# Patient Record
Sex: FEMALE | Race: BLACK OR AFRICAN AMERICAN | NOT HISPANIC OR LATINO | Employment: OTHER | URBAN - METROPOLITAN AREA
[De-identification: names, ages, dates, MRNs, and addresses within clinical notes are randomized per-mention and may not be internally consistent; named-entity substitution may affect disease eponyms.]

---

## 2017-11-13 ENCOUNTER — OFFICE VISIT (OUTPATIENT)
Dept: OPHTHALMOLOGY | Facility: CLINIC | Age: 67
End: 2017-11-13
Payer: COMMERCIAL

## 2017-11-13 DIAGNOSIS — H43.812 POSTERIOR VITREOUS DETACHMENT OF LEFT EYE: ICD-10-CM

## 2017-11-13 DIAGNOSIS — H43.12 VITREOUS HEMORRHAGE OF LEFT EYE: ICD-10-CM

## 2017-11-13 PROCEDURE — 99999 PR PBB SHADOW E&M-EST. PATIENT-LVL III: CPT | Mod: PBBFAC,,, | Performed by: OPHTHALMOLOGY

## 2017-11-13 PROCEDURE — 92014 COMPRE OPH EXAM EST PT 1/>: CPT | Mod: 25,S$GLB,, | Performed by: OPHTHALMOLOGY

## 2017-11-13 PROCEDURE — 67028 INJECTION EYE DRUG: CPT | Mod: LT,S$GLB,, | Performed by: OPHTHALMOLOGY

## 2017-11-13 RX ADMIN — Medication 1.25 MG: at 02:11

## 2017-11-13 NOTE — PROGRESS NOTES
HPI     Eye Problem    Additional comments: yearly check           Comments   DLS 11/8/16- She has been seeing floaters OS for about 2 weeks now. No   changes in vision noticed. BS ok but runs up and down    QYL=986 yesterday am      OCT - stable central cystoid space OD  No ME OS    Prior FA - NV OU OD > OS  With macular ischemia OD > OS  And late leakage OD c/w CME/DME      A/P    1. DM   - PDR s/p PRP ou  Increased VH OS    Avastin OS today    Will need additional PRP OS in future    2. DME OD  - S/p Avastin OD x 4  - S/p focal OD  - Stable CME OD today  - Monitor for now - consider Ozurdex OD if worsens    3. Pseudophakia OU    4. VH OS  Increased 3 weeks ago,  But improving  Monitor      5. Glaucoma screen      1 month    Risks, benefits, and alternatives to treatment discussed in detail with the patient.  The patient voiced understanding and wished to proceed with the procedure    Injection Procedure Note:  Diagnosis: PDR/VH OS    Topical Proparacaine and Betadine.  Inject Avastin OS at 6:00 @ 3.5-4mm posterior to limbus  Post Operative Dx: Same  Complications: None  Follow up as above.

## 2017-11-13 NOTE — PATIENT INSTRUCTIONS

## 2017-12-19 ENCOUNTER — HOSPITAL ENCOUNTER (EMERGENCY)
Facility: HOSPITAL | Age: 67
Discharge: HOME | End: 2017-12-19
Attending: EMERGENCY MEDICINE
Payer: COMMERCIAL

## 2017-12-19 VITALS
DIASTOLIC BLOOD PRESSURE: 67 MMHG | BODY MASS INDEX: 30.24 KG/M2 | HEIGHT: 59 IN | SYSTOLIC BLOOD PRESSURE: 155 MMHG | WEIGHT: 150 LBS | OXYGEN SATURATION: 99 % | RESPIRATION RATE: 18 BRPM | TEMPERATURE: 98 F | HEART RATE: 92 BPM
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
## 2017-12-19 DIAGNOSIS — R74.01 TRANSAMINITIS: ICD-10-CM
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
## 2017-12-19 DIAGNOSIS — R00.0 TACHYCARDIA: ICD-10-CM

## 2017-12-19 DIAGNOSIS — S09.90XA TRAUMATIC INJURY OF HEAD, INITIAL ENCOUNTER: ICD-10-CM

## 2017-12-19 DIAGNOSIS — R42 LIGHTHEADEDNESS: ICD-10-CM

## 2017-12-19 DIAGNOSIS — S89.91XA RIGHT KNEE INJURY: ICD-10-CM

## 2017-12-19 DIAGNOSIS — S89.92XA LEFT KNEE INJURY: ICD-10-CM

## 2017-12-19 LAB
ALBUMIN SERPL BCP-MCNC: 3.2 G/DL
ALP SERPL-CCNC: 173 U/L
ALT SERPL W/O P-5'-P-CCNC: 58 U/L
ANION GAP SERPL CALC-SCNC: 9 MMOL/L
AST SERPL-CCNC: 55 U/L
BASOPHILS # BLD AUTO: 0.04 K/UL
BASOPHILS NFR BLD: 0.4 %
BILIRUB SERPL-MCNC: 0.4 MG/DL
BUN SERPL-MCNC: 26 MG/DL
CALCIUM SERPL-MCNC: 10.1 MG/DL
CHLORIDE SERPL-SCNC: 103 MMOL/L
CO2 SERPL-SCNC: 29 MMOL/L
CREAT SERPL-MCNC: 0.8 MG/DL
DIFFERENTIAL METHOD: ABNORMAL
EOSINOPHIL # BLD AUTO: 0 K/UL
EOSINOPHIL NFR BLD: 0.4 %
ERYTHROCYTE [DISTWIDTH] IN BLOOD BY AUTOMATED COUNT: 16.4 %
EST. GFR  (AFRICAN AMERICAN): >60 ML/MIN/1.73 M^2
EST. GFR  (NON AFRICAN AMERICAN): >60 ML/MIN/1.73 M^2
GLUCOSE SERPL-MCNC: 63 MG/DL
HCT VFR BLD AUTO: 39.7 %
HGB BLD-MCNC: 12.3 G/DL
IMM GRANULOCYTES # BLD AUTO: 0.03 K/UL
IMM GRANULOCYTES NFR BLD AUTO: 0.3 %
LYMPHOCYTES # BLD AUTO: 3.4 K/UL
LYMPHOCYTES NFR BLD: 31.7 %
MCH RBC QN AUTO: 24.6 PG
MCHC RBC AUTO-ENTMCNC: 31 G/DL
MCV RBC AUTO: 79 FL
MONOCYTES # BLD AUTO: 0.6 K/UL
MONOCYTES NFR BLD: 5.2 %
NEUTROPHILS # BLD AUTO: 6.7 K/UL
NEUTROPHILS NFR BLD: 62 %
NRBC BLD-RTO: 0 /100 WBC
PLATELET # BLD AUTO: 445 K/UL
PMV BLD AUTO: 9.1 FL
POTASSIUM SERPL-SCNC: 4.3 MMOL/L
PROT SERPL-MCNC: 7.8 G/DL
RBC # BLD AUTO: 5 M/UL
SODIUM SERPL-SCNC: 141 MMOL/L
WBC # BLD AUTO: 10.78 K/UL

## 2017-12-19 PROCEDURE — 93010 ELECTROCARDIOGRAM REPORT: CPT | Mod: ,,, | Performed by: INTERNAL MEDICINE

## 2017-12-19 PROCEDURE — 25000003 PHARM REV CODE 250: Performed by: PHYSICIAN ASSISTANT

## 2017-12-19 PROCEDURE — 96360 HYDRATION IV INFUSION INIT: CPT

## 2017-12-19 PROCEDURE — 96361 HYDRATE IV INFUSION ADD-ON: CPT

## 2017-12-19 PROCEDURE — 93005 ELECTROCARDIOGRAM TRACING: CPT

## 2017-12-19 PROCEDURE — 90715 TDAP VACCINE 7 YRS/> IM: CPT | Performed by: PHYSICIAN ASSISTANT

## 2017-12-19 PROCEDURE — 99284 EMERGENCY DEPT VISIT MOD MDM: CPT | Mod: 25

## 2017-12-19 PROCEDURE — 85025 COMPLETE CBC W/AUTO DIFF WBC: CPT

## 2017-12-19 PROCEDURE — 80053 COMPREHEN METABOLIC PANEL: CPT

## 2017-12-19 PROCEDURE — 63600175 PHARM REV CODE 636 W HCPCS: Performed by: PHYSICIAN ASSISTANT

## 2017-12-19 PROCEDURE — 25000003 PHARM REV CODE 250: Performed by: EMERGENCY MEDICINE

## 2017-12-19 PROCEDURE — 99284 EMERGENCY DEPT VISIT MOD MDM: CPT | Mod: ,,, | Performed by: EMERGENCY MEDICINE

## 2017-12-19 PROCEDURE — 90471 IMMUNIZATION ADMIN: CPT | Performed by: PHYSICIAN ASSISTANT

## 2017-12-19 RX ORDER — ACETAMINOPHEN 325 MG/1
650 TABLET ORAL
Status: COMPLETED | OUTPATIENT
Start: 2017-12-19 | End: 2017-12-19

## 2017-12-19 RX ADMIN — SODIUM CHLORIDE 500 ML: 0.9 INJECTION, SOLUTION INTRAVENOUS at 07:12

## 2017-12-19 RX ADMIN — ACETAMINOPHEN 650 MG: 325 TABLET ORAL at 06:12

## 2017-12-19 RX ADMIN — CLOSTRIDIUM TETANI TOXOID ANTIGEN (FORMALDEHYDE INACTIVATED), CORYNEBACTERIUM DIPHTHERIAE TOXOID ANTIGEN (FORMALDEHYDE INACTIVATED), BORDETELLA PERTUSSIS TOXOID ANTIGEN (GLUTARALDEHYDE INACTIVATED), BORDETELLA PERTUSSIS FILAMENTOUS HEMAGGLUTININ ANTIGEN (FORMALDEHYDE INACTIVATED), BORDETELLA PERTUSSIS PERTACTIN ANTIGEN, AND BORDETELLA PERTUSSIS FIMBRIAE 2/3 ANTIGEN 0.5 ML: 5; 2; 2.5; 5; 3; 5 INJECTION, SUSPENSION INTRAMUSCULAR at 06:12

## 2017-12-19 NOTE — ED TRIAGE NOTES
[facility]  States that she fell on her face sustaining an abrasion to the inside of her upper lip, neck, both forearms and the medial aspect of her left thing.    GENERAL: The patient is well-developed and well-nourished in no apparent distress. Alert and oriented x4.                                                HEENT: Head is normocephalic and atraumatic. Extraocular muscles are intact. Pupils are equal, round, and reactive to light and accommodation. Nares appeared normal. Mouth is well hydrated and without lesions. Mucous membranes are moist. Posterior pharynx clear of any exudate or lesions.    NECK: Supple. No carotid bruits. No lymphadenopathy or thyromegaly.    LUNGS: Clear to auscultation.    HEART: Regular rate and rhythm without murmur.     ABDOMEN: Soft, nontender, and nondistended. Positive bowel sounds. No hepatosplenomegaly was noted.     EXTREMITIES: Without any cyanosis, clubbing, rash, lesions or edema.     NEUROLOGIC: Cranial nerves II through XII are grossly intact.     PSYCHIATRIC: Flat affect, but denies suicidal or homicidal ideations.    SKIN: No ulceration or induration present.

## 2017-12-20 NOTE — ED NOTES
Discharge home with family, states understanding to follow up as directed. Ambulates out of ED without difficulty. Reviewed dc info with PA and family prior to discharge.

## 2017-12-20 NOTE — ED NOTES
Patient sitting in recliner eating a whopper from [facility] [employer] that was brought in by family member

## 2017-12-20 NOTE — ED PROVIDER NOTES
"Encounter Date: 12/19/2017    SCRIBE #1 NOTE: I, Dory Romo, am scribing for, and in the presence of,  Dr. Lara . I have scribed the following portions of the note - the APC attestation.       History     Chief Complaint   Patient presents with    Fall     tripped walking to clinic, fell and struck  mouth on floor. Lost the partial plate on the bottom during the fall. On plavix. Denies LOC.Pain in both arms    and left knee     66 year old female with past medical history of DMII with proliferative retinopathy, nuclear sclerosis, Hx of CVA on Plavix, Tobacco use presenting to the ED with the chief complaint of a fall. Patient reports falling at 2pm today on her way to the eye clinic at [facility]. She reports she felt like her left leg "gave out" which caused her to fall. She reports falling forward on her bilateral knees and subsequently hitting her face and forehead on the ground. She reports her partial dentures on the bottom came out. She denies LOC and remembers the fall. She was able to ambulate after the fall. She reports feeling at her baseline health and denies having medical complaints prior to the fall. Patient denies headache, fever, chills, sore throat, trouble swallowing, chest pain, cough, shortness of breath, abdominal pain, nausea, vomiting, dysuria, hematuria, diarrhea, constipation, and extremity weakness.            Review of patient's allergies indicates:   Allergen Reactions    Iodinated contrast- oral and iv dye      Past Medical History:   Diagnosis Date    Arthritis     Cataract     Diabetes mellitus     Diabetic retinopathy     Hypertension      Past Surgical History:   Procedure Laterality Date    CAPSULOTOMY  6/26/13    yag left eye    CATARACT EXTRACTION  6/3/2013    right eye    CHOLECYSTECTOMY      TOTAL ABDOMINAL HYSTERECTOMY W/ BILATERAL SALPINGOOPHORECTOMY       Family History   Problem Relation Age of Onset    Glaucoma Mother     Diabetes Mother     Colon cancer " Mother     Diabetes Father     Diabetes Sister     Breast cancer Sister     Diabetes Maternal Uncle      Social History   Substance Use Topics    Smoking status: Current Every Day Smoker     Packs/day: 1.50    Smokeless tobacco: Never Used    Alcohol use No     Review of Systems   Constitutional: Negative for chills and fever.   HENT: Positive for dental problem. Negative for congestion, sore throat and trouble swallowing.    Eyes: Negative for pain.   Respiratory: Negative for cough and shortness of breath.    Cardiovascular: Negative for chest pain.   Gastrointestinal: Negative for abdominal pain, diarrhea, nausea and vomiting.   Genitourinary: Negative for dysuria and hematuria.   Musculoskeletal: Positive for arthralgias and gait problem. Negative for neck pain and neck stiffness.   Skin: Negative for wound.   Neurological: Positive for light-headedness. Negative for numbness and headaches.       Physical Exam     Initial Vitals [12/19/17 1521]   BP Pulse Resp Temp SpO2   125/87 100 16 97.7 °F (36.5 °C) 99 %      MAP       99.67         Physical Exam    Constitutional: She appears well-developed and well-nourished. No distress.   HENT:   Head: Normocephalic.   Mouth/Throat: Oropharynx is clear and moist. No oropharyngeal exudate.   There is a superficial scrape on the inside of the upper lip   Eyes: EOM are normal. Pupils are equal, round, and reactive to light.   Neck: Normal range of motion.   Cardiovascular: Normal rate, regular rhythm and intact distal pulses.   Pulmonary/Chest: Breath sounds normal. No respiratory distress. She has no wheezes.   Abdominal: Soft. Bowel sounds are normal. There is no tenderness.   Musculoskeletal:   There is tenderness to the left knee most prominent distal to the patella. There is full passive ROM. She able to bear weight and walk on her left leg.    Lymphadenopathy:     She has no cervical adenopathy.   Neurological: She is alert and oriented to person, place, and  time. She has normal strength and normal reflexes. No cranial nerve deficit.   Skin: Skin is warm and dry. No erythema.       Imaging Results          CT Head Without Contrast (Final result)  Result time 12/19/17 21:03:42    Final result by Yusuf Chairez MD (12/19/17 21:03:42)                 Impression:        No acute findings.    Remote right MCA territory infarct.    Remote left PCA distribution infarct with compensatory enlargement of the left occipital horn.    Chronic microvascular ischemic change.  ---------------------------------  Findings first visualized at 20:50.  Impression discussed with Skye Lara MD at 20:58 by Dr. Harvey via telephone for staff radiologist Yusuf Chairez MD .  ______________________________________     Electronically signed by resident: JAEYSH HARVEY MD  Date:     12/19/17  Time:    20:59            As the supervising and teaching physician, I personally reviewed the images and resident's interpretation and I agree with the findings.          Electronically signed by: YUSUF CHAIREZ MD  Date:     12/19/17  Time:    21:03              Narrative:    CT of the head without contrast:    Technique: 5 mm axial images were obtained from the skull base to the vertex without administration of intravenous contrast.    History: The head trauma, MRI brain 2/11/13    Comparison: MRI brain 2/11/13    Findings:     The ventricular system is normal in size and configuration.    There is a hypodense region involving the right frontal lobe corresponds this patient's remote history of a right MCA distribution infarct.  There is a hypodense region in the left occipital lobe which is also favored to represent a remote infarct in the left PCA distribution with compensatory enlargement of the left occipital horn. There are superimposed patchy and confluent regions of hypoattenuation within the supratentorial white matter in a distribution consistent with chronic microvascular ischemic change.   No evidence of intracranial hemorrhage or intra-axial mass.    No extra-axial masses or abnormal fluid collections.    The calvarium appears normal.  Paranasal sinuses and mastoid air cells appear clear.  No significant abnormality of the extracranial soft tissues.                             CT Lower Extremity Without Contrast Left (Final result)  Result time 12/19/17 21:45:57    Final result by Yusuf Burleson MD (12/19/17 21:45:57)                 Impression:        No acute fracture.    Small joint effusion.    Prepatellar soft tissue stranding which can be seen in the setting of prepatellar bursitis vs. edema.    Chondrocalcinosis of the medial and lateral lateral meniscus and calcifications of the articular cartilage of the knee.  These findings are not specific and can be seen with arthritis or possibly CPPD.    Atherosclerosis.  ______________________________________     Electronically signed by resident: JAYESH HARVEY MD  Date:     12/19/17  Time:    21:08            As the supervising and teaching physician, I personally reviewed the images and resident's interpretation and I agree with the findings.          Electronically signed by: YUSUF BURLESON MD  Date:     12/19/17  Time:    21:45              Narrative:    CT left knee    Technique: Helical axial sections were obtained through the left knee without IV contrast.  Multiplanar reformats were obtained.    Comparison: Radiograph bilateral knees 12/19/17    Indication: Possible left tibial plateau fracture.    Findings:    No evidence of acute fracture.  The previously questioned left tibial plateau fracture was likely artifactual.    There are mild degenerative changes of the knee with associated mild joint space narrowing and marginal osteophytes.  A fabella is incidentally noted.    There is chondrocalcinosis of the medial lateral meniscus an additional calcifications of the articular cartilage of the lateral tibial plateau and patellar  cartilage.  These findings are nonspecific and can be seen in the setting of arthritis or possibly CPPD.    There is a small joint effusion.  The extensor mechanism is maintained.    There is soft tissue stranding anterior to the patella and patellar tendon which can be seen with prepatellar bursitis.    The adjacent soft tissues otherwise demonstrate no significant abnormality.  There is significant atherosclerosis of the distal femoral artery, popliteal artery, and arteries of the calf.                             X-Ray Knee 3 View Bilateral (Final result)     Abnormal  Result time 12/19/17 20:13:37   Procedure changed from X-Ray Knee 3 View Left     Final result by Brooks Hinds MD (12/19/17 20:13:37)                 Impression:     Questionable nondisplaced fracture lateral side of left tibial plateau. Suggest clinical correlation and/or CT for confirmation.    Chondrocalcinosis and mild degenerative change of both knees.    This report has been flagged in EPIC .          Electronically signed by: BROOKS HINDS MD  Date:     12/19/17  Time:    20:13              Narrative:    Bilateral knee exam was performed with these views -- right and left laterals, AP each knee, and Merchant both knees.  Comparison -- none.    The AP view shows questionable nondisplaced fracture line along the lateral aspect of the left tibial plateau. This is not definite. If there is strong clinical suspicion of fracture, CT is recommended for further assessment. Otherwise the skeletal structures are intact without fracture, dislocation, or joint effusion. Mild degenerative changes are present with slight spurring at a few positions. The joint spaces are not significantly narrowed. Cartilage calcifications are  present bilaterally indicating chondrocalcinosis. Prominent vascular calcifications are present on both side.                                ED Course   Procedures  Labs Reviewed   CBC W/ AUTO DIFFERENTIAL - Abnormal;  Notable for the following:        Result Value    MCV 79 (*)     MCH 24.6 (*)     MCHC 31.0 (*)     RDW 16.4 (*)     Platelets 445 (*)     MPV 9.1 (*)     All other components within normal limits   COMPREHENSIVE METABOLIC PANEL - Abnormal; Notable for the following:     Glucose 63 (*)     BUN, Bld 26 (*)     Albumin 3.2 (*)     Alkaline Phosphatase 173 (*)     AST 55 (*)     ALT 58 (*)     All other components within normal limits                   APC / Resident Notes:   66 year old female with past medical history of DMII with proliferative retinopathy, nuclear sclerosis, Hx of CVA on Plavix, Tobacco use presenting to the ED c/o a fall occurring at 2pm today. Initial vitals stable. Physical exam reveals patient is AAOx3 and follows commands appropriately. There is tenderness to her forehead. There is a superficial scrape inside her upper lip. Heart and lung sounds unremarkable. There is no abdominal or CVA tenderness. There is no midline spinal tenderness. Patient is neurovascularly intact. There is tenderness to the left knee most prominent distal to the patella. There is full passive ROM. She able to bear weight and walk on her left leg. DDx includes but not limited to fracture, intracranial hemorrhage, musculoskeletal injury, osteoarthritis, compartment syndrome, septic arthritis, DVT. Will proceed with orthostatics, bilateral knee x-rays, head CT, and basic labs. Will update tetanus and give fluids.     Orthostatics positive  ECG - NSR at 96 bpm, left fascicular block with no changes from previous    WBC 10.78, H/H 12.3/39.7  Electrolytes show elevated BUN 26, elevated alk phos 173, elevated AST/ALT 55/58    CT head without contrast:  -No acute findings.  -Remote right MCA territory infarct.  -Remote left PCA distribution infarct with compensatory enlargement of the left occipital horn.  -Chronic microvascular ischemic change.    CT of knee after questionable nondisplaced fracture lateral side of left tibial  plateau on x-ray:  -No evidence of acute fracture.  The previously questioned left tibial plateau fracture was likely artifactual.  -There are mild degenerative changes of the knee with associated mild joint space narrowing and marginal osteophytes. There is a small joint effusion. Soft tissue stranding anterior to the patella and patellar tendon.     Labs and imaging reassuring for emergent processes at this time. Patient advised to follow-up with neurology regarding Head CT imaging. Patient advised to follow-up with orthopedics regarding CT of the knee if she continues to experience symptoms. Transaminitis most likey 2/2 to statin use as patient does not have abdominal tenderness. I have advised the patient to follow-up with their PCP. Return to ED precautions given. All of the patient's questions were answered. I have discussed the care of this patient with my supervising physician.        Scribe Attestation:   Scribe #1: I performed the above scribed service and the documentation accurately describes the services I performed. I attest to the accuracy of the note.    Attending Attestation:     Physician Attestation Statement for NP/PA:   I have conducted a face to face encounter with this patient in addition to the NP/PA, due to Medical Complexity    Other NP/PA Attestation Additions:    History of Present Illness: 66 y.o. female on Plavix and aspirin for a CVA presenting with a mechanical fall onto her knees, face, and forehead c/o bilateral knee pain left more than right and mouth pain. There is a small abrasion to her inner and upper lip. Pt has a loose left front upper incisor. No c- spine tenderness. Pt has some bilateral mild knee pain, but normal gait. Rest of trama exam is unremarkable. Will do CXR, head CT, bilateral knee xray, and reassess.                    ED Course      Clinical Impression:   The primary encounter diagnosis was Fall, initial encounter. Diagnoses of Lightheadedness, Left knee injury,  Right knee injury, Tachycardia, Transaminitis, and Traumatic injury of head, initial encounter were also pertinent to this visit.    Disposition:   Disposition: Discharged  Condition: Stable                        Quan Saldivar PA-C  12/20/17 9985

## 2018-01-16 ENCOUNTER — PROCEDURE VISIT (OUTPATIENT)
Dept: OPHTHALMOLOGY | Facility: CLINIC | Age: 68
End: 2018-01-16
Payer: COMMERCIAL

## 2018-01-16 VITALS — HEART RATE: 106 BPM | DIASTOLIC BLOOD PRESSURE: 79 MMHG | SYSTOLIC BLOOD PRESSURE: 143 MMHG

## 2018-01-16 DIAGNOSIS — H43.812 POSTERIOR VITREOUS DETACHMENT OF LEFT EYE: ICD-10-CM

## 2018-01-16 DIAGNOSIS — H43.12 VITREOUS HEMORRHAGE OF LEFT EYE: ICD-10-CM

## 2018-01-16 PROCEDURE — 92014 COMPRE OPH EXAM EST PT 1/>: CPT | Mod: S$GLB,,, | Performed by: OPHTHALMOLOGY

## 2018-01-16 PROCEDURE — 92226 PR SPECIAL EYE EXAM, SUBSEQUENT: CPT | Mod: RT,S$GLB,, | Performed by: OPHTHALMOLOGY

## 2018-01-16 NOTE — PROGRESS NOTES
HPI     DLS 11/13/17          Pt noticed that VA is stable---denies eye or flashes but is seeing   floaters in the left eye     LBS ---118 this am       OCT - stable central cystoid space OD  No ME OS    Prior FA - NV OU OD > OS  With macular ischemia OD > OS  And late leakage OD c/w CME/DME      A/P    1. DM   - PDR s/p PRP ou  Increased VH OS  S/p Avastin OS    Still central heme - consider PPV next visit if NI    Will need additional PRP OS in future    2. DME OD  - S/p Avastin OD x 4  - S/p focal OD  - Stable CME OD today  - Monitor for now - consider Ozurdex OD if worsens    3. Pseudophakia OU    4. VH OS  Increased 3 weeks ago,  But improving  Monitor      5. Glaucoma screen      2 month

## 2018-03-13 ENCOUNTER — PROCEDURE VISIT (OUTPATIENT)
Dept: OPHTHALMOLOGY | Facility: CLINIC | Age: 68
End: 2018-03-13
Payer: COMMERCIAL

## 2018-03-13 VITALS — HEART RATE: 102 BPM | DIASTOLIC BLOOD PRESSURE: 68 MMHG | SYSTOLIC BLOOD PRESSURE: 116 MMHG

## 2018-03-13 DIAGNOSIS — H43.812 POSTERIOR VITREOUS DETACHMENT OF LEFT EYE: ICD-10-CM

## 2018-03-13 DIAGNOSIS — H43.12 VITREOUS HEMORRHAGE OF LEFT EYE: ICD-10-CM

## 2018-03-13 PROCEDURE — 92226 PR SPECIAL EYE EXAM, SUBSEQUENT: CPT | Mod: 59,RT,S$GLB, | Performed by: OPHTHALMOLOGY

## 2018-03-13 PROCEDURE — 92014 COMPRE OPH EXAM EST PT 1/>: CPT | Mod: S$GLB,,, | Performed by: OPHTHALMOLOGY

## 2018-03-13 NOTE — PROGRESS NOTES
HPI     2 mo f/u   DLS- 01/16/2018 Dr. Dawson    Pt sts still seeing blood in OS and denies pain   Vision okay worse at night.   (-)Flashes (-)Floaters  (+)Photophobia  (+)Glare    No gtts     LBS - 118 today   Hemoglobin A1C       Date                     Value               Ref Range             Status                02/12/2013               7.7 (H)             4.0 - 6.2 %           Final            ----------      OCT - stable central cystoid space OD  No ME OS    Prior FA - NV OU OD > OS  With macular ischemia OD > OS  And late leakage OD c/w CME/DME      A/P    1. DM   - PDR s/p PRP ou  Increased VH OS  S/p Avastin OS    Will need additional PRP OS in future    2. DME OD  - S/p Avastin OD x 4  - S/p focal OD  - Stable CME OD today  - Monitor for now - consider Ozurdex OD if worsens    3. Pseudophakia OU    4. VH OS  Increased 3 weeks ago,  But improving  Monitor      5. Glaucoma screen      2-3 weeks PRP OS fill in

## 2018-03-27 ENCOUNTER — OFFICE VISIT (OUTPATIENT)
Dept: OPHTHALMOLOGY | Facility: CLINIC | Age: 68
End: 2018-03-27
Payer: COMMERCIAL

## 2018-03-27 VITALS — DIASTOLIC BLOOD PRESSURE: 84 MMHG | SYSTOLIC BLOOD PRESSURE: 151 MMHG | HEART RATE: 102 BPM

## 2018-03-27 DIAGNOSIS — H43.12 VITREOUS HEMORRHAGE OF LEFT EYE: ICD-10-CM

## 2018-03-27 PROCEDURE — 99999 PR PBB SHADOW E&M-EST. PATIENT-LVL III: CPT | Mod: PBBFAC,,, | Performed by: OPHTHALMOLOGY

## 2018-03-27 PROCEDURE — 67228 TREATMENT X10SV RETINOPATHY: CPT | Mod: LT,S$GLB,, | Performed by: OPHTHALMOLOGY

## 2018-03-27 PROCEDURE — 99499 UNLISTED E&M SERVICE: CPT | Mod: S$GLB,,, | Performed by: OPHTHALMOLOGY

## 2018-03-27 NOTE — PROGRESS NOTES
HPI     Eye Problem    Additional comments: PRP           Comments   DLS 3/13/18- Patient here for PRP fill in OD    OCT - stable central cystoid space OD  No ME OS    Prior FA - NV OU OD > OS  With macular ischemia OD > OS  And late leakage OD c/w CME/DME      A/P    1. DM   - PDR s/p PRP ou   Recent VH OS  S/p Avastin OS  (last 11/13/17)    Here for PRP fill in today.     2. DME OD  - S/p Avastin OD x 4  - S/p focal OD  - Stable CME OD today  - Monitor for now - consider Ozurdex OD if worsens    3. Pseudophakia OU    4. VH OS   - improving  Monitor      5. Glaucoma screen      1 month OCT    Risks, benefits, and alternatives to treatment discussed in detail with the patient.  The patient voiced understanding and wished to proceed with the procedure  Laser Procedure Note  Dx: PDR with VH OS  Laser: PRP OS  Argon  Spot: 200  Power: 130-170  Dur: 0.050  #:  [id number]  Complications: None  F/U as above

## 2018-05-01 ENCOUNTER — OFFICE VISIT (OUTPATIENT)
Dept: OPHTHALMOLOGY | Facility: CLINIC | Age: 68
End: 2018-05-01
Payer: COMMERCIAL

## 2018-05-01 VITALS — HEART RATE: 96 BPM | SYSTOLIC BLOOD PRESSURE: 113 MMHG | DIASTOLIC BLOOD PRESSURE: 72 MMHG

## 2018-05-01 DIAGNOSIS — H43.12 VITREOUS HEMORRHAGE OF LEFT EYE: ICD-10-CM

## 2018-05-01 DIAGNOSIS — H43.812 POSTERIOR VITREOUS DETACHMENT OF LEFT EYE: ICD-10-CM

## 2018-05-01 PROCEDURE — 99999 PR PBB SHADOW E&M-EST. PATIENT-LVL III: CPT | Mod: PBBFAC,,, | Performed by: OPHTHALMOLOGY

## 2018-05-01 PROCEDURE — 92226 PR SPECIAL EYE EXAM, SUBSEQUENT: CPT | Mod: RT,S$GLB,, | Performed by: OPHTHALMOLOGY

## 2018-05-01 PROCEDURE — 92014 COMPRE OPH EXAM EST PT 1/>: CPT | Mod: S$GLB,,, | Performed by: OPHTHALMOLOGY

## 2018-05-01 NOTE — PROGRESS NOTES
HPI     1mth check/ OCT  DLS-03/27/2018 Dr. Dawson     No change in vision maybe a little better still having floaters.     (-)Flashes (+)Floaters  (-)Photophobia  (+)Glare    No gtts         OCT - stable central cystoid space OD  No ME OS    Prior FA - NV OU OD > OS  With macular ischemia OD > OS  And late leakage OD c/w CME/DME      A/P    1. DM   - PDR s/p PRP ou   Recent VH OS  S/p Avastin OS  (last 11/13/17)    Here for PRP fill in today.     2. DME OD  - S/p Avastin OD x 4  - S/p focal OD  - Stable CME OD today  - Monitor for now - consider Ozurdex OD if worsens    3. Pseudophakia OU    4. VH OS   - improving  Monitor      5. Glaucoma screen      3 month OCT

## 2018-07-31 ENCOUNTER — OFFICE VISIT (OUTPATIENT)
Dept: OPHTHALMOLOGY | Facility: CLINIC | Age: 68
End: 2018-07-31
Payer: COMMERCIAL

## 2018-07-31 VITALS — DIASTOLIC BLOOD PRESSURE: 70 MMHG | HEART RATE: 100 BPM | SYSTOLIC BLOOD PRESSURE: 127 MMHG

## 2018-07-31 DIAGNOSIS — H43.12 VITREOUS HEMORRHAGE OF LEFT EYE: ICD-10-CM

## 2018-07-31 DIAGNOSIS — H43.812 POSTERIOR VITREOUS DETACHMENT OF LEFT EYE: ICD-10-CM

## 2018-07-31 DIAGNOSIS — Z96.1 PSEUDOPHAKIA OF BOTH EYES: ICD-10-CM

## 2018-07-31 PROCEDURE — 99999 PR PBB SHADOW E&M-EST. PATIENT-LVL III: CPT | Mod: PBBFAC,,, | Performed by: OPHTHALMOLOGY

## 2018-07-31 PROCEDURE — 92226 PR SPECIAL EYE EXAM, SUBSEQUENT: CPT | Mod: LT,S$GLB,, | Performed by: OPHTHALMOLOGY

## 2018-07-31 PROCEDURE — 92014 COMPRE OPH EXAM EST PT 1/>: CPT | Mod: S$GLB,,, | Performed by: OPHTHALMOLOGY

## 2018-07-31 PROCEDURE — 92134 CPTRZ OPH DX IMG PST SGM RTA: CPT | Mod: S$GLB,,, | Performed by: OPHTHALMOLOGY

## 2018-07-31 NOTE — PROGRESS NOTES
HPI     3 mth check/ OCT  DLS-05/01/2018 Dr. Dawson     No change in vision  And improvement in floaters has not seen them in a   while     (-)Flashes (-)Floaters  (-)Photophobia  (+)Glare    No gtts     HPI     1mth check/ OCT  DLS-03/27/2018 Dr. Dawson     No change in vision maybe a little better still having floaters.     (-)Flashes (+)Floaters  (-)Photophobia  (+)Glare    No gtts         OCT - stable central cystoid space OD  No ME OS    Prior FA - NV OU OD > OS  With macular ischemia OD > OS  And late leakage OD c/w CME/DME      A/P    1. DM   - PDR s/p PRP ou   Recent VH OS  S/p Avastin OS  (last 11/13/17)    Here for PRP fill in today.     2. DME OD  - S/p Avastin OD x 4  - S/p focal OD  - Stable CME OD today  - Monitor for now - consider Ozurdex OD if worsens    3. Pseudophakia OU    4. VH OS   - improving  Monitor      5. Glaucoma screen      6 month OCT

## 2018-10-30 ENCOUNTER — OFFICE VISIT (OUTPATIENT)
Dept: OPTOMETRY | Facility: CLINIC | Age: 68
End: 2018-10-30
Payer: COMMERCIAL

## 2018-10-30 DIAGNOSIS — H52.203 ASTIGMATISM WITH PRESBYOPIA, BILATERAL: Primary | ICD-10-CM

## 2018-10-30 DIAGNOSIS — H52.4 ASTIGMATISM WITH PRESBYOPIA, BILATERAL: Primary | ICD-10-CM

## 2018-10-30 PROCEDURE — 92015 DETERMINE REFRACTIVE STATE: CPT | Mod: S$GLB,,, | Performed by: OPTOMETRIST

## 2018-10-30 PROCEDURE — 99999 PR PBB SHADOW E&M-EST. PATIENT-LVL II: CPT | Mod: PBBFAC,,, | Performed by: OPTOMETRIST

## 2018-10-30 RX ORDER — AMITRIPTYLINE HYDROCHLORIDE 100 MG/1
1 TABLET ORAL NIGHTLY
Status: ON HOLD | COMMUNITY
Start: 2018-08-14 | End: 2021-10-15 | Stop reason: HOSPADM

## 2018-10-30 RX ORDER — ZOLPIDEM TARTRATE 10 MG/1
1 TABLET ORAL
Status: ON HOLD | COMMUNITY
Start: 2018-08-14 | End: 2020-07-23 | Stop reason: CLARIF

## 2018-10-30 RX ORDER — ROPINIROLE 0.25 MG/1
2 TABLET, FILM COATED ORAL
Status: ON HOLD | COMMUNITY
Start: 2018-08-14 | End: 2020-07-23 | Stop reason: CLARIF

## 2018-10-31 NOTE — PROGRESS NOTES
HPI     67 y.o. Female is here for MRx. Denies eye pain and flashes. H/o   floaters. Blurred vision at distance and near. Have trouble driving at   night. See better w/o correction.     Eye Meds: No gtt     Last edited by SALONI Green on 10/30/2018  3:09 PM. (History)            Assessment /Plan     For exam results, see Encounter Report.    Astigmatism with presbyopia, bilateral          1.  Bifocal rx given.  Recommend AR coating to help with night driving.  RTC 6 weeks for glasses check.

## 2018-12-11 ENCOUNTER — OFFICE VISIT (OUTPATIENT)
Dept: OPTOMETRY | Facility: CLINIC | Age: 68
End: 2018-12-11
Payer: COMMERCIAL

## 2018-12-11 DIAGNOSIS — H52.203 ASTIGMATISM WITH PRESBYOPIA, BILATERAL: Primary | ICD-10-CM

## 2018-12-11 DIAGNOSIS — H52.4 ASTIGMATISM WITH PRESBYOPIA, BILATERAL: Primary | ICD-10-CM

## 2018-12-11 PROCEDURE — 99499 UNLISTED E&M SERVICE: CPT | Mod: S$GLB,,, | Performed by: OPTOMETRIST

## 2018-12-11 PROCEDURE — 99999 PR PBB SHADOW E&M-EST. PATIENT-LVL II: CPT | Mod: PBBFAC,,, | Performed by: OPTOMETRIST

## 2018-12-11 NOTE — PROGRESS NOTES
HPI     Concerns About Ocular Health      Additional comments: 6 wk MR ck              Comments     Last eye exam was 10/30/18 with Dr. Hutton.  Patient states new glasses hasn't helped at all with night vision. Vision   is slightly better during the day but not much compared to old glasses.           Last edited by Melisa Cortez on 12/11/2018  3:04 PM. (History)            Assessment /Plan     For exam results, see Encounter Report.    Astigmatism with presbyopia, bilateral            1.  Vision better with new glasses.  Educated pt common for night vision to decrease as we age.  Also laser treatment to the retina affecting night vision. Continue scheduled follow-up with Dr. Dawson.

## 2019-01-28 ENCOUNTER — OFFICE VISIT (OUTPATIENT)
Dept: OPHTHALMOLOGY | Facility: CLINIC | Age: 69
End: 2019-01-28
Payer: COMMERCIAL

## 2019-01-28 DIAGNOSIS — H43.12 VITREOUS HEMORRHAGE OF LEFT EYE: ICD-10-CM

## 2019-01-28 DIAGNOSIS — H43.812 POSTERIOR VITREOUS DETACHMENT OF LEFT EYE: ICD-10-CM

## 2019-01-28 PROCEDURE — 92134 CPTRZ OPH DX IMG PST SGM RTA: CPT | Mod: S$GLB,,, | Performed by: OPHTHALMOLOGY

## 2019-01-28 PROCEDURE — 92014 COMPRE OPH EXAM EST PT 1/>: CPT | Mod: S$GLB,,, | Performed by: OPHTHALMOLOGY

## 2019-01-28 PROCEDURE — 99999 PR PBB SHADOW E&M-EST. PATIENT-LVL III: CPT | Mod: PBBFAC,,, | Performed by: OPHTHALMOLOGY

## 2019-01-28 PROCEDURE — 92226 PR SPECIAL EYE EXAM, SUBSEQUENT: CPT | Mod: RT,S$GLB,, | Performed by: OPHTHALMOLOGY

## 2019-01-28 PROCEDURE — 92014 PR EYE EXAM, EST PATIENT,COMPREHESV: ICD-10-PCS | Mod: S$GLB,,, | Performed by: OPHTHALMOLOGY

## 2019-01-28 PROCEDURE — 92226 PR SPECIAL EYE EXAM, SUBSEQUENT: ICD-10-PCS | Mod: RT,S$GLB,, | Performed by: OPHTHALMOLOGY

## 2019-01-28 PROCEDURE — 99999 PR PBB SHADOW E&M-EST. PATIENT-LVL III: ICD-10-PCS | Mod: PBBFAC,,, | Performed by: OPHTHALMOLOGY

## 2019-01-28 PROCEDURE — 92134 POSTERIOR SEGMENT OCT RETINA (OCULAR COHERENCE TOMOGRAPHY)-BOTH EYES: ICD-10-PCS | Mod: S$GLB,,, | Performed by: OPHTHALMOLOGY

## 2019-01-28 NOTE — PROGRESS NOTES
HPI     Eye Problem      Additional comments: 6 mos check              Comments     DLS 7/31/18- No changes x last visit. She went to her Dr last week and BS was a little high    XDB=100 yesterday am  A1C=??      OCT - stable central cystoid space OD  No ME OS    Prior FA - NV OU OD > OS  With macular ischemia OD > OS  And late leakage OD c/w CME/DME      A/P    1. DM   - PDR s/p PRP ou   Recent VH OS  S/p Avastin OS  (last 11/13/17)      2. DME OD  ERM/VMT component  - S/p Avastin OD x 4  - S/p focal OD  - Stable CME OD today  - Monitor for now - consider Ozurdex OD if worsens    3. Pseudophakia OU    4. VH OS   - improving  Monitor      5. Glaucoma screen      6 month OCT

## 2019-07-30 ENCOUNTER — OFFICE VISIT (OUTPATIENT)
Dept: OPHTHALMOLOGY | Facility: CLINIC | Age: 69
End: 2019-07-30
Payer: COMMERCIAL

## 2019-07-30 VITALS — HEART RATE: 99 BPM | DIASTOLIC BLOOD PRESSURE: 76 MMHG | SYSTOLIC BLOOD PRESSURE: 132 MMHG

## 2019-07-30 DIAGNOSIS — H43.12 VITREOUS HEMORRHAGE OF LEFT EYE: ICD-10-CM

## 2019-07-30 DIAGNOSIS — H43.812 POSTERIOR VITREOUS DETACHMENT OF LEFT EYE: ICD-10-CM

## 2019-07-30 PROCEDURE — 99999 PR PBB SHADOW E&M-EST. PATIENT-LVL III: CPT | Mod: PBBFAC,,, | Performed by: OPHTHALMOLOGY

## 2019-07-30 PROCEDURE — 92134 CPTRZ OPH DX IMG PST SGM RTA: CPT | Mod: S$GLB,,, | Performed by: OPHTHALMOLOGY

## 2019-07-30 PROCEDURE — 92014 PR EYE EXAM, EST PATIENT,COMPREHESV: ICD-10-PCS | Mod: S$GLB,,, | Performed by: OPHTHALMOLOGY

## 2019-07-30 PROCEDURE — 92134 POSTERIOR SEGMENT OCT RETINA (OCULAR COHERENCE TOMOGRAPHY)-BOTH EYES: ICD-10-PCS | Mod: S$GLB,,, | Performed by: OPHTHALMOLOGY

## 2019-07-30 PROCEDURE — 99999 PR PBB SHADOW E&M-EST. PATIENT-LVL III: ICD-10-PCS | Mod: PBBFAC,,, | Performed by: OPHTHALMOLOGY

## 2019-07-30 PROCEDURE — 92226 PR SPECIAL EYE EXAM, SUBSEQUENT: CPT | Mod: LT,S$GLB,, | Performed by: OPHTHALMOLOGY

## 2019-07-30 PROCEDURE — 92014 COMPRE OPH EXAM EST PT 1/>: CPT | Mod: S$GLB,,, | Performed by: OPHTHALMOLOGY

## 2019-07-30 PROCEDURE — 92226 PR SPECIAL EYE EXAM, SUBSEQUENT: ICD-10-PCS | Mod: RT,S$GLB,, | Performed by: OPHTHALMOLOGY

## 2019-07-30 NOTE — PROGRESS NOTES
HPI     6 mo OCT   DLS- 01/28/2019 Dr. Dawson     Pt sts no change in va since last visit denies pain   (-)Flashes (-)Floaters  (+)Photophobia  (+)Glare    No gtts       OCT - stable central cystoid space OD  No ME OS    Prior FA - NV OU OD > OS  With macular ischemia OD > OS  And late leakage OD c/w CME/DME      A/P    1. DM   - PDR s/p PRP ou   Recent VH OS  S/p Avastin OS  (last 11/13/17)      2. DME OD  ERM/VMT component  - S/p Avastin OD x 4  - S/p focal OD  - Stable CME OD today  - Monitor for now - consider Ozurdex OD if worsens    3. Pseudophakia OU    4. VH OS   - improving  Monitor      5. Glaucoma screen      12 month OCT

## 2020-03-25 ENCOUNTER — DOCUMENTATION ONLY (OUTPATIENT)
Dept: SURGERY | Facility: CLINIC | Age: 70
End: 2020-03-25

## 2020-03-25 ENCOUNTER — HOSPITAL ENCOUNTER (OUTPATIENT)
Dept: RADIOLOGY | Facility: HOSPITAL | Age: 70
Discharge: HOME | End: 2020-03-25
Attending: SURGERY
Payer: COMMERCIAL

## 2020-03-25 NOTE — PROGRESS NOTES
Received call from [facility], pt is requesting to see  for a consult for newly diagnosed breast cancer. CD & records brought to [facility] yesterday evening & uploaded/scanned into media. Tentatively scheduled for tomorrow, attempted to call pt, no answer, left voicemail.

## 2020-03-26 ENCOUNTER — TELEPHONE (OUTPATIENT)
Dept: SURGERY | Facility: CLINIC | Age: 70
End: 2020-03-26

## 2020-03-26 NOTE — TELEPHONE ENCOUNTER
Called pt & rescheduled appt to next week because receptors are not back, she voiced understanding.

## 2020-03-26 NOTE — TELEPHONE ENCOUNTER
Attempted to call & reschedule consult today because receptors are not back, no answer, left voicemail.

## 2020-03-30 ENCOUNTER — TELEPHONE (OUTPATIENT)
Dept: SURGERY | Facility: CLINIC | Age: 70
End: 2020-03-30

## 2020-03-30 NOTE — TELEPHONE ENCOUNTER
Called the patient about changing her appointment time with Dr. Clark on 4/2 from 1400 to 0930, she did not answer. Left her a detailed message with my direct call back number.

## 2020-04-01 ENCOUNTER — TELEPHONE (OUTPATIENT)
Dept: SURGERY | Facility: CLINIC | Age: 70
End: 2020-04-01

## 2020-04-02 ENCOUNTER — OFFICE VISIT (OUTPATIENT)
Dept: SURGERY | Facility: CLINIC | Age: 70
End: 2020-04-02
Payer: COMMERCIAL

## 2020-04-02 ENCOUNTER — DOCUMENTATION ONLY (OUTPATIENT)
Dept: SURGERY | Facility: CLINIC | Age: 70
End: 2020-04-02

## 2020-04-02 VITALS
BODY MASS INDEX: 30.23 KG/M2 | HEART RATE: 99 BPM | DIASTOLIC BLOOD PRESSURE: 69 MMHG | WEIGHT: 149.94 LBS | HEIGHT: 59 IN | SYSTOLIC BLOOD PRESSURE: 130 MMHG

## 2020-04-02 DIAGNOSIS — Z17.0 MALIGNANT NEOPLASM OF UPPER-OUTER QUADRANT OF RIGHT BREAST IN FEMALE, ESTROGEN RECEPTOR POSITIVE: Primary | ICD-10-CM

## 2020-04-02 DIAGNOSIS — C50.411 MALIGNANT NEOPLASM OF UPPER-OUTER QUADRANT OF RIGHT BREAST IN FEMALE, ESTROGEN RECEPTOR POSITIVE: Primary | ICD-10-CM

## 2020-04-02 PROCEDURE — 99205 PR OFFICE/OUTPT VISIT, NEW, LEVL V, 60-74 MIN: ICD-10-PCS | Mod: S$GLB,,, | Performed by: SURGERY

## 2020-04-02 PROCEDURE — 99999 PR PBB SHADOW E&M-EST. PATIENT-LVL III: ICD-10-PCS | Mod: PBBFAC,,, | Performed by: SURGERY

## 2020-04-02 PROCEDURE — 3288F PR FALLS RISK ASSESSMENT DOCUMENTED: ICD-10-PCS | Mod: CPTII,S$GLB,, | Performed by: SURGERY

## 2020-04-02 PROCEDURE — 1159F PR MEDICATION LIST DOCUMENTED IN MEDICAL RECORD: ICD-10-PCS | Mod: S$GLB,,, | Performed by: SURGERY

## 2020-04-02 PROCEDURE — 1126F PR PAIN SEVERITY QUANTIFIED, NO PAIN PRESENT: ICD-10-PCS | Mod: S$GLB,,, | Performed by: SURGERY

## 2020-04-02 PROCEDURE — 3288F FALL RISK ASSESSMENT DOCD: CPT | Mod: CPTII,S$GLB,, | Performed by: SURGERY

## 2020-04-02 PROCEDURE — 1126F AMNT PAIN NOTED NONE PRSNT: CPT | Mod: S$GLB,,, | Performed by: SURGERY

## 2020-04-02 PROCEDURE — 1100F PR PT FALLS ASSESS DOC 2+ FALLS/FALL W/INJURY/YR: ICD-10-PCS | Mod: CPTII,S$GLB,, | Performed by: SURGERY

## 2020-04-02 PROCEDURE — 99999 PR PBB SHADOW E&M-EST. PATIENT-LVL III: CPT | Mod: PBBFAC,,, | Performed by: SURGERY

## 2020-04-02 PROCEDURE — 1159F MED LIST DOCD IN RCRD: CPT | Mod: S$GLB,,, | Performed by: SURGERY

## 2020-04-02 PROCEDURE — 99205 OFFICE O/P NEW HI 60 MIN: CPT | Mod: S$GLB,,, | Performed by: SURGERY

## 2020-04-02 PROCEDURE — 1100F PTFALLS ASSESS-DOCD GE2>/YR: CPT | Mod: CPTII,S$GLB,, | Performed by: SURGERY

## 2020-04-02 NOTE — PROGRESS NOTES
Nurse Navigator Note:     Met with patient during her consult with Dr. Clark.  Patient and I reviewed the information she discussed with Dr. Clark, including treatment options, diagnosis, and future plans for workup. Patient and I went through the new patient binder, explained some of the information and why it is provided.     Also offered patient consults with our other specialty clinics: Dr. Berger for gynecological health during treatment, our breast physical therapy department for pre-op and post-operative assessments, Dr. Mcdaniels for psychological support, and Zulma King for nutritional counseling. Explained to patient that all of these support services are completely optional. Discussed that physical therapy may call patient to offer pre-op appt, and what that appt would entail.     Patient was given a copy of her appointments, Dr. Clark's card, and my card. Encouraged her to call me if she has any questions or concerns or would like to schedule any additional appointments. Verbalized understanding of all information.

## 2020-04-02 NOTE — PROGRESS NOTES
[facility]  [facility]  2020      SUBJECTIVE:   Ms. Darlene Avila is a 69 y.o. year old female with Type II DM who presents today with a newly diagnosed Invasive Ductal Carcinoma of the RIGHT breast.      History of Present Illness: Patient states she never felt this breast mass.  She went to her her OBGYN at [facility] for an annual checkup and he felt a right breast mass and sent her for an MRI on 3/17.  This showed a 2.9 cm area of non-mass enhancement at the lower outer right breast.  A mammogram and ultrasound was ordered for comparison and showed a subtle area of parenchymal enhancement at the lower outer right breast (BIRADS 4).  An MRI guided biopsy was recommended and performed on 3/18 revealing:    Invasive Ductal Carcinoma  Grade 2  ER+ (98.6%)  DE negative  Her2 negative   Ki-67: <10%     GYN History: Patient is  and had a hysterectomy at age 32. Denies any HRT or birth control.     She denies any previous history of cancer.  She denies any nipple discharge or skin changes at the breast.      Past Medical History:   Past Medical History:   Diagnosis Date    Arthritis     Cataract     Diabetes mellitus     Diabetic retinopathy     Hypertension      Past Surgical history:   Past Surgical History:   Procedure Laterality Date    CAPSULOTOMY  13    yag left eye    CATARACT EXTRACTION  6/3/2013    right eye    CHOLECYSTECTOMY      TOTAL ABDOMINAL HYSTERECTOMY W/ BILATERAL SALPINGOOPHORECTOMY       Social history:   Social History     Socioeconomic History    Marital status: Single     Spouse name: Not on file    Number of children: Not on file    Years of education: Not on file    Highest education level: Not on file   Occupational History    Not on file   Social Needs    Financial resource strain: Not on file    Food insecurity:     Worry: Not on file     Inability: Not on file    Transportation needs:     Medical: Not on file     Non-medical: Not on file    Tobacco Use    Smoking status: Current Every Day Smoker     Packs/day: 1.50    Smokeless tobacco: Never Used   Substance and Sexual Activity    Alcohol use: No    Drug use: No    Sexual activity: Not Currently   Lifestyle    Physical activity:     Days per week: Not on file     Minutes per session: Not on file    Stress: Not on file   Relationships    Social connections:     Talks on phone: Not on file     Gets together: Not on file     Attends Gnosticism service: Not on file     Active member of club or organization: Not on file     Attends meetings of clubs or organizations: Not on file     Relationship status: Not on file   Other Topics Concern    Not on file   Social History Narrative    Not on file     Allergies:   Review of patient's allergies indicates:   Allergen Reactions    Iodinated contrast media       Family History: Mother had colon cancer at age 82 and sister had breast cancer at 65.  Patient is unsure if they had genetic testing.      Review of Systems: Denies any unexplained weight loss, bone pain, chest pain, or SOB.  Admits to new headaches over the past week (pateint thinks attributed to stress).     OBJECTIVE:  Vitals:    04/02/20 1303   BP: 130/69   Pulse: 99     Physical Exam:  HEENT: Normocephalic, atraumatic.    Gen: alert and oriented; no acute distress.  Breast: ~3 x 3.5 cm firm mass at the 10 o'clock position of the right breast, N+4 cm; non-tender.  Very fibrocystic and dense breasts bilaterally with thickened tissue at superior and lateral aspects bilaterally.    Axilla:  No adjacent axillary lymphadenopathy bilaterally.    ASSESSMENT:  Ms. Darlene Avila is a 69 y.o. year old female with a newly diagnosed IDC of the RIGHT breast.    PLAN:   We discussed the etiology of this tumor along with the recommendations for treatment based on the National Comprehensive Cancer Network guidelines.    The options of Breast Conservation therapy with a lumpectomy versus a mastectomy with  or without reconstruction was presented. Both procedures would require a sentinel lymph node biopsy and a possible complete axillary lymph node dissection. We discussed that there was no difference in survival versus spread for breast conservation therapy versus a mastectomy.   In the setting of a lumpectomy, adjuvant radiation therapy is required to reduce the chance of a locoregional recurrence.  The duration and side effects of radiation were presented.    We also discussed the role of adjuvant endocrine therapy since she is ER+, and that it can reduce the risk of a recurrence and improve survival. The possibility of Chemotherapy and its duration, systemic benefit, and side effects was presented.     She will likely not need chemotherapy; however, we will send off an Oncotype to determine whether or not she would benefit from it in addition to endocrine therapy.  If the Oncotype score comes back low we will have medical oncology start treatment with endocrine therapy.  However, if the oncotype score comes back low at a time when COVID precautions are no longer needed, we would recommend surgery upfront.  If the oncotype score is high we will proceed with chemotherapy.      The patient is interested in a lumpectomy at this time.  We discussed that this is reasonable considering the large size of her breasts.  She will probably need radiation therapy if she chooses to have a lumpectomy; however, she would not need this if she chose to proceed with a mastectomy.     Patient was educated on breast cancer, receptors, wire localization lumpectomy, mastectomy, sentinel lymph node mapping and biopsy, axillary lymph node dissection, reconstruction, breast prosthesis with post-mastectomy bra and radiation therapy. Patient was given patient information binder including [facility] breast cancer treatment brochure.  All her questions were answered.    Patient declined genetic testing today.    We will call her once the Oncotype  results are finalized and determine the recommended treatment plan at that time.  Patient is interested in seeing a female medical oncologist at [facility].      Total time spent with the patient: 60 minutes.  45 minutes of face to face consultation and 15 minutes of chart review and coordination of care.

## 2020-04-03 DIAGNOSIS — Z17.0 MALIGNANT NEOPLASM OF UPPER-OUTER QUADRANT OF RIGHT BREAST IN FEMALE, ESTROGEN RECEPTOR POSITIVE: Primary | ICD-10-CM

## 2020-04-03 DIAGNOSIS — C50.411 MALIGNANT NEOPLASM OF UPPER-OUTER QUADRANT OF RIGHT BREAST IN FEMALE, ESTROGEN RECEPTOR POSITIVE: Primary | ICD-10-CM

## 2020-04-06 ENCOUNTER — TELEPHONE (OUTPATIENT)
Dept: HEMATOLOGY/ONCOLOGY | Facility: CLINIC | Age: 70
End: 2020-04-06

## 2020-04-06 ENCOUNTER — OFFICE VISIT (OUTPATIENT)
Dept: HEMATOLOGY/ONCOLOGY | Facility: CLINIC | Age: 70
End: 2020-04-06
Payer: COMMERCIAL

## 2020-04-06 DIAGNOSIS — F17.210 CIGARETTE SMOKER: ICD-10-CM

## 2020-04-06 DIAGNOSIS — C50.411 MALIGNANT NEOPLASM OF UPPER-OUTER QUADRANT OF RIGHT BREAST IN FEMALE, ESTROGEN RECEPTOR POSITIVE: Primary | ICD-10-CM

## 2020-04-06 DIAGNOSIS — Z17.0 MALIGNANT NEOPLASM OF UPPER-OUTER QUADRANT OF RIGHT BREAST IN FEMALE, ESTROGEN RECEPTOR POSITIVE: Primary | ICD-10-CM

## 2020-04-06 PROCEDURE — 99244 PR OFFICE CONSULTATION,LEVEL IV: ICD-10-PCS | Mod: 95,,, | Performed by: INTERNAL MEDICINE

## 2020-04-06 PROCEDURE — 99406 BEHAV CHNG SMOKING 3-10 MIN: CPT | Mod: 95,,, | Performed by: INTERNAL MEDICINE

## 2020-04-06 PROCEDURE — 99406 PR TOBACCO USE CESSATION INTERMEDIATE 3-10 MINUTES: ICD-10-PCS | Mod: 95,,, | Performed by: INTERNAL MEDICINE

## 2020-04-06 PROCEDURE — 99244 OFF/OP CNSLTJ NEW/EST MOD 40: CPT | Mod: 95,,, | Performed by: INTERNAL MEDICINE

## 2020-04-06 RX ORDER — ANASTROZOLE 1 MG/1
1 TABLET ORAL DAILY
Qty: 90 TABLET | Refills: 3 | Status: SHIPPED | OUTPATIENT
Start: 2020-04-06 | End: 2021-04-06

## 2020-04-06 NOTE — PROGRESS NOTES
ADDENDUM 04/13/2020     Oncotype low risk. Continue with neoadjuvant endocrine therapy. Message left with patient.   History:     Reason For Consultation:   1. Stage I (T2N0M0) invasive ductal carcinoma of right breast, upper outer quadrant, ER 99%, [place] neg, Her2 neg, Grade 2, Ki67 < 10%    Referring Provider:   Krysta Clark MD  [address] [place] [address]    Records Obtained: Records of the patients history including those obtained from the referring provider were reviewed and summarized in detail.    HPI:   Darlene Avila presents for consultation breast cancer. She is postmenopausal. Her oncologic history is detailed below. Today, she is feeling well. Her sister Sharmila was on the phone as well. She didn't have any menopausal symptoms when she went through menopause. She is a heavy smoker and her sister reports that she's unable to walk far distances.     Oncologic History:  Presentation  - 3/2020 - presented for annual GYN check up and right breast mass palpated   - 3/11/2020 - MRI breasts- Regional non mass enhancement in approximately the 9 upper outer quadrant of the right breast measuring approximately 3 cm in length.  No adenopathy. Left breast benign  - 3/17/2020 - Right breast US showed numerous hypoechoic areas were measured in the right breast.  The largest in the right breast at 8-9 o'clock 7 cm from the nipple area of palpable measures approximately 4.8 cm.  Characterization is markedly limited due to the  dependent nature of ultrasound and these could reflect dense breast tissue. noting that no masses were detected on previous MRI.  No definite correlate is seen for the MRI non mass enhancement.  - 3/18/2020 - Biopsy - Grade 2 IDC, estrogen receptor 99%, progesterone receptor neg, Her2 tello neg, Ki67 < 10%.   Surgery consultation with Dr Clark on 4/2; Oncotype sent  Medical oncology consultation on 4/6/2020   - 4/6/2020 - start Arimidex.        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, with long-term current use of insulin    Vitreous hemorrhage of left eye    Posterior vitreous detachment of left eye    Screening for glaucoma    Chronic diarrhea    Pseudophakia of both eyes    Malignant neoplasm of upper-outer quadrant of right breast in female, estrogen receptor positive     Social History   Social History     Socioeconomic History    Marital status: Single     Spouse name: Not on file    Number of children: Not on file    Years of education: Not on file    Highest education level: Not on file   Occupational History    Not on file   Social Needs    Financial resource strain: Not on file    Food insecurity:     Worry: Not on file     Inability: Not on file    Transportation needs:     Medical: Not on file     Non-medical: Not on file   Tobacco Use    Smoking status: Current Every Day Smoker     Packs/day: 1.50    Smokeless tobacco: Never Used   Substance and Sexual Activity    Alcohol use: No    Drug use: No    Sexual activity: Not Currently   Lifestyle    Physical activity:     Days per week: Not on file     Minutes per session: Not on file    Stress: Not on file   Relationships    Social connections:     Talks on phone: Not on file     Gets together: Not on file     Attends Cheondoism service: Not on file     Active member of club or organization: Not on file     Attends meetings of clubs or organizations: Not on file     Relationship status: Not on file   Other Topics Concern    Not on file   Social History Narrative    Not on file     Family History  Family History   Problem Relation Age of Onset    Glaucoma Mother     Diabetes Mother     Colon cancer Mother  82    Cataracts Mother     Diabetes Father     Diabetes Sister     Breast cancer Sister 65    Diabetes Maternal Uncle     Amblyopia Neg Hx     Blindness Neg Hx     Macular degeneration Neg Hx     Retinal detachment Neg Hx     Strabismus Neg Hx      Medications    Current Outpatient Medications:     amitriptyline (ELAVIL) 100 MG tablet, Take 1 tablet by mouth., Disp: , Rfl:     anastrozole (ARIMIDEX) 1 mg Tab, Take 1 tablet (1 mg total) by mouth once daily., Disp: 90 tablet, Rfl: 3    aspirin (ECOTRIN) 81 MG EC tablet, Take 81 mg by mouth once daily., Disp: , Rfl:     citalopram (CELEXA) 10 MG tablet, Take 1 tablet by mouth., Disp: , Rfl:     clopidogrel (PLAVIX) 75 mg tablet, Take by mouth. 1 tablet Oral Every day, Disp: , Rfl:     COLESEVELAM HCL (WELCHOL ORAL), Take by mouth., Disp: , Rfl:     diphenoxylate-atropine 2.5-0.025 mg (LOMOTIL) 2.5-0.025 mg per tablet, Take by mouth. 1 tablet Oral Three times a day, Disp: , Rfl:     insulin lispro protam & lispro (HUMALOG MIX 75-25 KWIKPEN) 100 unit/mL (75-25) InPn, , Disp: , Rfl:     insulin lispro protamine-insulin lispro (HUMALOG 75/25) 100 unit/mL (75-25) Susp, Inject into the skin 2 (two) times daily before meals.  , Disp: , Rfl:     pravastatin (PRAVACHOL) 10 MG tablet, Take by mouth. 1 tablet Oral Every day, Disp: , Rfl:     ramipril (ALTACE) 1.25 MG capsule, Take by mouth. 1 capsule Oral Every day, Disp: , Rfl:     rOPINIRole (REQUIP) 0.25 MG tablet, Take 2 tablets by mouth., Disp: , Rfl:     zolpidem (AMBIEN) 10 mg Tab, Take 1 tablet by mouth., Disp: , Rfl:   Allergies  Review of patient's allergies indicates:   Allergen Reactions    Iodinated contrast media      Review of Systems  Review of Systems   Constitutional: Negative for activity change, appetite change, chills, fatigue, fever and unexpected weight change.   HENT: Negative for congestion, hearing loss, nosebleeds, sinus pressure and trouble swallowing.    Eyes: Negative for  pain, discharge and itching.   Respiratory: Negative for cough, chest tightness and shortness of breath.    Cardiovascular: Negative for chest pain, palpitations and leg swelling.   Gastrointestinal: Negative for abdominal distention, abdominal pain, blood in stool, diarrhea, nausea, rectal pain and vomiting.   Endocrine: Negative for heat intolerance and polydipsia.   Genitourinary: Negative for difficulty urinating, dysuria, flank pain, frequency, hematuria and urgency.   Musculoskeletal: Negative for arthralgias, back pain and neck pain.   Skin: Negative for color change, pallor and rash.   Neurological: Negative for dizziness, numbness and headaches.   Hematological: Negative for adenopathy. Does not bruise/bleed easily.   Psychiatric/Behavioral: Negative for confusion and decreased concentration. The patient is not nervous/anxious.         Objective     Objective:     Televisit    Dr Clark's exam Breast: ~3 x 3.5 cm firm mass at the 10 o'clock position of the right breast, N+4 cm; non-tender.  Very fibrocystic and dense breasts bilaterally with thickened tissue at superior and lateral aspects bilaterally.      Labs and Imaging  Results for orders placed or performed during the hospital encounter of 12/19/17   CBC auto differential   Result Value Ref Range    WBC 10.78 3.90 - 12.70 K/uL    RBC 5.00 4.00 - 5.40 M/uL    Hemoglobin 12.3 12.0 - 16.0 g/dL    Hematocrit 39.7 37.0 - 48.5 %    Mean Corpuscular Volume 79 (L) 82 - 98 fL    Mean Corpuscular Hemoglobin 24.6 (L) 27.0 - 31.0 pg    Mean Corpuscular Hemoglobin Conc 31.0 (L) 32.0 - 36.0 g/dL    RDW 16.4 (H) 11.5 - 14.5 %    Platelets 445 (H) 150 - 350 K/uL    MPV 9.1 (L) 9.2 - 12.9 fL    Immature Granulocytes 0.3 0.0 - 0.5 %    Gran # (ANC) 6.7 1.8 - 7.7 K/uL    Immature Grans (Abs) 0.03 0.00 - 0.04 K/uL    Lymph # 3.4 1.0 - 4.8 K/uL    Mono # 0.6 0.3 - 1.0 K/uL    Eos # 0.0 0.0 - 0.5 K/uL    Baso # 0.04 0.00 - 0.20 K/uL    nRBC 0 0 /100 WBC    Gran% 62.0 38.0  - 73.0 %    Lymph% 31.7 18.0 - 48.0 %    Mono% 5.2 4.0 - 15.0 %    Eosinophil% 0.4 0.0 - 8.0 %    Basophil% 0.4 0.0 - 1.9 %    Differential Method Automated    Comprehensive metabolic panel   Result Value Ref Range    Sodium 141 136 - 145 mmol/L    Potassium 4.3 3.5 - 5.1 mmol/L    Chloride 103 95 - 110 mmol/L    CO2 29 23 - 29 mmol/L    Glucose 63 (L) 70 - 110 mg/dL    BUN, Bld 26 (H) 8 - 23 mg/dL    Creatinine 0.8 0.5 - 1.4 mg/dL    Calcium 10.1 8.7 - 10.5 mg/dL    Total Protein 7.8 6.0 - 8.4 g/dL    Albumin 3.2 (L) 3.5 - 5.2 g/dL    Total Bilirubin 0.4 0.1 - 1.0 mg/dL    Alkaline Phosphatase 173 (H) 55 - 135 U/L    AST 55 (H) 10 - 40 U/L    ALT 58 (H) 10 - 44 U/L    Anion Gap 9 8 - 16 mmol/L    eGFR if African American >60.0 >60 mL/min/1.73 m^2    eGFR if non African American >60.0 >60 mL/min/1.73 m^2       Breast pathology and imaging as above.     Assessment     Assessment:     1. Stage I (T2N0M0) invasive ductal carcinoma of right breast, upper outer quadrant, ER 99%, [place] neg, Her2 neg, Grade 2, Ki67 < 10%   * We discussed the prognosis for her breast cancer, particularly in terms of risks of local and distant recurrences. We reviewed treatment recommendations as detailed below.    * Discussed that in setting of coronavirus that we are not proceeding with surgery upfront given lower risk tumor. We should start treatment with endocrine therapy with Arimdiex while we await on the Oncotype result. If Oncotype greater than 26, we will consider neoadjuvant chemotherapy as it would be indicated in adjuvant setting anyway. If oncotype < 26, will perform neoadjuvant endocrine therapy.     - We discussed the risks, benefits and side effects of aromatase inhibitors, including but not limited to osteoporosis, musculoskeletal symptoms, cardiovascular risks, sexual dysfunction, and mood instability. Patient asked multiple appropriate questions and was given a handout detailing the medication.     - We will obtain a baseline  bone density scan when safe and start calcium and vitamin D supplementation.      2. Comorbidities including diabetes,   3. Tobacco cessation. I counseled patient on tobacco cessation for 4 minutes. We discussed the negative health impact of tobacco, the importance stopping cigarette use and methods for which to stop. Patient is interested in cessation.          Plan:     1. Start Arimidex.   2. Follow up Oncotype  3. Start calcium/vitamin D  4. Surgery when safe from coronavirus standpoint.   5. RTC in 6 wks  6. Tobacco cessation      I asked the patient to call for any questions, concerns, or new symptoms.    The patient location is: home  The chief complaint leading to consultation is: breast cancer  Visit type: Virtual visit with synchronous audio and video  Total time spent with patient: 25 min with additional 30 minutes reviewing records and coordinating care.   Each patient to whom he or she provides medical services by telemedicine is:  (1) informed of the relationship between the physician and patient and the respective role of any other health care provider with respect to management of the patient; and (2) notified that he or she may decline to receive medical services by telemedicine and may withdraw from such care at any time.

## 2020-04-06 NOTE — LETTER
April 6, 2020      Krysta Clark MD  [address]  [facility] [place] [address]           [place] [facility]  [address]  [facility] [address]  Phone: [phone]          Patient: Darlene Avila   MR Number: [id number]   YOB: 1950   Date of Visit: 4/6/2020       Dear Dr. Krysta Clark:    Thank you for referring Darlene Avila to me for evaluation. Attached you will find relevant portions of my assessment and plan of care.    If you have questions, please do not hesitate to call me. I look forward to following Darlene Avila along with you.    Sincerely,    Benita Barcenas MD    Enclosure  CC:  No Recipients    If you would like to receive this communication electronically, please contact [email or web address] or [phone] to request more information on [employer] Link access.    For providers and/or their staff who would like to refer a patient to [facility], please contact us through our one-stop-shop provider referral line, [facility], at [phone].    If you feel you have received this communication in error or would no longer like to receive these types of communications, please e-mail [email or web address]

## 2020-04-07 ENCOUNTER — TELEPHONE (OUTPATIENT)
Dept: HEMATOLOGY/ONCOLOGY | Facility: CLINIC | Age: 70
End: 2020-04-07

## 2020-04-07 NOTE — TELEPHONE ENCOUNTER
Call to pt.  Pt unavailable.   Left message for pt to return call to discuss Ca/Vit D recs from Dr. Barcenas--- recommend 1200 mg of calcium and vitamin D 1000 IU daily.   Callback number provided.

## 2020-04-24 ENCOUNTER — TELEPHONE (OUTPATIENT)
Dept: SURGERY | Facility: CLINIC | Age: 70
End: 2020-04-24

## 2020-04-24 NOTE — TELEPHONE ENCOUNTER
Message was left for  regarding her apt to see  on 05/21/20@ 1:30 pm .  has had a change in her schedule please call Genna abreu [phone] .So we can reschedule your apt to 6/04/20 @ 3:00pm.

## 2020-05-14 ENCOUNTER — TELEPHONE (OUTPATIENT)
Dept: HEMATOLOGY/ONCOLOGY | Facility: CLINIC | Age: 70
End: 2020-05-14

## 2020-05-14 ENCOUNTER — TELEPHONE (OUTPATIENT)
Dept: SURGERY | Facility: CLINIC | Age: 70
End: 2020-05-14

## 2020-05-14 NOTE — TELEPHONE ENCOUNTER
Called and spoke with the patient. She is scheduled for 6/18 at 3:30pm with Dr. Clark. Explained there was a scheduling issue and with the COVID restrictions things have been a little off. Confirmed appt date, time, and location and she voiced thanks and understanding.    ----- Message from Brooks Guerrero sent at 5/14/2020  1:44 PM CDT -----  Contact: Pt      The Pt is confused about when her appt should be because she states that it was for June and the computer says 5/21/20.  Please contact the Pt to confirm the date.    Phone # [phone]

## 2020-05-14 NOTE — TELEPHONE ENCOUNTER
Noted.    ----- Message from Brooks Guerrero sent at 5/14/2020  1:48 PM CDT -----  Contact: Pt    The Pt would like a call back to confirm her appt with you on 5/18/20.    Phone #  [phone]

## 2020-05-18 ENCOUNTER — OFFICE VISIT (OUTPATIENT)
Dept: HEMATOLOGY/ONCOLOGY | Facility: CLINIC | Age: 70
End: 2020-05-18
Payer: COMMERCIAL

## 2020-05-18 DIAGNOSIS — Z79.811 USE OF AROMATASE INHIBITORS: ICD-10-CM

## 2020-05-18 DIAGNOSIS — C50.411 MALIGNANT NEOPLASM OF UPPER-OUTER QUADRANT OF RIGHT BREAST IN FEMALE, ESTROGEN RECEPTOR POSITIVE: Primary | ICD-10-CM

## 2020-05-18 DIAGNOSIS — Z17.0 MALIGNANT NEOPLASM OF UPPER-OUTER QUADRANT OF RIGHT BREAST IN FEMALE, ESTROGEN RECEPTOR POSITIVE: Primary | ICD-10-CM

## 2020-05-18 PROCEDURE — 99214 OFFICE O/P EST MOD 30 MIN: CPT | Mod: 95,,, | Performed by: INTERNAL MEDICINE

## 2020-05-18 PROCEDURE — 99214 PR OFFICE/OUTPT VISIT, EST, LEVL IV, 30-39 MIN: ICD-10-PCS | Mod: 95,,, | Performed by: INTERNAL MEDICINE

## 2020-05-18 NOTE — PROGRESS NOTES
"Established Patient - Audio Only Telehealth Visit     The patient location is: home  The chief complaint leading to consultation is: breast cancer   Visit type: Virtual visit with audio only (telephone)  Total time spent with patient: 12 min       The reason for the audio only service rather than synchronous audio and video virtual visit was related to technical difficulties or patient preference/necessity.     Each patient to whom I provide medical services by telemedicine is:  (1) informed of the relationship between the physician and patient and the respective role of any other health care provider with respect to management of the patient; and (2) notified that they may decline to receive medical services by telemedicine and may withdraw from such care at any time. Patient verbally consented to receive this service via voice-only telephone call.       HPI: patient with T2 breast cancer on Arimidex in "neoadjuvant" setting due to coronavirus. Plan for surgical resection with Dr Clark. Has mild nausea with all of her medications but improves if she eats something before she takes the pills. She can feel the breast mass - doesn't feel like it is getting bigger. Occasional breast pains. Tolerating Arimidex well. She stopped smoking.        Assessment and plan:      1. Stage I (T2N0M0) invasive ductal carcinoma of right breast, upper outer quadrant, ER 99%, [place] neg, Her2 neg, Grade 2, Ki67 < 10%, low risk Oncotype   - Continue Arimidex until surgery with Dr Clark. Tolerating well. Has appt with Dr Clark on 6/18.      RTC 8 wks      This service was not originating from a related E/M service provided within the previous 7 days nor will  to an E/M service or procedure within the next 24 hours or my soonest available appointment.  Prevailing standard of care was able to be met in this audio-only visit.      "

## 2020-05-27 ENCOUNTER — TELEPHONE (OUTPATIENT)
Dept: SURGERY | Facility: CLINIC | Age: 70
End: 2020-05-27

## 2020-05-27 NOTE — TELEPHONE ENCOUNTER
Left VM with my direct contact information, patient advised to give a return call to schedule surgery date.

## 2020-06-18 ENCOUNTER — OFFICE VISIT (OUTPATIENT)
Dept: SURGERY | Facility: CLINIC | Age: 70
End: 2020-06-18
Payer: COMMERCIAL

## 2020-06-18 VITALS
DIASTOLIC BLOOD PRESSURE: 60 MMHG | HEART RATE: 108 BPM | BODY MASS INDEX: 30.04 KG/M2 | HEIGHT: 59 IN | WEIGHT: 149 LBS | SYSTOLIC BLOOD PRESSURE: 127 MMHG

## 2020-06-18 DIAGNOSIS — C50.411 MALIGNANT NEOPLASM OF UPPER-OUTER QUADRANT OF RIGHT BREAST IN FEMALE, ESTROGEN RECEPTOR POSITIVE: ICD-10-CM

## 2020-06-18 DIAGNOSIS — Z01.818 PREOP TESTING: Primary | ICD-10-CM

## 2020-06-18 DIAGNOSIS — Z17.0 MALIGNANT NEOPLASM OF UPPER-OUTER QUADRANT OF RIGHT BREAST IN FEMALE, ESTROGEN RECEPTOR POSITIVE: ICD-10-CM

## 2020-06-18 PROCEDURE — 1126F PR PAIN SEVERITY QUANTIFIED, NO PAIN PRESENT: ICD-10-PCS | Mod: S$GLB,,, | Performed by: SURGERY

## 2020-06-18 PROCEDURE — 1126F AMNT PAIN NOTED NONE PRSNT: CPT | Mod: S$GLB,,, | Performed by: SURGERY

## 2020-06-18 PROCEDURE — 1101F PT FALLS ASSESS-DOCD LE1/YR: CPT | Mod: CPTII,S$GLB,, | Performed by: SURGERY

## 2020-06-18 PROCEDURE — 1101F PR PT FALLS ASSESS DOC 0-1 FALLS W/OUT INJ PAST YR: ICD-10-PCS | Mod: CPTII,S$GLB,, | Performed by: SURGERY

## 2020-06-18 PROCEDURE — 99215 PR OFFICE/OUTPT VISIT, EST, LEVL V, 40-54 MIN: ICD-10-PCS | Mod: S$GLB,,, | Performed by: SURGERY

## 2020-06-18 PROCEDURE — 99999 PR PBB SHADOW E&M-EST. PATIENT-LVL V: ICD-10-PCS | Mod: PBBFAC,,, | Performed by: SURGERY

## 2020-06-18 PROCEDURE — 99215 OFFICE O/P EST HI 40 MIN: CPT | Mod: S$GLB,,, | Performed by: SURGERY

## 2020-06-18 PROCEDURE — 1159F PR MEDICATION LIST DOCUMENTED IN MEDICAL RECORD: ICD-10-PCS | Mod: S$GLB,,, | Performed by: SURGERY

## 2020-06-18 PROCEDURE — 99999 PR PBB SHADOW E&M-EST. PATIENT-LVL V: CPT | Mod: PBBFAC,,, | Performed by: SURGERY

## 2020-06-18 PROCEDURE — 1159F MED LIST DOCD IN RCRD: CPT | Mod: S$GLB,,, | Performed by: SURGERY

## 2020-06-18 NOTE — PROGRESS NOTES
[facility]  [facility] [place]  2020      SUBJECTIVE:   Ms. Darlene Avila is a 69 y.o. year old female with Type II DM who presents today with a newly diagnosed Invasive Ductal Carcinoma of the RIGHT breast.      History of Present Illness: Patient states she never felt this breast mass.  She went to her her OBGYN at [facility] for an annual checkup and he felt a right breast mass and sent her for an MRI on 3/17.  This showed a 2.9 cm area of non-mass enhancement at the lower outer right breast.  A mammogram and ultrasound was ordered for comparison and showed a subtle area of parenchymal enhancement at the lower outer right breast (BIRADS 4).  An MRI guided biopsy was recommended and performed on 3/18 revealing:    Invasive Ductal Carcinoma  Grade 2  ER+ (98.6%)  AZ negative  Her2 negative   Ki-67: <10%     Interval History 20  She was started on Arimidex due to COVID-related delay. She presents now for pre op. She is very nervous and anxious about surgery.      GYN History: Patient is  and had a hysterectomy at age 32. Denies any HRT or birth control.     She denies any previous history of cancer.  She denies any nipple discharge or skin changes at the breast.      Past Medical History:   Past Medical History:   Diagnosis Date    Arthritis     Cataract     Diabetes mellitus     Diabetic retinopathy     Hypertension      Past Surgical history:   Past Surgical History:   Procedure Laterality Date    CAPSULOTOMY  12    yag left eye    CATARACT EXTRACTION  6/3/2013    right eye    CHOLECYSTECTOMY      TOTAL ABDOMINAL HYSTERECTOMY W/ BILATERAL SALPINGOOPHORECTOMY       Social history:   Social History     Socioeconomic History    Marital status: Single     Spouse name: Not on file    Number of children: Not on file    Years of education: Not on file    Highest education level: Not on file   Occupational History    Not on file   Social Needs    Financial resource strain:  Not on file    Food insecurity     Worry: Not on file     Inability: Not on file    Transportation needs     Medical: Not on file     Non-medical: Not on file   Tobacco Use    Smoking status: Current Every Day Smoker     Packs/day: 1.50    Smokeless tobacco: Never Used   Substance and Sexual Activity    Alcohol use: No    Drug use: No    Sexual activity: Not Currently   Lifestyle    Physical activity     Days per week: Not on file     Minutes per session: Not on file    Stress: Not on file   Relationships    Social connections     Talks on phone: Not on file     Gets together: Not on file     Attends Cheondo[employer] service: Not on file     Active member of club or organization: Not on file     Attends meetings of clubs or organizations: Not on file     Relationship status: Not on file   Other Topics Concern    Not on file   Social History Narrative    Not on file     Allergies:   Review of patient's allergies indicates:   Allergen Reactions    Iodinated contrast media       Family History: Mother had colon cancer at age 82 and sister had breast cancer at 65.  Patient is unsure if they had genetic testing.      Review of Systems: Denies any unexplained weight loss, bone pain, chest pain, or SOB.  Admits to new headaches over the past week (pateint thinks attributed to stress).     OBJECTIVE:  There were no vitals filed for this visit.  Physical Exam:  HEENT: Normocephalic, atraumatic.    Gen: alert and oriented; no acute distress.  Breast: ~3 x 3.5 cm firm mass at the 10 o'clock position of the right breast, N+4 cm; non-tender.  Very fibrocystic and dense breasts bilaterally with thickened tissue at superior and lateral aspects bilaterally.    Axilla:  No adjacent axillary lymphadenopathy bilaterally.    ASSESSMENT:  Ms. Darlene Avila is a 69 y.o. year old female with a newly diagnosed IDC of the RIGHT breast.    PLAN:     - Plan for R mastectomy with SLNB. Discussed options at length again.  Her breast are  very dense and difficult to examine.   - Continue Arimidex, per med onc  - F/u information will be provided postop  - Please call with questions or concerns

## 2020-07-17 ENCOUNTER — TELEPHONE (OUTPATIENT)
Dept: HEMATOLOGY/ONCOLOGY | Facility: CLINIC | Age: 70
End: 2020-07-17

## 2020-07-20 ENCOUNTER — OFFICE VISIT (OUTPATIENT)
Dept: HEMATOLOGY/ONCOLOGY | Facility: CLINIC | Age: 70
End: 2020-07-20
Payer: COMMERCIAL

## 2020-07-20 ENCOUNTER — TELEPHONE (OUTPATIENT)
Dept: HEMATOLOGY/ONCOLOGY | Facility: CLINIC | Age: 70
End: 2020-07-20

## 2020-07-20 VITALS
HEART RATE: 101 BPM | RESPIRATION RATE: 16 BRPM | SYSTOLIC BLOOD PRESSURE: 127 MMHG | TEMPERATURE: 98 F | HEIGHT: 59 IN | WEIGHT: 128.5 LBS | DIASTOLIC BLOOD PRESSURE: 64 MMHG | OXYGEN SATURATION: 100 % | BODY MASS INDEX: 25.91 KG/M2

## 2020-07-20 DIAGNOSIS — Z17.0 MALIGNANT NEOPLASM OF UPPER-OUTER QUADRANT OF RIGHT BREAST IN FEMALE, ESTROGEN RECEPTOR POSITIVE: Primary | ICD-10-CM

## 2020-07-20 DIAGNOSIS — C50.411 MALIGNANT NEOPLASM OF UPPER-OUTER QUADRANT OF RIGHT BREAST IN FEMALE, ESTROGEN RECEPTOR POSITIVE: Primary | ICD-10-CM

## 2020-07-20 DIAGNOSIS — Z79.811 USE OF AROMATASE INHIBITORS: ICD-10-CM

## 2020-07-20 PROCEDURE — 1126F PR PAIN SEVERITY QUANTIFIED, NO PAIN PRESENT: ICD-10-PCS | Mod: S$GLB,,, | Performed by: NURSE PRACTITIONER

## 2020-07-20 PROCEDURE — 99999 PR PBB SHADOW E&M-EST. PATIENT-LVL IV: CPT | Mod: PBBFAC,,, | Performed by: NURSE PRACTITIONER

## 2020-07-20 PROCEDURE — 3008F BODY MASS INDEX DOCD: CPT | Mod: CPTII,S$GLB,, | Performed by: NURSE PRACTITIONER

## 2020-07-20 PROCEDURE — 1159F MED LIST DOCD IN RCRD: CPT | Mod: S$GLB,,, | Performed by: NURSE PRACTITIONER

## 2020-07-20 PROCEDURE — 1159F PR MEDICATION LIST DOCUMENTED IN MEDICAL RECORD: ICD-10-PCS | Mod: S$GLB,,, | Performed by: NURSE PRACTITIONER

## 2020-07-20 PROCEDURE — 1101F PR PT FALLS ASSESS DOC 0-1 FALLS W/OUT INJ PAST YR: ICD-10-PCS | Mod: CPTII,S$GLB,, | Performed by: NURSE PRACTITIONER

## 2020-07-20 PROCEDURE — 3008F PR BODY MASS INDEX (BMI) DOCUMENTED: ICD-10-PCS | Mod: CPTII,S$GLB,, | Performed by: NURSE PRACTITIONER

## 2020-07-20 PROCEDURE — 1126F AMNT PAIN NOTED NONE PRSNT: CPT | Mod: S$GLB,,, | Performed by: NURSE PRACTITIONER

## 2020-07-20 PROCEDURE — 99214 PR OFFICE/OUTPT VISIT, EST, LEVL IV, 30-39 MIN: ICD-10-PCS | Mod: S$GLB,,, | Performed by: NURSE PRACTITIONER

## 2020-07-20 PROCEDURE — 99999 PR PBB SHADOW E&M-EST. PATIENT-LVL IV: ICD-10-PCS | Mod: PBBFAC,,, | Performed by: NURSE PRACTITIONER

## 2020-07-20 PROCEDURE — 99214 OFFICE O/P EST MOD 30 MIN: CPT | Mod: S$GLB,,, | Performed by: NURSE PRACTITIONER

## 2020-07-20 PROCEDURE — 1101F PT FALLS ASSESS-DOCD LE1/YR: CPT | Mod: CPTII,S$GLB,, | Performed by: NURSE PRACTITIONER

## 2020-07-20 NOTE — Clinical Note
BMD- see if she can do tomorrow at [facility] around her other appts,. Thanks.   RTC in 8 weeks to see Dr. Barcenas.

## 2020-07-20 NOTE — PROGRESS NOTES
"Cc: breast cancer      70 yo female with  Stage I (T2N0M0) invasive ductal carcinoma of right breast, upper outer quadrant, ER 99%, MI neg, Her2 neg, Grade 2, Ki67 < 10%, low risk Oncotype. She is currently taking Arimidex in the  "neoadjuvant" setting due to coronavirus.   She is scheduled for R mastectomy 7/24/2020.     Today, she feels well and has no complaints except being nervous about upcoming surgery.   Tolerating AI well and without side effects.   No pain issues.     Oncologic History:  Presentation  - 3/2020 - presented for annual GYN check up and right breast mass palpated   - 3/11/2020 - MRI breasts- Regional non mass enhancement in approximately the 9 upper outer quadrant of the right breast measuring approximately 3 cm in length.  No adenopathy. Left breast benign  - 3/17/2020 - Right breast US showed numerous hypoechoic areas were measured in the right breast.  The largest in the right breast at 8-9 o'clock 7 cm from the nipple area of palpable measures approximately 4.8 cm.  Characterization is markedly limited due to the  dependent nature of ultrasound and these could reflect dense breast tissue. noting that no masses were detected on previous MRI.  No definite correlate is seen for the MRI non mass enhancement.  - 3/18/2020 - Biopsy - Grade 2 IDC, estrogen receptor 99%, progesterone receptor neg, Her2 tello neg, Ki67 < 10%.   Surgery consultation with Dr Clark on 4/2; Oncotype sent-     Medical oncology consultation on 4/6/2020   - 4/6/2020 - start Arimidex.    - Oncotype low risk.        Review of Systems  Review of Systems   Constitutional: Negative for activity change, appetite change, chills, fatigue, fever and unexpected weight change.   HENT: Negative for congestion, hearing loss, nosebleeds, sinus pressure and trouble swallowing.    Eyes: Negative for pain, discharge and itching.   Respiratory: Negative for cough, chest tightness and shortness of breath.    Cardiovascular: Negative " for chest pain, palpitations and leg swelling.   Gastrointestinal: Negative for abdominal distention, abdominal pain, blood in stool, diarrhea, nausea, rectal pain and vomiting.   Endocrine: Negative for heat intolerance and polydipsia.   Genitourinary: Negative for difficulty urinating, dysuria, flank pain, frequency, hematuria and urgency.   Musculoskeletal: Negative for arthralgias, back pain and neck pain.   Skin: Negative for color change, pallor and rash.   Neurological: Negative for dizziness, numbness and headaches.   Hematological: Negative for adenopathy. Does not bruise/bleed easily.   Psychiatric/Behavioral: Negative for confusion and decreased concentration. The patient is not nervous/anxious.             Objective:     Physical Exam   Constitutional: She is oriented to person, place, and time and well-developed, well-nourished, and in no distress.   HENT:   Head: Normocephalic.   Eyes: Pupils are equal, round, and reactive to light. EOM are normal.   Neck: Normal range of motion.   Cardiovascular: Normal rate and regular rhythm.   Pulmonary/Chest: Effort normal and breath sounds normal.   Abdominal: Soft. Bowel sounds are normal.   Musculoskeletal: Normal range of motion.         General: No edema.   Neurological: She is alert and oriented to person, place, and time.   Skin: Skin is warm and dry.     Breast: @ 3 x 3.5 cm firm mass at the 10 o'clock position of the right breast; non-tender.  Very fibrocystic and dense breasts bilaterally with thickened tissue at superior and lateral aspects bilaterally.    Axilla:  No adjacent axillary lymphadenopathy bilaterally.    Assessment:       1. Malignant neoplasm of upper-outer quadrant of right breast in female, estrogen receptor positive  Bone Density Spine And Hip   2. Use of aromatase inhibitors           Plan:     1. Continue Arimidex.   2. Continue calcium/vitamin D. Will need Vit D level this year.   3. R mastectomy this week as scheduled.   4. BMD this  week - need baseline  5. RTC in 8 weeks.   6. Tobacco cessation  7. Continue to follow up with PCP for other health maintenance. Reminded the need for Vit D and Lipid panel yearly.     Patient is in agreement with the proposed treatment plan. All questions were answered to the patient's satisfaction. Pt knows to call clinic for any new or worsening symptoms and if anything is needed before the next clinic visit.      [facility]  Hematology & Oncology  [address] [place] [id number]  ph. [phone]  Fax. [phone]     I spent 30 minutes (face to face) with the patient, more than 50% was in counseling and coordination of care as detailed above.

## 2020-07-20 NOTE — TELEPHONE ENCOUNTER
----- Message from Reji Franco NP sent at 7/20/2020  1:41 PM CDT -----  BMD- see if she can do tomorrow at [facility] around her other appts,. Thanks. RTC in 8 weeks to see Dr. Barcenas.

## 2020-07-21 ENCOUNTER — HOSPITAL ENCOUNTER (OUTPATIENT)
Dept: PREADMISSION TESTING | Facility: OTHER | Age: 70
Discharge: HOME | End: 2020-07-21
Attending: SURGERY
Payer: COMMERCIAL

## 2020-07-21 ENCOUNTER — HOSPITAL ENCOUNTER (OUTPATIENT)
Dept: RADIOLOGY | Facility: OTHER | Age: 70
Discharge: HOME | End: 2020-07-21
Attending: NURSE PRACTITIONER
Payer: COMMERCIAL

## 2020-07-21 ENCOUNTER — HOSPITAL ENCOUNTER (OUTPATIENT)
Dept: CARDIOLOGY | Facility: OTHER | Age: 70
Discharge: HOME | End: 2020-07-21
Attending: SURGERY
Payer: COMMERCIAL

## 2020-07-21 ENCOUNTER — ANESTHESIA EVENT (OUTPATIENT)
Dept: SURGERY | Facility: OTHER | Age: 70
End: 2020-07-21
Payer: COMMERCIAL

## 2020-07-21 ENCOUNTER — TELEPHONE (OUTPATIENT)
Dept: SURGERY | Facility: CLINIC | Age: 70
End: 2020-07-21

## 2020-07-21 VITALS
HEART RATE: 99 BPM | BODY MASS INDEX: 25.8 KG/M2 | SYSTOLIC BLOOD PRESSURE: 123 MMHG | HEIGHT: 59 IN | DIASTOLIC BLOOD PRESSURE: 61 MMHG | WEIGHT: 128 LBS | TEMPERATURE: 98 F | OXYGEN SATURATION: 99 %

## 2020-07-21 DIAGNOSIS — Z01.818 PREOP TESTING: ICD-10-CM

## 2020-07-21 DIAGNOSIS — Z01.818 PREOP TESTING: Primary | ICD-10-CM

## 2020-07-21 DIAGNOSIS — Z17.0 MALIGNANT NEOPLASM OF UPPER-OUTER QUADRANT OF RIGHT BREAST IN FEMALE, ESTROGEN RECEPTOR POSITIVE: ICD-10-CM

## 2020-07-21 DIAGNOSIS — C50.411 MALIGNANT NEOPLASM OF UPPER-OUTER QUADRANT OF RIGHT BREAST IN FEMALE, ESTROGEN RECEPTOR POSITIVE: ICD-10-CM

## 2020-07-21 PROCEDURE — 77080 DXA BONE DENSITY AXIAL: CPT | Mod: TC

## 2020-07-21 PROCEDURE — U0003 INFECTIOUS AGENT DETECTION BY NUCLEIC ACID (DNA OR RNA); SEVERE ACUTE RESPIRATORY SYNDROME CORONAVIRUS 2 (SARS-COV-2) (CORONAVIRUS DISEASE [COVID-19]), AMPLIFIED PROBE TECHNIQUE, MAKING USE OF HIGH THROUGHPUT TECHNOLOGIES AS DESCRIBED BY CMS-2020-01-R: HCPCS

## 2020-07-21 PROCEDURE — 93005 ELECTROCARDIOGRAM TRACING: CPT

## 2020-07-21 PROCEDURE — 93010 EKG 12-LEAD: ICD-10-PCS | Mod: ,,, | Performed by: INTERNAL MEDICINE

## 2020-07-21 PROCEDURE — 93010 ELECTROCARDIOGRAM REPORT: CPT | Mod: ,,, | Performed by: INTERNAL MEDICINE

## 2020-07-21 PROCEDURE — 77080 DXA BONE DENSITY AXIAL: CPT | Mod: 26,,, | Performed by: RADIOLOGY

## 2020-07-21 PROCEDURE — 77080 DEXA BONE DENSITY SPINE HIP: ICD-10-PCS | Mod: 26,,, | Performed by: RADIOLOGY

## 2020-07-21 RX ORDER — CELECOXIB 200 MG/1
400 CAPSULE ORAL
Status: CANCELLED | OUTPATIENT
Start: 2020-07-21 | End: 2020-07-21

## 2020-07-21 RX ORDER — CALCIUM CARBONATE 600 MG
600 TABLET ORAL ONCE
Status: ON HOLD | COMMUNITY
End: 2022-06-20

## 2020-07-21 RX ORDER — LIDOCAINE HYDROCHLORIDE 10 MG/ML
0.5 INJECTION, SOLUTION EPIDURAL; INFILTRATION; INTRACAUDAL; PERINEURAL ONCE
Status: CANCELLED | OUTPATIENT
Start: 2020-07-21 | End: 2020-07-21

## 2020-07-21 RX ORDER — FAMOTIDINE 20 MG/1
20 TABLET, FILM COATED ORAL
Status: CANCELLED | OUTPATIENT
Start: 2020-07-21 | End: 2020-07-21

## 2020-07-21 RX ORDER — ACETAMINOPHEN 500 MG
1000 TABLET ORAL
Status: CANCELLED | OUTPATIENT
Start: 2020-07-21 | End: 2020-07-21

## 2020-07-21 RX ORDER — SODIUM CHLORIDE, SODIUM LACTATE, POTASSIUM CHLORIDE, CALCIUM CHLORIDE 600; 310; 30; 20 MG/100ML; MG/100ML; MG/100ML; MG/100ML
INJECTION, SOLUTION INTRAVENOUS CONTINUOUS
Status: CANCELLED | OUTPATIENT
Start: 2020-07-21

## 2020-07-21 NOTE — DISCHARGE INSTRUCTIONS
Information to Prepare you for your Surgery    PRE-ADMIT TESTING -  [phone]    [address]  [place] [facility]          Your surgery has been scheduled at [facility]. We are pleased to have the opportunity to serve you. For Further Information please call [phone].    On the day of surgery please report to the Information Desk on the 1st floor.    · CONTACT YOUR PHYSICIAN'S OFFICE THE DAY PRIOR TO YOUR SURGERY TO OBTAIN YOUR ARRIVAL TIME.     · The evening before surgery do not eat anything after 9 p.m. ( this includes hard candy, chewing gum and mints).  You may only have GATORADE, POWERADE AND WATER  from 9 p.m. until you leave your home.   DO NOT DRINK ANY LIQUIDS ON THE WAY TO THE HOSPITAL.      SPECIAL MEDICATION INSTRUCTIONS: TAKE medications checked off by the Anesthesiologist on your Medication List.    Angiogram Patients: Take medications as instructed by your physician, including aspirin.     Surgery Patients:    If you take ASPIRIN - Your PHYSICIAN/SURGEON will need to inform you IF/OR when you need to stop taking aspirin prior to your surgery.     Do Not take any medications containing IBUPROFEN.  Do Not Wear any make-up or dark nail polish   (especially eye make-up) to surgery. If you come to surgery with makeup on you will be required to remove the makeup or nail polish.    Do not shave your surgical area at least 5 days prior to your surgery. The surgical prep will be performed at the hospital according to Infection Control regulations.    Leave all valuables at home.   Do Not wear any jewelry or watches, including any metal in body piercings. Jewelry must be removed prior to coming to the hospital.  There is a possibility that rings that are unable to be removed may be cut off if they are on the surgical extremity.    Contact Lens must be removed before surgery. Either do not wear the contact lens or bring a case and solution for  storage.  Please bring a container for eyeglasses or dentures as required.  Bring any paperwork your physician has provided, such as consent forms,  history and physicals, doctor's orders, etc.   Bring comfortable clothes that are loose fitting to wear upon discharge. Take into consideration the type of surgery being performed.  Maintain your diet as advised per your physician the day prior to surgery.      Adequate rest the night before surgery is advised.   Park in the Parking lot behind the hospital or in the [facility] across the street from the parking lot. Parking is complimentary.  If you will be discharged the same day as your procedure, please arrange for a responsible adult to drive you home or to accompany you if traveling by taxi.   YOU WILL NOT BE PERMITTED TO DRIVE OR TO LEAVE THE HOSPITAL ALONE AFTER SURGERY.   If you are being discharged the same day, it is strongly recommended that you arrange for someone to remain with you for the first 24 hrs following your surgery.    The Surgeon will speak to your family/visitor after your surgery regarding the outcome of your surgery and post op care.  The Surgeon may speak to you after your surgery, but there is a possibility you may not remember the details.  Please check with your family members regarding the conversation with the Surgeon.    We strongly recommend whoever is bringing you home be present for discharge instructions.  This will ensure a thorough understanding for your post op home care.    ALL CHILDREN MUST ALWAYS BE ACCOMPANIED BY AN ADULT.    Visitors-Refer to current Visitor policy handouts.    Thank you for your cooperation.  The Staff of [facility].                Bathing Instructions with Hibiclens     Shower the evening before and morning of your procedure with Hibiclens:   Wash your face with water and your regular face wash/soap   Apply Hibiclens directly on your skin or on a wet washcloth and wash  gently. When showering: Move away from the shower stream when applying Hibiclens to avoid rinsing off too soon.   Rinse thoroughly with warm water   Do not dilute Hibiclens         Dry off as usual, do not use any deodorant, powder, body lotions, perfume, after shave or cologne.

## 2020-07-21 NOTE — TELEPHONE ENCOUNTER
Spoke to patient to move surgery date to 8/7/20 due to her Glucose level on preop labs.  Patient is scheduled to see her primary care doctor tomorrow for medical clearance for surgery.

## 2020-07-21 NOTE — ANESTHESIA PREPROCEDURE EVALUATION
07/21/2020  Darlene Avila is a 69 y.o., female.    Anesthesia Evaluation    I have reviewed the Patient Summary Reports.    I have reviewed the Nursing Notes. I have reviewed the NPO Status.   I have reviewed the Medications.     Review of Systems  Anesthesia Hx:  No problems with previous Anesthesia    Social:  Smoker, No Alcohol Use    Hematology/Oncology:  Hematology Normal      Current/Recent Cancer. Breast right   EENT/Dental:EENT/Dental Normal   Cardiovascular:   Exercise tolerance: good Hypertension, well controlled    Pulmonary:  Pulmonary Normal    Renal/:  Renal/ Normal     Hepatic/GI:  Hepatic/GI Normal    Musculoskeletal:   Arthritis     Neurological:   TIA,    Endocrine:   Diabetes, well controlled, type 2        Physical Exam  General:  Well nourished    Airway/Jaw/Neck:  Airway Findings: Mouth Opening: Normal Tongue: Normal  General Airway Assessment: Adult, Good  Mallampati: I  TM Distance: Normal, at least 6 cm  Jaw/Neck Findings:  Neck ROM: Normal ROM      Dental:  Dental Findings: Upper partial dentures, Lower partial dentures        Mental Status:  Mental Status Findings:  Cooperative, Alert and Oriented         Anesthesia Plan  Type of Anesthesia, risks & benefits discussed:  Anesthesia Type:  general  Patient's Preference:   Intra-op Monitoring Plan: standard ASA monitors  Intra-op Monitoring Plan Comments:   Post Op Pain Control Plan: per primary service following discharge from PACU and multimodal analgesia  Post Op Pain Control Plan Comments:   Induction:    Beta Blocker:         Informed Consent: Patient understands risks and agrees with Anesthesia plan.  Questions answered. Anesthesia consent signed with patient.  ASA Score: 3     Day of Surgery Review of History & Physical:    H&P update referred to the surgeon.     Anesthesia Plan Notes: L arm devices. Labs and EKG are  pending. Discontinuation of Plavix was not discussed with the pt, and she is still taking it. Will ask for clearance from prescribing doctor.  Cleared by Dr Serra office,stopped Plavix Sept 20 GAC MD    Day of surgery update: pt surgery cx 9/25 due to elevated glucose, admit, ultimately required insulin qqt for control. Glucose 209 at 10:15, insulin at 0.375 unit/hr  Glucose in , will up insulin ((BS-60) * 0.02) or 3.5 units/hr        Ready For Surgery From Anesthesia Perspective.

## 2020-07-22 ENCOUNTER — HOSPITAL ENCOUNTER (OUTPATIENT)
Facility: OTHER | Age: 70
Discharge: HOME HEALTH | End: 2020-07-24
Attending: EMERGENCY MEDICINE | Admitting: INTERNAL MEDICINE
Payer: COMMERCIAL

## 2020-07-22 DIAGNOSIS — R55 SYNCOPE, UNSPECIFIED SYNCOPE TYPE: ICD-10-CM
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
## 2020-07-22 DIAGNOSIS — R55 SYNCOPE: ICD-10-CM

## 2020-07-22 DIAGNOSIS — R79.89 ELEVATED TROPONIN: ICD-10-CM
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
## 2020-07-22 DIAGNOSIS — I95.1 ORTHOSTASIS: Primary | ICD-10-CM

## 2020-07-22 DIAGNOSIS — R53.1 WEAKNESS: ICD-10-CM

## 2020-07-22 DIAGNOSIS — R42 POSTURAL DIZZINESS WITH PRESYNCOPE: ICD-10-CM

## 2020-07-22 DIAGNOSIS — R55 POSTURAL DIZZINESS WITH PRESYNCOPE: ICD-10-CM

## 2020-07-22 PROBLEM — E16.2 HYPOGLYCEMIA: Status: ACTIVE | Noted: 2020-07-22

## 2020-07-22 PROBLEM — I10 ESSENTIAL HYPERTENSION: Status: ACTIVE | Noted: 2020-07-22

## 2020-07-22 LAB
ALBUMIN SERPL BCP-MCNC: 3.4 G/DL (ref 3.5–5.2)
ALP SERPL-CCNC: 125 U/L (ref 55–135)
ALT SERPL W/O P-5'-P-CCNC: 16 U/L (ref 10–44)
ANION GAP SERPL CALC-SCNC: 13 MMOL/L (ref 8–16)
APTT BLDCRRT: 26 SEC (ref 21–32)
AST SERPL-CCNC: 18 U/L (ref 10–40)
BASOPHILS # BLD AUTO: 0.05 K/UL (ref 0–0.2)
BASOPHILS NFR BLD: 0.4 % (ref 0–1.9)
BILIRUB SERPL-MCNC: 0.3 MG/DL (ref 0.1–1)
BUN SERPL-MCNC: 24 MG/DL (ref 8–23)
CALCIUM SERPL-MCNC: 9.7 MG/DL (ref 8.7–10.5)
CHLORIDE SERPL-SCNC: 101 MMOL/L (ref 95–110)
CO2 SERPL-SCNC: 27 MMOL/L (ref 23–29)
CREAT SERPL-MCNC: 1.2 MG/DL (ref 0.5–1.4)
DIFFERENTIAL METHOD: ABNORMAL
EOSINOPHIL # BLD AUTO: 0 K/UL (ref 0–0.5)
EOSINOPHIL NFR BLD: 0.2 % (ref 0–8)
ERYTHROCYTE [DISTWIDTH] IN BLOOD BY AUTOMATED COUNT: 14 % (ref 11.5–14.5)
EST. GFR  (AFRICAN AMERICAN): 53 ML/MIN/1.73 M^2
EST. GFR  (NON AFRICAN AMERICAN): 46 ML/MIN/1.73 M^2
GLUCOSE SERPL-MCNC: 62 MG/DL (ref 70–110)
HCT VFR BLD AUTO: 40.2 % (ref 37–48.5)
HGB BLD-MCNC: 12.4 G/DL (ref 12–16)
IMM GRANULOCYTES # BLD AUTO: 0.03 K/UL (ref 0–0.04)
IMM GRANULOCYTES NFR BLD AUTO: 0.2 % (ref 0–0.5)
INR PPP: 0.9 (ref 0.8–1.2)
LYMPHOCYTES # BLD AUTO: 3.7 K/UL (ref 1–4.8)
LYMPHOCYTES NFR BLD: 27.4 % (ref 18–48)
MCH RBC QN AUTO: 25.8 PG (ref 27–31)
MCHC RBC AUTO-ENTMCNC: 30.8 G/DL (ref 32–36)
MCV RBC AUTO: 84 FL (ref 82–98)
MONOCYTES # BLD AUTO: 0.7 K/UL (ref 0.3–1)
MONOCYTES NFR BLD: 5.5 % (ref 4–15)
NEUTROPHILS # BLD AUTO: 8.9 K/UL (ref 1.8–7.7)
NEUTROPHILS NFR BLD: 66.3 % (ref 38–73)
NRBC BLD-RTO: 0 /100 WBC
PLATELET # BLD AUTO: 391 K/UL (ref 150–350)
PMV BLD AUTO: 8.9 FL (ref 9.2–12.9)
POCT GLUCOSE: 308 MG/DL (ref 70–110)
POCT GLUCOSE: 67 MG/DL (ref 70–110)
POCT GLUCOSE: 80 MG/DL (ref 70–110)
POTASSIUM SERPL-SCNC: 3.7 MMOL/L (ref 3.5–5.1)
PROT SERPL-MCNC: 6.8 G/DL (ref 6–8.4)
PROTHROMBIN TIME: 10 SEC (ref 9–12.5)
RBC # BLD AUTO: 4.8 M/UL (ref 4–5.4)
SARS-COV-2 RDRP RESP QL NAA+PROBE: NEGATIVE
SARS-COV-2 RNA RESP QL NAA+PROBE: NOT DETECTED
SODIUM SERPL-SCNC: 141 MMOL/L (ref 136–145)
TROPONIN I SERPL DL<=0.01 NG/ML-MCNC: 0.03 NG/ML (ref 0–0.03)
TROPONIN I SERPL DL<=0.01 NG/ML-MCNC: 0.03 NG/ML (ref 0–0.03)
TSH SERPL DL<=0.005 MIU/L-ACNC: 1.04 UIU/ML (ref 0.4–4)
WBC # BLD AUTO: 13.38 K/UL (ref 3.9–12.7)

## 2020-07-22 PROCEDURE — 80053 COMPREHEN METABOLIC PANEL: CPT

## 2020-07-22 PROCEDURE — 84443 ASSAY THYROID STIM HORMONE: CPT

## 2020-07-22 PROCEDURE — G0378 HOSPITAL OBSERVATION PER HR: HCPCS

## 2020-07-22 PROCEDURE — 36415 COLL VENOUS BLD VENIPUNCTURE: CPT

## 2020-07-22 PROCEDURE — 99220 PR INITIAL OBSERVATION CARE,LEVL III: CPT | Mod: ,,, | Performed by: PHYSICIAN ASSISTANT

## 2020-07-22 PROCEDURE — 63600175 PHARM REV CODE 636 W HCPCS: Performed by: EMERGENCY MEDICINE

## 2020-07-22 PROCEDURE — 93005 ELECTROCARDIOGRAM TRACING: CPT

## 2020-07-22 PROCEDURE — 25000003 PHARM REV CODE 250: Performed by: PHYSICIAN ASSISTANT

## 2020-07-22 PROCEDURE — 85610 PROTHROMBIN TIME: CPT

## 2020-07-22 PROCEDURE — 99291 CRITICAL CARE FIRST HOUR: CPT | Mod: 25

## 2020-07-22 PROCEDURE — 85730 THROMBOPLASTIN TIME PARTIAL: CPT

## 2020-07-22 PROCEDURE — U0002 COVID-19 LAB TEST NON-CDC: HCPCS

## 2020-07-22 PROCEDURE — 93010 ELECTROCARDIOGRAM REPORT: CPT | Mod: ,,, | Performed by: INTERNAL MEDICINE

## 2020-07-22 PROCEDURE — 99220 PR INITIAL OBSERVATION CARE,LEVL III: ICD-10-PCS | Mod: ,,, | Performed by: PHYSICIAN ASSISTANT

## 2020-07-22 PROCEDURE — 84484 ASSAY OF TROPONIN QUANT: CPT | Mod: 91

## 2020-07-22 PROCEDURE — 93010 EKG 12-LEAD: ICD-10-PCS | Mod: ,,, | Performed by: INTERNAL MEDICINE

## 2020-07-22 PROCEDURE — 85025 COMPLETE CBC W/AUTO DIFF WBC: CPT

## 2020-07-22 PROCEDURE — 96372 THER/PROPH/DIAG INJ SC/IM: CPT | Mod: 59

## 2020-07-22 PROCEDURE — 96374 THER/PROPH/DIAG INJ IV PUSH: CPT

## 2020-07-22 PROCEDURE — 25000003 PHARM REV CODE 250: Performed by: EMERGENCY MEDICINE

## 2020-07-22 RX ORDER — INSULIN ASPART 100 [IU]/ML
3 INJECTION, SOLUTION INTRAVENOUS; SUBCUTANEOUS
Status: DISCONTINUED | OUTPATIENT
Start: 2020-07-23 | End: 2020-07-23

## 2020-07-22 RX ORDER — INSULIN ASPART 100 [IU]/ML
1-10 INJECTION, SOLUTION INTRAVENOUS; SUBCUTANEOUS
Status: DISCONTINUED | OUTPATIENT
Start: 2020-07-22 | End: 2020-07-24 | Stop reason: HOSPADM

## 2020-07-22 RX ORDER — TALC
6 POWDER (GRAM) TOPICAL NIGHTLY PRN
Status: DISCONTINUED | OUTPATIENT
Start: 2020-07-22 | End: 2020-07-24 | Stop reason: HOSPADM

## 2020-07-22 RX ORDER — SODIUM CHLORIDE 0.9 % (FLUSH) 0.9 %
10 SYRINGE (ML) INJECTION
Status: DISCONTINUED | OUTPATIENT
Start: 2020-07-22 | End: 2020-07-24 | Stop reason: HOSPADM

## 2020-07-22 RX ORDER — IBUPROFEN 200 MG
16 TABLET ORAL
Status: DISCONTINUED | OUTPATIENT
Start: 2020-07-22 | End: 2020-07-24 | Stop reason: HOSPADM

## 2020-07-22 RX ORDER — ONDANSETRON 2 MG/ML
4 INJECTION INTRAMUSCULAR; INTRAVENOUS EVERY 8 HOURS PRN
Status: DISCONTINUED | OUTPATIENT
Start: 2020-07-22 | End: 2020-07-24 | Stop reason: HOSPADM

## 2020-07-22 RX ORDER — HEPARIN SODIUM 5000 [USP'U]/ML
5000 INJECTION, SOLUTION INTRAVENOUS; SUBCUTANEOUS EVERY 8 HOURS
Status: DISCONTINUED | OUTPATIENT
Start: 2020-07-23 | End: 2020-07-24 | Stop reason: HOSPADM

## 2020-07-22 RX ORDER — GLUCAGON 1 MG
1 KIT INJECTION
Status: DISCONTINUED | OUTPATIENT
Start: 2020-07-22 | End: 2020-07-24 | Stop reason: HOSPADM

## 2020-07-22 RX ORDER — IBUPROFEN 200 MG
24 TABLET ORAL
Status: DISCONTINUED | OUTPATIENT
Start: 2020-07-22 | End: 2020-07-24 | Stop reason: HOSPADM

## 2020-07-22 RX ADMIN — Medication 6 MG: at 09:07

## 2020-07-22 RX ADMIN — SODIUM CHLORIDE 1000 ML: 0.9 INJECTION, SOLUTION INTRAVENOUS at 04:07

## 2020-07-22 RX ADMIN — DEXTROSE AND SODIUM CHLORIDE 1000 ML: 5; .9 INJECTION, SOLUTION INTRAVENOUS at 05:07

## 2020-07-22 RX ADMIN — INSULIN ASPART 4 UNITS: 100 INJECTION, SOLUTION INTRAVENOUS; SUBCUTANEOUS at 09:07

## 2020-07-22 NOTE — ED PROVIDER NOTES
Encounter Date: 7/22/2020    SCRIBE #1 NOTE: I, Ez Andujar, am scribing for, and in the presence of, Dr. Bailey.       History     Chief Complaint   Patient presents with    Fall     Pt was sent from Dr. Kirill Serra for hypotension. Pt 113/54 here. Pt reported she fell last night and has been dizzy.     Time seen by provider: 4:23 PM    This is a 69 y.o. female with a history of HTN and DM s/p fall who presents with complaint of dizziness for the last few days. She says that she was going to the bathroom this morning when she began to feel dizzy and fell. When she starts to feel dizzy she notices that her vision gets blurry. She is a chronic smoker with chronic dyspnea she thinks is perhaps mildly worse than her baseline today. Pt says her blood sugars are stable but admits that her blood pressure has been lower than normal recently, around 90 systolic and correlates with her dizziness. She denies associated chest pain, cough, paresthesias, or any medication changes.    The history is provided by the patient and medical records.     Review of patient's allergies indicates:   Allergen Reactions    Iodinated contrast media Hives     Past Medical History:   Diagnosis Date    Arthritis     Cancer     Cataract     Diabetes mellitus     Diabetic retinopathy     Hypertension     TIA (transient ischemic attack) 4817-3577     Past Surgical History:   Procedure Laterality Date    CAPSULOTOMY  6/26/13    yag left eye    CATARACT EXTRACTION  6/3/2013    right eye    CHOLECYSTECTOMY      TOTAL ABDOMINAL HYSTERECTOMY W/ BILATERAL SALPINGOOPHORECTOMY       Family History   Problem Relation Age of Onset    Glaucoma Mother     Diabetes Mother     Colon cancer Mother 82    Cataracts Mother     Diabetes Father     Diabetes Sister     Breast cancer Sister 65    Diabetes Maternal Uncle     Amblyopia Neg Hx     Blindness Neg Hx     Macular degeneration Neg Hx     Retinal detachment Neg Hx     Strabismus Neg Hx  "     Social History     Tobacco Use    Smoking status: Current Every Day Smoker     Packs/day: 1.50    Smokeless tobacco: Never Used   Substance Use Topics    Alcohol use: No    Drug use: No     Review of Systems   ROS: As per HPI and below:   General: No fever.   HENT: No facial pain.   Eyes: Notes presyncopal/orthostatic vision changes. Negative for eye pain.   Cardiovascular: No chest pain.   Respiratory:  Notes dyspnea.   GI: No abdominal pain. Notes nausea. Notes vomiting. No diarrhea.   Skin: No rashes.   Neuro:  Notes syncope and dizziness.  No focal deficits.   Musculoskeletal: No extremity pain.  All other systems reviewed and are negative.    Physical Exam     Initial Vitals [07/22/20 1524]   BP Pulse Resp Temp SpO2   (!) 113/54 98 16 97.3 °F (36.3 °C) 100 %      MAP       --         Physical Exam  Nursing note and vitals reviewed.  BP (!) 148/71 (BP Location: Left arm, Patient Position: Sitting)   Pulse 98   Temp 98 °F (36.7 °C) (Oral)   Resp 19   Ht 4' 11" (1.499 m)   Wt 58.1 kg (128 lb)   SpO2 100%   BMI 25.85 kg/m²   Constitutional: AAOx3. No distress. As I entered the room, the pt was throwing away some trash and I noticed her seeming unsteady, presyncopal and required assistance to the stretcher. Pt improved once laying in the bed.  Eyes: EOMI. No discharge. Anicteric.  HENT:   Mouth/Throat: Oropharynx is clear. Uvula midline. Mucus membranes are moist.  Neck: Normal range of motion. Neck supple.  Cardiovascular: Normal rate. No murmur, no gallop and no friction rub heard.   Pulmonary/Chest: No respiratory distress. Effort normal. No wheezes, no rales, no rhonchi.   Abdominal: Bowel sounds normal. Soft. No distension and no mass. There is no tenderness. There is no rebound, no guarding, no tenderness at McBurney's point.  Musculoskeletal: Normal range of motion.   Neurological: GCS 15. Alert and oriented to person, place, and time. No gross cranial nerve, light touch or strength deficit. " Coordination normal.   Skin: Skin is warm and dry.   EXT: 2+ radial pulses.   Psychiatric: Behavior is normal. Judgment normal.  ED Course   Procedures  Labs Reviewed   CBC W/ AUTO DIFFERENTIAL - Abnormal; Notable for the following components:       Result Value    WBC 13.38 (*)     Mean Corpuscular Hemoglobin 25.8 (*)     Mean Corpuscular Hemoglobin Conc 30.8 (*)     Platelets 391 (*)     MPV 8.9 (*)     Gran # (ANC) 8.9 (*)     All other components within normal limits   COMPREHENSIVE METABOLIC PANEL - Abnormal; Notable for the following components:    Glucose 62 (*)     BUN, Bld 24 (*)     Albumin 3.4 (*)     eGFR if  53 (*)     eGFR if non  46 (*)     All other components within normal limits   TROPONIN I - Abnormal; Notable for the following components:    Troponin I 0.031 (*)     All other components within normal limits   POCT GLUCOSE - Abnormal; Notable for the following components:    POCT Glucose 67 (*)     All other components within normal limits   PROTIME-INR   TSH   APTT   SARS-COV-2 RNA AMPLIFICATION, QUAL    Narrative:     Pre-admit screen   D DIMER, QUANTITATIVE   OSMOLALITY, URINE RANDOM   PROTEIN / CREATININE RATIO, URINE   SODIUM, URINE, RANDOM   TROPONIN I   URINALYSIS, REFLEX TO URINE CULTURE   POCT GLUCOSE, HAND-HELD DEVICE   POCT GLUCOSE, HAND-HELD DEVICE   POCT GLUCOSE, HAND-HELD DEVICE   POCT GLUCOSE, HAND-HELD DEVICE   POCT GLUCOSE, HAND-HELD DEVICE   POCT GLUCOSE, HAND-HELD DEVICE   POCT GLUCOSE          Imaging Results          X-Ray Chest AP Portable (Final result)  Result time 07/22/20 16:29:21    Final result by Sam Knight MD (07/22/20 16:29:21)                 Impression:      No acute abnormality.      Electronically signed by: Sam Knight MD  Date:    07/22/2020  Time:    16:29             Narrative:    EXAMINATION:  XR CHEST AP PORTABLE    CLINICAL HISTORY:  Stroke; Unspecified fall, initial encounter    TECHNIQUE:  Single frontal view  of the chest was performed.    COMPARISON:  11/05/2013    FINDINGS:  The lungs are clear, with normal appearance of pulmonary vasculature and no pleural effusion or pneumothorax.    The cardiac silhouette is normal in size. The hilar and mediastinal contours are unremarkable.    Bones are intact.                               CT Head Without Contrast (Final result)  Result time 07/22/20 16:33:25    Final result by Sam Knight MD (07/22/20 16:33:25)                 Impression:      No intracranial blood products or acute large territory infarction.    Remote infarcts of the right frontal lobe, left occipital lobe, and left basal ganglia/thalamus as well as volume loss and microvascular changes are grossly stable from 2017 exam.      Electronically signed by: Sam Knight MD  Date:    07/22/2020  Time:    16:33             Narrative:    EXAMINATION:  CT HEAD WITHOUT CONTRAST    CLINICAL HISTORY:  Neuro deficit, acute, stroke suspected; Unspecified fall, initial encounter    TECHNIQUE:  Low dose axial CT images obtained throughout the head without intravenous contrast. Sagittal and coronal reconstructions were performed.    COMPARISON:  12/19/2017    FINDINGS:  No intracranial blood products.  No extra-axial masses or fluid collections.  No evidence of an acute large territory vascular infarction.  There are evidence of remote infarcts in the right frontal and left occipital lobes.  Probable prior left basal ganglia/thalamic infarct, also unchanged from prior.  There is volume loss with compensatory ventricular dilatation, similar to prior.  Sequelae of microvascular disease appears relatively similar from prior.    Calvarium is intact.  Visualized paranasal sinuses and mastoid air cells are clear.                                 Medical Decision Making:   History:   Old Medical Records: I decided to obtain old medical records.  Independently Interpreted Test(s):   I have ordered and independently  interpreted X-rays - see prior notes.  I have ordered and independently interpreted EKG Reading(s) - see prior notes  Clinical Tests:   Lab Tests: Ordered and Reviewed  Radiological Study: Ordered and Reviewed  Medical Tests: Ordered and Reviewed            Scribe Attestation:   Scribe #1: I performed the above scribed service and the documentation accurately describes the services I performed. I attest to the accuracy of the note.    Attending Attestation:           Physician Attestation for Scribe:  Physician Attestation Statement for Scribe #1: I, Dr. Bailey, reviewed documentation, as scribed by Ez Andujar in my presence, and it is both accurate and complete.                 ED Course as of Jul 22 1835   Wed Jul 22, 2020   1617 Patient is a 69-year-old female with hypertension, diabetes, history of TIA who presents with orthostasis, a fall last night after acute dizziness, and multiple near syncopal episodes over the past day.  Under went to interview the patient, she was trying to throw out an orange peel while standing, became acutely appeared unsteady, and became acutely presyncopal, requiring assistance back into the stretcher.   The initial differential included dysrhythmia, dehydration, gross metabolic abnormality, metabolic crisis, TIA/stroke. She denies chest pain or dyspnea, however acute PE is on the differential given her breast cancer history.     [RC]   1642 I independently reviewed and interpreted EKG which shows normal sinus rhythm at 99 beats per minute, no STEMI, no ischemic changes, normal intervals.  No acute change compared to prior tracing.        [RC]   1722 I independently reviewed and interpreted CXR which shows no pneumothorax, no focal consolidation, no cardiomegaly, no acute process.    I independently reviewed and interpreted CT head which shows no acute intracranial bleeding, mass, skull fracture, or acute intracranial process.    I independently reviewed and interpreted labs which  are notable for elevated troponin, pre-renal azotemia with normal Cr (BUN/Cr 24/1.2), mild hypoglycemia.       [RC]   1654 I have discussed the patient history, exam, findings with hospitalist Dr. Hanks, who accepts the patient for Dr. Webber.       =====================================  Critical Care:  35 minutes total critical care time was personally spent by me, exclusive of procedures and separately billable time.   Critical care was necessary to treat or prevent imminent or life-threatening deterioration of the following conditions: elevated troponin, syncope, hypotension.    =====================================           [RC]   1807 Findings, plan for admission discussed with pt. Though reluctant because she feels better, she with plan to admit.     [RC]      ED Course User Index  [RC] Mike Bailey MD                Clinical Impression:     1. Elevated troponin    2. Weakness    3. Fall at home, initial encounter    4. Postural dizziness with presyncope    5. 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 Syncope    7. Syncope, unspecified syncope type              ED Disposition Condition    Observation                           Mike Bailey MD  07/22/20 [id number]

## 2020-07-22 NOTE — HPI
Ms. Darlene Avila is a 69 y.o. female, with PMH of recently diagnosed with right-sided breast cancer (biopsy taken 03/18/2020, currently on Arimidex), hypertension, IDDM-2, prior TIA, current smoker, who presented to [facility] ED on 07/22/2020 for further evaluation of dizziness and presyncope which caused her to fall today.  She states that she has felt dizzy for the past few days, and nearly fell while going to the bathroom this morning as a result of dizziness.  Today she presented to her PCP for a preop clearance for her mastectomy,  and was found to be hypotensive in the office today.  She endorses more shortness of breath than baseline at present, and recent low blood pressures in the 90 systolic. She states she has not been eating well since starting her Arimidex, and feels nauseated each time she tries to eat.  In the ED she was evaluated with labs that showed hypoglycemia of 62, troponin was elevated at 0.031, an EKG showed normal sinus rhythm with a rate of 99, she did have positive orthostatics in the ED, and was unable to stand without dizziness. She was placed on OBS.

## 2020-07-22 NOTE — ED NOTES
Appearance: Pt awake, alert & oriented to person, place & time. Pt in no acute distress at present time. Pt is clean and well groomed with clothes appropriately fastened.   Skin: Skin warm, dry & intact. Color consistent with ethnicity. Mucous membranes moist. No breakdown or brusing noted.   Musculoskeletal: Patient moving all extremities well, no obvious swelling or deformities noted.   Respiratory: Respirations spontaneous, even, and non-labored. Visible chest rise noted. Airway is open and patent. No accessory muscle use noted.   Neurologic: Sensation is intact. Speech is clear and appropriate. Eyes open spontaneously, behavior appropriate to situation, follows commands, facial expression symmetrical, bilateral hand grasp equal and even, purposeful motor response noted.  Cardiac:  No Bilateral lower extremity edema. Cap refill is <3 seconds. Dizziness reported by pt along with generalized weakness.   Abdomen: Abdomen soft, non-tender to palpation. Pt denies active abd pains, cramping or discomfort, No N/V/D at this time.   : Pt reports no dysuria or hematuria.

## 2020-07-22 NOTE — ED NOTES
Pt c/o dizziness, generalized weakness x several days causing her to fall yesterday. Was sent down to ED for further eval. Supposed to have breast sx tomorrow. Hx breast ca. Denies hitting her head or having LOC. Pt AAOx4 and appropriate at this time. Respirations even and unlabored. No acute distress noted.

## 2020-07-23 PROBLEM — N39.0 UTI (URINARY TRACT INFECTION): Status: ACTIVE | Noted: 2020-07-23

## 2020-07-23 LAB
ANION GAP SERPL CALC-SCNC: 10 MMOL/L (ref 8–16)
ASCENDING AORTA: 2.59 CM
AV INDEX (PROSTH): 0.83
AV MEAN GRADIENT: 3 MMHG
AV PEAK GRADIENT: 5 MMHG
AV VALVE AREA: 2.32 CM2
AV VELOCITY RATIO: 0.85
BACTERIA #/AREA URNS HPF: ABNORMAL /HPF
BASOPHILS # BLD AUTO: 0.05 K/UL (ref 0–0.2)
BASOPHILS NFR BLD: 0.5 % (ref 0–1.9)
BILIRUB UR QL STRIP: NEGATIVE
BSA FOR ECHO PROCEDURE: 1.53 M2
BUN SERPL-MCNC: 17 MG/DL (ref 8–23)
CALCIUM SERPL-MCNC: 9 MG/DL (ref 8.7–10.5)
CHLORIDE SERPL-SCNC: 105 MMOL/L (ref 95–110)
CHOLEST SERPL-MCNC: 106 MG/DL (ref 120–199)
CHOLEST/HDLC SERPL: 2 {RATIO} (ref 2–5)
CLARITY UR: CLEAR
CO2 SERPL-SCNC: 24 MMOL/L (ref 23–29)
COLOR UR: YELLOW
CREAT SERPL-MCNC: 0.8 MG/DL (ref 0.5–1.4)
CREAT UR-MCNC: 48.9 MG/DL (ref 15–325)
CV ECHO LV RWT: 0.82 CM
DIFFERENTIAL METHOD: ABNORMAL
DOP CALC AO PEAK VEL: 1.12 M/S
DOP CALC AO VTI: 23.5 CM
DOP CALC LVOT AREA: 2.8 CM2
DOP CALC LVOT DIAMETER: 1.89 CM
DOP CALC LVOT PEAK VEL: 0.95 M/S
DOP CALC LVOT STROKE VOLUME: 54.57 CM3
DOP CALCLVOT PEAK VEL VTI: 19.46 CM
ECHO LV POSTERIOR WALL: 1.25 CM (ref 0.6–1.1)
EOSINOPHIL # BLD AUTO: 0.1 K/UL (ref 0–0.5)
EOSINOPHIL NFR BLD: 1.1 % (ref 0–8)
ERYTHROCYTE [DISTWIDTH] IN BLOOD BY AUTOMATED COUNT: 14.1 % (ref 11.5–14.5)
EST. GFR  (AFRICAN AMERICAN): >60 ML/MIN/1.73 M^2
EST. GFR  (NON AFRICAN AMERICAN): >60 ML/MIN/1.73 M^2
ESTIMATED AVG GLUCOSE: 329 MG/DL (ref 68–131)
FRACTIONAL SHORTENING: 51 % (ref 28–44)
GLUCOSE SERPL-MCNC: 71 MG/DL (ref 70–110)
GLUCOSE UR QL STRIP: ABNORMAL
HBA1C MFR BLD HPLC: 13.1 % (ref 4–5.6)
HCT VFR BLD AUTO: 38.9 % (ref 37–48.5)
HDLC SERPL-MCNC: 54 MG/DL (ref 40–75)
HDLC SERPL: 50.9 % (ref 20–50)
HGB BLD-MCNC: 11.9 G/DL (ref 12–16)
HGB UR QL STRIP: NEGATIVE
IMM GRANULOCYTES # BLD AUTO: 0.03 K/UL (ref 0–0.04)
IMM GRANULOCYTES NFR BLD AUTO: 0.3 % (ref 0–0.5)
INTERVENTRICULAR SEPTUM: 1.34 CM (ref 0.6–1.1)
IVRT: 127.5 MSEC
KETONES UR QL STRIP: NEGATIVE
LA MAJOR: 3.49 CM
LA MINOR: 4.78 CM
LA WIDTH: 3.4 CM
LDLC SERPL CALC-MCNC: 41.8 MG/DL (ref 63–159)
LEFT ATRIUM SIZE: 2.8 CM
LEFT ATRIUM VOLUME INDEX MOD: 16.6 ML/M2
LEFT ATRIUM VOLUME INDEX: 21.7 ML/M2
LEFT ATRIUM VOLUME MOD: 25 CM3
LEFT ATRIUM VOLUME: 32.65 CM3
LEFT INTERNAL DIMENSION IN SYSTOLE: 1.5 CM (ref 2.1–4)
LEFT VENTRICLE DIASTOLIC VOLUME INDEX: 24.22 ML/M2
LEFT VENTRICLE DIASTOLIC VOLUME: 36.47 ML
LEFT VENTRICLE MASS INDEX: 84 G/M2
LEFT VENTRICLE SYSTOLIC VOLUME INDEX: 4.1 ML/M2
LEFT VENTRICLE SYSTOLIC VOLUME: 6.1 ML
LEFT VENTRICULAR INTERNAL DIMENSION IN DIASTOLE: 3.05 CM (ref 3.5–6)
LEFT VENTRICULAR MASS: 126.26 G
LEUKOCYTE ESTERASE UR QL STRIP: ABNORMAL
LYMPHOCYTES # BLD AUTO: 4.6 K/UL (ref 1–4.8)
LYMPHOCYTES NFR BLD: 43.7 % (ref 18–48)
MCH RBC QN AUTO: 25.7 PG (ref 27–31)
MCHC RBC AUTO-ENTMCNC: 30.6 G/DL (ref 32–36)
MCV RBC AUTO: 84 FL (ref 82–98)
MICROSCOPIC COMMENT: ABNORMAL
MONOCYTES # BLD AUTO: 0.7 K/UL (ref 0.3–1)
MONOCYTES NFR BLD: 6.6 % (ref 4–15)
NEUTROPHILS # BLD AUTO: 5 K/UL (ref 1.8–7.7)
NEUTROPHILS NFR BLD: 47.8 % (ref 38–73)
NITRITE UR QL STRIP: NEGATIVE
NONHDLC SERPL-MCNC: 52 MG/DL
NRBC BLD-RTO: 0 /100 WBC
OSMOLALITY UR: 499 MOSM/KG (ref 50–1200)
PH UR STRIP: 6 [PH] (ref 5–8)
PISA TR MAX VEL: 2.81 M/S
PLATELET # BLD AUTO: 367 K/UL (ref 150–350)
PMV BLD AUTO: 9 FL (ref 9.2–12.9)
POCT GLUCOSE: 109 MG/DL (ref 70–110)
POCT GLUCOSE: 250 MG/DL (ref 70–110)
POCT GLUCOSE: 310 MG/DL (ref 70–110)
POTASSIUM SERPL-SCNC: 3.6 MMOL/L (ref 3.5–5.1)
PROT UR QL STRIP: NEGATIVE
PROT UR-MCNC: 11 MG/DL (ref 0–15)
PROT/CREAT UR: 0.22 MG/G{CREAT} (ref 0–0.2)
PULM VEIN S/D RATIO: 0.88
PV PEAK D VEL: 0.51 M/S
PV PEAK S VEL: 0.45 M/S
PV PEAK VELOCITY: 1.07 CM/S
RA MAJOR: 4.04 CM
RA PRESSURE: 3 MMHG
RA WIDTH: 3.39 CM
RBC # BLD AUTO: 4.63 M/UL (ref 4–5.4)
RETIRED EF AND QEF - SEE NOTES: 83 %
RIGHT VENTRICULAR END-DIASTOLIC DIMENSION: 2.17 CM
RV TISSUE DOPPLER FREE WALL SYSTOLIC VELOCITY 1 (APICAL 4 CHAMBER VIEW): 14.26 CM/S
SINUS: 2.97 CM
SODIUM SERPL-SCNC: 139 MMOL/L (ref 136–145)
SODIUM UR-SCNC: 87 MMOL/L (ref 20–250)
SP GR UR STRIP: 1.01 (ref 1–1.03)
SQUAMOUS #/AREA URNS HPF: 1 /HPF
STJ: 2.65 CM
TR MAX PG: 32 MMHG
TRICUSPID ANNULAR PLANE SYSTOLIC EXCURSION: 2.09 CM
TRIGL SERPL-MCNC: 51 MG/DL (ref 30–150)
TROPONIN I SERPL DL<=0.01 NG/ML-MCNC: 0.02 NG/ML (ref 0–0.03)
TROPONIN I SERPL DL<=0.01 NG/ML-MCNC: 0.03 NG/ML (ref 0–0.03)
TROPONIN I SERPL DL<=0.01 NG/ML-MCNC: 0.03 NG/ML (ref 0–0.03)
TV REST PULMONARY ARTERY PRESSURE: 35 MMHG
URN SPEC COLLECT METH UR: ABNORMAL
UROBILINOGEN UR STRIP-ACNC: NEGATIVE EU/DL
WBC # BLD AUTO: 10.42 K/UL (ref 3.9–12.7)
WBC #/AREA URNS HPF: >100 /HPF (ref 0–5)
WBC CLUMPS URNS QL MICRO: ABNORMAL
YEAST URNS QL MICRO: ABNORMAL

## 2020-07-23 PROCEDURE — 80048 BASIC METABOLIC PNL TOTAL CA: CPT

## 2020-07-23 PROCEDURE — 84484 ASSAY OF TROPONIN QUANT: CPT

## 2020-07-23 PROCEDURE — 96372 THER/PROPH/DIAG INJ SC/IM: CPT

## 2020-07-23 PROCEDURE — 99226 PR SUBSEQUENT OBSERVATION CARE,LEVEL III: CPT | Mod: ,,, | Performed by: PHYSICIAN ASSISTANT

## 2020-07-23 PROCEDURE — 63600175 PHARM REV CODE 636 W HCPCS: Performed by: PHYSICIAN ASSISTANT

## 2020-07-23 PROCEDURE — 80061 LIPID PANEL: CPT

## 2020-07-23 PROCEDURE — 99226 PR SUBSEQUENT OBSERVATION CARE,LEVEL III: ICD-10-PCS | Mod: ,,, | Performed by: PHYSICIAN ASSISTANT

## 2020-07-23 PROCEDURE — 81000 URINALYSIS NONAUTO W/SCOPE: CPT

## 2020-07-23 PROCEDURE — 97530 THERAPEUTIC ACTIVITIES: CPT

## 2020-07-23 PROCEDURE — 97162 PT EVAL MOD COMPLEX 30 MIN: CPT

## 2020-07-23 PROCEDURE — 85025 COMPLETE CBC W/AUTO DIFF WBC: CPT

## 2020-07-23 PROCEDURE — 84300 ASSAY OF URINE SODIUM: CPT

## 2020-07-23 PROCEDURE — 84156 ASSAY OF PROTEIN URINE: CPT

## 2020-07-23 PROCEDURE — 87086 URINE CULTURE/COLONY COUNT: CPT

## 2020-07-23 PROCEDURE — 84484 ASSAY OF TROPONIN QUANT: CPT | Mod: 91

## 2020-07-23 PROCEDURE — 94761 N-INVAS EAR/PLS OXIMETRY MLT: CPT

## 2020-07-23 PROCEDURE — C9399 UNCLASSIFIED DRUGS OR BIOLOG: HCPCS | Performed by: PHYSICIAN ASSISTANT

## 2020-07-23 PROCEDURE — 25000003 PHARM REV CODE 250: Performed by: PHYSICIAN ASSISTANT

## 2020-07-23 PROCEDURE — 83036 HEMOGLOBIN GLYCOSYLATED A1C: CPT

## 2020-07-23 PROCEDURE — 83935 ASSAY OF URINE OSMOLALITY: CPT

## 2020-07-23 PROCEDURE — 96375 TX/PRO/DX INJ NEW DRUG ADDON: CPT

## 2020-07-23 PROCEDURE — G0378 HOSPITAL OBSERVATION PER HR: HCPCS

## 2020-07-23 PROCEDURE — 36415 COLL VENOUS BLD VENIPUNCTURE: CPT

## 2020-07-23 RX ORDER — SODIUM CHLORIDE 9 MG/ML
INJECTION, SOLUTION INTRAVENOUS CONTINUOUS
Status: DISCONTINUED | OUTPATIENT
Start: 2020-07-23 | End: 2020-07-24

## 2020-07-23 RX ADMIN — HEPARIN SODIUM 5000 UNITS: 5000 INJECTION INTRAVENOUS; SUBCUTANEOUS at 05:07

## 2020-07-23 RX ADMIN — INSULIN DETEMIR 9 UNITS: 100 INJECTION, SOLUTION SUBCUTANEOUS at 09:07

## 2020-07-23 RX ADMIN — HEPARIN SODIUM 5000 UNITS: 5000 INJECTION INTRAVENOUS; SUBCUTANEOUS at 09:07

## 2020-07-23 RX ADMIN — SODIUM CHLORIDE: 0.9 INJECTION, SOLUTION INTRAVENOUS at 04:07

## 2020-07-23 RX ADMIN — INSULIN ASPART 2 UNITS: 100 INJECTION, SOLUTION INTRAVENOUS; SUBCUTANEOUS at 10:07

## 2020-07-23 RX ADMIN — CEFTRIAXONE 1 G: 1 INJECTION, SOLUTION INTRAVENOUS at 06:07

## 2020-07-23 RX ADMIN — INSULIN ASPART 8 UNITS: 100 INJECTION, SOLUTION INTRAVENOUS; SUBCUTANEOUS at 05:07

## 2020-07-23 RX ADMIN — HEPARIN SODIUM 5000 UNITS: 5000 INJECTION INTRAVENOUS; SUBCUTANEOUS at 01:07

## 2020-07-23 RX ADMIN — SODIUM CHLORIDE: 0.9 INJECTION, SOLUTION INTRAVENOUS at 02:07

## 2020-07-23 RX ADMIN — SODIUM CHLORIDE 500 ML: 0.9 INJECTION, SOLUTION INTRAVENOUS at 05:07

## 2020-07-23 NOTE — PLAN OF CARE
Plan of care reviewed with patient. Pt remains free from fall, injury, and skin breakdown. Positions self independently. Blood glucose and Telemetry monitoring maintained. Patient continue to complaints of dizziness when she stands up. Patient B/P decreases when she stands, see v/s flowsheet. Patient educated to call staff when she needs to get up out of bed. Purposeful hourly rounding done. Safety maintained. Patient lying in bed in no distress. Will continue to monitor.

## 2020-07-23 NOTE — ASSESSMENT & PLAN NOTE
- no associated chest pain, flat  - no events on monitor  - doubt ACS  - EKG without ischemic changes

## 2020-07-23 NOTE — PLAN OF CARE
Pt AAO x 4. Resp. Even and unlabored. Denies any c/o discomfort. Purposeful rounding done. Continues with IV fluids as ordered. Tolerating well. Voiding yellow urine per [facility] with assist without difficulty noted. Pt free from falls or injury. Safety maintained. Bed in lowest position and locked. Call light in reach.

## 2020-07-23 NOTE — ASSESSMENT & PLAN NOTE
- last A1C: 13.1  - patient reports compliance with home insulin, uncertain if this is the case  - low on admission. Given D5 bolus in ED  - Diabetic diet   - SSI with accuchekcs AC/HS  Will start LA insulin 9 units qhs, monitor

## 2020-07-23 NOTE — SUBJECTIVE & OBJECTIVE
Past Medical History:   Diagnosis Date    Arthritis     Cancer     Cataract     Diabetes mellitus     Diabetic retinopathy     Hypertension     TIA (transient ischemic attack) [id number]       Past Surgical History:   Procedure Laterality Date    CAPSULOTOMY  6/26/13    yag left eye    CATARACT EXTRACTION  6/3/2013    right eye    CHOLECYSTECTOMY      TOTAL ABDOMINAL HYSTERECTOMY W/ BILATERAL SALPINGOOPHORECTOMY         Review of patient's allergies indicates:   Allergen Reactions    Iodinated contrast media Hives       No current facility-administered medications on file prior to encounter.      Current Outpatient Medications on File Prior to Encounter   Medication Sig    amitriptyline (ELAVIL) 100 MG tablet Take 1 tablet by mouth.    aspirin (ECOTRIN) 81 MG EC tablet Take 81 mg by mouth once daily.    calcium carbonate (OS-JABIER) 600 mg calcium (1,500 mg) Tab Take 600 mg by mouth once.    citalopram (CELEXA) 10 MG tablet Take 1 tablet by mouth.    clopidogrel (PLAVIX) 75 mg tablet Take by mouth. 1 tablet Oral Every day    COLESEVELAM HCL (WELCHOL ORAL) Take by mouth.    diphenoxylate-atropine 2.5-0.025 mg (LOMOTIL) 2.5-0.025 mg per tablet Take by mouth. 1 tablet Oral Three times a day    insulin lispro protam & lispro (HUMALOG MIX 75-25 KWIKPEN) 100 unit/mL (75-25) InPn     insulin lispro protamine-insulin lispro (HUMALOG 75/25) 100 unit/mL (75-25) Susp Inject into the skin 2 (two) times daily before meals.      multivitamin capsule Take 1 capsule by mouth once daily.    pravastatin (PRAVACHOL) 10 MG tablet Take by mouth. 1 tablet Oral Every day    ramipril (ALTACE) 1.25 MG capsule Take by mouth. 1 capsule Oral Every day    zolpidem (AMBIEN) 10 mg Tab Take 1 tablet by mouth.    anastrozole (ARIMIDEX) 1 mg Tab Take 1 tablet (1 mg total) by mouth once daily.    rOPINIRole (REQUIP) 0.25 MG tablet Take 2 tablets by mouth.     Family History     Problem Relation (Age of Onset)    Breast cancer  "Sister (65)    Cataracts Mother    Colon cancer Mother (82)    Diabetes Mother, Father, Sister, Maternal Uncle    Glaucoma Mother        Tobacco Use    Smoking status: Current Every Day Smoker     Packs/day: 1.50    Smokeless tobacco: Never Used   Substance and Sexual Activity    Alcohol use: No    Drug use: No    Sexual activity: Not Currently     Review of Systems   Constitutional: Positive for appetite change (poor 2/2 nausea with eating ). Negative for activity change, chills, fatigue and fever.   HENT: Negative for congestion, ear pain, postnasal drip, rhinorrhea, sinus pressure, sore throat and trouble swallowing.    Eyes: Positive for visual disturbance ("tunnel vision" during episodes of dizziness ).   Respiratory: Negative for cough, shortness of breath and wheezing.    Cardiovascular: Negative for chest pain, palpitations and leg swelling.   Gastrointestinal: Negative for abdominal pain, diarrhea, nausea and vomiting.   Genitourinary: Negative for dysuria, flank pain, frequency, hematuria and urgency.   Musculoskeletal: Negative for back pain, gait problem, joint swelling and neck pain.   Skin: Negative for color change and wound.   Neurological: Positive for dizziness and syncope. Negative for weakness, light-headedness, numbness and headaches.   Psychiatric/Behavioral: Negative for confusion and decreased concentration.     Objective:     Vital Signs (Most Recent):  Temp: 98.2 °F (36.8 °C) (07/22/20 1919)  Pulse: 103 (07/22/20 1919)  Resp: 20 (07/22/20 1919)  BP: (!) 147/85 (07/22/20 1919)  SpO2: 98 % (07/22/20 1919) Vital Signs (24h Range):  Temp:  [97.3 °F (36.3 °C)-98.2 °F (36.8 °C)] 98.2 °F (36.8 °C)  Pulse:  [] 103  Resp:  [16-20] 20  SpO2:  [98 %-100 %] 98 %  BP: (113-148)/(54-85) 147/85     Weight: 58.1 kg (128 lb)  Body mass index is 25.85 kg/m².    Physical Exam  Vitals signs and nursing note reviewed.   Constitutional:       General: She is not in acute distress.     Appearance: " Normal appearance. She is well-developed. She is not diaphoretic.   HENT:      Head: Normocephalic and atraumatic.   Eyes:      General: No scleral icterus.     Conjunctiva/sclera: Conjunctivae normal.      Pupils: Pupils are equal, round, and reactive to light.   Neck:      Musculoskeletal: Normal range of motion and neck supple.      Trachea: No tracheal deviation.   Cardiovascular:      Rate and Rhythm: Normal rate and regular rhythm.      Heart sounds: Normal heart sounds. No murmur. No gallop.    Pulmonary:      Effort: Pulmonary effort is normal. No respiratory distress.      Breath sounds: Normal breath sounds. No stridor. No wheezing or rales.   Abdominal:      General: Bowel sounds are normal. There is no distension.      Palpations: Abdomen is soft. There is no mass.      Tenderness: There is no abdominal tenderness. There is no guarding or rebound.      Hernia: No hernia is present.   Musculoskeletal: Normal range of motion.   Skin:     General: Skin is warm and dry.      Coloration: Skin is not pale.      Findings: No erythema or rash.   Neurological:      General: No focal deficit present.      Mental Status: She is alert and oriented to person, place, and time.      Motor: No abnormal muscle tone.   Psychiatric:         Behavior: Behavior normal.         Thought Content: Thought content normal.         Judgment: Judgment normal.           CRANIAL NERVES     CN III, IV, VI   Pupils are equal, round, and reactive to light.       Significant Labs:   BMP:   Recent Labs   Lab 07/22/20  1609   GLU 62*      K 3.7      CO2 27   BUN 24*   CREATININE 1.2   CALCIUM 9.7     CBC:   Recent Labs   Lab 07/21/20  1131 07/22/20  1609   WBC 10.58 13.38*   HGB 12.6 12.4   HCT 42.0 40.2   * 391*     CMP:   Recent Labs   Lab 07/21/20  1131 07/22/20  1609    141   K 4.5 3.7   CL 99 101   CO2 25 27   * 62*   BUN 19 24*   CREATININE 1.3 1.2   CALCIUM 9.7 9.7   PROT 6.7 6.8   ALBUMIN 3.3* 3.4*    BILITOT 0.2 0.3   ALKPHOS 141* 125   AST 15 18   ALT 14 16   ANIONGAP 13 13   EGFRNONAA 42* 46*     Urine Culture: No results for input(s): LABURIN in the last 48 hours.  Urine Studies: No results for input(s): COLORU, APPEARANCEUA, PHUR, SPECGRAV, PROTEINUA, GLUCUA, KETONESU, BILIRUBINUA, OCCULTUA, NITRITE, UROBILINOGEN, LEUKOCYTESUR, RBCUA, WBCUA, BACTERIA, SQUAMEPITHEL, HYALINECASTS in the last 48 hours.    Invalid input(s): WRIGHTSUR  All pertinent labs within the past 24 hours have been reviewed.    Significant Imaging: I have reviewed all pertinent imaging results/findings within the past 24 hours.   Imaging Results          X-Ray Chest AP Portable (Final result)  Result time 07/22/20 16:29:21    Final result by Sam Knight MD (07/22/20 16:29:21)                 Impression:      No acute abnormality.      Electronically signed by: Sam Knight MD  Date:    07/22/2020  Time:    16:29             Narrative:    EXAMINATION:  XR CHEST AP PORTABLE    CLINICAL HISTORY:  Stroke; Unspecified fall, initial encounter    TECHNIQUE:  Single frontal view of the chest was performed.    COMPARISON:  11/05/2013    FINDINGS:  The lungs are clear, with normal appearance of pulmonary vasculature and no pleural effusion or pneumothorax.    The cardiac silhouette is normal in size. The hilar and mediastinal contours are unremarkable.    Bones are intact.                               CT Head Without Contrast (Final result)  Result time 07/22/20 16:33:25    Final result by Sam Knight MD (07/22/20 16:33:25)                 Impression:      No intracranial blood products or acute large territory infarction.    Remote infarcts of the right frontal lobe, left occipital lobe, and left basal ganglia/thalamus as well as volume loss and microvascular changes are grossly stable from 2017 exam.      Electronically signed by: Sam Knight MD  Date:    07/22/2020  Time:    16:33             Narrative:     EXAMINATION:  CT HEAD WITHOUT CONTRAST    CLINICAL HISTORY:  Neuro deficit, acute, stroke suspected; Unspecified fall, initial encounter    TECHNIQUE:  Low dose axial CT images obtained throughout the head without intravenous contrast. Sagittal and coronal reconstructions were performed.    COMPARISON:  12/19/2017    FINDINGS:  No intracranial blood products.  No extra-axial masses or fluid collections.  No evidence of an acute large territory vascular infarction.  There are evidence of remote infarcts in the right frontal and left occipital lobes.  Probable prior left basal ganglia/thalamic infarct, also unchanged from prior.  There is volume loss with compensatory ventricular dilatation, similar to prior.  Sequelae of microvascular disease appears relatively similar from prior.    Calvarium is intact.  Visualized paranasal sinuses and mastoid air cells are clear.

## 2020-07-23 NOTE — ASSESSMENT & PLAN NOTE
- Ms. Darlene Avila presents after multiple pre-syncopal episodes   - she reports dizziness, and tunnel vision prior to pre-syncopal episodes   - CT Head was negative in ED   - reported positive orthostatics in ED MD note, not documented in flowsheets   - fall precautions   - monitor on tele

## 2020-07-23 NOTE — ASSESSMENT & PLAN NOTE
- labs yesterday show hyperglycemia in 400s  - todays labs show hypoglycemia in 60's   - on D5 NS drip in ED  - monitor

## 2020-07-23 NOTE — PLAN OF CARE
SW met with pt at bedside to complete discharge assessment.  Dr. Kirill Cabrera is PCP and uses [facility] and would like bedside delivery.  No POA or LW.  Sister, Sabi will provide transportation.  Pt doesn't have DME or HH.  No needs identified  at this time.     07/23/20 1554   Discharge Assessment   Assessment Type Discharge Planning Assessment   Confirmed/corrected address and phone number on facesheet? Yes   Assessment information obtained from? Patient   Communicated expected length of stay with patient/caregiver no   Prior to hospitilization cognitive status: Alert/Oriented   Prior to hospitalization functional status: Independent   Current cognitive status: Alert/Oriented   Current Functional Status: Independent   Lives With alone   Able to Return to Prior Arrangements yes   Is patient able to care for self after discharge? Yes   Readmission Within the Last 30 Days no previous admission in last 30 days   Patient currently being followed by outpatient case management? No   Patient currently receives any other outside agency services? No   Equipment Currently Used at Home none   Do you have any problems affording any of your prescribed medications? No   Is the patient taking medications as prescribed? yes   Does the patient have transportation home? Yes   Transportation Anticipated family or friend will provide   Does the patient receive services at the [facility]? No   Discharge Plan A Home   DME Needed Upon Discharge  none   Patient/Family in Agreement with Plan yes

## 2020-07-23 NOTE — PROGRESS NOTES
[facility] Hospital Medicine  Progress Note    Patient Name: Darlene Avila  MRN: [id number]  Patient Class: OP- Observation   Admission Date: 7/22/2020  Length of Stay: 0 days  Attending Physician: Na Webber MD  Primary Care Provider: Kirill Cabrera Iii, MD        Subjective:     Principal Problem:Syncope        HPI:  Ms. Darlene Avila is a 69 y.o. female, with PMH of recently diagnosed with right-sided breast cancer (biopsy taken 03/18/2020, currently on Arimidex), hypertension, IDDM-2, prior TIA, current smoker, who presented to [facility] ED on 07/22/2020 for further evaluation of dizziness and presyncope which caused her to fall today.  She states that she has felt dizzy for the past few days, and nearly fell while going to the bathroom this morning as a result of dizziness.  Today she presented to her PCP for a preop clearance for her mastectomy,  and was found to be hypotensive in the office today.  She endorses more shortness of breath than baseline at present, and recent low blood pressures in the 90 systolic. She states she has not been eating well since starting her Arimidex, and feels nauseated each time she tries to eat.  In the ED she was evaluated with labs that showed hypoglycemia of 62, troponin was elevated at 0.031, an EKG showed normal sinus rhythm with a rate of 99, she did have positive orthostatics in the ED, and was unable to stand without dizziness. She was placed on OBS.     Overview/Hospital Course:  No notes on file    Interval History: remains orthostatic, weak    Review of Systems   Constitutional: Positive for appetite change (poor 2/2 nausea with eating ) and fatigue. Negative for activity change, chills and fever.   HENT: Negative for congestion.    Eyes: Negative for visual disturbance.   Respiratory: Negative for cough, shortness of breath and wheezing.    Cardiovascular: Negative for chest pain and palpitations.   Gastrointestinal: Negative for abdominal pain,  diarrhea, nausea and vomiting.   Genitourinary: Negative for hematuria and urgency.   Musculoskeletal: Negative for back pain and neck pain.   Skin: Negative for color change and wound.   Neurological: Positive for dizziness. Negative for weakness, light-headedness, numbness and headaches.     Objective:     Vital Signs (Most Recent):  Temp: 97.6 °F (36.4 °C) (07/23/20 1317)  Pulse: 104 (07/23/20 1400)  Resp: 20 (07/23/20 1317)  BP: 123/73 (07/23/20 1317)  SpO2: 100 % (07/23/20 1317) Vital Signs (24h Range):  Temp:  [97.6 °F (36.4 °C)-98.5 °F (36.9 °C)] 97.6 °F (36.4 °C)  Pulse:  [] 104  Resp:  [18-20] 20  SpO2:  [97 %-100 %] 100 %  BP: ()/(46-85) 123/73     Weight: 56.3 kg (124 lb 1.9 oz)  Body mass index is 25.07 kg/m².    Intake/Output Summary (Last 24 hours) at 7/23/2020 1618  Last data filed at 7/23/2020 1100  Gross per 24 hour   Intake 840.42 ml   Output 1500 ml   Net -659.58 ml      Physical Exam  Vitals signs and nursing note reviewed.   Constitutional:       General: She is not in acute distress.     Appearance: She is well-developed. She is not diaphoretic.   HENT:      Head: Normocephalic and atraumatic.   Eyes:      General: No scleral icterus.     Conjunctiva/sclera: Conjunctivae normal.   Neck:      Musculoskeletal: Normal range of motion and neck supple.   Cardiovascular:      Rate and Rhythm: Normal rate and regular rhythm.      Heart sounds: Normal heart sounds.   Pulmonary:      Effort: Pulmonary effort is normal. No respiratory distress.      Breath sounds: Normal breath sounds. No wheezing.   Abdominal:      General: Bowel sounds are normal. There is no distension.      Palpations: Abdomen is soft.      Tenderness: There is no abdominal tenderness.   Musculoskeletal: Normal range of motion.   Skin:     General: Skin is warm and dry.      Capillary Refill: Capillary refill takes less than 2 seconds.   Neurological:      General: No focal deficit present.      Mental Status: She is  alert.         Significant Labs:   CBC:   Recent Labs   Lab 07/22/20  1609 07/23/20  0341   WBC 13.38* 10.42   HGB 12.4 11.9*   HCT 40.2 38.9   * 367*     CMP:   Recent Labs   Lab 07/22/20  1609 07/23/20  0341    139   K 3.7 3.6    105   CO2 27 24   GLU 62* 71   BUN 24* 17   CREATININE 1.2 0.8   CALCIUM 9.7 9.0   PROT 6.8  --    ALBUMIN 3.4*  --    BILITOT 0.3  --    ALKPHOS 125  --    AST 18  --    ALT 16  --    ANIONGAP 13 10   EGFRNONAA 46* >60     Urine Culture: No results for input(s): LABURIN in the last 48 hours.  Urine Studies:   Recent Labs   Lab 07/23/20  0020   COLORU Yellow   APPEARANCEUA Clear   PHUR 6.0   SPECGRAV 1.015   PROTEINUA Negative   GLUCUA 3+*   KETONESU Negative   BILIRUBINUA Negative   OCCULTUA Negative   NITRITE Negative   UROBILINOGEN Negative   LEUKOCYTESUR 1+*   WBCUA >100*   BACTERIA Occasional   SQUAMEPITHEL 1     All pertinent labs within the past 24 hours have been reviewed.    Significant Imaging: I have reviewed all pertinent imaging results/findings within the past 24 hours.   Imaging Results          X-Ray Chest AP Portable (Final result)  Result time 07/22/20 16:29:21    Final result by Sam Knight MD (07/22/20 16:29:21)                 Impression:      No acute abnormality.      Electronically signed by: Sam Knight MD  Date:    07/22/2020  Time:    16:29             Narrative:    EXAMINATION:  XR CHEST AP PORTABLE    CLINICAL HISTORY:  Stroke; Unspecified fall, initial encounter    TECHNIQUE:  Single frontal view of the chest was performed.    COMPARISON:  11/05/2013    FINDINGS:  The lungs are clear, with normal appearance of pulmonary vasculature and no pleural effusion or pneumothorax.    The cardiac silhouette is normal in size. The hilar and mediastinal contours are unremarkable.    Bones are intact.                               CT Head Without Contrast (Final result)  Result time 07/22/20 16:33:25    Final result by Sam Knight,  MD (07/22/20 16:33:25)                 Impression:      No intracranial blood products or acute large territory infarction.    Remote infarcts of the right frontal lobe, left occipital lobe, and left basal ganglia/thalamus as well as volume loss and microvascular changes are grossly stable from 2017 exam.      Electronically signed by: Sam Knight MD  Date:    07/22/2020  Time:    16:33             Narrative:    EXAMINATION:  CT HEAD WITHOUT CONTRAST    CLINICAL HISTORY:  Neuro deficit, acute, stroke suspected; Unspecified fall, initial encounter    TECHNIQUE:  Low dose axial CT images obtained throughout the head without intravenous contrast. Sagittal and coronal reconstructions were performed.    COMPARISON:  12/19/2017    FINDINGS:  No intracranial blood products.  No extra-axial masses or fluid collections.  No evidence of an acute large territory vascular infarction.  There are evidence of remote infarcts in the right frontal and left occipital lobes.  Probable prior left basal ganglia/thalamic infarct, also unchanged from prior.  There is volume loss with compensatory ventricular dilatation, similar to prior.  Sequelae of microvascular disease appears relatively similar from prior.    Calvarium is intact.  Visualized paranasal sinuses and mastoid air cells are clear.                                    Assessment/Plan:      * Syncope  - Ms. Darlene Avila presents after multiple pre-syncopal episodes   - she reports dizziness, and tunnel vision prior to pre-syncopal episodes   - CT Head was negative in ED   - remains orthostatic, cont IVF's., KATHRIN's hold ACEi, hold Arimidex (can cause vasodilation)  - check cortisol in am  - fall precautions   - monitor on tele       UTI (urinary tract infection)  - cont CTx, follow UC      Essential hypertension  - reasonably stable  - hold ACEi d/t severe hypotension  - monitor    Hypoglycemia  - see above    Elevated troponin  - no associated chest pain, flat  - no  events on monitor  - doubt ACS  - EKG without ischemic changes          Malignant neoplasm of upper-outer quadrant of right breast in female, estrogen receptor positive  - biopsy taken 3/2020  - started Arimidex 4/2020  - initially scheduled for mastectomy 7/2020, delayed to 8/2020 2/2 hypoglycemia/hypotension   - Follows w/ Dr. Clark (Breast Surgery), Dr. 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 Given D5 bolus in ED  - Diabetic diet   - SSI with accuchekcs AC/HS  Will start LA insulin 9 units qhs, monitor      VTE Risk Mitigation (From admission, onward)         Ordered     Place KATHRNI hose  Until discontinued      07/23/20 1628     heparin (porcine) injection 5,000 Units  Every 8 hours      07/22/20 2138     IP VTE HIGH RISK PATIENT  Once      07/22/20 2138     Place sequential compression device  Until discontinued      07/22/20 2031                      Starla Posey PA-C  Department of Hospital Medicine   [facility]

## 2020-07-23 NOTE — SUBJECTIVE & OBJECTIVE
Interval History: remains orthostatic, weak    Review of Systems   Constitutional: Positive for appetite change (poor 2/2 nausea with eating ) and fatigue. Negative for activity change, chills and fever.   HENT: Negative for congestion.    Eyes: Negative for visual disturbance.   Respiratory: Negative for cough, shortness of breath and wheezing.    Cardiovascular: Negative for chest pain and palpitations.   Gastrointestinal: Negative for abdominal pain, diarrhea, nausea and vomiting.   Genitourinary: Negative for hematuria and urgency.   Musculoskeletal: Negative for back pain and neck pain.   Skin: Negative for color change and wound.   Neurological: Positive for dizziness. Negative for weakness, light-headedness, numbness and headaches.     Objective:     Vital Signs (Most Recent):  Temp: 97.6 °F (36.4 °C) (07/23/20 1317)  Pulse: 104 (07/23/20 1400)  Resp: 20 (07/23/20 1317)  BP: 123/73 (07/23/20 1317)  SpO2: 100 % (07/23/20 1317) Vital Signs (24h Range):  Temp:  [97.6 °F (36.4 °C)-98.5 °F (36.9 °C)] 97.6 °F (36.4 °C)  Pulse:  [] 104  Resp:  [18-20] 20  SpO2:  [97 %-100 %] 100 %  BP: ()/(46-85) 123/73     Weight: 56.3 kg (124 lb 1.9 oz)  Body mass index is 25.07 kg/m².    Intake/Output Summary (Last 24 hours) at 7/23/2020 1618  Last data filed at 7/23/2020 1100  Gross per 24 hour   Intake 840.42 ml   Output 1500 ml   Net -659.58 ml      Physical Exam  Vitals signs and nursing note reviewed.   Constitutional:       General: She is not in acute distress.     Appearance: She is well-developed. She is not diaphoretic.   HENT:      Head: Normocephalic and atraumatic.   Eyes:      General: No scleral icterus.     Conjunctiva/sclera: Conjunctivae normal.   Neck:      Musculoskeletal: Normal range of motion and neck supple.   Cardiovascular:      Rate and Rhythm: Normal rate and regular rhythm.      Heart sounds: Normal heart sounds.   Pulmonary:      Effort: Pulmonary effort is normal. No respiratory  distress.      Breath sounds: Normal breath sounds. No wheezing.   Abdominal:      General: Bowel sounds are normal. There is no distension.      Palpations: Abdomen is soft.      Tenderness: There is no abdominal tenderness.   Musculoskeletal: Normal range of motion.   Skin:     General: Skin is warm and dry.      Capillary Refill: Capillary refill takes less than 2 seconds.   Neurological:      General: No focal deficit present.      Mental Status: She is alert.         Significant Labs:   CBC:   Recent Labs   Lab 07/22/20  1609 07/23/20  0341   WBC 13.38* 10.42   HGB 12.4 11.9*   HCT 40.2 38.9   * 367*     CMP:   Recent Labs   Lab 07/22/20  1609 07/23/20  0341    139   K 3.7 3.6    105   CO2 27 24   GLU 62* 71   BUN 24* 17   CREATININE 1.2 0.8   CALCIUM 9.7 9.0   PROT 6.8  --    ALBUMIN 3.4*  --    BILITOT 0.3  --    ALKPHOS 125  --    AST 18  --    ALT 16  --    ANIONGAP 13 10   EGFRNONAA 46* >60     Urine Culture: No results for input(s): LABURIN in the last 48 hours.  Urine Studies:   Recent Labs   Lab 07/23/20  0020   COLORU Yellow   APPEARANCEUA Clear   PHUR 6.0   SPECGRAV 1.015   PROTEINUA Negative   GLUCUA 3+*   KETONESU Negative   BILIRUBINUA Negative   OCCULTUA Negative   NITRITE Negative   UROBILINOGEN Negative   LEUKOCYTESUR 1+*   WBCUA >100*   BACTERIA Occasional   SQUAMEPITHEL 1     All pertinent labs within the past 24 hours have been reviewed.    Significant Imaging: I have reviewed all pertinent imaging results/findings within the past 24 hours.   Imaging Results          X-Ray Chest AP Portable (Final result)  Result time 07/22/20 16:29:21    Final result by Sam Knight MD (07/22/20 16:29:21)                 Impression:      No acute abnormality.      Electronically signed by: Sam Knight MD  Date:    07/22/2020  Time:    16:29             Narrative:    EXAMINATION:  XR CHEST AP PORTABLE    CLINICAL HISTORY:  Stroke; Unspecified fall, initial  encounter    TECHNIQUE:  Single frontal view of the chest was performed.    COMPARISON:  11/05/2013    FINDINGS:  The lungs are clear, with normal appearance of pulmonary vasculature and no pleural effusion or pneumothorax.    The cardiac silhouette is normal in size. The hilar and mediastinal contours are unremarkable.    Bones are intact.                               CT Head Without Contrast (Final result)  Result time 07/22/20 16:33:25    Final result by Sam Knight MD (07/22/20 16:33:25)                 Impression:      No intracranial blood products or acute large territory infarction.    Remote infarcts of the right frontal lobe, left occipital lobe, and left basal ganglia/thalamus as well as volume loss and microvascular changes are grossly stable from 2017 exam.      Electronically signed by: Sam Knight MD  Date:    07/22/2020  Time:    16:33             Narrative:    EXAMINATION:  CT HEAD WITHOUT CONTRAST    CLINICAL HISTORY:  Neuro deficit, acute, stroke suspected; Unspecified fall, initial encounter    TECHNIQUE:  Low dose axial CT images obtained throughout the head without intravenous contrast. Sagittal and coronal reconstructions were performed.    COMPARISON:  12/19/2017    FINDINGS:  No intracranial blood products.  No extra-axial masses or fluid collections.  No evidence of an acute large territory vascular infarction.  There are evidence of remote infarcts in the right frontal and left occipital lobes.  Probable prior left basal ganglia/thalamic infarct, also unchanged from prior.  There is volume loss with compensatory ventricular dilatation, similar to prior.  Sequelae of microvascular disease appears relatively similar from prior.    Calvarium is intact.  Visualized paranasal sinuses and mastoid air cells are clear.

## 2020-07-23 NOTE — PT/OT/SLP EVAL
"Occupational Therapy   Evaluation and Discharge Note    Name: Darlene Avila  MRN: [id number]  Admitting Diagnosis:  Syncope      Recommendations:     Discharge Recommendations: home health PT, home health OT  Discharge Equipment Recommendations:  other (see comments)(TTB recommended though Pt. declined)  Barriers to discharge:  None    Assessment:   Initial OT eval/treat complete.  9517-8246:  Initially found seated EOB.  Seated vitals taken and found to be 96%, 104 BPM, and 107/54.  Sit>stand from EOB with supervision and no LOB noted during static stance while receiving standing BP check.  Standing vitals found to be 107 BPM and 87/54 - asymptomatic at this time.  Ambulated short household distance without AD bed>bathroom>sink and return to bed with supervision; no c/o dizziness or LOB noted.  Step transfer to toilet and using BUE to provide support and push up into stance from toilet.  Hand hygiene in stance with supervision.  Session terminated prematurely due to nursing staff needing to start PIV and PA meeting with Pt. At this time.  1561-1307:  Able to don/doff socks seated EOB via cross leg tech with no LOB noted during sitting.  Donned hospital gown as would front opening shirt and able to thread LUE and drape across back though requiring assist for snaps at RUE due to PIV placement at this time.  Discussed fall prevention including zelaya lighting and bathroom use as Pt. Reports a fall a week ago stating, "I'll be honest.  I really couldn't see.  I was holding onto the bed and it was dark."  Discussed zelaya lighting including a lamp or plug-ins with light.  Recommend HH OT/PT services with participation in ADL/functional transfers as independently as possible with nursing staff assist during acute care stay.  Recommending TTB for safety with bathing ADL due to syncopal episodes and low BP though Pt. Declining reporting that she prefers sit down baths.  No acute care OT needs identified at this time.  To d/c OT. "     Darlene Avila is a 69 y.o. female with a medical diagnosis of Syncope. At this time, patient is functioning at their prior level of function and does not require further acute OT services.     Plan:     During this hospitalization, patient does not require further acute OT services.  Please re-consult if situation changes.    · Plan of Care Reviewed with: patient    Subjective     Chief Complaint: No c/o pain.   Patient/Family Comments/goals: Wants to return home.  Expressed displeasure at having to stay over night.     Occupational Profile:  Lives alone in [place] with 4 ASHLEY and B-handrails; tub/shower combo with standard toilet.  Pt. Prefers sit down baths and performs all ADL/IADL/and ambulation independently prior to admit.    Equipment Used at home:  none  Assistance upon Discharge: Pt. Lives next door to her sister that is able to assist if needed.     Pain/Comfort:  Pain Rating 1: 0/10  Pain Rating Post-Intervention 1: 0/10    Patients cultural, spiritual, [employer]ian conflicts given the current situation: other (see comments)(None stated.)    Objective:     Communicated with: Luana GILMORE RN prior to session.  Patient found seated EOB with peripheral IV upon OT entry to room.    General Precautions: Standard, fall, other (see comments)(orthostatic)   Orthopedic Precautions:N/A   Braces: N/A     Occupational Performance:    Bed Mobility:    1st visit:  Initially found seated EOB; sit>supine independently to receive KATHRIN hose placement and complete meeting with PA.  2nd visit:  Independent with supine<>sit with HOB slightly elevated.     Functional Mobility/Transfers:  1st visit:  Sit>stand from EOB with supervision.  Ambulated short household distance without AD bed>bathroom>sink and return to bed with supervision.  Step transfer to toilet and using BUE to provide support and push up into stance from toilet.  Hand hygiene in stance with supervision.    Activities of Daily Livinnd visit:  Able to don/doff  socks seated EOB via cross leg tech with no LOB noted during sitting.  Donned hospital gown as would front opening shirt and able to thread LUE and drape across back though requiring assist for snaps at RUE due to PIV placement at this time.       Cognitive/Visual Perceptual:  Cognitive/Psychosocial Skills:  -       Oriented to: Person, Place, Time and Situation   -       Follows Commands/attention:Follows one-step commands and Follows two-step commands  -       Communication: clear/fluent  -       Memory: No Deficits noted  -       Safety awareness/insight to disability: intact   -       Mood/Affect/Coping skills/emotional control: Appropriate to situation  Visual/Perceptual:  -grossly intact; wears glasses     Physical Exam:  Skin integrity: Visible skin intact  Edema:  None noted  Sensation: -       Intact  Dominant hand: -       R  Upper Extremity Range of Motion:  -       Right Upper Extremity: WFL  -       Left Upper Extremity: WFL  Upper Extremity Strength: -       Right Upper Extremity: WFL  -       Left Upper Extremity: WFL   Strength: -       Right Upper Extremity: WFL  -       Left Upper Extremity: WFL  Fine Motor Coordination: -       Intact  Left hand thumb/finger opposition skills and Right hand thumb/finger opposition skills    AMPA 6 Click ADL:  AMPAC Total Score: 24    Treatment & Education:  Educated on role of OT, POC, DME needs, fall prevention/safety, and call light use.  Education:    Patient left HOB elevated with all lines intact, call button in reach and nursing notified    GOALS:   Multidisciplinary Problems     Occupational Therapy Goals        Problem: Occupational Therapy Goal    Goal Priority Disciplines Outcome Interventions   Occupational Therapy Goal     OT, PT/OT     Description: Initial OT eval/treat complete.  Recommend  OT/PT services and participation with nursing staff as independently as possible with ADL/functional transfers during acute care stay.  No acute care OT  services needed at this time.  To d/c OT services.                     History:     Past Medical History:   Diagnosis Date    Arthritis     Cancer     Cataract     Diabetes mellitus     Diabetic retinopathy     Hypertension     TIA (transient ischemic attack) [id number]         Past Surgical History:   Procedure Laterality Date    CAPSULOTOMY  6/26/13    yag left eye    CATARACT EXTRACTION  6/3/2013    right eye    CHOLECYSTECTOMY      HYSTERECTOMY      TOTAL ABDOMINAL HYSTERECTOMY W/ BILATERAL SALPINGOOPHORECTOMY         Time Tracking:     OT Date of Treatment: 07/23/20  OT Start Time: 1454(1615)  OT Stop Time: 1516(1634)  OT Total Time (min): 22 min (19)    Billable Minutes:Evaluation 22  Therapeutic Activity 19    Mela Burnett, OT  7/23/2020

## 2020-07-23 NOTE — ASSESSMENT & PLAN NOTE
- biopsy taken 3/2020  - started Arimidex 4/2020  - initially scheduled for mastectomy 7/2020, delayed to 8/2020 2/2 hypoglycemia/hypotension   - Follows w/ Dr. Clark (Breast Surgery), Dr. Barcenas (Hem/Onc)

## 2020-07-23 NOTE — NURSING
[id number] Kearns notified of drop in B/P while standing 90/51. Telephone order given to start Normal saline @ 125 ml/hr continuously in 1 hr. Telephone order read back.

## 2020-07-23 NOTE — ASSESSMENT & PLAN NOTE
- no associated chest pain  - EKG without ischemic changes   - monitor [id number]   - continue tele monitoring

## 2020-07-23 NOTE — ASSESSMENT & PLAN NOTE
- Ms. Darlene Avila presents after multiple pre-syncopal episodes   - she reports dizziness, and tunnel vision prior to pre-syncopal episodes   - CT Head was negative in ED   - remains orthostatic, cont IVF's., KATHRIN's hold ACEi, hold Arimidex (can cause vasodilation)  - check cortisol in am  - fall precautions   - monitor on tele

## 2020-07-23 NOTE — PLAN OF CARE
Problem: Physical Therapy Goal  Goal: Physical Therapy Goal  Outcome: Met     PT orders received. Evaluation completed. Pt is independent for ambulation and transfers. Pt demonstrates normal gait pattern and normal balance. Pt was orthostatic during session (BP in sitting 167/69, BP in standing 92/50), but had no complaints of dizziness. Pt expressed confidence in all aspects of her mobility. No acute PT needs identified at this time. Will d/c PT. Recommend discharge to home with HHPT.

## 2020-07-23 NOTE — PLAN OF CARE
Problem: Adult Inpatient Plan of Care  Goal: Absence of Hospital-Acquired Illness or Injury  Outcome: Met      Initial OT eval/treat complete.  Recommend HH OT/PT services with participation in ADL/functional transfers as independently as possible with nursing staff assist during acute care stay.  Recommending TTB for safety with bathing ADL due to syncopal episodes and low BP though Pt. Declining reporting that she prefers sit down baths.  No acute care OT needs identified at this time.  To d/c OT.

## 2020-07-23 NOTE — PT/OT/SLP EVAL
Physical Therapy Evaluation, Treatment, and Discharge Note    Patient Name:  Darlene Avila   MRN:  [id number]    Recommendations:     Discharge Recommendations:  home, home health PT   Discharge Equipment Recommendations: none   Barriers to discharge: Decreased caregiver support    Assessment:     Darlene Avila is a 69 y.o. female admitted with a medical diagnosis of Syncope. PT orders received. Evaluation completed. Pt is independent for ambulation and transfers. Pt demonstrates normal gait pattern and normal balance. Pt was orthostatic during session (BP in sitting 167/69, BP in standing 92/50), but had no complaints of dizziness. Pt expressed confidence in all aspects of her mobility. No acute PT needs identified at this time. Will d/c PT. Recommend discharge to home with HHPT.    Recent Surgery: * No surgery found *      Plan:     During this hospitalization, patient does not require further acute PT services.  Please re-consult if situation changes.      Subjective     Chief Complaint: None stated.  Patient/Family Comments/goals: No goal stated.  Pain/Comfort:  · Pain Rating 1: 0/10  · Pain Rating Post-Intervention 1: 0/10    Patients cultural, spiritual, Episcopal conflicts given the current situation: no(None stated)    Living Environment:  · Pt lives alone in a single story house with 3 steps to enter and bilateral handrail(s).   · DME owned: None  · Upon discharge, patient will not have assistance at home.  Prior level of function:  · Ambulation: Independent  · ADL's: Independent  · IADLs: Independent  · Falls: Admitted after a fall at home.    Objective:     Communicated with RN (Rosalee) prior to session.  Patient found supine with peripheral IV, bed alarm upon PT entry to room.    General Precautions: Standard, fall   Orthopedic Precautions:N/A   Braces: N/A     Exams:  · Cognition:   · Patient is oriented to person, place, time, and situation.  · Pt follows approximately 100% of multiple-step commands.     · Mood: Pleasant and cooperative.  · Musculoskeletal:  · Posture:    · In sitting: WNL  · In standing: WNL  · LE ROM/Strength:   · R ROM: No deficits  · L ROM: No deficits  · R Strength:   · Hip flexion: 5/5  · Knee extension: 5/5  · Dorsiflexion: 5/5   · L Strength:   · Hip flexion: 5/5  · Knee extension: 5/5  · Dorsiflexion: 5/5   · Neuromuscular:  · Sensation: Intact to light touch bilateral LEs.  · Tone/Reflexes: No impairments identified with functional mobility. No formal testing performed.  · Coordination:  · Heel to shin: WNL  · Toe tapping: WNL  · Balance:   · Static sitting: Independent  · Dynamic sitting: Independent  · Static standing: Independent  · Dynamic standing: Independent  · Visual-vestibular: No impairments identified with functional mobility. No formal testing performed.  · Integument: Visible skin intact.   · Cardiopulmonary:  · Orthostatic vital signs     07/23/20 0837 07/23/20 0839   Vital Signs   BP (!) 167/69 (!) 92/50   MAP (mmHg) 99 65   BP Location Left arm Left arm   Patient Position Sitting Standing     RN Amina) notified of low BP with standing.    Functional Mobility:  · Bed Mobility:     · Supine to Sit: independence  · Sit to Supine: independence  · Transfers:     · Sit to Stand:  independence with no AD  · Gait: 50 ft with independence and no AD. Limited gait distance due to low BP.    Therapeutic Activities and Exercises:   Bed mobility, transfer, and gait training as described above.    AM-PAC 6 CLICK MOBILITY  Total Score:24     Patient left supine with all lines intact, call button in reach and bed alarm on.    GOALS:   Multidisciplinary Problems     Physical Therapy Goals     Not on file          Multidisciplinary Problems (Resolved)        Problem: Physical Therapy Goal    Goal Priority Disciplines Outcome Goal Variances Interventions   Physical Therapy Goal   (Resolved)     PT, PT/OT Met                     History:     Past Medical History:   Diagnosis Date    Arthritis      Cancer     Cataract     Diabetes mellitus     Diabetic retinopathy     Hypertension     TIA (transient ischemic attack) 6630-5596       Past Surgical History:   Procedure Laterality Date    CAPSULOTOMY  6/26/13    yag left eye    CATARACT EXTRACTION  6/3/2013    right eye    CHOLECYSTECTOMY      HYSTERECTOMY      TOTAL ABDOMINAL HYSTERECTOMY W/ BILATERAL SALPINGOOPHORECTOMY         Time Tracking:     PT Received On: 07/23/20  PT Start Time: 0830     PT Stop Time: 0853  PT Total Time (min): 23 min     Billable Minutes: Evaluation 14 and Therapeutic Activity 10      Yocasta Mccabe, PT  07/23/2020

## 2020-07-23 NOTE — ASSESSMENT & PLAN NOTE
- last A1C:   Lab Results   Component Value Date    HGBA1C 7.7 (H) 02/12/2013    - A1C pending for AM   - hold oral antidiabetic meds   - Diabetic diet   - SSI with accuchekcs AC/HS

## 2020-07-23 NOTE — H&P
[facility] Hospital Medicine  History & Physical    Patient Name: Darlene Avila  MRN: [id number]  Admission Date: 7/22/2020  Attending Physician: Na Webber MD   Primary Care Provider: Kirill Cabrera Iii, MD         Patient information was obtained from patient, past medical records and ER records.     Subjective:     Principal Problem:Syncope    Chief Complaint:   Chief Complaint   Patient presents with    Fall     Pt was sent from Dr. Kirill Serra for hypotension. Pt 113/54 here. Pt reported she fell last night and has been dizzy.        HPI: Ms. Darlene Avila is a 69 y.o. female, with PMH of recently diagnosed with right-sided breast cancer (biopsy taken 03/18/2020, currently on Arimidex), hypertension, IDDM-2, prior TIA, current smoker, who [place] [facility] [place]2020 for further evaluation of dizziness and presyncope which caused her to fall today.  She states that she has felt dizzy for the past few days, and nearly fell while going to the bathroom this morning as a result of dizziness.  Today she presented to her PCP for a preop clearance for her mastectomy,  and was found to be hypotensive in the office today.  She endorses more shortness of breath than baseline at present, and recent low blood pressures in the 90 systolic. She states she has not been eating well since starting her Arimidex, and feels nauseated each time she tries to eat.  In the ED she was evaluated with labs that showed hypoglycemia of 62, troponin was elevated at 0.031, an EKG showed normal sinus rhythm with a rate of 99, she did have positive orthostatics in the ED, and was unable to stand without dizziness. She was placed on OBS.      Past Medical History:   Diagnosis Date    Arthritis     Cancer     Cataract     Diabetes mellitus     Diabetic retinopathy     Hypertension     TIA (transient ischemic attack) [id number]       Past Surgical History:   Procedure Laterality Date    CAPSULOTOMY  6/26/13     yag left eye    CATARACT EXTRACTION  6/3/2013    right eye    CHOLECYSTECTOMY      TOTAL ABDOMINAL HYSTERECTOMY W/ BILATERAL SALPINGOOPHORECTOMY         Review of patient's allergies indicates:   Allergen Reactions    Iodinated contrast media Hives       No current facility-administered medications on file prior to encounter.      Current Outpatient Medications on File Prior to Encounter   Medication Sig    amitriptyline (ELAVIL) 100 MG tablet Take 1 tablet by mouth.    aspirin (ECOTRIN) 81 MG EC tablet Take 81 mg by mouth once daily.    calcium carbonate (OS-JABIER) 600 mg calcium (1,500 mg) Tab Take 600 mg by mouth once.    citalopram (CELEXA) 10 MG tablet Take 1 tablet by mouth.    clopidogrel (PLAVIX) 75 mg tablet Take by mouth. 1 tablet Oral Every day    COLESEVELAM HCL (WELCHOL ORAL) Take by mouth.    diphenoxylate-atropine 2.5-0.025 mg (LOMOTIL) 2.5-0.025 mg per tablet Take by mouth. 1 tablet Oral Three times a day    insulin lispro protam & lispro (HUMALOG MIX 75-25 KWIKPEN) 100 unit/mL (75-25) InPn     insulin lispro protamine-insulin lispro (HUMALOG 75/25) 100 unit/mL (75-25) Susp Inject into the skin 2 (two) times daily before meals.      multivitamin capsule Take 1 capsule by mouth once daily.    pravastatin (PRAVACHOL) 10 MG tablet Take by mouth. 1 tablet Oral Every day    ramipril (ALTACE) 1.25 MG capsule Take by mouth. 1 capsule Oral Every day    zolpidem (AMBIEN) 10 mg Tab Take 1 tablet by mouth.    anastrozole (ARIMIDEX) 1 mg Tab Take 1 tablet (1 mg total) by mouth once daily.    rOPINIRole (REQUIP) 0.25 MG tablet Take 2 tablets by mouth.     Family History     Problem Relation (Age of Onset)    Breast cancer Sister (65)    Cataracts Mother    Colon cancer Mother (82)    Diabetes Mother, Father, Sister, Maternal Uncle    Glaucoma Mother        Tobacco Use    Smoking status: Current Every Day Smoker     Packs/day: 1.50    Smokeless tobacco: Never Used   Substance and Sexual  "Activity    Alcohol use: No    Drug use: No    Sexual activity: Not Currently     Review of Systems   Constitutional: Positive for appetite change (poor 2/2 nausea with eating ). Negative for activity change, chills, fatigue and fever.   HENT: Negative for congestion, ear pain, postnasal drip, rhinorrhea, sinus pressure, sore throat and trouble swallowing.    Eyes: Positive for visual disturbance ("tunnel vision" during episodes of dizziness ).   Respiratory: Negative for cough, shortness of breath and wheezing.    Cardiovascular: Negative for chest pain, palpitations and leg swelling.   Gastrointestinal: Negative for abdominal pain, diarrhea, nausea and vomiting.   Genitourinary: Negative for dysuria, flank pain, frequency, hematuria and urgency.   Musculoskeletal: Negative for back pain, gait problem, joint swelling and neck pain.   Skin: Negative for color change and wound.   Neurological: Positive for dizziness and syncope. Negative for weakness, light-headedness, numbness and headaches.   Psychiatric/Behavioral: Negative for confusion and decreased concentration.     Objective:     Vital Signs (Most Recent):  Temp: 98.2 °F (36.8 °C) (07/22/20 1919)  Pulse: 103 (07/22/20 1919)  Resp: 20 (07/22/20 1919)  BP: (!) 147/85 (07/22/20 1919)  SpO2: 98 % (07/22/20 1919) Vital Signs (24h Range):  Temp:  [97.3 °F (36.3 °C)-98.2 °F (36.8 °C)] 98.2 °F (36.8 °C)  Pulse:  [] 103  Resp:  [16-20] 20  SpO2:  [98 %-100 %] 98 %  BP: (113-148)/(54-85) 147/85     Weight: 58.1 kg (128 lb)  Body mass index is 25.85 kg/m².    Physical Exam  Vitals signs and nursing note reviewed.   Constitutional:       General: She is not in acute distress.     Appearance: Normal appearance. She is well-developed. She is not diaphoretic.   HENT:      Head: Normocephalic and atraumatic.   Eyes:      General: No scleral icterus.     Conjunctiva/sclera: Conjunctivae normal.      Pupils: Pupils are equal, round, and reactive to light.   Neck:      " Musculoskeletal: Normal range of motion and neck supple.      Trachea: No tracheal deviation.   Cardiovascular:      Rate and Rhythm: Normal rate and regular rhythm.      Heart sounds: Normal heart sounds. No murmur. No gallop.    Pulmonary:      Effort: Pulmonary effort is normal. No respiratory distress.      Breath sounds: Normal breath sounds. No stridor. No wheezing or rales.   Abdominal:      General: Bowel sounds are normal. There is no distension.      Palpations: Abdomen is soft. There is no mass.      Tenderness: There is no abdominal tenderness. There is no guarding or rebound.      Hernia: No hernia is present.   Musculoskeletal: Normal range of motion.   Skin:     General: Skin is warm and dry.      Coloration: Skin is not pale.      Findings: No erythema or rash.   Neurological:      General: No focal deficit present.      Mental Status: She is alert and oriented to person, place, and time.      Motor: No abnormal muscle tone.   Psychiatric:         Behavior: Behavior normal.         Thought Content: Thought content normal.         Judgment: Judgment normal.           CRANIAL NERVES     CN III, IV, VI   Pupils are equal, round, and reactive to light.       Significant Labs:   BMP:   Recent Labs   Lab 07/22/20  1609   GLU 62*      K 3.7      CO2 27   BUN 24*   CREATININE 1.2   CALCIUM 9.7     CBC:   Recent Labs   Lab 07/21/20  1131 07/22/20  1609   WBC 10.58 13.38*   HGB 12.6 12.4   HCT 42.0 40.2   * 391*     CMP:   Recent Labs   Lab 07/21/20  1131 07/22/20  1609    141   K 4.5 3.7   CL 99 101   CO2 25 27   * 62*   BUN 19 24*   CREATININE 1.3 1.2   CALCIUM 9.7 9.7   PROT 6.7 6.8   ALBUMIN 3.3* 3.4*   BILITOT 0.2 0.3   ALKPHOS 141* 125   AST 15 18   ALT 14 16   ANIONGAP 13 13   EGFRNONAA 42* 46*     Urine Culture: No results for input(s): LABURIN in the last 48 hours.  Urine Studies: No results for input(s): COLORU, APPEARANCEUA, PHUR, SPECGRAV, PROTEINUA, GLUCUA,  KETONESU, BILIRUBINUA, OCCULTUA, NITRITE, UROBILINOGEN, LEUKOCYTESUR, RBCUA, WBCUA, BACTERIA, SQUAMEPITHEL, HYALINECASTS in the last 48 hours.    Invalid input(s): STEFANIE  All pertinent labs within the past 24 hours have been reviewed.    Significant Imaging: I have reviewed all pertinent imaging results/findings within the past 24 hours.   Imaging Results          X-Ray Chest AP Portable (Final result)  Result time 07/22/20 16:29:21    Final result by Sam Knight MD (07/22/20 16:29:21)                 Impression:      No acute abnormality.      Electronically signed by: Sam Knight MD  Date:    07/22/2020  Time:    16:29             Narrative:    EXAMINATION:  XR CHEST AP PORTABLE    CLINICAL HISTORY:  Stroke; Unspecified fall, initial encounter    TECHNIQUE:  Single frontal view of the chest was performed.    COMPARISON:  11/05/2013    FINDINGS:  The lungs are clear, with normal appearance of pulmonary vasculature and no pleural effusion or pneumothorax.    The cardiac silhouette is normal in size. The hilar and mediastinal contours are unremarkable.    Bones are intact.                               CT Head Without Contrast (Final result)  Result time 07/22/20 16:33:25    Final result by Sam Knight MD (07/22/20 16:33:25)                 Impression:      No intracranial blood products or acute large territory infarction.    Remote infarcts of the right frontal lobe, left occipital lobe, and left basal ganglia/thalamus as well as volume loss and microvascular changes are grossly stable from 2017 exam.      Electronically signed by: Sam Knight MD  Date:    07/22/2020  Time:    16:33             Narrative:    EXAMINATION:  CT HEAD WITHOUT CONTRAST    CLINICAL HISTORY:  Neuro deficit, acute, stroke suspected; Unspecified fall, initial encounter    TECHNIQUE:  Low dose axial CT images obtained throughout the head without intravenous contrast. Sagittal and coronal reconstructions were  performed.    COMPARISON:  12/19/2017    FINDINGS:  No intracranial blood products.  No extra-axial masses or fluid collections.  No evidence of an acute large territory vascular infarction.  There are evidence of remote infarcts in the right frontal and left occipital lobes.  Probable prior left basal ganglia/thalamic infarct, also unchanged from prior.  There is volume loss with compensatory ventricular dilatation, similar to prior.  Sequelae of microvascular disease appears relatively similar from prior.    Calvarium is intact.  Visualized paranasal sinuses and mastoid air cells are clear.                                 Assessment/Plan:     * Syncope  - Ms. Darlene Avila presents after multiple pre-syncopal episodes   - she reports dizziness, and tunnel vision prior to pre-syncopal episodes   - CT Head was negative in ED   - reported positive orthostatics in ED MD note, not documented in flowsheets   - fall precautions   - monitor on tele       Hypoglycemia  - labs yesterday show hyperglycemia in 400s  - todays labs show hypoglycemia in 60's   - on D5 NS drip in ED  - monitor       Elevated troponin  - no associated chest pain  - EKG without ischemic changes   - monitor [id number]   - continue tele monitoring       Essential hypertension  - hypertensive at last vital signs   - home meds include ramipril 1.25 mg QD   - monitor     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 Clark (Breast Surgery), Dr. Barcenas (Hem/Onc)      VTE Risk Mitigation (From admission, onward)         Ordered     heparin (porcine) injection 5,000 Units  Every 8 hours      07/22/20 2138     IP VTE HIGH RISK PATIENT  Once      07/22/20 2138     Place sequential compression device  Until discontinued      07/22/20 2031                   Nataly Franklin PA-C  Department of Hospital Medicine   [facility]

## 2020-07-24 VITALS
HEIGHT: 59 IN | RESPIRATION RATE: 16 BRPM | HEART RATE: 101 BPM | WEIGHT: 124.13 LBS | DIASTOLIC BLOOD PRESSURE: 79 MMHG | OXYGEN SATURATION: 99 % | BODY MASS INDEX: 25.02 KG/M2 | SYSTOLIC BLOOD PRESSURE: 173 MMHG | TEMPERATURE: 98 F

## 2020-07-24 PROBLEM — I95.1 ORTHOSTASIS: Status: ACTIVE | Noted: 2020-07-24

## 2020-07-24 PROBLEM — Z86.73 HISTORY OF CVA (CEREBROVASCULAR ACCIDENT): Status: ACTIVE | Noted: 2020-07-24

## 2020-07-24 LAB
ANION GAP SERPL CALC-SCNC: 10 MMOL/L (ref 8–16)
BACTERIA UR CULT: NO GROWTH
BUN SERPL-MCNC: 15 MG/DL (ref 8–23)
CALCIUM SERPL-MCNC: 9 MG/DL (ref 8.7–10.5)
CHLORIDE SERPL-SCNC: 104 MMOL/L (ref 95–110)
CO2 SERPL-SCNC: 23 MMOL/L (ref 23–29)
CORTIS SERPL-MCNC: 19.7 UG/DL
CORTIS SERPL-MCNC: 25.5 UG/DL
CORTIS SERPL-MCNC: 4.1 UG/DL
CREAT SERPL-MCNC: 0.8 MG/DL (ref 0.5–1.4)
EST. GFR  (AFRICAN AMERICAN): >60 ML/MIN/1.73 M^2
EST. GFR  (NON AFRICAN AMERICAN): >60 ML/MIN/1.73 M^2
GLUCOSE SERPL-MCNC: 263 MG/DL (ref 70–110)
POCT GLUCOSE: 227 MG/DL (ref 70–110)
POCT GLUCOSE: 256 MG/DL (ref 70–110)
POCT GLUCOSE: 325 MG/DL (ref 70–110)
POTASSIUM SERPL-SCNC: 4.4 MMOL/L (ref 3.5–5.1)
SODIUM SERPL-SCNC: 137 MMOL/L (ref 136–145)
TSH SERPL DL<=0.005 MIU/L-ACNC: 0.69 UIU/ML (ref 0.4–4)
VIT B12 SERPL-MCNC: 728 PG/ML (ref 210–950)

## 2020-07-24 PROCEDURE — 96372 THER/PROPH/DIAG INJ SC/IM: CPT

## 2020-07-24 PROCEDURE — 96375 TX/PRO/DX INJ NEW DRUG ADDON: CPT

## 2020-07-24 PROCEDURE — 63700000 PHARM REV CODE 250 ALT 637 W/O HCPCS: Performed by: PHYSICIAN ASSISTANT

## 2020-07-24 PROCEDURE — 84443 ASSAY THYROID STIM HORMONE: CPT

## 2020-07-24 PROCEDURE — 96376 TX/PRO/DX INJ SAME DRUG ADON: CPT

## 2020-07-24 PROCEDURE — 36415 COLL VENOUS BLD VENIPUNCTURE: CPT

## 2020-07-24 PROCEDURE — 25000003 PHARM REV CODE 250: Performed by: PHYSICIAN ASSISTANT

## 2020-07-24 PROCEDURE — 82533 TOTAL CORTISOL: CPT | Mod: 91

## 2020-07-24 PROCEDURE — 97802 MEDICAL NUTRITION INDIV IN: CPT

## 2020-07-24 PROCEDURE — 82607 VITAMIN B-12: CPT

## 2020-07-24 PROCEDURE — 84244 ASSAY OF RENIN: CPT

## 2020-07-24 PROCEDURE — G0378 HOSPITAL OBSERVATION PER HR: HCPCS

## 2020-07-24 PROCEDURE — 99226 PR SUBSEQUENT OBSERVATION CARE,LEVEL III: CPT | Mod: ,,, | Performed by: PHYSICIAN ASSISTANT

## 2020-07-24 PROCEDURE — 63600175 PHARM REV CODE 636 W HCPCS: Performed by: PHYSICIAN ASSISTANT

## 2020-07-24 PROCEDURE — 82024 ASSAY OF ACTH: CPT

## 2020-07-24 PROCEDURE — C9399 UNCLASSIFIED DRUGS OR BIOLOG: HCPCS | Performed by: PHYSICIAN ASSISTANT

## 2020-07-24 PROCEDURE — 80048 BASIC METABOLIC PNL TOTAL CA: CPT

## 2020-07-24 PROCEDURE — 99226 PR SUBSEQUENT OBSERVATION CARE,LEVEL III: ICD-10-PCS | Mod: ,,, | Performed by: PHYSICIAN ASSISTANT

## 2020-07-24 RX ORDER — FLUCONAZOLE 100 MG/1
100 TABLET ORAL ONCE
Status: COMPLETED | OUTPATIENT
Start: 2020-07-24 | End: 2020-07-24

## 2020-07-24 RX ORDER — PRAVASTATIN SODIUM 10 MG/1
10 TABLET ORAL NIGHTLY
Status: DISCONTINUED | OUTPATIENT
Start: 2020-07-24 | End: 2020-07-24 | Stop reason: HOSPADM

## 2020-07-24 RX ORDER — COSYNTROPIN 0.25 MG/ML
0.25 INJECTION, POWDER, FOR SOLUTION INTRAMUSCULAR; INTRAVENOUS ONCE
Status: COMPLETED | OUTPATIENT
Start: 2020-07-24 | End: 2020-07-24

## 2020-07-24 RX ORDER — ASPIRIN 81 MG/1
81 TABLET ORAL DAILY
Status: DISCONTINUED | OUTPATIENT
Start: 2020-07-24 | End: 2020-07-24 | Stop reason: HOSPADM

## 2020-07-24 RX ORDER — MIDODRINE HYDROCHLORIDE 2.5 MG/1
2.5 TABLET ORAL EVERY 12 HOURS
Qty: 60 TABLET | Refills: 0 | Status: ON HOLD | OUTPATIENT
Start: 2020-07-24 | End: 2020-12-28 | Stop reason: HOSPADM

## 2020-07-24 RX ORDER — DIPHENOXYLATE HYDROCHLORIDE AND ATROPINE SULFATE 2.5; .025 MG/1; MG/1
TABLET ORAL
Status: ON HOLD
Start: 2020-07-24 | End: 2020-10-02 | Stop reason: HOSPADM

## 2020-07-24 RX ORDER — CLOPIDOGREL BISULFATE 75 MG/1
75 TABLET ORAL DAILY
Status: DISCONTINUED | OUTPATIENT
Start: 2020-07-24 | End: 2020-07-24 | Stop reason: HOSPADM

## 2020-07-24 RX ORDER — INSULIN LISPRO 100 [IU]/ML
10 INJECTION, SUSPENSION SUBCUTANEOUS 2 TIMES DAILY
Status: ON HOLD
Start: 2020-07-24 | End: 2020-12-28 | Stop reason: SDUPTHER

## 2020-07-24 RX ORDER — CEFUROXIME AXETIL 250 MG/1
250 TABLET ORAL EVERY 12 HOURS
Status: DISCONTINUED | OUTPATIENT
Start: 2020-07-25 | End: 2020-07-24

## 2020-07-24 RX ADMIN — INSULIN ASPART 8 UNITS: 100 INJECTION, SOLUTION INTRAVENOUS; SUBCUTANEOUS at 11:07

## 2020-07-24 RX ADMIN — ASPIRIN 81 MG: 81 TABLET, COATED ORAL at 03:07

## 2020-07-24 RX ADMIN — CEFTRIAXONE 1 G: 1 INJECTION, SOLUTION INTRAVENOUS at 05:07

## 2020-07-24 RX ADMIN — CLOPIDOGREL 75 MG: 75 TABLET, FILM COATED ORAL at 03:07

## 2020-07-24 RX ADMIN — INSULIN DETEMIR 10 UNITS: 100 INJECTION, SOLUTION SUBCUTANEOUS at 09:07

## 2020-07-24 RX ADMIN — HEPARIN SODIUM 5000 UNITS: 5000 INJECTION INTRAVENOUS; SUBCUTANEOUS at 05:07

## 2020-07-24 RX ADMIN — INSULIN ASPART 4 UNITS: 100 INJECTION, SOLUTION INTRAVENOUS; SUBCUTANEOUS at 09:07

## 2020-07-24 RX ADMIN — COSYNTROPIN 0.25 MG: 0.25 INJECTION, POWDER, LYOPHILIZED, FOR SOLUTION INTRAVENOUS at 11:07

## 2020-07-24 RX ADMIN — HEPARIN SODIUM 5000 UNITS: 5000 INJECTION INTRAVENOUS; SUBCUTANEOUS at 01:07

## 2020-07-24 RX ADMIN — FLUCONAZOLE 100 MG: 100 TABLET ORAL at 03:07

## 2020-07-24 NOTE — CONSULTS
"  [facility]  Adult Nutrition  Consult Note    SUMMARY     Recommendations    1. Continue Current diet.     2. If PO intake <50% of meals recommend Boost glucose BID.     3. Weekly weights.    Goals: 1. >75% of EEN, EPN by RD follow up.  Nutrition Goal Status: new  Communication of RD Recs: (POC)    Reason for Assessment    Reason For Assessment: consult  Diagnosis: (Syncope)  Relevant Medical History: DM, HTN, Cancer  Interdisciplinary Rounds: did not attend  General Information Comments:   20- Pt is a 69 year old female admitted after a fall. Pt reports her appetite is improved. PO intake of breakfast meal 75%. Pt states she has N/V PTA but feels great now. UBW 128lbs, no signifigant change in weight. Spoke with pt about countiung CHO's to stabilize blood sugar. Pt states she does not follow the DM diet at home but will try upon discharge.   NFPE 20- Pt nourished.   Nutrition Discharge Planning: Adequate nutrition to meet needs via diabetic diet.    Nutrition Risk Screen    Nutrition Risk Screen: no indicators present    Nutrition/Diet History    Spiritual, Cultural Beliefs, Adventist Practices, Values that Affect Care: no    Anthropometrics    Temp: 97.8 °F (36.6 °C)  Height Method: Stated  Height: 4' 11" (149.9 cm)  Height (inches): 59 in  Weight Method: Bed Scale  Weight: 56.3 kg (124 lb 1.9 oz)  Weight (lb): 124.12 lb  Ideal Body Weight (IBW), Female: 95 lb  % Ideal Body Weight, Female (lb): 130.65 %  BMI (Calculated): 25.1  BMI Grade: 25 - 29.9 - overweight  Usual Body Weight (UBW), k.1 kg  % Usual Body Weight: 97.1  % Weight Change From Usual Weight: -3.1 %     Lab/Procedures/Meds    Pertinent Labs Reviewed: reviewed  Pertinent Labs Comments: Glucose 263, A1C 13.1  Pertinent Medications Reviewed: reviewed  Pertinent Medications Comments: Heparin, Insulin    Estimated/Assessed Needs    Weight Used For Calorie Calculations: 56.3 kg (124 lb 1.9 oz)  Energy Calorie Requirements " (kcal): 1235 kcals/day(x1.25)  Energy Need Method: [place]  Protein Requirements: 45.13-56.42 g/day(0.8-1.0 g/kg)  Weight Used For Protein Calculations: 56.3 kg (124 lb 1.9 oz)  Fluid Requirements (mL): 1mL/kcal or per MD  Estimated Fluid Requirement Method: RDA Method  RDA Method (mL): 1235  CHO Requirement: 154 g CHO    Nutrition Prescription Ordered    Current Diet Order: Diet diabetic [facility]; 1500 Calorie (Diet/Nutrition)    Evaluation of Received Nutrient/Fluid Intake    Energy Calories Required: meeting needs  Protein Required: meeting needs  Fluid Required: meeting needs  Comments: LBM 7.23.20  % Intake of Estimated Energy Needs: 75 - 100 %  % Meal Intake: 75 - 100 %    Nutrition Risk    Level of Risk/Frequency of Follow-up: low     Assessment and Plan    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, with long-term current use of insulin  Contributing Nutrition Diagnosis  Excessive CHO intake    Related to (etiology):   Non compliant to diet order    Signs and Symptoms (as evidenced by):   A1C 13.1, Glucose 263    Interventions (treatment strategy):  Collaboration with other providers  Diabetic diet    Nutrition Diagnosis Status:   New    Monitor and Evaluation    Food and Nutrient Intake: food and beverage intake  Food and Nutrient Adminstration: diet order  Knowledge/Beliefs/Attitudes: food and nutrition knowledge/skill  Physical Activity and Function: nutrition-related ADLs and IADLs  Anthropometric Measurements: weight  Biochemical Data, Medical Tests and Procedures: glucose/endocrine profile  Nutrition-Focused Physical Findings: overall appearance     Malnutrition Assessment     Shine Score: 19  Orbital Region (Subcutaneous Fat Loss): well nourished  Upper Arm Region (Subcutaneous Fat Loss): well nourished  Thoracic and Lumbar Region: well nourished   [place] Region (Muscle Loss): well nourished  Clavicle Bone Region  (Muscle Loss): well nourished  Clavicle and Acromion Bone Region (Muscle Loss): well nourished  Scapular Bone Region (Muscle Loss): well nourished  Dorsal Hand (Muscle Loss): well nourished  Patellar Region (Muscle Loss): well nourished  Anterior Thigh Region (Muscle Loss): well nourished  Posterior Calf Region (Muscle Loss): well nourished   Edema (Fluid Accumulation): 0-->no edema present        Nutrition Follow-Up    RD Follow-up?: Yes

## 2020-07-24 NOTE — PLAN OF CARE
Recommendations    1. Continue Current diet.     2. If PO intake <50% of meals recommend Boost glucose BID.     3. Weekly weights.    Goals: 1. >75% of EEN, EPN by RD follow up.  Nutrition Goal Status: new  Communication of RD Recs: (POC)

## 2020-07-24 NOTE — NURSING
07/24/2020        Pt verbalized understanding D/C instructions and education provided pertinent to D/C needs. IV cath removed fully intact. No distress noted. Pt awaiting transport for discharge.          Luana Box RN

## 2020-07-24 NOTE — PLAN OF CARE
Plan of care reviewed with patient. Pt remains free from fall, injury, and skin breakdown. Positions self independently. Blood glucose and Telemetry monitoring maintained. IVFs infusing as order. Voiding spontaneously. Purposeful hourly rounding done. Safety maintained. Patient lying in bed in no distress. Will continue to monitor.

## 2020-07-24 NOTE — ASSESSMENT & PLAN NOTE
- Ms. Darlene Avila presents after multiple pre-syncopal episodes   - she reports dizziness, and tunnel vision prior to pre-syncopal episodes   - CT Head was negative in ED   - remains orthostatic, cont IVF's., KATHRIN's hold ACEi, hold Arimidex (can cause vasodilation); discussed with heme/onco  will need to restart since patient has not had resection; no know orthostatic cause  - fall precautions   - monitor on tele

## 2020-07-24 NOTE — PLAN OF CARE
Met with patient at the bedside; discussed patient choices of home health care providers. Patient signed Patient's Choice Disclosure Form choosing [facility] as preferred provider of choice or next first available agency in network.     [facility] health [facility] to [facility];; pending acceptance    Patient is okay to discharge home; home health agency to follow up with patient    2012- Melisa with [facility] accepted home health referral- will admit on Monday

## 2020-07-24 NOTE — PROGRESS NOTES
[facility] Hospital Medicine  Progress Note    Patient Name: Darlene Avila  MRN: [id number]  Patient Class: OP- Observation   Admission Date: 7/22/2020  Length of Stay: 0 days  Attending Physician: Na Webber MD  Primary Care Provider: Kirill Cabrera Iii, MD        Subjective:     Principal Problem:Syncope        HPI:  Ms. Darlene Avila is a 69 y.o. female, with PMH of recently diagnosed with right-sided breast cancer (biopsy taken 03/18/2020, currently on Arimidex), hypertension, IDDM-2, prior TIA, current smoker, who presented to [facility] ED on 07/22/2020 for further evaluation of dizziness and presyncope which caused her to fall today.  She states that she has felt dizzy for the past few days, and nearly fell while going to the bathroom this morning as a result of dizziness.  Today she presented to her PCP for a preop clearance for her mastectomy,  and was found to be hypotensive in the office today.  She endorses more shortness of breath than baseline at present, and recent low blood pressures in the 90 systolic. She states she has not been eating well since starting her Arimidex, and feels nauseated each time she tries to eat.  In the ED she was evaluated with labs that showed hypoglycemia of 62, troponin was elevated at 0.031, an EKG showed normal sinus rhythm with a rate of 99, she did have positive orthostatics in the ED, and was unable to stand without dizziness. She was placed on OBS.     Overview/Hospital Course:  No notes on file    Interval History: no overnight events, denies dizziness, still very orthostatic; sister reports frequent falls    Review of Systems   Constitutional: Positive for appetite change (poor 2/2 nausea with eating ) and fatigue. Negative for activity change, chills and fever.   HENT: Negative for congestion.    Eyes: Negative for visual disturbance.   Respiratory: Negative for cough, shortness of breath and wheezing.    Cardiovascular: Negative for chest pain  and palpitations.   Gastrointestinal: Negative for abdominal pain, diarrhea, nausea and vomiting.   Genitourinary: Negative for hematuria and urgency.   Musculoskeletal: Negative for back pain and neck pain.   Skin: Negative for color change and wound.   Neurological: Negative for dizziness, weakness, light-headedness, numbness and headaches.     Objective:     Vital Signs (Most Recent):  Temp: 98.1 °F (36.7 °C) (07/24/20 1257)  Pulse: 105 (07/24/20 1257)  Resp: 20 (07/24/20 1257)  BP: 108/63 (07/24/20 1257)  SpO2: 98 % (07/24/20 1257) Vital Signs (24h Range):  Temp:  [97.7 °F (36.5 °C)-98.7 °F (37.1 °C)] 98.1 °F (36.7 °C)  Pulse:  [] 105  Resp:  [18-20] 20  SpO2:  [97 %-100 %] 98 %  BP: ()/(53-81) 108/63     Weight: 56.3 kg (124 lb 1.9 oz)  Body mass index is 25.07 kg/m².    Intake/Output Summary (Last 24 hours) at 7/24/2020 1353  Last data filed at 7/24/2020 0800  Gross per 24 hour   Intake 2747.82 ml   Output 3250 ml   Net -502.18 ml      Physical Exam  Vitals signs and nursing note reviewed.   Constitutional:       General: She is not in acute distress.     Appearance: She is well-developed. She is not diaphoretic.   HENT:      Head: Normocephalic and atraumatic.   Eyes:      General: No scleral icterus.     Conjunctiva/sclera: Conjunctivae normal.   Neck:      Musculoskeletal: Normal range of motion and neck supple.   Cardiovascular:      Rate and Rhythm: Normal rate and regular rhythm.      Heart sounds: Normal heart sounds.   Pulmonary:      Effort: Pulmonary effort is normal. No respiratory distress.      Breath sounds: Normal breath sounds. No wheezing.   Abdominal:      General: Bowel sounds are normal. There is no distension.      Palpations: Abdomen is soft.      Tenderness: There is no abdominal tenderness.   Musculoskeletal: Normal range of motion.   Skin:     General: Skin is warm and dry.      Capillary Refill: Capillary refill takes less than 2 seconds.   Neurological:      General: No  focal deficit present.      Mental Status: She is alert.      Comments: Poor memory         Significant Labs:   CBC:   Recent Labs   Lab 07/22/20  1609 07/23/20  0341   WBC 13.38* 10.42   HGB 12.4 11.9*   HCT 40.2 38.9   * 367*     CMP:   Recent Labs   Lab 07/22/20  1609 07/23/20  0341 07/24/20  0320    139 137   K 3.7 3.6 4.4    105 104   CO2 27 24 23   GLU 62* 71 263*   BUN 24* 17 15   CREATININE 1.2 0.8 0.8   CALCIUM 9.7 9.0 9.0   PROT 6.8  --   --    ALBUMIN 3.4*  --   --    BILITOT 0.3  --   --    ALKPHOS 125  --   --    AST 18  --   --    ALT 16  --   --    ANIONGAP 13 10 10   EGFRNONAA 46* >60 >60     All pertinent labs within the past 24 hours have been reviewed.    Significant Imaging: I have reviewed all pertinent imaging results/findings within the past 24 hours.   Imaging Results          X-Ray Chest AP Portable (Final result)  Result time 07/22/20 16:29:21    Final result by Sam Knight MD (07/22/20 16:29:21)                 Impression:      No acute abnormality.      Electronically signed by: Sam Knight MD  Date:    07/22/2020  Time:    16:29             Narrative:    EXAMINATION:  XR CHEST AP PORTABLE    CLINICAL HISTORY:  Stroke; Unspecified fall, initial encounter    TECHNIQUE:  Single frontal view of the chest was performed.    COMPARISON:  11/05/2013    FINDINGS:  The lungs are clear, with normal appearance of pulmonary vasculature and no pleural effusion or pneumothorax.    The cardiac silhouette is normal in size. The hilar and mediastinal contours are unremarkable.    Bones are intact.                               CT Head Without Contrast (Final result)  Result time 07/22/20 16:33:25    Final result by Sam Knight MD (07/22/20 16:33:25)                 Impression:      No intracranial blood products or acute large territory infarction.    Remote infarcts of the right frontal lobe, left occipital lobe, and left basal ganglia/thalamus as well as volume  loss and microvascular changes are grossly stable from 2017 exam.      Electronically signed by: Sam Knight MD  Date:    07/22/2020  Time:    16:33             Narrative:    EXAMINATION:  CT HEAD WITHOUT CONTRAST    CLINICAL HISTORY:  Neuro deficit, acute, stroke suspected; Unspecified fall, initial encounter    TECHNIQUE:  Low dose axial CT images obtained throughout the head without intravenous contrast. Sagittal and coronal reconstructions were performed.    COMPARISON:  12/19/2017    FINDINGS:  No intracranial blood products.  No extra-axial masses or fluid collections.  No evidence of an acute large territory vascular infarction.  There are evidence of remote infarcts in the right frontal and left occipital lobes.  Probable prior left basal ganglia/thalamic infarct, also unchanged from prior.  There is volume loss with compensatory ventricular dilatation, similar to prior.  Sequelae of microvascular disease appears relatively similar from prior.    Calvarium is intact.  Visualized paranasal sinuses and mastoid air cells are clear.                                    Assessment/Plan:      * Syncope  - Ms. Darlene Avila presents after multiple pre-syncopal episodes   - she reports dizziness, and tunnel vision prior to pre-syncopal episodes   - CT Head was negative in ED   - remains orthostatic, cont IVF's., KATHRIN's hold ACEi, hold Arimidex (can cause vasodilation); discussed with heme/onco  will need to restart since patient has not had resection; no know orthostatic cause  - fall precautions   - monitor on tele       Orthostasis  - patient remains orthostatic despite IVF's; unclear etiology; per sister, patient with frequent falls  - TEDs  - am cortisol 4.1 sl lower than normal  - TSH normal  - check ACTH, renin, cortrosyn stimulation, B12      History of CVA (cerebrovascular accident)  - on ASA, plavix, statin at home      UTI (urinary tract infection)  - possible UTI with frequent urination  - UC  negative  - will dc abx      Essential hypertension  - reasonably stable  - hold ACEi d/t severe hypotension  - monitor    Hypoglycemia  - see above    Elevated troponin  - no associated chest pain, flat  - no events on monitor  - doubt ACS  - EKG without ischemic changes          Malignant neoplasm of upper-outer quadrant of right breast in female, estrogen receptor positive  - biopsy taken 3/2020  - started Arimidex 4/2020  - initially scheduled for mastectomy 7/2020, delayed to 8/2020 2/2 hypoglycemia/hypotension   - Follows w/ Dr. Clark (Breast Surgery), Dr. 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 Given D5 bolus in ED  - Diabetic diet   - SSI with accuchekcs AC/HS  LA 10 units  - monitor      VTE Risk Mitigation (From admission, onward)         Ordered     Place KATHRIN hose  Until discontinued      07/23/20 1628     heparin (porcine) injection 5,000 Units  Every 8 hours      07/22/20 2138     IP VTE HIGH RISK PATIENT  Once      07/22/20 2138     Place sequential compression device  Until discontinued      07/22/20 2031                      Starla Posey PA-C  Department of Hospital Medicine   [facility]

## 2020-07-24 NOTE — SUBJECTIVE & OBJECTIVE
Interval History: no overnight events, denies dizziness, still very orthostatic; sister reports frequent falls    Review of Systems   Constitutional: Positive for appetite change (poor 2/2 nausea with eating ) and fatigue. Negative for activity change, chills and fever.   HENT: Negative for congestion.    Eyes: Negative for visual disturbance.   Respiratory: Negative for cough, shortness of breath and wheezing.    Cardiovascular: Negative for chest pain and palpitations.   Gastrointestinal: Negative for abdominal pain, diarrhea, nausea and vomiting.   Genitourinary: Negative for hematuria and urgency.   Musculoskeletal: Negative for back pain and neck pain.   Skin: Negative for color change and wound.   Neurological: Negative for dizziness, weakness, light-headedness, numbness and headaches.     Objective:     Vital Signs (Most Recent):  Temp: 98.1 °F (36.7 °C) (07/24/20 1257)  Pulse: 105 (07/24/20 1257)  Resp: 20 (07/24/20 1257)  BP: 108/63 (07/24/20 1257)  SpO2: 98 % (07/24/20 1257) Vital Signs (24h Range):  Temp:  [97.7 °F (36.5 °C)-98.7 °F (37.1 °C)] 98.1 °F (36.7 °C)  Pulse:  [] 105  Resp:  [18-20] 20  SpO2:  [97 %-100 %] 98 %  BP: ()/(53-81) 108/63     Weight: 56.3 kg (124 lb 1.9 oz)  Body mass index is 25.07 kg/m².    Intake/Output Summary (Last 24 hours) at 7/24/2020 1353  Last data filed at 7/24/2020 0800  Gross per 24 hour   Intake 2747.82 ml   Output 3250 ml   Net -502.18 ml      Physical Exam  Vitals signs and nursing note reviewed.   Constitutional:       General: She is not in acute distress.     Appearance: She is well-developed. She is not diaphoretic.   HENT:      Head: Normocephalic and atraumatic.   Eyes:      General: No scleral icterus.     Conjunctiva/sclera: Conjunctivae normal.   Neck:      Musculoskeletal: Normal range of motion and neck supple.   Cardiovascular:      Rate and Rhythm: Normal rate and regular rhythm.      Heart sounds: Normal heart sounds.   Pulmonary:       Effort: Pulmonary effort is normal. No respiratory distress.      Breath sounds: Normal breath sounds. No wheezing.   Abdominal:      General: Bowel sounds are normal. There is no distension.      Palpations: Abdomen is soft.      Tenderness: There is no abdominal tenderness.   Musculoskeletal: Normal range of motion.   Skin:     General: Skin is warm and dry.      Capillary Refill: Capillary refill takes less than 2 seconds.   Neurological:      General: No focal deficit present.      Mental Status: She is alert.      Comments: Poor memory         Significant Labs:   CBC:   Recent Labs   Lab 07/22/20  1609 07/23/20  0341   WBC 13.38* 10.42   HGB 12.4 11.9*   HCT 40.2 38.9   * 367*     CMP:   Recent Labs   Lab 07/22/20  1609 07/23/20  0341 07/24/20  0320    139 137   K 3.7 3.6 4.4    105 104   CO2 27 24 23   GLU 62* 71 263*   BUN 24* 17 15   CREATININE 1.2 0.8 0.8   CALCIUM 9.7 9.0 9.0   PROT 6.8  --   --    ALBUMIN 3.4*  --   --    BILITOT 0.3  --   --    ALKPHOS 125  --   --    AST 18  --   --    ALT 16  --   --    ANIONGAP 13 10 10   EGFRNONAA 46* >60 >60     All pertinent labs within the past 24 hours have been reviewed.    Significant Imaging: I have reviewed all pertinent imaging results/findings within the past 24 hours.   Imaging Results          X-Ray Chest AP Portable (Final result)  Result time 07/22/20 16:29:21    Final result by Sam Knight MD (07/22/20 16:29:21)                 Impression:      No acute abnormality.      Electronically signed by: Sam Knight MD  Date:    07/22/2020  Time:    16:29             Narrative:    EXAMINATION:  XR CHEST AP PORTABLE    CLINICAL HISTORY:  Stroke; Unspecified fall, initial encounter    TECHNIQUE:  Single frontal view of the chest was performed.    COMPARISON:  11/05/2013    FINDINGS:  The lungs are clear, with normal appearance of pulmonary vasculature and no pleural effusion or pneumothorax.    The cardiac silhouette is normal in  size. The hilar and mediastinal contours are unremarkable.    Bones are intact.                               CT Head Without Contrast (Final result)  Result time 07/22/20 16:33:25    Final result by Sam Knight MD (07/22/20 16:33:25)                 Impression:      No intracranial blood products or acute large territory infarction.    Remote infarcts of the right frontal lobe, left occipital lobe, and left basal ganglia/thalamus as well as volume loss and microvascular changes are grossly stable from 2017 exam.      Electronically signed by: Sam Knight MD  Date:    07/22/2020  Time:    16:33             Narrative:    EXAMINATION:  CT HEAD WITHOUT CONTRAST    CLINICAL HISTORY:  Neuro deficit, acute, stroke suspected; Unspecified fall, initial encounter    TECHNIQUE:  Low dose axial CT images obtained throughout the head without intravenous contrast. Sagittal and coronal reconstructions were performed.    COMPARISON:  12/19/2017    FINDINGS:  No intracranial blood products.  No extra-axial masses or fluid collections.  No evidence of an acute large territory vascular infarction.  There are evidence of remote infarcts in the right frontal and left occipital lobes.  Probable prior left basal ganglia/thalamic infarct, also unchanged from prior.  There is volume loss with compensatory ventricular dilatation, similar to prior.  Sequelae of microvascular disease appears relatively similar from prior.    Calvarium is intact.  Visualized paranasal sinuses and mastoid air cells are clear.

## 2020-07-24 NOTE — PLAN OF CARE
[facility]    HOME HEALTH ORDERS  FACE TO FACE ENCOUNTER    Patient Name: Darlene Avila  YOB: 1950    PCP: Kirill Cabrera Iii, MD   [facility] [address] [facility] [address]  PCP Phone Number: [phone]  PCP Fax: [phone]       Encounter Date: 07/24/2020    Admit to Home Health    Diagnoses:  Active Hospital Problems    Diagnosis  POA    *Syncope [R55]  Yes    History of CVA (cerebrovascular accident) [Z86.73]  Not Applicable    Orthostasis [I95.1]  Yes    UTI (urinary tract infection) [N39.0]  Yes    Elevated troponin [R79.89]  Yes    Hypoglycemia [E16.2]  Yes    Essential hypertension [I10]  Yes    Malignant neoplasm of upper-outer quadrant of right breast in female, estrogen receptor positive [C50.411, Z17.0]  Not Applicable    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       Future Appointments   Date Time Provider Department Center   8/4/2020  2:00 PM COVID TESTING, [facility]   8/4/2020  3:30 PM DEIRDRE Dawson MD [facility]   8/6/2020  3:15 PM Krysta Clark MD [facility]   8/7/2020  7:30 AM [facility] NM1 [facility]LEAR [facility]   9/15/2020  1:30 PM Benita Barcenas MD [facility] HEMONC3 [facility]     Follow-up Information     Schedule an appointment as soon as possible for a visit with Kirill Cabrera Iii, MD.    Specialty: Internal Medicine  Why: post hospital follow-up  Contact information:  [address]  [facility] [address]  [facility] [address] [phone]                     I have seen and examined this patient face to face today. My clinical findings that support the need for the home health skilled services and home bound status are the following:  Weakness/numbness  causing balance and gait disturbance due to Weakness/Debility making it taxing to leave home.    Allergies:  Review of patient's allergies indicates:   Allergen Reactions    Iodinated contrast media Hives       Diet: regular diet    Activities: activity as tolerated    Nursing:   SN to complete comprehensive assessment including routine vital signs. Instruct on disease process and s/s of complications to report to MD. Review/verify medication list sent home with the patient at time of discharge  and instruct patient/caregiver as needed. Frequency may be adjusted depending on start of care date.    Notify MD if SBP > 160 or < 90; DBP > 90 or < 50; HR > 120 or < 50; Temp > 101       CONSULTS:    Physical Therapy to evaluate and treat. Evaluate for home safety and equipment needs; Establish/upgrade home exercise program. Perform / instruct on therapeutic exercises, gait training, transfer training, and Range of Motion.  Occupational Therapy to evaluate and treat. Evaluate home environment for safety and equipment needs. Perform/Instruct on transfers, ADL training, ROM, and therapeutic exercises.    Medications: Review discharge medications with patient and family and provide education.      Current Discharge Medication List      START taking these medications    Details   midodrine (PROAMATINE) 2.5 MG Tab Take 1 tablet (2.5 mg total) by mouth every 12 (twelve) hours.  Qty: 60 tablet, Refills: 0    Associated Diagnoses: Orthostasis         CONTINUE these medications which have CHANGED    Details   diphenoxylate-atropine 2.5-0.025 mg (LOMOTIL) 2.5-0.025 mg per tablet 1 tablet Oral Three times a day  As needed    Associated Diagnoses: Orthostasis      insulin lispro protamin-lispro (HUMALOG MIX 75-25 KWIKPEN) 100 unit/mL (75-25) InPn Inject 10 Units into the skin 2 (two) times a day. Inject 16 units every morning and 18 units every evening    Associated Diagnoses: Orthostasis         CONTINUE these medications which have  NOT CHANGED    Details   amitriptyline (ELAVIL) 100 MG tablet Take 1 tablet by mouth every evening.       aspirin (ECOTRIN) 81 MG EC tablet Take 81 mg by mouth once daily.      calcium carbonate (OS-JABIER) 600 mg calcium (1,500 mg) Tab Take 600 mg by mouth once.      citalopram (CELEXA) 10 MG tablet Take 1 tablet by mouth once daily.       clopidogrel (PLAVIX) 75 mg tablet Take 75 mg by mouth once daily.       multivitamin capsule Take 1 capsule by mouth once daily.      pravastatin (PRAVACHOL) 10 MG tablet Take 10 mg by mouth once daily.       anastrozole (ARIMIDEX) 1 mg Tab Take 1 tablet (1 mg total) by mouth once daily.  Qty: 90 tablet, Refills: 3    Associated Diagnoses: Malignant neoplasm of upper-outer quadrant of right breast in female, estrogen receptor positive             I certify that this patient is confined to her home and needs intermittent skilled nursing care, physical therapy and occupational therapy.

## 2020-07-24 NOTE — ASSESSMENT & PLAN NOTE
- patient remains orthostatic despite IVF's; unclear etiology; per sister, patient with frequent falls  - TEDs  - am cortisol 4.1 sl lower than normal  - TSH normal  - check ACTH, renin, cortrosyn stimulation, B12

## 2020-07-24 NOTE — DISCHARGE SUMMARY
[facility] Hospital Medicine  Discharge Summary      Patient Name: Darlene Avila  MRN: [id number]  Admission Date: 7/22/2020  Hospital Length of Stay: 0 days  Discharge Date and Time: 7/24/2020  6:33 PM  Attending Physician: No att. providers found   Discharging Provider: Starla Posey PA-C  Primary Care Provider: Kirill Cabrera Iii, MD      HPI:   Ms. Darlene Avila is a 69 y.o. female, with PMH of recently diagnosed with right-sided breast cancer (biopsy taken 03/18/2020, currently on Arimidex), hypertension, IDDM-2, prior TIA, current smoker, who presented to [facility] [place] on 07/22/2020 for further evaluation of dizziness and presyncope which caused her to fall today.  She states that she has felt dizzy for the past few days, and nearly fell while going to the bathroom this morning as a result of dizziness.  Today she presented to her PCP for a preop clearance for her mastectomy,  and was found to be hypotensive in the office today.  She endorses more shortness of breath than baseline at present, and recent low blood pressures in the 90 systolic. She states she has not been eating well since starting her Arimidex, and feels nauseated each time she tries to eat.  In the ED she was evaluated with labs that showed hypoglycemia of 62, troponin was elevated at 0.031, an EKG showed normal sinus rhythm with a rate of 99, she did have positive orthostatics in the ED, and was unable to stand without dizziness. She was placed on OBS.     * No surgery found *      Hospital Course:   69 y.o. female, with PMH of recently diagnosed with right-sided breast cancer (biopsy taken 03/18/2020, currently on Arimidex), hypertension, IDDM-2, prior TIA, current smoker, frequent falls admitted to observation with hypotension/presyncope, possible UTI, culture negative, abx discontinued. CT Head was negative in ED. Gluc 67 in ED s/p D5. A1c 13.1; patient reports compliance with home insulin, uncertain if this is the case as  not medication has been picked up from her pharmacy; sister states patient does not always take her medications; reports patient drinks soda all day long does not eat regular meals.  She was noted to be be severely orthostatic. S/p IVF's, TEDs. Morning cortisol, low normal but normal respone on cortrosyn stimulation. TSH and B12 within normal limits. Renin and ACTH pending. Low dose midrodrine started. Home health arranged, discussed extensively with patient and sister regarding safety when standing and holding on to something. Home health arranged, stable for discharge home.      Consults:   Consults (From admission, onward)        Status Ordering Provider     Inpatient consult to Registered Dietitian/Nutritionist  Once     Provider:  (Not yet assigned)    Completed ERIC BABCOCK          No new Assessment & Plan notes have been filed under this hospital service since the last note was generated.  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     Specialty: Internal Medicine  Why: post hospital follow-up  Contact information:  [address]  [facility]  [facility] [address] [phone]                 Patient Instructions:      Diet Adult Regular     Notify your health care provider if you experience any of the following:  temperature >100.4     Notify your health care provider if you experience any of the following:  persistent nausea and vomiting or diarrhea     Notify your health care provider if you experience any of the following:  severe uncontrolled pain     Notify your health care provider if you experience any of the following:  difficulty breathing or increased cough     Notify your health care provider if you experience any of the following:  severe persistent headache     Notify your health care provider if you experience any of the following:  persistent dizziness, light-headedness, or visual disturbances     Notify your health care provider if you experience any of the following:  increased confusion or weakness     Activity as tolerated       Significant Diagnostic Studies:  Imaging Results          X-Ray Chest AP Portable (Final result)  Result time 07/22/20 16:29:21    Final result by Sam Knight MD (07/22/20 16:29:21)                 Impression:      No acute abnormality.      Electronically signed by: Sam Knight MD  Date:    07/22/2020  Time:    16:29             Narrative:    EXAMINATION:  XR CHEST AP PORTABLE    CLINICAL HISTORY:  Stroke; Unspecified fall, initial encounter    TECHNIQUE:  Single frontal view of the chest was performed.    COMPARISON:  11/05/2013    FINDINGS:  The lungs are clear, with normal appearance of pulmonary vasculature and no pleural effusion or pneumothorax.    The cardiac silhouette is normal in size. The hilar and mediastinal contours are unremarkable.    Bones are intact.                               CT Head Without Contrast (Final result)  Result time 07/22/20 16:33:25    Final  result by Sam Knight MD (07/22/20 16:33:25)                 Impression:      No intracranial blood products or acute large territory infarction.    Remote infarcts of the right frontal lobe, left occipital lobe, and left basal ganglia/thalamus as well as volume loss and microvascular changes are grossly stable from 2017 exam.      Electronically signed by: Sam Knight MD  Date:    07/22/2020  Time:    16:33             Narrative:    EXAMINATION:  CT HEAD WITHOUT CONTRAST    CLINICAL HISTORY:  Neuro deficit, acute, stroke suspected; Unspecified fall, initial encounter    TECHNIQUE:  Low dose axial CT images obtained throughout the head without intravenous contrast. Sagittal and coronal reconstructions were performed.    COMPARISON:  12/19/2017    FINDINGS:  No intracranial blood products.  No extra-axial masses or fluid collections.  No evidence of an acute large territory vascular infarction.  There are evidence of remote infarcts in the right frontal and left occipital lobes.  Probable prior left basal ganglia/thalamic infarct, also unchanged from prior.  There is volume loss with compensatory ventricular dilatation, similar to prior.  Sequelae of microvascular disease appears relatively similar from prior.    Calvarium is intact.  Visualized paranasal sinuses and mastoid air cells are clear.                            '    Pending Diagnostic Studies:     Procedure Component Value Units Date/Time    ACTH [[id number]] Collected: 07/24/20 1057    Order Status: Sent Lab Status: In process Updated: 07/24/20 1250    Specimen: Blood     Renin [[id number]] Collected: 07/24/20 1057    Order Status: Sent Lab Status: In process Updated: 07/24/20 1250    Specimen: Blood          Medications:  Reconciled Home Medications:      Medication List      START taking these medications    midodrine 2.5 MG Tab  Commonly known as: PROAMATINE  Take 1 tablet (2.5 mg total) by mouth every 12 (twelve) hours.        CHANGE how  you take these medications    diphenoxylate-atropine 2.5-0.025 mg 2.5-0.025 mg per tablet  Commonly known as: LOMOTIL  1 tablet Oral Three times a day  As needed  What changed:   · how to take this  · additional instructions     HumaLOG Mix 75-25 KwikPen 100 unit/mL (75-25) Inpn  Generic drug: insulin lispro protamin-lispro  Inject 10 Units into the skin 2 (two) times a day. Inject 16 units every morning and 18 units every evening  What changed: how much to take        CONTINUE taking these medications    amitriptyline 100 MG tablet  Commonly known as: ELAVIL  Take 1 tablet by mouth every evening.     anastrozole 1 mg Tab  Commonly known as: ARIMIDEX  Take 1 tablet (1 mg total) by mouth once daily.     aspirin 81 MG EC tablet  Commonly known as: ECOTRIN  Take 81 mg by mouth once daily.     calcium carbonate 600 mg calcium (1,500 mg) Tab  Commonly known as: OS-JABIER  Take 600 mg by mouth once.     citalopram 10 MG tablet  Commonly known as: CELEXA  Take 1 tablet by mouth once daily.     multivitamin capsule  Take 1 capsule by mouth once daily.     PLAVIX 75 mg tablet  Generic drug: clopidogreL  Take 75 mg by mouth once daily.     pravastatin 10 MG tablet  Commonly known as: PRAVACHOL  Take 10 mg by mouth once daily.            Indwelling Lines/Drains at time of discharge:   Lines/Drains/Airways     None                 Time spent on the discharge of patient: >30 minutes  Patient was seen and examined on the date of discharge and determined to be suitable for discharge.         Starla Posey PA-C  Department of Hospital Medicine  [facility]

## 2020-07-24 NOTE — ASSESSMENT & PLAN NOTE
Contributing Nutrition Diagnosis  Excessive CHO intake    Related to (etiology):   Non compliant to diet order    Signs and Symptoms (as evidenced by):   A1C 13.1, Glucose 263    Interventions (treatment strategy):  Collaboration with other providers  Diabetic diet    Nutrition Diagnosis Status:   New

## 2020-07-24 NOTE — ASSESSMENT & PLAN NOTE
- last A1C: 13.1  - patient reports compliance with home insulin, uncertain if this is the case as not medication has been picked up from her pharmacy; sister states patient does not always take her medications; reports she drinks soda all day long does not eat regular meals  - low on admission. Given D5 bolus in ED  - Diabetic diet   - SSI with accuchekcs AC/HS  LA 10 units  - monitor

## 2020-07-25 LAB — CORTIS SERPL-MCNC: 28.4 UG/DL

## 2020-07-25 NOTE — HOSPITAL COURSE
69 y.o. female, with PMH of recently diagnosed with right-sided breast cancer (biopsy taken 03/18/2020, currently on Arimidex), hypertension, IDDM-2, prior TIA, current smoker, frequent falls admitted to observation with hypotension/presyncope, possible UTI, culture negative, abx discontinued. CT Head was negative in ED. Gluc 67 in ED s/p D5. A1c 13.1; patient reports compliance with home insulin, uncertain if this is the case as not medication has been picked up from her pharmacy; sister states patient does not always take her medications; reports patient drinks soda all day long does not eat regular meals.  She was noted to be be severely orthostatic. S/p IVF's, TEDs. Morning cortisol, low normal but normal respone on cortrosyn stimulation. TSH and B12 within normal limits. Renin and ACTH pending. Low dose midrodrine started. Home health arranged, discussed extensively with patient and sister regarding safety when standing and holding on to something. Home health arranged, stable for discharge home.

## 2020-07-28 LAB — ACTH PLAS-MCNC: 12 PG/ML (ref 0–46)

## 2020-07-29 ENCOUNTER — TELEPHONE (OUTPATIENT)
Dept: SURGERY | Facility: CLINIC | Age: 70
End: 2020-07-29

## 2020-07-29 NOTE — TELEPHONE ENCOUNTER
Spoke with Odilia VALENCIA from [facility], pt presented to clinic for surgical clearance, pt BP 78/50, pt lethargic, dizzy, and feeling overall weak and tired, pt states that she had not yet taken her midodrine 2.5 MG Tab, pt A1C 13.1, pt refused to report to ED, pt is not cleared for surgery at this time, pt scheduled for F/U appt on Tuesday 8/4, midodrine 2.5 MG Tab medication changed to BID per NP, will notify MD at this time

## 2020-07-30 LAB — RENIN PLAS-CCNC: 2.1 NG/ML/H

## 2020-08-04 ENCOUNTER — OFFICE VISIT (OUTPATIENT)
Dept: OPHTHALMOLOGY | Facility: CLINIC | Age: 70
End: 2020-08-04
Payer: COMMERCIAL

## 2020-08-04 VITALS — SYSTOLIC BLOOD PRESSURE: 77 MMHG | HEART RATE: 98 BPM | DIASTOLIC BLOOD PRESSURE: 53 MMHG

## 2020-08-04 DIAGNOSIS — H43.812 POSTERIOR VITREOUS DETACHMENT OF LEFT EYE: ICD-10-CM

## 2020-08-04 DIAGNOSIS — H43.12 VITREOUS HEMORRHAGE OF LEFT EYE: ICD-10-CM

## 2020-08-04 PROCEDURE — 99999 PR PBB SHADOW E&M-EST. PATIENT-LVL IV: CPT | Mod: PBBFAC,,, | Performed by: OPHTHALMOLOGY

## 2020-08-04 PROCEDURE — 92134 POSTERIOR SEGMENT OCT RETINA (OCULAR COHERENCE TOMOGRAPHY)-BOTH EYES: ICD-10-PCS | Mod: S$GLB,,, | Performed by: OPHTHALMOLOGY

## 2020-08-04 PROCEDURE — 92134 CPTRZ OPH DX IMG PST SGM RTA: CPT | Mod: S$GLB,,, | Performed by: OPHTHALMOLOGY

## 2020-08-04 PROCEDURE — 92202 PR OPHTHALMOSCOPY, EXT, W/DRAW OPTIC NERVE/MACULA, I&R, UNI/BI: ICD-10-PCS | Mod: S$GLB,,, | Performed by: OPHTHALMOLOGY

## 2020-08-04 PROCEDURE — 92014 COMPRE OPH EXAM EST PT 1/>: CPT | Mod: S$GLB,,, | Performed by: OPHTHALMOLOGY

## 2020-08-04 PROCEDURE — 92202 OPSCPY EXTND ON/MAC DRAW: CPT | Mod: S$GLB,,, | Performed by: OPHTHALMOLOGY

## 2020-08-04 PROCEDURE — 99999 PR PBB SHADOW E&M-EST. PATIENT-LVL IV: ICD-10-PCS | Mod: PBBFAC,,, | Performed by: OPHTHALMOLOGY

## 2020-08-04 PROCEDURE — 92014 PR EYE EXAM, EST PATIENT,COMPREHESV: ICD-10-PCS | Mod: S$GLB,,, | Performed by: OPHTHALMOLOGY

## 2020-08-04 NOTE — PROGRESS NOTES
HPI     Eye Problem      Additional comments: PDR OU              Comments     Yearly check OCT   DLS: 07/30/2020 Dr. Dawson     Hemoglobin A1C       Date                     Value               Ref Range             Status                07/23/2020               13.1 (H)            4.0 - 5.6 %           Final            ----------  Patient states her vision has been stable since her last visit.   (-)Flashes (-)Floaters  (+)Photophobia  (+)Glare    No gtts         OCT - stable central cystoid space OD  No ME OS    Prior FA - NV OU OD > OS  With macular ischemia OD > OS  And late leakage OD c/w CME/DME      A/P    1. DM   - PDR s/p PRP ou   Recent VH OS  S/p Avastin OS  (last 11/13/17)      2. DME OD  ERM/VMT component  - S/p Avastin OD x 4  - S/p focal OD  - Stable CME OD today  - Monitor for now - consider Ozurdex OD if worsens    3. Pseudophakia OU    4. VH OS   - improving  Monitor      5. Glaucoma screen      12 month OCT

## 2020-09-10 ENCOUNTER — OFFICE VISIT (OUTPATIENT)
Dept: SURGERY | Facility: CLINIC | Age: 70
End: 2020-09-10
Payer: COMMERCIAL

## 2020-09-10 VITALS
DIASTOLIC BLOOD PRESSURE: 52 MMHG | WEIGHT: 129.88 LBS | HEART RATE: 103 BPM | BODY MASS INDEX: 26.18 KG/M2 | SYSTOLIC BLOOD PRESSURE: 108 MMHG | HEIGHT: 59 IN

## 2020-09-10 DIAGNOSIS — Z17.0 MALIGNANT NEOPLASM OF UPPER-OUTER QUADRANT OF RIGHT BREAST IN FEMALE, ESTROGEN RECEPTOR POSITIVE: Primary | ICD-10-CM

## 2020-09-10 DIAGNOSIS — C50.411 MALIGNANT NEOPLASM OF UPPER-OUTER QUADRANT OF RIGHT BREAST IN FEMALE, ESTROGEN RECEPTOR POSITIVE: Primary | ICD-10-CM

## 2020-09-10 PROCEDURE — 1126F PR PAIN SEVERITY QUANTIFIED, NO PAIN PRESENT: ICD-10-PCS | Mod: S$GLB,,, | Performed by: SURGERY

## 2020-09-10 PROCEDURE — 1101F PT FALLS ASSESS-DOCD LE1/YR: CPT | Mod: CPTII,S$GLB,, | Performed by: SURGERY

## 2020-09-10 PROCEDURE — 1159F MED LIST DOCD IN RCRD: CPT | Mod: S$GLB,,, | Performed by: SURGERY

## 2020-09-10 PROCEDURE — 99999 PR PBB SHADOW E&M-EST. PATIENT-LVL IV: CPT | Mod: PBBFAC,,, | Performed by: SURGERY

## 2020-09-10 PROCEDURE — 3008F PR BODY MASS INDEX (BMI) DOCUMENTED: ICD-10-PCS | Mod: CPTII,S$GLB,, | Performed by: SURGERY

## 2020-09-10 PROCEDURE — 99214 OFFICE O/P EST MOD 30 MIN: CPT | Mod: S$GLB,,, | Performed by: SURGERY

## 2020-09-10 PROCEDURE — 3008F BODY MASS INDEX DOCD: CPT | Mod: CPTII,S$GLB,, | Performed by: SURGERY

## 2020-09-10 PROCEDURE — 1101F PR PT FALLS ASSESS DOC 0-1 FALLS W/OUT INJ PAST YR: ICD-10-PCS | Mod: CPTII,S$GLB,, | Performed by: SURGERY

## 2020-09-10 PROCEDURE — 1159F PR MEDICATION LIST DOCUMENTED IN MEDICAL RECORD: ICD-10-PCS | Mod: S$GLB,,, | Performed by: SURGERY

## 2020-09-10 PROCEDURE — 99999 PR PBB SHADOW E&M-EST. PATIENT-LVL IV: ICD-10-PCS | Mod: PBBFAC,,, | Performed by: SURGERY

## 2020-09-10 PROCEDURE — 1126F AMNT PAIN NOTED NONE PRSNT: CPT | Mod: S$GLB,,, | Performed by: SURGERY

## 2020-09-10 PROCEDURE — 99214 PR OFFICE/OUTPT VISIT, EST, LEVL IV, 30-39 MIN: ICD-10-PCS | Mod: S$GLB,,, | Performed by: SURGERY

## 2020-09-10 NOTE — H&P (VIEW-ONLY)
[facility]  [facility] [place]  9/10/2020      SUBJECTIVE:   Ms. Darlene Avila is a 69 y.o. year old female with Type II DM who presents today with a newly diagnosed Invasive Ductal Carcinoma of the RIGHT breast.      History of Present Illness: Patient states she never felt this breast mass.  She went to her her OBGYN at [facility] for an annual checkup and he felt a right breast mass and sent her for an MRI on 3/17.  This showed a 2.9 cm area of non-mass enhancement at the lower outer right breast.  A mammogram and ultrasound was ordered for comparison and showed a subtle area of parenchymal enhancement at the lower outer right breast (BIRADS 4).  An MRI guided biopsy was recommended and performed on 3/18 revealing:    Invasive Ductal Carcinoma  Grade 2  ER+ (98.6%)  MT negative  Her2 negative   Ki-67: <10%     Interval History 20  She was started on Arimidex due to COVID-related delay. She presents now for pre op. She is very nervous and anxious about surgery.      GYN History: Patient is  and had a hysterectomy at age 32. Denies any HRT or birth control.     She denies any previous history of cancer.  She denies any nipple discharge or skin changes at the breast.      Past Medical History:   Past Medical History:   Diagnosis Date    Arthritis     Cancer     Cataract     Diabetes mellitus     Diabetic retinopathy     Hypertension     TIA (transient ischemic attack) [id number]     Past Surgical history:   Past Surgical History:   Procedure Laterality Date    CAPSULOTOMY  13    yag left eye    CATARACT EXTRACTION  6/3/2013    right eye    CHOLECYSTECTOMY      HYSTERECTOMY      TOTAL ABDOMINAL HYSTERECTOMY W/ BILATERAL SALPINGOOPHORECTOMY       Social history:   Social History     Socioeconomic History    Marital status: Single     Spouse name: Not on file    Number of children: Not on file    Years of education: Not on file    Highest education level: Not on file    Occupational History    Not on file   Social Needs    Financial resource strain: Not on file    Food insecurity     Worry: Not on file     Inability: Not on file    Transportation needs     Medical: Not on file     Non-medical: Not on file   Tobacco Use    Smoking status: Current Every Day Smoker     Packs/day: 1.50    Smokeless tobacco: Never Used   Substance and Sexual Activity    Alcohol use: No    Drug use: No    Sexual activity: Not Currently   Lifestyle    Physical activity     Days per week: Not on file     Minutes per session: Not on file    Stress: Not on file   Relationships    Social connections     Talks on phone: Not on file     Gets together: Not on file     Attends Advent service: Not on file     Active member of club or organization: Not on file     Attends meetings of clubs or organizations: Not on file     Relationship status: Not on file   Other Topics Concern    Not on file   Social History Narrative    Not on file     Allergies:   Review of patient's allergies indicates:   Allergen Reactions    Iodinated contrast media Hives      Family History: Mother had colon cancer at age 82 and sister had breast cancer at 65.  Patient is unsure if they had genetic testing.      Review of Systems: Denies any unexplained weight loss, bone pain, chest pain, or SOB.  Admits to new headaches over the past week (pateint thinks attributed to stress).     OBJECTIVE:  Vitals:    09/10/20 1441   BP: (!) 108/52   Pulse: 103     Physical Exam:  HEENT: Normocephalic, atraumatic.    Gen: alert and oriented; no acute distress.  Breast: ~3 x 3.5 cm firm mass at the 10 o'clock position of the right breast, N+4 cm; non-tender.  Very fibrocystic and dense breasts bilaterally with thickened tissue at superior and lateral aspects bilaterally.    Axilla:  No adjacent axillary lymphadenopathy bilaterally.    ASSESSMENT:  Ms. Darlene Avila is a 69 y.o. year old female with a newly diagnosed IDC of the RIGHT  breast.    PLAN:     - Plan for R mastectomy with SLNB. Discussed options at length again.  Her breast are very dense and difficult to examine.

## 2020-09-10 NOTE — PROGRESS NOTES
[facility]  [facility] [place]  9/10/2020      SUBJECTIVE:   Ms. Darlene Avila is a 69 y.o. year old female with Type II DM who presents today with a newly diagnosed Invasive Ductal Carcinoma of the RIGHT breast.      History of Present Illness: Patient states she never felt this breast mass.  She went to her her OBGYN at [facility] for an annual checkup and he felt a right breast mass and sent her for an MRI on 3/17.  This showed a 2.9 cm area of non-mass enhancement at the lower outer right breast.  A mammogram and ultrasound was ordered for comparison and showed a subtle area of parenchymal enhancement at the lower outer right breast (BIRADS 4).  An MRI guided biopsy was recommended and performed on 3/18 revealing:    Invasive Ductal Carcinoma  Grade 2  ER+ (98.6%)  [place] negative  Her2 negative   Ki-67: <10%     Interval History 20  She was started on Arimidex due to COVID-related delay. She presents now for pre op. She is very nervous and anxious about surgery.      GYN History: Patient is  and had a hysterectomy at age 32. Denies any HRT or birth control.     She denies any previous history of cancer.  She denies any nipple discharge or skin changes at the breast.      Past Medical History:   Past Medical History:   Diagnosis Date    Arthritis     Cancer     Cataract     Diabetes mellitus     Diabetic retinopathy     Hypertension     TIA (transient ischemic attack) [id number]     Past Surgical history:   Past Surgical History:   Procedure Laterality Date    CAPSULOTOMY  13    yag left eye    CATARACT EXTRACTION  6/3/2013    right eye    CHOLECYSTECTOMY      HYSTERECTOMY      TOTAL ABDOMINAL HYSTERECTOMY W/ BILATERAL SALPINGOOPHORECTOMY       Social history:   Social History     Socioeconomic History    Marital status: Single     Spouse name: Not on file    Number of children: Not on file    Years of education: Not on file    Highest education level: Not on file    Occupational History    Not on file   Social Needs    Financial resource strain: Not on file    Food insecurity     Worry: Not on file     Inability: Not on file    Transportation needs     Medical: Not on file     Non-medical: Not on file   Tobacco Use    Smoking status: Current Every Day Smoker     Packs/day: 1.50    Smokeless tobacco: Never Used   Substance and Sexual Activity    Alcohol use: No    Drug use: No    Sexual activity: Not Currently   Lifestyle    Physical activity     Days per week: Not on file     Minutes per session: Not on file    Stress: Not on file   Relationships    Social connections     Talks on phone: Not on file     Gets together: Not on file     Attends [employer] service: Not on file     Active member of club or organization: Not on file     Attends meetings of clubs or organizations: Not on file     Relationship status: Not on file   Other Topics Concern    Not on file   Social History Narrative    Not on file     Allergies:   Review of patient's allergies indicates:   Allergen Reactions    Iodinated contrast media Hives      Family History: Mother had colon cancer at age 82 and sister had breast cancer at 65.  Patient is unsure if they had genetic testing.      Review of Systems: Denies any unexplained weight loss, bone pain, chest pain, or SOB.  Admits to new headaches over the past week (pateint thinks attributed to stress).     OBJECTIVE:  Vitals:    09/10/20 1441   BP: (!) 108/52   Pulse: 103     Physical Exam:  HEENT: Normocephalic, atraumatic.    Gen: alert and oriented; no acute distress.  Breast: ~3 x 3.5 cm firm mass at the 10 o'clock position of the right breast, N+4 cm; non-tender.  Very fibrocystic and dense breasts bilaterally with thickened tissue at superior and lateral aspects bilaterally.    Axilla:  No adjacent axillary lymphadenopathy bilaterally.    ASSESSMENT:  Ms. Darlene Avila is a 69 y.o. year old female with a newly diagnosed IDC of the RIGHT  breast.    PLAN:     - Plan for R mastectomy with SLNB. Discussed options at length again.  Her breast are very dense and difficult to examine.

## 2020-09-15 RX ORDER — SODIUM CHLORIDE 9 MG/ML
INJECTION, SOLUTION INTRAVENOUS CONTINUOUS
Status: CANCELLED | OUTPATIENT
Start: 2020-09-15

## 2020-09-18 ENCOUNTER — TELEPHONE (OUTPATIENT)
Dept: SURGERY | Facility: CLINIC | Age: 70
End: 2020-09-18

## 2020-09-18 NOTE — TELEPHONE ENCOUNTER
Spoke to patient and gave her instructions to stop her plavix on Sunday 9/19/20, 5 days before her surgery and to restart it 24 hours after her surgery per Odilia Villar NP, [facility].  Patient verbalized understanding.

## 2020-09-21 ENCOUNTER — TELEPHONE (OUTPATIENT)
Dept: HEMATOLOGY/ONCOLOGY | Facility: CLINIC | Age: 70
End: 2020-09-21

## 2020-09-21 ENCOUNTER — OFFICE VISIT (OUTPATIENT)
Dept: HEMATOLOGY/ONCOLOGY | Facility: CLINIC | Age: 70
End: 2020-09-21
Payer: COMMERCIAL

## 2020-09-21 VITALS
RESPIRATION RATE: 18 BRPM | DIASTOLIC BLOOD PRESSURE: 71 MMHG | TEMPERATURE: 98 F | HEIGHT: 59 IN | BODY MASS INDEX: 26.27 KG/M2 | HEART RATE: 103 BPM | SYSTOLIC BLOOD PRESSURE: 142 MMHG | WEIGHT: 130.31 LBS | OXYGEN SATURATION: 100 %

## 2020-09-21 DIAGNOSIS — C50.411 MALIGNANT NEOPLASM OF UPPER-OUTER QUADRANT OF RIGHT BREAST IN FEMALE, ESTROGEN RECEPTOR POSITIVE: Primary | ICD-10-CM

## 2020-09-21 DIAGNOSIS — I10 ESSENTIAL HYPERTENSION: ICD-10-CM

## 2020-09-21 DIAGNOSIS — Z17.0 MALIGNANT NEOPLASM OF UPPER-OUTER QUADRANT OF RIGHT BREAST IN FEMALE, ESTROGEN RECEPTOR POSITIVE: Primary | ICD-10-CM

## 2020-09-21 DIAGNOSIS — Z79.811 USE OF AROMATASE INHIBITORS: ICD-10-CM

## 2020-09-21 PROCEDURE — 1101F PR PT FALLS ASSESS DOC 0-1 FALLS W/OUT INJ PAST YR: ICD-10-PCS | Mod: CPTII,S$GLB,, | Performed by: INTERNAL MEDICINE

## 2020-09-21 PROCEDURE — 99213 OFFICE O/P EST LOW 20 MIN: CPT | Mod: S$GLB,,, | Performed by: INTERNAL MEDICINE

## 2020-09-21 PROCEDURE — 3008F BODY MASS INDEX DOCD: CPT | Mod: CPTII,S$GLB,, | Performed by: INTERNAL MEDICINE

## 2020-09-21 PROCEDURE — 1126F AMNT PAIN NOTED NONE PRSNT: CPT | Mod: S$GLB,,, | Performed by: INTERNAL MEDICINE

## 2020-09-21 PROCEDURE — 3008F PR BODY MASS INDEX (BMI) DOCUMENTED: ICD-10-PCS | Mod: CPTII,S$GLB,, | Performed by: INTERNAL MEDICINE

## 2020-09-21 PROCEDURE — 99999 PR PBB SHADOW E&M-EST. PATIENT-LVL IV: ICD-10-PCS | Mod: PBBFAC,,, | Performed by: INTERNAL MEDICINE

## 2020-09-21 PROCEDURE — 99213 PR OFFICE/OUTPT VISIT, EST, LEVL III, 20-29 MIN: ICD-10-PCS | Mod: S$GLB,,, | Performed by: INTERNAL MEDICINE

## 2020-09-21 PROCEDURE — 1159F MED LIST DOCD IN RCRD: CPT | Mod: S$GLB,,, | Performed by: INTERNAL MEDICINE

## 2020-09-21 PROCEDURE — 99999 PR PBB SHADOW E&M-EST. PATIENT-LVL IV: CPT | Mod: PBBFAC,,, | Performed by: INTERNAL MEDICINE

## 2020-09-21 PROCEDURE — 1126F PR PAIN SEVERITY QUANTIFIED, NO PAIN PRESENT: ICD-10-PCS | Mod: S$GLB,,, | Performed by: INTERNAL MEDICINE

## 2020-09-21 PROCEDURE — 1159F PR MEDICATION LIST DOCUMENTED IN MEDICAL RECORD: ICD-10-PCS | Mod: S$GLB,,, | Performed by: INTERNAL MEDICINE

## 2020-09-21 PROCEDURE — 1101F PT FALLS ASSESS-DOCD LE1/YR: CPT | Mod: CPTII,S$GLB,, | Performed by: INTERNAL MEDICINE

## 2020-09-21 RX ORDER — CHOLECALCIFEROL (VITAMIN D3) 25 MCG
1000 TABLET ORAL DAILY
Status: ON HOLD | COMMUNITY
End: 2022-06-20

## 2020-09-21 NOTE — TELEPHONE ENCOUNTER
----- Message from Benita Barcenas MD sent at 9/21/2020  4:33 PM CDT -----  See DR Richard to review pathology in about 3.5 weeks.

## 2020-09-21 NOTE — PROGRESS NOTES
History:     Reason For Follow up:   1. Stage I (T2N0M0) invasive ductal carcinoma of right breast, upper outer quadrant, ER 99%, NV neg, Her2 neg, Grade 2, Ki67 < 10%    HPI:   Darlene Avila presents for follow up of breast cancer. She is on neoadjuvant Arimidex with plans for surgery on Friday. She is nervious about surgery. Tolerates Arimidex well without complaints. Is owrking on quitting smoking.     Oncologic History:  Presentation  - 3/2020 - presented for annual GYN check up and right breast mass palpated   - 3/11/2020 - MRI breasts- Regional non mass enhancement in approximately the 9 upper outer quadrant of the right breast measuring approximately 3 cm in length.  No adenopathy. Left breast benign  - 3/17/2020 - Right breast US showed numerous hypoechoic areas were measured in the right breast.  The largest in the right breast at 8-9 o'clock 7 cm from the nipple area of palpable measures approximately 4.8 cm.  Characterization is markedly limited due to the  dependent nature of ultrasound and these could reflect dense breast tissue. noting that no masses were detected on previous MRI.  No definite correlate is seen for the MRI non mass enhancement.  - 3/18/2020 - Biopsy - Grade 2 IDC, estrogen receptor 99%, progesterone receptor neg, Her2 tello neg, Ki67 < 10%.   Surgery consultation with Dr Clark on 4/2; Oncotype sent  Medical oncology consultation on 4/6/2020   - 4/6/2020 - started Arimidex.     History: I reviewed, confirmed and updated history (past medical, social, family) as necessary.    Medications    Current Outpatient Medications:     amitriptyline (ELAVIL) 100 MG tablet, Take 1 tablet by mouth every evening. , Disp: , Rfl:     anastrozole (ARIMIDEX) 1 mg Tab, Take 1 tablet (1 mg total) by mouth once daily., Disp: 90 tablet, Rfl: 3    aspirin (ECOTRIN) 81 MG EC tablet, Take 81 mg by mouth once daily., Disp: , Rfl:     calcium carbonate (OS-JABIER) 600 mg calcium (1,500 mg) Tab, Take  600 mg by mouth once., Disp: , Rfl:     citalopram (CELEXA) 10 MG tablet, Take 1 tablet by mouth once daily. , Disp: , Rfl:     clopidogrel (PLAVIX) 75 mg tablet, Take 75 mg by mouth once daily. , Disp: , Rfl:     insulin lispro protamin-lispro (HUMALOG MIX 75-25 KWIKPEN) 100 unit/mL (75-25) InPn, Inject 10 Units into the skin 2 (two) times a day. Inject 16 units every morning and 18 units every evening, Disp: , Rfl:     midodrine (PROAMATINE) 2.5 MG Tab, Take 1 tablet (2.5 mg total) by mouth every 12 (twelve) hours., Disp: 60 tablet, Rfl: 0    multivitamin capsule, Take 1 capsule by mouth once daily., Disp: , Rfl:     pravastatin (PRAVACHOL) 10 MG tablet, Take 10 mg by mouth once daily. , Disp: , Rfl:     vitamin D (VITAMIN D3) 1000 units Tab, Take 1,000 Units by mouth once daily., Disp: , Rfl:     diphenoxylate-atropine 2.5-0.025 mg (LOMOTIL) 2.5-0.025 mg per tablet, 1 tablet Oral Three times a day  As needed (Patient not taking: Reported on 9/21/2020), Disp: , Rfl:   Allergies  Review of patient's allergies indicates:   Allergen Reactions    Iodinated contrast media Hives     Review of Systems  Review of Systems   Constitutional: Negative for chills, diaphoresis, fever, malaise/fatigue and weight loss.   Respiratory: Negative for cough, hemoptysis, sputum production, shortness of breath and wheezing.    Gastrointestinal: Negative for abdominal pain, nausea and vomiting.   Musculoskeletal: Negative for joint pain.   Skin: Negative for itching and rash.   Neurological: Negative for dizziness, tingling, weakness and headaches.          Objective     Objective:     Vitals:    09/21/20 1604   BP: (!) 142/71   Pulse: 103   Resp: 18   Temp: 98 °F (36.7 °C)     Physical Exam  Vitals signs and nursing note reviewed.   Constitutional:       General: She is not in acute distress.     Appearance: Normal appearance. She is well-developed.   HENT:      Head: Normocephalic and atraumatic.      Mouth/Throat:      Mouth:  No oral lesions.   Eyes:      General: No scleral icterus.        Right eye: No discharge.         Left eye: No discharge.      Conjunctiva/sclera: Conjunctivae normal.   Neck:      Musculoskeletal: Neck supple.   Cardiovascular:      Rate and Rhythm: Normal rate and regular rhythm.      Heart sounds: Normal heart sounds. No murmur.   Pulmonary:      Effort: Pulmonary effort is normal. No respiratory distress.      Breath sounds: Normal breath sounds. No wheezing, rhonchi or rales.      Comments: Right breast with 3 x 3.5 cm firm mass at the 10 o'clock position of the right breast, N+4 cm; dense breast  Chest:      Chest wall: There is no dullness to percussion.   Abdominal:      General: Bowel sounds are normal.      Palpations: Abdomen is soft.      Tenderness: There is no abdominal tenderness.   Skin:     General: Skin is warm and dry.      Coloration: Skin is not pale.   Neurological:      Mental Status: She is alert and oriented to person, place, and time.   Psychiatric:         Behavior: Behavior normal.         Thought Content: Thought content normal.             Labs and Imaging  Results for orders placed or performed during the hospital encounter of 07/22/20   Urine culture    Specimen: Urine   Result Value Ref Range    Urine Culture, Routine No growth    CBC W/ AUTO DIFFERENTIAL   Result Value Ref Range    WBC 13.38 (H) 3.90 - 12.70 K/uL    RBC 4.80 4.00 - 5.40 M/uL    Hemoglobin 12.4 12.0 - 16.0 g/dL    Hematocrit 40.2 37.0 - 48.5 %    Mean Corpuscular Volume 84 82 - 98 fL    Mean Corpuscular Hemoglobin 25.8 (L) 27.0 - 31.0 pg    Mean Corpuscular Hemoglobin Conc 30.8 (L) 32.0 - 36.0 g/dL    RDW 14.0 11.5 - 14.5 %    Platelets 391 (H) 150 - 350 K/uL    MPV 8.9 (L) 9.2 - 12.9 fL    Immature Granulocytes 0.2 0.0 - 0.5 %    Gran # (ANC) 8.9 (H) 1.8 - 7.7 K/uL    Immature Grans (Abs) 0.03 0.00 - 0.04 K/uL    Lymph # 3.7 1.0 - 4.8 K/uL    Mono # 0.7 0.3 - 1.0 K/uL    Eos # 0.0 0.0 - 0.5 K/uL    Analisa # 0.05 0.00  - 0.20 K/uL    nRBC 0 0 /100 WBC    Gran% 66.3 38.0 - 73.0 %    Lymph% 27.4 18.0 - 48.0 %    Mono% 5.5 4.0 - 15.0 %    Eosinophil% 0.2 0.0 - 8.0 %    Basophil% 0.4 0.0 - 1.9 %    Differential Method Automated    Comprehensive metabolic panel   Result Value Ref Range    Sodium 141 136 - 145 mmol/L    Potassium 3.7 3.5 - 5.1 mmol/L    Chloride 101 95 - 110 mmol/L    CO2 27 23 - 29 mmol/L    Glucose 62 (L) 70 - 110 mg/dL    BUN, Bld 24 (H) 8 - 23 mg/dL    Creatinine 1.2 0.5 - 1.4 mg/dL    Calcium 9.7 8.7 - 10.5 mg/dL    Total Protein 6.8 6.0 - 8.4 g/dL    Albumin 3.4 (L) 3.5 - 5.2 g/dL    Total Bilirubin 0.3 0.1 - 1.0 mg/dL    Alkaline Phosphatase 125 55 - 135 U/L    AST 18 10 - 40 U/L    ALT 16 10 - 44 U/L    Anion Gap 13 8 - 16 mmol/L    eGFR if African American 53 (A) >60 mL/min/1.73 m^2    eGFR if non African American 46 (A) >60 mL/min/1.73 m^2   Protime-INR   Result Value Ref Range    Prothrombin Time 10.0 9.0 - 12.5 sec    INR 0.9 0.8 - 1.2   TSH   Result Value Ref Range    TSH 1.036 0.400 - 4.000 uIU/mL   APTT   Result Value Ref Range    aPTT 26.0 21.0 - 32.0 sec   Troponin I   Result Value Ref Range    Troponin I 0.031 (H) 0.000 - 0.026 ng/mL   COVID-19 Rapid Screening   Result Value Ref Range    SARS-CoV-2 RNA, Amplification, Qual Negative Negative   Osmolality, urine   Result Value Ref Range    Osmolality, Ur 499 50 - 1200 mOsm/kg   Protein / creatinine ratio, urine   Result Value Ref Range    Protein, Urine Random 11 0 - 15 mg/dL    Creatinine, Random Ur 48.9 15.0 - 325.0 mg/dL    Prot/Creat Ratio, Ur 0.22 (H) 0.00 - 0.20   Sodium, urine, random   Result Value Ref Range    Sodium Urine Random 87 20 - 250 mmol/L   Troponin I   Result Value Ref Range    Troponin I 0.029 (H) 0.000 - 0.026 ng/mL   Urinalysis, Reflex to Urine Culture Urine, Clean Catch    Specimen: Urine   Result Value Ref Range    Specimen UA Urine, Clean Catch     Color, UA Yellow Yellow, Straw, Kirsten    Appearance, UA Clear Clear    pH, UA 6.0  5.0 - 8.0    Specific Gravity, UA 1.015 1.005 - 1.030    Protein, UA Negative Negative    Glucose, UA 3+ (A) Negative    Ketones, UA Negative Negative    Bilirubin (UA) Negative Negative    Occult Blood UA Negative Negative    Nitrite, UA Negative Negative    Urobilinogen, UA Negative <2.0 EU/dL    Leukocytes, UA 1+ (A) Negative   Troponin I   Result Value Ref Range    Troponin I 0.033 (H) 0.000 - 0.026 ng/mL   Urinalysis Microscopic   Result Value Ref Range    WBC, UA >100 (H) 0 - 5 /hpf    WBC Clumps, UA Occasional (A) None-Rare    Bacteria Occasional None-Occ /hpf    Yeast, UA Moderate (A) None    Squam Epithel, UA 1 /hpf    Microscopic Comment SEE COMMENT    CBC auto differential   Result Value Ref Range    WBC 10.42 3.90 - 12.70 K/uL    RBC 4.63 4.00 - 5.40 M/uL    Hemoglobin 11.9 (L) 12.0 - 16.0 g/dL    Hematocrit 38.9 37.0 - 48.5 %    Mean Corpuscular Volume 84 82 - 98 fL    Mean Corpuscular Hemoglobin 25.7 (L) 27.0 - 31.0 pg    Mean Corpuscular Hemoglobin Conc 30.6 (L) 32.0 - 36.0 g/dL    RDW 14.1 11.5 - 14.5 %    Platelets 367 (H) 150 - 350 K/uL    MPV 9.0 (L) 9.2 - 12.9 fL    Immature Granulocytes 0.3 0.0 - 0.5 %    Gran # (ANC) 5.0 1.8 - 7.7 K/uL    Immature Grans (Abs) 0.03 0.00 - 0.04 K/uL    Lymph # 4.6 1.0 - 4.8 K/uL    Mono # 0.7 0.3 - 1.0 K/uL    Eos # 0.1 0.0 - 0.5 K/uL    Baso # 0.05 0.00 - 0.20 K/uL    nRBC 0 0 /100 WBC    Gran% 47.8 38.0 - 73.0 %    Lymph% 43.7 18.0 - 48.0 %    Mono% 6.6 4.0 - 15.0 %    Eosinophil% 1.1 0.0 - 8.0 %    Basophil% 0.5 0.0 - 1.9 %    Differential Method Automated    Troponin I   Result Value Ref Range    Troponin I 0.024 0.000 - 0.026 ng/mL   Basic metabolic panel   Result Value Ref Range    Sodium 139 136 - 145 mmol/L    Potassium 3.6 3.5 - 5.1 mmol/L    Chloride 105 95 - 110 mmol/L    CO2 24 23 - 29 mmol/L    Glucose 71 70 - 110 mg/dL    BUN, Bld 17 8 - 23 mg/dL    Creatinine 0.8 0.5 - 1.4 mg/dL    Calcium 9.0 8.7 - 10.5 mg/dL    Anion Gap 10 8 - 16 mmol/L    eGFR  if African American >60 >60 mL/min/1.73 m^2    eGFR if non African American >60 >60 mL/min/1.73 m^2   Troponin I   Result Value Ref Range    Troponin I 0.026 0.000 - 0.026 ng/mL   Lipid Panel   Result Value Ref Range    Cholesterol 106 (L) 120 - 199 mg/dL    Triglycerides 51 30 - 150 mg/dL    HDL 54 40 - 75 mg/dL    LDL Cholesterol 41.8 (L) 63.0 - 159.0 mg/dL    Hdl/Cholesterol Ratio 50.9 (H) 20.0 - 50.0 %    Total Cholesterol/HDL Ratio 2.0 2.0 - 5.0    Non-HDL Cholesterol 52 mg/dL   Hemoglobin A1C   Result Value Ref Range    Hemoglobin A1C 13.1 (H) 4.0 - 5.6 %    Estimated Avg Glucose 329 (H) 68 - 131 mg/dL   Basic metabolic panel   Result Value Ref Range    Sodium 137 136 - 145 mmol/L    Potassium 4.4 3.5 - 5.1 mmol/L    Chloride 104 95 - 110 mmol/L    CO2 23 23 - 29 mmol/L    Glucose 263 (H) 70 - 110 mg/dL    BUN, Bld 15 8 - 23 mg/dL    Creatinine 0.8 0.5 - 1.4 mg/dL    Calcium 9.0 8.7 - 10.5 mg/dL    Anion Gap 10 8 - 16 mmol/L    eGFR if African American >60 >60 mL/min/1.73 m^2    eGFR if non African American >60 >60 mL/min/1.73 m^2   Cortisol   Result Value Ref Range    Cortisol 4.10 ug/dL   ACTH   Result Value Ref Range    ACTH 12 0 - 46 pg/mL   Renin   Result Value Ref Range    Renin Activity 2.1 ng/mL/h   Cortisol   Result Value Ref Range    Cortisol 19.70 ug/dL   Vitamin B12   Result Value Ref Range    Vitamin B-12 728 210 - 950 pg/mL   TSH   Result Value Ref Range    TSH 0.689 0.400 - 4.000 uIU/mL   Cortisol   Result Value Ref Range    Cortisol 25.50 ug/dL   Cortisol   Result Value Ref Range    Cortisol 28.40 ug/dL   Echo Color Flow Doppler? Yes   Result Value Ref Range    Ascending aorta 2.59 cm    STJ 2.65 cm    AV mean gradient 3 mmHg    Ao peak anne 1.12 m/s    Ao VTI 23.50 cm    IVRT 127.50 msec    IVS 1.34 (A) 0.6 - 1.1 cm    LA size 2.80 cm    Left Atrium Major Axis 3.49 cm    Left Atrium Minor Axis 4.78 cm    LVIDd 3.05 (A) 3.5 - 6.0 cm    LVIDs 1.50 (A) 2.1 - 4.0 cm    LVOT diameter 1.89 cm    LVOT  peak VTI 19.46 cm    Posterior Wall 1.25 (A) 0.6 - 1.1 cm    PV Peak D Bull 0.51 m/s    PV Peak S Bull 0.45 m/s    RA Major Axis 4.04 cm    RA Width 3.39 cm    RVDD 2.17 cm    Sinus 2.97 cm    TAPSE 2.09 cm    TR Max Bull 2.81 m/s    LA WIDTH 3.40 cm    PV PEAK VELOCITY 1.07 cm/s    LV Diastolic Volume 36.47 mL    LV Systolic Volume 6.10 mL    RV S' 14.26 cm/s    LVOT peak bull 0.95 m/s    FS 51 %    LA volume 32.65 cm3    LV mass 126.26 g    Left Ventricle Relative Wall Thickness 0.82 cm    AV valve area 2.32 cm2    AV Velocity Ratio 0.85     AV index (prosthetic) 0.83     Pulm vein S/D ratio 0.88     LVOT area 2.8 cm2    LVOT stroke volume 54.57 cm3    AV peak gradient 5 mmHg    LV Systolic Volume Index 4.1 mL/m2    LV Diastolic Volume Index 24.22 mL/m2    LA Volume Index 21.7 mL/m2    LV Mass Index 84 g/m2    Triscuspid Valve Regurgitation Peak Gradient 32 mmHg    BSA 1.53 m2    Right Atrial Pressure (from IVC) 3 mmHg    TV rest pulmonary artery pressure 35 mmHg    LA Volume Index (Mod) 16.6 mL/m2    LA volume (mod) 25.00 cm3    QEF 83 %   POCT glucose   Result Value Ref Range    POCT Glucose 67 (L) 70 - 110 mg/dL   POCT glucose   Result Value Ref Range    POCT Glucose 80 70 - 110 mg/dL   POCT glucose   Result Value Ref Range    POCT Glucose 308 (H) 70 - 110 mg/dL   POCT glucose   Result Value Ref Range    POCT Glucose 109 70 - 110 mg/dL   POCT glucose   Result Value Ref Range    POCT Glucose 310 (H) 70 - 110 mg/dL   POCT glucose   Result Value Ref Range    POCT Glucose 250 (H) 70 - 110 mg/dL   POCT glucose   Result Value Ref Range    POCT Glucose 227 (H) 70 - 110 mg/dL   POCT glucose   Result Value Ref Range    POCT Glucose 325 (H) 70 - 110 mg/dL   POCT glucose   Result Value Ref Range    POCT Glucose 256 (H) 70 - 110 mg/dL       Breast pathology and imaging as above.     Assessment     Assessment:     1. Stage I (T2N0M0) invasive ductal carcinoma of right breast, upper outer quadrant, ER 99%, IL neg, Her2 neg,  Grade 2, Ki67 < 10%, low risk Oncotype   * 3/2020 - started neoadjuvant Arimidex due to coronavirus   * Planning mastectomy with Dr Clark this week. Will follow up 3 wks post-op to review pathology.     2. Comorbidities including diabetes,   3. Tobacco cessation. I counseled patient on tobacco cessation for 4 minutes. We discussed the negative health impact of tobacco, the importance stopping cigarette use and methods for which to stop. Patient is interested in cessation.      Plan:     1. Surgery this week   2. Continue Arimidex.   3. Continue calcium/vitamin D  4. RTC to review pathology and finalize adjuvant therapy plan.    Wants female MD upon my departure.       I asked the patient to call for any questions, concerns, or new symptoms.

## 2020-09-22 ENCOUNTER — HOSPITAL ENCOUNTER (OUTPATIENT)
Dept: PREADMISSION TESTING | Facility: OTHER | Age: 70
Discharge: HOME | End: 2020-09-22
Attending: SURGERY
Payer: COMMERCIAL

## 2020-09-22 DIAGNOSIS — Z01.818 PREOP TESTING: ICD-10-CM

## 2020-09-22 PROCEDURE — U0003 INFECTIOUS AGENT DETECTION BY NUCLEIC ACID (DNA OR RNA); SEVERE ACUTE RESPIRATORY SYNDROME CORONAVIRUS 2 (SARS-COV-2) (CORONAVIRUS DISEASE [COVID-19]), AMPLIFIED PROBE TECHNIQUE, MAKING USE OF HIGH THROUGHPUT TECHNOLOGIES AS DESCRIBED BY CMS-2020-01-R: HCPCS

## 2020-09-23 LAB — SARS-COV-2 RNA RESP QL NAA+PROBE: NOT DETECTED

## 2020-09-23 NOTE — PRE ADMISSION SCREENING
Clearance from Dr. Serra in media reviewed by Dr. Ingram. Patient last dose of plavix was 9/19. No new orders.

## 2020-09-23 NOTE — PRE ADMISSION SCREENING
Pre admit phone call completed.    Instructions given to patient about NPO status as follows:     The evening before surgery do not eat anything after 9 p.m. ( this includes hard candy, chewing gum and mints).  You may only have GATORADE, POWERADE AND WATER from 9 p.m. until you leave your home. DO NOT  DRINK ANY LIQUIDS ON THE WAY TO THE HOSPITAL.      Patient was also instructed on the below information:    Park in the Parking lot behind the hospital or in the [facility] across the street from the parking lot.  Parking is complimentary.  If you will be discharged the same day as your procedure, please arrange for a responsible adult to drive you home or  to accompany you if traveling by taxi.  YOU WILL NOT BE PERMITTED TO DRIVE OR TO LEAVE THE HOSPITAL ALONE AFTER SURGERY.  It is strongly recommended that you arrange for someone to remain with you for the first 24 hrs following your surgery.    Patient verbalized understanding of above instructions.      Last dose of PLAVIX was 9/19/2020.

## 2020-09-24 ENCOUNTER — TELEPHONE (OUTPATIENT)
Dept: SURGERY | Facility: CLINIC | Age: 70
End: 2020-09-24

## 2020-09-24 NOTE — TELEPHONE ENCOUNTER
Spoke to patient to give surgery arrival time of 1130 tomorrow.  Covid test results negative.  Patient had no other concerns at this time

## 2020-09-25 ENCOUNTER — HOSPITAL ENCOUNTER (OUTPATIENT)
Facility: OTHER | Age: 70
Discharge: HOME | End: 2020-10-02
Attending: SURGERY | Admitting: HOSPITALIST
Payer: COMMERCIAL

## 2020-09-25 ENCOUNTER — HOSPITAL ENCOUNTER (OUTPATIENT)
Dept: RADIOLOGY | Facility: OTHER | Age: 70
Discharge: HOME | End: 2020-09-25
Attending: SURGERY | Admitting: SURGERY
Payer: COMMERCIAL

## 2020-09-25 ENCOUNTER — ANESTHESIA (OUTPATIENT)
Dept: SURGERY | Facility: OTHER | Age: 70
End: 2020-09-25
Payer: COMMERCIAL

## 2020-09-25 DIAGNOSIS — C50.411 MALIGNANT NEOPLASM OF UPPER-OUTER QUADRANT OF RIGHT BREAST IN FEMALE, ESTROGEN RECEPTOR POSITIVE: ICD-10-CM

## 2020-09-25 DIAGNOSIS — Z51.5 ENCOUNTER FOR PALLIATIVE CARE: ICD-10-CM

## 2020-09-25 DIAGNOSIS — Z17.0 MALIGNANT NEOPLASM OF UPPER-OUTER QUADRANT OF RIGHT BREAST IN FEMALE, ESTROGEN RECEPTOR POSITIVE: ICD-10-CM

## 2020-09-25 DIAGNOSIS — R00.0 TACHYCARDIA: ICD-10-CM

## 2020-09-25 DIAGNOSIS — R92.8 ABNORMAL MAMMOGRAPHY: ICD-10-CM

## 2020-09-25 DIAGNOSIS — E11.65 POORLY CONTROLLED TYPE 2 DIABETES MELLITUS WITH COMPLICATION: Primary | ICD-10-CM

## 2020-09-25 DIAGNOSIS — E11.8 POORLY CONTROLLED TYPE 2 DIABETES MELLITUS WITH COMPLICATION: Primary | ICD-10-CM

## 2020-09-25 PROBLEM — R55 SYNCOPE: Status: RESOLVED | Noted: 2020-07-22 | Resolved: 2020-09-25

## 2020-09-25 LAB
ANION GAP SERPL CALC-SCNC: 9 MMOL/L (ref 8–16)
BASOPHILS # BLD AUTO: 0.07 K/UL (ref 0–0.2)
BASOPHILS NFR BLD: 0.7 % (ref 0–1.9)
BUN SERPL-MCNC: 21 MG/DL (ref 8–23)
CALCIUM SERPL-MCNC: 9.9 MG/DL (ref 8.7–10.5)
CHLORIDE SERPL-SCNC: 101 MMOL/L (ref 95–110)
CO2 SERPL-SCNC: 26 MMOL/L (ref 23–29)
CREAT SERPL-MCNC: 1.1 MG/DL (ref 0.5–1.4)
DIFFERENTIAL METHOD: ABNORMAL
EOSINOPHIL # BLD AUTO: 0.1 K/UL (ref 0–0.5)
EOSINOPHIL NFR BLD: 0.5 % (ref 0–8)
ERYTHROCYTE [DISTWIDTH] IN BLOOD BY AUTOMATED COUNT: 13.7 % (ref 11.5–14.5)
EST. GFR  (AFRICAN AMERICAN): 59 ML/MIN/1.73 M^2
EST. GFR  (NON AFRICAN AMERICAN): 51 ML/MIN/1.73 M^2
GLUCOSE SERPL-MCNC: 347 MG/DL (ref 70–110)
HCT VFR BLD AUTO: 42.8 % (ref 37–48.5)
HGB BLD-MCNC: 13.2 G/DL (ref 12–16)
IMM GRANULOCYTES # BLD AUTO: 0.02 K/UL (ref 0–0.04)
IMM GRANULOCYTES NFR BLD AUTO: 0.2 % (ref 0–0.5)
LYMPHOCYTES # BLD AUTO: 2.8 K/UL (ref 1–4.8)
LYMPHOCYTES NFR BLD: 28.5 % (ref 18–48)
MCH RBC QN AUTO: 26.2 PG (ref 27–31)
MCHC RBC AUTO-ENTMCNC: 30.8 G/DL (ref 32–36)
MCV RBC AUTO: 85 FL (ref 82–98)
MONOCYTES # BLD AUTO: 0.5 K/UL (ref 0.3–1)
MONOCYTES NFR BLD: 5.3 % (ref 4–15)
NEUTROPHILS # BLD AUTO: 6.4 K/UL (ref 1.8–7.7)
NEUTROPHILS NFR BLD: 64.8 % (ref 38–73)
NRBC BLD-RTO: 0 /100 WBC
PHOSPHATE SERPL-MCNC: 2.9 MG/DL (ref 2.7–4.5)
PLATELET # BLD AUTO: 363 K/UL (ref 150–350)
PMV BLD AUTO: 9 FL (ref 9.2–12.9)
POCT GLUCOSE: 294 MG/DL (ref 70–110)
POCT GLUCOSE: 359 MG/DL (ref 70–110)
POCT GLUCOSE: 366 MG/DL (ref 70–110)
POTASSIUM SERPL-SCNC: 4.9 MMOL/L (ref 3.5–5.1)
RBC # BLD AUTO: 5.04 M/UL (ref 4–5.4)
SODIUM SERPL-SCNC: 136 MMOL/L (ref 136–145)
WBC # BLD AUTO: 9.9 K/UL (ref 3.9–12.7)

## 2020-09-25 PROCEDURE — C9399 UNCLASSIFIED DRUGS OR BIOLOG: HCPCS | Performed by: HOSPITALIST

## 2020-09-25 PROCEDURE — 93010 EKG 12-LEAD: ICD-10-PCS | Mod: ,,, | Performed by: INTERNAL MEDICINE

## 2020-09-25 PROCEDURE — 84100 ASSAY OF PHOSPHORUS: CPT

## 2020-09-25 PROCEDURE — 94761 N-INVAS EAR/PLS OXIMETRY MLT: CPT

## 2020-09-25 PROCEDURE — G0378 HOSPITAL OBSERVATION PER HR: HCPCS

## 2020-09-25 PROCEDURE — 36415 COLL VENOUS BLD VENIPUNCTURE: CPT

## 2020-09-25 PROCEDURE — 80048 BASIC METABOLIC PNL TOTAL CA: CPT

## 2020-09-25 PROCEDURE — 99220 PR INITIAL OBSERVATION CARE,LEVL III: ICD-10-PCS | Mod: ,,, | Performed by: HOSPITALIST

## 2020-09-25 PROCEDURE — 83036 HEMOGLOBIN GLYCOSYLATED A1C: CPT

## 2020-09-25 PROCEDURE — 96372 THER/PROPH/DIAG INJ SC/IM: CPT | Mod: 59 | Performed by: HOSPITALIST

## 2020-09-25 PROCEDURE — 85025 COMPLETE CBC W/AUTO DIFF WBC: CPT

## 2020-09-25 PROCEDURE — 93010 ELECTROCARDIOGRAM REPORT: CPT | Mod: ,,, | Performed by: INTERNAL MEDICINE

## 2020-09-25 PROCEDURE — A9520 TC99 TILMANOCEPT DIAG 0.5MCI: HCPCS

## 2020-09-25 PROCEDURE — 63600175 PHARM REV CODE 636 W HCPCS: Performed by: HOSPITALIST

## 2020-09-25 PROCEDURE — 25000003 PHARM REV CODE 250: Performed by: NURSE PRACTITIONER

## 2020-09-25 PROCEDURE — 93005 ELECTROCARDIOGRAM TRACING: CPT

## 2020-09-25 PROCEDURE — 25000003 PHARM REV CODE 250: Performed by: HOSPITALIST

## 2020-09-25 PROCEDURE — 96361 HYDRATE IV INFUSION ADD-ON: CPT | Performed by: HOSPITALIST

## 2020-09-25 PROCEDURE — 99220 PR INITIAL OBSERVATION CARE,LEVL III: CPT | Mod: ,,, | Performed by: HOSPITALIST

## 2020-09-25 RX ORDER — CITALOPRAM 10 MG/1
10 TABLET ORAL DAILY
Status: DISCONTINUED | OUTPATIENT
Start: 2020-09-26 | End: 2020-10-02 | Stop reason: HOSPADM

## 2020-09-25 RX ORDER — INSULIN ASPART 100 [IU]/ML
0-5 INJECTION, SOLUTION INTRAVENOUS; SUBCUTANEOUS
Status: DISCONTINUED | OUTPATIENT
Start: 2020-09-25 | End: 2020-09-27

## 2020-09-25 RX ORDER — AMITRIPTYLINE HYDROCHLORIDE 25 MG/1
100 TABLET, FILM COATED ORAL NIGHTLY
Status: DISCONTINUED | OUTPATIENT
Start: 2020-09-25 | End: 2020-09-26

## 2020-09-25 RX ORDER — IBUPROFEN 200 MG
1 TABLET ORAL DAILY
Status: DISCONTINUED | OUTPATIENT
Start: 2020-09-26 | End: 2020-10-02

## 2020-09-25 RX ORDER — IBUPROFEN 200 MG
16 TABLET ORAL
Status: DISCONTINUED | OUTPATIENT
Start: 2020-09-25 | End: 2020-09-27

## 2020-09-25 RX ORDER — CEFAZOLIN SODIUM 2 G/50ML
2 SOLUTION INTRAVENOUS
Status: DISCONTINUED | OUTPATIENT
Start: 2020-09-25 | End: 2020-09-25 | Stop reason: HOSPADM

## 2020-09-25 RX ORDER — IBUPROFEN 200 MG
24 TABLET ORAL
Status: DISCONTINUED | OUTPATIENT
Start: 2020-09-25 | End: 2020-09-27

## 2020-09-25 RX ORDER — SODIUM CHLORIDE, SODIUM LACTATE, POTASSIUM CHLORIDE, CALCIUM CHLORIDE 600; 310; 30; 20 MG/100ML; MG/100ML; MG/100ML; MG/100ML
INJECTION, SOLUTION INTRAVENOUS CONTINUOUS
Status: DISCONTINUED | OUTPATIENT
Start: 2020-09-25 | End: 2020-09-26

## 2020-09-25 RX ORDER — INSULIN ASPART 100 [IU]/ML
5 INJECTION, SOLUTION INTRAVENOUS; SUBCUTANEOUS
Status: DISCONTINUED | OUTPATIENT
Start: 2020-09-26 | End: 2020-09-26

## 2020-09-25 RX ORDER — SODIUM CHLORIDE 0.9 % (FLUSH) 0.9 %
10 SYRINGE (ML) INJECTION
Status: DISCONTINUED | OUTPATIENT
Start: 2020-09-25 | End: 2020-10-02 | Stop reason: HOSPADM

## 2020-09-25 RX ORDER — SODIUM CHLORIDE 9 MG/ML
INJECTION, SOLUTION INTRAVENOUS CONTINUOUS
Status: DISCONTINUED | OUTPATIENT
Start: 2020-09-25 | End: 2020-09-25

## 2020-09-25 RX ORDER — ENOXAPARIN SODIUM 100 MG/ML
40 INJECTION SUBCUTANEOUS EVERY 24 HOURS
Status: DISCONTINUED | OUTPATIENT
Start: 2020-09-25 | End: 2020-09-27

## 2020-09-25 RX ORDER — ASPIRIN 81 MG/1
81 TABLET ORAL DAILY
Status: DISCONTINUED | OUTPATIENT
Start: 2020-09-26 | End: 2020-09-27

## 2020-09-25 RX ORDER — AMITRIPTYLINE HYDROCHLORIDE 25 MG/1
50 TABLET, FILM COATED ORAL NIGHTLY
Status: DISCONTINUED | OUTPATIENT
Start: 2020-09-25 | End: 2020-09-25

## 2020-09-25 RX ORDER — PRAVASTATIN SODIUM 10 MG/1
10 TABLET ORAL DAILY
Status: DISCONTINUED | OUTPATIENT
Start: 2020-09-26 | End: 2020-10-02 | Stop reason: HOSPADM

## 2020-09-25 RX ORDER — AMITRIPTYLINE HYDROCHLORIDE 25 MG/1
100 TABLET, FILM COATED ORAL NIGHTLY
Status: DISCONTINUED | OUTPATIENT
Start: 2020-09-25 | End: 2020-09-25

## 2020-09-25 RX ORDER — GLUCAGON 1 MG
1 KIT INJECTION
Status: DISCONTINUED | OUTPATIENT
Start: 2020-09-25 | End: 2020-09-27

## 2020-09-25 RX ORDER — DIPHENOXYLATE HYDROCHLORIDE AND ATROPINE SULFATE 2.5; .025 MG/1; MG/1
1 TABLET ORAL 4 TIMES DAILY PRN
Status: DISCONTINUED | OUTPATIENT
Start: 2020-09-25 | End: 2020-10-02 | Stop reason: HOSPADM

## 2020-09-25 RX ADMIN — AMITRIPTYLINE HYDROCHLORIDE 100 MG: 25 TABLET, FILM COATED ORAL at 09:09

## 2020-09-25 RX ADMIN — INSULIN ASPART 3 UNITS: 100 INJECTION, SOLUTION INTRAVENOUS; SUBCUTANEOUS at 09:09

## 2020-09-25 RX ADMIN — SODIUM CHLORIDE, SODIUM LACTATE, POTASSIUM CHLORIDE, AND CALCIUM CHLORIDE: .6; .31; .03; .02 INJECTION, SOLUTION INTRAVENOUS at 07:09

## 2020-09-25 RX ADMIN — ENOXAPARIN SODIUM 40 MG: 100 INJECTION SUBCUTANEOUS at 07:09

## 2020-09-25 RX ADMIN — INSULIN DETEMIR 15 UNITS: 100 INJECTION, SOLUTION SUBCUTANEOUS at 09:09

## 2020-09-25 NOTE — PLAN OF CARE
During routine rounds, assessed pt for spiritual distress, and assured pt aware  of spiritual care available.  While providing reflective listening and compassionate presence, pt's concerns were explored.  Mild distress was reported and presented, indicated by pt's affect, lament(s), request for prayer, and request for spiritual care.  Care intervention included pt requesting and being led in prayer.   Pt reported gratitude for the spiritual wellness check.   Pt reported relational and crow support.  Follow-up was offered upon request, though patient will be discharged soon.

## 2020-09-25 NOTE — OR NURSING
Spoke to both Dr. Jameson and Dr. Clark about pt's .  Instructed to consult hospitalist. Surgery cancelled per Dr. Clark until blood sugar is under control.  Consult order placed by BAKARI Hernandez RN.

## 2020-09-25 NOTE — OR NURSING
Patient transferred to [facility]; ROOM 371, report given to RADHA Turk. Dr Salguero notified.

## 2020-09-26 LAB
ESTIMATED AVG GLUCOSE: 280 MG/DL (ref 68–131)
HBA1C MFR BLD HPLC: 11.4 % (ref 4–5.6)
MAGNESIUM SERPL-MCNC: 1.8 MG/DL (ref 1.6–2.6)
POCT GLUCOSE: 133 MG/DL (ref 70–110)
POCT GLUCOSE: 246 MG/DL (ref 70–110)
POCT GLUCOSE: 329 MG/DL (ref 70–110)
POCT GLUCOSE: 338 MG/DL (ref 70–110)
POCT GLUCOSE: 85 MG/DL (ref 70–110)

## 2020-09-26 PROCEDURE — G0378 HOSPITAL OBSERVATION PER HR: HCPCS

## 2020-09-26 PROCEDURE — 25000003 PHARM REV CODE 250: Performed by: HOSPITALIST

## 2020-09-26 PROCEDURE — 97161 PT EVAL LOW COMPLEX 20 MIN: CPT

## 2020-09-26 PROCEDURE — 99226 PR SUBSEQUENT OBSERVATION CARE,LEVEL III: ICD-10-PCS | Mod: ,,, | Performed by: HOSPITALIST

## 2020-09-26 PROCEDURE — 63600175 PHARM REV CODE 636 W HCPCS: Performed by: HOSPITALIST

## 2020-09-26 PROCEDURE — 96361 HYDRATE IV INFUSION ADD-ON: CPT | Performed by: HOSPITALIST

## 2020-09-26 PROCEDURE — 99226 PR SUBSEQUENT OBSERVATION CARE,LEVEL III: CPT | Mod: ,,, | Performed by: HOSPITALIST

## 2020-09-26 PROCEDURE — 96375 TX/PRO/DX INJ NEW DRUG ADDON: CPT | Performed by: HOSPITALIST

## 2020-09-26 PROCEDURE — S4991 NICOTINE PATCH NONLEGEND: HCPCS | Performed by: HOSPITALIST

## 2020-09-26 PROCEDURE — 83735 ASSAY OF MAGNESIUM: CPT

## 2020-09-26 PROCEDURE — 97165 OT EVAL LOW COMPLEX 30 MIN: CPT

## 2020-09-26 PROCEDURE — 36415 COLL VENOUS BLD VENIPUNCTURE: CPT

## 2020-09-26 PROCEDURE — 96372 THER/PROPH/DIAG INJ SC/IM: CPT | Mod: 59 | Performed by: HOSPITALIST

## 2020-09-26 RX ORDER — AMITRIPTYLINE HYDROCHLORIDE 25 MG/1
75 TABLET, FILM COATED ORAL NIGHTLY
Status: DISCONTINUED | OUTPATIENT
Start: 2020-09-26 | End: 2020-10-02 | Stop reason: HOSPADM

## 2020-09-26 RX ORDER — ONDANSETRON 2 MG/ML
8 INJECTION INTRAMUSCULAR; INTRAVENOUS EVERY 8 HOURS PRN
Status: DISCONTINUED | OUTPATIENT
Start: 2020-09-26 | End: 2020-10-02 | Stop reason: HOSPADM

## 2020-09-26 RX ORDER — INSULIN ASPART 100 [IU]/ML
8 INJECTION, SOLUTION INTRAVENOUS; SUBCUTANEOUS
Status: DISCONTINUED | OUTPATIENT
Start: 2020-09-26 | End: 2020-09-27

## 2020-09-26 RX ADMIN — ASPIRIN 81 MG: 81 TABLET, COATED ORAL at 09:09

## 2020-09-26 RX ADMIN — CITALOPRAM HYDROBROMIDE 10 MG: 10 TABLET, FILM COATED ORAL at 09:09

## 2020-09-26 RX ADMIN — INSULIN ASPART 4 UNITS: 100 INJECTION, SOLUTION INTRAVENOUS; SUBCUTANEOUS at 09:09

## 2020-09-26 RX ADMIN — INSULIN ASPART 2 UNITS: 100 INJECTION, SOLUTION INTRAVENOUS; SUBCUTANEOUS at 12:09

## 2020-09-26 RX ADMIN — ONDANSETRON 8 MG: 2 INJECTION INTRAMUSCULAR; INTRAVENOUS at 12:09

## 2020-09-26 RX ADMIN — Medication 1 PATCH: at 09:09

## 2020-09-26 RX ADMIN — ENOXAPARIN SODIUM 40 MG: 100 INJECTION SUBCUTANEOUS at 06:09

## 2020-09-26 RX ADMIN — INSULIN ASPART 5 UNITS: 100 INJECTION, SOLUTION INTRAVENOUS; SUBCUTANEOUS at 09:09

## 2020-09-26 RX ADMIN — INSULIN ASPART 2 UNITS: 100 INJECTION, SOLUTION INTRAVENOUS; SUBCUTANEOUS at 10:09

## 2020-09-26 RX ADMIN — AMITRIPTYLINE HYDROCHLORIDE 75 MG: 25 TABLET, FILM COATED ORAL at 09:09

## 2020-09-26 RX ADMIN — PRAVASTATIN SODIUM 10 MG: 10 TABLET ORAL at 09:09

## 2020-09-26 RX ADMIN — INSULIN ASPART 8 UNITS: 100 INJECTION, SOLUTION INTRAVENOUS; SUBCUTANEOUS at 12:09

## 2020-09-26 NOTE — HOSPITAL COURSE
Patient is a 69 year-old woman with hypertension, poorly controlled diabetes mellitus type II, prior transient ischemic attack, current tobacco smoker with breast cancer who has had delay in undergoing a mastectomy to COVID pandemic and again now due to hyperglycemia resulting in cancellation of surgery on Friday.  Patient admitted on the hospital under observation to medically optimize her for surgery anticipated on Monday. Despite efforts to adjust her insulin regimen for better glycemic control she continued to have high capillary blood glucose readings intermixed with some low insulin readings.  Patient transferred to the intensive care unit select she was started on continuous insulin infusion to help regulate her diabetes in the perioperative period. See below

## 2020-09-26 NOTE — HPI
Patient is a 69 year-old woman with hypertension, poorly controlled diabetes mellitus type II, prior transient ischemic attack, current tobacco and recently diagnosed with right-sided breast cancer currently on anastrozole with plan for mastectomy today with Dr. Krysta Clark.  Surgery however cancelled due to hyperglycemia. Surgery has already been delay due to COVID pandemic.  Dr. Clark requesting evaluation by hospital medicine.  Patient reports non-compliance with diabetic diet.

## 2020-09-26 NOTE — PROGRESS NOTES
[facility] Hospital Medicine  Progress Note    Patient Name: Darlene Avila  MRN: [id number]  Patient Class: OP- Observation   Admission Date: 9/25/2020  Length of Stay: 0 days  Attending Physician: Hamilton Salguero MD  Primary Care Provider: Kirill Cabrera Iii, MD        Subjective:     Principal Problem:Poorly controlled type 2 diabetes mellitus with complication        HPI:  Patient is a 69 year-old woman with hypertension, poorly controlled diabetes mellitus type II, prior transient ischemic attack, current tobacco and recently diagnosed with right-sided breast cancer currently on anastrozole with plan for mastectomy today with Dr. Krysta Clark.  Surgery however cancelled due to hyperglycemia. Surgery has already been delay due to COVID pandemic.  Dr. Clark requesting evaluation by hospital medicine.  Patient reports non-compliance with diabetic diet.    Overview/Hospital Course:  Patient is a 69 year-old woman with hypertension, poorly controlled diabetes mellitus type II, prior transient ischemic attack, current tobacco smoker with breast cancer who has had delay in undergoing a mastectomy to COVID pandemic and again now due to hyperglycemia resulting in cancellation of surgery on Friday.  Patient admitted on the hospital under observation to medically optimize her for surgery anticipated on Monday.    Interval History:  No acute events overnight.  Capillary blood glucose readings remain high.  Patient reports some nausea this morning.    Review of Systems   Constitutional: Negative for chills and fever.   Respiratory: Negative for shortness of breath and wheezing.    Cardiovascular: Negative for chest pain.   Gastrointestinal: Positive for nausea. Negative for abdominal distention, abdominal pain, constipation, diarrhea and vomiting.   Genitourinary: Negative for dysuria and frequency.   Musculoskeletal: Negative for arthralgias and myalgias.   Neurological: Negative for light-headedness.    Psychiatric/Behavioral: Negative for agitation and confusion.     Objective:     Vital Signs (Most Recent):  Temp: 98.3 °F (36.8 °C) (09/26/20 1223)  Pulse: 96 (09/26/20 1223)  Resp: 18 (09/26/20 1223)  BP: 125/63 (09/26/20 1223)  SpO2: 96 % (09/26/20 1223) Vital Signs (24h Range):  Temp:  [97.9 °F (36.6 °C)-98.7 °F (37.1 °C)] 98.3 °F (36.8 °C)  Pulse:  [] 96  Resp:  [16-20] 18  SpO2:  [96 %-99 %] 96 %  BP: (123-164)/(63-85) 125/63     Weight: 59.6 kg (131 lb 8 oz)  Body mass index is 26.56 kg/m².    Intake/Output Summary (Last 24 hours) at 9/26/2020 1603  Last data filed at 9/26/2020 0600  Gross per 24 hour   Intake 480 ml   Output 700 ml   Net -220 ml      Physical Exam  Constitutional:       General: She is not in acute distress.     Appearance: She is well-developed.   HENT:      Head: Atraumatic.   Eyes:      Conjunctiva/sclera: Conjunctivae normal.   Neck:      Musculoskeletal: Neck supple.   Cardiovascular:      Rate and Rhythm: Normal rate and regular rhythm.      Heart sounds: Normal heart sounds. No murmur.   Pulmonary:      Effort: Pulmonary effort is normal.      Breath sounds: Normal breath sounds. No wheezing.   Abdominal:      General: Bowel sounds are normal. There is no distension.      Palpations: Abdomen is soft.      Tenderness: There is no abdominal tenderness.   Musculoskeletal: Normal range of motion.         General: No deformity.   Neurological:      Mental Status: She is alert and oriented to person, place, and time.         Significant Labs: All pertinent labs within the past 24 hours have been reviewed.    Significant Imaging: I have reviewed all pertinent imaging results/findings within the past 24 hours.      Assessment/Plan:      * Poorly controlled type 2 diabetes mellitus with complication  Poorly controlled diabetes based on hemoglobin A1c of 11.4%.  Increased both long-acting and preprandial insulin today for better glycemic control.  Continue monitor adjust further as  needed to medically optimized prior to surgery anticipated for Monday.    Malignant neoplasm of upper-outer quadrant of right breast in female, estrogen receptor positive  Patient has had delay and surgery related to obtaining medical clearance, COVID-19 pandemic, and now recent cancellation of surgery due to hyperglycemia.  Continue medical optimization prior to anticipated surgery scheduled for Monday.    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    Essential hypertension  Normotensive off blood pressure medications.  Continue to monitor.    Chronic diarrhea  Continue with home regimen.        VTE Risk Mitigation (From admission, onward)         Ordered     enoxaparin injection 40 mg  Every 24 hours      09/25/20 1742     IP VTE HIGH RISK PATIENT  Once      09/25/20 1742     Place sequential compression device  Until discontinued      09/25/20 1207     Place KATHRIN hose  Until discontinued      09/25/20 1207                Hamilton Salguero MD  Department of Hospital Medicine   [facility]

## 2020-09-26 NOTE — PLAN OF CARE
OT evaluation completed with no OT treatment needs identified.  She was ambulatory without gait instability and Independent with basic self-care during assessment.  There are no discharge recommendations or DME needs.  D/C OT.  Violet Barbosa, ScD, LOTR 9/26/2020

## 2020-09-26 NOTE — PLAN OF CARE
SW met with patient at bedside to complete discharge planning assessment.  Patient alert and oriented xs 4.  Patient verified all demographic information on facesheet is correct.  Patient verified PCP is Dr. Kirill Cabrera.  Patient verified primary health insurance is [employer].  Patient with home health and rollator for DME.  Patient signed patient choice disclosure choosing to resume home health with [facility].  Patient with NO POA but has Living Will.  Patient not on dialysis or medication coumadin.  Patient with no 30 day admission.  Patient with no financial issues at this time.  Patient family will provide transportation upon discharge from facility.  Patient independent with ADLs, live alone, drives self.  Patient uses [facility] [address]       09/26/20 0966   Discharge Assessment   Assessment Type Discharge Planning Assessment   Confirmed/corrected address and phone number on facesheet? Yes   Assessment information obtained from? Patient   Communicated expected length of stay with patient/caregiver yes   Prior to hospitilization cognitive status: Alert/Oriented   Prior to hospitalization functional status: Independent   Current cognitive status: Alert/Oriented   Current Functional Status: Independent   Facility Arrived From: home   Lives With alone   Able to Return to Prior Arrangements yes   Is patient able to care for self after discharge? Yes   Patient's perception of discharge disposition home health   Readmission Within the Last 30 Days no previous admission in last 30 days   Patient currently being followed by outpatient case management? No   Patient currently receives any other outside agency services? Yes   Name and contact number of agency or person providing outside services [facility]   Is it the patient/care giver preference to resume care with the current outside agency? Yes   Equipment Currently Used at Home rollator   Do you have any problems  affording any of your prescribed medications? No   Is the patient taking medications as prescribed? yes   Does the patient have transportation home? Yes   Transportation Anticipated family or friend will provide   Does the patient receive services at the [facility]? No   Discharge Plan A Home Health   Discharge Plan B Home Health   DME Needed Upon Discharge  none   Patient/Family in Agreement with Plan yes

## 2020-09-26 NOTE — PT/OT/SLP EVAL
"Occupational Therapy   Evaluation and Discharge Note    Name: Darlene Avila  MRN: [id number]  Admitting Diagnosis:  Poorly controlled type 2 diabetes mellitus with complication 1 Day Post-Op    Recommendations:     Discharge Recommendations: home  Discharge Equipment Recommendations:  none  Barriers to discharge:  None    Assessment:     Darlene Avila is a 69 y.o. female with a medical diagnosis of Poorly controlled type 2 diabetes mellitus with complication. At this time, patient is functioning at their prior level of function and does not require further acute OT services.     Plan:     During this hospitalization, patient does not require further acute OT services.  Please re-consult if situation changes.    · Plan of Care Reviewed with: patient    Subjective     Chief Complaint: none  Patient/Family Comments/goals: stabilize "blood sugar" so she can have hr planned surgery    Occupational Profile:  Living Environment: lives alone in a single story house with 4 ASHLEY and (B)HR at entry  Previous level of function: ambulatory without AD, Independent ADLs and IADLs  Roles and Routines: none reported  Equipment Used at home:  none  Assistance upon Discharge: her sister can help at discharge if needed    Pain/Comfort:  · Pain Rating 1: 0/10  · Pain Rating Post-Intervention 1: 0/10    Patients cultural, spiritual, [employer]aritan conflicts given the current situation: no    Objective:     Communicated with: patient, PT, and patient's nurse prior to session.  Patient found standing EOB looking for her slipper with peripheral IV upon OT entry to room.  She was agreeable to the OT evaluation.    General Precautions: Standard, diabetic   Orthopedic Precautions:N/A   Braces: N/A     Occupational Performance:    Bed Mobility:    · None    Functional Mobility/Transfers:  · Independent sit><stand  · Independent toilet transfer  ·   · Functional Mobility: ambulated EOB><bathroom independently without AD or assist, nn gait instability " or LOB noted.    Activities of Daily Living:  · Independent G/H (mouth wash, brush tongue-gums, clean dentures, wash face and hands) standing at sink  · Independent UBD (don hospital gown as robe) standing EOB   · Independent LBD (don socks) sitting EOB  · Declined toilet use    Cognitive/Visual Perceptual:  Cognitive/Psychosocial Skills:  -       Oriented to: Person, Place, Time and Situation   -       Follows Commands/attention:Follows multistep  commands  -       Communication: clear/fluent  -       Memory: No Deficits noted  -       Safety awareness/insight to disability: intact   -       Mood/Affect/Coping skills/emotional control: Appropriate to situation  Visual/Perceptual:      -Intact dentures    Physical Exam:  Balance:    -       WFL  Postural examination/scapula alignment:    -       No postural abnormalities identified  Skin integrity: Visible skin intact  Edema:  None noted  Sensation:    -       Intact for light touch both hands  Motor Planning:    -       WFL  Dominant hand:    -       Right  UE ROM: WNL BUE  UE Strength: 4/5 BUE  Hand Function: WNL for  strength and dexterity both hands    AMPA 6 Click ADL:  AMPAC Total Score: 24     Treatment/Education:  Instruction on use of remote call button device to run the TV  Education:    Patient left standing EOB with call button in reach and nurs notified    GOALS:   Multidisciplinary Problems     Occupational Therapy Goals     Not on file          Multidisciplinary Problems (Resolved)        Problem: Occupational Therapy Goal    Goal Priority Disciplines Outcome Interventions   Occupational Therapy Goal   (Resolved)     OT, PT/OT Met    Description: No goals                   History:     Past Medical History:   Diagnosis Date    Arthritis     Cancer     Cataract     Diabetes mellitus     Diabetic retinopathy     Hypertension     TIA (transient ischemic attack) [id number]       Past Surgical History:   Procedure Laterality Date    CAPSULOTOMY   6/26/13    yag left eye    CATARACT EXTRACTION  6/3/2013    right eye    CHOLECYSTECTOMY      HYSTERECTOMY      TOTAL ABDOMINAL HYSTERECTOMY W/ BILATERAL SALPINGOOPHORECTOMY         Time Tracking:     OT Date of Treatment: 09/26/20  OT Start Time: 0902  OT Stop Time: 0924  OT Total Time (min): 22 min    Billable Minutes:Evaluation 22    Violet Barbosa, Mya, LOTR  9/26/2020

## 2020-09-26 NOTE — PLAN OF CARE
Plan of care reviewed with patient. Pt remains free from fall, injury, and skin breakdown. Positions self independently. Blood glucose monitoring. Education provided about diet, Pt verbalized understanding. Purposeful hourly rounding done. Safety maintained. Patient lying in bed in no distress.

## 2020-09-26 NOTE — PLAN OF CARE
Patient is awake, alert, and oriented x 4. Patient is ambulatory and up to bathroom. Patient complained of nausea during the shift and medications were administered per orders. VSS through the shift. Patient refused SCDs. Patient remained free from injury and falls throughout the shift. Bed locked in lowest position with side rails up x 2. Call light is within reach of patient. Purposeful rounding maintained throughout shift. Will continue to monitor.

## 2020-09-26 NOTE — SUBJECTIVE & OBJECTIVE
Interval History:  No acute events overnight.  Capillary blood glucose readings remain high.  Patient reports some nausea this morning.    Review of Systems   Constitutional: Negative for chills and fever.   Respiratory: Negative for shortness of breath and wheezing.    Cardiovascular: Negative for chest pain.   Gastrointestinal: Positive for nausea. Negative for abdominal distention, abdominal pain, constipation, diarrhea and vomiting.   Genitourinary: Negative for dysuria and frequency.   Musculoskeletal: Negative for arthralgias and myalgias.   Neurological: Negative for light-headedness.   Psychiatric/Behavioral: Negative for agitation and confusion.     Objective:     Vital Signs (Most Recent):  Temp: 98.3 °F (36.8 °C) (09/26/20 1223)  Pulse: 96 (09/26/20 1223)  Resp: 18 (09/26/20 1223)  BP: 125/63 (09/26/20 1223)  SpO2: 96 % (09/26/20 1223) Vital Signs (24h Range):  Temp:  [97.9 °F (36.6 °C)-98.7 °F (37.1 °C)] 98.3 °F (36.8 °C)  Pulse:  [] 96  Resp:  [16-20] 18  SpO2:  [96 %-99 %] 96 %  BP: (123-164)/(63-85) 125/63     Weight: 59.6 kg (131 lb 8 oz)  Body mass index is 26.56 kg/m².    Intake/Output Summary (Last 24 hours) at 9/26/2020 1603  Last data filed at 9/26/2020 0600  Gross per 24 hour   Intake 480 ml   Output 700 ml   Net -220 ml      Physical Exam  Constitutional:       General: She is not in acute distress.     Appearance: She is well-developed.   HENT:      Head: Atraumatic.   Eyes:      Conjunctiva/sclera: Conjunctivae normal.   Neck:      Musculoskeletal: Neck supple.   Cardiovascular:      Rate and Rhythm: Normal rate and regular rhythm.      Heart sounds: Normal heart sounds. No murmur.   Pulmonary:      Effort: Pulmonary effort is normal.      Breath sounds: Normal breath sounds. No wheezing.   Abdominal:      General: Bowel sounds are normal. There is no distension.      Palpations: Abdomen is soft.      Tenderness: There is no abdominal tenderness.   Musculoskeletal: Normal range of  motion.         General: No deformity.   Neurological:      Mental Status: She is alert and oriented to person, place, and time.         Significant Labs: All pertinent labs within the past 24 hours have been reviewed.    Significant Imaging: I have reviewed all pertinent imaging results/findings within the past 24 hours.

## 2020-09-26 NOTE — H&P
[facility] Hospital Medicine  History & Physical    Patient Name: Darlene Avila  MRN: [id number]  Admission Date: 9/25/2020  Attending Physician: Hamilton Salguero MD   Primary Care Provider: Kirill Cabrera III, MD         Patient information was obtained from patient, past medical records and ER records.     Subjective:     Principal Problem:Poorly controlled type 2 diabetes mellitus with complication    Chief Complaint:   Chief Complaint   Patient presents with    Dizziness        HPI: Patient is a 69 year-old woman with hypertension, poorly controlled diabetes mellitus type II, prior transient ischemic attack, current tobacco and recently diagnosed with right-sided breast cancer currently on anastrozole with plan for mastectomy today with Dr. Krysta Clark.  Surgery however cancelled due to hyperglycemia. Surgery has already been delay due to COVID pandemic.  Dr. Clark requesting evaluation by hospital medicine.  Patient reports non-compliance with diabetic diet.    Past Medical History:   Diagnosis Date    Arthritis     Cancer     Cataract     Diabetes mellitus     Diabetic retinopathy     Hypertension     TIA (transient ischemic attack) [id number]       Past Surgical History:   Procedure Laterality Date    CAPSULOTOMY  6/26/13    yag left eye    CATARACT EXTRACTION  6/3/2013    right eye    CHOLECYSTECTOMY      HYSTERECTOMY      TOTAL ABDOMINAL HYSTERECTOMY W/ BILATERAL SALPINGOOPHORECTOMY         Review of patient's allergies indicates:   Allergen Reactions    Iodinated contrast media Hives       No current facility-administered medications on file prior to encounter.      Current Outpatient Medications on File Prior to Encounter   Medication Sig    amitriptyline (ELAVIL) 100 MG tablet Take 1 tablet by mouth every evening.     anastrozole (ARIMIDEX) 1 mg Tab Take 1 tablet (1 mg total) by mouth once daily.    aspirin (ECOTRIN) 81 MG EC tablet Take 81 mg by mouth once daily.     calcium carbonate (OS-JABIER) 600 mg calcium (1,500 mg) Tab Take 600 mg by mouth once.    citalopram (CELEXA) 10 MG tablet Take 1 tablet by mouth once daily.     clopidogrel (PLAVIX) 75 mg tablet Take 75 mg by mouth once daily.     diphenoxylate-atropine 2.5-0.025 mg (LOMOTIL) 2.5-0.025 mg per tablet 1 tablet Oral Three times a day  As needed    insulin lispro protamin-lispro (HUMALOG MIX 75-25 KWIKPEN) 100 unit/mL (75-25) InPn Inject 10 Units into the skin 2 (two) times a day. Inject 16 units every morning and 18 units every evening    midodrine (PROAMATINE) 2.5 MG Tab Take 1 tablet (2.5 mg total) by mouth every 12 (twelve) hours.    multivitamin capsule Take 1 capsule by mouth once daily.    pravastatin (PRAVACHOL) 10 MG tablet Take 10 mg by mouth once daily.      Family History     Problem Relation (Age of Onset)    Breast cancer Sister (65)    Cancer Mother, Sister    Cataracts Mother    Colon cancer Mother (82)    Diabetes Mother, Father, Sister, Maternal Uncle, Paternal Uncle, Maternal Grandmother, Maternal Grandfather    Glaucoma Mother    Hypertension Sister, Maternal Grandmother        Tobacco Use    Smoking status: Current Every Day Smoker     Packs/day: 1.50    Smokeless tobacco: Never Used   Substance and Sexual Activity    Alcohol use: No    Drug use: No    Sexual activity: Not Currently     Review of Systems   Constitutional: Negative for chills and fever.   HENT: Negative for congestion, hearing loss, sinus pressure and trouble swallowing.    Eyes: Negative for photophobia, pain, redness and visual disturbance.   Respiratory: Negative for cough, chest tightness, shortness of breath and wheezing.    Cardiovascular: Negative for chest pain and palpitations.   Gastrointestinal: Negative for abdominal distention, abdominal pain, blood in stool, constipation, diarrhea, nausea and vomiting.   Endocrine: Negative for cold intolerance, heat intolerance, polydipsia, polyphagia and polyuria.    Genitourinary: Negative for dysuria and frequency.   Musculoskeletal: Positive for back pain and myalgias. Negative for arthralgias, gait problem, joint swelling and neck pain.   Allergic/Immunologic: Negative for environmental allergies, food allergies and immunocompromised state.   Neurological: Negative for dizziness, seizures, syncope, weakness, light-headedness and headaches.   Hematological: Negative for adenopathy.   Psychiatric/Behavioral: Negative for agitation, behavioral problems and confusion.     Objective:     Vital Signs (Most Recent):  Temp: 98.1 °F (36.7 °C) (09/25/20 2008)  Pulse: 103 (09/25/20 2008)  Resp: 20 (09/25/20 2008)  BP: (!) 159/79 (09/25/20 2008)  SpO2: 99 % (09/25/20 2008) Vital Signs (24h Range):  Temp:  [97.8 °F (36.6 °C)-98.7 °F (37.1 °C)] 98.1 °F (36.7 °C)  Pulse:  [] 103  Resp:  [16-20] 20  SpO2:  [98 %-99 %] 99 %  BP: (115-159)/(62-79) 159/79     Weight: 58.1 kg (128 lb)  Body mass index is 25.85 kg/m².    Physical Exam  Constitutional:       General: She is not in acute distress.     Appearance: She is well-developed. She is not diaphoretic.   HENT:      Head: Normocephalic and atraumatic.      Nose: Nose normal.      Mouth/Throat:      Mouth: Mucous membranes are dry.      Pharynx: No oropharyngeal exudate.   Eyes:      General: No scleral icterus.        Right eye: No discharge.         Left eye: No discharge.      Conjunctiva/sclera: Conjunctivae normal.      Pupils: Pupils are equal, round, and reactive to light.   Neck:      Musculoskeletal: Normal range of motion and neck supple.      Thyroid: No thyromegaly.      Vascular: No JVD.      Trachea: No tracheal deviation.   Cardiovascular:      Rate and Rhythm: Normal rate and regular rhythm.      Heart sounds: Normal heart sounds. No murmur. No friction rub. No gallop.    Pulmonary:      Effort: Pulmonary effort is normal. No respiratory distress.      Breath sounds: Normal breath sounds. No stridor. No wheezing or  rales.   Chest:      Chest wall: No tenderness.   Abdominal:      General: Bowel sounds are normal. There is no distension.      Palpations: Abdomen is soft. There is no mass.      Tenderness: There is no abdominal tenderness. There is no guarding or rebound.   Musculoskeletal: Normal range of motion.         General: No tenderness.   Lymphadenopathy:      Cervical: No cervical adenopathy.   Skin:     General: Skin is warm and dry.      Coloration: Skin is not pale.      Findings: No erythema or rash.   Neurological:      Mental Status: She is alert and oriented to person, place, and time.      Cranial Nerves: No cranial nerve deficit.      Motor: No abnormal muscle tone.      Coordination: Coordination normal.      Deep Tendon Reflexes: Reflexes are normal and symmetric. Reflexes normal.   Psychiatric:         Behavior: Behavior normal.         Thought Content: Thought content normal.         Judgment: Judgment normal.           CRANIAL NERVES     CN III, IV, VI   Pupils are equal, round, and reactive to light.       Significant Labs: All pertinent labs within the past 24 hours have been reviewed.    Significant Imaging: I have reviewed all pertinent imaging results/findings within the past 24 hours.    Assessment/Plan:     * Poorly controlled type 2 diabetes mellitus with complication  Checking hemoglobin A1c. Reasonably controlled with current regimen.  Will continue with current regimen and continue to monitor.    Malignant neoplasm of upper-outer quadrant of right breast in female, estrogen receptor positive  Plan on surgery on Monday.  Plan to medically optimized prior to surgery.    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    Essential hypertension  Hold blood pressure medication until hypotension resolves.    Chronic diarrhea  Resume home regimen.      VTE Risk  Mitigation (From admission, onward)         Ordered     enoxaparin injection 40 mg  Every 24 hours      09/25/20 1742     IP VTE HIGH RISK PATIENT  Once      09/25/20 1742     Place sequential compression device  Until discontinued      09/25/20 1207     Place KATHRIN hose  Until discontinued      09/25/20 1207                   Hamilton Salguero MD  Department of Hospital Medicine   [facility]

## 2020-09-26 NOTE — ASSESSMENT & PLAN NOTE
Poorly controlled diabetes based on hemoglobin A1c of 11.4%.  Increased both long-acting and preprandial insulin today for better glycemic control.  Continue monitor adjust further as needed to medically optimized prior to surgery anticipated for Monday.

## 2020-09-26 NOTE — ASSESSMENT & PLAN NOTE
Patient has had delay and surgery related to obtaining medical clearance, COVID-19 pandemic, and now recent cancellation of surgery due to hyperglycemia.  Continue medical optimization prior to anticipated surgery scheduled for Monday.

## 2020-09-26 NOTE — SUBJECTIVE & OBJECTIVE
Past Medical History:   Diagnosis Date    Arthritis     Cancer     Cataract     Diabetes mellitus     Diabetic retinopathy     Hypertension     TIA (transient ischemic attack) 5230-3927       Past Surgical History:   Procedure Laterality Date    CAPSULOTOMY  6/26/13    yag left eye    CATARACT EXTRACTION  6/3/2013    right eye    CHOLECYSTECTOMY      HYSTERECTOMY      TOTAL ABDOMINAL HYSTERECTOMY W/ BILATERAL SALPINGOOPHORECTOMY         Review of patient's allergies indicates:   Allergen Reactions    Iodinated contrast media Hives       No current facility-administered medications on file prior to encounter.      Current Outpatient Medications on File Prior to Encounter   Medication Sig    amitriptyline (ELAVIL) 100 MG tablet Take 1 tablet by mouth every evening.     anastrozole (ARIMIDEX) 1 mg Tab Take 1 tablet (1 mg total) by mouth once daily.    aspirin (ECOTRIN) 81 MG EC tablet Take 81 mg by mouth once daily.    calcium carbonate (OS-JABIER) 600 mg calcium (1,500 mg) Tab Take 600 mg by mouth once.    citalopram (CELEXA) 10 MG tablet Take 1 tablet by mouth once daily.     clopidogrel (PLAVIX) 75 mg tablet Take 75 mg by mouth once daily.     diphenoxylate-atropine 2.5-0.025 mg (LOMOTIL) 2.5-0.025 mg per tablet 1 tablet Oral Three times a day  As needed    insulin lispro protamin-lispro (HUMALOG MIX 75-25 KWIKPEN) 100 unit/mL (75-25) InPn Inject 10 Units into the skin 2 (two) times a day. Inject 16 units every morning and 18 units every evening    midodrine (PROAMATINE) 2.5 MG Tab Take 1 tablet (2.5 mg total) by mouth every 12 (twelve) hours.    multivitamin capsule Take 1 capsule by mouth once daily.    pravastatin (PRAVACHOL) 10 MG tablet Take 10 mg by mouth once daily.      Family History     Problem Relation (Age of Onset)    Breast cancer Sister (65)    Cancer Mother, Sister    Cataracts Mother    Colon cancer Mother (82)    Diabetes Mother, Father, Sister, Maternal Uncle, Paternal Uncle,  Maternal Grandmother, Maternal Grandfather    Glaucoma Mother    Hypertension Sister, Maternal Grandmother        Tobacco Use    Smoking status: Current Every Day Smoker     Packs/day: 1.50    Smokeless tobacco: Never Used   Substance and Sexual Activity    Alcohol use: No    Drug use: No    Sexual activity: Not Currently     Review of Systems   Constitutional: Negative for chills and fever.   HENT: Negative for congestion, hearing loss, sinus pressure and trouble swallowing.    Eyes: Negative for photophobia, pain, redness and visual disturbance.   Respiratory: Negative for cough, chest tightness, shortness of breath and wheezing.    Cardiovascular: Negative for chest pain and palpitations.   Gastrointestinal: Negative for abdominal distention, abdominal pain, blood in stool, constipation, diarrhea, nausea and vomiting.   Endocrine: Negative for cold intolerance, heat intolerance, polydipsia, polyphagia and polyuria.   Genitourinary: Negative for dysuria and frequency.   Musculoskeletal: Positive for back pain and myalgias. Negative for arthralgias, gait problem, joint swelling and neck pain.   Allergic/Immunologic: Negative for environmental allergies, food allergies and immunocompromised state.   Neurological: Negative for dizziness, seizures, syncope, weakness, light-headedness and headaches.   Hematological: Negative for adenopathy.   Psychiatric/Behavioral: Negative for agitation, behavioral problems and confusion.     Objective:     Vital Signs (Most Recent):  Temp: 98.1 °F (36.7 °C) (09/25/20 2008)  Pulse: 103 (09/25/20 2008)  Resp: 20 (09/25/20 2008)  BP: (!) 159/79 (09/25/20 2008)  SpO2: 99 % (09/25/20 2008) Vital Signs (24h Range):  Temp:  [97.8 °F (36.6 °C)-98.7 °F (37.1 °C)] 98.1 °F (36.7 °C)  Pulse:  [] 103  Resp:  [16-20] 20  SpO2:  [98 %-99 %] 99 %  BP: (115-159)/(62-79) 159/79     Weight: 58.1 kg (128 lb)  Body mass index is 25.85 kg/m².    Physical Exam  Constitutional:       General:  She is not in acute distress.     Appearance: She is well-developed. She is not diaphoretic.   HENT:      Head: Normocephalic and atraumatic.      Nose: Nose normal.      Mouth/Throat:      Mouth: Mucous membranes are dry.      Pharynx: No oropharyngeal exudate.   Eyes:      General: No scleral icterus.        Right eye: No discharge.         Left eye: No discharge.      Conjunctiva/sclera: Conjunctivae normal.      Pupils: Pupils are equal, round, and reactive to light.   Neck:      Musculoskeletal: Normal range of motion and neck supple.      Thyroid: No thyromegaly.      Vascular: No JVD.      Trachea: No tracheal deviation.   Cardiovascular:      Rate and Rhythm: Normal rate and regular rhythm.      Heart sounds: Normal heart sounds. No murmur. No friction rub. No gallop.    Pulmonary:      Effort: Pulmonary effort is normal. No respiratory distress.      Breath sounds: Normal breath sounds. No stridor. No wheezing or rales.   Chest:      Chest wall: No tenderness.   Abdominal:      General: Bowel sounds are normal. There is no distension.      Palpations: Abdomen is soft. There is no mass.      Tenderness: There is no abdominal tenderness. There is no guarding or rebound.   Musculoskeletal: Normal range of motion.         General: No tenderness.   Lymphadenopathy:      Cervical: No cervical adenopathy.   Skin:     General: Skin is warm and dry.      Coloration: Skin is not pale.      Findings: No erythema or rash.   Neurological:      Mental Status: She is alert and oriented to person, place, and time.      Cranial Nerves: No cranial nerve deficit.      Motor: No abnormal muscle tone.      Coordination: Coordination normal.      Deep Tendon Reflexes: Reflexes are normal and symmetric. Reflexes normal.   Psychiatric:         Behavior: Behavior normal.         Thought Content: Thought content normal.         Judgment: Judgment normal.           CRANIAL NERVES     CN III, IV, VI   Pupils are equal, round, and  reactive to light.       Significant Labs: All pertinent labs within the past 24 hours have been reviewed.    Significant Imaging: I have reviewed all pertinent imaging results/findings within the past 24 hours.

## 2020-09-26 NOTE — PLAN OF CARE
Problem: Physical Therapy Goal  Goal: Physical Therapy Goal  Description: Eval only, pt I  Outcome: Met  Pt presented sitting in BSChair, sit stand I, gait x 300ft, no Ad, no LOB, WFL.  Pt return to room to sit onEOB I'ly. Pt reports in/out of bed and bathroom all day.

## 2020-09-26 NOTE — PLAN OF CARE
09/26/20 0920   CHENG Message   Medicare Outpatient and Observation Notification regarding financial responsibility Explained to patient/caregiver;Signed/date by patient/caregiver   Date CHENG was signed 09/26/20   Time CHENG was signed 0920

## 2020-09-26 NOTE — PT/OT/SLP EVAL
Physical Therapy Evaluation and Discharge Note    Patient Name:  Darlene Avila   MRN:  [id number]    Recommendations:     Discharge Recommendations:  home   Discharge Equipment Recommendations: none   Barriers to discharge: None    Assessment:     Darlene Avila is a 69 y.o. female admitted with a medical diagnosis of Poorly controlled type 2 diabetes mellitus with complication. .  At this time, patient is functioning at their prior level of function and does not require further acute PT services.     Recent Surgery: Procedure(s) (LRB):  MASTECTOMY-Right (Right)  BIOPSY, LYMPH NODE, SENTINEL-Right (Right) 1 Day Post-Op    Plan:     During this hospitalization, patient does not require further acute PT services.  Please re-consult if situation changes.      Subjective     Chief Complaint: cancellation of sx  Patient/Family Comments/goals: hoping to have sx on Monday  Pain/Comfort:  · Pain Rating 1: 0/10  · Pain Rating Post-Intervention 1: 0/10    Patients cultural, spiritual, Alevism conflicts given the current situation: no    Living Environment:  Pt lives alone in [facility] with 4 stepes to enter with B HR  Prior to admission, patients level of function was I.  Equipment used at home: none.  DME owned (not currently used): rollator.  Upon discharge, patient will have assistance from sister if needed.    Objective:     Communicated with nurseDenise prior to session.  Patient found up in chair with peripheral IV upon PT entry to room.    General Precautions: Standard, diabetic   Orthopedic Precautions:N/A   Braces: N/A     Exams:  · Cognitive Exam:  Patient is oriented to Person, Place, Time and Situation  · Gross Motor Coordination:  WFL  · Postural Exam:  Patient presented with the following abnormalities:    · -       No postural abnormalities identified  · Sensation:    · -       Intact  · Skin Integrity/Edema:      · -       Skin integrity: Visible skin intact  · RLE ROM: WFL  · RLE Strength: WFL  · LLE ROM:  WFL  · LLE Strength: WFL    Functional Mobility:  · Bed Mobility:     · Supine to Sit: independence  · Sit to Supine: independence  · Transfers:     · Sit to Stand:  independence with no AD  · Bed to Chair: independence with  no AD  using  Step Transfer  · Gait: x 300ft, no AD, no LOB, WFL  · Balance: WFL    AM-PAC 6 CLICK MOBILITY  Total Score:24       Therapeutic Activities and Exercises:   Pt presented sitting in BSChair, sit stand I, gait x 300ft, no Ad, no LOB, WFL.  Pt return to room to sit onEOB I'ly. Pt reports in/out of bed and bathroom all day.    AM-PAC 6 CLICK MOBILITY  Total Score:24     Patient left sitting EOB with all lines intact, call button in reach and nurse, Denise notified.    GOALS:   Multidisciplinary Problems     Physical Therapy Goals     Not on file          Multidisciplinary Problems (Resolved)        Problem: Physical Therapy Goal    Goal Priority Disciplines Outcome Goal Variances Interventions   Physical Therapy Goal   (Resolved)     PT, PT/OT Met     Description: Eval only, pt I                   History:     Past Medical History:   Diagnosis Date    Arthritis     Cancer     Cataract     Diabetes mellitus     Diabetic retinopathy     Hypertension     TIA (transient ischemic attack) [id number]       Past Surgical History:   Procedure Laterality Date    CAPSULOTOMY  6/26/13    yag left eye    CATARACT EXTRACTION  6/3/2013    right eye    CHOLECYSTECTOMY      HYSTERECTOMY      TOTAL ABDOMINAL HYSTERECTOMY W/ BILATERAL SALPINGOOPHORECTOMY         Time Tracking:     PT Received On: 09/26/20  PT Start Time: 1031     PT Stop Time: 1040  PT Total Time (min): 9 min     Billable Minutes: Evaluation 6      Rubens Contreras, PT  09/26/2020

## 2020-09-26 NOTE — ASSESSMENT & PLAN NOTE
Checking hemoglobin A1c. Reasonably controlled with current regimen.  Will continue with current regimen and continue to monitor.

## 2020-09-27 LAB
ANION GAP SERPL CALC-SCNC: 6 MMOL/L (ref 8–16)
BASOPHILS # BLD AUTO: 0.03 K/UL (ref 0–0.2)
BASOPHILS NFR BLD: 0.3 % (ref 0–1.9)
BUN SERPL-MCNC: 25 MG/DL (ref 8–23)
CALCIUM SERPL-MCNC: 9.2 MG/DL (ref 8.7–10.5)
CHLORIDE SERPL-SCNC: 104 MMOL/L (ref 95–110)
CO2 SERPL-SCNC: 25 MMOL/L (ref 23–29)
CREAT SERPL-MCNC: 1.2 MG/DL (ref 0.5–1.4)
DIFFERENTIAL METHOD: ABNORMAL
EOSINOPHIL # BLD AUTO: 0 K/UL (ref 0–0.5)
EOSINOPHIL NFR BLD: 0.5 % (ref 0–8)
ERYTHROCYTE [DISTWIDTH] IN BLOOD BY AUTOMATED COUNT: 13.6 % (ref 11.5–14.5)
EST. GFR  (AFRICAN AMERICAN): 53 ML/MIN/1.73 M^2
EST. GFR  (NON AFRICAN AMERICAN): 46 ML/MIN/1.73 M^2
GLUCOSE SERPL-MCNC: 359 MG/DL (ref 70–110)
HCT VFR BLD AUTO: 40.5 % (ref 37–48.5)
HGB BLD-MCNC: 12.3 G/DL (ref 12–16)
IMM GRANULOCYTES # BLD AUTO: 0.02 K/UL (ref 0–0.04)
IMM GRANULOCYTES NFR BLD AUTO: 0.2 % (ref 0–0.5)
LYMPHOCYTES # BLD AUTO: 3 K/UL (ref 1–4.8)
LYMPHOCYTES NFR BLD: 33.8 % (ref 18–48)
MAGNESIUM SERPL-MCNC: 2.1 MG/DL (ref 1.6–2.6)
MCH RBC QN AUTO: 25.6 PG (ref 27–31)
MCHC RBC AUTO-ENTMCNC: 30.4 G/DL (ref 32–36)
MCV RBC AUTO: 84 FL (ref 82–98)
MONOCYTES # BLD AUTO: 0.6 K/UL (ref 0.3–1)
MONOCYTES NFR BLD: 6.2 % (ref 4–15)
NEUTROPHILS # BLD AUTO: 5.2 K/UL (ref 1.8–7.7)
NEUTROPHILS NFR BLD: 59 % (ref 38–73)
NRBC BLD-RTO: 0 /100 WBC
PHOSPHATE SERPL-MCNC: 2.9 MG/DL (ref 2.7–4.5)
PLATELET # BLD AUTO: 337 K/UL (ref 150–350)
PMV BLD AUTO: 9.1 FL (ref 9.2–12.9)
POCT GLUCOSE: 211 MG/DL (ref 70–110)
POCT GLUCOSE: 244 MG/DL (ref 70–110)
POCT GLUCOSE: 272 MG/DL (ref 70–110)
POCT GLUCOSE: 433 MG/DL (ref 70–110)
POTASSIUM SERPL-SCNC: 4.3 MMOL/L (ref 3.5–5.1)
RBC # BLD AUTO: 4.8 M/UL (ref 4–5.4)
SODIUM SERPL-SCNC: 135 MMOL/L (ref 136–145)
WBC # BLD AUTO: 8.85 K/UL (ref 3.9–12.7)

## 2020-09-27 PROCEDURE — 84132 ASSAY OF SERUM POTASSIUM: CPT | Mod: 91

## 2020-09-27 PROCEDURE — 63600175 PHARM REV CODE 636 W HCPCS: Performed by: HOSPITALIST

## 2020-09-27 PROCEDURE — 99226 PR SUBSEQUENT OBSERVATION CARE,LEVEL III: CPT | Mod: ,,, | Performed by: HOSPITALIST

## 2020-09-27 PROCEDURE — 96372 THER/PROPH/DIAG INJ SC/IM: CPT | Mod: 59 | Performed by: HOSPITALIST

## 2020-09-27 PROCEDURE — S4991 NICOTINE PATCH NONLEGEND: HCPCS | Performed by: HOSPITALIST

## 2020-09-27 PROCEDURE — G0378 HOSPITAL OBSERVATION PER HR: HCPCS

## 2020-09-27 PROCEDURE — 25000003 PHARM REV CODE 250: Performed by: HOSPITALIST

## 2020-09-27 PROCEDURE — 83735 ASSAY OF MAGNESIUM: CPT

## 2020-09-27 PROCEDURE — 99226 PR SUBSEQUENT OBSERVATION CARE,LEVEL III: ICD-10-PCS | Mod: ,,, | Performed by: HOSPITALIST

## 2020-09-27 PROCEDURE — 85025 COMPLETE CBC W/AUTO DIFF WBC: CPT

## 2020-09-27 PROCEDURE — 84100 ASSAY OF PHOSPHORUS: CPT

## 2020-09-27 PROCEDURE — 96361 HYDRATE IV INFUSION ADD-ON: CPT | Performed by: HOSPITALIST

## 2020-09-27 PROCEDURE — 80048 BASIC METABOLIC PNL TOTAL CA: CPT

## 2020-09-27 PROCEDURE — 36415 COLL VENOUS BLD VENIPUNCTURE: CPT

## 2020-09-27 RX ORDER — GLUCAGON 1 MG
1 KIT INJECTION
Status: DISCONTINUED | OUTPATIENT
Start: 2020-09-27 | End: 2020-09-27

## 2020-09-27 RX ORDER — INSULIN ASPART 100 [IU]/ML
1-10 INJECTION, SOLUTION INTRAVENOUS; SUBCUTANEOUS
Status: DISCONTINUED | OUTPATIENT
Start: 2020-09-27 | End: 2020-09-27

## 2020-09-27 RX ORDER — IBUPROFEN 200 MG
24 TABLET ORAL
Status: DISCONTINUED | OUTPATIENT
Start: 2020-09-27 | End: 2020-09-27

## 2020-09-27 RX ORDER — IBUPROFEN 200 MG
16 TABLET ORAL
Status: DISCONTINUED | OUTPATIENT
Start: 2020-09-27 | End: 2020-09-27

## 2020-09-27 RX ORDER — SODIUM CHLORIDE 9 MG/ML
INJECTION, SOLUTION INTRAVENOUS CONTINUOUS
Status: DISCONTINUED | OUTPATIENT
Start: 2020-09-27 | End: 2020-09-27

## 2020-09-27 RX ADMIN — DIPHENOXYLATE HYDROCHLORIDE AND ATROPINE SULFATE 1 TABLET: 2.5; .025 TABLET ORAL at 04:09

## 2020-09-27 RX ADMIN — INSULIN ASPART 5 UNITS: 100 INJECTION, SOLUTION INTRAVENOUS; SUBCUTANEOUS at 08:09

## 2020-09-27 RX ADMIN — PRAVASTATIN SODIUM 10 MG: 10 TABLET ORAL at 08:09

## 2020-09-27 RX ADMIN — INSULIN ASPART 4 UNITS: 100 INJECTION, SOLUTION INTRAVENOUS; SUBCUTANEOUS at 04:09

## 2020-09-27 RX ADMIN — ENOXAPARIN SODIUM 40 MG: 100 INJECTION SUBCUTANEOUS at 04:09

## 2020-09-27 RX ADMIN — SODIUM CHLORIDE: 0.9 INJECTION, SOLUTION INTRAVENOUS at 08:09

## 2020-09-27 RX ADMIN — INSULIN ASPART 4 UNITS: 100 INJECTION, SOLUTION INTRAVENOUS; SUBCUTANEOUS at 08:09

## 2020-09-27 RX ADMIN — CITALOPRAM HYDROBROMIDE 10 MG: 10 TABLET, FILM COATED ORAL at 08:09

## 2020-09-27 RX ADMIN — AMITRIPTYLINE HYDROCHLORIDE 75 MG: 25 TABLET, FILM COATED ORAL at 08:09

## 2020-09-27 RX ADMIN — ASPIRIN 81 MG: 81 TABLET, COATED ORAL at 08:09

## 2020-09-27 RX ADMIN — SODIUM CHLORIDE: 0.9 INJECTION, SOLUTION INTRAVENOUS at 06:09

## 2020-09-27 RX ADMIN — INSULIN ASPART 6 UNITS: 100 INJECTION, SOLUTION INTRAVENOUS; SUBCUTANEOUS at 12:09

## 2020-09-27 RX ADMIN — Medication 1 PATCH: at 08:09

## 2020-09-27 NOTE — ASSESSMENT & PLAN NOTE
Capillary blood glucose mostly in the 300s yesterday.  Adjust insulin regimen for better glycemic control.  Patient counseled importance of adhering to a diabetic diet.

## 2020-09-27 NOTE — ASSESSMENT & PLAN NOTE
Poorly controlled diabetes based on hemoglobin A1c of 11.4%.  Adjusted insulin regimen further for better glycemic control.  Patient counseled importance of sticking to a diabetic diet.  Patient states she is agreeable to adhering to a diabetic diet today.  Continue monitor adjust further as needed to medically optimized prior to surgery anticipated for Monday.

## 2020-09-27 NOTE — SUBJECTIVE & OBJECTIVE
Interval History:  No acute events.  Patient refused to take a diabetic diet yesterday.  She had an episode of diarrhea.  Patient with history of chronic diarrhea.    Review of Systems   Constitutional: Negative for chills and fever.   Respiratory: Negative for shortness of breath and wheezing.    Cardiovascular: Negative for chest pain.   Gastrointestinal: Positive for diarrhea. Negative for abdominal distention, abdominal pain, constipation, nausea and vomiting.   Genitourinary: Negative for dysuria and frequency.   Musculoskeletal: Negative for arthralgias and myalgias.   Neurological: Negative for light-headedness.   Psychiatric/Behavioral: Negative for agitation and confusion.     Objective:     Vital Signs (Most Recent):  Temp: 97.8 °F (36.6 °C) (09/27/20 0811)  Pulse: 95 (09/27/20 0811)  Resp: 18 (09/27/20 0811)  BP: 117/65 (09/27/20 0811)  SpO2: 97 % (09/27/20 0811) Vital Signs (24h Range):  Temp:  [97.7 °F (36.5 °C)-98.5 °F (36.9 °C)] 97.8 °F (36.6 °C)  Pulse:  [] 95  Resp:  [16-20] 18  SpO2:  [96 %-99 %] 97 %  BP: (107-130)/(56-70) 117/65     Weight: 59.6 kg (131 lb 8 oz)  Body mass index is 26.56 kg/m².  No intake or output data in the 24 hours ending 09/27/20 1021   Physical Exam  Constitutional:       General: She is not in acute distress.     Appearance: She is well-developed.   HENT:      Head: Atraumatic.   Eyes:      Conjunctiva/sclera: Conjunctivae normal.   Neck:      Musculoskeletal: Neck supple.   Cardiovascular:      Rate and Rhythm: Normal rate and regular rhythm.      Heart sounds: Normal heart sounds. No murmur.   Pulmonary:      Effort: Pulmonary effort is normal.      Breath sounds: Normal breath sounds. No wheezing.   Abdominal:      General: Bowel sounds are normal. There is no distension.      Palpations: Abdomen is soft.      Tenderness: There is no abdominal tenderness.   Musculoskeletal: Normal range of motion.         General: No deformity.   Neurological:      Mental Status:  She is alert and oriented to person, place, and time.         Significant Labs: All pertinent labs within the past 24 hours have been reviewed.    Significant Imaging: I have reviewed all pertinent imaging results/findings within the past 24 hours.

## 2020-09-27 NOTE — PROGRESS NOTES
[facility] Hospital Medicine  Progress Note    Patient Name: Darlene Avila  MRN: [id number]  Patient Class: OP- Observation   Admission Date: 9/25/2020  Length of Stay: 0 days  Attending Physician: Hamilton Salguero MD  Primary Care Provider: Kirill Cabrera Iii, MD        Subjective:     Principal Problem:Poorly controlled type 2 diabetes mellitus with complication        HPI:  Patient is a 69 year-old woman with hypertension, poorly controlled diabetes mellitus type II, prior transient ischemic attack, current tobacco and recently diagnosed with right-sided breast cancer currently on anastrozole with plan for mastectomy today with Dr. Krysta Clark.  Surgery however cancelled due to hyperglycemia. Surgery has already been delay due to COVID pandemic.  Dr. Clark requesting evaluation by hospital medicine.  Patient reports non-compliance with diabetic diet.    Overview/Hospital Course:  Patient is a 69 year-old woman with hypertension, poorly controlled diabetes mellitus type II, prior transient ischemic attack, current tobacco smoker with breast cancer who has had delay in undergoing a mastectomy to COVID pandemic and again now due to hyperglycemia resulting in cancellation of surgery on Friday.  Patient admitted on the hospital under observation to medically optimize her for surgery anticipated on Monday.  Patient continue high elevated capillary blood glucose readings on Saturday.  Insulin regimen has been adjusted bring her under better glycemic control particularly in the perioperative period.    Interval History:  No acute events.  Patient refused to take a diabetic diet yesterday.  She had an episode of diarrhea.  Patient with history of chronic diarrhea.    Review of Systems   Constitutional: Negative for chills and fever.   Respiratory: Negative for shortness of breath and wheezing.    Cardiovascular: Negative for chest pain.   Gastrointestinal: Positive for diarrhea. Negative for  abdominal distention, abdominal pain, constipation, nausea and vomiting.   Genitourinary: Negative for dysuria and frequency.   Musculoskeletal: Negative for arthralgias and myalgias.   Neurological: Negative for light-headedness.   Psychiatric/Behavioral: Negative for agitation and confusion.     Objective:     Vital Signs (Most Recent):  Temp: 97.8 °F (36.6 °C) (09/27/20 0811)  Pulse: 95 (09/27/20 0811)  Resp: 18 (09/27/20 0811)  BP: 117/65 (09/27/20 0811)  SpO2: 97 % (09/27/20 0811) Vital Signs (24h Range):  Temp:  [97.7 °F (36.5 °C)-98.5 °F (36.9 °C)] 97.8 °F (36.6 °C)  Pulse:  [] 95  Resp:  [16-20] 18  SpO2:  [96 %-99 %] 97 %  BP: (107-130)/(56-70) 117/65     Weight: 59.6 kg (131 lb 8 oz)  Body mass index is 26.56 kg/m².  No intake or output data in the 24 hours ending 09/27/20 1021   Physical Exam  Constitutional:       General: She is not in acute distress.     Appearance: She is well-developed.   HENT:      Head: Atraumatic.   Eyes:      Conjunctiva/sclera: Conjunctivae normal.   Neck:      Musculoskeletal: Neck supple.   Cardiovascular:      Rate and Rhythm: Normal rate and regular rhythm.      Heart sounds: Normal heart sounds. No murmur.   Pulmonary:      Effort: Pulmonary effort is normal.      Breath sounds: Normal breath sounds. No wheezing.   Abdominal:      General: Bowel sounds are normal. There is no distension.      Palpations: Abdomen is soft.      Tenderness: There is no abdominal tenderness.   Musculoskeletal: Normal range of motion.         General: No deformity.   Neurological:      Mental Status: She is alert and oriented to person, place, and time.         Significant Labs: All pertinent labs within the past 24 hours have been reviewed.    Significant Imaging: I have reviewed all pertinent imaging results/findings within the past 24 hours.      Assessment/Plan:      * Poorly controlled type 2 diabetes mellitus with complication  Poorly controlled diabetes based on hemoglobin A1c of  11.4%.  Adjusted insulin regimen further for better glycemic control.  Patient counseled importance of sticking to a diabetic diet.  Patient states she is agreeable to adhering to a diabetic diet today.  Continue monitor adjust further as needed to medically optimized prior to surgery anticipated for Monday.    Malignant neoplasm of upper-outer quadrant of right breast in female, estrogen receptor positive  Patient has had delay and surgery related to obtaining medical clearance, COVID-19 pandemic, and now recent cancellation of surgery due to hyperglycemia.  Continue medical optimization prior to anticipated surgery scheduled for tomorrow.  NPO after midnight tonight.    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  Adjust insulin regimen for better glycemic control.  Patient counseled importance of adhering to a diabetic diet.    Essential hypertension  Normotensive off blood pressure medications.  Continue to monitor.    Chronic diarrhea  Stable.  Continue with home regimen.        VTE Risk Mitigation (From admission, onward)         Ordered     enoxaparin injection 40 mg  Every 24 hours      09/25/20 1742     IP VTE HIGH RISK PATIENT  Once      09/25/20 1742     Place sequential compression device  Until discontinued      09/25/20 1207     Place KATHRIN hose  Until discontinued      09/25/20 1207                Hamilton Salguero MD  Department of Hospital Medicine   [facility]

## 2020-09-27 NOTE — ASSESSMENT & PLAN NOTE
Patient has had delay and surgery related to obtaining medical clearance, COVID-19 pandemic, and now recent cancellation of surgery due to hyperglycemia.  Continue medical optimization prior to anticipated surgery scheduled for tomorrow.  NPO after midnight tonight.

## 2020-09-27 NOTE — PLAN OF CARE
VSS and afebrile, AAOx4. Glucose levels being monitored. Ordered diet tolerated well. Pt doing well, no issues tonight. Plan of care reviewed with patient. Purposeful rounding done, call light at bed side, bed at lowest position, brakes on, non-skid socks on, will continue to monitor.

## 2020-09-28 PROBLEM — Z72.0 TOBACCO ABUSE: Status: ACTIVE | Noted: 2020-09-28

## 2020-09-28 LAB
ANION GAP SERPL CALC-SCNC: 8 MMOL/L (ref 8–16)
BASOPHILS # BLD AUTO: 0.05 K/UL (ref 0–0.2)
BASOPHILS NFR BLD: 0.6 % (ref 0–1.9)
BUN SERPL-MCNC: 17 MG/DL (ref 8–23)
CALCIUM SERPL-MCNC: 9.1 MG/DL (ref 8.7–10.5)
CHLORIDE SERPL-SCNC: 107 MMOL/L (ref 95–110)
CO2 SERPL-SCNC: 24 MMOL/L (ref 23–29)
CREAT SERPL-MCNC: 0.7 MG/DL (ref 0.5–1.4)
DIFFERENTIAL METHOD: ABNORMAL
EOSINOPHIL # BLD AUTO: 0.1 K/UL (ref 0–0.5)
EOSINOPHIL NFR BLD: 1.1 % (ref 0–8)
ERYTHROCYTE [DISTWIDTH] IN BLOOD BY AUTOMATED COUNT: 13.5 % (ref 11.5–14.5)
EST. GFR  (AFRICAN AMERICAN): >60 ML/MIN/1.73 M^2
EST. GFR  (NON AFRICAN AMERICAN): >60 ML/MIN/1.73 M^2
GLUCOSE SERPL-MCNC: 81 MG/DL (ref 70–110)
HCT VFR BLD AUTO: 39.6 % (ref 37–48.5)
HGB BLD-MCNC: 12.2 G/DL (ref 12–16)
IMM GRANULOCYTES # BLD AUTO: 0.02 K/UL (ref 0–0.04)
IMM GRANULOCYTES NFR BLD AUTO: 0.2 % (ref 0–0.5)
LYMPHOCYTES # BLD AUTO: 3.8 K/UL (ref 1–4.8)
LYMPHOCYTES NFR BLD: 44.1 % (ref 18–48)
MAGNESIUM SERPL-MCNC: 1.8 MG/DL (ref 1.6–2.6)
MCH RBC QN AUTO: 26 PG (ref 27–31)
MCHC RBC AUTO-ENTMCNC: 30.8 G/DL (ref 32–36)
MCV RBC AUTO: 84 FL (ref 82–98)
MONOCYTES # BLD AUTO: 0.6 K/UL (ref 0.3–1)
MONOCYTES NFR BLD: 6.8 % (ref 4–15)
NEUTROPHILS # BLD AUTO: 4 K/UL (ref 1.8–7.7)
NEUTROPHILS NFR BLD: 47.2 % (ref 38–73)
NRBC BLD-RTO: 0 /100 WBC
PHOSPHATE SERPL-MCNC: 2.3 MG/DL (ref 2.7–4.5)
PLATELET # BLD AUTO: 326 K/UL (ref 150–350)
PMV BLD AUTO: 8.9 FL (ref 9.2–12.9)
POCT GLUCOSE: 105 MG/DL (ref 70–110)
POCT GLUCOSE: 113 MG/DL (ref 70–110)
POCT GLUCOSE: 119 MG/DL (ref 70–110)
POCT GLUCOSE: 138 MG/DL (ref 70–110)
POCT GLUCOSE: 139 MG/DL (ref 70–110)
POCT GLUCOSE: 150 MG/DL (ref 70–110)
POCT GLUCOSE: 153 MG/DL (ref 70–110)
POCT GLUCOSE: 176 MG/DL (ref 70–110)
POCT GLUCOSE: 200 MG/DL (ref 70–110)
POCT GLUCOSE: 209 MG/DL (ref 70–110)
POCT GLUCOSE: 227 MG/DL (ref 70–110)
POCT GLUCOSE: 232 MG/DL (ref 70–110)
POCT GLUCOSE: 256 MG/DL (ref 70–110)
POCT GLUCOSE: 282 MG/DL (ref 70–110)
POCT GLUCOSE: 296 MG/DL (ref 70–110)
POCT GLUCOSE: 72 MG/DL (ref 70–110)
POCT GLUCOSE: 76 MG/DL (ref 70–110)
POCT GLUCOSE: 83 MG/DL (ref 70–110)
POCT GLUCOSE: 84 MG/DL (ref 70–110)
POCT GLUCOSE: 88 MG/DL (ref 70–110)
POCT GLUCOSE: 95 MG/DL (ref 70–110)
POCT GLUCOSE: 98 MG/DL (ref 70–110)
POTASSIUM SERPL-SCNC: 3.7 MMOL/L (ref 3.5–5.1)
POTASSIUM SERPL-SCNC: 4.2 MMOL/L (ref 3.5–5.1)
RBC # BLD AUTO: 4.69 M/UL (ref 4–5.4)
SODIUM SERPL-SCNC: 139 MMOL/L (ref 136–145)
WBC # BLD AUTO: 8.5 K/UL (ref 3.9–12.7)

## 2020-09-28 PROCEDURE — 19303 PR MASTECTOMY, SIMPLE, COMPLETE: ICD-10-PCS | Mod: RT,,, | Performed by: SURGERY

## 2020-09-28 PROCEDURE — G0378 HOSPITAL OBSERVATION PER HR: HCPCS

## 2020-09-28 PROCEDURE — 38525 BIOPSY/REMOVAL LYMPH NODES: CPT | Mod: 51,RT,, | Performed by: SURGERY

## 2020-09-28 PROCEDURE — 63600175 PHARM REV CODE 636 W HCPCS: Mod: JG | Performed by: SURGERY

## 2020-09-28 PROCEDURE — 88342 CHG IMMUNOCYTOCHEMISTRY: ICD-10-PCS | Mod: 26,,, | Performed by: PATHOLOGY

## 2020-09-28 PROCEDURE — 94761 N-INVAS EAR/PLS OXIMETRY MLT: CPT

## 2020-09-28 PROCEDURE — 38525 PR BIOPSY/REM LYMPH NODES, AXILLARY: ICD-10-PCS | Mod: 51,RT,, | Performed by: SURGERY

## 2020-09-28 PROCEDURE — 88307 PR  SURG PATH,LEVEL V: ICD-10-PCS | Mod: 26,,, | Performed by: PATHOLOGY

## 2020-09-28 PROCEDURE — 36000706: Performed by: SURGERY

## 2020-09-28 PROCEDURE — 71000039 HC RECOVERY, EACH ADD'L HOUR: Performed by: SURGERY

## 2020-09-28 PROCEDURE — 38900 PR INTRAOPERATIVE SENTINEL LYMPH NODE ID W DYE INJECTION: ICD-10-PCS | Mod: RT,,, | Performed by: SURGERY

## 2020-09-28 PROCEDURE — 88307 TISSUE EXAM BY PATHOLOGIST: CPT | Mod: 26,,, | Performed by: PATHOLOGY

## 2020-09-28 PROCEDURE — 84100 ASSAY OF PHOSPHORUS: CPT

## 2020-09-28 PROCEDURE — 63600175 PHARM REV CODE 636 W HCPCS: Performed by: REGISTERED NURSE

## 2020-09-28 PROCEDURE — 88331 PATH CONSLTJ SURG 1 BLK 1SPC: CPT | Performed by: PATHOLOGY

## 2020-09-28 PROCEDURE — 63600175 PHARM REV CODE 636 W HCPCS: Performed by: ANESTHESIOLOGY

## 2020-09-28 PROCEDURE — 96361 HYDRATE IV INFUSION ADD-ON: CPT | Performed by: HOSPITALIST

## 2020-09-28 PROCEDURE — 36000707: Performed by: SURGERY

## 2020-09-28 PROCEDURE — 25000003 PHARM REV CODE 250: Performed by: ANESTHESIOLOGY

## 2020-09-28 PROCEDURE — 96376 TX/PRO/DX INJ SAME DRUG ADON: CPT | Performed by: HOSPITALIST

## 2020-09-28 PROCEDURE — 88331 PR  PATH CONSULT IN SURG,W FRZ SEC: ICD-10-PCS | Mod: 26,,, | Performed by: PATHOLOGY

## 2020-09-28 PROCEDURE — 88331 PATH CONSLTJ SURG 1 BLK 1SPC: CPT | Mod: 26,,, | Performed by: PATHOLOGY

## 2020-09-28 PROCEDURE — 80048 BASIC METABOLIC PNL TOTAL CA: CPT

## 2020-09-28 PROCEDURE — 36415 COLL VENOUS BLD VENIPUNCTURE: CPT

## 2020-09-28 PROCEDURE — 88307 TISSUE EXAM BY PATHOLOGIST: CPT | Performed by: PATHOLOGY

## 2020-09-28 PROCEDURE — 88341 IMHCHEM/IMCYTCHM EA ADD ANTB: CPT | Mod: 59 | Performed by: PATHOLOGY

## 2020-09-28 PROCEDURE — 63600175 PHARM REV CODE 636 W HCPCS: Performed by: HOSPITALIST

## 2020-09-28 PROCEDURE — 71000033 HC RECOVERY, INTIAL HOUR: Performed by: SURGERY

## 2020-09-28 PROCEDURE — 99226 PR SUBSEQUENT OBSERVATION CARE,LEVEL III: ICD-10-PCS | Mod: ,,, | Performed by: HOSPITALIST

## 2020-09-28 PROCEDURE — 63600175 PHARM REV CODE 636 W HCPCS: Performed by: NURSE PRACTITIONER

## 2020-09-28 PROCEDURE — 25000003 PHARM REV CODE 250: Performed by: REGISTERED NURSE

## 2020-09-28 PROCEDURE — 96375 TX/PRO/DX INJ NEW DRUG ADDON: CPT | Mod: 59 | Performed by: HOSPITALIST

## 2020-09-28 PROCEDURE — 96365 THER/PROPH/DIAG IV INF INIT: CPT | Performed by: HOSPITALIST

## 2020-09-28 PROCEDURE — 83735 ASSAY OF MAGNESIUM: CPT

## 2020-09-28 PROCEDURE — 38900 IO MAP OF SENT LYMPH NODE: CPT | Mod: RT,,, | Performed by: SURGERY

## 2020-09-28 PROCEDURE — 96372 THER/PROPH/DIAG INJ SC/IM: CPT | Mod: 59 | Performed by: HOSPITALIST

## 2020-09-28 PROCEDURE — 63600175 PHARM REV CODE 636 W HCPCS: Performed by: INTERNAL MEDICINE

## 2020-09-28 PROCEDURE — 99226 PR SUBSEQUENT OBSERVATION CARE,LEVEL III: CPT | Mod: ,,, | Performed by: HOSPITALIST

## 2020-09-28 PROCEDURE — 38792 RA TRACER ID OF SENTINL NODE: CPT | Mod: 59,RT,, | Performed by: SURGERY

## 2020-09-28 PROCEDURE — 25000003 PHARM REV CODE 250: Performed by: HOSPITALIST

## 2020-09-28 PROCEDURE — S4991 NICOTINE PATCH NONLEGEND: HCPCS | Performed by: HOSPITALIST

## 2020-09-28 PROCEDURE — 88342 IMHCHEM/IMCYTCHM 1ST ANTB: CPT | Performed by: PATHOLOGY

## 2020-09-28 PROCEDURE — 85025 COMPLETE CBC W/AUTO DIFF WBC: CPT

## 2020-09-28 PROCEDURE — 88342 IMHCHEM/IMCYTCHM 1ST ANTB: CPT | Mod: 26,,, | Performed by: PATHOLOGY

## 2020-09-28 PROCEDURE — 88341 PR IHC OR ICC EACH ADD'L SINGLE ANTIBODY  STAINPR: ICD-10-PCS | Mod: 26,,, | Performed by: PATHOLOGY

## 2020-09-28 PROCEDURE — 88341 IMHCHEM/IMCYTCHM EA ADD ANTB: CPT | Mod: 26,,, | Performed by: PATHOLOGY

## 2020-09-28 PROCEDURE — 38792 PR IDENTIFY SENTINEL 2DE: ICD-10-PCS | Mod: 59,RT,, | Performed by: SURGERY

## 2020-09-28 PROCEDURE — 37000009 HC ANESTHESIA EA ADD 15 MINS: Performed by: SURGERY

## 2020-09-28 PROCEDURE — 96366 THER/PROPH/DIAG IV INF ADDON: CPT | Mod: 59 | Performed by: HOSPITALIST

## 2020-09-28 PROCEDURE — 82962 GLUCOSE BLOOD TEST: CPT | Performed by: SURGERY

## 2020-09-28 PROCEDURE — 37000008 HC ANESTHESIA 1ST 15 MINUTES: Performed by: SURGERY

## 2020-09-28 PROCEDURE — 27201423 OPTIME MED/SURG SUP & DEVICES STERILE SUPPLY: Performed by: SURGERY

## 2020-09-28 PROCEDURE — 19303 MAST SIMPLE COMPLETE: CPT | Mod: RT,,, | Performed by: SURGERY

## 2020-09-28 PROCEDURE — 25000003 PHARM REV CODE 250: Performed by: INTERNAL MEDICINE

## 2020-09-28 PROCEDURE — 25000003 PHARM REV CODE 250: Performed by: SURGERY

## 2020-09-28 PROCEDURE — C9399 UNCLASSIFIED DRUGS OR BIOLOG: HCPCS | Performed by: HOSPITALIST

## 2020-09-28 RX ORDER — HYDROMORPHONE HYDROCHLORIDE 2 MG/ML
0.4 INJECTION, SOLUTION INTRAMUSCULAR; INTRAVENOUS; SUBCUTANEOUS EVERY 5 MIN PRN
Status: DISCONTINUED | OUTPATIENT
Start: 2020-09-28 | End: 2020-10-01

## 2020-09-28 RX ORDER — DEXTROSE MONOHYDRATE AND SODIUM CHLORIDE 5; .9 G/100ML; G/100ML
INJECTION, SOLUTION INTRAVENOUS CONTINUOUS
Status: DISCONTINUED | OUTPATIENT
Start: 2020-09-28 | End: 2020-09-28

## 2020-09-28 RX ORDER — DEXTROSE MONOHYDRATE, SODIUM CHLORIDE, AND POTASSIUM CHLORIDE 50; 1.49; 9 G/1000ML; G/1000ML; G/1000ML
INJECTION, SOLUTION INTRAVENOUS CONTINUOUS
Status: DISPENSED | OUTPATIENT
Start: 2020-09-28 | End: 2020-09-28

## 2020-09-28 RX ORDER — SODIUM CHLORIDE, SODIUM LACTATE, POTASSIUM CHLORIDE, CALCIUM CHLORIDE 600; 310; 30; 20 MG/100ML; MG/100ML; MG/100ML; MG/100ML
INJECTION, SOLUTION INTRAVENOUS CONTINUOUS PRN
Status: DISCONTINUED | OUTPATIENT
Start: 2020-09-28 | End: 2020-09-28

## 2020-09-28 RX ORDER — FENTANYL CITRATE 50 UG/ML
INJECTION, SOLUTION INTRAMUSCULAR; INTRAVENOUS
Status: DISCONTINUED | OUTPATIENT
Start: 2020-09-28 | End: 2020-09-28

## 2020-09-28 RX ORDER — SODIUM CHLORIDE 0.9 % (FLUSH) 0.9 %
3 SYRINGE (ML) INJECTION
Status: DISCONTINUED | OUTPATIENT
Start: 2020-09-28 | End: 2020-10-02 | Stop reason: HOSPADM

## 2020-09-28 RX ORDER — INSULIN ASPART 100 [IU]/ML
8 INJECTION, SOLUTION INTRAVENOUS; SUBCUTANEOUS
Status: DISCONTINUED | OUTPATIENT
Start: 2020-09-28 | End: 2020-09-29

## 2020-09-28 RX ORDER — HYDROCODONE BITARTRATE AND ACETAMINOPHEN 5; 325 MG/1; MG/1
1 TABLET ORAL EVERY 4 HOURS PRN
Status: DISCONTINUED | OUTPATIENT
Start: 2020-09-28 | End: 2020-10-02 | Stop reason: HOSPADM

## 2020-09-28 RX ORDER — ENALAPRILAT 1.25 MG/ML
1.25 INJECTION INTRAVENOUS EVERY 6 HOURS
Status: DISCONTINUED | OUTPATIENT
Start: 2020-09-28 | End: 2020-09-28

## 2020-09-28 RX ORDER — IBUPROFEN 200 MG
16 TABLET ORAL
Status: DISCONTINUED | OUTPATIENT
Start: 2020-09-28 | End: 2020-10-02 | Stop reason: HOSPADM

## 2020-09-28 RX ORDER — GLUCAGON 1 MG
1 KIT INJECTION
Status: DISCONTINUED | OUTPATIENT
Start: 2020-09-28 | End: 2020-10-02 | Stop reason: HOSPADM

## 2020-09-28 RX ORDER — ONDANSETRON 2 MG/ML
INJECTION INTRAMUSCULAR; INTRAVENOUS
Status: DISCONTINUED | OUTPATIENT
Start: 2020-09-28 | End: 2020-09-28

## 2020-09-28 RX ORDER — ISOSULFAN BLUE 50 MG/5ML
INJECTION, SOLUTION SUBCUTANEOUS
Status: DISCONTINUED | OUTPATIENT
Start: 2020-09-28 | End: 2020-09-28 | Stop reason: HOSPADM

## 2020-09-28 RX ORDER — ONDANSETRON 2 MG/ML
4 INJECTION INTRAMUSCULAR; INTRAVENOUS DAILY PRN
Status: DISCONTINUED | OUTPATIENT
Start: 2020-09-28 | End: 2020-10-01

## 2020-09-28 RX ORDER — PHENYLEPHRINE HYDROCHLORIDE 10 MG/ML
INJECTION INTRAVENOUS
Status: DISCONTINUED | OUTPATIENT
Start: 2020-09-28 | End: 2020-09-28

## 2020-09-28 RX ORDER — INSULIN ASPART 100 [IU]/ML
1-10 INJECTION, SOLUTION INTRAVENOUS; SUBCUTANEOUS
Status: DISCONTINUED | OUTPATIENT
Start: 2020-09-28 | End: 2020-10-02 | Stop reason: HOSPADM

## 2020-09-28 RX ORDER — ROCURONIUM BROMIDE 10 MG/ML
INJECTION, SOLUTION INTRAVENOUS
Status: DISCONTINUED | OUTPATIENT
Start: 2020-09-28 | End: 2020-09-28

## 2020-09-28 RX ORDER — IBUPROFEN 200 MG
24 TABLET ORAL
Status: DISCONTINUED | OUTPATIENT
Start: 2020-09-28 | End: 2020-10-02 | Stop reason: HOSPADM

## 2020-09-28 RX ORDER — SUCCINYLCHOLINE CHLORIDE 20 MG/ML
INJECTION INTRAMUSCULAR; INTRAVENOUS
Status: DISCONTINUED | OUTPATIENT
Start: 2020-09-28 | End: 2020-09-28

## 2020-09-28 RX ORDER — DIPHENHYDRAMINE HYDROCHLORIDE 50 MG/ML
12.5 INJECTION INTRAMUSCULAR; INTRAVENOUS EVERY 30 MIN PRN
Status: DISCONTINUED | OUTPATIENT
Start: 2020-09-28 | End: 2020-10-01

## 2020-09-28 RX ORDER — LIDOCAINE HCL/PF 100 MG/5ML
SYRINGE (ML) INTRAVENOUS
Status: DISCONTINUED | OUTPATIENT
Start: 2020-09-28 | End: 2020-09-28

## 2020-09-28 RX ORDER — PROPOFOL 10 MG/ML
INJECTION, EMULSION INTRAVENOUS
Status: DISCONTINUED | OUTPATIENT
Start: 2020-09-28 | End: 2020-09-28

## 2020-09-28 RX ORDER — METOPROLOL TARTRATE 1 MG/ML
5 INJECTION, SOLUTION INTRAVENOUS EVERY 6 HOURS
Status: DISCONTINUED | OUTPATIENT
Start: 2020-09-28 | End: 2020-09-28

## 2020-09-28 RX ORDER — MUPIROCIN 20 MG/G
OINTMENT TOPICAL 2 TIMES DAILY
Status: DISCONTINUED | OUTPATIENT
Start: 2020-09-28 | End: 2020-10-02 | Stop reason: HOSPADM

## 2020-09-28 RX ORDER — OXYCODONE HYDROCHLORIDE 5 MG/1
5 TABLET ORAL
Status: DISCONTINUED | OUTPATIENT
Start: 2020-09-28 | End: 2020-10-01

## 2020-09-28 RX ORDER — ENALAPRILAT 1.25 MG/ML
1.25 INJECTION INTRAVENOUS EVERY 6 HOURS
Status: DISPENSED | OUTPATIENT
Start: 2020-09-28 | End: 2020-09-29

## 2020-09-28 RX ADMIN — INSULIN ASPART 3 UNITS: 100 INJECTION, SOLUTION INTRAVENOUS; SUBCUTANEOUS at 11:09

## 2020-09-28 RX ADMIN — ONDANSETRON 8 MG: 2 INJECTION INTRAMUSCULAR; INTRAVENOUS at 04:09

## 2020-09-28 RX ADMIN — PROPOFOL 50 MG: 10 INJECTION, EMULSION INTRAVENOUS at 11:09

## 2020-09-28 RX ADMIN — SODIUM CHLORIDE 2 UNITS/HR: 9 INJECTION, SOLUTION INTRAVENOUS at 02:09

## 2020-09-28 RX ADMIN — PRAVASTATIN SODIUM 10 MG: 10 TABLET ORAL at 08:09

## 2020-09-28 RX ADMIN — INSULIN ASPART 8 UNITS: 100 INJECTION, SOLUTION INTRAVENOUS; SUBCUTANEOUS at 05:09

## 2020-09-28 RX ADMIN — ENALAPRILAT 1.25 MG: 1.25 INJECTION INTRAVENOUS at 11:09

## 2020-09-28 RX ADMIN — MUPIROCIN: 20 OINTMENT TOPICAL at 08:09

## 2020-09-28 RX ADMIN — ENALAPRILAT 1.25 MG: 1.25 INJECTION INTRAVENOUS at 05:09

## 2020-09-28 RX ADMIN — CITALOPRAM HYDROBROMIDE 10 MG: 10 TABLET, FILM COATED ORAL at 08:09

## 2020-09-28 RX ADMIN — SUCCINYLCHOLINE CHLORIDE 100 MG: 20 INJECTION, SOLUTION INTRAMUSCULAR; INTRAVENOUS at 11:09

## 2020-09-28 RX ADMIN — FENTANYL CITRATE 50 MCG: 50 INJECTION, SOLUTION INTRAMUSCULAR; INTRAVENOUS at 11:09

## 2020-09-28 RX ADMIN — PHENYLEPHRINE HYDROCHLORIDE 160 MCG: 10 INJECTION INTRAVENOUS at 12:09

## 2020-09-28 RX ADMIN — INSULIN DETEMIR 20 UNITS: 100 INJECTION, SOLUTION SUBCUTANEOUS at 08:09

## 2020-09-28 RX ADMIN — LIDOCAINE HYDROCHLORIDE 50 MG: 20 INJECTION, SOLUTION INTRAVENOUS at 11:09

## 2020-09-28 RX ADMIN — SODIUM CHLORIDE 3 UNITS/HR: 9 INJECTION, SOLUTION INTRAVENOUS at 01:09

## 2020-09-28 RX ADMIN — HYDROCODONE BITARTRATE AND ACETAMINOPHEN 1 TABLET: 5; 325 TABLET ORAL at 08:09

## 2020-09-28 RX ADMIN — Medication 1 PATCH: at 08:09

## 2020-09-28 RX ADMIN — PHENYLEPHRINE HYDROCHLORIDE 100 MCG: 10 INJECTION INTRAVENOUS at 12:09

## 2020-09-28 RX ADMIN — POTASSIUM CHLORIDE, DEXTROSE MONOHYDRATE AND SODIUM CHLORIDE: 150; 5; 900 INJECTION, SOLUTION INTRAVENOUS at 08:09

## 2020-09-28 RX ADMIN — SODIUM CHLORIDE, SODIUM LACTATE, POTASSIUM CHLORIDE, AND CALCIUM CHLORIDE: 600; 310; 30; 20 INJECTION, SOLUTION INTRAVENOUS at 11:09

## 2020-09-28 RX ADMIN — PHENYLEPHRINE HYDROCHLORIDE 100 MCG: 10 INJECTION INTRAVENOUS at 11:09

## 2020-09-28 RX ADMIN — DEXTROSE 2 G: 50 INJECTION, SOLUTION INTRAVENOUS at 11:09

## 2020-09-28 RX ADMIN — DEXTROSE AND SODIUM CHLORIDE: 5; .9 INJECTION, SOLUTION INTRAVENOUS at 03:09

## 2020-09-28 RX ADMIN — AMITRIPTYLINE HYDROCHLORIDE 75 MG: 25 TABLET, FILM COATED ORAL at 08:09

## 2020-09-28 RX ADMIN — PHENYLEPHRINE HYDROCHLORIDE 0.4 MCG/KG/MIN: 10 INJECTION INTRAVENOUS at 12:09

## 2020-09-28 RX ADMIN — PHENYLEPHRINE HYDROCHLORIDE 200 MCG: 10 INJECTION INTRAVENOUS at 11:09

## 2020-09-28 RX ADMIN — ENALAPRILAT 1.25 MG: 1.25 INJECTION INTRAVENOUS at 01:09

## 2020-09-28 RX ADMIN — PROMETHAZINE HYDROCHLORIDE 6.25 MG: 25 INJECTION INTRAMUSCULAR; INTRAVENOUS at 02:09

## 2020-09-28 RX ADMIN — SODIUM CHLORIDE 2.3 UNITS/HR: 9 INJECTION, SOLUTION INTRAVENOUS at 01:09

## 2020-09-28 RX ADMIN — ENALAPRILAT 1.25 MG: 1.25 INJECTION INTRAVENOUS at 06:09

## 2020-09-28 RX ADMIN — ROCURONIUM BROMIDE 5 MG: 10 INJECTION, SOLUTION INTRAVENOUS at 11:09

## 2020-09-28 RX ADMIN — PHENYLEPHRINE HYDROCHLORIDE 200 MCG: 10 INJECTION INTRAVENOUS at 12:09

## 2020-09-28 RX ADMIN — CARBOXYMETHYLCELLULOSE SODIUM 1 DROP: 2.5 SOLUTION/ DROPS OPHTHALMIC at 11:09

## 2020-09-28 RX ADMIN — MINERAL OIL AND WHITE PETROLATUM 1 TUBE: 30; 940 OINTMENT OPHTHALMIC at 11:09

## 2020-09-28 RX ADMIN — ONDANSETRON HYDROCHLORIDE 4 MG: 2 INJECTION INTRAMUSCULAR; INTRAVENOUS at 12:09

## 2020-09-28 NOTE — NURSING
mg/dL. Insulin gtt restarted per MD order and rate adjusted per insulin protocol. Will continue to monitor.

## 2020-09-28 NOTE — ANESTHESIA PROCEDURE NOTES
Intubation  Performed by: Ingris Jackson CRNA  Authorized by: Alison Grant MD     Intubation:     Induction:  Intravenous    Intubated:  Postinduction    Mask Ventilation:  Easy mask    Attempts:  1    Attempted By:  CRNA    Method of Intubation:  Video laryngoscopy    Blade:  Hunter 3    Laryngeal View Grade: Grade IIA - cords partially seen      Difficult Airway Encountered?: No      Complications:  None    Airway Device:  Oral endotracheal tube    Airway Device Size:  7.0    Style/Cuff Inflation:  Cuffed (inflated to minimal occlusive pressure)    Inflation Amount (mL):  4    Tube secured:  21    Secured at:  The lips    Placement Verified By:  Capnometry    Complicating Factors:  Anterior larynx    Findings Post-Intubation:  BS equal bilateral and atraumatic/condition of teeth unchanged

## 2020-09-28 NOTE — PROGRESS NOTES
[facility] Hospital Medicine  Progress Note    Patient Name: Darlene Avila  MRN: [id number]  Patient Class: OP- Observation   Admission Date: 9/25/2020  Length of Stay: 0 days  Attending Physician: Hamilton Salguero MD  Primary Care Provider: Kirill Cabrrea Iii, MD        Subjective:     Principal Problem:Poorly controlled type 2 diabetes mellitus with complication        HPI:  Patient is a 69 year-old woman with hypertension, poorly controlled diabetes mellitus type II, prior transient ischemic attack, current tobacco and recently diagnosed with right-sided breast cancer currently on anastrozole with plan for mastectomy today with Dr. Krysta Clark.  Surgery however cancelled due to hyperglycemia. Surgery has already been delay due to COVID pandemic.  Dr. Clark requesting evaluation by hospital medicine.  Patient reports non-compliance with diabetic diet.    Overview/Hospital Course:  Patient is a 69 year-old woman with hypertension, poorly controlled diabetes mellitus type II, prior transient ischemic attack, current tobacco smoker with breast cancer who has had delay in undergoing a mastectomy to COVID pandemic and again now due to hyperglycemia resulting in cancellation of surgery on Friday.  Patient admitted on the hospital under observation to medically optimize her for surgery anticipated on Monday. Despite efforts to adjust her insulin regimen for better glycemic control she continued to have high capillary blood glucose readings intermixed with some low insulin readings.  Patient transferred to the intensive care unit select she was started on continuous insulin infusion to help regulate her diabetes in the perioperative period.  NPO for surgery later today.    Interval History:  Efforts to achieve reasonable glycemic control with subcutaneous insulin successful.  Patient started on insulin infusion to achieve reasonable glycemic control during the perioperative period.    Review of  Systems   Constitutional: Negative for chills and fever.   Respiratory: Negative for shortness of breath and wheezing.    Cardiovascular: Negative for chest pain.   Gastrointestinal: Positive for diarrhea. Negative for abdominal distention, abdominal pain, constipation, nausea and vomiting.   Genitourinary: Negative for dysuria and frequency.   Musculoskeletal: Negative for arthralgias and myalgias.   Neurological: Negative for light-headedness.   Psychiatric/Behavioral: Negative for agitation and confusion.     Objective:     Vital Signs (Most Recent):  Temp: 97.8 °F (36.6 °C) (09/28/20 0315)  Pulse: 94 (09/28/20 0500)  Resp: (!) 23 (09/28/20 0500)  BP: (!) 179/84 (09/28/20 0500)  SpO2: 100 % (09/28/20 0500) Vital Signs (24h Range):  Temp:  [97.1 °F (36.2 °C)-98.6 °F (37 °C)] 97.8 °F (36.6 °C)  Pulse:  [] 94  Resp:  [10-23] 23  SpO2:  [96 %-100 %] 100 %  BP: (117-193)/(65-92) 179/84     Weight: 58.2 kg (128 lb 4.9 oz)  Body mass index is 25.91 kg/m².    Intake/Output Summary (Last 24 hours) at 9/28/2020 0623  Last data filed at 9/28/2020 0536  Gross per 24 hour   Intake 188.35 ml   Output 650 ml   Net -461.65 ml      Physical Exam  Constitutional:       General: She is not in acute distress.     Appearance: She is well-developed.   HENT:      Head: Atraumatic.   Eyes:      Conjunctiva/sclera: Conjunctivae normal.   Neck:      Musculoskeletal: Neck supple.   Cardiovascular:      Rate and Rhythm: Normal rate and regular rhythm.      Heart sounds: Normal heart sounds. No murmur.   Pulmonary:      Effort: Pulmonary effort is normal.      Breath sounds: Normal breath sounds. No wheezing.   Abdominal:      General: Bowel sounds are normal. There is no distension.      Palpations: Abdomen is soft.      Tenderness: There is no abdominal tenderness.   Musculoskeletal: Normal range of motion.         General: No deformity.   Neurological:      Mental Status: She is alert and oriented to person, place, and time.          Significant Labs: All pertinent labs within the past 24 hours have been reviewed.    Significant Imaging: I have reviewed all pertinent imaging results/findings within the past 24 hours.      Assessment/Plan:      * Poorly controlled type 2 diabetes mellitus with complication  Poorly controlled diabetes based on hemoglobin A1c of 13.1%.  Adjusted subcutaneous insulin regimen further for better glycemic control.  Patient has been noncompliant with diabetic diet and has poor insight on how to manage her diabetes.  She is also challenged by somewhat brittle diabetes as well.  Will ask diabetic educator to speak with patient.  Efforts to reasonably control her diabetes utilizing subcutaneous insulin have been unsuccessful.  Patient transferred to the intensive care unit started on continuous infusion in order to achieve reasonable glycemic control during the perioperative period.  Continue with continuous insulin infusion protocol.    Malignant neoplasm of upper-outer quadrant of right breast in female, estrogen receptor positive  Patient has had delays in surgery related to obtaining medical clearance, COVID-19 pandemic, and now recent cancellation of surgery due to hyperglycemia.  Started on insulin infusion overnight to achieve reasonable glycemic control during the perioperative period.    Essential hypertension  Started on enalaprilat 1.25 mg every 6 hours overnight.  Will continue for now and monitor blood pressure and adjust further if need.  Will avoid tight blood pressure control prior to surgery.    Chronic diarrhea  Stable.  Continue with home regimen.    Tobacco abuse  Patient counseled on the importance of smoking cessation.  Started on nicotine replacement.      DVT prophylaxis.  Thrombo Embolic Deterrent hose (KATHRIN® hose) and sequential compression devices for now pending surgery.      Hamilton Salguero MD  Department of Hospital Medicine   [facility]

## 2020-09-28 NOTE — NURSING
Per Dr. Barron keep BG less than 200 mg/dL. Last  mg/dL.  Insulin gtt turned off.  Will continue to monitor BG Q1h per order.

## 2020-09-28 NOTE — PLAN OF CARE
"Plan of care reviewed with patient. Verbalized understanding. AAOx4, repetitive health related questions, forgetful, vss, and respirations equal and unlabored. Complains of diarrhea.PRN lomotil administered. Glucose and bp monitored. Pt tolerating diet. Safety maintained. No falls or injuries this shift. Wheels locked, bed in the lowest position, personal items and call light with in reach. Pt instructed to call with all needs. Patient states," I have had multiple fall prior to admission. I fell outside the doctors office and scraped my left ankle". Pt educated on fall safety. No needs at this time. No acute distress this shift. Will continue to monitor.  "

## 2020-09-28 NOTE — TRANSFER OF CARE
"Anesthesia Transfer of Care Note    Patient: Darlene Avila    Procedure(s) Performed: Procedure(s) (LRB):  MASTECTOMY-Right (Right)  BIOPSY, LYMPH NODE, SENTINEL-Right (Right)    Patient location: PACU    Anesthesia Type: general    Transport from OR: Transported from OR on room air with adequate spontaneous ventilation    Post pain: adequate analgesia    Post assessment: no apparent anesthetic complications    Post vital signs: stable    Level of consciousness: awake    Nausea/Vomiting: no nausea/vomiting    Complications: none    Transfer of care protocol was followed      Last vitals:   Visit Vitals  BP (!) 144/65   Pulse 92   Temp 36.6 °C (97.8 °F) (Oral)   Resp 14   Ht 4' 11" (1.499 m)   Wt 58.2 kg (128 lb 4.9 oz)   SpO2 98%   Breastfeeding No   BMI 25.91 kg/m²     "

## 2020-09-28 NOTE — SUBJECTIVE & OBJECTIVE
Interval History:  Efforts to achieve reasonable glycemic control with subcutaneous insulin successful.  Patient started on insulin infusion to achieve reasonable glycemic control during the perioperative period.    Review of Systems   Constitutional: Negative for chills and fever.   Respiratory: Negative for shortness of breath and wheezing.    Cardiovascular: Negative for chest pain.   Gastrointestinal: Positive for diarrhea. Negative for abdominal distention, abdominal pain, constipation, nausea and vomiting.   Genitourinary: Negative for dysuria and frequency.   Musculoskeletal: Negative for arthralgias and myalgias.   Neurological: Negative for light-headedness.   Psychiatric/Behavioral: Negative for agitation and confusion.     Objective:     Vital Signs (Most Recent):  Temp: 97.8 °F (36.6 °C) (09/28/20 0315)  Pulse: 94 (09/28/20 0500)  Resp: (!) 23 (09/28/20 0500)  BP: (!) 179/84 (09/28/20 0500)  SpO2: 100 % (09/28/20 0500) Vital Signs (24h Range):  Temp:  [97.1 °F (36.2 °C)-98.6 °F (37 °C)] 97.8 °F (36.6 °C)  Pulse:  [] 94  Resp:  [10-23] 23  SpO2:  [96 %-100 %] 100 %  BP: (117-193)/(65-92) 179/84     Weight: 58.2 kg (128 lb 4.9 oz)  Body mass index is 25.91 kg/m².    Intake/Output Summary (Last 24 hours) at 9/28/2020 0623  Last data filed at 9/28/2020 0536  Gross per 24 hour   Intake 188.35 ml   Output 650 ml   Net -461.65 ml      Physical Exam  Constitutional:       General: She is not in acute distress.     Appearance: She is well-developed.   HENT:      Head: Atraumatic.   Eyes:      Conjunctiva/sclera: Conjunctivae normal.   Neck:      Musculoskeletal: Neck supple.   Cardiovascular:      Rate and Rhythm: Normal rate and regular rhythm.      Heart sounds: Normal heart sounds. No murmur.   Pulmonary:      Effort: Pulmonary effort is normal.      Breath sounds: Normal breath sounds. No wheezing.   Abdominal:      General: Bowel sounds are normal. There is no distension.      Palpations: Abdomen is  soft.      Tenderness: There is no abdominal tenderness.   Musculoskeletal: Normal range of motion.         General: No deformity.   Neurological:      Mental Status: She is alert and oriented to person, place, and time.         Significant Labs: All pertinent labs within the past 24 hours have been reviewed.    Significant Imaging: I have reviewed all pertinent imaging results/findings within the past 24 hours.

## 2020-09-28 NOTE — PLAN OF CARE
Pt received as transfer from floor for insulin gtt. Insulin gtt initiated per orders, Dr. Vargas from [facility] in room. New orders noted. Q1H CBG preformed. 0400 CBG 84 insulin gtt suspended per insulin adjustment nomogram ordered. Q15 CBG's in progress until value >/= to 100, insulin gtt remains suspended at this time. Plan for pt to go to surgery this AM. Safety measures in place, will continue to monitor.

## 2020-09-28 NOTE — ANESTHESIA POSTPROCEDURE EVALUATION
Anesthesia Post Evaluation    Patient: Darlene Avila    Procedure(s) Performed: Procedure(s) (LRB):  MASTECTOMY-Right (Right)  BIOPSY, LYMPH NODE, SENTINEL-Right (Right)    Final Anesthesia Type: general    Patient location during evaluation: PACU  Patient participation: Yes- Able to Participate  Level of consciousness: awake and alert  Post-procedure vital signs: reviewed and stable  Pain management: adequate  Airway patency: patent    PONV status at discharge: No PONV  Anesthetic complications: no      Cardiovascular status: blood pressure returned to baseline and stable  Respiratory status: unassisted, spontaneous ventilation and room air  Hydration status: euvolemic  Follow-up not needed.  Comments: Pt in ICU preop for insulin qqt. Brought to PACU post op in anticipation of d/c drip and sending to the floor; however, hospital medicine preferred her to go back to ICU to transition off insulin qqt. Weaned down to 2 units/hr insulin and glucose from 227 to 153.           Vitals Value Taken Time   /75 09/28/20 1601   Temp 36.7 °C (98.1 °F) 09/28/20 1545   Pulse 94 09/28/20 1643   Resp 21 09/28/20 1643   SpO2 98 % 09/28/20 1643   Vitals shown include unvalidated device data.      Event Time   Out of Recovery 09/28/2020 15:31:00         Pain/Shira Score: Shira Score: 10 (9/28/2020  3:10 PM)

## 2020-09-28 NOTE — PROGRESS NOTES
1330 dr wall at bedside. Notified pt BG is 173. Order received to decrease insulin drip to 2.3 units/hr.  1430 dr wall at [facility]. Notified that pt BG is 153. Order received to decrease insulin to 2 units/hr.

## 2020-09-28 NOTE — ASSESSMENT & PLAN NOTE
Poorly controlled diabetes based on hemoglobin A1c of 13.1%.  Adjusted subcutaneous insulin regimen further for better glycemic control.  Patient has been noncompliant with diabetic diet and has poor insight on how to manage her diabetes.  She is also challenged by somewhat brittle diabetes as well.  Will ask diabetic educator to speak with patient.  Efforts to reasonably control her diabetes utilizing subcutaneous insulin have been unsuccessful.  Patient transferred to the intensive care unit started on continuous infusion in order to achieve reasonable glycemic control during the perioperative period.  Continue with continuous insulin infusion protocol.

## 2020-09-28 NOTE — NURSING
Transferred to ICU via bed to receive insulin drip as ordered by Dr. Barron.  Bedside report given to RN.  Insulin drip being ordered to attempt to get BS under control in order to have surgery as scheduled on today.

## 2020-09-28 NOTE — EICU
69 year old female with poorly controlled HTN/DM admitted preoperative prior to mastectomy for Breast cancer     On camera alert and awake and communicative  LA 94/min,/84,SPO2 100% RA,RR11/min    Cr 1.2  Anion gap 6  Glucose 359    Plan:  -continue insulin infusion  -DNS 75/ml hour to be initiated if BS less than 200mg/dl  -Check glucose hourly  -enalaprilat 1.25 mg IV q 6 hours for BP control  -NPO for procedure in AM

## 2020-09-28 NOTE — ASSESSMENT & PLAN NOTE
Patient has had delays in surgery related to obtaining medical clearance, COVID-19 pandemic, and now recent cancellation of surgery due to hyperglycemia.  Started on insulin infusion overnight to achieve reasonable glycemic control during the perioperative period.

## 2020-09-28 NOTE — ASSESSMENT & PLAN NOTE
Started on enalaprilat 1.25 mg every 6 hours overnight.  Will continue for now and monitor blood pressure and adjust further if need.  Will avoid tight blood pressure control prior to surgery.

## 2020-09-28 NOTE — NURSING
Pt arrived back to room from sx @ 1535. VSS on RA. Insulin gtt and IVF remains infusing. Plan is to transition pt to SubQ insulin @ dinner. R breast incision with abd pads and surgical bra. R breast CORWIN drain noted with bloody drainage. Pt has no complaints at the present time. Only request is to eat. Bed in lowest locked position. NAD noted. Will continue to monitor.

## 2020-09-29 LAB
ANION GAP SERPL CALC-SCNC: 7 MMOL/L (ref 8–16)
BASOPHILS # BLD AUTO: 0.05 K/UL (ref 0–0.2)
BASOPHILS # BLD AUTO: 0.06 K/UL (ref 0–0.2)
BASOPHILS NFR BLD: 0.4 % (ref 0–1.9)
BASOPHILS NFR BLD: 0.4 % (ref 0–1.9)
BUN SERPL-MCNC: 13 MG/DL (ref 8–23)
CALCIUM SERPL-MCNC: 8.8 MG/DL (ref 8.7–10.5)
CHLORIDE SERPL-SCNC: 108 MMOL/L (ref 95–110)
CO2 SERPL-SCNC: 24 MMOL/L (ref 23–29)
CREAT SERPL-MCNC: 0.9 MG/DL (ref 0.5–1.4)
DIFFERENTIAL METHOD: ABNORMAL
DIFFERENTIAL METHOD: ABNORMAL
EOSINOPHIL # BLD AUTO: 0.1 K/UL (ref 0–0.5)
EOSINOPHIL # BLD AUTO: 0.1 K/UL (ref 0–0.5)
EOSINOPHIL NFR BLD: 0.5 % (ref 0–8)
EOSINOPHIL NFR BLD: 0.7 % (ref 0–8)
ERYTHROCYTE [DISTWIDTH] IN BLOOD BY AUTOMATED COUNT: 13.5 % (ref 11.5–14.5)
ERYTHROCYTE [DISTWIDTH] IN BLOOD BY AUTOMATED COUNT: 13.5 % (ref 11.5–14.5)
EST. GFR  (AFRICAN AMERICAN): >60 ML/MIN/1.73 M^2
EST. GFR  (NON AFRICAN AMERICAN): >60 ML/MIN/1.73 M^2
GLUCOSE SERPL-MCNC: 159 MG/DL (ref 70–110)
HCT VFR BLD AUTO: 34.3 % (ref 37–48.5)
HCT VFR BLD AUTO: 37.3 % (ref 37–48.5)
HGB BLD-MCNC: 10.7 G/DL (ref 12–16)
HGB BLD-MCNC: 11.3 G/DL (ref 12–16)
IMM GRANULOCYTES # BLD AUTO: 0.03 K/UL (ref 0–0.04)
IMM GRANULOCYTES # BLD AUTO: 0.04 K/UL (ref 0–0.04)
IMM GRANULOCYTES NFR BLD AUTO: 0.2 % (ref 0–0.5)
IMM GRANULOCYTES NFR BLD AUTO: 0.3 % (ref 0–0.5)
LYMPHOCYTES # BLD AUTO: 3.3 K/UL (ref 1–4.8)
LYMPHOCYTES # BLD AUTO: 4.9 K/UL (ref 1–4.8)
LYMPHOCYTES NFR BLD: 27.2 % (ref 18–48)
LYMPHOCYTES NFR BLD: 36.5 % (ref 18–48)
MAGNESIUM SERPL-MCNC: 1.8 MG/DL (ref 1.6–2.6)
MCH RBC QN AUTO: 26.2 PG (ref 27–31)
MCH RBC QN AUTO: 26.6 PG (ref 27–31)
MCHC RBC AUTO-ENTMCNC: 30.3 G/DL (ref 32–36)
MCHC RBC AUTO-ENTMCNC: 31.2 G/DL (ref 32–36)
MCV RBC AUTO: 85 FL (ref 82–98)
MCV RBC AUTO: 87 FL (ref 82–98)
MONOCYTES # BLD AUTO: 0.7 K/UL (ref 0.3–1)
MONOCYTES # BLD AUTO: 0.8 K/UL (ref 0.3–1)
MONOCYTES NFR BLD: 4.9 % (ref 4–15)
MONOCYTES NFR BLD: 6.4 % (ref 4–15)
NEUTROPHILS # BLD AUTO: 7.7 K/UL (ref 1.8–7.7)
NEUTROPHILS # BLD AUTO: 7.9 K/UL (ref 1.8–7.7)
NEUTROPHILS NFR BLD: 57.4 % (ref 38–73)
NEUTROPHILS NFR BLD: 65.1 % (ref 38–73)
NRBC BLD-RTO: 0 /100 WBC
NRBC BLD-RTO: 0 /100 WBC
PHOSPHATE SERPL-MCNC: 2.6 MG/DL (ref 2.7–4.5)
PLATELET # BLD AUTO: 291 K/UL (ref 150–350)
PLATELET # BLD AUTO: 300 K/UL (ref 150–350)
PMV BLD AUTO: 8.9 FL (ref 9.2–12.9)
PMV BLD AUTO: 9.5 FL (ref 9.2–12.9)
POCT GLUCOSE: 100 MG/DL (ref 70–110)
POCT GLUCOSE: 154 MG/DL (ref 70–110)
POCT GLUCOSE: 175 MG/DL (ref 70–110)
POCT GLUCOSE: 83 MG/DL (ref 70–110)
POTASSIUM SERPL-SCNC: 3.6 MMOL/L (ref 3.5–5.1)
RBC # BLD AUTO: 4.02 M/UL (ref 4–5.4)
RBC # BLD AUTO: 4.31 M/UL (ref 4–5.4)
SODIUM SERPL-SCNC: 139 MMOL/L (ref 136–145)
WBC # BLD AUTO: 12.08 K/UL (ref 3.9–12.7)
WBC # BLD AUTO: 13.37 K/UL (ref 3.9–12.7)

## 2020-09-29 PROCEDURE — 25000003 PHARM REV CODE 250: Performed by: SURGERY

## 2020-09-29 PROCEDURE — 99226 PR SUBSEQUENT OBSERVATION CARE,LEVEL III: CPT | Mod: ,,, | Performed by: INTERNAL MEDICINE

## 2020-09-29 PROCEDURE — 80048 BASIC METABOLIC PNL TOTAL CA: CPT

## 2020-09-29 PROCEDURE — G0378 HOSPITAL OBSERVATION PER HR: HCPCS

## 2020-09-29 PROCEDURE — 96372 THER/PROPH/DIAG INJ SC/IM: CPT | Mod: 59 | Performed by: HOSPITALIST

## 2020-09-29 PROCEDURE — 84100 ASSAY OF PHOSPHORUS: CPT

## 2020-09-29 PROCEDURE — 25000003 PHARM REV CODE 250: Performed by: HOSPITALIST

## 2020-09-29 PROCEDURE — 25000003 PHARM REV CODE 250: Performed by: ANESTHESIOLOGY

## 2020-09-29 PROCEDURE — 99226 PR SUBSEQUENT OBSERVATION CARE,LEVEL III: ICD-10-PCS | Mod: ,,, | Performed by: INTERNAL MEDICINE

## 2020-09-29 PROCEDURE — 94761 N-INVAS EAR/PLS OXIMETRY MLT: CPT

## 2020-09-29 PROCEDURE — 96376 TX/PRO/DX INJ SAME DRUG ADON: CPT | Mod: 59 | Performed by: HOSPITALIST

## 2020-09-29 PROCEDURE — 85025 COMPLETE CBC W/AUTO DIFF WBC: CPT | Mod: 91

## 2020-09-29 PROCEDURE — 36415 COLL VENOUS BLD VENIPUNCTURE: CPT

## 2020-09-29 PROCEDURE — S4991 NICOTINE PATCH NONLEGEND: HCPCS | Performed by: HOSPITALIST

## 2020-09-29 PROCEDURE — 25000003 PHARM REV CODE 250: Performed by: INTERNAL MEDICINE

## 2020-09-29 PROCEDURE — 83735 ASSAY OF MAGNESIUM: CPT

## 2020-09-29 RX ADMIN — INSULIN ASPART 5 UNITS: 100 INJECTION, SOLUTION INTRAVENOUS; SUBCUTANEOUS at 09:09

## 2020-09-29 RX ADMIN — MUPIROCIN: 20 OINTMENT TOPICAL at 10:09

## 2020-09-29 RX ADMIN — ENALAPRILAT 1.25 MG: 1.25 INJECTION INTRAVENOUS at 12:09

## 2020-09-29 RX ADMIN — CITALOPRAM HYDROBROMIDE 10 MG: 10 TABLET, FILM COATED ORAL at 08:09

## 2020-09-29 RX ADMIN — OXYCODONE 5 MG: 5 TABLET ORAL at 11:09

## 2020-09-29 RX ADMIN — Medication 1 PATCH: at 08:09

## 2020-09-29 RX ADMIN — PRAVASTATIN SODIUM 10 MG: 10 TABLET ORAL at 08:09

## 2020-09-29 RX ADMIN — MUPIROCIN: 20 OINTMENT TOPICAL at 09:09

## 2020-09-29 RX ADMIN — AMITRIPTYLINE HYDROCHLORIDE 75 MG: 25 TABLET, FILM COATED ORAL at 09:09

## 2020-09-29 RX ADMIN — HYDROCODONE BITARTRATE AND ACETAMINOPHEN 1 TABLET: 5; 325 TABLET ORAL at 05:09

## 2020-09-29 RX ADMIN — OXYCODONE 5 MG: 5 TABLET ORAL at 10:09

## 2020-09-29 RX ADMIN — INSULIN ASPART 2 UNITS: 100 INJECTION, SOLUTION INTRAVENOUS; SUBCUTANEOUS at 04:09

## 2020-09-29 NOTE — NURSING
"Palliative Care Daily Progress Note:  Chief Complaint:       Chief Complaint   Patient presents with    Dizziness   HPI: Patient is a 69 year-old woman with hypertension, poorly controlled diabetes mellitus type II, prior transient ischemic attack, current tobacco and recently diagnosed with right-sided breast cancer currently on anastrozole with plan for mastectomy today with Dr. Krysta Clark.  Surgery however cancelled due to hyperglycemia. Surgery has already been delay due to COVID   pandemic.  Dr. Clark requesting evaluation by hospital medicine.  Patient reports non-compliance with diabetic diet.    Initial Vital Signs:  Temp: 98.1 °F (36.7 °C) (09/25/20 2008)  Pulse: 103 (09/25/20 2008)  Resp: 20 (09/25/20 2008)  BP: (!) 159/79 (09/25/20 2008)  SpO2: 99 % (09/25/20 2008)    Admission Weight/BMI & Noted Weight/BMI in EMR:    59.6 kg (131 lb 8 oz)     Date: Weight in kg (lbs): BMI: Height:   9/28/2020 58.2 kg (128 lb 4.9 oz)  25.91 kg/m²    4' 11" (149.9 cm)   9/1/2020 56.7 kg (125 lb) 33.79 kg/m²    4' 11" (149.9 cm)   7/22/2020 58.1 kg (128 lb) 25.85 kg/m²    4' 11" (149.9 cm)     Family History      Problem Relation (Age of Onset)     Breast cancer Sister (65)     Cancer Mother, Sister     Cataracts Mother     Colon cancer Mother (82)     Diabetes Mother, Father, Sister, Maternal Uncle, Paternal Uncle, Maternal Grandmother, Maternal Grandfather     Glaucoma Mother     Hypertension Sister, Maternal Grandmother                Tobacco Use    Smoking status: Current Every Day Smoker       Packs/day: 1.50    Smokeless tobacco: Never Used   Substance and Sexual Activity    Alcohol use: No    Drug use: No    Sexual activity: Not Currently     Per Dr. Salguero dated 9/28/20 at 0635:  HPI:  Patient is a 69 year-old woman with hypertension, poorly controlled diabetes mellitus type II, prior transient ischemic attack, current tobacco and recently diagnosed with right-sided breast cancer currently on anastrozole " with plan for mastectomy today with Dr. Krysta Clark.  Surgery however cancelled due to hyperglycemia. Surgery has already been delay due to COVID pandemic.  Dr. Clark requesting evaluation by hospital medicine.  Patient reports non-compliance with diabetic diet.     Overview/Hospital Course:  Patient is a 69 year-old woman with hypertension, poorly controlled diabetes mellitus type II, prior transient ischemic attack, current tobacco smoker with breast cancer who has had delay in undergoing a mastectomy to COVID pandemic and again now due to hyperglycemia resulting in cancellation of surgery on Friday.  Patient admitted on the hospital under observation to medically optimize her for surgery anticipated on Monday. Despite efforts to adjust her insulin regimen for better glycemic control she continued to have high capillary blood glucose readings intermixed with some low insulin readings.  Patient transferred to the intensive care unit select she was started on continuous insulin infusion to help regulate her diabetes in the perioperative period. NPO for surgery later today.     ----------------------------------------------------------------------------------------------------------------------------------------------------------------------  Per ICU RN note dated 9/29/20 at 0503:  Approx 0300 patient called out and RN entered room & pt on floor next to bed. RN x3 transferred patient safely to bed. Patient denies LOC/hitting head. Oriented x4. Only c/o soreness while pointing to R breast/R upper abdominal area. BP 82/48  SpO2 100% on RA. Previous /59 before fall. Seth VALENCIA notified. Repeat /64 and pt brought to CT for stat CT by RN x2. CT results discussed w/ ANNIE Ann. Will hold IV antihypertensive this AM per ANNIE Ann. Incision wnl. CORWIN drain in  place.  -----------------------------------------------------------------------------------------------------------------------------------------------------------------------      S:   Medical record reviewed, followed up with Dr. Dietz regarding patient's condition. Physician's Plan of Care Goal is GOC and AD.         B:   Code Status: Full  Advanced Directives:      Yes/No/Unknown Date Completed: In EMR:   HCPOA Unknown N/A No   Living Will Yes, per CM note dated 9/26/20 at 0920 unknown No   LaPOST Unknown N/A No      Family/Support:   Sister: Sabi Avila ([phone])  Marital Status:  Single  Spiritual Designation:  Islam        Labs:    Ref Range & Units 9/22/20 2mo ago   SARS-CoV2 (COVID-19) Qualitative PCR Not Detected Not Detected  Not Detected CM       Ref Range & Units 9/25/20 2mo ago   POCT Glucose 70 - 110 mg/dL 366High   256High        Ref Range & Units 9/25/20 2mo ago   Hemoglobin A1C 4.0 - 5.6 % 11.4High   13.1High  CM       Ref Range & Units 9/29/20 at 11:23 9/29/20 at 07:40   POCT Glucose 70 - 110 mg/dL 100  83          BMP:   Ref Range & Units 9/25/20 2mo ago   Sodium 136 - 145 mmol/L 136  137    Potassium 3.5 - 5.1 mmol/L 4.9  4.4    Chloride 95 - 110 mmol/L 101  104    CO2 23 - 29 mmol/L 26  23    Glucose 70 - 110 mg/dL 347High   263High     BUN, Bld 8 - 23 mg/dL 21  15    Creatinine 0.5 - 1.4 mg/dL 1.1  0.8    Calcium 8.7 - 10.5 mg/dL 9.9  9.0    Anion Gap 8 - 16 mmol/L 9  10    eGFR if African American >60 mL/min/1.73 m^2 59Abnormal   >60    eGFR if non African American >60 mL/min/1.73 m^2 51Abnormal   >60 CM      CBC:   Ref Range & Units 9/25/20 2mo ago   WBC 3.90 - 12.70 K/uL 9.90  10.42    RBC 4.00 - 5.40 M/uL 5.04  4.63    Hemoglobin 12.0 - 16.0 g/dL 13.2  11.9Low     Hematocrit 37.0 - 48.5 % 42.8  38.9    Mean Corpuscular Volume 82 - 98 fL 85  84    Mean Corpuscular Hemoglobin 27.0 - 31.0 pg 26.2Low   25.7Low     Mean Corpuscular Hemoglobin Conc 32.0 - 36.0 g/dL 30.8Low   30.6Low      RDW 11.5 - 14.5 % 13.7  14.1    Platelets 150 - 350 K/uL 363High   367High     MPV 9.2 - 12.9 fL 9.0Low   9.0Low     Immature Granulocytes 0.0 - 0.5 % 0.2  0.3    Gran # (ANC) 1.8 - 7.7 K/uL 6.4  5.0    Immature Grans (Abs) 0.00 - 0.04 K/uL 0.02  0.03 CM       <<<Specimen to Pathology, Surgery Breast: Specimen Collected: 09/28/20 12:00; Status:  In process >>>         Diagnostics:     9/29/20 CT HEAD WITHOUT CONTRAST:  No CT evidence of acute intracranial abnormality. Clinical correlation and further evaluation as warranted.Chronic senescent and microvascular ischemic changes.  Stable appearing remote infarcts involving the right frontal and left occipital lobes.    9/29/20 CT CHEST ABDOMEN PELVIS WITHOUT CONTRAST(XPD):  1.  Postoperative change of recent right-sided mastectomy with surgical drainage catheter in place.  There are scattered foci of subcutaneous emphysema throughout the right chest wall as well as a heterogeneous predominately hyperattenuating collection within the right anterior chest wall subcutaneous soft tissues presumably representing postoperative hematoma/seroma with dimensions as above.  Clinical correlation advised.  2.  No CT evidence of acute intra-abdominal abnormality.  3.  Incidentally noted 0.4 cm right lower lobe nodule.  For a solid nodule <6 mm, Fleischner Society 2017 guidelines recommend no routine follow up for a low risk patient, or follow-up with non-contrast chest CT at 12 months in a high risk patient.  4.  Mild urinary bladder wall thickening likely relating to nondistention, although clinical correlation for cystitis advised.  5.  Small subcentimeter sclerotic focus within the right superior pubic ramus as well as the left greater trochanter.  These may relate to small bone islands, although in light of patient's history of breast malignancy, correlation with any prior outside imaging to assess for stability is advised.  6.  Coarsely calcified pancreas suggesting sequelae  of chronic pancreatitis.  7.  Aortic atherosclerosis.  Coronary artery calcification.    7/23/20 TTE:  · Concentric left ventricular remodeling.  · Hyperdynamic left ventricular systolic function.  · Normal right ventricular systolic function.         A:   ESAS to be completed by Palliative Care NP during ful consult.  Bowel Management Plan (BMP): No  Last BM noted on 9/27/20.      Discharge Planning:   Per CM note dated 9/26/20 at 0920:  Discharge Plan A Home Health   Discharge Plan B Home Health     Prior to hospitilization cognitive status: Alert/Oriented   Prior to hospitalization functional status: Independent   Current cognitive status: Alert/Oriented   Current Functional Status: Independent   Facility Arrived From: home   Lives With alone     Name and contact number of agency or person providing outside services ???? Home Health     Equipment Currently Used at Home rollator     DME Needed Upon Discharge  none   SW met with patient at bedside to complete discharge planning assessment.  Patient alert and oriented xs 4.  Patient verified all demographic information on facesheet is correct.  Patient verified PCP is Dr. Kirill Cabrera.  Patient verified primary health insurance is [employer].  Patient with home health and rollator for DME.  Patient signed patient choice disclosure choosing to resume home health with [facility].  Patient with NO POA but has Living Will.  Patient not on dialysis or medication coumadin.  Patient with no 30 day admission.  Patient with no financial issues at this time.  Patient family will provide transportation upon discharge from facility.  Patient independent with ADLs, live alone, drives self.  Patient uses [facility] [address]      R:  Support offered at this time.   Palliative Care Team will continue to follow patient.       Thank you for allowing Palliative Care to participate in the care of this patient.      Shamika Dixon RN

## 2020-09-29 NOTE — SUBJECTIVE & OBJECTIVE
Interval History:  Patient overall stable this morning.  She had a reported fall last night - unclear events leading up to this.  No LOC.  No head injury.  Soreness to right chest area.  Patient is awake, alert this morning.    Review of Systems   Constitutional: Negative for chills and fever.   Respiratory: Negative for shortness of breath and wheezing.    Cardiovascular: Negative for chest pain.   Gastrointestinal: Positive for diarrhea. Negative for abdominal distention, abdominal pain, constipation, nausea and vomiting.   Genitourinary: Negative for dysuria and frequency.   Musculoskeletal: Negative for arthralgias and myalgias.        Right post-surgical pain   Neurological: Negative for light-headedness.   Psychiatric/Behavioral: Negative for agitation and confusion.     Objective:     Vital Signs (Most Recent):  Temp: 98.9 °F (37.2 °C) (09/29/20 1101)  Pulse: 104 (09/29/20 1215)  Resp: 16 (09/29/20 1215)  BP: 135/66 (09/29/20 1000)  SpO2: 100 % (09/29/20 1215) Vital Signs (24h Range):  Temp:  [98 °F (36.7 °C)-98.9 °F (37.2 °C)] 98.9 °F (37.2 °C)  Pulse:  [] 104  Resp:  [13-25] 16  SpO2:  [97 %-100 %] 100 %  BP: ()/(48-92) 135/66     Weight: 58.2 kg (128 lb 4.9 oz)  Body mass index is 25.91 kg/m².    Intake/Output Summary (Last 24 hours) at 9/29/2020 1313  Last data filed at 9/29/2020 1200  Gross per 24 hour   Intake 1471.58 ml   Output 395 ml   Net 1076.58 ml      Physical Exam  Constitutional:       General: She is not in acute distress.     Appearance: She is well-developed.   HENT:      Head: Atraumatic.   Eyes:      Conjunctiva/sclera: Conjunctivae normal.   Neck:      Musculoskeletal: Neck supple.   Cardiovascular:      Rate and Rhythm: Normal rate and regular rhythm.      Heart sounds: Normal heart sounds. No murmur.   Pulmonary:      Effort: Pulmonary effort is normal.      Breath sounds: Normal breath sounds. No wheezing.   Abdominal:      General: Bowel sounds are normal. There is no  distension.      Palpations: Abdomen is soft.      Tenderness: There is no abdominal tenderness.   Musculoskeletal: Normal range of motion.         General: Tenderness present. No deformity.      Comments: Right chest drain with blood.   Neurological:      Mental Status: She is alert and oriented to person, place, and time.         Significant Labs: All pertinent labs within the past 24 hours have been reviewed.    Significant Imaging: I have reviewed all pertinent imaging results/findings within the past 24 hours.

## 2020-09-29 NOTE — PLAN OF CARE
CORWIN drain output was 280 mL since start of shift with hernesto red blood. Eduardo MEJÍA and J Luis MEJÍA made aware. Eduardo ordered CBC and pt to be NPO. Vitals stable. Surgical site shows no hematoma or bleeding.

## 2020-09-29 NOTE — SIGNIFICANT EVENT
Patient had a fall.  Complaining of abdominal pain. Patient also hypotensive post fall.  Will scan head, chest, abdomen, and pelvis due to fall and subsequent low pressure in the immediate post op period.

## 2020-09-29 NOTE — PLAN OF CARE
During routine rounds, assessed pt for spiritual distress, and reminded pt  of spiritual care available.  While providing reflective listening and compassionate presence, pt's concerns were explored.  Some distress was reported and presented, indicated by pt's affect, feedback, and lament(s).  Pt reported gratitude for the spiritual wellness check.   Pt reported and presented with crow and emotional support.  Follow-up was offered upon request, and, though not indicated at this time, may be offered later at staff's discretion, should pt's conditions change, and/or if pt's length of stay continues significantly.

## 2020-09-29 NOTE — PROGRESS NOTES
Approx 0300 patient called out and RN entered room & pt on floor next to bed. RN x3 transferred patient safely to bed. Patient denies LOC/hitting head. Oriented x4. Only c/o soreness while pointing to R breast/R upper abdominal area. BP 82/48  SpO2 100% on RA. Previous /59 before fall. Seth VALENCIA notified. Repeat /64 and pt brought to CT for stat CT by RN x2. CT results discussed w/ ANNIE Ann. Will hold IV antihypertensive this AM per ANNIE Ann. Incision wnl. CORWIN drain in place.

## 2020-09-29 NOTE — ASSESSMENT & PLAN NOTE
Started on Enalaprilat 1.25 mg every 6 hours overnight on 9/27/2020.    Will continue for now and monitor blood pressure and adjust further if need.    BP was low overnight post-op, but better today.

## 2020-09-29 NOTE — ASSESSMENT & PLAN NOTE
Patient has had delays in surgery related to obtaining medical clearance, COVID-19 pandemic, and now recent cancellation of surgery due to hyperglycemia.    Started on insulin infusion overnight on 9/27/2020 to achieve reasonable glycemic control during the perioperative period.    Fall overnight on 9/28/2020 - Imaging without acute injury noted.  The patient is status post recent right-sided mastectomy.  There are scattered foci of subcutaneous emphysema throughout the right chest wall presumably postoperative in etiology.  There is a surgical drainage catheter in place.  There is a heterogeneous collection within the right anterior chest wall measuring approximately 4.3 x 19.5 x 12.0 cm, likely representing postoperative seroma/hematoma.    POD #1 - MASTECTOMY-Right, and BIOPSY, LYMPH NODE, SENTINEL-Right.  Drain with blood.    Plan:  CBC  Notified Dr. Clark by Nurse

## 2020-09-29 NOTE — PLAN OF CARE
CORWIN output decreased throughout the day. Incision site ecchymotic but clean and intact. MD Eduardo at bedside at 1700. Pt is NPO after midnight per MD order. No plans for surgery yet, but MD will be back to see pt in AM to monitor drainage and site. No food till cleared by MD that there will be no surgery. MD ordered to notify if inciscion area became more swollen and bruised and if output significantly increases. BPs dropped when pt. Ambulated to toilet. BP stable now. BP med held.

## 2020-09-29 NOTE — PROGRESS NOTES
[facility] Hospital Medicine  Progress Note    Patient Name: Darlene Avila  MRN: [id number]  Patient Class: OP- Observation   Admission Date: 9/25/2020  Length of Stay: 0 days  Attending Physician: Chino Dietz MD  Primary Care Provider: Kirill Cabrera Iii, MD        Subjective:     Principal Problem:Poorly controlled type 2 diabetes mellitus with complication        HPI:  Patient is a 69 year-old woman with hypertension, poorly controlled diabetes mellitus type II, prior transient ischemic attack, current tobacco and recently diagnosed with right-sided breast cancer currently on anastrozole with plan for mastectomy today with Dr. Krysta Clark.  Surgery however cancelled due to hyperglycemia. Surgery has already been delay due to COVID pandemic.  Dr. Clark requesting evaluation by hospital medicine.  Patient reports non-compliance with diabetic diet.    Overview/Hospital Course:  Patient is a 69 year-old woman with hypertension, poorly controlled diabetes mellitus type II, prior transient ischemic attack, current tobacco smoker with breast cancer who has had delay in undergoing a mastectomy to COVID pandemic and again now due to hyperglycemia resulting in cancellation of surgery on Friday.  Patient admitted on the hospital under observation to medically optimize her for surgery anticipated on Monday. Despite efforts to adjust her insulin regimen for better glycemic control she continued to have high capillary blood glucose readings intermixed with some low insulin readings.  Patient transferred to the intensive care unit select she was started on continuous insulin infusion to help regulate her diabetes in the perioperative period.  NPO for surgery later today.    Interval History:  Patient overall stable this morning.  She had a reported fall last night - unclear events leading up to this.  No LOC.  No head injury.  Soreness to right chest area.  Patient is awake, alert this  morning.    Review of Systems   Constitutional: Negative for chills and fever.   Respiratory: Negative for shortness of breath and wheezing.    Cardiovascular: Negative for chest pain.   Gastrointestinal: Positive for diarrhea. Negative for abdominal distention, abdominal pain, constipation, nausea and vomiting.   Genitourinary: Negative for dysuria and frequency.   Musculoskeletal: Negative for arthralgias and myalgias.        Right post-surgical pain   Neurological: Negative for light-headedness.   Psychiatric/Behavioral: Negative for agitation and confusion.     Objective:     Vital Signs (Most Recent):  Temp: 98.9 °F (37.2 °C) (09/29/20 1101)  Pulse: 104 (09/29/20 1215)  Resp: 16 (09/29/20 1215)  BP: 135/66 (09/29/20 1000)  SpO2: 100 % (09/29/20 1215) Vital Signs (24h Range):  Temp:  [98 °F (36.7 °C)-98.9 °F (37.2 °C)] 98.9 °F (37.2 °C)  Pulse:  [] 104  Resp:  [13-25] 16  SpO2:  [97 %-100 %] 100 %  BP: ()/(48-92) 135/66     Weight: 58.2 kg (128 lb 4.9 oz)  Body mass index is 25.91 kg/m².    Intake/Output Summary (Last 24 hours) at 9/29/2020 1313  Last data filed at 9/29/2020 1200  Gross per 24 hour   Intake 1471.58 ml   Output 395 ml   Net 1076.58 ml      Physical Exam  Constitutional:       General: She is not in acute distress.     Appearance: She is well-developed.   HENT:      Head: Atraumatic.   Eyes:      Conjunctiva/sclera: Conjunctivae normal.   Neck:      Musculoskeletal: Neck supple.   Cardiovascular:      Rate and Rhythm: Normal rate and regular rhythm.      Heart sounds: Normal heart sounds. No murmur.   Pulmonary:      Effort: Pulmonary effort is normal.      Breath sounds: Normal breath sounds. No wheezing.   Abdominal:      General: Bowel sounds are normal. There is no distension.      Palpations: Abdomen is soft.      Tenderness: There is no abdominal tenderness.   Musculoskeletal: Normal range of motion.         General: Tenderness present. No deformity.      Comments: Right chest  drain with blood.   Neurological:      Mental Status: She is alert and oriented to person, place, and time.         Significant Labs: All pertinent labs within the past 24 hours have been reviewed.    Significant Imaging: I have reviewed all pertinent imaging results/findings within the past 24 hours.      Assessment/Plan:      * Poorly controlled type 2 diabetes mellitus with complication  Poorly controlled diabetes based on hemoglobin A1c of 13.1%.    Home Meds:  Humalog 75/25 - 16 units and 18 units.      Patient has been noncompliant with diabetic diet and has poor insight on how to manage her diabetes.  She is also challenged by somewhat brittle diabetes as well.    Placed on continuous infusion in order to achieve reasonable glycemic control during the perioperative period.    Now on Detemir 20units qHS with SSI  -Follow closely    Malignant neoplasm of upper-outer quadrant of right breast in female, estrogen receptor positive  Patient has had delays in surgery related to obtaining medical clearance, COVID-19 pandemic, and now recent cancellation of surgery due to hyperglycemia.    Started on insulin infusion overnight on 9/27/2020 to achieve reasonable glycemic control during the perioperative period.    Fall overnight on 9/28/2020 - Imaging without acute injury noted.  The patient is status post recent right-sided mastectomy.  There are scattered foci of subcutaneous emphysema throughout the right chest wall presumably postoperative in etiology.  There is a surgical drainage catheter in place.  There is a heterogeneous collection within the right anterior chest wall measuring approximately 4.3 x 19.5 x 12.0 cm, likely representing postoperative seroma/hematoma.    POD #1 - MASTECTOMY-Right, and BIOPSY, LYMPH NODE, SENTINEL-Right.  Drain with blood.    Plan:  CBC  Notified Dr. Clark by Nurse    Tobacco abuse  Patient counseled on the importance of smoking cessation.    Started on nicotine  replacement.      History of CVA (cerebrovascular accident)  On ASA, Plavix, Statin at home  -ASA/Plavix held marguerite-operatively      Essential hypertension  Started on Enalaprilat 1.25 mg every 6 hours overnight on 9/27/2020.    Will continue for now and monitor blood pressure and adjust further if need.    BP was low overnight post-op, but better today.    Chronic diarrhea  Stable.  Continue with home regimen.      VTE Risk Mitigation (From admission, onward)         Ordered     Place KATHRIN hose  Until discontinued      09/28/20 1703     Place sequential compression device  Until discontinued      09/28/20 1703     IP VTE HIGH RISK PATIENT  Once      09/28/20 1703                Discharge Planning   JUDIT:      Code Status: Full Code   Is the patient medically ready for discharge?:     Reason for patient still in hospital (select all that apply): Patient trending condition, Treatment and Consult recommendations  Discharge Plan A: Home Health                  Chino Dietz MD  Department of Hospital Medicine   [facility]

## 2020-09-29 NOTE — ASSESSMENT & PLAN NOTE
Poorly controlled diabetes based on hemoglobin A1c of 13.1%.    Home Meds:  Humalog 75/25 - 16 units and 18 units.      Patient has been noncompliant with diabetic diet and has poor insight on how to manage her diabetes.  She is also challenged by somewhat brittle diabetes as well.    Placed on continuous infusion in order to achieve reasonable glycemic control during the perioperative period.    Now on Detemir 20units qHS with SSI  -Follow closely

## 2020-09-30 PROBLEM — D62 ACUTE BLOOD LOSS ANEMIA: Status: ACTIVE | Noted: 2020-09-30

## 2020-09-30 LAB
ANION GAP SERPL CALC-SCNC: 6 MMOL/L (ref 8–16)
BASOPHILS # BLD AUTO: 0.04 K/UL (ref 0–0.2)
BASOPHILS NFR BLD: 0.4 % (ref 0–1.9)
BUN SERPL-MCNC: 16 MG/DL (ref 8–23)
CALCIUM SERPL-MCNC: 8.7 MG/DL (ref 8.7–10.5)
CHLORIDE SERPL-SCNC: 106 MMOL/L (ref 95–110)
CO2 SERPL-SCNC: 26 MMOL/L (ref 23–29)
CREAT SERPL-MCNC: 0.8 MG/DL (ref 0.5–1.4)
DIFFERENTIAL METHOD: ABNORMAL
EOSINOPHIL # BLD AUTO: 0.1 K/UL (ref 0–0.5)
EOSINOPHIL NFR BLD: 0.5 % (ref 0–8)
ERYTHROCYTE [DISTWIDTH] IN BLOOD BY AUTOMATED COUNT: 13.5 % (ref 11.5–14.5)
EST. GFR  (AFRICAN AMERICAN): >60 ML/MIN/1.73 M^2
EST. GFR  (NON AFRICAN AMERICAN): >60 ML/MIN/1.73 M^2
GLUCOSE SERPL-MCNC: 146 MG/DL (ref 70–110)
HCT VFR BLD AUTO: 32.5 % (ref 37–48.5)
HGB BLD-MCNC: 10 G/DL (ref 12–16)
IMM GRANULOCYTES # BLD AUTO: 0.02 K/UL (ref 0–0.04)
IMM GRANULOCYTES NFR BLD AUTO: 0.2 % (ref 0–0.5)
LYMPHOCYTES # BLD AUTO: 3 K/UL (ref 1–4.8)
LYMPHOCYTES NFR BLD: 32.3 % (ref 18–48)
MAGNESIUM SERPL-MCNC: 1.9 MG/DL (ref 1.6–2.6)
MCH RBC QN AUTO: 26.2 PG (ref 27–31)
MCHC RBC AUTO-ENTMCNC: 30.8 G/DL (ref 32–36)
MCV RBC AUTO: 85 FL (ref 82–98)
MONOCYTES # BLD AUTO: 0.7 K/UL (ref 0.3–1)
MONOCYTES NFR BLD: 7.3 % (ref 4–15)
NEUTROPHILS # BLD AUTO: 5.6 K/UL (ref 1.8–7.7)
NEUTROPHILS NFR BLD: 59.3 % (ref 38–73)
NRBC BLD-RTO: 0 /100 WBC
PHOSPHATE SERPL-MCNC: 2.8 MG/DL (ref 2.7–4.5)
PLATELET # BLD AUTO: 280 K/UL (ref 150–350)
PMV BLD AUTO: 9 FL (ref 9.2–12.9)
POCT GLUCOSE: 122 MG/DL (ref 70–110)
POCT GLUCOSE: 165 MG/DL (ref 70–110)
POCT GLUCOSE: 282 MG/DL (ref 70–110)
POCT GLUCOSE: 300 MG/DL (ref 70–110)
POCT GLUCOSE: 380 MG/DL (ref 70–110)
POCT GLUCOSE: 61 MG/DL (ref 70–110)
POTASSIUM SERPL-SCNC: 3.9 MMOL/L (ref 3.5–5.1)
RBC # BLD AUTO: 3.81 M/UL (ref 4–5.4)
SODIUM SERPL-SCNC: 138 MMOL/L (ref 136–145)
WBC # BLD AUTO: 9.41 K/UL (ref 3.9–12.7)

## 2020-09-30 PROCEDURE — 99226 PR SUBSEQUENT OBSERVATION CARE,LEVEL III: CPT | Mod: ,,, | Performed by: INTERNAL MEDICINE

## 2020-09-30 PROCEDURE — 25000003 PHARM REV CODE 250: Performed by: HOSPITALIST

## 2020-09-30 PROCEDURE — 94761 N-INVAS EAR/PLS OXIMETRY MLT: CPT

## 2020-09-30 PROCEDURE — 99226 PR SUBSEQUENT OBSERVATION CARE,LEVEL III: ICD-10-PCS | Mod: ,,, | Performed by: INTERNAL MEDICINE

## 2020-09-30 PROCEDURE — 85025 COMPLETE CBC W/AUTO DIFF WBC: CPT

## 2020-09-30 PROCEDURE — 25000003 PHARM REV CODE 250: Performed by: SURGERY

## 2020-09-30 PROCEDURE — 36415 COLL VENOUS BLD VENIPUNCTURE: CPT

## 2020-09-30 PROCEDURE — S4991 NICOTINE PATCH NONLEGEND: HCPCS | Performed by: HOSPITALIST

## 2020-09-30 PROCEDURE — 97162 PT EVAL MOD COMPLEX 30 MIN: CPT

## 2020-09-30 PROCEDURE — 96374 THER/PROPH/DIAG INJ IV PUSH: CPT | Mod: 59 | Performed by: HOSPITALIST

## 2020-09-30 PROCEDURE — 80048 BASIC METABOLIC PNL TOTAL CA: CPT

## 2020-09-30 PROCEDURE — 84100 ASSAY OF PHOSPHORUS: CPT

## 2020-09-30 PROCEDURE — G0378 HOSPITAL OBSERVATION PER HR: HCPCS

## 2020-09-30 PROCEDURE — 83735 ASSAY OF MAGNESIUM: CPT

## 2020-09-30 PROCEDURE — 96372 THER/PROPH/DIAG INJ SC/IM: CPT | Mod: 59 | Performed by: HOSPITALIST

## 2020-09-30 PROCEDURE — 25000003 PHARM REV CODE 250: Performed by: INTERNAL MEDICINE

## 2020-09-30 RX ORDER — MIDODRINE HYDROCHLORIDE 2.5 MG/1
2.5 TABLET ORAL 2 TIMES DAILY WITH MEALS
Status: DISCONTINUED | OUTPATIENT
Start: 2020-09-30 | End: 2020-10-02 | Stop reason: HOSPADM

## 2020-09-30 RX ADMIN — MUPIROCIN: 20 OINTMENT TOPICAL at 09:09

## 2020-09-30 RX ADMIN — INSULIN ASPART 6 UNITS: 100 INJECTION, SOLUTION INTRAVENOUS; SUBCUTANEOUS at 11:09

## 2020-09-30 RX ADMIN — Medication 1 PATCH: at 08:09

## 2020-09-30 RX ADMIN — HYDROCODONE BITARTRATE AND ACETAMINOPHEN 1 TABLET: 5; 325 TABLET ORAL at 11:09

## 2020-09-30 RX ADMIN — HYDROCODONE BITARTRATE AND ACETAMINOPHEN 1 TABLET: 5; 325 TABLET ORAL at 10:09

## 2020-09-30 RX ADMIN — PRAVASTATIN SODIUM 10 MG: 10 TABLET ORAL at 08:09

## 2020-09-30 RX ADMIN — HYDROCODONE BITARTRATE AND ACETAMINOPHEN 1 TABLET: 5; 325 TABLET ORAL at 04:09

## 2020-09-30 RX ADMIN — DEXTROSE MONOHYDRATE 12.5 G: 25 INJECTION, SOLUTION INTRAVENOUS at 07:09

## 2020-09-30 RX ADMIN — CITALOPRAM HYDROBROMIDE 10 MG: 10 TABLET, FILM COATED ORAL at 08:09

## 2020-09-30 RX ADMIN — INSULIN ASPART 3 UNITS: 100 INJECTION, SOLUTION INTRAVENOUS; SUBCUTANEOUS at 09:09

## 2020-09-30 RX ADMIN — MUPIROCIN: 20 OINTMENT TOPICAL at 08:09

## 2020-09-30 RX ADMIN — AMITRIPTYLINE HYDROCHLORIDE 75 MG: 25 TABLET, FILM COATED ORAL at 09:09

## 2020-09-30 RX ADMIN — MIDODRINE HYDROCHLORIDE 2.5 MG: 2.5 TABLET ORAL at 05:09

## 2020-09-30 NOTE — ASSESSMENT & PLAN NOTE
Poorly controlled diabetes based on hemoglobin A1c of 13.1%.    Home Meds:  Humalog 75/25 - 16 units and 18 units.      Patient has been noncompliant with diabetic diet and has poor insight on how to manage her diabetes.  She is also challenged by somewhat brittle diabetes as well.    Placed on continuous infusion in order to achieve reasonable glycemic control during the perioperative period.    Now on Detemir 20units qHS with SSI, but was NPO after midnight.  Glucose a little low this morning.  Consider reducing Detemir to 15units  -Follow closely

## 2020-09-30 NOTE — PLAN OF CARE
Pt is not yet ready for discharge, remains in ICU. Pt with glucose and BP issues.    CM to follow for plans and arrangements when ready for discharge.   09/30/20 7077   Discharge Reassessment   Assessment Type Discharge Planning Reassessment   Provided patient/caregiver education on the expected discharge date and the discharge plan Yes   Do you have any problems affording any of your prescribed medications? No   Discharge Plan A Home   Discharge Plan B Home;Home Health

## 2020-09-30 NOTE — PLAN OF CARE
Problem: Physical Therapy Goal  Goal: Physical Therapy Goal  Description: Goals to be met by: 10/30/2020    Patient will perform the following to increase strength, improve mobility, and return to prior level of function:    1. Supine <> sit with independence.  2. Sit<>stand with independence.  3. Gait x 200 feet with independence.  4. Ascend/descend 3 step(s) with bilateral handrails and SBA.   9/30/2020 1009 by Yocasta Mccabe, PT  Outcome: Ongoing, Progressing     PT orders received. PT evaluation completed and goals/POC established. Pt tolerated evaluation well with no adverse reactions. Pt performed supine <> sit, sit <> stand, and side-stepping with CGA. BP dropped to 90/51 with standing. Deferred ambulation way from bed 2/2 orthostatic hypotension. PT will continue to follow and progress goals as tolerated. Recommend discharge to home with outpatient PT.

## 2020-09-30 NOTE — PT/OT/SLP EVAL
Physical Therapy Evaluation    Patient Name:  Darlene Avila   MRN:  [id number]    Recommendations:     Discharge Recommendations:  home, outpatient PT   Discharge Equipment Recommendations: none   Barriers to discharge: Decreased caregiver support    Assessment:     Darlene Avila is a 69 y.o. female admitted with a medical diagnosis of Poorly controlled type 2 diabetes mellitus with complication. Pt was admitted on 9/25 for mastectomy 2/2 R breast carcinoma. BG was found to be 366, surgery was cancelled, hospital consulted, and patient admitted to hospital. Pt was transferred to ICU on 9/28 and started on a continuous insulin drip to control BG. She underwent mastectomy on 9/28/2020 and has been in ICU since surgery. She presents with the following impairments/functional limitations:  gait instability, impaired self care skills, decreased upper extremity function, pain, impaired cardiopulmonary response to activity.    Rehab Prognosis: Good; patient would benefit from acute skilled PT services to address these deficits and reach maximum level of function.    Recent Surgery: Procedure(s) (LRB):  MASTECTOMY-Right (Right)  BIOPSY, LYMPH NODE, SENTINEL-Right (Right) 2 Days Post-Op    Plan:     During this hospitalization, patient to be seen 3 x/week to address the identified rehab impairments via gait training, therapeutic activities, therapeutic exercises and progress toward the following goals:    · Plan of Care Expires:  10/30/20    Subjective     Chief Complaint: R shoulder pain  Patient/Family Comments/goals: To go home  Pain/Comfort:  · Pain Rating 1: 6/10  · Location - Side 1: Right  · Location - Orientation 1: generalized  · Location 1: shoulder  · Pain Addressed 1: Reposition, Distraction, Cessation of Activity  · Pain Rating Post-Intervention 1: (Pain unchanged)    Patients cultural, spiritual, Pentec[employer]alism conflicts given the current situation: no    Living Environment:  · Pt lives alone in a single story house  with 3 steps to enter and bilateral handrail(s).   · DME owned: rolling walker  · Upon discharge, patient will not have assistance at home.  Prior level of function:  · Ambulation: Independent  · ADL's: Independent  · IADLs: Independent  · Falls: Fall on 9/29 in hospital room.    Objective:     Communicated with RNs (Krista) prior to session.  Patient found supine with blood pressure cuff, bed alarm, peripheral IV, CORWIN drain, telemetry, pulse ox (continuous)  upon PT entry to room.    General Precautions: Standard, diabetic, fall, other (see comments)(Orthostatic)   Orthopedic Precautions:N/A   Braces: N/A     Exams:  · Cognition:   · Patient is oriented to person, place, time, and situation.  · Pt follows approximately 100% of multiple step commands.    · Mood: Pleasant and cooperative. Motivated to participate  · Safety Awareness: Fair. Slightly impulsive with mobility  · Musculoskeletal:  · Posture:  WNL  · LE ROM/Strength:   · R ROM: WNL  · L ROM: WNL  · R Strength: WFL  · L Strength: WFL  · Neuromuscular:  · Sensation: Intact to light touch bilateral LEs.   · Tone/Reflexes/Coordination: No impairments identified with functional mobility. No formal testing performed.  · Balance:   · Static sitting: Normal  · Dynamic sitting: Normal  · Static standing: Good+  · Dynamic standing: Good+  · Visual-vestibular: No impairments identified with functional mobility. No formal testing performed.  · Integument: Bruising of left forearm  · Cardiopulmonary:  · Vital signs:    BP HR (bpm) SpO2   Pre-treatment (supine) 163/77 101  100%   Sitting /60 105 100%   Standing 90/51 105 100%   Post treatment (supine) 162/75 104 97%     · Edema: Mild edema in L hand.    Functional Mobility: Non-slip socks donned prior to mobility.  · Bed Mobility:     · Supine to Sit: contact guard assistance. Performed with head of bed elevated.  · Required verbal cues to limit pushing up with RUE  · Sit to Supine: contact guard  assistance. Performed with head of bed elevated.  · Required verbal cues to limit pushing/use of RUE.  · Transfers:     · Sit to Stand:  contact guard assistance with no AD  · Gait: Pt took 3 side-steps at EOB with CGA and no AD.   · Ambulation away from bed deferred 2/2 orthostatic hypotension.    Therapeutic Activities and Exercises:   PT educated patient re:   · PT plan of care/role of PT  · Fall and safety precautions  · Use of call light (don't get up without assistance)  Pt verbalized understanding     The patient is safe to transfer with RN assist.   Patient encouraged to sit up in chair throughout the day to prevent deconditioning.     AM-PAC 6 CLICK MOBILITY  Total Score:18     Patient left supine with all lines intact, call button in reach and bed alarm on. RN (Rachelle) notified of pt's Bps during session.    GOALS:   Multidisciplinary Problems     Physical Therapy Goals        Problem: Physical Therapy Goal    Goal Priority Disciplines Outcome Goal Variances Interventions   Physical Therapy Goal     PT, PT/OT Ongoing, Progressing     Description: Goals to be met by: 10/30/2020    Patient will perform the following to increase strength, improve mobility, and return to prior level of function:    1. Supine <> sit with independence.  2. Sit<>stand with independence.  3. Gait x 200 feet with independence.  4. Ascend/descend 3 step(s) with bilateral handrails and SBA.                    History:     Past Medical History:   Diagnosis Date    Arthritis     Cancer     Cataract     Diabetes mellitus     Diabetic retinopathy     Hypertension     TIA (transient ischemic attack) 4120-5061       Past Surgical History:   Procedure Laterality Date    CAPSULOTOMY  6/26/13    yag left eye    CATARACT EXTRACTION  6/3/2013    right eye    CHOLECYSTECTOMY      HYSTERECTOMY      MASTECTOMY Right 9/28/2020    Procedure: MASTECTOMY-Right;  Surgeon: Krysta Clark MD;  Location[facility];  Service: General;  Laterality:  Right;    SENTINEL LYMPH NODE BIOPSY Right 9/28/2020    Procedure: BIOPSY, LYMPH NODE, SENTINEL-Right;  Surgeon: Krysta Clark MD;  Location: [facility];  Service: General;  Laterality: Right;    TOTAL ABDOMINAL HYSTERECTOMY W/ BILATERAL SALPINGOOPHORECTOMY         Time Tracking:     PT Received On: 09/30/20  PT Start Time: 0932     PT Stop Time: 0948  PT Total Time (min): 16 min     Billable Minutes: Evaluation 16      Yocasta Mccabe, PT  09/30/2020

## 2020-09-30 NOTE — ASSESSMENT & PLAN NOTE
Patient has had delays in surgery related to obtaining medical clearance, COVID-19 pandemic, and now recent cancellation of surgery due to hyperglycemia.    Started on insulin infusion overnight on 9/27/2020 to achieve reasonable glycemic control during the perioperative period.    Fall overnight on 9/28/2020 - Imaging without acute injury noted.  The patient is status post recent right-sided mastectomy.  There are scattered foci of subcutaneous emphysema throughout the right chest wall presumably postoperative in etiology.  There is a surgical drainage catheter in place.  There is a heterogeneous collection within the right anterior chest wall measuring approximately 4.3 x 19.5 x 12.0 cm, likely representing postoperative seroma/hematoma.    POD #2 - MASTECTOMY-Right, and BIOPSY, LYMPH NODE, SENTINEL-Right.  Drain with blood - less today.  No need for further surgical intervention per Dr. Clark.

## 2020-09-30 NOTE — SUBJECTIVE & OBJECTIVE
Interval History:  Patient overall stable this morning.  Having less blood in drain today.  Patient is awake, alert this morning.      Review of Systems   Constitutional: Negative for chills and fever.   Respiratory: Negative for shortness of breath and wheezing.    Cardiovascular: Negative for chest pain.   Gastrointestinal: Positive for diarrhea (chronic). Negative for abdominal distention, abdominal pain, constipation, nausea and vomiting.   Genitourinary: Negative for dysuria and frequency.   Musculoskeletal: Negative for arthralgias and myalgias.        Right post-surgical pain   Neurological: Negative for light-headedness.   Psychiatric/Behavioral: Negative for agitation and confusion.     Objective:     Vital Signs (Most Recent):  Temp: 98.2 °F (36.8 °C) (09/30/20 0703)  Pulse: 106 (09/30/20 0754)  Resp: (!) 25 (09/30/20 0754)  BP: (!) 115/56 (09/30/20 0703)  SpO2: 100 % (09/30/20 0754) Vital Signs (24h Range):  Temp:  [98.1 °F (36.7 °C)-99.1 °F (37.3 °C)] 98.2 °F (36.8 °C)  Pulse:  [] 106  Resp:  [10-30] 25  SpO2:  [98 %-100 %] 100 %  BP: ()/(47-90) 115/56     Weight: 58.2 kg (128 lb 4.9 oz)  Body mass index is 25.91 kg/m².    Intake/Output Summary (Last 24 hours) at 9/30/2020 1004  Last data filed at 9/30/2020 0600  Gross per 24 hour   Intake 400 ml   Output 1055 ml   Net -655 ml      Physical Exam  Constitutional:       General: She is not in acute distress.     Appearance: She is well-developed.   HENT:      Head: Atraumatic.   Eyes:      Conjunctiva/sclera: Conjunctivae normal.   Neck:      Musculoskeletal: Neck supple.   Cardiovascular:      Rate and Rhythm: Normal rate and regular rhythm.      Heart sounds: Normal heart sounds. No murmur.   Pulmonary:      Effort: Pulmonary effort is normal.      Breath sounds: Normal breath sounds. No wheezing.   Abdominal:      General: Bowel sounds are normal. There is no distension.      Palpations: Abdomen is soft.      Tenderness: There is no  abdominal tenderness.   Musculoskeletal: Normal range of motion.         General: Tenderness present. No deformity.      Comments: Right chest drain with blood.   Neurological:      Mental Status: She is alert and oriented to person, place, and time.         Significant Labs: All pertinent labs within the past 24 hours have been reviewed.    Significant Imaging: I have reviewed all pertinent imaging results/findings within the past 24 hours.

## 2020-09-30 NOTE — PROGRESS NOTES
[facility] Hospital Medicine  Progress Note    Patient Name: Darlene Avila  MRN: [id number]  Patient Class: OP- Observation   Admission Date: 9/25/2020  Length of Stay: 0 days  Attending Physician: Chino Dietz MD  Primary Care Provider: Kirill Cabrera Iii, MD        Subjective:     Principal Problem:Poorly controlled type 2 diabetes mellitus with complication        HPI:  Patient is a 69 year-old woman with hypertension, poorly controlled diabetes mellitus type II, prior transient ischemic attack, current tobacco and recently diagnosed with right-sided breast cancer currently on anastrozole with plan for mastectomy today with Dr. Krysta Clark.  Surgery however cancelled due to hyperglycemia. Surgery has already been delay due to COVID pandemic.  Dr. Clark requesting evaluation by hospital medicine.  Patient reports non-compliance with diabetic diet.    Overview/Hospital Course:  Patient is a 69 year-old woman with hypertension, poorly controlled diabetes mellitus type II, prior transient ischemic attack, current tobacco smoker with breast cancer who has had delay in undergoing a mastectomy to COVID pandemic and again now due to hyperglycemia resulting in cancellation of surgery on Friday.  Patient admitted on the hospital under observation to medically optimize her for surgery anticipated on Monday. Despite efforts to adjust her insulin regimen for better glycemic control she continued to have high capillary blood glucose readings intermixed with some low insulin readings.  Patient transferred to the intensive care unit select she was started on continuous insulin infusion to help regulate her diabetes in the perioperative period.  NPO for surgery later today.    Interval History:  Patient overall stable this morning.  Having less blood in drain today.  Patient is awake, alert this morning.      Review of Systems   Constitutional: Negative for chills and fever.   Respiratory: Negative for  shortness of breath and wheezing.    Cardiovascular: Negative for chest pain.   Gastrointestinal: Positive for diarrhea (chronic). Negative for abdominal distention, abdominal pain, constipation, nausea and vomiting.   Genitourinary: Negative for dysuria and frequency.   Musculoskeletal: Negative for arthralgias and myalgias.        Right post-surgical pain   Neurological: Negative for light-headedness.   Psychiatric/Behavioral: Negative for agitation and confusion.     Objective:     Vital Signs (Most Recent):  Temp: 98.2 °F (36.8 °C) (09/30/20 0703)  Pulse: 106 (09/30/20 0754)  Resp: (!) 25 (09/30/20 0754)  BP: (!) 115/56 (09/30/20 0703)  SpO2: 100 % (09/30/20 0754) Vital Signs (24h Range):  Temp:  [98.1 °F (36.7 °C)-99.1 °F (37.3 °C)] 98.2 °F (36.8 °C)  Pulse:  [] 106  Resp:  [10-30] 25  SpO2:  [98 %-100 %] 100 %  BP: ()/(47-90) 115/56     Weight: 58.2 kg (128 lb 4.9 oz)  Body mass index is 25.91 kg/m².    Intake/Output Summary (Last 24 hours) at 9/30/2020 1004  Last data filed at 9/30/2020 0600  Gross per 24 hour   Intake 400 ml   Output 1055 ml   Net -655 ml      Physical Exam  Constitutional:       General: She is not in acute distress.     Appearance: She is well-developed.   HENT:      Head: Atraumatic.   Eyes:      Conjunctiva/sclera: Conjunctivae normal.   Neck:      Musculoskeletal: Neck supple.   Cardiovascular:      Rate and Rhythm: Normal rate and regular rhythm.      Heart sounds: Normal heart sounds. No murmur.   Pulmonary:      Effort: Pulmonary effort is normal.      Breath sounds: Normal breath sounds. No wheezing.   Abdominal:      General: Bowel sounds are normal. There is no distension.      Palpations: Abdomen is soft.      Tenderness: There is no abdominal tenderness.   Musculoskeletal: Normal range of motion.         General: Tenderness present. No deformity.      Comments: Right chest drain with blood.   Neurological:      Mental Status: She is alert and oriented to person,  place, and time.         Significant Labs: All pertinent labs within the past 24 hours have been reviewed.    Significant Imaging: I have reviewed all pertinent imaging results/findings within the past 24 hours.      Assessment/Plan:      * Poorly controlled type 2 diabetes mellitus with complication  Poorly controlled diabetes based on hemoglobin A1c of 13.1%.    Home Meds:  Humalog 75/25 - 16 units and 18 units.      Patient has been noncompliant with diabetic diet and has poor insight on how to manage her diabetes.  She is also challenged by somewhat brittle diabetes as well.    Placed on continuous infusion in order to achieve reasonable glycemic control during the perioperative period.    Now on Detemir 20units qHS with SSI, but was NPO after midnight.  Glucose a little low this morning.  Consider reducing Detemir to 15units  -Follow closely    Malignant neoplasm of upper-outer quadrant of right breast in female, estrogen receptor positive  Patient has had delays in surgery related to obtaining medical clearance, COVID-19 pandemic, and now recent cancellation of surgery due to hyperglycemia.    Started on insulin infusion overnight on 9/27/2020 to achieve reasonable glycemic control during the perioperative period.    Fall overnight on 9/28/2020 - Imaging without acute injury noted.  The patient is status post recent right-sided mastectomy.  There are scattered foci of subcutaneous emphysema throughout the right chest wall presumably postoperative in etiology.  There is a surgical drainage catheter in place.  There is a heterogeneous collection within the right anterior chest wall measuring approximately 4.3 x 19.5 x 12.0 cm, likely representing postoperative seroma/hematoma.    POD #2 - MASTECTOMY-Right, and BIOPSY, LYMPH NODE, SENTINEL-Right.  Drain with blood - less today.  No need for further surgical intervention per Dr. Clark.        Acute blood loss anemia  Hgb 13.2 on 9/25 and down to 10.0  post-op.  Follow       Tobacco abuse  Patient counseled on the importance of smoking cessation.    Started on nicotine replacement.      History of CVA (cerebrovascular accident)  On ASA, Plavix, Statin at home  -ASA/Plavix held marguerite-operatively      Essential hypertension  Not on BP meds at home.  On Midodrine for possible Orthostatic Hypotension.  Will check Orthostatics.    Chronic diarrhea  Stable.  Continue with home regimen.      VTE Risk Mitigation (From admission, onward)         Ordered     Place KATHRIN hose  Until discontinued      09/28/20 1703     Place sequential compression device  Until discontinued      09/28/20 1703     IP VTE HIGH RISK PATIENT  Once      09/28/20 1703                Discharge Planning   JUDIT:      Code Status: Full Code   Is the patient medically ready for discharge?:     Reason for patient still in hospital (select all that apply): Patient trending condition and Treatment  Discharge Plan A: Home Health                  Chino Dietz MD  Department of Hospital Medicine   [facility]

## 2020-09-30 NOTE — NURSING
NPO since midnight for possible surgery in a.m.  Total output from CORWIN=80cc serosanguinous drainage overnight.  Up to bathroom and urinating without difficulty. Incision is dry and intact with no drainage, ecchymosis noted.  No increased swelling.  Pain controlled. Free from falls and injury. Cautioned to call for help and patient states understanding. Bed alarm on.  VSS, BP labile.

## 2020-09-30 NOTE — ASSESSMENT & PLAN NOTE
Not on BP meds at home.  On Midodrine for possible Orthostatic Hypotension.  Will check Orthostatics.

## 2020-09-30 NOTE — PROGRESS NOTES
MD Eduardo rounded this morning and cleared pt for being NPO. Surgical site dry and in tact. Site is healing up to MD standards. Output from CORWIN drain has decreased significantly.   Blood sugar was 60 in AM. Pt. Was given IV 25 g of dextrose. Blood sugar was brought back up to 160. Blood sugar was 300 at noon. Pt. Received 6 units of Aspart with lunch.  When pt was up with PT, blood pressure dropped to MAP of 65. Orthostatic vitals collected. When pt was standing position, pt dropped to MAP of 55. Pt was asymptomatic with blood pressure drop. When pt placed back in bed, pressure went to systolic of 180s. MD ordered Midodrene to maintain BP. Pt. Complained of rt. Shoulder pain. Pt. Received prn pain med for comfort. Pt. Received first Midodrene dose with dinner. Pt. Ate all of dinner. All vitals stable and pt in no visible distress. Family up to date with plan of care.

## 2020-10-01 LAB
ANION GAP SERPL CALC-SCNC: 9 MMOL/L (ref 8–16)
BASOPHILS # BLD AUTO: 0.04 K/UL (ref 0–0.2)
BASOPHILS NFR BLD: 0.3 % (ref 0–1.9)
BUN SERPL-MCNC: 15 MG/DL (ref 8–23)
CALCIUM SERPL-MCNC: 9 MG/DL (ref 8.7–10.5)
CHLORIDE SERPL-SCNC: 104 MMOL/L (ref 95–110)
CO2 SERPL-SCNC: 26 MMOL/L (ref 23–29)
CREAT SERPL-MCNC: 0.8 MG/DL (ref 0.5–1.4)
DIFFERENTIAL METHOD: ABNORMAL
EOSINOPHIL # BLD AUTO: 0.1 K/UL (ref 0–0.5)
EOSINOPHIL NFR BLD: 0.4 % (ref 0–8)
ERYTHROCYTE [DISTWIDTH] IN BLOOD BY AUTOMATED COUNT: 13.6 % (ref 11.5–14.5)
EST. GFR  (AFRICAN AMERICAN): >60 ML/MIN/1.73 M^2
EST. GFR  (NON AFRICAN AMERICAN): >60 ML/MIN/1.73 M^2
GLUCOSE SERPL-MCNC: 171 MG/DL (ref 70–110)
HCT VFR BLD AUTO: 32.4 % (ref 37–48.5)
HGB BLD-MCNC: 10 G/DL (ref 12–16)
IMM GRANULOCYTES # BLD AUTO: 0.04 K/UL (ref 0–0.04)
IMM GRANULOCYTES NFR BLD AUTO: 0.3 % (ref 0–0.5)
LYMPHOCYTES # BLD AUTO: 3 K/UL (ref 1–4.8)
LYMPHOCYTES NFR BLD: 24.8 % (ref 18–48)
MAGNESIUM SERPL-MCNC: 1.9 MG/DL (ref 1.6–2.6)
MCH RBC QN AUTO: 26.3 PG (ref 27–31)
MCHC RBC AUTO-ENTMCNC: 30.9 G/DL (ref 32–36)
MCV RBC AUTO: 85 FL (ref 82–98)
MONOCYTES # BLD AUTO: 0.6 K/UL (ref 0.3–1)
MONOCYTES NFR BLD: 5.3 % (ref 4–15)
NEUTROPHILS # BLD AUTO: 8.2 K/UL (ref 1.8–7.7)
NEUTROPHILS NFR BLD: 68.9 % (ref 38–73)
NRBC BLD-RTO: 0 /100 WBC
PHOSPHATE SERPL-MCNC: 2.8 MG/DL (ref 2.7–4.5)
PLATELET # BLD AUTO: 309 K/UL (ref 150–350)
PMV BLD AUTO: 9.2 FL (ref 9.2–12.9)
POCT GLUCOSE: 135 MG/DL (ref 70–110)
POCT GLUCOSE: 218 MG/DL (ref 70–110)
POCT GLUCOSE: 247 MG/DL (ref 70–110)
POCT GLUCOSE: 265 MG/DL (ref 70–110)
POCT GLUCOSE: 53 MG/DL (ref 70–110)
POTASSIUM SERPL-SCNC: 3.7 MMOL/L (ref 3.5–5.1)
RBC # BLD AUTO: 3.8 M/UL (ref 4–5.4)
SODIUM SERPL-SCNC: 139 MMOL/L (ref 136–145)
WBC # BLD AUTO: 11.94 K/UL (ref 3.9–12.7)

## 2020-10-01 PROCEDURE — 99233 SBSQ HOSP IP/OBS HIGH 50: CPT | Mod: ,,, | Performed by: INTERNAL MEDICINE

## 2020-10-01 PROCEDURE — 25000003 PHARM REV CODE 250: Performed by: SURGERY

## 2020-10-01 PROCEDURE — S4991 NICOTINE PATCH NONLEGEND: HCPCS | Performed by: HOSPITALIST

## 2020-10-01 PROCEDURE — 25000003 PHARM REV CODE 250: Performed by: HOSPITALIST

## 2020-10-01 PROCEDURE — 25000003 PHARM REV CODE 250: Performed by: INTERNAL MEDICINE

## 2020-10-01 PROCEDURE — 94761 N-INVAS EAR/PLS OXIMETRY MLT: CPT

## 2020-10-01 PROCEDURE — 85025 COMPLETE CBC W/AUTO DIFF WBC: CPT

## 2020-10-01 PROCEDURE — 83735 ASSAY OF MAGNESIUM: CPT

## 2020-10-01 PROCEDURE — 99233 PR SUBSEQUENT HOSPITAL CARE,LEVL III: ICD-10-PCS | Mod: ,,, | Performed by: INTERNAL MEDICINE

## 2020-10-01 PROCEDURE — 20000000 HC ICU ROOM

## 2020-10-01 PROCEDURE — 80048 BASIC METABOLIC PNL TOTAL CA: CPT

## 2020-10-01 PROCEDURE — 96376 TX/PRO/DX INJ SAME DRUG ADON: CPT | Mod: 59 | Performed by: HOSPITALIST

## 2020-10-01 PROCEDURE — 84100 ASSAY OF PHOSPHORUS: CPT

## 2020-10-01 PROCEDURE — 36415 COLL VENOUS BLD VENIPUNCTURE: CPT

## 2020-10-01 PROCEDURE — G0378 HOSPITAL OBSERVATION PER HR: HCPCS

## 2020-10-01 RX ORDER — HYDRALAZINE HYDROCHLORIDE 25 MG/1
25 TABLET, FILM COATED ORAL EVERY 8 HOURS PRN
Status: DISCONTINUED | OUTPATIENT
Start: 2020-10-02 | End: 2020-10-02 | Stop reason: HOSPADM

## 2020-10-01 RX ORDER — ACETAMINOPHEN 325 MG/1
650 TABLET ORAL EVERY 6 HOURS PRN
Status: DISCONTINUED | OUTPATIENT
Start: 2020-10-01 | End: 2020-10-02 | Stop reason: HOSPADM

## 2020-10-01 RX ORDER — ANASTROZOLE 1 MG/1
1 TABLET ORAL DAILY
Status: DISCONTINUED | OUTPATIENT
Start: 2020-10-01 | End: 2020-10-02 | Stop reason: HOSPADM

## 2020-10-01 RX ADMIN — PRAVASTATIN SODIUM 10 MG: 10 TABLET ORAL at 08:10

## 2020-10-01 RX ADMIN — MUPIROCIN: 20 OINTMENT TOPICAL at 09:10

## 2020-10-01 RX ADMIN — ACETAMINOPHEN 650 MG: 325 TABLET, FILM COATED ORAL at 08:10

## 2020-10-01 RX ADMIN — HYDROCODONE BITARTRATE AND ACETAMINOPHEN 1 TABLET: 5; 325 TABLET ORAL at 11:10

## 2020-10-01 RX ADMIN — ANASTROZOLE 1 MG: 1 TABLET, COATED ORAL at 12:10

## 2020-10-01 RX ADMIN — DIPHENOXYLATE HYDROCHLORIDE AND ATROPINE SULFATE 1 TABLET: 2.5; .025 TABLET ORAL at 08:10

## 2020-10-01 RX ADMIN — MIDODRINE HYDROCHLORIDE 2.5 MG: 2.5 TABLET ORAL at 08:10

## 2020-10-01 RX ADMIN — AMITRIPTYLINE HYDROCHLORIDE 75 MG: 25 TABLET, FILM COATED ORAL at 08:10

## 2020-10-01 RX ADMIN — INSULIN ASPART 4 UNITS: 100 INJECTION, SOLUTION INTRAVENOUS; SUBCUTANEOUS at 12:10

## 2020-10-01 RX ADMIN — HYDROCODONE BITARTRATE AND ACETAMINOPHEN 1 TABLET: 5; 325 TABLET ORAL at 03:10

## 2020-10-01 RX ADMIN — Medication 1 PATCH: at 08:10

## 2020-10-01 RX ADMIN — INSULIN ASPART 4 UNITS: 100 INJECTION, SOLUTION INTRAVENOUS; SUBCUTANEOUS at 04:10

## 2020-10-01 RX ADMIN — CITALOPRAM HYDROBROMIDE 10 MG: 10 TABLET, FILM COATED ORAL at 08:10

## 2020-10-01 RX ADMIN — MIDODRINE HYDROCHLORIDE 2.5 MG: 2.5 TABLET ORAL at 05:10

## 2020-10-01 RX ADMIN — DEXTROSE MONOHYDRATE 12.5 G: 25 INJECTION, SOLUTION INTRAVENOUS at 07:10

## 2020-10-01 RX ADMIN — MUPIROCIN: 20 OINTMENT TOPICAL at 08:10

## 2020-10-01 RX ADMIN — INSULIN ASPART 3 UNITS: 100 INJECTION, SOLUTION INTRAVENOUS; SUBCUTANEOUS at 09:10

## 2020-10-01 NOTE — PROGRESS NOTES
0730 BG 53 this am. Pt asymptomatic. Treated with 12.5 g of D50 per prn order. Result was BG of 135. Pt given meal tray. Dr. Dietz notified. Will monitor.

## 2020-10-01 NOTE — ASSESSMENT & PLAN NOTE
Poorly controlled diabetes based on hemoglobin A1c of 13.1%.    Home Meds:  Humalog 75/25 - 16 units and 18 units.      Patient has been noncompliant with diabetic diet and has poor insight on how to manage her diabetes.    Placed on continuous infusion in order to achieve reasonable glycemic control during the perioperative period.    Now on Detemir 20units qHS with SSI.  Glucose a little low again  this morning.    -Decrease Detemir to 15units  -Follow closely

## 2020-10-01 NOTE — PROGRESS NOTES
[facility] Hospital Medicine  Progress Note    Patient Name: Darlene Avila  MRN: [id number]  Patient Class: IP- Inpatient   Admission Date: 9/25/2020  Length of Stay: 0 days  Attending Physician: Chino Dietz MD  Primary Care Provider: Kirill Cabrera Iii, MD        Subjective:     Principal Problem:Poorly controlled type 2 diabetes mellitus with complication        HPI:  Patient is a 69 year-old woman with hypertension, poorly controlled diabetes mellitus type II, prior transient ischemic attack, current tobacco and recently diagnosed with right-sided breast cancer currently on anastrozole with plan for mastectomy today with Dr. Krysta Clark.  Surgery however cancelled due to hyperglycemia. Surgery has already been delay due to COVID pandemic.  Dr. Clark requesting evaluation by hospital medicine.  Patient reports non-compliance with diabetic diet.    Overview/Hospital Course:  Patient is a 69 year-old woman with hypertension, poorly controlled diabetes mellitus type II, prior transient ischemic attack, current tobacco smoker with breast cancer who has had delay in undergoing a mastectomy to COVID pandemic and again now due to hyperglycemia resulting in cancellation of surgery on Friday.  Patient admitted on the hospital under observation to medically optimize her for surgery anticipated on Monday. Despite efforts to adjust her insulin regimen for better glycemic control she continued to have high capillary blood glucose readings intermixed with some low insulin readings.  Patient transferred to the intensive care unit select she was started on continuous insulin infusion to help regulate her diabetes in the perioperative period.  NPO for surgery later today.    Interval History:  Patient overall stable this morning.  Having less blood in drain today.  She wants to go home today.    Review of Systems   Constitutional: Negative for chills and fever.   Respiratory: Negative for shortness of  breath and wheezing.    Cardiovascular: Negative for chest pain.   Gastrointestinal: Positive for diarrhea (chronic and intermittent since Cholecystomy). Negative for abdominal distention, abdominal pain, constipation, nausea and vomiting.   Genitourinary: Negative for dysuria and frequency.   Musculoskeletal: Negative for arthralgias and myalgias.        Right post-surgical pain   Neurological: Negative for light-headedness.   Psychiatric/Behavioral: Negative for agitation and confusion.     Objective:     Vital Signs (Most Recent):  Temp: 98.1 °F (36.7 °C) (10/01/20 1100)  Pulse: 102 (10/01/20 1100)  Resp: 18 (10/01/20 1100)  BP: (!) 152/68 (10/01/20 1100)  SpO2: 99 % (10/01/20 1100) Vital Signs (24h Range):  Temp:  [98.1 °F (36.7 °C)-98.3 °F (36.8 °C)] 98.1 °F (36.7 °C)  Pulse:  [] 102  Resp:  [8-41] 18  SpO2:  [96 %-100 %] 99 %  BP: ()/(46-88) 152/68     Weight: 58.2 kg (128 lb 4.9 oz)  Body mass index is 25.91 kg/m².    Intake/Output Summary (Last 24 hours) at 10/1/2020 1117  Last data filed at 9/30/2020 2305  Gross per 24 hour   Intake 1040 ml   Output 832 ml   Net 208 ml      Physical Exam  Constitutional:       General: She is not in acute distress.     Appearance: She is well-developed.   HENT:      Head: Atraumatic.   Eyes:      Conjunctiva/sclera: Conjunctivae normal.   Neck:      Musculoskeletal: Neck supple.   Cardiovascular:      Rate and Rhythm: Normal rate and regular rhythm.      Heart sounds: Normal heart sounds. No murmur.   Pulmonary:      Effort: Pulmonary effort is normal.      Breath sounds: Normal breath sounds. No wheezing.   Abdominal:      General: Bowel sounds are normal. There is no distension.      Palpations: Abdomen is soft.      Tenderness: There is no abdominal tenderness.   Musculoskeletal: Normal range of motion.         General: Tenderness present. No deformity.      Comments: Right chest drain with blood.   Neurological:      Mental Status: She is alert and oriented  to person, place, and time.         Significant Labs: All pertinent labs within the past 24 hours have been reviewed.    Significant Imaging: I have reviewed all pertinent imaging results/findings within the past 24 hours.      Assessment/Plan:      * Poorly controlled type 2 diabetes mellitus with complication  Poorly controlled diabetes based on hemoglobin A1c of 13.1%.    Home Meds:  Humalog 75/25 - 16 units and 18 units.      Patient has been noncompliant with diabetic diet and has poor insight on how to manage her diabetes.    Placed on continuous infusion in order to achieve reasonable glycemic control during the perioperative period.    Now on Detemir 20units qHS with SSI.  Glucose a little low again  this morning.    -Decrease Detemir to 15units  -Follow closely    Malignant neoplasm of upper-outer quadrant of right breast in female, estrogen receptor positive  Stage I (T2N0M0) invasive ductal carcinoma of right breast, upper outer quadrant, ER 99%, PA neg, Her2 neg, Grade 2, Ki67 < 10%, low risk Oncotype  Started neoadjuvant Arimidex due to coronavirus in 3/2020  Patient has had delays in surgery related to obtaining medical clearance, COVID-19 pandemic, and now recent cancellation of surgery due to hyperglycemia.    Started on insulin infusion overnight on 9/27/2020 to achieve reasonable glycemic control during the perioperative period.    POD #3 - MASTECTOMY-Right, and BIOPSY, LYMPH NODE, SENTINEL-Right.  Drain with blood - less today.  No need for further surgical intervention per Dr. Clark.    Plan:  Patient will continue with drain in place with outpatient Breast Surgery follow up  Continue with Arimidex  Follow up with Oncology on discharge        Acute blood loss anemia  Hgb 13.2 on 9/25 and down to 10.0 post-op.  Stable since yesterday  Follow       Tobacco abuse  Patient counseled on the importance of smoking cessation.    Started on nicotine replacement.      History of CVA (cerebrovascular  accident)  On ASA, Plavix, Statin at home  -ASA/Plavix held marguerite-operatively      Essential hypertension  Not on BP meds at home.  On Midodrine for Orthostatic Hypotension - no records found.  Orthostatic in ICU and Midodrine restarted  BP a little high today  -Repeat Orthostatics on Midodrine  -Consider Cardiology consult    Chronic diarrhea  Since Cholecystectomy  Stable.    Continue with home regimen.      VTE Risk Mitigation (From admission, onward)         Ordered     Place KATHRIN hose  Until discontinued      09/28/20 1703     Place sequential compression device  Until discontinued      09/28/20 1703     IP VTE HIGH RISK PATIENT  Once      09/28/20 1703                Discharge Planning   JUDIT:      Code Status: Full Code   Is the patient medically ready for discharge?:     Reason for patient still in hospital (select all that apply): Patient trending condition and Treatment  Discharge Plan A: Home                  Chino Dietz MD  Department of Hospital Medicine   [facility]

## 2020-10-01 NOTE — ASSESSMENT & PLAN NOTE
Stage I (T2N0M0) invasive ductal carcinoma of right breast, upper outer quadrant, ER 99%, VT neg, Her2 neg, Grade 2, Ki67 < 10%, low risk Oncotype  Started neoadjuvant Arimidex due to coronavirus in 3/2020  Patient has had delays in surgery related to obtaining medical clearance, COVID-19 pandemic, and now recent cancellation of surgery due to hyperglycemia.    Started on insulin infusion overnight on 9/27/2020 to achieve reasonable glycemic control during the perioperative period.    POD #3 - MASTECTOMY-Right, and BIOPSY, LYMPH NODE, SENTINEL-Right.  Drain with blood - less today.  No need for further surgical intervention per Dr. Clark.    Plan:  Patient will continue with drain in place with outpatient Breast Surgery follow up  Continue with Arimidex  Follow up with Oncology on discharge

## 2020-10-01 NOTE — ASSESSMENT & PLAN NOTE
Not on BP meds at home.  On Midodrine for Orthostatic Hypotension - no records found.  Orthostatic in ICU and Midodrine restarted  BP a little high today  -Repeat Orthostatics on Midodrine  -Consider Cardiology consult

## 2020-10-01 NOTE — SUBJECTIVE & OBJECTIVE
Interval History:  Patient overall stable this morning.  Having less blood in drain today.  She wants to go home today.    Review of Systems   Constitutional: Negative for chills and fever.   Respiratory: Negative for shortness of breath and wheezing.    Cardiovascular: Negative for chest pain.   Gastrointestinal: Positive for diarrhea (chronic and intermittent since Cholecystomy). Negative for abdominal distention, abdominal pain, constipation, nausea and vomiting.   Genitourinary: Negative for dysuria and frequency.   Musculoskeletal: Negative for arthralgias and myalgias.        Right post-surgical pain   Neurological: Negative for light-headedness.   Psychiatric/Behavioral: Negative for agitation and confusion.     Objective:     Vital Signs (Most Recent):  Temp: 98.1 °F (36.7 °C) (10/01/20 1100)  Pulse: 102 (10/01/20 1100)  Resp: 18 (10/01/20 1100)  BP: (!) 152/68 (10/01/20 1100)  SpO2: 99 % (10/01/20 1100) Vital Signs (24h Range):  Temp:  [98.1 °F (36.7 °C)-98.3 °F (36.8 °C)] 98.1 °F (36.7 °C)  Pulse:  [] 102  Resp:  [8-41] 18  SpO2:  [96 %-100 %] 99 %  BP: ()/(46-88) 152/68     Weight: 58.2 kg (128 lb 4.9 oz)  Body mass index is 25.91 kg/m².    Intake/Output Summary (Last 24 hours) at 10/1/2020 1117  Last data filed at 9/30/2020 2305  Gross per 24 hour   Intake 1040 ml   Output 832 ml   Net 208 ml      Physical Exam  Constitutional:       General: She is not in acute distress.     Appearance: She is well-developed.   HENT:      Head: Atraumatic.   Eyes:      Conjunctiva/sclera: Conjunctivae normal.   Neck:      Musculoskeletal: Neck supple.   Cardiovascular:      Rate and Rhythm: Normal rate and regular rhythm.      Heart sounds: Normal heart sounds. No murmur.   Pulmonary:      Effort: Pulmonary effort is normal.      Breath sounds: Normal breath sounds. No wheezing.   Abdominal:      General: Bowel sounds are normal. There is no distension.      Palpations: Abdomen is soft.      Tenderness:  There is no abdominal tenderness.   Musculoskeletal: Normal range of motion.         General: Tenderness present. No deformity.      Comments: Right chest drain with blood.   Neurological:      Mental Status: She is alert and oriented to person, place, and time.         Significant Labs: All pertinent labs within the past 24 hours have been reviewed.    Significant Imaging: I have reviewed all pertinent imaging results/findings within the past 24 hours.

## 2020-10-02 ENCOUNTER — TELEPHONE (OUTPATIENT)
Dept: CARDIOLOGY | Facility: CLINIC | Age: 70
End: 2020-10-02

## 2020-10-02 VITALS
OXYGEN SATURATION: 97 % | HEIGHT: 59 IN | WEIGHT: 128.31 LBS | TEMPERATURE: 98 F | HEART RATE: 71 BPM | BODY MASS INDEX: 25.87 KG/M2 | RESPIRATION RATE: 16 BRPM | DIASTOLIC BLOOD PRESSURE: 89 MMHG | SYSTOLIC BLOOD PRESSURE: 145 MMHG

## 2020-10-02 PROBLEM — Z51.5 ENCOUNTER FOR PALLIATIVE CARE: Status: ACTIVE | Noted: 2020-10-02

## 2020-10-02 LAB
ANION GAP SERPL CALC-SCNC: 7 MMOL/L (ref 8–16)
BASOPHILS # BLD AUTO: 0.05 K/UL (ref 0–0.2)
BASOPHILS NFR BLD: 0.5 % (ref 0–1.9)
BUN SERPL-MCNC: 17 MG/DL (ref 8–23)
CALCIUM SERPL-MCNC: 9.1 MG/DL (ref 8.7–10.5)
CHLORIDE SERPL-SCNC: 105 MMOL/L (ref 95–110)
CO2 SERPL-SCNC: 27 MMOL/L (ref 23–29)
CREAT SERPL-MCNC: 0.8 MG/DL (ref 0.5–1.4)
DIFFERENTIAL METHOD: ABNORMAL
EOSINOPHIL # BLD AUTO: 0.3 K/UL (ref 0–0.5)
EOSINOPHIL NFR BLD: 2.4 % (ref 0–8)
ERYTHROCYTE [DISTWIDTH] IN BLOOD BY AUTOMATED COUNT: 13.4 % (ref 11.5–14.5)
EST. GFR  (AFRICAN AMERICAN): >60 ML/MIN/1.73 M^2
EST. GFR  (NON AFRICAN AMERICAN): >60 ML/MIN/1.73 M^2
GLUCOSE SERPL-MCNC: 207 MG/DL (ref 70–110)
HCT VFR BLD AUTO: 31.9 % (ref 37–48.5)
HGB BLD-MCNC: 9.7 G/DL (ref 12–16)
IMM GRANULOCYTES # BLD AUTO: 0.02 K/UL (ref 0–0.04)
IMM GRANULOCYTES NFR BLD AUTO: 0.2 % (ref 0–0.5)
LYMPHOCYTES # BLD AUTO: 3.6 K/UL (ref 1–4.8)
LYMPHOCYTES NFR BLD: 33.5 % (ref 18–48)
MAGNESIUM SERPL-MCNC: 1.9 MG/DL (ref 1.6–2.6)
MCH RBC QN AUTO: 26 PG (ref 27–31)
MCHC RBC AUTO-ENTMCNC: 30.4 G/DL (ref 32–36)
MCV RBC AUTO: 86 FL (ref 82–98)
MONOCYTES # BLD AUTO: 0.8 K/UL (ref 0.3–1)
MONOCYTES NFR BLD: 7.7 % (ref 4–15)
NEUTROPHILS # BLD AUTO: 6 K/UL (ref 1.8–7.7)
NEUTROPHILS NFR BLD: 55.7 % (ref 38–73)
NRBC BLD-RTO: 0 /100 WBC
PHOSPHATE SERPL-MCNC: 3 MG/DL (ref 2.7–4.5)
PLATELET # BLD AUTO: 350 K/UL (ref 150–350)
PMV BLD AUTO: 9.1 FL (ref 9.2–12.9)
POCT GLUCOSE: 172 MG/DL (ref 70–110)
POCT GLUCOSE: 80 MG/DL (ref 70–110)
POTASSIUM SERPL-SCNC: 4.1 MMOL/L (ref 3.5–5.1)
RBC # BLD AUTO: 3.73 M/UL (ref 4–5.4)
SODIUM SERPL-SCNC: 139 MMOL/L (ref 136–145)
WBC # BLD AUTO: 10.81 K/UL (ref 3.9–12.7)

## 2020-10-02 PROCEDURE — 99217 PR OBSERVATION CARE DISCHARGE: ICD-10-PCS | Mod: ,,, | Performed by: INTERNAL MEDICINE

## 2020-10-02 PROCEDURE — 84100 ASSAY OF PHOSPHORUS: CPT

## 2020-10-02 PROCEDURE — 85025 COMPLETE CBC W/AUTO DIFF WBC: CPT

## 2020-10-02 PROCEDURE — 99497 PR ADVNCD CARE PLAN 30 MIN: ICD-10-PCS | Mod: ,,, | Performed by: FAMILY MEDICINE

## 2020-10-02 PROCEDURE — 99497 ADVNCD CARE PLAN 30 MIN: CPT | Mod: ,,, | Performed by: FAMILY MEDICINE

## 2020-10-02 PROCEDURE — G0378 HOSPITAL OBSERVATION PER HR: HCPCS

## 2020-10-02 PROCEDURE — 25000003 PHARM REV CODE 250: Performed by: INTERNAL MEDICINE

## 2020-10-02 PROCEDURE — 99205 PR OFFICE/OUTPT VISIT, NEW, LEVL V, 60-74 MIN: ICD-10-PCS | Mod: ,,, | Performed by: FAMILY MEDICINE

## 2020-10-02 PROCEDURE — 94761 N-INVAS EAR/PLS OXIMETRY MLT: CPT

## 2020-10-02 PROCEDURE — 36415 COLL VENOUS BLD VENIPUNCTURE: CPT

## 2020-10-02 PROCEDURE — 99205 OFFICE O/P NEW HI 60 MIN: CPT | Mod: ,,, | Performed by: FAMILY MEDICINE

## 2020-10-02 PROCEDURE — 97116 GAIT TRAINING THERAPY: CPT

## 2020-10-02 PROCEDURE — 83735 ASSAY OF MAGNESIUM: CPT

## 2020-10-02 PROCEDURE — 99217 PR OBSERVATION CARE DISCHARGE: CPT | Mod: ,,, | Performed by: INTERNAL MEDICINE

## 2020-10-02 PROCEDURE — 80048 BASIC METABOLIC PNL TOTAL CA: CPT

## 2020-10-02 RX ORDER — RAMIPRIL 1.25 MG/1
1.25 CAPSULE ORAL DAILY
Status: DISCONTINUED | OUTPATIENT
Start: 2020-10-02 | End: 2020-10-02 | Stop reason: HOSPADM

## 2020-10-02 RX ORDER — RAMIPRIL 1.25 MG/1
1.25 CAPSULE ORAL DAILY
Qty: 30 CAPSULE | Refills: 0 | Status: ON HOLD
Start: 2020-10-02 | End: 2020-12-28 | Stop reason: HOSPADM

## 2020-10-02 RX ORDER — MUPIROCIN 20 MG/G
OINTMENT TOPICAL 2 TIMES DAILY
Qty: 1 TUBE | Refills: 0 | Status: ON HOLD | OUTPATIENT
Start: 2020-10-02 | End: 2020-12-28 | Stop reason: HOSPADM

## 2020-10-02 RX ORDER — HYDROCODONE BITARTRATE AND ACETAMINOPHEN 5; 325 MG/1; MG/1
1 TABLET ORAL EVERY 8 HOURS PRN
Qty: 21 TABLET | Refills: 0 | Status: SHIPPED | OUTPATIENT
Start: 2020-10-02 | End: 2020-10-09

## 2020-10-02 RX ADMIN — CITALOPRAM HYDROBROMIDE 10 MG: 10 TABLET, FILM COATED ORAL at 09:10

## 2020-10-02 RX ADMIN — MUPIROCIN: 20 OINTMENT TOPICAL at 09:10

## 2020-10-02 RX ADMIN — ANASTROZOLE 1 MG: 1 TABLET, COATED ORAL at 09:10

## 2020-10-02 RX ADMIN — PRAVASTATIN SODIUM 10 MG: 10 TABLET ORAL at 09:10

## 2020-10-02 RX ADMIN — RAMIPRIL 1.25 MG: 1.25 CAPSULE ORAL at 02:10

## 2020-10-02 NOTE — ASSESSMENT & PLAN NOTE
Stage I (T2N0M0) invasive ductal carcinoma of right breast, upper outer quadrant, ER 99%, UT neg, Her2 neg, Grade 2, Ki67 < 10%, low risk Oncotype  Started neoadjuvant Arimidex due to coronavirus in 3/2020  Patient has had delays in surgery related to obtaining medical clearance, COVID-19 pandemic, and now recent cancellation of surgery due to hyperglycemia.    Started on insulin infusion overnight on 9/27/2020 to achieve reasonable glycemic control during the perioperative period.    POD #4 - MASTECTOMY-Right, and BIOPSY, LYMPH NODE, SENTINEL-Right.  Drain with blood - less today.  No need for further surgical intervention per Dr. Clark.    Plan:  Patient will continue with drain in place with outpatient Breast Surgery follow up - 10/8/2020  Continue with Arimidex  Follow up with Oncology on discharge - 10/19/2020

## 2020-10-02 NOTE — PLAN OF CARE
[facility] HOME HEALTH ORDERS  FACE TO FACE ENCOUNTER    Patient Name: Darlene Avila  YOB: 1950    PCP: Kirill Cabrera Iii, MD   PCP Address: [address] [place] [address] [address] / [facility] [address]  PCP Phone Number: [phone]  PCP Fax: [phone]    Encounter Date: 10/02/2020    Admit to Home Health    Diagnoses:  Active Hospital Problems    Diagnosis  POA    *Poorly controlled type 2 diabetes mellitus with complication [E11.8, E11.65]  Yes     Priority: 2     Malignant neoplasm of upper-outer quadrant of right breast in female, estrogen receptor positive [C50.411, Z17.0]  Not Applicable     Priority: 1 - High    Encounter for palliative care [Z51.5]  Not Applicable    Acute blood loss anemia [D62]  No    Tobacco abuse [Z72.0]  Yes    History of CVA (cerebrovascular accident) [Z86.73]  Not Applicable    Essential hypertension [I10]  Yes    Chronic diarrhea [K52.9]  Yes      Resolved Hospital Problems    Diagnosis Date Resolved POA    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    Specialties: Surgery, Breast Surgery  Why: Breast Surgery - 10/8/2020 at 1:45pm  Contact information:  [address]  [facility] [place] [address]  [phone]             Kirill Cabrera Iii, MD In 1 week.    Specialty: Internal Medicine  Why: Blood Pressure and Diabetes follow up  Contact information:  [facility] [address]  [facility] [address] [phone]                     I have seen and examined this patient face to face today. My clinical findings that  support the need for the home health skilled services and home bound status are the following:  Patient with medication mismanagement issues requiring home bound status as evidenced by  Poor adherence to medication regimen/dosage, Uncontrolled hyperglycemic/hypoglycemic events and Orthostatic Hypotension.  Medical restrictions requiring assistance of another human to leave home due to  Recent Surgery and decreased mobility.    Allergies:  Review of patient's allergies indicates:   Allergen Reactions    Iodinated contrast media Hives       Diet: diabetic diet: 1800 calorie    Activities: activity as tolerated    Nursing:   SN to complete comprehensive assessment including routine vital signs. Instruct on disease process and s/s of complications to report to MD. Review/verify medication list sent home with the patient at time of discharge  and instruct patient/caregiver as needed. Frequency may be adjusted depending on start of care date.    Notify MD if SBP > 160 or < 90; DBP > 90 or < 50; HR > 120 or < 50; Temp > 101; Other:         MISCELLANEOUS CARE:  Wound Care Orders:  n/a   General Post-Mastectomy care  Patient with Drain in Place      Medications: Review discharge medications with patient and family and provide education.      Current Discharge Medication List      START taking these medications    Details   HYDROcodone-acetaminophen (NORCO) 5-325 mg per tablet Take 1 tablet by mouth every 8 (eight) hours as needed.  Qty: 21 tablet, Refills: 0    Comments: Quantity prescribed more than 7 day supply? No      mupirocin (BACTROBAN) 2 % ointment by Nasal route 2 (two) times daily.  Qty: 1 Tube, Refills: 0      ramipriL (ALTACE) 1.25 MG capsule Take 1 capsule (1.25 mg total) by mouth once daily.  Qty: 30 capsule, Refills: 0         CONTINUE these medications which have NOT CHANGED    Details   amitriptyline (ELAVIL) 100 MG tablet Take 1 tablet by mouth every evening.       anastrozole (ARIMIDEX) 1 mg Tab Take 1  tablet (1 mg total) by mouth once daily.  Qty: 90 tablet, Refills: 3    Associated Diagnoses: Malignant neoplasm of upper-outer quadrant of right breast in female, estrogen receptor positive      aspirin (ECOTRIN) 81 MG EC tablet Take 81 mg by mouth once daily.      calcium carbonate (OS-JABIER) 600 mg calcium (1,500 mg) Tab Take 600 mg by mouth once.      citalopram (CELEXA) 10 MG tablet Take 1 tablet by mouth once daily.       clopidogrel (PLAVIX) 75 mg tablet Take 75 mg by mouth once daily.       insulin lispro protamin-lispro (HUMALOG MIX 75-25 KWIKPEN) 100 unit/mL (75-25) InPn Inject 10 Units into the skin 2 (two) times a day. Inject 16 units every morning and 18 units every evening    Associated Diagnoses: Orthostasis      midodrine (PROAMATINE) 2.5 MG Tab Take 1 tablet (2.5 mg total) by mouth every 12 (twelve) hours.  Qty: 60 tablet, Refills: 0    Associated Diagnoses: Orthostasis      multivitamin capsule Take 1 capsule by mouth once daily.      pravastatin (PRAVACHOL) 10 MG tablet Take 10 mg by mouth once daily.       vitamin D (VITAMIN D3) 1000 units Tab Take 1,000 Units by mouth once daily.         STOP taking these medications       diphenoxylate-atropine 2.5-0.025 mg (LOMOTIL) 2.5-0.025 mg per tablet Comments:   Reason for Stopping:               I certify that this patient is confined to her home and needs Nursing Care

## 2020-10-02 NOTE — PLAN OF CARE
Ms. Avila rested well overnight, VSS, afebrile. Requested PRN lomotil @ beginning of shift d/t eating gumbo for dinner. Pt reports she has had chronic diarrhea since having her gallbladder removed and states she takes antidiarrheal medication twice daily at home. Right breast incision CDI. CORWIN drain with 30cc serosanguineous output overnight. Ambulates to toilet and voids without difficulty; standby assist provided. NP notified overnight regarding  and then 180; PRN hydralazine ordered. Upon 3rd recheck prior to med admin, , no PRN BP medications administered. Repositions in bed independently. Very anxious for discharge. Up to date with POC.

## 2020-10-02 NOTE — ASSESSMENT & PLAN NOTE
Not on BP meds at home.  On Midodrine for Orthostatic Hypotension - no records found.  Probable Autonomic Dysfunction given wide fluctuations in BP.  Orthostatic in ICU and Midodrine restarted  BP a high today, but improvement in orthostatic component.  -Will consult Cardiology for recommendations

## 2020-10-02 NOTE — DISCHARGE INSTRUCTIONS
POSTOPERATIVE INSTRUCTIONS FOLLOWING   MASTECTOMY AND/OR AXILLARY LYMPH NODE DISSECTION    The following are post-operative instructions that will help you to recover from your surgery.  Please read over these instructions carefully and contact us if we can answer any of your questions or concerns.    Dressing/breast binder (surgi-bra)  A surgical bra may be placed around your chest after your surgery.  If you are given the bra, please wear it as close to 24 hours a day as possible until your post-operative clinic appointment.  If the elastic around the bra irritates your skin, you may wear a soft t-shirt underneath the bra.    You may go without wearing the bra long enough to bath, to launder and dry the bra. If you have fluffy filler placed inside the bra, the filler should be removed whenever the bra is taken off. Please reinsert the fluffy filler, or insert the new soft filler, under the bra when you put the bra back on.  If the bra is extremely uncomfortable, you may wear a supportive sports bra instead after 2 days.    You may shower AFTER the drains are removed.  Please sponge bathe until then. Do not take a tub bath and do not soak the surgical site. Please do not remove the white strips of tape (steri-strips) that cover your incision.  They will be removed at your clinic visit.    Activity    You will be able to do much of your own personal care, such as bathing, dressing, preparing simple meals, etc.   A short walk each day will help with your recovery   You may find that you need to take rest breaks between activities, but you should not need to stay in bed for prolonged periods of time during the day   You may resume light household activities such as simple meal preparation, folding laundry, using your computer, and completing paperwork as you feel ready   Please avoid activities that require moderate to heavy lifting (grocery shopping) or pushing/pulling (vacuuming) and repetitive motions (such as  washing windows or long hours on the computer). Do not lift anything heavier than a gallon of milk.   A good rule during this time is to listen to your body, do what is comfortable, and stop and rest when your feel tired.  If it hurts, dont do it.   Following a lymph node dissection, dont avoid using your arm, but dont exercise your arm until after your first post-operative visit.  At your first post-op visit, you will be given arm exercises to regain movement and flexibility.  You may be referred to physical therapy.   You may restart driving when you are no longer on narcotics, your drain has been removed, and you feel safe turning the wheel and stopping quickly.   You will need to be out of work approximately 2-6 weeks depending on your particular surgery and how well you are recovering.  We will evaluate how you are doing at the first post-op appointment.  This is a good time to ask when you may return to work and what activities you may do.    Medication for pain   You will be given a prescription for pain medication. You should not drive or operate machinery while taking these.  Please take narcotics with food.  Narcotics can cause, or worse, constipation.  You will need to increase your fluid intake, eat high fiber foods (such as fruits and bran) and make sure that you are up and walking. You may need to take an over the counter stool softener for constipation.   An icepack may be helpful to decrease discomfort and swelling, particularly to the armpit after a lymph node dissection.   A small pillow positioned in the armpit may also decrease discomfort after a lymph node dissection.   If you are given a prescription for antibiotics, please continue to take them until the drains have been removed.    How to care for your Drain(s)  1. Wash hands--STRIP or milk the drainage tube as it comes out of your body toward the bulb.   a. Beginning where the drain comes out of your body, hold drainage tubing  with one hand and with the other, stretch and release tubing an inch at time while moving downward with both hands toward the bulb.  b. Do this 2-3 times before emptying the bulb.  2. Remove the stopper from the bulbs port  (drainage port)  3. Pour the drainage in the measuring cup provided by the nurse  4. Flatten/squeeze the bulb to create a vacuum and replace the stopper before letting go the of the bulb.  5. Record the date, time and amount of drainage in ccs (not ounces) each time bulb is emptied. If you have more than one drain, record each separately.  6. Discard the drainage into the toilet after measuring and then wash hands.  7. Empty bulbs 3 times/day or as needed if it fills up before 8 hours.  8. Remember to bring the output record with you to your doctors appointment.    Please report the following:   Temperature greater than 101 degrees   Discharge or bad odor from the wound   Excessive bleeding, such as bloody dressing or extreme bruising   Redness at incision and/or drain sites   Swelling or buildup of fluid around incision  If blue dye was used to locate your sentinel lymph nodes, your urine and stool may be blue-green in color for 1 or 2 days.    Additional information  I will see you approximately 2 weeks following your surgery.  If this follow-up appointment has not been made, please call the office.    If you have any questions or problems, please call my office or my nurse.    Dr. Krysta James, RN  [phone]    After hours and on weekends, you may call the main [facility] line at [phone] and ask to have the general surgery resident paged or have me paged      Lymphedema Risk Reduction    Lymphedema is a swelling of a part of the body, caused by an insufficient lymphatic system and an accumulation of fluid in the bodys tissues.  Lymphedema may occur when normal drainage of fluid is disrupted, such as an infection, injury, cancer, scar tissue, or removal of lymph  nodes.    If you had a full axillary node dissection procedure, you may be at great risk for lymphedema.     For those patients having a sentinel lymph node biopsy, these risks may be smaller and the recommendations are provided for your review and consideration.    The following list contains recommendations for reducing your risk of developing lymphedema.    I. Skin Care--avoid trauma/injury to reduce infection risk   Keep the hand and arm on the side of surgery clean and dry   Pay attention to nail care and do not cut cuticles   Avoid punctures, such as injections and blood draws from you on the side of your surgery   Wear gloves while doing activities that may cause skin injury (washing dishes, gardening, etc.)   If scratches or punctures occur, wash area with soap and water, and observe for signs of infections (redness, drainage, swelling)   If a rash, itching, redness, pain, increased skin temperatures, fever, or flu-like symptoms occur, contact your physician immediately for early treatment of a possible infection  II. Activity/Lifestyle   Gradually build up the duration and intensity of any activity or exercise   Take frequent rest periods during activity to allow for arm recovery   Monitor your arm and upper body during and after activity for any change in size, shape, tissue, texture, soreness, heaviness, or firmness  III. Avoid constriction of your arm on the side of your surgery   Avoid having blood pressure take on the arm on the side of your surgery   Wear loose fitting jewelry and clothing   When you return to wearing a bra, make sure that it is well fitted and not tight                    PLEASE RECORD ALL AMOUNTS IN CCS AND BRIND THIS RECORD   WITH YOU TO YOUR RETURN APPOINTMENT  Date Time Drain #1 Drain #2 comment                                                                                                                                                                                                                          Date Time Drain #1 Drain #2 comment

## 2020-10-02 NOTE — ASSESSMENT & PLAN NOTE
Poorly controlled diabetes based on hemoglobin A1c of 13.1%.    Home Meds:  Humalog 75/25 - 16 units and 18 units.      Patient has been noncompliant with diabetic diet and has poor insight on how to manage her diabetes.    Placed on continuous infusion in order to achieve reasonable glycemic control during the perioperative period.    Now on Detemir 20units qHS with SSI.  Glucose a little low again  this morning.      Patient admits to poor adherence with insulin at home.  Will discharge on previous 75/25 and advised to follow up closely with her PCP.

## 2020-10-02 NOTE — HPI
"Ms. Avila is a 69 y.o. female with PMH of hypertension, poorly controlled diabetes mellitus type II, prior transient ischemic attack, current tobacco and recently diagnosed with right-sided breast cancer who presented for mastectomy which was delayed due to uncontrolled diabetes.  Patient reports non compliance with medication.  On 9/28, mastectomy was able to be performed.  Patient was placed in ICU prior to procedure due to needs insulin gtt.  Since surgery, patient with orthostatic hypotension and fall. Cardiology has been consulted today.    Patient reports she lives alone, she has no children. Her sister lives next door.  Patient reports she is independent at home but reports worsening fatigue and weakness.  Patient reports frequent falls at home, reports she does not know how long she is on the floor.  Patient reports episodes of "blacking out" while in the bathroom.  Patient reports this has been occurring for the past year.  Patient reports associated dizziness. Patient reports she drives herself to healthcare appointments.   "

## 2020-10-02 NOTE — ASSESSMENT & PLAN NOTE
- Disease education provided.  Patient reports her favorite foods are fried foods, discussed importance of dietary adherence and medication adherence.

## 2020-10-02 NOTE — TELEPHONE ENCOUNTER
Dr. Rivera notified of consult at 9:15 a.m.    ----- Message from Agata Carranza sent at 10/2/2020  7:13 AM CDT -----  Name of Who is Calling: [facility]     Room/Bed# ICU 3     Diagnosis: Ortho hypotension    Referring Physician: Chino Dietz     Call Back Number: [phone]

## 2020-10-02 NOTE — ASSESSMENT & PLAN NOTE
- Patient with episodes of orthostatic hypotension on midodrine, now hypertensive.  - Cardiology consulted

## 2020-10-02 NOTE — ASSESSMENT & PLAN NOTE
- Patient reports weakness/ fatigue/ frequent falls prior to admission.  Patient reports difficulty in things she previously could do such as mopping.   - PT/OT following  - ACP introduced.  HCPOA designated  - Patient discussed her experience with her mothers death after her mother was in a coma after a fall.  Emotional support provided  - LaPOST completed.  Resuscitation discussed.  Patient does not desire resuscitation when her heart/ breathing are to stop.  PATIENT TO BE DNR  - Living will completed  - GOC introduced.  Patient placed an emphasis on being able to cook and regaining her independence

## 2020-10-02 NOTE — PT/OT/SLP PROGRESS
"Physical Therapy Treatment    Patient Name:  Darlene Avila   MRN:  [id number]    Recommendations:     Discharge Recommendations:  home, outpatient PT   Discharge Equipment Recommendations: none   Barriers to discharge: Decreased caregiver support    Assessment:     Darlene Avila is a 69 y.o. female admitted with a medical diagnosis of Poorly controlled type 2 diabetes mellitus with complication.  She presents with the following impairments/functional limitations:  gait instability, impaired balance, decreased safety awareness, impaired cardiopulmonary response to activity.    Pt tolerated treatment well. Pt is progressing toward meeting goals. Pt performed sit <> stand with SBA. BP in sitting was 140/62. BP dropped to 103/53 with standing, but patient had no complaints of dizziness initially. Ambulated 200 ft, then began complaining of dizziness and had loss of balance. Pt required min A from therapist to correct. Returned to room, but unable to get immediate blood pressure reading due to machine error. After sitting for ~2 min BP was 149/63. PT will continue to follow and progress goals as tolerated. Recommend discharge to home.     Rehab Prognosis: Good; patient would benefit from acute skilled PT services to address these deficits and reach maximum level of function.    Recent Surgery: Procedure(s) (LRB):  MASTECTOMY-Right (Right)  BIOPSY, LYMPH NODE, SENTINEL-Right (Right) 4 Days Post-Op    Plan:     During this hospitalization, patient to be seen 3 x/week to address the identified rehab impairments via gait training, therapeutic activities, therapeutic exercises and progress toward the following goals:    · Plan of Care Expires:  10/30/20    Subjective     Chief Complaint: "I need to get out of here."  Patient/Family Comments/goals: to go home  Pain/Comfort:  · Pain Rating 1: 0/10  · Pain Rating Post-Intervention 1: 0/10      Objective:     Communicated with RN (Luana) prior to session.  Patient found sitting " EOB with peripheral IV, telemetry, pulse ox (continuous), blood pressure cuff upon PT entry to room.     General Precautions: Standard, fall, diabetic   Orthopedic Precautions:N/A   Braces: N/A     Functional Mobility: Gait belt and non-slip socks donned prior to mobility. Surgical mask donned for ambulation in hallway per current infection control policy.  · Transfers: sit <> stand with SBA  · Ambulation: 200 ft with SBA. Pt began complaining of dizziness after ambulating 200 ft and had loss of balance requiring min A from therapist to correct.    AM-PAC 6 CLICK MOBILITY  Turning over in bed (including adjusting bedclothes, sheets and blankets)?: 4  Sitting down on and standing up from a chair with arms (e.g., wheelchair, bedside commode, etc.): 4  Moving from lying on back to sitting on the side of the bed?: 4  Moving to and from a bed to a chair (including a wheelchair)?: 4  Need to walk in hospital room?: 4  Climbing 3-5 steps with a railing?: 4  Basic Mobility Total Score: 24       Therapeutic Activities and Exercises:   PT educated patient re:   · PT plan of care/role of PT  · Fall and safety precautions  · Gait deviations  · Use of call light (don't get up without assistance)  Pt verbalized understanding     The patient is safe to transfer with RN assist, whiteboard updated.   Patient encouraged to sit up in chair throughout the day to prevent deconditioning.     Patient left sitting EOB with all lines intact and call button in reach..    GOALS:   Multidisciplinary Problems     Physical Therapy Goals        Problem: Physical Therapy Goal    Goal Priority Disciplines Outcome Goal Variances Interventions   Physical Therapy Goal     PT, PT/OT Ongoing, Progressing     Description: Goals to be met by: 10/30/2020    Patient will perform the following to increase strength, improve mobility, and return to prior level of function:    1. Supine <> sit with independence.  2. Sit<>stand with independence.  3. Gait x 200  feet with independence.  4. Ascend/descend 3 step(s) with bilateral handrails and SBA.                    Time Tracking:     PT Received On: 10/02/20  PT Start Time: 1331     PT Stop Time: 1348  PT Total Time (min): 17 min     Billable Minutes: Gait Training 17    Treatment Type: Treatment  PT/PTA: PT     PTA Visit Number: 0     Yocasta Mccabe, PT  10/02/2020

## 2020-10-02 NOTE — ASSESSMENT & PLAN NOTE
On ASA, Plavix, Statin at home  -ASA/Plavix held marguerite-operatively  -Can resume Aspirin and Plavix tomorrow (10/3/2020) per Dr. Clark.

## 2020-10-02 NOTE — NURSING
Pt w/c out of the ICU. Pt stable. Pt's d/c instructions and wound care reviewed. Pt instructed about CORWIN drain. Pt instructed on the s/s of infection. Pt alert. Family at the bedside. Pt instructed to follow up with cards. Pt instructed how to change postions slowly to prevent dizziness. Spoke with Dr. Dietz ok to d.c home without cardiology seeing the patient. Dr. Dietz's nurse will make cardiology follow up appointment and call patient with date and time.

## 2020-10-02 NOTE — PLAN OF CARE
Problem: Physical Therapy Goal  Goal: Physical Therapy Goal  Description: Goals to be met by: 10/30/2020    Patient will perform the following to increase strength, improve mobility, and return to prior level of function:    1. Supine <> sit with independence.  2. Sit<>stand with independence.  3. Gait x 200 feet with independence.  4. Ascend/descend 3 step(s) with bilateral handrails and SBA.   Outcome: Ongoing, Progressing     Pt tolerated treatment well. Pt is progressing toward meeting goals. Pt performed sit <> stand with SBA. BP in sitting was 140/62. BP dropped to 103/53 with standing, but patient had no complaints of dizziness initially. Ambulated 200 ft, then began complaining of dizziness and had loss of balance. Pt required min A from therapist to correct. Returned to room, but unable to get immediate blood pressure reading due to machine error. After sitting for ~2 min BP was 149/63. PT will continue to follow and progress goals as tolerated. Recommend discharge to home.

## 2020-10-02 NOTE — CONSULTS
[facility]  Palliative Medicine  Consult Note    Patient Name: Darlene Avila  MRN: [id number]  Admission Date: 9/25/2020  Hospital Length of Stay: 1 days  Code Status: Full Code   Attending Provider: Chino Dietz MD  Consulting Provider: Malia Gamboa DNP  Primary Care Physician: Kirill Cabrera Iii, MD  Principal Problem:Poorly controlled type 2 diabetes mellitus with complication    Patient information was obtained from patient, past medical records and ER records.      Consults   Reason for consult: Advance care planning/ goals of care    Assessment/Plan:     * Poorly controlled type 2 diabetes mellitus with complication  - Disease education provided.  Patient reports her favorite foods are fried foods, discussed importance of dietary adherence and medication adherence.     Encounter for palliative care  - Patient reports weakness/ fatigue/ frequent falls prior to admission.  Patient reports difficulty in things she previously could do such as mopping.   - PT/OT following  - ACP introduced.  HCPOA designated  - Patient talked about her recent diagnosis of breast cancer.  Patients sister has had breast cancer in the past.  Emotional support provided.   - Patient discussed her experience with her mothers death after her mother was in a coma after a fall.  Emotional support provided  - LaPOST completed.  Resuscitation discussed.  Patient does not desire resuscitation when her heart/ breathing are to stop.  PATIENT TO BE DNR  - Living will completed  - GOC introduced.  Patient placed an emphasis on being able to cook and regaining her independence    Essential hypertension  - Patient with episodes of orthostatic hypotension on midodrine, now hypertensive.  - Cardiology consulted     Malignant neoplasm of upper-outer quadrant of right breast in female, estrogen receptor positive  - Patient has mastectomy on 9/28/2020        Thank you for your consult. I will sign off. Please contact us if  "you have any additional questions.    Subjective:     HPI:   Ms. Avila is a 69 y.o. female with PMH of hypertension, poorly controlled diabetes mellitus type II, prior transient ischemic attack, current tobacco and recently diagnosed with right-sided breast cancer who presented for mastectomy which was delayed due to uncontrolled diabetes.  Patient reports non compliance with medication.  On 9/28, mastectomy was able to be performed.  Patient was placed in ICU prior to procedure due to needing insulin gtt.  Since surgery, patient with orthostatic hypotension and fall. Cardiology has been consulted today.    Patient reports she lives alone, she has no children. Her sister lives next door.  Patient reports she is independent at home but reports worsening fatigue and weakness.  Patient reports frequent falls at home, reports she does not know how long she is on the floor.  Patient reports episodes of "blacking out" while in the bathroom.  Patient reports this has been occurring for the past year.  Patient reports associated dizziness. Patient reports she drives herself to healthcare appointments.     Hospital Course:  No notes on file    Interval History: Patient lying in bed, reports incisional pain.    Family member at bedside     Past Medical History:   Diagnosis Date    Arthritis     Cancer     Cataract     Diabetes mellitus     Diabetic retinopathy     Hypertension     TIA (transient ischemic attack) [id number]       Past Surgical History:   Procedure Laterality Date    CAPSULOTOMY  6/26/13    yag left eye    CATARACT EXTRACTION  6/3/2013    right eye    CHOLECYSTECTOMY      HYSTERECTOMY      MASTECTOMY Right 9/28/2020    Procedure: MASTECTOMY-Right;  Surgeon: Krysta Clark MD;  Location: [facility];  Service: General;  Laterality: Right;    SENTINEL LYMPH NODE BIOPSY Right 9/28/2020    Procedure: BIOPSY, LYMPH NODE, SENTINEL-Right;  Surgeon: Krysta Clark MD;  Location: [facility];  Service: General;  " Laterality: Right;    TOTAL ABDOMINAL HYSTERECTOMY W/ BILATERAL SALPINGOOPHORECTOMY         Review of patient's allergies indicates:   Allergen Reactions    Iodinated contrast media Hives       Medications:  Continuous Infusions:  Scheduled Meds:   amitriptyline  75 mg Oral QHS    anastrozole  1 mg Oral Daily    citalopram  10 mg Oral Daily    insulin detemir U-100  18 Units Subcutaneous QHS    midodrine  2.5 mg Oral BID WM    mupirocin   Nasal BID    pravastatin  10 mg Oral Daily     PRN Meds:acetaminophen, dextrose 50%, dextrose 50%, diphenoxylate-atropine 2.5-0.025 mg, glucagon (human recombinant), glucose, glucose, hydrALAZINE, HYDROcodone-acetaminophen, influenza, insulin aspart U-100, ondansetron, promethazine (PHENERGAN) IVPB, sodium chloride 0.9%, sodium chloride 0.9%    Family History     Problem Relation (Age of Onset)    Breast cancer Sister (65)    Cancer Mother, Sister    Cataracts Mother    Colon cancer Mother (82)    Diabetes Mother, Father, Sister, Maternal Uncle, Paternal Uncle, Maternal Grandmother, Maternal Grandfather    Glaucoma Mother    Hypertension Sister, Maternal Grandmother        Tobacco Use    Smoking status: Current Every Day Smoker     Packs/day: 1.50    Smokeless tobacco: Never Used   Substance and Sexual Activity    Alcohol use: No    Drug use: No    Sexual activity: Not Currently       Review of Systems   Constitutional: Positive for fatigue. Negative for activity change, appetite change and fever.   HENT: Negative for sinus pressure and sore throat.    Eyes: Negative for pain and visual disturbance.   Respiratory: Negative for cough, chest tightness and shortness of breath.    Cardiovascular: Negative for chest pain and palpitations.   Gastrointestinal: Positive for diarrhea. Negative for abdominal pain, constipation and nausea.   Musculoskeletal: Negative for gait problem and myalgias.        Reports incisional pain to R breast   Neurological: Positive for dizziness  and weakness. Negative for seizures, syncope, light-headedness and headaches.   Psychiatric/Behavioral: Negative for agitation, behavioral problems and hallucinations. The patient is not nervous/anxious.      Objective:     Vital Signs (Most Recent):  Temp: 98.1 °F (36.7 °C) (10/02/20 1110)  Pulse: (!) 116 (10/02/20 1245)  Resp: (!) 35 (10/02/20 1245)  BP: (!) 164/132 (10/02/20 1200)  SpO2: 99 % (10/02/20 1245) Vital Signs (24h Range):  Temp:  [98 °F (36.7 °C)-98.6 °F (37 °C)] 98.1 °F (36.7 °C)  Pulse:  [] 116  Resp:  [12-41] 35  SpO2:  [97 %-100 %] 99 %  BP: (107-180)/() 164/132     Weight: 58.2 kg (128 lb 4.9 oz)  Body mass index is 25.91 kg/m².    Physical Exam  Constitutional:       General: She is not in acute distress.     Appearance: She is well-developed.   Neck:      Musculoskeletal: Normal range of motion.   Cardiovascular:      Rate and Rhythm: Normal rate and regular rhythm.      Heart sounds: No murmur.   Pulmonary:      Breath sounds: Normal breath sounds.   Chest:      Chest wall: No tenderness.   Abdominal:      General: Bowel sounds are normal.   Musculoskeletal: Normal range of motion.   Skin:     General: Skin is warm.      Comments: R breast incision with R drain  R arm/ breast tender   Neurological:      Mental Status: She is alert and oriented to person, place, and time.   Psychiatric:         Thought Content: Thought content normal.         Judgment: Judgment normal.         Review of Symptoms    Symptom Assessment (ESAS 0-10 Scale)  Pain:  3  Dyspnea:  0  Fatigue:  2               Performance Status:  70    ECOG Performance Status Grade:  1 - Ambulates, capable of light work    Living Arrangements:  Lives alone    Psychosocial/Cultural: Patient has no children, has a sister who lives next door and is able to help the patient if needed.       Advance Care Planning   Advance Directives:   Living Will: Yes (completed 10/2/2020)        Copy on chart: Yes    LaPOST: Yes (DNR, selective  treatment, trial period of artificial nutrition by tube)    Do Not Resuscitate Status: Yes (established 10/2/2020)    Medical Power of : Yes    Agent's Name:  Sabi Avila   Agent's Contact Number:  [phone]    Decision Making:  Patient answered questions         Significant Labs: All pertinent labs within the past 24 hours have been reviewed.  CBC:   Recent Labs   Lab 10/02/20  0350   WBC 10.81   HGB 9.7*   HCT 31.9*   MCV 86        BMP:  Recent Labs   Lab 10/02/20  0350   *      K 4.1      CO2 27   BUN 17   CREATININE 0.8   CALCIUM 9.1   MG 1.9     LFT:  Lab Results   Component Value Date    AST 18 07/22/2020    ALKPHOS 125 07/22/2020    BILITOT 0.3 07/22/2020     Albumin:   Albumin   Date Value Ref Range Status   07/22/2020 3.4 (L) 3.5 - 5.2 g/dL Final     Protein:   Total Protein   Date Value Ref Range Status   07/22/2020 6.8 6.0 - 8.4 g/dL Final     Lactic acid:   No results found for: LACTATE    Significant Imaging: I have reviewed all pertinent imaging results/findings within the past 24 hours.     I have reviewed the CT head from 9/29/2020    I have reviewed the CT chest abdomen from 9/29/2020    I have reviewed the echo TTE from 7/23/2020    I have reviewed the EKG from 9/25/2020    Advance Care Planning     Power of   I initiated the process of advance care planning today and explained the importance of this process to the patient.  I introduced the concept of advance directives to the patient, as well. Then the patient received detailed information about the importance of designating a Health Care Power of  (HCPOA). She was also instructed to communicate with this person about their wishes for future healthcare, should she become sick and lose decision-making capacity. The patient has not previously appointed a HCPOA. After our discussion, the patient has decided to complete a HCPOA and has appointed her sister and NAME:Sabi Avila #[phone].          Living Will  During this visit, I engaged the patient  in the advance care planning process.  The patient and I reviewed the role for advance directives and their purpose in directing future healthcare if the patient's unable to speak for him/herself.  At this point in time, the patient does have full decision-making capacity.  We discussed different extreme health states that she could experience, and reviewed what kind of medical care she would want in those situations.  The patient communicated that if she were comatose and had little chance of a meaningful recovery, she would not want machines/life-sustaining treatments used. In addition to the above preference, other important end-of-life issues for the patient include food and water to not be administered invasively. The patient has completed a living will to reflect these preferences.           [facility]  I engaged the patient in a conversation about advance care planning and we specifically addressed what the goals of care would be moving forward, in light of the patient's change in clinical status, specifically new diagnosis of breast cancer.  We did specifically address the patient's likely prognosis, which is fair .  We explored the patient's values and preferences for future care.  The patient endorses that what is most important right now is to focus on spending time at home, remaining as independent as possible and symptom/pain control    Accordingly, we have decided that the best plan to meet the patient's goals includes continuing with treatment    I did not explain the role for hospice care at this stage of the patient's illness, including its ability to help the patient live with the best quality of life possible.  We will not be making a hospice referral.    Code Status  In light of the patients advanced and life limiting illness,I engaged the the patient in a conversation about the patient's preferences for care  at the very end of  "life. The patient wishes to have a natural, peaceful death.  Along those lines, the patient does not wish to have CPR or other invasive treatments performed when her heart and/or breathing stops. I communicated to the patient that a DNR order would be placed in her medical record to reflect this preference and LaPOST form was completed to reflect other EOL preferences of the patient such as DNR, selective treatment, trial period of artificial nutrition ("short term nutrition by tube").          Discussed with Dr. Dietz.  Patient to be DNR    In addition to E/M, 30 minutes spent in advance care planning      Malia Gamboa, TRACY  Palliative Medicine  [facility]            "

## 2020-10-02 NOTE — PLAN OF CARE
Pt discharging home today, friend to provide transportation home.     Home health arrangemetns complete with [facility].     Pt ready for discharge from  perspective.   10/02/20 2012   Final Note   Assessment Type Final Discharge Note   Anticipated Discharge Disposition Home-Health   What phone number can be called within the next 1-3 days to see how you are doing after discharge? [phone]   Hospital Follow Up  Appt(s) scheduled? Yes   Discharge plans and expectations educations in teach back method with documentation complete? Yes   Right Care Referral Info   Post Acute Recommendation Home-care

## 2020-10-02 NOTE — SUBJECTIVE & OBJECTIVE
Interval History: Patient lying in bed, reports incisional pain.    Family member at bedside     Past Medical History:   Diagnosis Date    Arthritis     Cancer     Cataract     Diabetes mellitus     Diabetic retinopathy     Hypertension     TIA (transient ischemic attack) [id number]       Past Surgical History:   Procedure Laterality Date    CAPSULOTOMY  6/26/13    yag left eye    CATARACT EXTRACTION  6/3/2013    right eye    CHOLECYSTECTOMY      HYSTERECTOMY      MASTECTOMY Right 9/28/2020    Procedure: MASTECTOMY-Right;  Surgeon: Krysta Clark MD;  Location: [facility] OR;  Service: General;  Laterality: Right;    SENTINEL LYMPH NODE BIOPSY Right 9/28/2020    Procedure: BIOPSY, LYMPH NODE, SENTINEL-Right;  Surgeon: Krysta Clark MD;  Location: [facility] OR;  Service: General;  Laterality: Right;    TOTAL ABDOMINAL HYSTERECTOMY W/ BILATERAL SALPINGOOPHORECTOMY         Review of patient's allergies indicates:   Allergen Reactions    Iodinated contrast media Hives       Medications:  Continuous Infusions:  Scheduled Meds:   amitriptyline  75 mg Oral QHS    anastrozole  1 mg Oral Daily    citalopram  10 mg Oral Daily    insulin detemir U-100  18 Units Subcutaneous QHS    midodrine  2.5 mg Oral BID WM    mupirocin   Nasal BID    pravastatin  10 mg Oral Daily     PRN Meds:acetaminophen, dextrose 50%, dextrose 50%, diphenoxylate-atropine 2.5-0.025 mg, glucagon (human recombinant), glucose, glucose, hydrALAZINE, HYDROcodone-acetaminophen, influenza, insulin aspart U-100, ondansetron, promethazine (PHENERGAN) IVPB, sodium chloride 0.9%, sodium chloride 0.9%    Family History     Problem Relation (Age of Onset)    Breast cancer Sister (65)    Cancer Mother, Sister    Cataracts Mother    Colon cancer Mother (82)    Diabetes Mother, Father, Sister, Maternal Uncle, Paternal Uncle, Maternal Grandmother, Maternal Grandfather    Glaucoma Mother    Hypertension Sister, Maternal Grandmother        Tobacco Use    Smoking  status: Current Every Day Smoker     Packs/day: 1.50    Smokeless tobacco: Never Used   Substance and Sexual Activity    Alcohol use: No    Drug use: No    Sexual activity: Not Currently       Review of Systems   Constitutional: Positive for fatigue. Negative for activity change, appetite change and fever.   HENT: Negative for sinus pressure and sore throat.    Eyes: Negative for pain and visual disturbance.   Respiratory: Negative for cough, chest tightness and shortness of breath.    Cardiovascular: Negative for chest pain and palpitations.   Gastrointestinal: Positive for diarrhea. Negative for abdominal pain, constipation and nausea.   Musculoskeletal: Negative for gait problem and myalgias.        Reports incisional pain to R breast   Neurological: Positive for dizziness and weakness. Negative for seizures, syncope, light-headedness and headaches.   Psychiatric/Behavioral: Negative for agitation, behavioral problems and hallucinations. The patient is not nervous/anxious.      Objective:     Vital Signs (Most Recent):  Temp: 98.1 °F (36.7 °C) (10/02/20 1110)  Pulse: (!) 116 (10/02/20 1245)  Resp: (!) 35 (10/02/20 1245)  BP: (!) 164/132 (10/02/20 1200)  SpO2: 99 % (10/02/20 1245) Vital Signs (24h Range):  Temp:  [98 °F (36.7 °C)-98.6 °F (37 °C)] 98.1 °F (36.7 °C)  Pulse:  [] 116  Resp:  [12-41] 35  SpO2:  [97 %-100 %] 99 %  BP: (107-180)/() 164/132     Weight: 58.2 kg (128 lb 4.9 oz)  Body mass index is 25.91 kg/m².    Physical Exam  Constitutional:       General: She is not in acute distress.     Appearance: She is well-developed.   Neck:      Musculoskeletal: Normal range of motion.   Cardiovascular:      Rate and Rhythm: Normal rate and regular rhythm.      Heart sounds: No murmur.   Pulmonary:      Breath sounds: Normal breath sounds.   Chest:      Chest wall: No tenderness.   Abdominal:      General: Bowel sounds are normal.   Musculoskeletal: Normal range of motion.   Skin:     General: Skin  is warm.      Comments: R breast incision with R drain  R arm/ breast tender   Neurological:      Mental Status: She is alert and oriented to person, place, and time.   Psychiatric:         Thought Content: Thought content normal.         Judgment: Judgment normal.         Review of Symptoms    Symptom Assessment (ESAS 0-10 Scale)  Pain:  3  Dyspnea:  0  Fatigue:  2               Performance Status:  70    ECOG Performance Status Grade:  1 - Ambulates, capable of light work    Living Arrangements:  Lives alone    Psychosocial/Cultural: Patient has no children, has a sister who lives next door and is able to help the patient if needed.       Advance Care Planning   Advance Directives:   Living Will: Yes (completed 10/2/2020)        Copy on chart: Yes    LaPOST: Yes (DNR, selective treatment, trial period of artificial nutrition by tube)    Do Not Resuscitate Status: Yes (established 10/2/2020)    Medical Power of : Yes    Agent's Name:  Sabi Avila   Agent's Contact Number:  [phone]    Decision Making:  Patient answered questions         Significant Labs: All pertinent labs within the past 24 hours have been reviewed.  CBC:   Recent Labs   Lab 10/02/20  0350   WBC 10.81   HGB 9.7*   HCT 31.9*   MCV 86        BMP:  Recent Labs   Lab 10/02/20  0350   *      K 4.1      CO2 27   BUN 17   CREATININE 0.8   CALCIUM 9.1   MG 1.9     LFT:  Lab Results   Component Value Date    AST 18 07/22/2020    ALKPHOS 125 07/22/2020    BILITOT 0.3 07/22/2020     Albumin:   Albumin   Date Value Ref Range Status   07/22/2020 3.4 (L) 3.5 - 5.2 g/dL Final     Protein:   Total Protein   Date Value Ref Range Status   07/22/2020 6.8 6.0 - 8.4 g/dL Final     Lactic acid:   No results found for: LACTATE    Significant Imaging: I have reviewed all pertinent imaging results/findings within the past 24 hours.     I have reviewed the CT head from 9/29/2020    I have reviewed the CT chest abdomen from  9/29/2020    I have reviewed the echo TTE from 7/23/2020    I have reviewed the EKG from 9/25/2020

## 2020-10-02 NOTE — ASSESSMENT & PLAN NOTE
Poorly controlled diabetes based on hemoglobin A1c of 13.1%.    Home Meds:  Humalog 75/25 - 16 units and 18 units.      Patient has been noncompliant with diabetic diet and has poor insight on how to manage her diabetes.    Placed on continuous infusion in order to achieve reasonable glycemic control during the perioperative period.    Now on Detemir 20units qHS with SSI.  Glucose a little low again  this morning.    -Continue Detemir to 15units  -Continue SSI before meals  -Follow closely

## 2020-10-02 NOTE — PLAN OF CARE
Pt d/c home. Pt alert. Pt d/c instructions explained. Pt denies any needs. Pt is to follow up with cardiology.

## 2020-10-02 NOTE — ASSESSMENT & PLAN NOTE
Not on BP meds at home.  On Midodrine for Orthostatic Hypotension - no records found.  Probable Autonomic Dysfunction given wide fluctuations in BP.  Orthostatic in ICU and Midodrine restarted  BP a high today, but improvement in orthostatic component.  Spoke with her PCP, Dr. Cabrera - she was placed on this by her Cardiologist  Also takes Ramipril 1.25mg daily.  Will resume  -Follow up with PCP and Cardiology on discharge

## 2020-10-02 NOTE — PROGRESS NOTES
[facility] Hospital Medicine  Progress Note    Patient Name: Darlene Avila  MRN: [id number]  Patient Class: IP- Inpatient   Admission Date: 9/25/2020  Length of Stay: 1 days  Attending Physician: Chino Dietz MD  Primary Care Provider: Kirill Cabrera Iii, MD        Subjective:     Principal Problem:Poorly controlled type 2 diabetes mellitus with complication        HPI:  Patient is a 69 year-old woman with hypertension, poorly controlled diabetes mellitus type II, prior transient ischemic attack, current tobacco and recently diagnosed with right-sided breast cancer currently on anastrozole with plan for mastectomy today with Dr. Krysta Clark.  Surgery however cancelled due to hyperglycemia. Surgery has already been delay due to COVID pandemic.  Dr. Clark requesting evaluation by hospital medicine.  Patient reports non-compliance with diabetic diet.    Overview/Hospital Course:  Patient is a 69 year-old woman with hypertension, poorly controlled diabetes mellitus type II, prior transient ischemic attack, current tobacco smoker with breast cancer who has had delay in undergoing a mastectomy to COVID pandemic and again now due to hyperglycemia resulting in cancellation of surgery on Friday.  Patient admitted on the hospital under observation to medically optimize her for surgery anticipated on Monday. Despite efforts to adjust her insulin regimen for better glycemic control she continued to have high capillary blood glucose readings intermixed with some low insulin readings.  Patient transferred to the intensive care unit select she was started on continuous insulin infusion to help regulate her diabetes in the perioperative period.  NPO for surgery later today.    Interval History:  Patient overall stable this morning.  She wants to go home today.  No orthostatic hypotension reported.  Was given Hydralazine for elevated BP overnight.    Review of Systems   Constitutional: Negative for chills  and fever.   Respiratory: Negative for shortness of breath and wheezing.    Cardiovascular: Negative for chest pain.   Gastrointestinal: Positive for diarrhea (chronic and intermittent since Cholecystomy). Negative for abdominal distention, abdominal pain, constipation, nausea and vomiting.   Genitourinary: Negative for dysuria and frequency.   Musculoskeletal: Negative for arthralgias and myalgias.        Right post-surgical pain   Neurological: Negative for light-headedness.   Psychiatric/Behavioral: Negative for agitation and confusion.     Objective:     Vital Signs (Most Recent):  Temp: 98.2 °F (36.8 °C) (10/02/20 0715)  Pulse: 102 (10/02/20 0930)  Resp: 20 (10/02/20 0930)  BP: (!) 149/59 (10/02/20 0930)  SpO2: 100 % (10/02/20 0930) Vital Signs (24h Range):  Temp:  [98 °F (36.7 °C)-98.6 °F (37 °C)] 98.2 °F (36.8 °C)  Pulse:  [] 102  Resp:  [12-34] 20  SpO2:  [98 %-100 %] 100 %  BP: ()/(53-75) 149/59     Weight: 58.2 kg (128 lb 4.9 oz)  Body mass index is 25.91 kg/m².    Intake/Output Summary (Last 24 hours) at 10/2/2020 1029  Last data filed at 10/2/2020 0600  Gross per 24 hour   Intake 200 ml   Output 760 ml   Net -560 ml      Physical Exam  Constitutional:       General: She is not in acute distress.     Appearance: She is well-developed.   HENT:      Head: Atraumatic.   Eyes:      Conjunctiva/sclera: Conjunctivae normal.   Neck:      Musculoskeletal: Neck supple.   Cardiovascular:      Rate and Rhythm: Normal rate and regular rhythm.      Heart sounds: Normal heart sounds. No murmur.   Pulmonary:      Effort: Pulmonary effort is normal.      Breath sounds: Normal breath sounds. No wheezing.   Abdominal:      General: Bowel sounds are normal. There is no distension.      Palpations: Abdomen is soft.      Tenderness: There is no abdominal tenderness.   Musculoskeletal: Normal range of motion.         General: Tenderness present. No deformity.      Comments: Right chest drain with blood.    Neurological:      Mental Status: She is alert and oriented to person, place, and time.         Significant Labs: All pertinent labs within the past 24 hours have been reviewed.    Significant Imaging: I have reviewed all pertinent imaging results/findings within the past 24 hours.      Assessment/Plan:      * Poorly controlled type 2 diabetes mellitus with complication  Poorly controlled diabetes based on hemoglobin A1c of 13.1%.    Home Meds:  Humalog 75/25 - 16 units and 18 units.      Patient has been noncompliant with diabetic diet and has poor insight on how to manage her diabetes.    Placed on continuous infusion in order to achieve reasonable glycemic control during the perioperative period.    Now on Detemir 20units qHS with SSI.  Glucose a little low again  this morning.    -Continue Detemir to 15units  -Continue SSI before meals  -Follow closely    Malignant neoplasm of upper-outer quadrant of right breast in female, estrogen receptor positive  Stage I (T2N0M0) invasive ductal carcinoma of right breast, upper outer quadrant, ER 99%, [place] neg, Her2 neg, Grade 2, Ki67 < 10%, low risk Oncotype  Started neoadjuvant Arimidex due to coronavirus in 3/2020  Patient has had delays in surgery related to obtaining medical clearance, COVID-19 pandemic, and now recent cancellation of surgery due to hyperglycemia.    Started on insulin infusion overnight on 9/27/2020 to achieve reasonable glycemic control during the perioperative period.    POD #4 - MASTECTOMY-Right, and BIOPSY, LYMPH NODE, SENTINEL-Right.  Drain with blood - less today.  No need for further surgical intervention per Dr. Clark.    Plan:  Patient will continue with drain in place with outpatient Breast Surgery follow up  Continue with Arimidex  Follow up with Oncology on discharge        Acute blood loss anemia  Hgb 13.2 on 9/25 and down to 9.7 post-op.  Stable overall.  Follow       Tobacco abuse  Patient counseled on the importance of smoking  cessation.    Started on nicotine replacement.      History of CVA (cerebrovascular accident)  On ASA, Plavix, Statin at home  -ASA/Plavix held marguerite-operatively      Essential hypertension  Not on BP meds at home.  On Midodrine for Orthostatic Hypotension - no records found.  Probable Autonomic Dysfunction given wide fluctuations in BP.  Orthostatic in ICU and Midodrine restarted  BP a high today, but improvement in orthostatic component.  -Will consult Cardiology for recommendations      Chronic diarrhea  Since Cholecystectomy  Stable.    Continue with home regimen.      VTE Risk Mitigation (From admission, onward)         Ordered     Place KATHRIN hose  Until discontinued      09/28/20 1703     Place sequential compression device  Until discontinued      09/28/20 1703     IP VTE HIGH RISK PATIENT  Once      09/28/20 1703                Discharge Planning   JUDIT:      Code Status: Full Code   Is the patient medically ready for discharge?:     Reason for patient still in hospital (select all that apply): Patient trending condition, Treatment and Consult recommendations  Discharge Plan A: Home                  Chino Dietz MD  Department of Hospital Medicine   [facility]

## 2020-10-02 NOTE — ASSESSMENT & PLAN NOTE
Stage I (T2N0M0) invasive ductal carcinoma of right breast, upper outer quadrant, ER 99%, ME neg, Her2 neg, Grade 2, Ki67 < 10%, low risk Oncotype  Started neoadjuvant Arimidex due to coronavirus in 3/2020  Patient has had delays in surgery related to obtaining medical clearance, COVID-19 pandemic, and now recent cancellation of surgery due to hyperglycemia.    Started on insulin infusion overnight on 9/27/2020 to achieve reasonable glycemic control during the perioperative period.    POD #4 - MASTECTOMY-Right, and BIOPSY, LYMPH NODE, SENTINEL-Right.  Drain with blood - less today.  No need for further surgical intervention per Dr. Clark.    Plan:  Patient will continue with drain in place with outpatient Breast Surgery follow up  Continue with Arimidex  Follow up with Oncology on discharge

## 2020-10-02 NOTE — DISCHARGE SUMMARY
[facility] Hospital Medicine  Discharge Summary      Patient Name: Darlene Avila  MRN: [id number]  Admission Date: 9/25/2020  Hospital Length of Stay: 1 days  Discharge Date and Time:  10/02/2020 1:43 PM  Attending Physician: Chino Dietz MD   Discharging Provider: Chino Dietz MD  Primary Care Provider: Kirill Cabrera Iii, MD      HPI:   Patient is a 69 year-old woman with hypertension, poorly controlled diabetes mellitus type II, prior transient ischemic attack, current tobacco and recently diagnosed with right-sided breast cancer currently on anastrozole with plan for mastectomy today with Dr. Krysta Clark.  Surgery however cancelled due to hyperglycemia. Surgery has already been delay due to COVID pandemic.  Dr. Clark requesting evaluation by hospital medicine.  Patient reports non-compliance with diabetic diet.    Procedure(s) (LRB):  MASTECTOMY-Right (Right)  BIOPSY, LYMPH NODE, SENTINEL-Right (Right)      Hospital Course:   Patient is a 69 year-old woman with hypertension, poorly controlled diabetes mellitus type II, prior transient ischemic attack, current tobacco smoker with breast cancer who has had delay in undergoing a mastectomy to COVID pandemic and again now due to hyperglycemia resulting in cancellation of surgery on Friday.  Patient admitted on the hospital under observation to medically optimize her for surgery anticipated on Monday. Despite efforts to adjust her insulin regimen for better glycemic control she continued to have high capillary blood glucose readings intermixed with some low insulin readings.  Patient transferred to the intensive care unit select she was started on continuous insulin infusion to help regulate her diabetes in the perioperative period. See below     Consults:   Consults (From admission, onward)        Status Ordering Provider     Inpatient consult to Palliative Care  Once     Provider:  Malia Gamboa, CHINO Hammer  W.          * Poorly controlled type 2 diabetes mellitus with complication  Poorly controlled diabetes based on hemoglobin A1c of 13.1%.    Home Meds:  Humalog 75/25 - 16 units and 18 units.      Patient has been noncompliant with diabetic diet and has poor insight on how to manage her diabetes.    Placed on continuous infusion in order to achieve reasonable glycemic control during the perioperative period.    Now on Detemir 20units qHS with SSI.  Glucose a little low again  this morning.      Patient admits to poor adherence with insulin at home.  Will discharge on previous 75/25 and advised to follow up closely with her PCP.    Malignant neoplasm of upper-outer quadrant of right breast in female, estrogen receptor positive  Stage I (T2N0M0) invasive ductal carcinoma of right breast, upper outer quadrant, ER 99%, RI neg, Her2 neg, Grade 2, Ki67 < 10%, low risk Oncotype  Started neoadjuvant Arimidex due to coronavirus in 3/2020  Patient has had delays in surgery related to obtaining medical clearance, COVID-19 pandemic, and now recent cancellation of surgery due to hyperglycemia.    Started on insulin infusion overnight on 9/27/2020 to achieve reasonable glycemic control during the perioperative period.    POD #4 - MASTECTOMY-Right, and BIOPSY, LYMPH NODE, SENTINEL-Right.  Drain with blood - less today.  No need for further surgical intervention per Dr. Clark.    Plan:  Patient will continue with drain in place with outpatient Breast Surgery follow up - 10/8/2020  Continue with Arimidex  Follow up with Oncology on discharge - 10/19/2020        Encounter for palliative care  Seen by Palliative Care  Patient DNR  Surjit signed      Acute blood loss anemia  Hgb 13.2 on 9/25 and down to 9.7 post-op.  Stable overall.  Follow       Tobacco abuse  Patient counseled on the importance of smoking cessation.    Started on nicotine replacement.      History of CVA (cerebrovascular accident)  On ASA, Plavix, Statin at  home  -ASA/Plavix held marguerite-operatively  -Can resume Aspirin and Plavix tomorrow (10/3/2020) per Dr. Clark.      Essential hypertension  Not on BP meds at home.  On Midodrine for Orthostatic Hypotension - no records found.  Probable Autonomic Dysfunction given wide fluctuations in BP.  Orthostatic in ICU and Midodrine restarted  BP a high today, but improvement in orthostatic component.  Spoke with her PCP, Dr. Cabrera - she was placed on this by her Cardiologist  Also takes Ramipril 1.25mg daily.  Will resume  -Follow up with PCP and Cardiology on discharge      Chronic diarrhea  Since Cholecystectomy  Stable.          Final Active Diagnoses:    Diagnosis Date Noted POA    PRINCIPAL PROBLEM:  Poorly controlled type 2 diabetes mellitus with complication [E11.8, E11.65] 09/25/2020 Yes    Malignant neoplasm of upper-outer quadrant of right breast in female, estrogen receptor positive [C50.411, Z17.0] 04/06/2020 Not Applicable    Encounter for palliative care [Z51.5] 10/02/2020 Not Applicable    Acute blood loss anemia [D62] 09/30/2020 No    Tobacco abuse [Z72.0] 09/28/2020 Yes    History of CVA (cerebrovascular accident) [Z86.73] 07/24/2020 Not Applicable    Essential hypertension [I10] 07/22/2020 Yes    Chronic diarrhea [K52.9] 11/02/2012 Yes      Problems Resolved During this Admission:    Diagnosis Date Noted Date Resolved POA    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    Specialties: Surgery, Breast Surgery  Why: Breast Surgery - 10/8/2020 at 1:45pm  Contact information:  [facility] [id number] [phone]             Kirill Cabrera Iii, MD In 1 week.     Specialty: Internal Medicine  Why: Blood Pressure and Diabetes follow up  Contact information:  [address]  [address]  [facility] [address] [phone]                 Patient Instructions:      Hemoglobin A1c   Standing Status: Standing Number of Occurrences: 40 Standing Exp. Date: 09/26/30       Significant Diagnostic Studies: Labs:   BMP:   Recent Labs   Lab 10/01/20  0337 10/02/20  0350   * 207*    139   K 3.7 4.1    105   CO2 26 27   BUN 15 17   CREATININE 0.8 0.8   CALCIUM 9.0 9.1   MG 1.9 1.9    and CBC   Recent Labs   Lab 10/01/20  0337 10/02/20  0350   WBC 11.94 10.81   HGB 10.0* 9.7*   HCT 32.4* 31.9*    350       Pending Diagnostic Studies:     Procedure Component Value Units Date/Time    Specimen to Pathology, Surgery Breast [[id number]] Collected: 09/28/20 1200    Order Status: Sent Lab Status: In process Updated: 09/28/20 1328         Medications:  Reconciled Home Medications:      Medication List      START taking these medications    HYDROcodone-acetaminophen 5-325 mg per tablet  Commonly known as: NORCO  Take 1 tablet by mouth every 8 (eight) hours as needed.     mupirocin 2 % ointment  Commonly known as: BACTROBAN  by Nasal route 2 (two) times daily.     ramipriL 1.25 MG capsule  Commonly known as: ALTACE  Take 1 capsule (1.25 mg total) by mouth once daily.        CONTINUE taking these medications    amitriptyline 100 MG tablet  Commonly known as: ELAVIL  Take 1 tablet by mouth every evening.     anastrozole 1 mg Tab  Commonly known as: ARIMIDEX  Take 1 tablet (1 mg total) by mouth once daily.     aspirin 81 MG EC tablet  Commonly known as: ECOTRIN  Take 81 mg by mouth once daily.     calcium carbonate 600 mg calcium (1,500 mg) Tab  Commonly known as: OS-JABIER  Take 600 mg by mouth once.     citalopram 10 MG tablet  Commonly known as: CELEXA  Take 1 tablet by mouth once daily.     HumaLOG Mix 75-25 KwikPen 100 unit/mL (75-25) Inpn  Generic drug: insulin lispro  protamin-lispro  Inject 10 Units into the skin 2 (two) times a day. Inject 16 units every morning and 18 units every evening     midodrine 2.5 MG Tab  Commonly known as: PROAMATINE  Take 1 tablet (2.5 mg total) by mouth every 12 (twelve) hours.     multivitamin capsule  Take 1 capsule by mouth once daily.     PLAVIX 75 mg tablet  Generic drug: clopidogreL  Take 75 mg by mouth once daily.     pravastatin 10 MG tablet  Commonly known as: PRAVACHOL  Take 10 mg by mouth once daily.     vitamin D 1000 units Tab  Commonly known as: VITAMIN D3  Take 1,000 Units by mouth once daily.        STOP taking these medications    diphenoxylate-atropine 2.5-0.025 mg 2.5-0.025 mg per tablet  Commonly known as: LOMOTIL            Indwelling Lines/Drains at time of discharge:   Lines/Drains/Airways     Drain                 Closed/Suction Drain 09/28/20 1220 Right Breast Bulb 15 Fr. 4 days                Time spent on the discharge of patient: 35 minutes  Patient was seen and examined on the date of discharge and determined to be suitable for discharge.         Chino Dietz MD  Department of Hospital Medicine  [facility]

## 2020-10-02 NOTE — SUBJECTIVE & OBJECTIVE
Interval History:  Patient overall stable this morning.  She wants to go home today.  No orthostatic hypotension reported.  Was given Hydralazine for elevated BP overnight.    Review of Systems   Constitutional: Negative for chills and fever.   Respiratory: Negative for shortness of breath and wheezing.    Cardiovascular: Negative for chest pain.   Gastrointestinal: Positive for diarrhea (chronic and intermittent since Cholecystomy). Negative for abdominal distention, abdominal pain, constipation, nausea and vomiting.   Genitourinary: Negative for dysuria and frequency.   Musculoskeletal: Negative for arthralgias and myalgias.        Right post-surgical pain   Neurological: Negative for light-headedness.   Psychiatric/Behavioral: Negative for agitation and confusion.     Objective:     Vital Signs (Most Recent):  Temp: 98.2 °F (36.8 °C) (10/02/20 0715)  Pulse: 102 (10/02/20 0930)  Resp: 20 (10/02/20 0930)  BP: (!) 149/59 (10/02/20 0930)  SpO2: 100 % (10/02/20 0930) Vital Signs (24h Range):  Temp:  [98 °F (36.7 °C)-98.6 °F (37 °C)] 98.2 °F (36.8 °C)  Pulse:  [] 102  Resp:  [12-34] 20  SpO2:  [98 %-100 %] 100 %  BP: ()/(53-75) 149/59     Weight: 58.2 kg (128 lb 4.9 oz)  Body mass index is 25.91 kg/m².    Intake/Output Summary (Last 24 hours) at 10/2/2020 1029  Last data filed at 10/2/2020 0600  Gross per 24 hour   Intake 200 ml   Output 760 ml   Net -560 ml      Physical Exam  Constitutional:       General: She is not in acute distress.     Appearance: She is well-developed.   HENT:      Head: Atraumatic.   Eyes:      Conjunctiva/sclera: Conjunctivae normal.   Neck:      Musculoskeletal: Neck supple.   Cardiovascular:      Rate and Rhythm: Normal rate and regular rhythm.      Heart sounds: Normal heart sounds. No murmur.   Pulmonary:      Effort: Pulmonary effort is normal.      Breath sounds: Normal breath sounds. No wheezing.   Abdominal:      General: Bowel sounds are normal. There is no distension.       Palpations: Abdomen is soft.      Tenderness: There is no abdominal tenderness.   Musculoskeletal: Normal range of motion.         General: Tenderness present. No deformity.      Comments: Right chest drain with blood.   Neurological:      Mental Status: She is alert and oriented to person, place, and time.         Significant Labs: All pertinent labs within the past 24 hours have been reviewed.    Significant Imaging: I have reviewed all pertinent imaging results/findings within the past 24 hours.

## 2020-10-08 LAB
FINAL PATHOLOGIC DIAGNOSIS: NORMAL
FROZEN SECTION DIAGNOSIS: NORMAL
FROZEN SECTION FOOTNOTE: NORMAL
GROSS: NORMAL
Lab: NORMAL

## 2020-10-12 ENCOUNTER — OFFICE VISIT (OUTPATIENT)
Dept: SURGERY | Facility: CLINIC | Age: 70
End: 2020-10-12
Payer: COMMERCIAL

## 2020-10-12 VITALS
HEART RATE: 103 BPM | DIASTOLIC BLOOD PRESSURE: 56 MMHG | WEIGHT: 121 LBS | SYSTOLIC BLOOD PRESSURE: 111 MMHG | HEIGHT: 59 IN | BODY MASS INDEX: 24.39 KG/M2 | TEMPERATURE: 98 F

## 2020-10-12 DIAGNOSIS — C50.911 MALIGNANT NEOPLASM OF RIGHT BREAST IN FEMALE, ESTROGEN RECEPTOR POSITIVE, UNSPECIFIED SITE OF BREAST: ICD-10-CM

## 2020-10-12 DIAGNOSIS — Z17.0 MALIGNANT NEOPLASM OF RIGHT BREAST IN FEMALE, ESTROGEN RECEPTOR POSITIVE, UNSPECIFIED SITE OF BREAST: ICD-10-CM

## 2020-10-12 DIAGNOSIS — Z90.11 STATUS POST RIGHT MASTECTOMY: Primary | ICD-10-CM

## 2020-10-12 PROCEDURE — 99999 PR PBB SHADOW E&M-EST. PATIENT-LVL III: CPT | Mod: PBBFAC,,, | Performed by: SURGERY

## 2020-10-12 PROCEDURE — 99024 POSTOP FOLLOW-UP VISIT: CPT | Mod: S$GLB,,, | Performed by: SURGERY

## 2020-10-12 PROCEDURE — 99999 PR PBB SHADOW E&M-EST. PATIENT-LVL III: ICD-10-PCS | Mod: PBBFAC,,, | Performed by: SURGERY

## 2020-10-12 PROCEDURE — 99024 PR POST-OP FOLLOW-UP VISIT: ICD-10-PCS | Mod: S$GLB,,, | Performed by: SURGERY

## 2020-10-12 NOTE — PROGRESS NOTES
REFERRING PHYSICIAN:  No referring provider defined for this encounter.       Kirill Cabrera Iii, MD    DIAGNOSIS:    This is a 69 y.o. female with a stage pT2 N0 M0 ER + (98%) [place] -- HER2 -- grade 2  Invasive Ductal carcinoma of the right breast.    TREATMENT SUMMARY:  -Neoadjuvant Arimidex     The patient is status post right total mastectomy and right sentinel node biopsy on 9/28/2020.  Final pathology showed:  - One lymph node negative for malignancy (0/1)  - Immunostains were performed with appropriately reactive controls and the lymph node is negative for  AE1/AE3, WSK, and CAM5.2, supporting the above interpretation.  2. Breast, right, mastectomy:  - Residual invasive ductal carcinoma, [place] grade 2 of 3  - Extensive treatment effect is seen  - Tumor is multifocal within the treatment bed with the largest residual focus measuring 1.2 cm in maximal  dimension  - Residual ductal carcinoma in situ, intermediate nuclear grade, solid type, present in three of thirty four  slides examined  - Margins of resection are negative for malignancy  - Nipple areolar complex with no significant histopathologic abnormality.  - Please see SYNOPTIC REPORT below  - Pathologic stage: ymT1c (sn)N0    INTERVAL HISTORY:   Darlene Avila comes in for a post-op check.  She denies fever, chills, chest pain or shortness of breath.  Her pain is well controlled.    She does complain of weakness but states she hasn't eaten or taken her medicine today.     Patient states her drain output has been about 50 ml/day.     PHYSICAL EXAMINATION:   General:  This is a well appearing female with appropriate speech, affect and gait.     Right chest:  Incision clean, dry, and intact  Drain in place with 25 ml of bloody fluid but not on suction. Drain was emptied in clinic today and put back on suction.      IMPRESSION:   The patient has had an uneventful postoperative course.    PLAN:   1. return to clinic next week for drain removal.  2. Patient  was given juice and crackers and felt much better afterward. She was advised to eat and take her medicine before coming to clinic next week.   3. Referral to medical oncology for adjuvant endocrine therapy.

## 2020-10-19 ENCOUNTER — OFFICE VISIT (OUTPATIENT)
Dept: HEMATOLOGY/ONCOLOGY | Facility: CLINIC | Age: 70
End: 2020-10-19
Payer: COMMERCIAL

## 2020-10-19 VITALS
TEMPERATURE: 98 F | WEIGHT: 124.31 LBS | OXYGEN SATURATION: 98 % | BODY MASS INDEX: 25.06 KG/M2 | DIASTOLIC BLOOD PRESSURE: 57 MMHG | RESPIRATION RATE: 18 BRPM | SYSTOLIC BLOOD PRESSURE: 118 MMHG | HEART RATE: 102 BPM | HEIGHT: 59 IN

## 2020-10-19 DIAGNOSIS — R93.89 ABNORMAL FINDING ON CT SCAN: ICD-10-CM

## 2020-10-19 DIAGNOSIS — Z72.0 TOBACCO ABUSE: ICD-10-CM

## 2020-10-19 DIAGNOSIS — Z17.0 MALIGNANT NEOPLASM OF UPPER-OUTER QUADRANT OF RIGHT BREAST IN FEMALE, ESTROGEN RECEPTOR POSITIVE: Primary | ICD-10-CM

## 2020-10-19 DIAGNOSIS — Z79.811 USE OF AROMATASE INHIBITORS: ICD-10-CM

## 2020-10-19 DIAGNOSIS — E11.65 POORLY CONTROLLED TYPE 2 DIABETES MELLITUS WITH COMPLICATION: ICD-10-CM

## 2020-10-19 DIAGNOSIS — M85.80 OSTEOPENIA, UNSPECIFIED LOCATION: ICD-10-CM

## 2020-10-19 DIAGNOSIS — E11.8 POORLY CONTROLLED TYPE 2 DIABETES MELLITUS WITH COMPLICATION: ICD-10-CM

## 2020-10-19 DIAGNOSIS — D64.9 CHRONIC ANEMIA: ICD-10-CM

## 2020-10-19 DIAGNOSIS — I10 ESSENTIAL HYPERTENSION: ICD-10-CM

## 2020-10-19 DIAGNOSIS — C50.411 MALIGNANT NEOPLASM OF UPPER-OUTER QUADRANT OF RIGHT BREAST IN FEMALE, ESTROGEN RECEPTOR POSITIVE: Primary | ICD-10-CM

## 2020-10-19 PROCEDURE — 1126F PR PAIN SEVERITY QUANTIFIED, NO PAIN PRESENT: ICD-10-PCS | Mod: S$GLB,,, | Performed by: INTERNAL MEDICINE

## 2020-10-19 PROCEDURE — 3046F HEMOGLOBIN A1C LEVEL >9.0%: CPT | Mod: CPTII,S$GLB,, | Performed by: INTERNAL MEDICINE

## 2020-10-19 PROCEDURE — 1126F AMNT PAIN NOTED NONE PRSNT: CPT | Mod: S$GLB,,, | Performed by: INTERNAL MEDICINE

## 2020-10-19 PROCEDURE — 1101F PR PT FALLS ASSESS DOC 0-1 FALLS W/OUT INJ PAST YR: ICD-10-PCS | Mod: CPTII,S$GLB,, | Performed by: INTERNAL MEDICINE

## 2020-10-19 PROCEDURE — 1101F PT FALLS ASSESS-DOCD LE1/YR: CPT | Mod: CPTII,S$GLB,, | Performed by: INTERNAL MEDICINE

## 2020-10-19 PROCEDURE — 99999 PR PBB SHADOW E&M-EST. PATIENT-LVL IV: CPT | Mod: PBBFAC,,, | Performed by: INTERNAL MEDICINE

## 2020-10-19 PROCEDURE — 3046F PR MOST RECENT HEMOGLOBIN A1C LEVEL > 9.0%: ICD-10-PCS | Mod: CPTII,S$GLB,, | Performed by: INTERNAL MEDICINE

## 2020-10-19 PROCEDURE — 3008F PR BODY MASS INDEX (BMI) DOCUMENTED: ICD-10-PCS | Mod: CPTII,S$GLB,, | Performed by: INTERNAL MEDICINE

## 2020-10-19 PROCEDURE — 99215 PR OFFICE/OUTPT VISIT, EST, LEVL V, 40-54 MIN: ICD-10-PCS | Mod: S$GLB,,, | Performed by: INTERNAL MEDICINE

## 2020-10-19 PROCEDURE — 1159F MED LIST DOCD IN RCRD: CPT | Mod: S$GLB,,, | Performed by: INTERNAL MEDICINE

## 2020-10-19 PROCEDURE — 99999 PR PBB SHADOW E&M-EST. PATIENT-LVL IV: ICD-10-PCS | Mod: PBBFAC,,, | Performed by: INTERNAL MEDICINE

## 2020-10-19 PROCEDURE — 99215 OFFICE O/P EST HI 40 MIN: CPT | Mod: S$GLB,,, | Performed by: INTERNAL MEDICINE

## 2020-10-19 PROCEDURE — 3008F BODY MASS INDEX DOCD: CPT | Mod: CPTII,S$GLB,, | Performed by: INTERNAL MEDICINE

## 2020-10-19 PROCEDURE — 1159F PR MEDICATION LIST DOCUMENTED IN MEDICAL RECORD: ICD-10-PCS | Mod: S$GLB,,, | Performed by: INTERNAL MEDICINE

## 2020-10-19 NOTE — PROGRESS NOTES
Subjective:       Patient ID: Darlene Avila is a 69 y.o. female.    Chief Complaint: No chief complaint on file.    HPI     Returns after surgery revealing pathologic stage: ymT1c (sn)N0 right breast cancer    - 9/28/2020 Surgery  Returns to review Pathology (Izabella Barcenas patient)  Pathology:  Diagnosis 1. Lymph node, right axillary, sentinel, excision:   - One lymph node negative for malignancy (0/1)   - Immunostains were performed with appropriately reactive controls and the   lymph node is negative for AE1/AE3, WSK, and CAM5.2, supporting the above   interpretation.   2. Breast, right, mastectomy:   - Residual invasive ductal carcinoma, Vandana grade 2 of 3   - Extensive treatment effect is seen   - Tumor is multifocal within the treatment bed with the largest residual   focus measuring 1.2 cm in maximal dimension   - Residual ductal carcinoma in situ, intermediate nuclear grade, solid type, present in three of thirty four slides examined   - Margins of resection are negative for malignancy   - Nipple areolar complex with no significant histopathologic abnormality.   - Please see SYNOPTIC REPORT below   - Pathologic stage: ymT1c (sn)N0   SYNOPTIC REPORT:   SITE, LATERALITY, PROCEDURE: Breast, right, mastectomy   HISTOLOGIC TYPE: Invasive ductal carcinoma   HISTOLOGIC GRADE: Grade 2        Tubules 3        Nuclei 2        Mitosis 1   TUMOR FOCALITY: Multifocal within treatment effect   TUMOR SIZE: Largest focus measures 1.2 cm in maximal dimension   DUCTAL CARCINOMA IN SITU: Present        Nuclear grade: Intermediate        Necrosis: Not identified        Architecture: Solid type   TUMOR EXTENSION:        Skin: Uninvolved        Nipple: Uninvolved        Skeletal muscle: Skeletal muscle not present   MARGINS: Uninvolved by tumor        Invasive ductal carcinoma: Uninvolved; tumor comes to within 0.9 cm of   the posterior (black ink) margin        Ductal carcinoma in situ: Uninvolved; tumor comes to within  0.9 cm of   the posterior (black ink) margin   LYMPH Nodes: Uninvolved (see separate specimens)        Total number of nodes examined: 1        Total number of sentinel nodes examined: 1        Total number of nodes involved: 0   LYMPHOVASCULAR INVASION: Not identified   TREATMENT EFFECT (BREAST): Definitive response to presurgical therapy   TREATMENT EFFECT (LYMPH NODE): None identified   TUMOR MARKERS:  Per H&P, tumor markers have been performed on the previous   (outside) biopsy specimen and the tumor cells are positive for Estrogen   receptor and negative for Progesterone receptor and HER2.   ADDITIONAL FINDINGS: Fibrocystic changes, including fibroadenomatoid changes,   apocrine metaplasia, microcyst formation, and duct ectasia;   microcalcifications present in benign ductal elements   PATHOLOGIC STAGE: ym1c (sn)N0      She had been on neoadjuvant Arimidex and restarted post operatively    Initial clinical dx:  1. Stage I (T2N0M0) invasive ductal carcinoma of right breast, upper outer quadrant, ER 99%, OK neg, Her2 neg, Grade 2, Ki67 < 10%     Oncologic History:  Presentation  - 3/2020 - presented for annual GYN check up and right breast mass palpated   - 3/11/2020 - MRI breasts- Regional non mass enhancement in approximately the 9 upper outer quadrant of the right breast measuring approximately 3 cm in length.  No adenopathy. Left breast benign  - 3/17/2020 - Right breast US showed numerous hypoechoic areas were measured in the right breast.  The largest in the right breast at 8-9 o'clock 7 cm from the nipple area of palpable measures approximately 4.8 cm.  Characterization is markedly limited due to the  dependent nature of ultrasound and these could reflect dense breast tissue. noting that no masses were detected on previous MRI.  No definite correlate is seen for the MRI non mass enhancement.  - 3/18/2020 - Biopsy - Grade 2 IDC, estrogen receptor 99%, progesterone receptor neg, Her2 tello neg, Ki67 <  10%.   Surgery consultation with Dr Clark on 4/2; Oncotype sent  Medical oncology consultation on 4/6/2020              - 4/6/2020 - started Arimidex as Oncotype low risk at 17    Review of Systems   Constitutional: Negative for activity change, appetite change, chills, fatigue, fever and unexpected weight change (reports significant planned weight loss).   HENT: Negative for nasal congestion, postnasal drip, rhinorrhea, sore throat and trouble swallowing.    Respiratory: Negative for cough, shortness of breath and wheezing.    Cardiovascular: Negative for chest pain, palpitations and leg swelling.   Gastrointestinal: Positive for constipation. Negative for abdominal distention, blood in stool, change in bowel habit, diarrhea, reflux and change in bowel habit.   Genitourinary: Negative for decreased urine volume, difficulty urinating, frequency and urgency.   Integumentary:  Negative for breast mass.   Neurological: Negative for dizziness, weakness, light-headedness, numbness and headaches.   Hematological: Negative for adenopathy. Does not bruise/bleed easily.   Psychiatric/Behavioral: Negative for dysphoric mood. The patient is not nervous/anxious.    Breast: Negative for mass        Objective:      Physical Exam  Vitals signs and nursing note reviewed.   Constitutional:       General: She is not in acute distress.     Appearance: Normal appearance. She is well-developed. She is not ill-appearing.      Comments: Thin  Very pleasant   HENT:      Head: Normocephalic and atraumatic.      Mouth/Throat:      Mouth: No oral lesions.   Eyes:      General: No scleral icterus.        Right eye: No discharge.         Left eye: No discharge.      Conjunctiva/sclera: Conjunctivae normal.   Neck:      Musculoskeletal: Neck supple.   Cardiovascular:      Rate and Rhythm: Normal rate and regular rhythm.      Heart sounds: Normal heart sounds. No murmur.   Pulmonary:      Effort: Pulmonary effort is normal. No respiratory  distress.      Breath sounds: Normal breath sounds. No wheezing, rhonchi or rales.      Comments: Surgical site healing well  CORWIN drain in place  Chest:      Chest wall: There is no dullness to percussion.   Abdominal:      General: Bowel sounds are normal.      Palpations: Abdomen is soft.      Tenderness: There is no abdominal tenderness.   Skin:     General: Skin is warm and dry.      Coloration: Skin is not pale.   Neurological:      Mental Status: She is alert and oriented to person, place, and time.   Psychiatric:         Behavior: Behavior normal.         Thought Content: Thought content normal.       Labs- reviewed  Imaging- reviewed  Note 9/29/2020 CT C/A/P:  Impression:  1.  Postoperative change of recent right-sided mastectomy with surgical drainage catheter in place.  There are scattered foci of subcutaneous emphysema throughout the right chest wall as well as a heterogeneous predominately hyperattenuating collection within the right anterior chest wall subcutaneous soft tissues presumably representing postoperative hematoma/seroma with dimensions as above.  Clinical correlation advised.  2.  No CT evidence of acute intra-abdominal abnormality.  3.  Incidentally noted 0.4 cm right lower lobe nodule.  For a solid nodule <6 mm, Fleischner Society 2017 guidelines recommend no routine follow up for a low risk patient, or follow-up with non-contrast chest CT at 12 months in a high risk patient.  4.  Mild urinary bladder wall thickening likely relating to nondistention, although clinical correlation for cystitis advised.  5.  Small subcentimeter sclerotic focus within the right superior pubic ramus as well as the left greater trochanter.  These may relate to small bone islands, although in light of patient's history of breast malignancy, correlation with any prior outside imaging to assess for stability is advised.  6.  Coarsely calcified pancreas suggesting sequelae of chronic pancreatitis.  7.  Aortic  atherosclerosis.  Coronary artery calcification.  Assessment:       1. Malignant neoplasm of upper-outer quadrant of right breast in female, estrogen receptor positive    2. Essential hypertension    3. Tobacco abuse    4. Use of aromatase inhibitors    5. Chronic anemia    6. Osteopenia, unspecified location    7. Poorly controlled type 2 diabetes mellitus with complication        Plan:     1,4. Continue Arimidex  Will need annual BMD  Needs Vit D level  Tolerating well  2. Discussed importance of control  3. Discussed cessation  CT Chest was done see above and needs appropriate follow up with repeat CT as described  4. Annual BMD  On calcium and Vit D  5. Needs work up with next visit  Prior colonoscopy was in 2012  6. On calcium and Vit D will follow  Will order bone scan with CT bone finding above  7. Follow with PCP

## 2020-10-20 PROBLEM — M85.80 OSTEOPENIA: Status: ACTIVE | Noted: 2020-10-20

## 2020-10-20 PROBLEM — D64.9 CHRONIC ANEMIA: Status: ACTIVE | Noted: 2020-10-20

## 2020-10-22 ENCOUNTER — OFFICE VISIT (OUTPATIENT)
Dept: SURGERY | Facility: CLINIC | Age: 70
End: 2020-10-22
Payer: COMMERCIAL

## 2020-10-22 VITALS
HEART RATE: 101 BPM | SYSTOLIC BLOOD PRESSURE: 123 MMHG | HEIGHT: 59 IN | DIASTOLIC BLOOD PRESSURE: 59 MMHG | WEIGHT: 124.88 LBS | BODY MASS INDEX: 25.17 KG/M2

## 2020-10-22 DIAGNOSIS — Z90.11 STATUS POST RIGHT MASTECTOMY: Primary | ICD-10-CM

## 2020-10-22 DIAGNOSIS — Z17.0 MALIGNANT NEOPLASM OF RIGHT BREAST IN FEMALE, ESTROGEN RECEPTOR POSITIVE, UNSPECIFIED SITE OF BREAST: Primary | ICD-10-CM

## 2020-10-22 DIAGNOSIS — C50.911 MALIGNANT NEOPLASM OF RIGHT BREAST IN FEMALE, ESTROGEN RECEPTOR POSITIVE, UNSPECIFIED SITE OF BREAST: Primary | ICD-10-CM

## 2020-10-22 PROCEDURE — 99024 PR POST-OP FOLLOW-UP VISIT: ICD-10-PCS | Mod: S$GLB,,, | Performed by: SURGERY

## 2020-10-22 PROCEDURE — 99999 PR PBB SHADOW E&M-EST. PATIENT-LVL III: CPT | Mod: PBBFAC,,, | Performed by: SURGERY

## 2020-10-22 PROCEDURE — 99024 POSTOP FOLLOW-UP VISIT: CPT | Mod: S$GLB,,, | Performed by: SURGERY

## 2020-10-22 PROCEDURE — 99999 PR PBB SHADOW E&M-EST. PATIENT-LVL III: ICD-10-PCS | Mod: PBBFAC,,, | Performed by: SURGERY

## 2020-10-22 RX ORDER — SULFAMETHOXAZOLE AND TRIMETHOPRIM 800; 160 MG/1; MG/1
1 TABLET ORAL 2 TIMES DAILY
Qty: 20 TABLET | Refills: 0 | Status: SHIPPED | OUTPATIENT
Start: 2020-10-22 | End: 2020-11-01

## 2020-10-23 ENCOUNTER — TELEPHONE (OUTPATIENT)
Dept: HEMATOLOGY/ONCOLOGY | Facility: CLINIC | Age: 70
End: 2020-10-23

## 2020-10-23 ENCOUNTER — DOCUMENTATION ONLY (OUTPATIENT)
Dept: SURGERY | Facility: CLINIC | Age: 70
End: 2020-10-23

## 2020-10-23 NOTE — PROGRESS NOTES
Met with patient at post op visit, prescription given to patient for prosthesis and bra. Gave patient contact information for [facility].

## 2020-10-23 NOTE — TELEPHONE ENCOUNTER
----- Message from Lanny Bernardo sent at 10/23/2020  3:34 PM CDT -----  Regarding: FW: Patient advice  Contact: pt    ----- Message -----  From: Obie Paiz  Sent: 10/23/2020   3:31 PM CDT  To: [facility] Staff  Subject: Patient advice                                   Pt called in regards to rescheduling her appointment       Pt stated she would like an afternoon or evening appointment         Pt can be reached at [phone]

## 2020-10-23 NOTE — TELEPHONE ENCOUNTER
Message fwd to .       ----- Message from Obie Paiz sent at 10/23/2020  3:31 PM CDT -----  Regarding: Patient advice  Contact: pt  Pt called in regards to rescheduling her appointment       Pt stated she would like an afternoon or evening appointment         Pt can be reached at [phone]

## 2020-11-16 ENCOUNTER — TELEPHONE (OUTPATIENT)
Dept: SURGERY | Facility: CLINIC | Age: 70
End: 2020-11-16

## 2020-11-16 NOTE — TELEPHONE ENCOUNTER
Spoke to patient in reference to message from her primary care office.  They stated that she had some drainage and a pen point size hole in her incision.  It did not look infected.   Offered patient to come in for an appointment today or Thursday with Dr. Clark.  She states that she is going out of town, she is feeling fine and doesn't need to come in for an appointment.  Patient denied any fever, warmth or redness to the area.  She stated that the drainage has decreased.  Patient told to call if she any of the above mentioned symptoms develop.

## 2020-11-19 ENCOUNTER — TELEPHONE (OUTPATIENT)
Dept: HEMATOLOGY/ONCOLOGY | Facility: CLINIC | Age: 70
End: 2020-11-19

## 2020-11-19 NOTE — TELEPHONE ENCOUNTER
Message fwd to .     ----- Message from Augusta Mak sent at 11/19/2020  2:23 PM CST -----  Regarding: reschedule appt  Contact: pt [phone]  Pt is calling to reschedule her appt that is scheduled for 11/24. Please contact pt

## 2020-11-19 NOTE — TELEPHONE ENCOUNTER
----- Message from Lanny Bernardo sent at 11/19/2020  2:26 PM CST -----  Regarding: FW: reschedule appt  Contact: pt [phone]    ----- Message -----  From: Augusta Mak  Sent: 11/19/2020   2:23 PM CST  To: [facility] Staff  Subject: reschedule appt                                  Pt is calling to reschedule her appt that is scheduled for 11/24. Please contact pt

## 2020-12-02 ENCOUNTER — HOSPITAL ENCOUNTER (OUTPATIENT)
Dept: RADIOLOGY | Facility: HOSPITAL | Age: 70
Discharge: HOME | End: 2020-12-02
Attending: INTERNAL MEDICINE
Payer: COMMERCIAL

## 2020-12-02 DIAGNOSIS — C50.411 MALIGNANT NEOPLASM OF UPPER-OUTER QUADRANT OF RIGHT BREAST IN FEMALE, ESTROGEN RECEPTOR POSITIVE: ICD-10-CM

## 2020-12-02 DIAGNOSIS — Z17.0 MALIGNANT NEOPLASM OF UPPER-OUTER QUADRANT OF RIGHT BREAST IN FEMALE, ESTROGEN RECEPTOR POSITIVE: ICD-10-CM

## 2020-12-02 DIAGNOSIS — R93.89 ABNORMAL FINDING ON CT SCAN: ICD-10-CM

## 2020-12-02 PROCEDURE — 78306 NM BONE SCAN WHOLE BODY: ICD-10-PCS | Mod: 26,,, | Performed by: RADIOLOGY

## 2020-12-02 PROCEDURE — 78306 BONE IMAGING WHOLE BODY: CPT | Mod: 26,,, | Performed by: RADIOLOGY

## 2020-12-02 PROCEDURE — A9503 TC99M MEDRONATE: HCPCS

## 2020-12-03 ENCOUNTER — TELEPHONE (OUTPATIENT)
Dept: HEMATOLOGY/ONCOLOGY | Facility: CLINIC | Age: 70
End: 2020-12-03

## 2020-12-03 DIAGNOSIS — M25.569 KNEE PAIN, UNSPECIFIED CHRONICITY, UNSPECIFIED LATERALITY: Primary | ICD-10-CM

## 2020-12-03 NOTE — TELEPHONE ENCOUNTER
----- Message from Taty Richard MD sent at 12/3/2020  1:26 PM CST -----  Called cell- went straight to  and it is full  Called her home as 2nd # and left a message on the answering machine to return call

## 2020-12-03 NOTE — TELEPHONE ENCOUNTER
"Message fwd to MD.       ----- Message from Cynthia Joshua sent at 12/3/2020 12:46 PM CST -----  Patient Assist    Name of caller: Darlene   Contact Preference:  [phone]   Does Patient feel the need to see the MD today? No   Physician:   What is the nature of the call?    - responded to a missed call from her MD , please attempt to reach her again.     Additional Notes:   "Thank you for all that you do for our patients'"            "

## 2020-12-04 ENCOUNTER — TELEPHONE (OUTPATIENT)
Dept: HEMATOLOGY/ONCOLOGY | Facility: CLINIC | Age: 70
End: 2020-12-04

## 2020-12-04 NOTE — TELEPHONE ENCOUNTER
"----- Message from Simona Rhodes MA sent at 12/4/2020  8:33 AM CST -----  Please schedule for ortho.  Thank you   ~ Simona  ----- Message -----  From: Taty Richard MD  Sent: 12/3/2020   5:23 PM CST  To: Lanny Bernardo    Called back  Reviewed that scans show no cancer  States pain mostly around knees and leads to falls  Although states her PCP told her falls was from too much BP meds and she states did get better when these were adjusted  Therefore- rec ortho referral  Referral placed  ----- Message -----  From: Lanny Bernardo  Sent: 12/3/2020   1:53 PM CST  To: Taty Richard MD      ----- Message -----  From: Cynthia Joshua  Sent: 12/3/2020  12:46 PM CST  To: [facility] Staff    Patient Assist    Name of caller: Darlene   Contact Preference:  [phone]   Does Patient feel the need to see the MD today? No   Physician:   What is the nature of the call?    - responded to a missed call from her MD , please attempt to reach her again.     Additional Notes:   "Thank you for all that you do for our patients'"                "

## 2020-12-27 ENCOUNTER — HOSPITAL ENCOUNTER (OUTPATIENT)
Facility: OTHER | Age: 70
Discharge: HOME HEALTH | End: 2020-12-28
Attending: EMERGENCY MEDICINE
Payer: COMMERCIAL

## 2020-12-27 DIAGNOSIS — I95.9 HYPOTENSION, UNSPECIFIED HYPOTENSION TYPE: Primary | ICD-10-CM

## 2020-12-27 DIAGNOSIS — R55 SYNCOPE: ICD-10-CM

## 2020-12-27 DIAGNOSIS — I95.1 ORTHOSTASIS: ICD-10-CM

## 2020-12-27 DIAGNOSIS — S09.90XA HEAD INJURY: ICD-10-CM

## 2020-12-27 PROBLEM — E11.9 TYPE 2 DIABETES MELLITUS, WITH LONG-TERM CURRENT USE OF INSULIN: Status: ACTIVE | Noted: 2020-12-27

## 2020-12-27 PROBLEM — Z66 DNR (DO NOT RESUSCITATE): Status: ACTIVE | Noted: 2020-12-27

## 2020-12-27 PROBLEM — N17.9 AKI (ACUTE KIDNEY INJURY): Status: ACTIVE | Noted: 2020-12-27

## 2020-12-27 PROBLEM — Z79.4 TYPE 2 DIABETES MELLITUS, WITH LONG-TERM CURRENT USE OF INSULIN: Status: ACTIVE | Noted: 2020-12-27

## 2020-12-27 LAB
ANION GAP SERPL CALC-SCNC: 14 MMOL/L (ref 8–16)
B-OH-BUTYR BLD STRIP-SCNC: 1 MMOL/L (ref 0–0.5)
BACTERIA #/AREA URNS HPF: ABNORMAL /HPF
BASOPHILS # BLD AUTO: 0.04 K/UL (ref 0–0.2)
BASOPHILS NFR BLD: 0.5 % (ref 0–1.9)
BILIRUB UR QL STRIP: NEGATIVE
BUN SERPL-MCNC: 24 MG/DL (ref 8–23)
CALCIUM SERPL-MCNC: 9.4 MG/DL (ref 8.7–10.5)
CHLORIDE SERPL-SCNC: 98 MMOL/L (ref 95–110)
CLARITY UR: ABNORMAL
CO2 SERPL-SCNC: 24 MMOL/L (ref 23–29)
COLOR UR: YELLOW
CREAT SERPL-MCNC: 1.3 MG/DL (ref 0.5–1.4)
CTP QC/QA: YES
DIFFERENTIAL METHOD: ABNORMAL
EOSINOPHIL # BLD AUTO: 0.1 K/UL (ref 0–0.5)
EOSINOPHIL NFR BLD: 0.6 % (ref 0–8)
ERYTHROCYTE [DISTWIDTH] IN BLOOD BY AUTOMATED COUNT: 15 % (ref 11.5–14.5)
EST. GFR  (AFRICAN AMERICAN): 48 ML/MIN/1.73 M^2
EST. GFR  (NON AFRICAN AMERICAN): 41 ML/MIN/1.73 M^2
GLUCOSE SERPL-MCNC: 449 MG/DL (ref 70–110)
GLUCOSE UR QL STRIP: ABNORMAL
HCT VFR BLD AUTO: 41.4 % (ref 37–48.5)
HGB BLD-MCNC: 12.6 G/DL (ref 12–16)
HGB UR QL STRIP: ABNORMAL
IMM GRANULOCYTES # BLD AUTO: 0.03 K/UL (ref 0–0.04)
IMM GRANULOCYTES NFR BLD AUTO: 0.4 % (ref 0–0.5)
KETONES UR QL STRIP: ABNORMAL
LEUKOCYTE ESTERASE UR QL STRIP: ABNORMAL
LYMPHOCYTES # BLD AUTO: 2.9 K/UL (ref 1–4.8)
LYMPHOCYTES NFR BLD: 34.7 % (ref 18–48)
MCH RBC QN AUTO: 24.2 PG (ref 27–31)
MCHC RBC AUTO-ENTMCNC: 30.4 G/DL (ref 32–36)
MCV RBC AUTO: 80 FL (ref 82–98)
MICROSCOPIC COMMENT: ABNORMAL
MONOCYTES # BLD AUTO: 0.5 K/UL (ref 0.3–1)
MONOCYTES NFR BLD: 5.9 % (ref 4–15)
NEUTROPHILS # BLD AUTO: 4.8 K/UL (ref 1.8–7.7)
NEUTROPHILS NFR BLD: 57.9 % (ref 38–73)
NITRITE UR QL STRIP: NEGATIVE
NRBC BLD-RTO: 0 /100 WBC
PH UR STRIP: 6 [PH] (ref 5–8)
PLATELET # BLD AUTO: 414 K/UL (ref 150–350)
PMV BLD AUTO: 9.4 FL (ref 9.2–12.9)
POCT GLUCOSE: 379 MG/DL (ref 70–110)
POTASSIUM SERPL-SCNC: 4.5 MMOL/L (ref 3.5–5.1)
PROT UR QL STRIP: ABNORMAL
RBC # BLD AUTO: 5.2 M/UL (ref 4–5.4)
RBC #/AREA URNS HPF: 3 /HPF (ref 0–4)
SARS-COV-2 RDRP RESP QL NAA+PROBE: NEGATIVE
SODIUM SERPL-SCNC: 136 MMOL/L (ref 136–145)
SP GR UR STRIP: 1.02 (ref 1–1.03)
TROPONIN I SERPL DL<=0.01 NG/ML-MCNC: 0.03 NG/ML (ref 0–0.03)
TSH SERPL DL<=0.005 MIU/L-ACNC: 1.1 UIU/ML (ref 0.4–4)
URN SPEC COLLECT METH UR: ABNORMAL
UROBILINOGEN UR STRIP-ACNC: NEGATIVE EU/DL
WBC # BLD AUTO: 8.29 K/UL (ref 3.9–12.7)
WBC #/AREA URNS HPF: >100 /HPF (ref 0–5)
YEAST URNS QL MICRO: ABNORMAL

## 2020-12-27 PROCEDURE — G0378 HOSPITAL OBSERVATION PER HR: HCPCS

## 2020-12-27 PROCEDURE — 84484 ASSAY OF TROPONIN QUANT: CPT

## 2020-12-27 PROCEDURE — 87086 URINE CULTURE/COLONY COUNT: CPT

## 2020-12-27 PROCEDURE — 99220 PR INITIAL OBSERVATION CARE,LEVL III: CPT | Mod: ,,, | Performed by: INTERNAL MEDICINE

## 2020-12-27 PROCEDURE — 84443 ASSAY THYROID STIM HORMONE: CPT

## 2020-12-27 PROCEDURE — 80048 BASIC METABOLIC PNL TOTAL CA: CPT

## 2020-12-27 PROCEDURE — 96372 THER/PROPH/DIAG INJ SC/IM: CPT | Mod: 59 | Performed by: EMERGENCY MEDICINE

## 2020-12-27 PROCEDURE — 93010 EKG 12-LEAD: ICD-10-PCS | Mod: ,,, | Performed by: INTERNAL MEDICINE

## 2020-12-27 PROCEDURE — 25500020 PHARM REV CODE 255: Performed by: INTERNAL MEDICINE

## 2020-12-27 PROCEDURE — 85025 COMPLETE CBC W/AUTO DIFF WBC: CPT

## 2020-12-27 PROCEDURE — A9585 GADOBUTROL INJECTION: HCPCS | Performed by: INTERNAL MEDICINE

## 2020-12-27 PROCEDURE — 93005 ELECTROCARDIOGRAM TRACING: CPT

## 2020-12-27 PROCEDURE — U0002 COVID-19 LAB TEST NON-CDC: HCPCS | Performed by: EMERGENCY MEDICINE

## 2020-12-27 PROCEDURE — 36415 COLL VENOUS BLD VENIPUNCTURE: CPT

## 2020-12-27 PROCEDURE — 99285 EMERGENCY DEPT VISIT HI MDM: CPT | Mod: 25

## 2020-12-27 PROCEDURE — 99220 PR INITIAL OBSERVATION CARE,LEVL III: ICD-10-PCS | Mod: ,,, | Performed by: INTERNAL MEDICINE

## 2020-12-27 PROCEDURE — C9399 UNCLASSIFIED DRUGS OR BIOLOG: HCPCS | Performed by: INTERNAL MEDICINE

## 2020-12-27 PROCEDURE — 25000003 PHARM REV CODE 250: Performed by: INTERNAL MEDICINE

## 2020-12-27 PROCEDURE — 25000003 PHARM REV CODE 250: Performed by: EMERGENCY MEDICINE

## 2020-12-27 PROCEDURE — 93010 ELECTROCARDIOGRAM REPORT: CPT | Mod: ,,, | Performed by: INTERNAL MEDICINE

## 2020-12-27 PROCEDURE — 81000 URINALYSIS NONAUTO W/SCOPE: CPT

## 2020-12-27 PROCEDURE — 82010 KETONE BODYS QUAN: CPT

## 2020-12-27 PROCEDURE — 63600175 PHARM REV CODE 636 W HCPCS: Performed by: INTERNAL MEDICINE

## 2020-12-27 RX ORDER — CITALOPRAM 10 MG/1
10 TABLET ORAL DAILY
Status: DISCONTINUED | OUTPATIENT
Start: 2020-12-28 | End: 2020-12-28 | Stop reason: HOSPADM

## 2020-12-27 RX ORDER — IBUPROFEN 200 MG
24 TABLET ORAL
Status: DISCONTINUED | OUTPATIENT
Start: 2020-12-27 | End: 2020-12-28 | Stop reason: HOSPADM

## 2020-12-27 RX ORDER — GLUCAGON 1 MG
1 KIT INJECTION
Status: DISCONTINUED | OUTPATIENT
Start: 2020-12-27 | End: 2020-12-28 | Stop reason: HOSPADM

## 2020-12-27 RX ORDER — MIDODRINE HYDROCHLORIDE 2.5 MG/1
5 TABLET ORAL 2 TIMES DAILY WITH MEALS
Status: DISCONTINUED | OUTPATIENT
Start: 2020-12-27 | End: 2020-12-28 | Stop reason: HOSPADM

## 2020-12-27 RX ORDER — PRAVASTATIN SODIUM 10 MG/1
10 TABLET ORAL EVERY EVENING
Status: DISCONTINUED | OUTPATIENT
Start: 2020-12-28 | End: 2020-12-28 | Stop reason: HOSPADM

## 2020-12-27 RX ORDER — SODIUM CHLORIDE 0.9 % (FLUSH) 0.9 %
10 SYRINGE (ML) INJECTION
Status: DISCONTINUED | OUTPATIENT
Start: 2020-12-27 | End: 2020-12-28 | Stop reason: HOSPADM

## 2020-12-27 RX ORDER — HEPARIN SODIUM 5000 [USP'U]/ML
5000 INJECTION, SOLUTION INTRAVENOUS; SUBCUTANEOUS EVERY 12 HOURS
Status: DISCONTINUED | OUTPATIENT
Start: 2020-12-27 | End: 2020-12-28 | Stop reason: HOSPADM

## 2020-12-27 RX ORDER — ASPIRIN 81 MG/1
81 TABLET ORAL DAILY
Status: DISCONTINUED | OUTPATIENT
Start: 2020-12-28 | End: 2020-12-28 | Stop reason: HOSPADM

## 2020-12-27 RX ORDER — SODIUM CHLORIDE 9 MG/ML
INJECTION, SOLUTION INTRAVENOUS CONTINUOUS
Status: DISCONTINUED | OUTPATIENT
Start: 2020-12-27 | End: 2020-12-28

## 2020-12-27 RX ORDER — GADOBUTROL 604.72 MG/ML
5 INJECTION INTRAVENOUS
Status: COMPLETED | OUTPATIENT
Start: 2020-12-27 | End: 2020-12-27

## 2020-12-27 RX ORDER — TALC
6 POWDER (GRAM) TOPICAL NIGHTLY PRN
Status: DISCONTINUED | OUTPATIENT
Start: 2020-12-27 | End: 2020-12-28 | Stop reason: HOSPADM

## 2020-12-27 RX ORDER — IBUPROFEN 200 MG
16 TABLET ORAL
Status: DISCONTINUED | OUTPATIENT
Start: 2020-12-27 | End: 2020-12-28 | Stop reason: HOSPADM

## 2020-12-27 RX ORDER — SODIUM CHLORIDE 9 MG/ML
INJECTION, SOLUTION INTRAVENOUS
Status: COMPLETED | OUTPATIENT
Start: 2020-12-27 | End: 2020-12-27

## 2020-12-27 RX ORDER — AMITRIPTYLINE HYDROCHLORIDE 25 MG/1
100 TABLET, FILM COATED ORAL NIGHTLY
Status: DISCONTINUED | OUTPATIENT
Start: 2020-12-27 | End: 2020-12-28 | Stop reason: HOSPADM

## 2020-12-27 RX ORDER — INSULIN ASPART 100 [IU]/ML
1-10 INJECTION, SOLUTION INTRAVENOUS; SUBCUTANEOUS
Status: DISCONTINUED | OUTPATIENT
Start: 2020-12-27 | End: 2020-12-28 | Stop reason: HOSPADM

## 2020-12-27 RX ADMIN — SODIUM CHLORIDE 999 ML/HR: 0.9 INJECTION, SOLUTION INTRAVENOUS at 12:12

## 2020-12-27 RX ADMIN — GADOBUTROL 5 ML: 604.72 INJECTION INTRAVENOUS at 06:12

## 2020-12-27 RX ADMIN — INSULIN ASPART 3 UNITS: 100 INJECTION, SOLUTION INTRAVENOUS; SUBCUTANEOUS at 08:12

## 2020-12-27 RX ADMIN — INSULIN DETEMIR 20 UNITS: 100 INJECTION, SOLUTION SUBCUTANEOUS at 08:12

## 2020-12-27 RX ADMIN — AMITRIPTYLINE HYDROCHLORIDE 100 MG: 25 TABLET, FILM COATED ORAL at 08:12

## 2020-12-27 RX ADMIN — SODIUM CHLORIDE: 0.9 INJECTION, SOLUTION INTRAVENOUS at 08:12

## 2020-12-27 RX ADMIN — HEPARIN SODIUM 5000 UNITS: 5000 INJECTION INTRAVENOUS; SUBCUTANEOUS at 08:12

## 2020-12-28 VITALS
TEMPERATURE: 98 F | DIASTOLIC BLOOD PRESSURE: 50 MMHG | HEART RATE: 108 BPM | RESPIRATION RATE: 18 BRPM | SYSTOLIC BLOOD PRESSURE: 92 MMHG | OXYGEN SATURATION: 98 %

## 2020-12-28 LAB
25(OH)D3+25(OH)D2 SERPL-MCNC: 20 NG/ML (ref 30–96)
ALBUMIN SERPL BCP-MCNC: 2.7 G/DL (ref 3.5–5.2)
ALP SERPL-CCNC: 125 U/L (ref 55–135)
ALT SERPL W/O P-5'-P-CCNC: 12 U/L (ref 10–44)
ANION GAP SERPL CALC-SCNC: 10 MMOL/L (ref 8–16)
AST SERPL-CCNC: 16 U/L (ref 10–40)
BACTERIA #/AREA URNS HPF: ABNORMAL /HPF
BASOPHILS # BLD AUTO: 0.04 K/UL (ref 0–0.2)
BASOPHILS NFR BLD: 0.5 % (ref 0–1.9)
BILIRUB SERPL-MCNC: 0.4 MG/DL (ref 0.1–1)
BILIRUB UR QL STRIP: NEGATIVE
BUN SERPL-MCNC: 21 MG/DL (ref 8–23)
CALCIUM SERPL-MCNC: 9.1 MG/DL (ref 8.7–10.5)
CHLORIDE SERPL-SCNC: 104 MMOL/L (ref 95–110)
CLARITY UR: ABNORMAL
CO2 SERPL-SCNC: 27 MMOL/L (ref 23–29)
COLOR UR: YELLOW
CREAT SERPL-MCNC: 0.8 MG/DL (ref 0.5–1.4)
DIFFERENTIAL METHOD: ABNORMAL
EOSINOPHIL # BLD AUTO: 0.1 K/UL (ref 0–0.5)
EOSINOPHIL NFR BLD: 1 % (ref 0–8)
ERYTHROCYTE [DISTWIDTH] IN BLOOD BY AUTOMATED COUNT: 14.9 % (ref 11.5–14.5)
EST. GFR  (AFRICAN AMERICAN): >60 ML/MIN/1.73 M^2
EST. GFR  (NON AFRICAN AMERICAN): >60 ML/MIN/1.73 M^2
ESTIMATED AVG GLUCOSE: 326 MG/DL (ref 68–131)
GLUCOSE SERPL-MCNC: 61 MG/DL (ref 70–110)
GLUCOSE UR QL STRIP: ABNORMAL
HBA1C MFR BLD HPLC: 13 % (ref 4–5.6)
HCT VFR BLD AUTO: 37.9 % (ref 37–48.5)
HGB BLD-MCNC: 11.9 G/DL (ref 12–16)
HGB UR QL STRIP: ABNORMAL
HYALINE CASTS #/AREA URNS LPF: 0 /LPF
IMM GRANULOCYTES # BLD AUTO: 0.02 K/UL (ref 0–0.04)
IMM GRANULOCYTES NFR BLD AUTO: 0.2 % (ref 0–0.5)
KETONES UR QL STRIP: NEGATIVE
LEUKOCYTE ESTERASE UR QL STRIP: ABNORMAL
LYMPHOCYTES # BLD AUTO: 3.1 K/UL (ref 1–4.8)
LYMPHOCYTES NFR BLD: 38 % (ref 18–48)
MAGNESIUM SERPL-MCNC: 1.8 MG/DL (ref 1.6–2.6)
MCH RBC QN AUTO: 24.9 PG (ref 27–31)
MCHC RBC AUTO-ENTMCNC: 31.4 G/DL (ref 32–36)
MCV RBC AUTO: 79 FL (ref 82–98)
MICROSCOPIC COMMENT: ABNORMAL
MONOCYTES # BLD AUTO: 0.5 K/UL (ref 0.3–1)
MONOCYTES NFR BLD: 6.1 % (ref 4–15)
NEUTROPHILS # BLD AUTO: 4.4 K/UL (ref 1.8–7.7)
NEUTROPHILS NFR BLD: 54.2 % (ref 38–73)
NITRITE UR QL STRIP: NEGATIVE
NRBC BLD-RTO: 0 /100 WBC
PH UR STRIP: 6 [PH] (ref 5–8)
PHOSPHATE SERPL-MCNC: 2.4 MG/DL (ref 2.7–4.5)
PLATELET # BLD AUTO: 387 K/UL (ref 150–350)
PMV BLD AUTO: 9.5 FL (ref 9.2–12.9)
POCT GLUCOSE: 126 MG/DL (ref 70–110)
POCT GLUCOSE: 254 MG/DL (ref 70–110)
POCT GLUCOSE: 292 MG/DL (ref 70–110)
POTASSIUM SERPL-SCNC: 4 MMOL/L (ref 3.5–5.1)
PROT SERPL-MCNC: 6.4 G/DL (ref 6–8.4)
PROT UR QL STRIP: ABNORMAL
RBC # BLD AUTO: 4.78 M/UL (ref 4–5.4)
RBC #/AREA URNS HPF: 3 /HPF (ref 0–4)
SODIUM SERPL-SCNC: 141 MMOL/L (ref 136–145)
SP GR UR STRIP: 1.02 (ref 1–1.03)
SQUAMOUS #/AREA URNS HPF: 1 /HPF
URN SPEC COLLECT METH UR: ABNORMAL
UROBILINOGEN UR STRIP-ACNC: NEGATIVE EU/DL
WBC # BLD AUTO: 8.2 K/UL (ref 3.9–12.7)
WBC #/AREA URNS HPF: >100 /HPF (ref 0–5)
YEAST URNS QL MICRO: ABNORMAL

## 2020-12-28 PROCEDURE — 84100 ASSAY OF PHOSPHORUS: CPT

## 2020-12-28 PROCEDURE — 82306 VITAMIN D 25 HYDROXY: CPT

## 2020-12-28 PROCEDURE — 96374 THER/PROPH/DIAG INJ IV PUSH: CPT | Performed by: EMERGENCY MEDICINE

## 2020-12-28 PROCEDURE — 36415 COLL VENOUS BLD VENIPUNCTURE: CPT

## 2020-12-28 PROCEDURE — 63600175 PHARM REV CODE 636 W HCPCS: Performed by: INTERNAL MEDICINE

## 2020-12-28 PROCEDURE — 99217 PR OBSERVATION CARE DISCHARGE: ICD-10-PCS | Mod: ,,, | Performed by: INTERNAL MEDICINE

## 2020-12-28 PROCEDURE — 81000 URINALYSIS NONAUTO W/SCOPE: CPT

## 2020-12-28 PROCEDURE — 97535 SELF CARE MNGMENT TRAINING: CPT | Mod: 59

## 2020-12-28 PROCEDURE — 83036 HEMOGLOBIN GLYCOSYLATED A1C: CPT

## 2020-12-28 PROCEDURE — 80053 COMPREHEN METABOLIC PANEL: CPT

## 2020-12-28 PROCEDURE — 96372 THER/PROPH/DIAG INJ SC/IM: CPT | Mod: 59 | Performed by: EMERGENCY MEDICINE

## 2020-12-28 PROCEDURE — 83735 ASSAY OF MAGNESIUM: CPT

## 2020-12-28 PROCEDURE — 25000003 PHARM REV CODE 250: Performed by: INTERNAL MEDICINE

## 2020-12-28 PROCEDURE — G0378 HOSPITAL OBSERVATION PER HR: HCPCS

## 2020-12-28 PROCEDURE — 97161 PT EVAL LOW COMPLEX 20 MIN: CPT

## 2020-12-28 PROCEDURE — 97530 THERAPEUTIC ACTIVITIES: CPT

## 2020-12-28 PROCEDURE — 97116 GAIT TRAINING THERAPY: CPT

## 2020-12-28 PROCEDURE — 97166 OT EVAL MOD COMPLEX 45 MIN: CPT

## 2020-12-28 PROCEDURE — 99217 PR OBSERVATION CARE DISCHARGE: CPT | Mod: ,,, | Performed by: INTERNAL MEDICINE

## 2020-12-28 PROCEDURE — 85025 COMPLETE CBC W/AUTO DIFF WBC: CPT

## 2020-12-28 RX ORDER — INSULIN LISPRO 100 [IU]/ML
INJECTION, SUSPENSION SUBCUTANEOUS
Status: ON HOLD
Start: 2020-12-28 | End: 2021-04-19 | Stop reason: HOSPADM

## 2020-12-28 RX ORDER — CEPHALEXIN 500 MG/1
500 CAPSULE ORAL EVERY 12 HOURS
Qty: 7 CAPSULE | Refills: 14 | Status: ON HOLD | OUTPATIENT
Start: 2020-12-28 | End: 2021-04-19 | Stop reason: HOSPADM

## 2020-12-28 RX ORDER — MIDODRINE HYDROCHLORIDE 5 MG/1
5 TABLET ORAL
Qty: 90 TABLET | Refills: 11 | Status: ON HOLD
Start: 2020-12-28 | End: 2021-04-21 | Stop reason: HOSPADM

## 2020-12-28 RX ADMIN — CITALOPRAM HYDROBROMIDE 10 MG: 10 TABLET ORAL at 09:12

## 2020-12-28 RX ADMIN — HEPARIN SODIUM 5000 UNITS: 5000 INJECTION INTRAVENOUS; SUBCUTANEOUS at 09:12

## 2020-12-28 RX ADMIN — MIDODRINE HYDROCHLORIDE 5 MG: 2.5 TABLET ORAL at 09:12

## 2020-12-28 RX ADMIN — ASPIRIN 81 MG: 81 TABLET, FILM COATED ORAL at 09:12

## 2020-12-28 RX ADMIN — CEFTRIAXONE 1 G: 1 INJECTION, SOLUTION INTRAVENOUS at 12:12

## 2020-12-29 LAB — BACTERIA UR CULT: NORMAL

## 2020-12-30 ENCOUNTER — OFFICE VISIT (OUTPATIENT)
Dept: CARDIOLOGY | Facility: CLINIC | Age: 70
End: 2020-12-30
Payer: COMMERCIAL

## 2020-12-30 VITALS
HEART RATE: 96 BPM | SYSTOLIC BLOOD PRESSURE: 156 MMHG | DIASTOLIC BLOOD PRESSURE: 78 MMHG | BODY MASS INDEX: 22.26 KG/M2 | WEIGHT: 110.25 LBS

## 2020-12-30 DIAGNOSIS — I95.1 ORTHOSTASIS: ICD-10-CM

## 2020-12-30 DIAGNOSIS — I10 ESSENTIAL HYPERTENSION: ICD-10-CM

## 2020-12-30 DIAGNOSIS — Z79.4 TYPE 2 DIABETES MELLITUS WITH HYPERGLYCEMIA, WITH LONG-TERM CURRENT USE OF INSULIN: ICD-10-CM

## 2020-12-30 DIAGNOSIS — Z86.73 HISTORY OF CVA (CEREBROVASCULAR ACCIDENT): Primary | ICD-10-CM

## 2020-12-30 DIAGNOSIS — Z72.0 TOBACCO ABUSE: ICD-10-CM

## 2020-12-30 DIAGNOSIS — E11.65 TYPE 2 DIABETES MELLITUS WITH HYPERGLYCEMIA, WITH LONG-TERM CURRENT USE OF INSULIN: ICD-10-CM

## 2020-12-30 DIAGNOSIS — I95.9 HYPOTENSION, UNSPECIFIED HYPOTENSION TYPE: ICD-10-CM

## 2020-12-30 DIAGNOSIS — Z66 DNR (DO NOT RESUSCITATE): ICD-10-CM

## 2020-12-30 PROCEDURE — 3288F FALL RISK ASSESSMENT DOCD: CPT | Mod: CPTII,S$GLB,, | Performed by: INTERNAL MEDICINE

## 2020-12-30 PROCEDURE — 99999 PR PBB SHADOW E&M-EST. PATIENT-LVL III: CPT | Mod: PBBFAC,,, | Performed by: INTERNAL MEDICINE

## 2020-12-30 PROCEDURE — 1159F MED LIST DOCD IN RCRD: CPT | Mod: S$GLB,,, | Performed by: INTERNAL MEDICINE

## 2020-12-30 PROCEDURE — 99203 PR OFFICE/OUTPT VISIT, NEW, LEVL III, 30-44 MIN: ICD-10-PCS | Mod: S$GLB,,, | Performed by: INTERNAL MEDICINE

## 2020-12-30 PROCEDURE — 99203 OFFICE O/P NEW LOW 30 MIN: CPT | Mod: S$GLB,,, | Performed by: INTERNAL MEDICINE

## 2020-12-30 PROCEDURE — 3008F PR BODY MASS INDEX (BMI) DOCUMENTED: ICD-10-PCS | Mod: CPTII,S$GLB,, | Performed by: INTERNAL MEDICINE

## 2020-12-30 PROCEDURE — 3046F PR MOST RECENT HEMOGLOBIN A1C LEVEL > 9.0%: ICD-10-PCS | Mod: CPTII,S$GLB,, | Performed by: INTERNAL MEDICINE

## 2020-12-30 PROCEDURE — 1157F PR ADVANCE CARE PLAN OR EQUIV PRESENT IN MEDICAL RECORD: ICD-10-PCS | Mod: S$GLB,,, | Performed by: INTERNAL MEDICINE

## 2020-12-30 PROCEDURE — 3008F BODY MASS INDEX DOCD: CPT | Mod: CPTII,S$GLB,, | Performed by: INTERNAL MEDICINE

## 2020-12-30 PROCEDURE — 1157F ADVNC CARE PLAN IN RCRD: CPT | Mod: S$GLB,,, | Performed by: INTERNAL MEDICINE

## 2020-12-30 PROCEDURE — 1101F PT FALLS ASSESS-DOCD LE1/YR: CPT | Mod: CPTII,S$GLB,, | Performed by: INTERNAL MEDICINE

## 2020-12-30 PROCEDURE — 99999 PR PBB SHADOW E&M-EST. PATIENT-LVL III: ICD-10-PCS | Mod: PBBFAC,,, | Performed by: INTERNAL MEDICINE

## 2020-12-30 PROCEDURE — 1126F AMNT PAIN NOTED NONE PRSNT: CPT | Mod: S$GLB,,, | Performed by: INTERNAL MEDICINE

## 2020-12-30 PROCEDURE — 1101F PR PT FALLS ASSESS DOC 0-1 FALLS W/OUT INJ PAST YR: ICD-10-PCS | Mod: CPTII,S$GLB,, | Performed by: INTERNAL MEDICINE

## 2020-12-30 PROCEDURE — 3288F PR FALLS RISK ASSESSMENT DOCUMENTED: ICD-10-PCS | Mod: CPTII,S$GLB,, | Performed by: INTERNAL MEDICINE

## 2020-12-30 PROCEDURE — 1159F PR MEDICATION LIST DOCUMENTED IN MEDICAL RECORD: ICD-10-PCS | Mod: S$GLB,,, | Performed by: INTERNAL MEDICINE

## 2020-12-30 PROCEDURE — 3046F HEMOGLOBIN A1C LEVEL >9.0%: CPT | Mod: CPTII,S$GLB,, | Performed by: INTERNAL MEDICINE

## 2020-12-30 PROCEDURE — 1126F PR PAIN SEVERITY QUANTIFIED, NO PAIN PRESENT: ICD-10-PCS | Mod: S$GLB,,, | Performed by: INTERNAL MEDICINE

## 2020-12-30 NOTE — PROGRESS NOTES
Cardiology Consult  12/30/2020  1:43 PM    Attending Cardiologist: Seven Mathew M.D.  Primary Care Provider: Kirill Cabrera Iii, MD  Chief Complaint/Reason For Consultation:  Low BP      Problem list  Patient Active Problem List   Diagnosis    Vitreous hemorrhage of left eye    Posterior vitreous detachment of left eye    Screening for glaucoma    Chronic diarrhea    Pseudophakia of both eyes    Malignant neoplasm of upper-outer quadrant of right breast in female, estrogen receptor positive    Use of aromatase inhibitors    Elevated troponin    Syncope    Hypoglycemia    Essential hypertension    UTI (urinary tract infection)    History of CVA (cerebrovascular accident)    Orthostasis    Poorly controlled type 2 diabetes mellitus with complication    Tobacco abuse    Acute blood loss anemia    Encounter for palliative care    Chronic anemia    Osteopenia    Orthostatic hypotension    Type 2 diabetes mellitus, with long-term current use of insulin    DENNISE (acute kidney injury)    DNR (do not resuscitate)       CC:  Low BP    HPI:  Darlene Avila is a 70 y.o.year-old female orthostatic hypotension, breast cancer s/p mastectomy, smoker, uncontrolled diabetic who was referred for low BP.  Went to ER last week for syncope due to low BP.  Sister is with her today who states she eats a lot of sweets (her A1C 13) and drinks a lot of Cokes.  She smokes daily.  No angina.  No palpitations.  Has lost a lot of weight.    Medications  Current Outpatient Medications   Medication Sig Dispense Refill    amitriptyline (ELAVIL) 100 MG tablet Take 1 tablet by mouth every evening.       anastrozole (ARIMIDEX) 1 mg Tab Take 1 tablet (1 mg total) by mouth once daily. 90 tablet 3    aspirin (ECOTRIN) 81 MG EC tablet Take 81 mg by mouth once daily.      calcium carbonate (OS-JABIER) 600 mg calcium (1,500 mg) Tab Take 600 mg by mouth once.      cephALEXin (KEFLEX) 500 MG capsule Take 1 capsule (500 mg  total) by mouth every 12 (twelve) hours. 7 capsule 14    citalopram (CELEXA) 10 MG tablet Take 1 tablet by mouth once daily.       insulin lispro protamin-lispro (HUMALOG MIX 75-25 KWIKPEN) 100 unit/mL (75-25) InPn Inject 18 units every morning and 14 units every evening as patient states.      vitamin D (VITAMIN D3) 1000 units Tab Take 1,000 Units by mouth once daily.      midodrine (PROAMATINE) 5 MG Tab Take 1 tablet (5 mg total) by mouth 3 (three) times daily with meals. (Patient not taking: Reported on 12/30/2020) 90 tablet 11     No current facility-administered medications for this visit.       Prior to Admission medications    Medication Sig Start Date End Date Taking? Authorizing Provider   amitriptyline (ELAVIL) 100 MG tablet Take 1 tablet by mouth every evening.  8/14/18  Yes Historical Provider   anastrozole (ARIMIDEX) 1 mg Tab Take 1 tablet (1 mg total) by mouth once daily. 4/6/20 4/6/21 Yes Benita Barcenas MD   aspirin (ECOTRIN) 81 MG EC tablet Take 81 mg by mouth once daily.   Yes Historical Provider   calcium carbonate (OS-JABIER) 600 mg calcium (1,500 mg) Tab Take 600 mg by mouth once.   Yes Historical Provider   cephALEXin (KEFLEX) 500 MG capsule Take 1 capsule (500 mg total) by mouth every 12 (twelve) hours. 12/28/20  Yes Na Webber MD   citalopram (CELEXA) 10 MG tablet Take 1 tablet by mouth once daily.  7/5/12  Yes Historical Provider   insulin lispro protamin-lispro (HUMALOG MIX 75-25 KWIKPEN) 100 unit/mL (75-25) InPn Inject 18 units every morning and 14 units every evening as patient states. 12/28/20  Yes Na Webber MD   vitamin D (VITAMIN D3) 1000 units Tab Take 1,000 Units by mouth once daily.   Yes Historical Provider   midodrine (PROAMATINE) 5 MG Tab Take 1 tablet (5 mg total) by mouth 3 (three) times daily with meals.  Patient not taking: Reported on 12/30/2020 12/28/20 12/28/21  Na Webber MD         History  Past Medical History:   Diagnosis Date    Arthritis     Cancer      Cataract     Diabetes mellitus     Diabetic retinopathy     Hypertension     TIA (transient ischemic attack) 1814-3125     Past Surgical History:   Procedure Laterality Date    CAPSULOTOMY  6/26/13    yag left eye    CATARACT EXTRACTION  6/3/2013    right eye    CHOLECYSTECTOMY      HYSTERECTOMY      MASTECTOMY Right 9/28/2020    Procedure: MASTECTOMY-Right;  Surgeon: Krysta Clark MD;  Location: [facility];  Service: General;  Laterality: Right;    SENTINEL LYMPH NODE BIOPSY Right 9/28/2020    Procedure: BIOPSY, LYMPH NODE, SENTINEL-Right;  Surgeon: Krysta Clark MD;  Location: [facility];  Service: General;  Laterality: Right;    TOTAL ABDOMINAL HYSTERECTOMY W/ BILATERAL SALPINGOOPHORECTOMY       Social History     Socioeconomic History    Marital status: Single     Spouse name: Not on file    Number of children: Not on file    Years of education: Not on file    Highest education level: Not on file   Occupational History    Not on file   Social Needs    Financial resource strain: Not on file    Food insecurity     Worry: Not on file     Inability: Not on file    Transportation needs     Medical: Not on file     Non-medical: Not on file   Tobacco Use    Smoking status: Current Every Day Smoker     Packs/day: 1.50     Types: Cigarettes    Smokeless tobacco: Never Used   Substance and Sexual Activity    Alcohol use: No    Drug use: No    Sexual activity: Not Currently   Lifestyle    Physical activity     Days per week: Not on file     Minutes per session: Not on file    Stress: Not on file   Relationships    Social connections     Talks on phone: Not on file     Gets together: Not on file     Attends Rastafarian service: Not on file     Active member of club or organization: Not on file     Attends meetings of clubs or organizations: Not on file     Relationship status: Not on file   Other Topics Concern    Not on file   Social History Narrative    Not on file         Allergies  Review of  patient's allergies indicates:   Allergen Reactions    Iodinated contrast media Hives         Review of Systems   Review of Systems   Constitution: Positive for decreased appetite and weight loss.   Cardiovascular: Positive for near-syncope and syncope. Negative for chest pain, claudication, cyanosis, dyspnea on exertion, irregular heartbeat, leg swelling, orthopnea, palpitations and paroxysmal nocturnal dyspnea.   Respiratory: Negative.          Physical Exam  Wt Readings from Last 1 Encounters:   12/30/20 50 kg (110 lb 3.7 oz)     BP Readings from Last 3 Encounters:   12/30/20 (!) 156/78   12/28/20 (!) 92/50   10/22/20 (!) 123/59     Pulse Readings from Last 1 Encounters:   12/30/20 96     Body mass index is 22.26 kg/m².    Physical Exam   Constitutional: She is oriented to person, place, and time. She appears cachectic.   Neck: No JVD present. Carotid bruit is not present.   Cardiovascular: Normal rate, regular rhythm, S1 normal and S2 normal.   Pulses:       Carotid pulses are 2+ on the right side and 2+ on the left side.       Radial pulses are 2+ on the right side and 2+ on the left side.   Pulmonary/Chest: Breath sounds normal.   Abdominal: Bowel sounds are normal.   Neurological: She is alert and oriented to person, place, and time.   Skin: Skin is warm and dry.   Psychiatric: She has a normal mood and affect.   Vitals reviewed.                Assessment:  1.  orthostasis    2. Uncontrolled diabetic    3.  Tobacco smoker    Plan:  Encourage hydration, decrease sugar intake including Cokes.  Increase salt in diet.  Continue midodrine for BP.  Get echo to assess syncope. Encourage to quit smoking.    Follow up:  1-2 months    Time spent evaluating and treating patient 15 minutes with >50% of this time being face-to-face.     Seven Mathew MD, F.A.C.C, F.S.C.A.I.

## 2020-12-30 NOTE — LETTER
December 30, 2020      Na Webber MD  [address] [facility] [address]           [facility]  [address] [place] [address], [facility] [address]  [facility] [address]  Phone: [phone]  Fax: [phone]          Patient: Darlene Avila   MR Number: [id number]   YOB: 1950   Date of Visit: 12/30/2020       Dear Dr. Na Webber:    Thank you for referring Darlene Avila to me for evaluation. Attached you will find relevant portions of my assessment and plan of care.    If you have questions, please do not hesitate to call me. I look forward to following Darlene Avila along with you.    Sincerely,    Seven Mathew MD    Enclosure  CC:  No Recipients    If you would like to receive this communication electronically, please contact [email or web address] or [phone] to request more information on [employer] access.    For providers and/or their staff who would like to refer a patient to [facility], please contact us through our one-stop-shop provider referral line, [facility], at [phone].    If you feel you have received this communication in error or would no longer like to receive these types of communications, please e-mail [email or web address]

## 2021-01-06 ENCOUNTER — HOSPITAL ENCOUNTER (OUTPATIENT)
Dept: CARDIOLOGY | Facility: OTHER | Age: 71
Discharge: HOME | End: 2021-01-06
Attending: INTERNAL MEDICINE
Payer: COMMERCIAL

## 2021-01-06 VITALS
HEIGHT: 59 IN | WEIGHT: 110 LBS | HEART RATE: 96 BPM | SYSTOLIC BLOOD PRESSURE: 156 MMHG | BODY MASS INDEX: 22.18 KG/M2 | DIASTOLIC BLOOD PRESSURE: 78 MMHG

## 2021-01-06 DIAGNOSIS — I95.9 HYPOTENSION, UNSPECIFIED HYPOTENSION TYPE: ICD-10-CM

## 2021-01-06 LAB
ASCENDING AORTA: 2.45 CM
AV INDEX (PROSTH): 0.96
AV MEAN GRADIENT: 3 MMHG
AV PEAK GRADIENT: 6 MMHG
AV VALVE AREA: 2.75 CM2
AV VELOCITY RATIO: 0.75
BSA FOR ECHO PROCEDURE: 1.44 M2
CV ECHO LV RWT: 0.67 CM
DOP CALC AO PEAK VEL: 1.24 M/S
DOP CALC AO VTI: 19.78 CM
DOP CALC LVOT AREA: 2.9 CM2
DOP CALC LVOT DIAMETER: 1.91 CM
DOP CALC LVOT PEAK VEL: 0.93 M/S
DOP CALC LVOT STROKE VOLUME: 54.33 CM3
DOP CALCLVOT PEAK VEL VTI: 18.97 CM
E WAVE DECELERATION TIME: 136.49 MSEC
E/A RATIO: 3.43
E/E' RATIO: 37.33 M/S
ECHO LV POSTERIOR WALL: 1.11 CM (ref 0.6–1.1)
FRACTIONAL SHORTENING: 41 % (ref 28–44)
INTERVENTRICULAR SEPTUM: 1.12 CM (ref 0.6–1.1)
IVRT: 122 MSEC
LA MAJOR: 3.44 CM
LA MINOR: 3.91 CM
LA WIDTH: 3.06 CM
LEFT ATRIUM SIZE: 2.82 CM
LEFT ATRIUM VOLUME INDEX MOD: 19.6 ML/M2
LEFT ATRIUM VOLUME INDEX: 18.8 ML/M2
LEFT ATRIUM VOLUME MOD: 28 CM3
LEFT ATRIUM VOLUME: 26.85 CM3
LEFT INTERNAL DIMENSION IN SYSTOLE: 1.96 CM (ref 2.1–4)
LEFT VENTRICLE DIASTOLIC VOLUME INDEX: 31.23 ML/M2
LEFT VENTRICLE DIASTOLIC VOLUME: 44.67 ML
LEFT VENTRICLE MASS INDEX: 79 G/M2
LEFT VENTRICLE SYSTOLIC VOLUME INDEX: 8.4 ML/M2
LEFT VENTRICLE SYSTOLIC VOLUME: 12.07 ML
LEFT VENTRICULAR INTERNAL DIMENSION IN DIASTOLE: 3.32 CM (ref 3.5–6)
LEFT VENTRICULAR MASS: 112.39 G
LV LATERAL E/E' RATIO: 42 M/S
LV SEPTAL E/E' RATIO: 33.6 M/S
MV A" WAVE DURATION": 9.43 MSEC
MV PEAK A VEL: 0.49 M/S
MV PEAK E VEL: 1.68 M/S
PISA MRMAX VEL: 0.04 M/S
PISA TR MAX VEL: 2.88 M/S
PULM VEIN S/D RATIO: 1.72
PV PEAK D VEL: 0.36 M/S
PV PEAK S VEL: 0.62 M/S
PV PEAK VELOCITY: 0.76 CM/S
RA MAJOR: 3.4 CM
RA PRESSURE: 3 MMHG
RA WIDTH: 2.8 CM
RIGHT VENTRICULAR END-DIASTOLIC DIMENSION: 2.05 CM
RV TISSUE DOPPLER FREE WALL SYSTOLIC VELOCITY 1 (APICAL 4 CHAMBER VIEW): 15.32 CM/S
SINUS: 2.6 CM
STJ: 2.47 CM
TDI LATERAL: 0.04 M/S
TDI SEPTAL: 0.05 M/S
TDI: 0.05 M/S
TR MAX PG: 33 MMHG
TRICUSPID ANNULAR PLANE SYSTOLIC EXCURSION: 2.33 CM
TV REST PULMONARY ARTERY PRESSURE: 36 MMHG

## 2021-01-06 PROCEDURE — 93306 TTE W/DOPPLER COMPLETE: CPT | Mod: 26,,, | Performed by: INTERNAL MEDICINE

## 2021-01-06 PROCEDURE — 93306 TTE W/DOPPLER COMPLETE: CPT

## 2021-01-06 PROCEDURE — 93306 ECHO (CUPID ONLY): ICD-10-PCS | Mod: 26,,, | Performed by: INTERNAL MEDICINE

## 2021-01-28 ENCOUNTER — OFFICE VISIT (OUTPATIENT)
Dept: CARDIOLOGY | Facility: CLINIC | Age: 71
End: 2021-01-28
Payer: COMMERCIAL

## 2021-01-28 VITALS
WEIGHT: 112.44 LBS | BODY MASS INDEX: 22.67 KG/M2 | SYSTOLIC BLOOD PRESSURE: 136 MMHG | OXYGEN SATURATION: 98 % | HEIGHT: 59 IN | DIASTOLIC BLOOD PRESSURE: 74 MMHG | HEART RATE: 103 BPM

## 2021-01-28 DIAGNOSIS — I95.1 ORTHOSTATIC HYPOTENSION: Primary | ICD-10-CM

## 2021-01-28 PROCEDURE — 1157F ADVNC CARE PLAN IN RCRD: CPT | Mod: S$GLB,,, | Performed by: INTERNAL MEDICINE

## 2021-01-28 PROCEDURE — 99999 PR PBB SHADOW E&M-EST. PATIENT-LVL III: CPT | Mod: PBBFAC,,, | Performed by: INTERNAL MEDICINE

## 2021-01-28 PROCEDURE — 3008F BODY MASS INDEX DOCD: CPT | Mod: CPTII,S$GLB,, | Performed by: INTERNAL MEDICINE

## 2021-01-28 PROCEDURE — 3075F SYST BP GE 130 - 139MM HG: CPT | Mod: CPTII,S$GLB,, | Performed by: INTERNAL MEDICINE

## 2021-01-28 PROCEDURE — 1159F MED LIST DOCD IN RCRD: CPT | Mod: S$GLB,,, | Performed by: INTERNAL MEDICINE

## 2021-01-28 PROCEDURE — 1159F PR MEDICATION LIST DOCUMENTED IN MEDICAL RECORD: ICD-10-PCS | Mod: S$GLB,,, | Performed by: INTERNAL MEDICINE

## 2021-01-28 PROCEDURE — 99213 PR OFFICE/OUTPT VISIT, EST, LEVL III, 20-29 MIN: ICD-10-PCS | Mod: S$GLB,,, | Performed by: INTERNAL MEDICINE

## 2021-01-28 PROCEDURE — 3078F PR MOST RECENT DIASTOLIC BLOOD PRESSURE < 80 MM HG: ICD-10-PCS | Mod: CPTII,S$GLB,, | Performed by: INTERNAL MEDICINE

## 2021-01-28 PROCEDURE — 1157F PR ADVANCE CARE PLAN OR EQUIV PRESENT IN MEDICAL RECORD: ICD-10-PCS | Mod: S$GLB,,, | Performed by: INTERNAL MEDICINE

## 2021-01-28 PROCEDURE — 99213 OFFICE O/P EST LOW 20 MIN: CPT | Mod: S$GLB,,, | Performed by: INTERNAL MEDICINE

## 2021-01-28 PROCEDURE — 3008F PR BODY MASS INDEX (BMI) DOCUMENTED: ICD-10-PCS | Mod: CPTII,S$GLB,, | Performed by: INTERNAL MEDICINE

## 2021-01-28 PROCEDURE — 3075F PR MOST RECENT SYSTOLIC BLOOD PRESS GE 130-139MM HG: ICD-10-PCS | Mod: CPTII,S$GLB,, | Performed by: INTERNAL MEDICINE

## 2021-01-28 PROCEDURE — 99999 PR PBB SHADOW E&M-EST. PATIENT-LVL III: ICD-10-PCS | Mod: PBBFAC,,, | Performed by: INTERNAL MEDICINE

## 2021-01-28 PROCEDURE — 3078F DIAST BP <80 MM HG: CPT | Mod: CPTII,S$GLB,, | Performed by: INTERNAL MEDICINE

## 2021-03-22 ENCOUNTER — TELEPHONE (OUTPATIENT)
Dept: NEUROLOGY | Facility: CLINIC | Age: 71
End: 2021-03-22

## 2021-04-05 ENCOUNTER — TELEPHONE (OUTPATIENT)
Dept: NEUROLOGY | Facility: CLINIC | Age: 71
End: 2021-04-05

## 2021-04-14 ENCOUNTER — HOSPITAL ENCOUNTER (OUTPATIENT)
Facility: OTHER | Age: 71
Discharge: NURSING, REHAB OR LONG-TERM CARE | End: 2021-04-22
Attending: EMERGENCY MEDICINE | Admitting: INTERNAL MEDICINE
Payer: COMMERCIAL

## 2021-04-14 DIAGNOSIS — E86.0 DEHYDRATION: ICD-10-CM

## 2021-04-14 DIAGNOSIS — Z17.0 MALIGNANT NEOPLASM OF UPPER-OUTER QUADRANT OF RIGHT BREAST IN FEMALE, ESTROGEN RECEPTOR POSITIVE: ICD-10-CM

## 2021-04-14 DIAGNOSIS — E11.65 HYPERGLYCEMIA DUE TO DIABETES MELLITUS: ICD-10-CM

## 2021-04-14 DIAGNOSIS — Z86.73 HISTORY OF CVA (CEREBROVASCULAR ACCIDENT): ICD-10-CM

## 2021-04-14 DIAGNOSIS — I10 ESSENTIAL HYPERTENSION: ICD-10-CM

## 2021-04-14 DIAGNOSIS — Z72.0 TOBACCO ABUSE: ICD-10-CM

## 2021-04-14 DIAGNOSIS — D64.9 CHRONIC ANEMIA: ICD-10-CM
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
## 2021-04-14 DIAGNOSIS — Z51.5 ENCOUNTER FOR PALLIATIVE CARE: ICD-10-CM

## 2021-04-14 DIAGNOSIS — E11.65 UNCONTROLLED TYPE 2 DIABETES MELLITUS WITH HYPERGLYCEMIA: ICD-10-CM

## 2021-04-14 DIAGNOSIS — I95.1 ORTHOSTATIC HYPOTENSION: Primary | ICD-10-CM

## 2021-04-14 DIAGNOSIS — Z66 DNR (DO NOT RESUSCITATE): ICD-10-CM

## 2021-04-14 DIAGNOSIS — Z91.148 NONCOMPLIANCE WITH MEDICATIONS: ICD-10-CM

## 2021-04-14 DIAGNOSIS — R29.6 FALLS FREQUENTLY: ICD-10-CM

## 2021-04-14 DIAGNOSIS — C50.411 MALIGNANT NEOPLASM OF UPPER-OUTER QUADRANT OF RIGHT BREAST IN FEMALE, ESTROGEN RECEPTOR POSITIVE: ICD-10-CM

## 2021-04-14 DIAGNOSIS — I95.1 ORTHOSTATIC SYNCOPE: ICD-10-CM

## 2021-04-14 LAB
ALBUMIN SERPL BCP-MCNC: 2.5 G/DL (ref 3.5–5.2)
ALLENS TEST: ABNORMAL
ALP SERPL-CCNC: 153 U/L (ref 55–135)
ALT SERPL W/O P-5'-P-CCNC: 10 U/L (ref 10–44)
ANION GAP SERPL CALC-SCNC: 12 MMOL/L (ref 8–16)
ANION GAP SERPL CALC-SCNC: 16 MMOL/L (ref 8–16)
AST SERPL-CCNC: 19 U/L (ref 10–40)
B-OH-BUTYR BLD STRIP-SCNC: 2.7 MMOL/L (ref 0–0.5)
BACTERIA #/AREA URNS HPF: ABNORMAL /HPF
BASOPHILS # BLD AUTO: 0.03 K/UL (ref 0–0.2)
BASOPHILS # BLD AUTO: 0.04 K/UL (ref 0–0.2)
BASOPHILS NFR BLD: 0.3 % (ref 0–1.9)
BASOPHILS NFR BLD: 0.4 % (ref 0–1.9)
BILIRUB DIRECT SERPL-MCNC: 0.4 MG/DL (ref 0.1–0.3)
BILIRUB SERPL-MCNC: 0.8 MG/DL (ref 0.1–1)
BILIRUB UR QL STRIP: NEGATIVE
BUN SERPL-MCNC: 20 MG/DL (ref 8–23)
BUN SERPL-MCNC: 23 MG/DL (ref 8–23)
CALCIUM SERPL-MCNC: 8.6 MG/DL (ref 8.7–10.5)
CALCIUM SERPL-MCNC: 9.8 MG/DL (ref 8.7–10.5)
CHLORIDE SERPL-SCNC: 97 MMOL/L (ref 95–110)
CHLORIDE SERPL-SCNC: 99 MMOL/L (ref 95–110)
CK SERPL-CCNC: 184 U/L (ref 20–180)
CLARITY UR: CLEAR
CO2 SERPL-SCNC: 24 MMOL/L (ref 23–29)
CO2 SERPL-SCNC: 26 MMOL/L (ref 23–29)
COLOR UR: YELLOW
CREAT SERPL-MCNC: 0.9 MG/DL (ref 0.5–1.4)
CREAT SERPL-MCNC: 1.4 MG/DL (ref 0.5–1.4)
CTP QC/QA: YES
DELSYS: ABNORMAL
DIFFERENTIAL METHOD: ABNORMAL
DIFFERENTIAL METHOD: ABNORMAL
EOSINOPHIL # BLD AUTO: 0 K/UL (ref 0–0.5)
EOSINOPHIL # BLD AUTO: 0 K/UL (ref 0–0.5)
EOSINOPHIL NFR BLD: 0.2 % (ref 0–8)
EOSINOPHIL NFR BLD: 0.4 % (ref 0–8)
ERYTHROCYTE [DISTWIDTH] IN BLOOD BY AUTOMATED COUNT: 16.5 % (ref 11.5–14.5)
ERYTHROCYTE [DISTWIDTH] IN BLOOD BY AUTOMATED COUNT: 16.5 % (ref 11.5–14.5)
EST. GFR  (AFRICAN AMERICAN): 44 ML/MIN/1.73 M^2
EST. GFR  (AFRICAN AMERICAN): >60 ML/MIN/1.73 M^2
EST. GFR  (NON AFRICAN AMERICAN): 38 ML/MIN/1.73 M^2
EST. GFR  (NON AFRICAN AMERICAN): >60 ML/MIN/1.73 M^2
ESTIMATED AVG GLUCOSE: 275 MG/DL (ref 68–131)
GLUCOSE SERPL-MCNC: 221 MG/DL (ref 70–110)
GLUCOSE SERPL-MCNC: 317 MG/DL (ref 70–110)
GLUCOSE UR QL STRIP: ABNORMAL
HBA1C MFR BLD: 11.2 % (ref 4–5.6)
HCO3 UR-SCNC: 31.7 MMOL/L (ref 24–28)
HCT VFR BLD AUTO: 31.3 % (ref 37–48.5)
HCT VFR BLD AUTO: 34.1 % (ref 37–48.5)
HCT VFR BLD CALC: 34 %PCV (ref 36–54)
HGB BLD-MCNC: 10.6 G/DL (ref 12–16)
HGB BLD-MCNC: 12 G/DL
HGB BLD-MCNC: 9.6 G/DL (ref 12–16)
HGB UR QL STRIP: ABNORMAL
HYALINE CASTS #/AREA URNS LPF: 0 /LPF
IMM GRANULOCYTES # BLD AUTO: 0.03 K/UL (ref 0–0.04)
IMM GRANULOCYTES # BLD AUTO: 0.04 K/UL (ref 0–0.04)
IMM GRANULOCYTES NFR BLD AUTO: 0.3 % (ref 0–0.5)
IMM GRANULOCYTES NFR BLD AUTO: 0.4 % (ref 0–0.5)
KETONES UR QL STRIP: ABNORMAL
LEUKOCYTE ESTERASE UR QL STRIP: ABNORMAL
LYMPHOCYTES # BLD AUTO: 2.2 K/UL (ref 1–4.8)
LYMPHOCYTES # BLD AUTO: 2.3 K/UL (ref 1–4.8)
LYMPHOCYTES NFR BLD: 21.4 % (ref 18–48)
LYMPHOCYTES NFR BLD: 24.6 % (ref 18–48)
MAGNESIUM SERPL-MCNC: 1.9 MG/DL (ref 1.6–2.6)
MCH RBC QN AUTO: 25.3 PG (ref 27–31)
MCH RBC QN AUTO: 25.3 PG (ref 27–31)
MCHC RBC AUTO-ENTMCNC: 30.7 G/DL (ref 32–36)
MCHC RBC AUTO-ENTMCNC: 31.1 G/DL (ref 32–36)
MCV RBC AUTO: 81 FL (ref 82–98)
MCV RBC AUTO: 83 FL (ref 82–98)
MICROSCOPIC COMMENT: ABNORMAL
MODE: ABNORMAL
MONOCYTES # BLD AUTO: 0.4 K/UL (ref 0.3–1)
MONOCYTES # BLD AUTO: 0.6 K/UL (ref 0.3–1)
MONOCYTES NFR BLD: 4.1 % (ref 4–15)
MONOCYTES NFR BLD: 5.3 % (ref 4–15)
NEUTROPHILS # BLD AUTO: 6.7 K/UL (ref 1.8–7.7)
NEUTROPHILS # BLD AUTO: 7.5 K/UL (ref 1.8–7.7)
NEUTROPHILS NFR BLD: 70.4 % (ref 38–73)
NEUTROPHILS NFR BLD: 72.2 % (ref 38–73)
NITRITE UR QL STRIP: NEGATIVE
NRBC BLD-RTO: 0 /100 WBC
NRBC BLD-RTO: 0 /100 WBC
PCO2 BLDA: 51.9 MMHG (ref 35–45)
PH SMN: 7.39 [PH] (ref 7.35–7.45)
PH UR STRIP: 6 [PH] (ref 5–8)
PLATELET # BLD AUTO: 423 K/UL (ref 150–450)
PLATELET # BLD AUTO: 476 K/UL (ref 150–450)
PMV BLD AUTO: 8.6 FL (ref 9.2–12.9)
PMV BLD AUTO: 9.2 FL (ref 9.2–12.9)
PO2 BLDA: 26 MMHG (ref 40–60)
POC BE: 7 MMOL/L
POC IONIZED CALCIUM: 1.14 MMOL/L (ref 1.06–1.42)
POC SATURATED O2: 46 % (ref 95–100)
POC TCO2: 33 MMOL/L (ref 24–29)
POCT GLUCOSE: 234 MG/DL (ref 70–110)
POCT GLUCOSE: 255 MG/DL (ref 70–110)
POCT GLUCOSE: 325 MG/DL (ref 70–110)
POCT GLUCOSE: 352 MG/DL (ref 70–110)
POCT GLUCOSE: 361 MG/DL (ref 70–110)
POTASSIUM BLD-SCNC: 6.4 MMOL/L (ref 3.5–5.1)
POTASSIUM SERPL-SCNC: 4 MMOL/L (ref 3.5–5.1)
POTASSIUM SERPL-SCNC: 4.3 MMOL/L (ref 3.5–5.1)
PROT SERPL-MCNC: 6.2 G/DL (ref 6–8.4)
PROT UR QL STRIP: NEGATIVE
RBC # BLD AUTO: 3.79 M/UL (ref 4–5.4)
RBC # BLD AUTO: 4.19 M/UL (ref 4–5.4)
RBC #/AREA URNS HPF: 0 /HPF (ref 0–4)
SAMPLE: ABNORMAL
SARS-COV-2 RDRP RESP QL NAA+PROBE: NEGATIVE
SITE: ABNORMAL
SODIUM BLD-SCNC: 133 MMOL/L (ref 136–145)
SODIUM SERPL-SCNC: 137 MMOL/L (ref 136–145)
SODIUM SERPL-SCNC: 137 MMOL/L (ref 136–145)
SP GR UR STRIP: 1.02 (ref 1–1.03)
SQUAMOUS #/AREA URNS HPF: 2 /HPF
TSH SERPL DL<=0.005 MIU/L-ACNC: 0.55 UIU/ML (ref 0.4–4)
URN SPEC COLLECT METH UR: ABNORMAL
UROBILINOGEN UR STRIP-ACNC: NEGATIVE EU/DL
WBC # BLD AUTO: 10.39 K/UL (ref 3.9–12.7)
WBC # BLD AUTO: 9.52 K/UL (ref 3.9–12.7)
WBC #/AREA URNS HPF: 11 /HPF (ref 0–5)
YEAST URNS QL MICRO: ABNORMAL

## 2021-04-14 PROCEDURE — 82962 GLUCOSE BLOOD TEST: CPT

## 2021-04-14 PROCEDURE — 82803 BLOOD GASES ANY COMBINATION: CPT

## 2021-04-14 PROCEDURE — 99497 PR ADVNCD CARE PLAN 30 MIN: ICD-10-PCS | Mod: ,,, | Performed by: FAMILY MEDICINE

## 2021-04-14 PROCEDURE — C9399 UNCLASSIFIED DRUGS OR BIOLOG: HCPCS | Performed by: PHYSICIAN ASSISTANT

## 2021-04-14 PROCEDURE — 99205 PR OFFICE/OUTPT VISIT, NEW, LEVL V, 60-74 MIN: ICD-10-PCS | Mod: ,,, | Performed by: FAMILY MEDICINE

## 2021-04-14 PROCEDURE — G0378 HOSPITAL OBSERVATION PER HR: HCPCS

## 2021-04-14 PROCEDURE — 63600175 PHARM REV CODE 636 W HCPCS: Performed by: PHYSICIAN ASSISTANT

## 2021-04-14 PROCEDURE — 99205 OFFICE O/P NEW HI 60 MIN: CPT | Mod: ,,, | Performed by: FAMILY MEDICINE

## 2021-04-14 PROCEDURE — 25000003 PHARM REV CODE 250: Performed by: PHYSICIAN ASSISTANT

## 2021-04-14 PROCEDURE — 99220 PR INITIAL OBSERVATION CARE,LEVL III: ICD-10-PCS | Mod: ,,, | Performed by: PHYSICIAN ASSISTANT

## 2021-04-14 PROCEDURE — 96361 HYDRATE IV INFUSION ADD-ON: CPT

## 2021-04-14 PROCEDURE — 80048 BASIC METABOLIC PNL TOTAL CA: CPT | Mod: 91 | Performed by: PHYSICIAN ASSISTANT

## 2021-04-14 PROCEDURE — 85025 COMPLETE CBC W/AUTO DIFF WBC: CPT | Performed by: EMERGENCY MEDICINE

## 2021-04-14 PROCEDURE — 82550 ASSAY OF CK (CPK): CPT | Performed by: EMERGENCY MEDICINE

## 2021-04-14 PROCEDURE — 25000003 PHARM REV CODE 250: Performed by: EMERGENCY MEDICINE

## 2021-04-14 PROCEDURE — 85025 COMPLETE CBC W/AUTO DIFF WBC: CPT | Mod: 91 | Performed by: PHYSICIAN ASSISTANT

## 2021-04-14 PROCEDURE — 87086 URINE CULTURE/COLONY COUNT: CPT | Performed by: EMERGENCY MEDICINE

## 2021-04-14 PROCEDURE — 99220 PR INITIAL OBSERVATION CARE,LEVL III: CPT | Mod: ,,, | Performed by: PHYSICIAN ASSISTANT

## 2021-04-14 PROCEDURE — 99497 ADVNCD CARE PLAN 30 MIN: CPT | Mod: ,,, | Performed by: FAMILY MEDICINE

## 2021-04-14 PROCEDURE — 99285 EMERGENCY DEPT VISIT HI MDM: CPT | Mod: 25

## 2021-04-14 PROCEDURE — 80076 HEPATIC FUNCTION PANEL: CPT | Performed by: PHYSICIAN ASSISTANT

## 2021-04-14 PROCEDURE — 36415 COLL VENOUS BLD VENIPUNCTURE: CPT | Performed by: PHYSICIAN ASSISTANT

## 2021-04-14 PROCEDURE — 80048 BASIC METABOLIC PNL TOTAL CA: CPT | Performed by: EMERGENCY MEDICINE

## 2021-04-14 PROCEDURE — 97163 PT EVAL HIGH COMPLEX 45 MIN: CPT

## 2021-04-14 PROCEDURE — 97530 THERAPEUTIC ACTIVITIES: CPT

## 2021-04-14 PROCEDURE — 93010 EKG 12-LEAD: ICD-10-PCS | Mod: ,,, | Performed by: INTERNAL MEDICINE

## 2021-04-14 PROCEDURE — 93010 ELECTROCARDIOGRAM REPORT: CPT | Mod: ,,, | Performed by: INTERNAL MEDICINE

## 2021-04-14 PROCEDURE — 99220 PR INITIAL OBSERVATION CARE,LEVL III: CPT | Mod: ,,, | Performed by: INTERNAL MEDICINE

## 2021-04-14 PROCEDURE — 97166 OT EVAL MOD COMPLEX 45 MIN: CPT

## 2021-04-14 PROCEDURE — 83735 ASSAY OF MAGNESIUM: CPT | Performed by: EMERGENCY MEDICINE

## 2021-04-14 PROCEDURE — 96360 HYDRATION IV INFUSION INIT: CPT

## 2021-04-14 PROCEDURE — 83036 HEMOGLOBIN GLYCOSYLATED A1C: CPT | Performed by: PHYSICIAN ASSISTANT

## 2021-04-14 PROCEDURE — 84443 ASSAY THYROID STIM HORMONE: CPT | Performed by: PHYSICIAN ASSISTANT

## 2021-04-14 PROCEDURE — 99900035 HC TECH TIME PER 15 MIN (STAT)

## 2021-04-14 PROCEDURE — 93005 ELECTROCARDIOGRAM TRACING: CPT

## 2021-04-14 PROCEDURE — U0002 COVID-19 LAB TEST NON-CDC: HCPCS | Performed by: EMERGENCY MEDICINE

## 2021-04-14 PROCEDURE — 99220 PR INITIAL OBSERVATION CARE,LEVL III: ICD-10-PCS | Mod: ,,, | Performed by: INTERNAL MEDICINE

## 2021-04-14 PROCEDURE — 82010 KETONE BODYS QUAN: CPT | Performed by: EMERGENCY MEDICINE

## 2021-04-14 PROCEDURE — 81000 URINALYSIS NONAUTO W/SCOPE: CPT | Performed by: EMERGENCY MEDICINE

## 2021-04-14 PROCEDURE — 96372 THER/PROPH/DIAG INJ SC/IM: CPT | Mod: 59

## 2021-04-14 RX ORDER — IBUPROFEN 200 MG
16 TABLET ORAL
Status: DISCONTINUED | OUTPATIENT
Start: 2021-04-14 | End: 2021-04-22 | Stop reason: HOSPADM

## 2021-04-14 RX ORDER — GLUCAGON 1 MG
1 KIT INJECTION
Status: DISCONTINUED | OUTPATIENT
Start: 2021-04-14 | End: 2021-04-22 | Stop reason: HOSPADM

## 2021-04-14 RX ORDER — ANASTROZOLE 1 MG/1
1 TABLET ORAL DAILY
Status: ON HOLD | COMMUNITY
End: 2022-06-20

## 2021-04-14 RX ORDER — MIDODRINE HYDROCHLORIDE 5 MG/1
5 TABLET ORAL EVERY 12 HOURS
Status: DISCONTINUED | OUTPATIENT
Start: 2021-04-14 | End: 2021-04-15

## 2021-04-14 RX ORDER — INSULIN ASPART 100 [IU]/ML
4 INJECTION, SOLUTION INTRAVENOUS; SUBCUTANEOUS
Status: DISCONTINUED | OUTPATIENT
Start: 2021-04-14 | End: 2021-04-22 | Stop reason: HOSPADM

## 2021-04-14 RX ORDER — ANASTROZOLE 1 MG/1
1 TABLET ORAL DAILY
Status: DISCONTINUED | OUTPATIENT
Start: 2021-04-14 | End: 2021-04-22 | Stop reason: HOSPADM

## 2021-04-14 RX ORDER — IBUPROFEN 200 MG
24 TABLET ORAL
Status: DISCONTINUED | OUTPATIENT
Start: 2021-04-14 | End: 2021-04-22 | Stop reason: HOSPADM

## 2021-04-14 RX ORDER — SODIUM CHLORIDE 9 MG/ML
INJECTION, SOLUTION INTRAVENOUS
Status: COMPLETED | OUTPATIENT
Start: 2021-04-14 | End: 2021-04-14

## 2021-04-14 RX ORDER — INSULIN ASPART 100 [IU]/ML
1-10 INJECTION, SOLUTION INTRAVENOUS; SUBCUTANEOUS
Status: DISCONTINUED | OUTPATIENT
Start: 2021-04-14 | End: 2021-04-22 | Stop reason: HOSPADM

## 2021-04-14 RX ORDER — CITALOPRAM 10 MG/1
10 TABLET ORAL DAILY
Status: DISCONTINUED | OUTPATIENT
Start: 2021-04-14 | End: 2021-04-22 | Stop reason: HOSPADM

## 2021-04-14 RX ORDER — AMITRIPTYLINE HYDROCHLORIDE 25 MG/1
100 TABLET, FILM COATED ORAL NIGHTLY
Status: DISCONTINUED | OUTPATIENT
Start: 2021-04-14 | End: 2021-04-14

## 2021-04-14 RX ORDER — CHOLECALCIFEROL (VITAMIN D3) 25 MCG
1000 TABLET ORAL DAILY
Status: DISCONTINUED | OUTPATIENT
Start: 2021-04-14 | End: 2021-04-22 | Stop reason: HOSPADM

## 2021-04-14 RX ORDER — CALCIUM CARBONATE 500(1250)
500 TABLET ORAL ONCE
Status: COMPLETED | OUTPATIENT
Start: 2021-04-14 | End: 2021-04-14

## 2021-04-14 RX ORDER — ASPIRIN 81 MG/1
81 TABLET ORAL DAILY
Status: DISCONTINUED | OUTPATIENT
Start: 2021-04-14 | End: 2021-04-22 | Stop reason: HOSPADM

## 2021-04-14 RX ORDER — MIDODRINE HYDROCHLORIDE 5 MG/1
5 TABLET ORAL
Status: DISCONTINUED | OUTPATIENT
Start: 2021-04-14 | End: 2021-04-14

## 2021-04-14 RX ADMIN — CALCIUM 500 MG: 500 TABLET ORAL at 09:04

## 2021-04-14 RX ADMIN — SODIUM CHLORIDE: 0.9 INJECTION, SOLUTION INTRAVENOUS at 12:04

## 2021-04-14 RX ADMIN — CITALOPRAM HYDROBROMIDE 10 MG: 10 TABLET, FILM COATED ORAL at 09:04

## 2021-04-14 RX ADMIN — INSULIN DETEMIR 12 UNITS: 100 INJECTION, SOLUTION SUBCUTANEOUS at 09:04

## 2021-04-14 RX ADMIN — INSULIN ASPART 4 UNITS: 100 INJECTION, SOLUTION INTRAVENOUS; SUBCUTANEOUS at 09:04

## 2021-04-14 RX ADMIN — ANASTROZOLE 1 MG: 1 TABLET, COATED ORAL at 09:04

## 2021-04-14 RX ADMIN — INSULIN ASPART 8 UNITS: 100 INJECTION, SOLUTION INTRAVENOUS; SUBCUTANEOUS at 06:04

## 2021-04-14 RX ADMIN — MIDODRINE HYDROCHLORIDE 5 MG: 5 TABLET ORAL at 09:04

## 2021-04-14 RX ADMIN — INSULIN ASPART 4 UNITS: 100 INJECTION, SOLUTION INTRAVENOUS; SUBCUTANEOUS at 06:04

## 2021-04-14 RX ADMIN — ASPIRIN 81 MG: 81 TABLET, FILM COATED ORAL at 09:04

## 2021-04-14 RX ADMIN — INSULIN ASPART 4 UNITS: 100 INJECTION, SOLUTION INTRAVENOUS; SUBCUTANEOUS at 01:04

## 2021-04-14 RX ADMIN — Medication 1000 UNITS: at 09:04

## 2021-04-15 PROBLEM — R09.02 HYPOXIA: Status: ACTIVE | Noted: 2021-04-15

## 2021-04-15 LAB
ANION GAP SERPL CALC-SCNC: 10 MMOL/L (ref 8–16)
BASOPHILS # BLD AUTO: 0.02 K/UL (ref 0–0.2)
BASOPHILS NFR BLD: 0.2 % (ref 0–1.9)
BUN SERPL-MCNC: 18 MG/DL (ref 8–23)
CALCIUM SERPL-MCNC: 8.6 MG/DL (ref 8.7–10.5)
CHLORIDE SERPL-SCNC: 100 MMOL/L (ref 95–110)
CO2 SERPL-SCNC: 27 MMOL/L (ref 23–29)
CREAT SERPL-MCNC: 0.8 MG/DL (ref 0.5–1.4)
DIFFERENTIAL METHOD: ABNORMAL
EOSINOPHIL # BLD AUTO: 0.1 K/UL (ref 0–0.5)
EOSINOPHIL NFR BLD: 0.8 % (ref 0–8)
ERYTHROCYTE [DISTWIDTH] IN BLOOD BY AUTOMATED COUNT: 16.6 % (ref 11.5–14.5)
EST. GFR  (AFRICAN AMERICAN): >60 ML/MIN/1.73 M^2
EST. GFR  (NON AFRICAN AMERICAN): >60 ML/MIN/1.73 M^2
GLUCOSE SERPL-MCNC: 210 MG/DL (ref 70–110)
HCT VFR BLD AUTO: 30.4 % (ref 37–48.5)
HGB BLD-MCNC: 9.4 G/DL (ref 12–16)
IMM GRANULOCYTES # BLD AUTO: 0.03 K/UL (ref 0–0.04)
IMM GRANULOCYTES NFR BLD AUTO: 0.4 % (ref 0–0.5)
LYMPHOCYTES # BLD AUTO: 2 K/UL (ref 1–4.8)
LYMPHOCYTES NFR BLD: 24.4 % (ref 18–48)
MCH RBC QN AUTO: 25.2 PG (ref 27–31)
MCHC RBC AUTO-ENTMCNC: 30.9 G/DL (ref 32–36)
MCV RBC AUTO: 82 FL (ref 82–98)
MONOCYTES # BLD AUTO: 0.4 K/UL (ref 0.3–1)
MONOCYTES NFR BLD: 4.5 % (ref 4–15)
NEUTROPHILS # BLD AUTO: 5.8 K/UL (ref 1.8–7.7)
NEUTROPHILS NFR BLD: 69.7 % (ref 38–73)
NRBC BLD-RTO: 0 /100 WBC
PLATELET # BLD AUTO: 425 K/UL (ref 150–450)
PMV BLD AUTO: 9.2 FL (ref 9.2–12.9)
POCT GLUCOSE: 119 MG/DL (ref 70–110)
POCT GLUCOSE: 134 MG/DL (ref 70–110)
POCT GLUCOSE: 198 MG/DL (ref 70–110)
POCT GLUCOSE: 304 MG/DL (ref 70–110)
POTASSIUM SERPL-SCNC: 3.5 MMOL/L (ref 3.5–5.1)
RBC # BLD AUTO: 3.73 M/UL (ref 4–5.4)
SODIUM SERPL-SCNC: 137 MMOL/L (ref 136–145)
WBC # BLD AUTO: 8.27 K/UL (ref 3.9–12.7)

## 2021-04-15 PROCEDURE — 99214 OFFICE O/P EST MOD 30 MIN: CPT | Mod: ,,, | Performed by: INTERNAL MEDICINE

## 2021-04-15 PROCEDURE — 25000003 PHARM REV CODE 250: Performed by: PHYSICIAN ASSISTANT

## 2021-04-15 PROCEDURE — 85025 COMPLETE CBC W/AUTO DIFF WBC: CPT | Performed by: PHYSICIAN ASSISTANT

## 2021-04-15 PROCEDURE — 36415 COLL VENOUS BLD VENIPUNCTURE: CPT | Performed by: PHYSICIAN ASSISTANT

## 2021-04-15 PROCEDURE — 97535 SELF CARE MNGMENT TRAINING: CPT | Mod: 59

## 2021-04-15 PROCEDURE — G0378 HOSPITAL OBSERVATION PER HR: HCPCS

## 2021-04-15 PROCEDURE — 80048 BASIC METABOLIC PNL TOTAL CA: CPT | Performed by: PHYSICIAN ASSISTANT

## 2021-04-15 PROCEDURE — 97530 THERAPEUTIC ACTIVITIES: CPT

## 2021-04-15 PROCEDURE — 99226 PR SUBSEQUENT OBSERVATION CARE,LEVEL III: ICD-10-PCS | Mod: ,,, | Performed by: PHYSICIAN ASSISTANT

## 2021-04-15 PROCEDURE — 99214 PR OFFICE/OUTPT VISIT, EST, LEVL IV, 30-39 MIN: ICD-10-PCS | Mod: ,,, | Performed by: INTERNAL MEDICINE

## 2021-04-15 PROCEDURE — 96372 THER/PROPH/DIAG INJ SC/IM: CPT

## 2021-04-15 PROCEDURE — 99226 PR SUBSEQUENT OBSERVATION CARE,LEVEL III: CPT | Mod: ,,, | Performed by: PHYSICIAN ASSISTANT

## 2021-04-15 PROCEDURE — 94761 N-INVAS EAR/PLS OXIMETRY MLT: CPT

## 2021-04-15 RX ORDER — MIDODRINE HYDROCHLORIDE 5 MG/1
5 TABLET ORAL 3 TIMES DAILY
Status: DISCONTINUED | OUTPATIENT
Start: 2021-04-15 | End: 2021-04-15

## 2021-04-15 RX ORDER — IPRATROPIUM BROMIDE AND ALBUTEROL SULFATE 2.5; .5 MG/3ML; MG/3ML
3 SOLUTION RESPIRATORY (INHALATION) EVERY 6 HOURS PRN
Status: DISCONTINUED | OUTPATIENT
Start: 2021-04-15 | End: 2021-04-22 | Stop reason: HOSPADM

## 2021-04-15 RX ORDER — POTASSIUM CHLORIDE 20 MEQ/1
20 TABLET, EXTENDED RELEASE ORAL ONCE
Status: COMPLETED | OUTPATIENT
Start: 2021-04-15 | End: 2021-04-15

## 2021-04-15 RX ORDER — MIDODRINE HYDROCHLORIDE 5 MG/1
5 TABLET ORAL EVERY 12 HOURS
Status: DISCONTINUED | OUTPATIENT
Start: 2021-04-15 | End: 2021-04-22 | Stop reason: HOSPADM

## 2021-04-15 RX ORDER — MIDODRINE HYDROCHLORIDE 5 MG/1
5 TABLET ORAL EVERY 24 HOURS
Status: DISCONTINUED | OUTPATIENT
Start: 2021-04-16 | End: 2021-04-15

## 2021-04-15 RX ORDER — ACETAMINOPHEN 325 MG/1
650 TABLET ORAL EVERY 6 HOURS PRN
Status: DISCONTINUED | OUTPATIENT
Start: 2021-04-15 | End: 2021-04-22 | Stop reason: HOSPADM

## 2021-04-15 RX ADMIN — INSULIN ASPART 4 UNITS: 100 INJECTION, SOLUTION INTRAVENOUS; SUBCUTANEOUS at 01:04

## 2021-04-15 RX ADMIN — INSULIN ASPART 4 UNITS: 100 INJECTION, SOLUTION INTRAVENOUS; SUBCUTANEOUS at 09:04

## 2021-04-15 RX ADMIN — INSULIN DETEMIR 12 UNITS: 100 INJECTION, SOLUTION SUBCUTANEOUS at 09:04

## 2021-04-15 RX ADMIN — Medication 1000 UNITS: at 09:04

## 2021-04-15 RX ADMIN — INSULIN ASPART 4 UNITS: 100 INJECTION, SOLUTION INTRAVENOUS; SUBCUTANEOUS at 06:04

## 2021-04-15 RX ADMIN — INSULIN DETEMIR 12 UNITS: 100 INJECTION, SOLUTION SUBCUTANEOUS at 08:04

## 2021-04-15 RX ADMIN — ASPIRIN 81 MG: 81 TABLET, FILM COATED ORAL at 09:04

## 2021-04-15 RX ADMIN — ANASTROZOLE 1 MG: 1 TABLET, COATED ORAL at 09:04

## 2021-04-15 RX ADMIN — POTASSIUM CHLORIDE 20 MEQ: 1500 TABLET, EXTENDED RELEASE ORAL at 10:04

## 2021-04-15 RX ADMIN — INSULIN ASPART 4 UNITS: 100 INJECTION, SOLUTION INTRAVENOUS; SUBCUTANEOUS at 08:04

## 2021-04-15 RX ADMIN — ACETAMINOPHEN 650 MG: 325 TABLET, FILM COATED ORAL at 10:04

## 2021-04-15 RX ADMIN — CITALOPRAM HYDROBROMIDE 10 MG: 10 TABLET, FILM COATED ORAL at 09:04

## 2021-04-16 LAB
ANION GAP SERPL CALC-SCNC: 9 MMOL/L (ref 8–16)
BACTERIA UR CULT: NORMAL
BACTERIA UR CULT: NORMAL
BASOPHILS # BLD AUTO: 0.03 K/UL (ref 0–0.2)
BASOPHILS NFR BLD: 0.3 % (ref 0–1.9)
BUN SERPL-MCNC: 17 MG/DL (ref 8–23)
CALCIUM SERPL-MCNC: 9.3 MG/DL (ref 8.7–10.5)
CHLORIDE SERPL-SCNC: 99 MMOL/L (ref 95–110)
CO2 SERPL-SCNC: 29 MMOL/L (ref 23–29)
CREAT SERPL-MCNC: 0.7 MG/DL (ref 0.5–1.4)
DIFFERENTIAL METHOD: ABNORMAL
EOSINOPHIL # BLD AUTO: 0 K/UL (ref 0–0.5)
EOSINOPHIL NFR BLD: 0.3 % (ref 0–8)
ERYTHROCYTE [DISTWIDTH] IN BLOOD BY AUTOMATED COUNT: 16.6 % (ref 11.5–14.5)
EST. GFR  (AFRICAN AMERICAN): >60 ML/MIN/1.73 M^2
EST. GFR  (NON AFRICAN AMERICAN): >60 ML/MIN/1.73 M^2
GLUCOSE SERPL-MCNC: 51 MG/DL (ref 70–110)
HCT VFR BLD AUTO: 32.1 % (ref 37–48.5)
HGB BLD-MCNC: 10 G/DL (ref 12–16)
IMM GRANULOCYTES # BLD AUTO: 0.05 K/UL (ref 0–0.04)
IMM GRANULOCYTES NFR BLD AUTO: 0.5 % (ref 0–0.5)
LYMPHOCYTES # BLD AUTO: 1.5 K/UL (ref 1–4.8)
LYMPHOCYTES NFR BLD: 15.3 % (ref 18–48)
MCH RBC QN AUTO: 25.2 PG (ref 27–31)
MCHC RBC AUTO-ENTMCNC: 31.2 G/DL (ref 32–36)
MCV RBC AUTO: 81 FL (ref 82–98)
MONOCYTES # BLD AUTO: 0.6 K/UL (ref 0.3–1)
MONOCYTES NFR BLD: 6.1 % (ref 4–15)
NEUTROPHILS # BLD AUTO: 7.8 K/UL (ref 1.8–7.7)
NEUTROPHILS NFR BLD: 77.5 % (ref 38–73)
NRBC BLD-RTO: 0 /100 WBC
PLATELET # BLD AUTO: 429 K/UL (ref 150–450)
PMV BLD AUTO: 8.7 FL (ref 9.2–12.9)
POCT GLUCOSE: 146 MG/DL (ref 70–110)
POCT GLUCOSE: 68 MG/DL (ref 70–110)
POCT GLUCOSE: 93 MG/DL (ref 70–110)
POTASSIUM SERPL-SCNC: 3.4 MMOL/L (ref 3.5–5.1)
RBC # BLD AUTO: 3.97 M/UL (ref 4–5.4)
SODIUM SERPL-SCNC: 137 MMOL/L (ref 136–145)
WBC # BLD AUTO: 10.02 K/UL (ref 3.9–12.7)

## 2021-04-16 PROCEDURE — 36415 COLL VENOUS BLD VENIPUNCTURE: CPT | Performed by: PHYSICIAN ASSISTANT

## 2021-04-16 PROCEDURE — 97116 GAIT TRAINING THERAPY: CPT

## 2021-04-16 PROCEDURE — 99226 PR SUBSEQUENT OBSERVATION CARE,LEVEL III: CPT | Mod: ,,, | Performed by: PHYSICIAN ASSISTANT

## 2021-04-16 PROCEDURE — 96372 THER/PROPH/DIAG INJ SC/IM: CPT

## 2021-04-16 PROCEDURE — 99900035 HC TECH TIME PER 15 MIN (STAT)

## 2021-04-16 PROCEDURE — 99214 PR OFFICE/OUTPT VISIT, EST, LEVL IV, 30-39 MIN: ICD-10-PCS | Mod: ,,, | Performed by: INTERNAL MEDICINE

## 2021-04-16 PROCEDURE — 25000003 PHARM REV CODE 250: Performed by: PHYSICIAN ASSISTANT

## 2021-04-16 PROCEDURE — G0378 HOSPITAL OBSERVATION PER HR: HCPCS

## 2021-04-16 PROCEDURE — 99214 OFFICE O/P EST MOD 30 MIN: CPT | Mod: ,,, | Performed by: INTERNAL MEDICINE

## 2021-04-16 PROCEDURE — 97535 SELF CARE MNGMENT TRAINING: CPT | Mod: 59

## 2021-04-16 PROCEDURE — 94761 N-INVAS EAR/PLS OXIMETRY MLT: CPT

## 2021-04-16 PROCEDURE — 80048 BASIC METABOLIC PNL TOTAL CA: CPT | Performed by: PHYSICIAN ASSISTANT

## 2021-04-16 PROCEDURE — 99226 PR SUBSEQUENT OBSERVATION CARE,LEVEL III: ICD-10-PCS | Mod: ,,, | Performed by: PHYSICIAN ASSISTANT

## 2021-04-16 PROCEDURE — 97530 THERAPEUTIC ACTIVITIES: CPT

## 2021-04-16 PROCEDURE — 85025 COMPLETE CBC W/AUTO DIFF WBC: CPT | Performed by: PHYSICIAN ASSISTANT

## 2021-04-16 RX ORDER — POTASSIUM CHLORIDE 20 MEQ/1
40 TABLET, EXTENDED RELEASE ORAL ONCE
Status: COMPLETED | OUTPATIENT
Start: 2021-04-16 | End: 2021-04-16

## 2021-04-16 RX ORDER — TALC
6 POWDER (GRAM) TOPICAL NIGHTLY
Status: DISCONTINUED | OUTPATIENT
Start: 2021-04-16 | End: 2021-04-22 | Stop reason: HOSPADM

## 2021-04-16 RX ADMIN — CITALOPRAM HYDROBROMIDE 10 MG: 10 TABLET, FILM COATED ORAL at 08:04

## 2021-04-16 RX ADMIN — ASPIRIN 81 MG: 81 TABLET, FILM COATED ORAL at 08:04

## 2021-04-16 RX ADMIN — Medication 6 MG: at 09:04

## 2021-04-16 RX ADMIN — MIDODRINE HYDROCHLORIDE 5 MG: 5 TABLET ORAL at 09:04

## 2021-04-16 RX ADMIN — INSULIN ASPART 4 UNITS: 100 INJECTION, SOLUTION INTRAVENOUS; SUBCUTANEOUS at 12:04

## 2021-04-16 RX ADMIN — INSULIN ASPART 4 UNITS: 100 INJECTION, SOLUTION INTRAVENOUS; SUBCUTANEOUS at 05:04

## 2021-04-16 RX ADMIN — MIDODRINE HYDROCHLORIDE 5 MG: 5 TABLET ORAL at 08:04

## 2021-04-16 RX ADMIN — Medication 1000 UNITS: at 08:04

## 2021-04-16 RX ADMIN — INSULIN DETEMIR 5 UNITS: 100 INJECTION, SOLUTION SUBCUTANEOUS at 09:04

## 2021-04-16 RX ADMIN — ANASTROZOLE 1 MG: 1 TABLET, COATED ORAL at 08:04

## 2021-04-16 RX ADMIN — ACETAMINOPHEN 650 MG: 325 TABLET, FILM COATED ORAL at 03:04

## 2021-04-16 RX ADMIN — POTASSIUM CHLORIDE 40 MEQ: 1500 TABLET, EXTENDED RELEASE ORAL at 05:04

## 2021-04-16 RX ADMIN — INSULIN ASPART 4 UNITS: 100 INJECTION, SOLUTION INTRAVENOUS; SUBCUTANEOUS at 08:04

## 2021-04-17 LAB
ANION GAP SERPL CALC-SCNC: 8 MMOL/L (ref 8–16)
BASOPHILS # BLD AUTO: 0.04 K/UL (ref 0–0.2)
BASOPHILS NFR BLD: 0.4 % (ref 0–1.9)
BUN SERPL-MCNC: 18 MG/DL (ref 8–23)
CALCIUM SERPL-MCNC: 9.1 MG/DL (ref 8.7–10.5)
CHLORIDE SERPL-SCNC: 100 MMOL/L (ref 95–110)
CO2 SERPL-SCNC: 29 MMOL/L (ref 23–29)
CREAT SERPL-MCNC: 0.8 MG/DL (ref 0.5–1.4)
DIFFERENTIAL METHOD: ABNORMAL
EOSINOPHIL # BLD AUTO: 0.1 K/UL (ref 0–0.5)
EOSINOPHIL NFR BLD: 1.5 % (ref 0–8)
ERYTHROCYTE [DISTWIDTH] IN BLOOD BY AUTOMATED COUNT: 17.2 % (ref 11.5–14.5)
EST. GFR  (AFRICAN AMERICAN): >60 ML/MIN/1.73 M^2
EST. GFR  (NON AFRICAN AMERICAN): >60 ML/MIN/1.73 M^2
GLUCOSE SERPL-MCNC: 176 MG/DL (ref 70–110)
HCT VFR BLD AUTO: 34.3 % (ref 37–48.5)
HGB BLD-MCNC: 10.3 G/DL (ref 12–16)
IMM GRANULOCYTES # BLD AUTO: 0.03 K/UL (ref 0–0.04)
IMM GRANULOCYTES NFR BLD AUTO: 0.3 % (ref 0–0.5)
LYMPHOCYTES # BLD AUTO: 2.5 K/UL (ref 1–4.8)
LYMPHOCYTES NFR BLD: 26.3 % (ref 18–48)
MCH RBC QN AUTO: 24.9 PG (ref 27–31)
MCHC RBC AUTO-ENTMCNC: 30 G/DL (ref 32–36)
MCV RBC AUTO: 83 FL (ref 82–98)
MONOCYTES # BLD AUTO: 0.5 K/UL (ref 0.3–1)
MONOCYTES NFR BLD: 5 % (ref 4–15)
NEUTROPHILS # BLD AUTO: 6.3 K/UL (ref 1.8–7.7)
NEUTROPHILS NFR BLD: 66.5 % (ref 38–73)
NRBC BLD-RTO: 0 /100 WBC
PLATELET # BLD AUTO: 476 K/UL (ref 150–450)
PMV BLD AUTO: 8.8 FL (ref 9.2–12.9)
POCT GLUCOSE: 105 MG/DL (ref 70–110)
POCT GLUCOSE: 115 MG/DL (ref 70–110)
POCT GLUCOSE: 163 MG/DL (ref 70–110)
POCT GLUCOSE: 200 MG/DL (ref 70–110)
POCT GLUCOSE: 250 MG/DL (ref 70–110)
POTASSIUM SERPL-SCNC: 4.8 MMOL/L (ref 3.5–5.1)
RBC # BLD AUTO: 4.13 M/UL (ref 4–5.4)
SODIUM SERPL-SCNC: 137 MMOL/L (ref 136–145)
WBC # BLD AUTO: 9.54 K/UL (ref 3.9–12.7)

## 2021-04-17 PROCEDURE — 99226 PR SUBSEQUENT OBSERVATION CARE,LEVEL III: CPT | Mod: ,,, | Performed by: PHYSICIAN ASSISTANT

## 2021-04-17 PROCEDURE — 99226 PR SUBSEQUENT OBSERVATION CARE,LEVEL III: ICD-10-PCS | Mod: ,,, | Performed by: PHYSICIAN ASSISTANT

## 2021-04-17 PROCEDURE — 30200315 PPD INTRADERMAL TEST REV CODE 302: Performed by: PHYSICIAN ASSISTANT

## 2021-04-17 PROCEDURE — 94761 N-INVAS EAR/PLS OXIMETRY MLT: CPT

## 2021-04-17 PROCEDURE — 99214 OFFICE O/P EST MOD 30 MIN: CPT | Mod: ,,, | Performed by: INTERNAL MEDICINE

## 2021-04-17 PROCEDURE — 85025 COMPLETE CBC W/AUTO DIFF WBC: CPT | Performed by: PHYSICIAN ASSISTANT

## 2021-04-17 PROCEDURE — 36415 COLL VENOUS BLD VENIPUNCTURE: CPT | Performed by: PHYSICIAN ASSISTANT

## 2021-04-17 PROCEDURE — 96372 THER/PROPH/DIAG INJ SC/IM: CPT

## 2021-04-17 PROCEDURE — 25000003 PHARM REV CODE 250: Performed by: PHYSICIAN ASSISTANT

## 2021-04-17 PROCEDURE — G0378 HOSPITAL OBSERVATION PER HR: HCPCS

## 2021-04-17 PROCEDURE — 99214 PR OFFICE/OUTPT VISIT, EST, LEVL IV, 30-39 MIN: ICD-10-PCS | Mod: ,,, | Performed by: INTERNAL MEDICINE

## 2021-04-17 PROCEDURE — 86580 TB INTRADERMAL TEST: CPT | Performed by: PHYSICIAN ASSISTANT

## 2021-04-17 PROCEDURE — 80048 BASIC METABOLIC PNL TOTAL CA: CPT | Performed by: PHYSICIAN ASSISTANT

## 2021-04-17 RX ADMIN — INSULIN ASPART 2 UNITS: 100 INJECTION, SOLUTION INTRAVENOUS; SUBCUTANEOUS at 09:04

## 2021-04-17 RX ADMIN — INSULIN DETEMIR 5 UNITS: 100 INJECTION, SOLUTION SUBCUTANEOUS at 09:04

## 2021-04-17 RX ADMIN — INSULIN ASPART 4 UNITS: 100 INJECTION, SOLUTION INTRAVENOUS; SUBCUTANEOUS at 08:04

## 2021-04-17 RX ADMIN — INSULIN ASPART 4 UNITS: 100 INJECTION, SOLUTION INTRAVENOUS; SUBCUTANEOUS at 12:04

## 2021-04-17 RX ADMIN — ACETAMINOPHEN 650 MG: 325 TABLET, FILM COATED ORAL at 08:04

## 2021-04-17 RX ADMIN — INSULIN ASPART 2 UNITS: 100 INJECTION, SOLUTION INTRAVENOUS; SUBCUTANEOUS at 05:04

## 2021-04-17 RX ADMIN — MIDODRINE HYDROCHLORIDE 5 MG: 5 TABLET ORAL at 09:04

## 2021-04-17 RX ADMIN — INSULIN ASPART 2 UNITS: 100 INJECTION, SOLUTION INTRAVENOUS; SUBCUTANEOUS at 12:04

## 2021-04-17 RX ADMIN — ASPIRIN 81 MG: 81 TABLET, FILM COATED ORAL at 08:04

## 2021-04-17 RX ADMIN — Medication 1000 UNITS: at 08:04

## 2021-04-17 RX ADMIN — MIDODRINE HYDROCHLORIDE 5 MG: 5 TABLET ORAL at 08:04

## 2021-04-17 RX ADMIN — TUBERCULIN PURIFIED PROTEIN DERIVATIVE 5 UNITS: 5 INJECTION, SOLUTION INTRADERMAL at 09:04

## 2021-04-17 RX ADMIN — INSULIN ASPART 4 UNITS: 100 INJECTION, SOLUTION INTRAVENOUS; SUBCUTANEOUS at 05:04

## 2021-04-17 RX ADMIN — INSULIN DETEMIR 5 UNITS: 100 INJECTION, SOLUTION SUBCUTANEOUS at 08:04

## 2021-04-17 RX ADMIN — CITALOPRAM HYDROBROMIDE 10 MG: 10 TABLET, FILM COATED ORAL at 08:04

## 2021-04-17 RX ADMIN — Medication 6 MG: at 09:04

## 2021-04-18 LAB
ANION GAP SERPL CALC-SCNC: 7 MMOL/L (ref 8–16)
BASOPHILS # BLD AUTO: 0.05 K/UL (ref 0–0.2)
BASOPHILS NFR BLD: 0.6 % (ref 0–1.9)
BUN SERPL-MCNC: 20 MG/DL (ref 8–23)
CALCIUM SERPL-MCNC: 9 MG/DL (ref 8.7–10.5)
CHLORIDE SERPL-SCNC: 99 MMOL/L (ref 95–110)
CO2 SERPL-SCNC: 29 MMOL/L (ref 23–29)
CREAT SERPL-MCNC: 0.7 MG/DL (ref 0.5–1.4)
DIFFERENTIAL METHOD: ABNORMAL
EOSINOPHIL # BLD AUTO: 0.2 K/UL (ref 0–0.5)
EOSINOPHIL NFR BLD: 2.1 % (ref 0–8)
ERYTHROCYTE [DISTWIDTH] IN BLOOD BY AUTOMATED COUNT: 17.2 % (ref 11.5–14.5)
EST. GFR  (AFRICAN AMERICAN): >60 ML/MIN/1.73 M^2
EST. GFR  (NON AFRICAN AMERICAN): >60 ML/MIN/1.73 M^2
GLUCOSE SERPL-MCNC: 172 MG/DL (ref 70–110)
HCT VFR BLD AUTO: 31.2 % (ref 37–48.5)
HGB BLD-MCNC: 9.6 G/DL (ref 12–16)
IMM GRANULOCYTES # BLD AUTO: 0.02 K/UL (ref 0–0.04)
IMM GRANULOCYTES NFR BLD AUTO: 0.2 % (ref 0–0.5)
LYMPHOCYTES # BLD AUTO: 2.7 K/UL (ref 1–4.8)
LYMPHOCYTES NFR BLD: 33.5 % (ref 18–48)
MCH RBC QN AUTO: 25.5 PG (ref 27–31)
MCHC RBC AUTO-ENTMCNC: 30.8 G/DL (ref 32–36)
MCV RBC AUTO: 83 FL (ref 82–98)
MONOCYTES # BLD AUTO: 0.4 K/UL (ref 0.3–1)
MONOCYTES NFR BLD: 5.4 % (ref 4–15)
NEUTROPHILS # BLD AUTO: 4.7 K/UL (ref 1.8–7.7)
NEUTROPHILS NFR BLD: 58.2 % (ref 38–73)
NRBC BLD-RTO: 0 /100 WBC
PLATELET # BLD AUTO: 454 K/UL (ref 150–450)
PMV BLD AUTO: 8.5 FL (ref 9.2–12.9)
POCT GLUCOSE: 156 MG/DL (ref 70–110)
POCT GLUCOSE: 181 MG/DL (ref 70–110)
POCT GLUCOSE: 248 MG/DL (ref 70–110)
POCT GLUCOSE: 316 MG/DL (ref 70–110)
POTASSIUM SERPL-SCNC: 4 MMOL/L (ref 3.5–5.1)
RBC # BLD AUTO: 3.77 M/UL (ref 4–5.4)
SODIUM SERPL-SCNC: 135 MMOL/L (ref 136–145)
WBC # BLD AUTO: 8.08 K/UL (ref 3.9–12.7)

## 2021-04-18 PROCEDURE — 36415 COLL VENOUS BLD VENIPUNCTURE: CPT | Performed by: PHYSICIAN ASSISTANT

## 2021-04-18 PROCEDURE — 80048 BASIC METABOLIC PNL TOTAL CA: CPT | Performed by: PHYSICIAN ASSISTANT

## 2021-04-18 PROCEDURE — 96372 THER/PROPH/DIAG INJ SC/IM: CPT

## 2021-04-18 PROCEDURE — 25000003 PHARM REV CODE 250: Performed by: INTERNAL MEDICINE

## 2021-04-18 PROCEDURE — 99900035 HC TECH TIME PER 15 MIN (STAT)

## 2021-04-18 PROCEDURE — 25000003 PHARM REV CODE 250: Performed by: PHYSICIAN ASSISTANT

## 2021-04-18 PROCEDURE — 94761 N-INVAS EAR/PLS OXIMETRY MLT: CPT

## 2021-04-18 PROCEDURE — 99214 OFFICE O/P EST MOD 30 MIN: CPT | Mod: ,,, | Performed by: INTERNAL MEDICINE

## 2021-04-18 PROCEDURE — 99226 PR SUBSEQUENT OBSERVATION CARE,LEVEL III: CPT | Mod: ,,, | Performed by: PHYSICIAN ASSISTANT

## 2021-04-18 PROCEDURE — 85025 COMPLETE CBC W/AUTO DIFF WBC: CPT | Performed by: PHYSICIAN ASSISTANT

## 2021-04-18 PROCEDURE — 99226 PR SUBSEQUENT OBSERVATION CARE,LEVEL III: ICD-10-PCS | Mod: ,,, | Performed by: PHYSICIAN ASSISTANT

## 2021-04-18 PROCEDURE — G0378 HOSPITAL OBSERVATION PER HR: HCPCS

## 2021-04-18 PROCEDURE — 99214 PR OFFICE/OUTPT VISIT, EST, LEVL IV, 30-39 MIN: ICD-10-PCS | Mod: ,,, | Performed by: INTERNAL MEDICINE

## 2021-04-18 RX ORDER — LOPERAMIDE HYDROCHLORIDE 2 MG/1
2 CAPSULE ORAL ONCE
Status: COMPLETED | OUTPATIENT
Start: 2021-04-18 | End: 2021-04-18

## 2021-04-18 RX ORDER — LOPERAMIDE HYDROCHLORIDE 2 MG/1
2 CAPSULE ORAL ONCE
Status: DISCONTINUED | OUTPATIENT
Start: 2021-04-18 | End: 2021-04-18

## 2021-04-18 RX ADMIN — MIDODRINE HYDROCHLORIDE 5 MG: 5 TABLET ORAL at 08:04

## 2021-04-18 RX ADMIN — ASPIRIN 81 MG: 81 TABLET, FILM COATED ORAL at 08:04

## 2021-04-18 RX ADMIN — INSULIN ASPART 4 UNITS: 100 INJECTION, SOLUTION INTRAVENOUS; SUBCUTANEOUS at 12:04

## 2021-04-18 RX ADMIN — INSULIN ASPART 4 UNITS: 100 INJECTION, SOLUTION INTRAVENOUS; SUBCUTANEOUS at 09:04

## 2021-04-18 RX ADMIN — INSULIN ASPART 4 UNITS: 100 INJECTION, SOLUTION INTRAVENOUS; SUBCUTANEOUS at 08:04

## 2021-04-18 RX ADMIN — CITALOPRAM HYDROBROMIDE 10 MG: 10 TABLET, FILM COATED ORAL at 08:04

## 2021-04-18 RX ADMIN — ANASTROZOLE 1 MG: 1 TABLET, COATED ORAL at 08:04

## 2021-04-18 RX ADMIN — Medication 1000 UNITS: at 08:04

## 2021-04-18 RX ADMIN — INSULIN DETEMIR 5 UNITS: 100 INJECTION, SOLUTION SUBCUTANEOUS at 08:04

## 2021-04-18 RX ADMIN — Medication 6 MG: at 08:04

## 2021-04-18 RX ADMIN — INSULIN ASPART 4 UNITS: 100 INJECTION, SOLUTION INTRAVENOUS; SUBCUTANEOUS at 05:04

## 2021-04-18 RX ADMIN — LOPERAMIDE HYDROCHLORIDE 2 MG: 2 CAPSULE ORAL at 09:04

## 2021-04-18 RX ADMIN — INSULIN ASPART 2 UNITS: 100 INJECTION, SOLUTION INTRAVENOUS; SUBCUTANEOUS at 08:04

## 2021-04-19 ENCOUNTER — TELEPHONE (OUTPATIENT)
Dept: PLASTIC SURGERY | Facility: CLINIC | Age: 71
End: 2021-04-19

## 2021-04-19 LAB
ANION GAP SERPL CALC-SCNC: 8 MMOL/L (ref 8–16)
BASOPHILS # BLD AUTO: 0.04 K/UL (ref 0–0.2)
BASOPHILS NFR BLD: 0.4 % (ref 0–1.9)
BUN SERPL-MCNC: 24 MG/DL (ref 8–23)
CALCIUM SERPL-MCNC: 8.9 MG/DL (ref 8.7–10.5)
CHLORIDE SERPL-SCNC: 102 MMOL/L (ref 95–110)
CO2 SERPL-SCNC: 27 MMOL/L (ref 23–29)
CREAT SERPL-MCNC: 0.7 MG/DL (ref 0.5–1.4)
DIFFERENTIAL METHOD: ABNORMAL
EOSINOPHIL # BLD AUTO: 0.2 K/UL (ref 0–0.5)
EOSINOPHIL NFR BLD: 1.9 % (ref 0–8)
ERYTHROCYTE [DISTWIDTH] IN BLOOD BY AUTOMATED COUNT: 17.3 % (ref 11.5–14.5)
EST. GFR  (AFRICAN AMERICAN): >60 ML/MIN/1.73 M^2
EST. GFR  (NON AFRICAN AMERICAN): >60 ML/MIN/1.73 M^2
GLUCOSE SERPL-MCNC: 88 MG/DL (ref 70–110)
HCT VFR BLD AUTO: 28.8 % (ref 37–48.5)
HGB BLD-MCNC: 8.8 G/DL (ref 12–16)
IMM GRANULOCYTES # BLD AUTO: 0.04 K/UL (ref 0–0.04)
IMM GRANULOCYTES NFR BLD AUTO: 0.4 % (ref 0–0.5)
LYMPHOCYTES # BLD AUTO: 3.1 K/UL (ref 1–4.8)
LYMPHOCYTES NFR BLD: 34.6 % (ref 18–48)
MCH RBC QN AUTO: 25.1 PG (ref 27–31)
MCHC RBC AUTO-ENTMCNC: 30.6 G/DL (ref 32–36)
MCV RBC AUTO: 82 FL (ref 82–98)
MONOCYTES # BLD AUTO: 0.6 K/UL (ref 0.3–1)
MONOCYTES NFR BLD: 6.8 % (ref 4–15)
NEUTROPHILS # BLD AUTO: 5.1 K/UL (ref 1.8–7.7)
NEUTROPHILS NFR BLD: 55.9 % (ref 38–73)
NRBC BLD-RTO: 0 /100 WBC
PLATELET # BLD AUTO: 454 K/UL (ref 150–450)
PMV BLD AUTO: 8.4 FL (ref 9.2–12.9)
POCT GLUCOSE: 104 MG/DL (ref 70–110)
POCT GLUCOSE: 192 MG/DL (ref 70–110)
POCT GLUCOSE: 291 MG/DL (ref 70–110)
POCT GLUCOSE: 397 MG/DL (ref 70–110)
POCT GLUCOSE: 88 MG/DL (ref 70–110)
POTASSIUM SERPL-SCNC: 4 MMOL/L (ref 3.5–5.1)
RBC # BLD AUTO: 3.51 M/UL (ref 4–5.4)
SODIUM SERPL-SCNC: 137 MMOL/L (ref 136–145)
TB INDURATION 48 - 72 HR READ: 0 MM
WBC # BLD AUTO: 9.03 K/UL (ref 3.9–12.7)

## 2021-04-19 PROCEDURE — 25000003 PHARM REV CODE 250: Performed by: PHYSICIAN ASSISTANT

## 2021-04-19 PROCEDURE — 96372 THER/PROPH/DIAG INJ SC/IM: CPT

## 2021-04-19 PROCEDURE — 99226 PR SUBSEQUENT OBSERVATION CARE,LEVEL III: ICD-10-PCS | Mod: ,,, | Performed by: PHYSICIAN ASSISTANT

## 2021-04-19 PROCEDURE — 36415 COLL VENOUS BLD VENIPUNCTURE: CPT | Performed by: PHYSICIAN ASSISTANT

## 2021-04-19 PROCEDURE — G0378 HOSPITAL OBSERVATION PER HR: HCPCS

## 2021-04-19 PROCEDURE — 97530 THERAPEUTIC ACTIVITIES: CPT | Mod: CQ

## 2021-04-19 PROCEDURE — 94761 N-INVAS EAR/PLS OXIMETRY MLT: CPT

## 2021-04-19 PROCEDURE — 85025 COMPLETE CBC W/AUTO DIFF WBC: CPT | Performed by: PHYSICIAN ASSISTANT

## 2021-04-19 PROCEDURE — 97116 GAIT TRAINING THERAPY: CPT | Mod: CQ

## 2021-04-19 PROCEDURE — 99226 PR SUBSEQUENT OBSERVATION CARE,LEVEL III: CPT | Mod: ,,, | Performed by: PHYSICIAN ASSISTANT

## 2021-04-19 PROCEDURE — 80048 BASIC METABOLIC PNL TOTAL CA: CPT | Performed by: PHYSICIAN ASSISTANT

## 2021-04-19 RX ORDER — INSULIN ASPART 100 [IU]/ML
4 INJECTION, SOLUTION INTRAVENOUS; SUBCUTANEOUS 3 TIMES DAILY
Refills: 0 | Status: ON HOLD
Start: 2021-04-19 | End: 2021-06-14 | Stop reason: SDUPTHER

## 2021-04-19 RX ORDER — ACETAMINOPHEN 325 MG/1
650 TABLET ORAL EVERY 6 HOURS PRN
Refills: 0 | Status: ON HOLD
Start: 2021-04-19 | End: 2022-06-20

## 2021-04-19 RX ORDER — AMITRIPTYLINE HYDROCHLORIDE 100 MG/1
100 TABLET ORAL NIGHTLY
Status: CANCELLED
Start: 2021-04-19

## 2021-04-19 RX ORDER — AMITRIPTYLINE HYDROCHLORIDE 25 MG/1
100 TABLET, FILM COATED ORAL NIGHTLY
Status: DISCONTINUED | OUTPATIENT
Start: 2021-04-19 | End: 2021-04-22 | Stop reason: HOSPADM

## 2021-04-19 RX ADMIN — Medication 1000 UNITS: at 08:04

## 2021-04-19 RX ADMIN — Medication 6 MG: at 09:04

## 2021-04-19 RX ADMIN — INSULIN DETEMIR 5 UNITS: 100 INJECTION, SOLUTION SUBCUTANEOUS at 09:04

## 2021-04-19 RX ADMIN — INSULIN ASPART 3 UNITS: 100 INJECTION, SOLUTION INTRAVENOUS; SUBCUTANEOUS at 09:04

## 2021-04-19 RX ADMIN — INSULIN ASPART 4 UNITS: 100 INJECTION, SOLUTION INTRAVENOUS; SUBCUTANEOUS at 12:04

## 2021-04-19 RX ADMIN — INSULIN ASPART 4 UNITS: 100 INJECTION, SOLUTION INTRAVENOUS; SUBCUTANEOUS at 08:04

## 2021-04-19 RX ADMIN — AMITRIPTYLINE HYDROCHLORIDE 100 MG: 25 TABLET, FILM COATED ORAL at 10:04

## 2021-04-19 RX ADMIN — ACETAMINOPHEN 650 MG: 325 TABLET, FILM COATED ORAL at 07:04

## 2021-04-19 RX ADMIN — INSULIN ASPART 2 UNITS: 100 INJECTION, SOLUTION INTRAVENOUS; SUBCUTANEOUS at 12:04

## 2021-04-19 RX ADMIN — INSULIN ASPART 4 UNITS: 100 INJECTION, SOLUTION INTRAVENOUS; SUBCUTANEOUS at 05:04

## 2021-04-19 RX ADMIN — ANASTROZOLE 1 MG: 1 TABLET, COATED ORAL at 08:04

## 2021-04-19 RX ADMIN — ASPIRIN 81 MG: 81 TABLET, FILM COATED ORAL at 08:04

## 2021-04-19 RX ADMIN — INSULIN DETEMIR 5 UNITS: 100 INJECTION, SOLUTION SUBCUTANEOUS at 08:04

## 2021-04-19 RX ADMIN — MIDODRINE HYDROCHLORIDE 5 MG: 5 TABLET ORAL at 08:04

## 2021-04-19 RX ADMIN — CITALOPRAM HYDROBROMIDE 10 MG: 10 TABLET, FILM COATED ORAL at 08:04

## 2021-04-20 PROBLEM — R09.02 HYPOXIA: Status: RESOLVED | Noted: 2021-04-15 | Resolved: 2021-04-20

## 2021-04-20 PROBLEM — I95.1 ORTHOSTATIC SYNCOPE: Status: ACTIVE | Noted: 2020-07-22

## 2021-04-20 LAB
ANION GAP SERPL CALC-SCNC: 7 MMOL/L (ref 8–16)
BASOPHILS # BLD AUTO: 0.04 K/UL (ref 0–0.2)
BASOPHILS NFR BLD: 0.5 % (ref 0–1.9)
BUN SERPL-MCNC: 23 MG/DL (ref 8–23)
CALCIUM SERPL-MCNC: 9.2 MG/DL (ref 8.7–10.5)
CHLORIDE SERPL-SCNC: 103 MMOL/L (ref 95–110)
CO2 SERPL-SCNC: 29 MMOL/L (ref 23–29)
CREAT SERPL-MCNC: 0.8 MG/DL (ref 0.5–1.4)
DIFFERENTIAL METHOD: ABNORMAL
EOSINOPHIL # BLD AUTO: 0.1 K/UL (ref 0–0.5)
EOSINOPHIL NFR BLD: 1.6 % (ref 0–8)
ERYTHROCYTE [DISTWIDTH] IN BLOOD BY AUTOMATED COUNT: 17.6 % (ref 11.5–14.5)
EST. GFR  (AFRICAN AMERICAN): >60 ML/MIN/1.73 M^2
EST. GFR  (NON AFRICAN AMERICAN): >60 ML/MIN/1.73 M^2
GLUCOSE SERPL-MCNC: 191 MG/DL (ref 70–110)
HCT VFR BLD AUTO: 29.4 % (ref 37–48.5)
HGB BLD-MCNC: 9 G/DL (ref 12–16)
IMM GRANULOCYTES # BLD AUTO: 0.04 K/UL (ref 0–0.04)
IMM GRANULOCYTES NFR BLD AUTO: 0.5 % (ref 0–0.5)
LYMPHOCYTES # BLD AUTO: 2.9 K/UL (ref 1–4.8)
LYMPHOCYTES NFR BLD: 37.7 % (ref 18–48)
MCH RBC QN AUTO: 25.6 PG (ref 27–31)
MCHC RBC AUTO-ENTMCNC: 30.6 G/DL (ref 32–36)
MCV RBC AUTO: 84 FL (ref 82–98)
MONOCYTES # BLD AUTO: 0.5 K/UL (ref 0.3–1)
MONOCYTES NFR BLD: 6.8 % (ref 4–15)
NEUTROPHILS # BLD AUTO: 4.1 K/UL (ref 1.8–7.7)
NEUTROPHILS NFR BLD: 52.9 % (ref 38–73)
NRBC BLD-RTO: 0 /100 WBC
PLATELET # BLD AUTO: 472 K/UL (ref 150–450)
PMV BLD AUTO: 8.7 FL (ref 9.2–12.9)
POCT GLUCOSE: 141 MG/DL (ref 70–110)
POCT GLUCOSE: 186 MG/DL (ref 70–110)
POCT GLUCOSE: 197 MG/DL (ref 70–110)
POCT GLUCOSE: 202 MG/DL (ref 70–110)
POTASSIUM SERPL-SCNC: 3.9 MMOL/L (ref 3.5–5.1)
RBC # BLD AUTO: 3.52 M/UL (ref 4–5.4)
SARS-COV-2 RDRP RESP QL NAA+PROBE: NEGATIVE
SODIUM SERPL-SCNC: 139 MMOL/L (ref 136–145)
WBC # BLD AUTO: 7.67 K/UL (ref 3.9–12.7)

## 2021-04-20 PROCEDURE — U0002 COVID-19 LAB TEST NON-CDC: HCPCS | Performed by: NURSE PRACTITIONER

## 2021-04-20 PROCEDURE — 99220 PR INITIAL OBSERVATION CARE,LEVL III: CPT | Mod: ,,, | Performed by: NURSE PRACTITIONER

## 2021-04-20 PROCEDURE — 94761 N-INVAS EAR/PLS OXIMETRY MLT: CPT

## 2021-04-20 PROCEDURE — 99900035 HC TECH TIME PER 15 MIN (STAT)

## 2021-04-20 PROCEDURE — 99220 PR INITIAL OBSERVATION CARE,LEVL III: ICD-10-PCS | Mod: ,,, | Performed by: NURSE PRACTITIONER

## 2021-04-20 PROCEDURE — 96372 THER/PROPH/DIAG INJ SC/IM: CPT

## 2021-04-20 PROCEDURE — 36415 COLL VENOUS BLD VENIPUNCTURE: CPT | Performed by: PHYSICIAN ASSISTANT

## 2021-04-20 PROCEDURE — 25000003 PHARM REV CODE 250: Performed by: PHYSICIAN ASSISTANT

## 2021-04-20 PROCEDURE — G0378 HOSPITAL OBSERVATION PER HR: HCPCS

## 2021-04-20 PROCEDURE — 80048 BASIC METABOLIC PNL TOTAL CA: CPT | Performed by: PHYSICIAN ASSISTANT

## 2021-04-20 PROCEDURE — 85025 COMPLETE CBC W/AUTO DIFF WBC: CPT | Performed by: PHYSICIAN ASSISTANT

## 2021-04-20 PROCEDURE — 97530 THERAPEUTIC ACTIVITIES: CPT

## 2021-04-20 PROCEDURE — 97110 THERAPEUTIC EXERCISES: CPT | Mod: CQ

## 2021-04-20 PROCEDURE — 97116 GAIT TRAINING THERAPY: CPT | Mod: CQ

## 2021-04-20 PROCEDURE — 97535 SELF CARE MNGMENT TRAINING: CPT | Mod: 59

## 2021-04-20 RX ADMIN — INSULIN ASPART 4 UNITS: 100 INJECTION, SOLUTION INTRAVENOUS; SUBCUTANEOUS at 12:04

## 2021-04-20 RX ADMIN — ANASTROZOLE 1 MG: 1 TABLET, COATED ORAL at 09:04

## 2021-04-20 RX ADMIN — MIDODRINE HYDROCHLORIDE 5 MG: 5 TABLET ORAL at 09:04

## 2021-04-20 RX ADMIN — AMITRIPTYLINE HYDROCHLORIDE 100 MG: 25 TABLET, FILM COATED ORAL at 11:04

## 2021-04-20 RX ADMIN — MIDODRINE HYDROCHLORIDE 5 MG: 5 TABLET ORAL at 11:04

## 2021-04-20 RX ADMIN — INSULIN DETEMIR 5 UNITS: 100 INJECTION, SOLUTION SUBCUTANEOUS at 09:04

## 2021-04-20 RX ADMIN — Medication 1000 UNITS: at 09:04

## 2021-04-20 RX ADMIN — Medication 6 MG: at 11:04

## 2021-04-20 RX ADMIN — INSULIN DETEMIR 5 UNITS: 100 INJECTION, SOLUTION SUBCUTANEOUS at 11:04

## 2021-04-20 RX ADMIN — INSULIN ASPART 2 UNITS: 100 INJECTION, SOLUTION INTRAVENOUS; SUBCUTANEOUS at 12:04

## 2021-04-20 RX ADMIN — CITALOPRAM HYDROBROMIDE 10 MG: 10 TABLET, FILM COATED ORAL at 09:04

## 2021-04-20 RX ADMIN — INSULIN ASPART 4 UNITS: 100 INJECTION, SOLUTION INTRAVENOUS; SUBCUTANEOUS at 04:04

## 2021-04-20 RX ADMIN — ASPIRIN 81 MG: 81 TABLET, FILM COATED ORAL at 09:04

## 2021-04-20 RX ADMIN — INSULIN ASPART 4 UNITS: 100 INJECTION, SOLUTION INTRAVENOUS; SUBCUTANEOUS at 09:04

## 2021-04-21 LAB
ANION GAP SERPL CALC-SCNC: 7 MMOL/L (ref 8–16)
BASOPHILS # BLD AUTO: 0.04 K/UL (ref 0–0.2)
BASOPHILS NFR BLD: 0.5 % (ref 0–1.9)
BUN SERPL-MCNC: 26 MG/DL (ref 8–23)
CALCIUM SERPL-MCNC: 8.6 MG/DL (ref 8.7–10.5)
CHLORIDE SERPL-SCNC: 102 MMOL/L (ref 95–110)
CO2 SERPL-SCNC: 29 MMOL/L (ref 23–29)
CREAT SERPL-MCNC: 0.8 MG/DL (ref 0.5–1.4)
DIFFERENTIAL METHOD: ABNORMAL
EOSINOPHIL # BLD AUTO: 0.1 K/UL (ref 0–0.5)
EOSINOPHIL NFR BLD: 1.3 % (ref 0–8)
ERYTHROCYTE [DISTWIDTH] IN BLOOD BY AUTOMATED COUNT: 17.6 % (ref 11.5–14.5)
EST. GFR  (AFRICAN AMERICAN): >60 ML/MIN/1.73 M^2
EST. GFR  (NON AFRICAN AMERICAN): >60 ML/MIN/1.73 M^2
GLUCOSE SERPL-MCNC: 251 MG/DL (ref 70–110)
HCT VFR BLD AUTO: 28.4 % (ref 37–48.5)
HGB BLD-MCNC: 8.8 G/DL (ref 12–16)
IMM GRANULOCYTES # BLD AUTO: 0.02 K/UL (ref 0–0.04)
IMM GRANULOCYTES NFR BLD AUTO: 0.2 % (ref 0–0.5)
LYMPHOCYTES # BLD AUTO: 2.9 K/UL (ref 1–4.8)
LYMPHOCYTES NFR BLD: 32.9 % (ref 18–48)
MCH RBC QN AUTO: 25.7 PG (ref 27–31)
MCHC RBC AUTO-ENTMCNC: 31 G/DL (ref 32–36)
MCV RBC AUTO: 83 FL (ref 82–98)
MONOCYTES # BLD AUTO: 0.7 K/UL (ref 0.3–1)
MONOCYTES NFR BLD: 7.9 % (ref 4–15)
NEUTROPHILS # BLD AUTO: 5 K/UL (ref 1.8–7.7)
NEUTROPHILS NFR BLD: 57.2 % (ref 38–73)
NRBC BLD-RTO: 0 /100 WBC
PLATELET # BLD AUTO: 459 K/UL (ref 150–450)
PMV BLD AUTO: 8.6 FL (ref 9.2–12.9)
POCT GLUCOSE: 133 MG/DL (ref 70–110)
POCT GLUCOSE: 152 MG/DL (ref 70–110)
POCT GLUCOSE: 189 MG/DL (ref 70–110)
POCT GLUCOSE: 208 MG/DL (ref 70–110)
POCT GLUCOSE: 218 MG/DL (ref 70–110)
POTASSIUM SERPL-SCNC: 4.1 MMOL/L (ref 3.5–5.1)
RBC # BLD AUTO: 3.42 M/UL (ref 4–5.4)
SODIUM SERPL-SCNC: 138 MMOL/L (ref 136–145)
WBC # BLD AUTO: 8.7 K/UL (ref 3.9–12.7)

## 2021-04-21 PROCEDURE — 97530 THERAPEUTIC ACTIVITIES: CPT | Mod: CQ

## 2021-04-21 PROCEDURE — 36415 COLL VENOUS BLD VENIPUNCTURE: CPT | Performed by: PHYSICIAN ASSISTANT

## 2021-04-21 PROCEDURE — 25000003 PHARM REV CODE 250: Performed by: NURSE PRACTITIONER

## 2021-04-21 PROCEDURE — 99226 PR SUBSEQUENT OBSERVATION CARE,LEVEL III: CPT | Mod: ,,, | Performed by: NURSE PRACTITIONER

## 2021-04-21 PROCEDURE — 96372 THER/PROPH/DIAG INJ SC/IM: CPT

## 2021-04-21 PROCEDURE — 99226 PR SUBSEQUENT OBSERVATION CARE,LEVEL III: ICD-10-PCS | Mod: ,,, | Performed by: NURSE PRACTITIONER

## 2021-04-21 PROCEDURE — 80048 BASIC METABOLIC PNL TOTAL CA: CPT | Performed by: PHYSICIAN ASSISTANT

## 2021-04-21 PROCEDURE — G0378 HOSPITAL OBSERVATION PER HR: HCPCS

## 2021-04-21 PROCEDURE — 25000003 PHARM REV CODE 250: Performed by: PHYSICIAN ASSISTANT

## 2021-04-21 PROCEDURE — 97116 GAIT TRAINING THERAPY: CPT | Mod: CQ

## 2021-04-21 PROCEDURE — 85025 COMPLETE CBC W/AUTO DIFF WBC: CPT | Performed by: PHYSICIAN ASSISTANT

## 2021-04-21 RX ORDER — MIDODRINE HYDROCHLORIDE 5 MG/1
5 TABLET ORAL EVERY 12 HOURS
Qty: 60 TABLET | Refills: 11 | Status: SHIPPED | OUTPATIENT
Start: 2021-04-21 | End: 2021-06-17

## 2021-04-21 RX ADMIN — INSULIN ASPART 2 UNITS: 100 INJECTION, SOLUTION INTRAVENOUS; SUBCUTANEOUS at 08:04

## 2021-04-21 RX ADMIN — ASPIRIN 81 MG: 81 TABLET, FILM COATED ORAL at 08:04

## 2021-04-21 RX ADMIN — MIDODRINE HYDROCHLORIDE 5 MG: 5 TABLET ORAL at 09:04

## 2021-04-21 RX ADMIN — INSULIN ASPART 4 UNITS: 100 INJECTION, SOLUTION INTRAVENOUS; SUBCUTANEOUS at 05:04

## 2021-04-21 RX ADMIN — INSULIN ASPART 4 UNITS: 100 INJECTION, SOLUTION INTRAVENOUS; SUBCUTANEOUS at 08:04

## 2021-04-21 RX ADMIN — SODIUM CHLORIDE 500 ML: 0.9 INJECTION, SOLUTION INTRAVENOUS at 02:04

## 2021-04-21 RX ADMIN — ANASTROZOLE 1 MG: 1 TABLET, COATED ORAL at 08:04

## 2021-04-21 RX ADMIN — INSULIN DETEMIR 5 UNITS: 100 INJECTION, SOLUTION SUBCUTANEOUS at 08:04

## 2021-04-21 RX ADMIN — INSULIN ASPART 4 UNITS: 100 INJECTION, SOLUTION INTRAVENOUS; SUBCUTANEOUS at 12:04

## 2021-04-21 RX ADMIN — INSULIN ASPART 2 UNITS: 100 INJECTION, SOLUTION INTRAVENOUS; SUBCUTANEOUS at 05:04

## 2021-04-21 RX ADMIN — Medication 1000 UNITS: at 08:04

## 2021-04-21 RX ADMIN — CITALOPRAM HYDROBROMIDE 10 MG: 10 TABLET, FILM COATED ORAL at 08:04

## 2021-04-21 RX ADMIN — Medication 6 MG: at 09:04

## 2021-04-21 RX ADMIN — AMITRIPTYLINE HYDROCHLORIDE 100 MG: 25 TABLET, FILM COATED ORAL at 10:04

## 2021-04-21 RX ADMIN — MIDODRINE HYDROCHLORIDE 5 MG: 5 TABLET ORAL at 08:04

## 2021-04-22 VITALS
HEART RATE: 110 BPM | WEIGHT: 104.5 LBS | RESPIRATION RATE: 18 BRPM | SYSTOLIC BLOOD PRESSURE: 172 MMHG | TEMPERATURE: 99 F | OXYGEN SATURATION: 100 % | DIASTOLIC BLOOD PRESSURE: 72 MMHG | BODY MASS INDEX: 21.07 KG/M2 | HEIGHT: 59 IN

## 2021-04-22 LAB
ANION GAP SERPL CALC-SCNC: 8 MMOL/L (ref 8–16)
BASOPHILS # BLD AUTO: 0.03 K/UL (ref 0–0.2)
BASOPHILS NFR BLD: 0.4 % (ref 0–1.9)
BUN SERPL-MCNC: 18 MG/DL (ref 8–23)
CALCIUM SERPL-MCNC: 8.9 MG/DL (ref 8.7–10.5)
CHLORIDE SERPL-SCNC: 104 MMOL/L (ref 95–110)
CO2 SERPL-SCNC: 29 MMOL/L (ref 23–29)
CREAT SERPL-MCNC: 0.7 MG/DL (ref 0.5–1.4)
DIFFERENTIAL METHOD: ABNORMAL
EOSINOPHIL # BLD AUTO: 0.1 K/UL (ref 0–0.5)
EOSINOPHIL NFR BLD: 1.3 % (ref 0–8)
ERYTHROCYTE [DISTWIDTH] IN BLOOD BY AUTOMATED COUNT: 17.8 % (ref 11.5–14.5)
EST. GFR  (AFRICAN AMERICAN): >60 ML/MIN/1.73 M^2
EST. GFR  (NON AFRICAN AMERICAN): >60 ML/MIN/1.73 M^2
GLUCOSE SERPL-MCNC: 99 MG/DL (ref 70–110)
HCT VFR BLD AUTO: 29.4 % (ref 37–48.5)
HGB BLD-MCNC: 8.9 G/DL (ref 12–16)
IMM GRANULOCYTES # BLD AUTO: 0.02 K/UL (ref 0–0.04)
IMM GRANULOCYTES NFR BLD AUTO: 0.3 % (ref 0–0.5)
LYMPHOCYTES # BLD AUTO: 2.3 K/UL (ref 1–4.8)
LYMPHOCYTES NFR BLD: 30.4 % (ref 18–48)
MCH RBC QN AUTO: 25.5 PG (ref 27–31)
MCHC RBC AUTO-ENTMCNC: 30.3 G/DL (ref 32–36)
MCV RBC AUTO: 84 FL (ref 82–98)
MONOCYTES # BLD AUTO: 0.7 K/UL (ref 0.3–1)
MONOCYTES NFR BLD: 9 % (ref 4–15)
NEUTROPHILS # BLD AUTO: 4.5 K/UL (ref 1.8–7.7)
NEUTROPHILS NFR BLD: 58.6 % (ref 38–73)
NRBC BLD-RTO: 0 /100 WBC
PLATELET # BLD AUTO: 482 K/UL (ref 150–450)
PMV BLD AUTO: 8.5 FL (ref 9.2–12.9)
POCT GLUCOSE: 116 MG/DL (ref 70–110)
POCT GLUCOSE: 298 MG/DL (ref 70–110)
POCT GLUCOSE: 95 MG/DL (ref 70–110)
POTASSIUM SERPL-SCNC: 4.2 MMOL/L (ref 3.5–5.1)
RBC # BLD AUTO: 3.49 M/UL (ref 4–5.4)
SODIUM SERPL-SCNC: 141 MMOL/L (ref 136–145)
WBC # BLD AUTO: 7.69 K/UL (ref 3.9–12.7)

## 2021-04-22 PROCEDURE — 99217 PR OBSERVATION CARE DISCHARGE: CPT | Mod: ,,, | Performed by: NURSE PRACTITIONER

## 2021-04-22 PROCEDURE — 25000003 PHARM REV CODE 250: Performed by: PHYSICIAN ASSISTANT

## 2021-04-22 PROCEDURE — 99900035 HC TECH TIME PER 15 MIN (STAT)

## 2021-04-22 PROCEDURE — 36415 COLL VENOUS BLD VENIPUNCTURE: CPT | Performed by: PHYSICIAN ASSISTANT

## 2021-04-22 PROCEDURE — 96372 THER/PROPH/DIAG INJ SC/IM: CPT | Mod: 59

## 2021-04-22 PROCEDURE — 99217 PR OBSERVATION CARE DISCHARGE: ICD-10-PCS | Mod: ,,, | Performed by: NURSE PRACTITIONER

## 2021-04-22 PROCEDURE — 96360 HYDRATION IV INFUSION INIT: CPT

## 2021-04-22 PROCEDURE — 97116 GAIT TRAINING THERAPY: CPT | Mod: CQ

## 2021-04-22 PROCEDURE — C9399 UNCLASSIFIED DRUGS OR BIOLOG: HCPCS | Performed by: PHYSICIAN ASSISTANT

## 2021-04-22 PROCEDURE — 85025 COMPLETE CBC W/AUTO DIFF WBC: CPT | Performed by: PHYSICIAN ASSISTANT

## 2021-04-22 PROCEDURE — 97530 THERAPEUTIC ACTIVITIES: CPT | Mod: CQ

## 2021-04-22 PROCEDURE — 25000003 PHARM REV CODE 250: Performed by: NURSE PRACTITIONER

## 2021-04-22 PROCEDURE — 94761 N-INVAS EAR/PLS OXIMETRY MLT: CPT

## 2021-04-22 PROCEDURE — G0378 HOSPITAL OBSERVATION PER HR: HCPCS

## 2021-04-22 PROCEDURE — 80048 BASIC METABOLIC PNL TOTAL CA: CPT | Performed by: PHYSICIAN ASSISTANT

## 2021-04-22 PROCEDURE — 96361 HYDRATE IV INFUSION ADD-ON: CPT

## 2021-04-22 RX ADMIN — INSULIN ASPART 4 UNITS: 100 INJECTION, SOLUTION INTRAVENOUS; SUBCUTANEOUS at 01:04

## 2021-04-22 RX ADMIN — INSULIN DETEMIR 5 UNITS: 100 INJECTION, SOLUTION SUBCUTANEOUS at 08:04

## 2021-04-22 RX ADMIN — MIDODRINE HYDROCHLORIDE 5 MG: 5 TABLET ORAL at 08:04

## 2021-04-22 RX ADMIN — Medication 1000 UNITS: at 08:04

## 2021-04-22 RX ADMIN — INSULIN ASPART 4 UNITS: 100 INJECTION, SOLUTION INTRAVENOUS; SUBCUTANEOUS at 06:04

## 2021-04-22 RX ADMIN — ANASTROZOLE 1 MG: 1 TABLET, COATED ORAL at 08:04

## 2021-04-22 RX ADMIN — SODIUM CHLORIDE 500 ML: 0.9 INJECTION, SOLUTION INTRAVENOUS at 01:04

## 2021-04-22 RX ADMIN — CITALOPRAM HYDROBROMIDE 10 MG: 10 TABLET, FILM COATED ORAL at 08:04

## 2021-04-22 RX ADMIN — ASPIRIN 81 MG: 81 TABLET, FILM COATED ORAL at 08:04

## 2021-04-23 ENCOUNTER — TELEPHONE (OUTPATIENT)
Dept: ADMINISTRATIVE | Facility: OTHER | Age: 71
End: 2021-04-23

## 2021-04-27 ENCOUNTER — EXTERNAL HOSPITAL ADMISSION (OUTPATIENT)
Dept: SKILLED NURSING FACILITY | Facility: HOSPITAL | Age: 71
End: 2021-04-27
Payer: COMMERCIAL

## 2021-04-27 DIAGNOSIS — R29.6 FALLS FREQUENTLY: Primary | ICD-10-CM

## 2021-04-29 ENCOUNTER — EXTERNAL HOSPITAL ADMISSION (OUTPATIENT)
Dept: SKILLED NURSING FACILITY | Facility: HOSPITAL | Age: 71
End: 2021-04-29
Payer: COMMERCIAL

## 2021-04-29 DIAGNOSIS — R29.6 FALLS FREQUENTLY: Primary | ICD-10-CM

## 2021-05-04 ENCOUNTER — EXTERNAL HOSPITAL ADMISSION (OUTPATIENT)
Dept: SKILLED NURSING FACILITY | Facility: HOSPITAL | Age: 71
End: 2021-05-04
Payer: COMMERCIAL

## 2021-05-04 DIAGNOSIS — E11.65 TYPE 2 DIABETES MELLITUS WITH HYPERGLYCEMIA, WITH LONG-TERM CURRENT USE OF INSULIN: Primary | ICD-10-CM

## 2021-05-04 DIAGNOSIS — Z79.4 TYPE 2 DIABETES MELLITUS WITH HYPERGLYCEMIA, WITH LONG-TERM CURRENT USE OF INSULIN: Primary | ICD-10-CM

## 2021-05-06 ENCOUNTER — EXTERNAL HOSPITAL ADMISSION (OUTPATIENT)
Dept: SKILLED NURSING FACILITY | Facility: HOSPITAL | Age: 71
End: 2021-05-06
Payer: COMMERCIAL

## 2021-05-06 DIAGNOSIS — R29.6 FALLS FREQUENTLY: Primary | ICD-10-CM

## 2021-05-07 ENCOUNTER — TELEPHONE (OUTPATIENT)
Dept: NEUROLOGY | Facility: CLINIC | Age: 71
End: 2021-05-07

## 2021-05-10 ENCOUNTER — TELEPHONE (OUTPATIENT)
Dept: NEUROLOGY | Facility: CLINIC | Age: 71
End: 2021-05-10

## 2021-05-10 NOTE — TELEPHONE ENCOUNTER
----- Message from Madeline Garcia sent at 5/10/2021 11:33 AM CDT -----  Contact: Alise @ [phone] ext [phone]  Alise from [facility] says she is returning Cris's call to schedule an appt for pt. Pls call her back.

## 2021-05-11 ENCOUNTER — EXTERNAL HOSPITAL ADMISSION (OUTPATIENT)
Dept: SKILLED NURSING FACILITY | Facility: HOSPITAL | Age: 71
End: 2021-05-11
Payer: COMMERCIAL

## 2021-05-11 DIAGNOSIS — Z91.148 NONCOMPLIANCE WITH MEDICATIONS: Primary | ICD-10-CM

## 2021-05-18 ENCOUNTER — EXTERNAL HOSPITAL ADMISSION (OUTPATIENT)
Dept: SKILLED NURSING FACILITY | Facility: HOSPITAL | Age: 71
End: 2021-05-18
Payer: COMMERCIAL

## 2021-05-18 DIAGNOSIS — R29.6 FALLS FREQUENTLY: Primary | ICD-10-CM

## 2021-05-25 ENCOUNTER — EXTERNAL HOSPITAL ADMISSION (OUTPATIENT)
Dept: SKILLED NURSING FACILITY | Facility: HOSPITAL | Age: 71
End: 2021-05-25
Payer: COMMERCIAL

## 2021-05-25 DIAGNOSIS — R29.6 FALLS FREQUENTLY: Primary | ICD-10-CM

## 2021-05-29 PROCEDURE — G0180 PR HOME HEALTH MD CERTIFICATION: ICD-10-PCS | Mod: ,,, | Performed by: INTERNAL MEDICINE

## 2021-05-29 PROCEDURE — G0180 MD CERTIFICATION HHA PATIENT: HCPCS | Mod: ,,, | Performed by: INTERNAL MEDICINE

## 2021-06-09 ENCOUNTER — EXTERNAL HOME HEALTH (OUTPATIENT)
Dept: HOME HEALTH SERVICES | Facility: HOSPITAL | Age: 71
End: 2021-06-09
Payer: COMMERCIAL

## 2021-06-11 ENCOUNTER — HOSPITAL ENCOUNTER (OUTPATIENT)
Facility: HOSPITAL | Age: 71
Discharge: HOME HEALTH | End: 2021-06-14
Attending: EMERGENCY MEDICINE | Admitting: EMERGENCY MEDICINE
Payer: COMMERCIAL

## 2021-06-11 DIAGNOSIS — R07.9 CHEST PAIN: ICD-10-CM

## 2021-06-11 DIAGNOSIS — R55 SYNCOPE: ICD-10-CM

## 2021-06-11 DIAGNOSIS — Z79.4 TYPE 2 DIABETES MELLITUS WITH HYPERGLYCEMIA, WITH LONG-TERM CURRENT USE OF INSULIN: ICD-10-CM

## 2021-06-11 DIAGNOSIS — I10 ESSENTIAL HYPERTENSION: ICD-10-CM

## 2021-06-11 DIAGNOSIS — E11.65 TYPE 2 DIABETES MELLITUS WITH HYPERGLYCEMIA, WITH LONG-TERM CURRENT USE OF INSULIN: ICD-10-CM
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
## 2021-06-11 DIAGNOSIS — I95.1 ORTHOSTATIC SYNCOPE: Primary | ICD-10-CM

## 2021-06-11 PROBLEM — R79.89 ELEVATED TROPONIN: Status: RESOLVED | Noted: 2020-07-22 | Resolved: 2021-06-11

## 2021-06-11 PROBLEM — I50.32 CHRONIC HEART FAILURE WITH PRESERVED EJECTION FRACTION: Status: ACTIVE | Noted: 2021-06-11

## 2021-06-11 PROBLEM — D62 ACUTE BLOOD LOSS ANEMIA: Status: RESOLVED | Noted: 2020-09-30 | Resolved: 2021-06-11

## 2021-06-11 PROBLEM — Z75.8 DISCHARGE PLANNING ISSUES: Status: ACTIVE | Noted: 2021-06-11

## 2021-06-11 PROBLEM — N17.9 AKI (ACUTE KIDNEY INJURY): Status: RESOLVED | Noted: 2020-12-27 | Resolved: 2021-06-11

## 2021-06-11 LAB
ALBUMIN SERPL BCP-MCNC: 3.3 G/DL (ref 3.5–5.2)
ALP SERPL-CCNC: 289 U/L (ref 55–135)
ALT SERPL W/O P-5'-P-CCNC: 124 U/L (ref 10–44)
ANION GAP SERPL CALC-SCNC: 13 MMOL/L (ref 8–16)
AST SERPL-CCNC: 98 U/L (ref 10–40)
BASOPHILS # BLD AUTO: 0.04 K/UL (ref 0–0.2)
BASOPHILS NFR BLD: 0.4 % (ref 0–1.9)
BILIRUB SERPL-MCNC: 0.5 MG/DL (ref 0.1–1)
BUN SERPL-MCNC: 26 MG/DL (ref 8–23)
BUN SERPL-MCNC: 27 MG/DL (ref 6–30)
CALCIUM SERPL-MCNC: 10.1 MG/DL (ref 8.7–10.5)
CHLORIDE SERPL-SCNC: 101 MMOL/L (ref 95–110)
CHLORIDE SERPL-SCNC: 99 MMOL/L (ref 95–110)
CO2 SERPL-SCNC: 20 MMOL/L (ref 23–29)
CREAT SERPL-MCNC: 0.9 MG/DL (ref 0.5–1.4)
CREAT SERPL-MCNC: 1.1 MG/DL (ref 0.5–1.4)
CTP QC/QA: YES
DIFFERENTIAL METHOD: ABNORMAL
EOSINOPHIL # BLD AUTO: 0 K/UL (ref 0–0.5)
EOSINOPHIL NFR BLD: 0.3 % (ref 0–8)
ERYTHROCYTE [DISTWIDTH] IN BLOOD BY AUTOMATED COUNT: 16.7 % (ref 11.5–14.5)
EST. GFR  (AFRICAN AMERICAN): 58.4 ML/MIN/1.73 M^2
EST. GFR  (NON AFRICAN AMERICAN): 50.6 ML/MIN/1.73 M^2
GLUCOSE SERPL-MCNC: 199 MG/DL (ref 70–110)
GLUCOSE SERPL-MCNC: 199 MG/DL (ref 70–110)
HCT VFR BLD AUTO: 42.9 % (ref 37–48.5)
HCT VFR BLD CALC: 43 %PCV (ref 36–54)
HGB BLD-MCNC: 13.3 G/DL (ref 12–16)
IMM GRANULOCYTES # BLD AUTO: 0.04 K/UL (ref 0–0.04)
IMM GRANULOCYTES NFR BLD AUTO: 0.4 % (ref 0–0.5)
LYMPHOCYTES # BLD AUTO: 1.9 K/UL (ref 1–4.8)
LYMPHOCYTES NFR BLD: 19.3 % (ref 18–48)
MCH RBC QN AUTO: 24.9 PG (ref 27–31)
MCHC RBC AUTO-ENTMCNC: 31 G/DL (ref 32–36)
MCV RBC AUTO: 80 FL (ref 82–98)
MONOCYTES # BLD AUTO: 0.3 K/UL (ref 0.3–1)
MONOCYTES NFR BLD: 3.5 % (ref 4–15)
NEUTROPHILS # BLD AUTO: 7.5 K/UL (ref 1.8–7.7)
NEUTROPHILS NFR BLD: 76.1 % (ref 38–73)
NRBC BLD-RTO: 0 /100 WBC
PLATELET # BLD AUTO: 502 K/UL (ref 150–450)
PMV BLD AUTO: 9.5 FL (ref 9.2–12.9)
POC IONIZED CALCIUM: 1.23 MMOL/L (ref 1.06–1.42)
POC TCO2 (MEASURED): 24 MMOL/L (ref 23–29)
POCT GLUCOSE: 174 MG/DL (ref 70–110)
POCT GLUCOSE: 220 MG/DL (ref 70–110)
POTASSIUM BLD-SCNC: 4.5 MMOL/L (ref 3.5–5.1)
POTASSIUM SERPL-SCNC: 4.6 MMOL/L (ref 3.5–5.1)
PROT SERPL-MCNC: 7.5 G/DL (ref 6–8.4)
RBC # BLD AUTO: 5.34 M/UL (ref 4–5.4)
SAMPLE: ABNORMAL
SARS-COV-2 RDRP RESP QL NAA+PROBE: NEGATIVE
SODIUM BLD-SCNC: 135 MMOL/L (ref 136–145)
SODIUM SERPL-SCNC: 134 MMOL/L (ref 136–145)
TROPONIN I SERPL DL<=0.01 NG/ML-MCNC: 0.03 NG/ML (ref 0–0.03)
WBC # BLD AUTO: 9.83 K/UL (ref 3.9–12.7)

## 2021-06-11 PROCEDURE — C9399 UNCLASSIFIED DRUGS OR BIOLOG: HCPCS | Performed by: PHYSICIAN ASSISTANT

## 2021-06-11 PROCEDURE — 99285 EMERGENCY DEPT VISIT HI MDM: CPT | Mod: CS,,, | Performed by: EMERGENCY MEDICINE

## 2021-06-11 PROCEDURE — 82962 GLUCOSE BLOOD TEST: CPT

## 2021-06-11 PROCEDURE — 80053 COMPREHEN METABOLIC PANEL: CPT | Performed by: EMERGENCY MEDICINE

## 2021-06-11 PROCEDURE — 25000003 PHARM REV CODE 250: Performed by: PHYSICIAN ASSISTANT

## 2021-06-11 PROCEDURE — 93010 ELECTROCARDIOGRAM REPORT: CPT | Mod: ,,, | Performed by: INTERNAL MEDICINE

## 2021-06-11 PROCEDURE — 99220 PR INITIAL OBSERVATION CARE,LEVL III: ICD-10-PCS | Mod: ,,, | Performed by: PHYSICIAN ASSISTANT

## 2021-06-11 PROCEDURE — 93005 ELECTROCARDIOGRAM TRACING: CPT

## 2021-06-11 PROCEDURE — 80047 BASIC METABLC PNL IONIZED CA: CPT

## 2021-06-11 PROCEDURE — U0002 COVID-19 LAB TEST NON-CDC: HCPCS | Performed by: PHYSICIAN ASSISTANT

## 2021-06-11 PROCEDURE — 96372 THER/PROPH/DIAG INJ SC/IM: CPT | Mod: 59

## 2021-06-11 PROCEDURE — 99220 PR INITIAL OBSERVATION CARE,LEVL III: CPT | Mod: ,,, | Performed by: PHYSICIAN ASSISTANT

## 2021-06-11 PROCEDURE — 93010 EKG 12-LEAD: ICD-10-PCS | Mod: 76,,, | Performed by: INTERNAL MEDICINE

## 2021-06-11 PROCEDURE — 99285 EMERGENCY DEPT VISIT HI MDM: CPT | Mod: 25

## 2021-06-11 PROCEDURE — 96374 THER/PROPH/DIAG INJ IV PUSH: CPT

## 2021-06-11 PROCEDURE — 84484 ASSAY OF TROPONIN QUANT: CPT | Performed by: EMERGENCY MEDICINE

## 2021-06-11 PROCEDURE — 96361 HYDRATE IV INFUSION ADD-ON: CPT

## 2021-06-11 PROCEDURE — 63600175 PHARM REV CODE 636 W HCPCS: Performed by: PHYSICIAN ASSISTANT

## 2021-06-11 PROCEDURE — 93010 ELECTROCARDIOGRAM REPORT: CPT | Mod: 76,,, | Performed by: INTERNAL MEDICINE

## 2021-06-11 PROCEDURE — 25000003 PHARM REV CODE 250: Performed by: STUDENT IN AN ORGANIZED HEALTH CARE EDUCATION/TRAINING PROGRAM

## 2021-06-11 PROCEDURE — G0378 HOSPITAL OBSERVATION PER HR: HCPCS

## 2021-06-11 PROCEDURE — 63600175 PHARM REV CODE 636 W HCPCS: Performed by: STUDENT IN AN ORGANIZED HEALTH CARE EDUCATION/TRAINING PROGRAM

## 2021-06-11 PROCEDURE — 99285 PR EMERGENCY DEPT VISIT,LEVEL V: ICD-10-PCS | Mod: CS,,, | Performed by: EMERGENCY MEDICINE

## 2021-06-11 PROCEDURE — 85025 COMPLETE CBC W/AUTO DIFF WBC: CPT | Performed by: EMERGENCY MEDICINE

## 2021-06-11 PROCEDURE — 82330 ASSAY OF CALCIUM: CPT

## 2021-06-11 RX ORDER — INSULIN ASPART 100 [IU]/ML
2 INJECTION, SOLUTION INTRAVENOUS; SUBCUTANEOUS 3 TIMES DAILY
Status: DISCONTINUED | OUTPATIENT
Start: 2021-06-12 | End: 2021-06-12

## 2021-06-11 RX ORDER — MIDODRINE HYDROCHLORIDE 5 MG/1
5 TABLET ORAL EVERY 12 HOURS
Status: DISCONTINUED | OUTPATIENT
Start: 2021-06-12 | End: 2021-06-12

## 2021-06-11 RX ORDER — ACETAMINOPHEN 325 MG/1
650 TABLET ORAL EVERY 4 HOURS PRN
Status: DISCONTINUED | OUTPATIENT
Start: 2021-06-11 | End: 2021-06-14 | Stop reason: HOSPADM

## 2021-06-11 RX ORDER — CITALOPRAM 10 MG/1
10 TABLET ORAL DAILY
Status: DISCONTINUED | OUTPATIENT
Start: 2021-06-12 | End: 2021-06-14 | Stop reason: HOSPADM

## 2021-06-11 RX ORDER — INSULIN ASPART 100 [IU]/ML
4 INJECTION, SOLUTION INTRAVENOUS; SUBCUTANEOUS 3 TIMES DAILY
Status: DISCONTINUED | OUTPATIENT
Start: 2021-06-12 | End: 2021-06-11

## 2021-06-11 RX ORDER — ENOXAPARIN SODIUM 100 MG/ML
40 INJECTION SUBCUTANEOUS EVERY 24 HOURS
Status: DISCONTINUED | OUTPATIENT
Start: 2021-06-11 | End: 2021-06-14 | Stop reason: HOSPADM

## 2021-06-11 RX ORDER — GLUCAGON 1 MG
1 KIT INJECTION
Status: DISCONTINUED | OUTPATIENT
Start: 2021-06-11 | End: 2021-06-13

## 2021-06-11 RX ORDER — KETOROLAC TROMETHAMINE 30 MG/ML
15 INJECTION, SOLUTION INTRAMUSCULAR; INTRAVENOUS EVERY 6 HOURS PRN
Status: DISCONTINUED | OUTPATIENT
Start: 2021-06-11 | End: 2021-06-14 | Stop reason: HOSPADM

## 2021-06-11 RX ORDER — IBUPROFEN 200 MG
16 TABLET ORAL
Status: DISCONTINUED | OUTPATIENT
Start: 2021-06-11 | End: 2021-06-13

## 2021-06-11 RX ORDER — FAMOTIDINE 10 MG/ML
20 INJECTION INTRAVENOUS
Status: COMPLETED | OUTPATIENT
Start: 2021-06-11 | End: 2021-06-11

## 2021-06-11 RX ORDER — ONDANSETRON 4 MG/1
4 TABLET, ORALLY DISINTEGRATING ORAL
Status: COMPLETED | OUTPATIENT
Start: 2021-06-11 | End: 2021-06-11

## 2021-06-11 RX ORDER — IPRATROPIUM BROMIDE AND ALBUTEROL SULFATE 2.5; .5 MG/3ML; MG/3ML
3 SOLUTION RESPIRATORY (INHALATION) EVERY 4 HOURS PRN
Status: DISCONTINUED | OUTPATIENT
Start: 2021-06-11 | End: 2021-06-14 | Stop reason: HOSPADM

## 2021-06-11 RX ORDER — SODIUM CHLORIDE 0.9 % (FLUSH) 0.9 %
5 SYRINGE (ML) INJECTION
Status: DISCONTINUED | OUTPATIENT
Start: 2021-06-11 | End: 2021-06-14 | Stop reason: HOSPADM

## 2021-06-11 RX ORDER — INSULIN ASPART 100 [IU]/ML
0-5 INJECTION, SOLUTION INTRAVENOUS; SUBCUTANEOUS
Status: DISCONTINUED | OUTPATIENT
Start: 2021-06-11 | End: 2021-06-13

## 2021-06-11 RX ORDER — ONDANSETRON 8 MG/1
8 TABLET, ORALLY DISINTEGRATING ORAL EVERY 8 HOURS PRN
Status: DISCONTINUED | OUTPATIENT
Start: 2021-06-11 | End: 2021-06-14 | Stop reason: HOSPADM

## 2021-06-11 RX ORDER — TALC
6 POWDER (GRAM) TOPICAL NIGHTLY PRN
Status: DISCONTINUED | OUTPATIENT
Start: 2021-06-11 | End: 2021-06-14 | Stop reason: HOSPADM

## 2021-06-11 RX ORDER — FLUCONAZOLE 150 MG/1
150 TABLET ORAL ONCE
Status: COMPLETED | OUTPATIENT
Start: 2021-06-11 | End: 2021-06-11

## 2021-06-11 RX ORDER — BISACODYL 10 MG
10 SUPPOSITORY, RECTAL RECTAL DAILY PRN
Status: DISCONTINUED | OUTPATIENT
Start: 2021-06-11 | End: 2021-06-14 | Stop reason: HOSPADM

## 2021-06-11 RX ORDER — MIRTAZAPINE 7.5 MG/1
7.5 TABLET, FILM COATED ORAL NIGHTLY
Status: DISCONTINUED | OUTPATIENT
Start: 2021-06-12 | End: 2021-06-14 | Stop reason: HOSPADM

## 2021-06-11 RX ORDER — ANASTROZOLE 1 MG/1
1 TABLET ORAL DAILY
Status: DISCONTINUED | OUTPATIENT
Start: 2021-06-12 | End: 2021-06-14 | Stop reason: HOSPADM

## 2021-06-11 RX ORDER — ASPIRIN 81 MG/1
81 TABLET ORAL DAILY
Status: DISCONTINUED | OUTPATIENT
Start: 2021-06-12 | End: 2021-06-14 | Stop reason: HOSPADM

## 2021-06-11 RX ORDER — IBUPROFEN 200 MG
24 TABLET ORAL
Status: DISCONTINUED | OUTPATIENT
Start: 2021-06-11 | End: 2021-06-13

## 2021-06-11 RX ORDER — ONDANSETRON 2 MG/ML
4 INJECTION INTRAMUSCULAR; INTRAVENOUS EVERY 8 HOURS PRN
Status: DISCONTINUED | OUTPATIENT
Start: 2021-06-11 | End: 2021-06-14 | Stop reason: HOSPADM

## 2021-06-11 RX ORDER — POLYETHYLENE GLYCOL 3350 17 G/17G
17 POWDER, FOR SOLUTION ORAL DAILY
Status: DISCONTINUED | OUTPATIENT
Start: 2021-06-12 | End: 2021-06-14 | Stop reason: HOSPADM

## 2021-06-11 RX ADMIN — FAMOTIDINE 20 MG: 10 INJECTION INTRAVENOUS at 04:06

## 2021-06-11 RX ADMIN — ENOXAPARIN SODIUM 40 MG: 40 INJECTION SUBCUTANEOUS at 11:06

## 2021-06-11 RX ADMIN — INSULIN DETEMIR 4 UNITS: 100 INJECTION, SOLUTION SUBCUTANEOUS at 11:06

## 2021-06-11 RX ADMIN — FLUCONAZOLE 150 MG: 150 TABLET ORAL at 07:06

## 2021-06-11 RX ADMIN — ONDANSETRON 4 MG: 4 TABLET, ORALLY DISINTEGRATING ORAL at 04:06

## 2021-06-11 RX ADMIN — SODIUM CHLORIDE, SODIUM LACTATE, POTASSIUM CHLORIDE, AND CALCIUM CHLORIDE 1000 ML: .6; .31; .03; .02 INJECTION, SOLUTION INTRAVENOUS at 06:06

## 2021-06-12 PROBLEM — Z71.89 ACP (ADVANCE CARE PLANNING): Status: ACTIVE | Noted: 2020-12-27

## 2021-06-12 LAB
ANION GAP SERPL CALC-SCNC: 10 MMOL/L (ref 8–16)
BASOPHILS # BLD AUTO: 0.03 K/UL (ref 0–0.2)
BASOPHILS NFR BLD: 0.3 % (ref 0–1.9)
BUN SERPL-MCNC: 26 MG/DL (ref 8–23)
CALCIUM SERPL-MCNC: 9.6 MG/DL (ref 8.7–10.5)
CHLORIDE SERPL-SCNC: 102 MMOL/L (ref 95–110)
CO2 SERPL-SCNC: 24 MMOL/L (ref 23–29)
CREAT SERPL-MCNC: 1.2 MG/DL (ref 0.5–1.4)
DIFFERENTIAL METHOD: ABNORMAL
EOSINOPHIL # BLD AUTO: 0 K/UL (ref 0–0.5)
EOSINOPHIL NFR BLD: 0.3 % (ref 0–8)
ERYTHROCYTE [DISTWIDTH] IN BLOOD BY AUTOMATED COUNT: 16 % (ref 11.5–14.5)
EST. GFR  (AFRICAN AMERICAN): 52.5 ML/MIN/1.73 M^2
EST. GFR  (NON AFRICAN AMERICAN): 45.6 ML/MIN/1.73 M^2
ESTIMATED AVG GLUCOSE: 258 MG/DL (ref 68–131)
GLUCOSE SERPL-MCNC: 329 MG/DL (ref 70–110)
HBA1C MFR BLD: 10.6 % (ref 4–5.6)
HCT VFR BLD AUTO: 37.6 % (ref 37–48.5)
HGB BLD-MCNC: 11.6 G/DL (ref 12–16)
IMM GRANULOCYTES # BLD AUTO: 0.03 K/UL (ref 0–0.04)
IMM GRANULOCYTES NFR BLD AUTO: 0.3 % (ref 0–0.5)
LYMPHOCYTES # BLD AUTO: 3.1 K/UL (ref 1–4.8)
LYMPHOCYTES NFR BLD: 30.1 % (ref 18–48)
MAGNESIUM SERPL-MCNC: 2 MG/DL (ref 1.6–2.6)
MCH RBC QN AUTO: 24.4 PG (ref 27–31)
MCHC RBC AUTO-ENTMCNC: 30.9 G/DL (ref 32–36)
MCV RBC AUTO: 79 FL (ref 82–98)
MONOCYTES # BLD AUTO: 0.5 K/UL (ref 0.3–1)
MONOCYTES NFR BLD: 4.6 % (ref 4–15)
NEUTROPHILS # BLD AUTO: 6.7 K/UL (ref 1.8–7.7)
NEUTROPHILS NFR BLD: 64.4 % (ref 38–73)
NRBC BLD-RTO: 0 /100 WBC
PHOSPHATE SERPL-MCNC: 3.4 MG/DL (ref 2.7–4.5)
PLATELET # BLD AUTO: 431 K/UL (ref 150–450)
PMV BLD AUTO: 9.8 FL (ref 9.2–12.9)
POCT GLUCOSE: 195 MG/DL (ref 70–110)
POCT GLUCOSE: 205 MG/DL (ref 70–110)
POCT GLUCOSE: 317 MG/DL (ref 70–110)
POCT GLUCOSE: 363 MG/DL (ref 70–110)
POCT GLUCOSE: 376 MG/DL (ref 70–110)
POTASSIUM SERPL-SCNC: 4.3 MMOL/L (ref 3.5–5.1)
RBC # BLD AUTO: 4.76 M/UL (ref 4–5.4)
SODIUM SERPL-SCNC: 136 MMOL/L (ref 136–145)
WBC # BLD AUTO: 10.44 K/UL (ref 3.9–12.7)

## 2021-06-12 PROCEDURE — G0378 HOSPITAL OBSERVATION PER HR: HCPCS

## 2021-06-12 PROCEDURE — 97165 OT EVAL LOW COMPLEX 30 MIN: CPT

## 2021-06-12 PROCEDURE — 85025 COMPLETE CBC W/AUTO DIFF WBC: CPT | Performed by: PHYSICIAN ASSISTANT

## 2021-06-12 PROCEDURE — C9399 UNCLASSIFIED DRUGS OR BIOLOG: HCPCS | Performed by: PHYSICIAN ASSISTANT

## 2021-06-12 PROCEDURE — 63600175 PHARM REV CODE 636 W HCPCS: Performed by: PHYSICIAN ASSISTANT

## 2021-06-12 PROCEDURE — 80048 BASIC METABOLIC PNL TOTAL CA: CPT | Performed by: PHYSICIAN ASSISTANT

## 2021-06-12 PROCEDURE — 84100 ASSAY OF PHOSPHORUS: CPT | Performed by: PHYSICIAN ASSISTANT

## 2021-06-12 PROCEDURE — 97161 PT EVAL LOW COMPLEX 20 MIN: CPT

## 2021-06-12 PROCEDURE — 99226 PR SUBSEQUENT OBSERVATION CARE,LEVEL III: CPT | Mod: ,,, | Performed by: NURSE PRACTITIONER

## 2021-06-12 PROCEDURE — 25000003 PHARM REV CODE 250: Performed by: PHYSICIAN ASSISTANT

## 2021-06-12 PROCEDURE — 83735 ASSAY OF MAGNESIUM: CPT | Performed by: PHYSICIAN ASSISTANT

## 2021-06-12 PROCEDURE — 99226 PR SUBSEQUENT OBSERVATION CARE,LEVEL III: ICD-10-PCS | Mod: ,,, | Performed by: NURSE PRACTITIONER

## 2021-06-12 PROCEDURE — 83036 HEMOGLOBIN GLYCOSYLATED A1C: CPT | Performed by: PHYSICIAN ASSISTANT

## 2021-06-12 RX ORDER — INSULIN ASPART 100 [IU]/ML
3 INJECTION, SOLUTION INTRAVENOUS; SUBCUTANEOUS 3 TIMES DAILY
Status: DISCONTINUED | OUTPATIENT
Start: 2021-06-13 | End: 2021-06-13

## 2021-06-12 RX ORDER — MIDODRINE HYDROCHLORIDE 5 MG/1
5 TABLET ORAL
Status: DISCONTINUED | OUTPATIENT
Start: 2021-06-13 | End: 2021-06-14 | Stop reason: HOSPADM

## 2021-06-12 RX ADMIN — INSULIN ASPART 2 UNITS: 100 INJECTION, SOLUTION INTRAVENOUS; SUBCUTANEOUS at 09:06

## 2021-06-12 RX ADMIN — CITALOPRAM HYDROBROMIDE 10 MG: 10 TABLET ORAL at 08:06

## 2021-06-12 RX ADMIN — ENOXAPARIN SODIUM 40 MG: 40 INJECTION SUBCUTANEOUS at 04:06

## 2021-06-12 RX ADMIN — INSULIN ASPART 4 UNITS: 100 INJECTION, SOLUTION INTRAVENOUS; SUBCUTANEOUS at 09:06

## 2021-06-12 RX ADMIN — MIDODRINE HYDROCHLORIDE 5 MG: 5 TABLET ORAL at 08:06

## 2021-06-12 RX ADMIN — MIRTAZAPINE 7.5 MG: 7.5 TABLET ORAL at 09:06

## 2021-06-12 RX ADMIN — ANASTROZOLE 1 MG: 1 TABLET, COATED ORAL at 11:06

## 2021-06-12 RX ADMIN — INSULIN ASPART 2 UNITS: 100 INJECTION, SOLUTION INTRAVENOUS; SUBCUTANEOUS at 04:06

## 2021-06-12 RX ADMIN — INSULIN ASPART 2 UNITS: 100 INJECTION, SOLUTION INTRAVENOUS; SUBCUTANEOUS at 03:06

## 2021-06-12 RX ADMIN — INSULIN ASPART 3 UNITS: 100 INJECTION, SOLUTION INTRAVENOUS; SUBCUTANEOUS at 09:06

## 2021-06-12 RX ADMIN — MELATONIN TAB 3 MG 6 MG: 3 TAB at 09:06

## 2021-06-12 RX ADMIN — INSULIN DETEMIR 4 UNITS: 100 INJECTION, SOLUTION SUBCUTANEOUS at 08:06

## 2021-06-12 RX ADMIN — ASPIRIN 81 MG: 81 TABLET, COATED ORAL at 08:06

## 2021-06-13 LAB
ANION GAP SERPL CALC-SCNC: 10 MMOL/L (ref 8–16)
BASOPHILS # BLD AUTO: 0.05 K/UL (ref 0–0.2)
BASOPHILS NFR BLD: 0.8 % (ref 0–1.9)
BUN SERPL-MCNC: 25 MG/DL (ref 8–23)
CALCIUM SERPL-MCNC: 9.7 MG/DL (ref 8.7–10.5)
CHLORIDE SERPL-SCNC: 103 MMOL/L (ref 95–110)
CO2 SERPL-SCNC: 22 MMOL/L (ref 23–29)
CREAT SERPL-MCNC: 0.8 MG/DL (ref 0.5–1.4)
DIFFERENTIAL METHOD: ABNORMAL
EOSINOPHIL # BLD AUTO: 0.1 K/UL (ref 0–0.5)
EOSINOPHIL NFR BLD: 0.9 % (ref 0–8)
ERYTHROCYTE [DISTWIDTH] IN BLOOD BY AUTOMATED COUNT: 16.1 % (ref 11.5–14.5)
EST. GFR  (AFRICAN AMERICAN): >60 ML/MIN/1.73 M^2
EST. GFR  (NON AFRICAN AMERICAN): >60 ML/MIN/1.73 M^2
GLUCOSE SERPL-MCNC: 110 MG/DL (ref 70–110)
HCT VFR BLD AUTO: 38.3 % (ref 37–48.5)
HGB BLD-MCNC: 11.8 G/DL (ref 12–16)
IMM GRANULOCYTES # BLD AUTO: 0.01 K/UL (ref 0–0.04)
IMM GRANULOCYTES NFR BLD AUTO: 0.2 % (ref 0–0.5)
LYMPHOCYTES # BLD AUTO: 3 K/UL (ref 1–4.8)
LYMPHOCYTES NFR BLD: 45.3 % (ref 18–48)
MAGNESIUM SERPL-MCNC: 1.9 MG/DL (ref 1.6–2.6)
MCH RBC QN AUTO: 24.5 PG (ref 27–31)
MCHC RBC AUTO-ENTMCNC: 30.8 G/DL (ref 32–36)
MCV RBC AUTO: 80 FL (ref 82–98)
MONOCYTES # BLD AUTO: 0.4 K/UL (ref 0.3–1)
MONOCYTES NFR BLD: 5.8 % (ref 4–15)
NEUTROPHILS # BLD AUTO: 3.1 K/UL (ref 1.8–7.7)
NEUTROPHILS NFR BLD: 47 % (ref 38–73)
NRBC BLD-RTO: 0 /100 WBC
PHOSPHATE SERPL-MCNC: 3 MG/DL (ref 2.7–4.5)
PLATELET # BLD AUTO: 428 K/UL (ref 150–450)
PMV BLD AUTO: 9.7 FL (ref 9.2–12.9)
POCT GLUCOSE: 104 MG/DL (ref 70–110)
POCT GLUCOSE: 104 MG/DL (ref 70–110)
POCT GLUCOSE: 215 MG/DL (ref 70–110)
POCT GLUCOSE: 243 MG/DL (ref 70–110)
POCT GLUCOSE: 286 MG/DL (ref 70–110)
POCT GLUCOSE: 321 MG/DL (ref 70–110)
POCT GLUCOSE: 354 MG/DL (ref 70–110)
POCT GLUCOSE: 435 MG/DL (ref 70–110)
POCT GLUCOSE: 468 MG/DL (ref 70–110)
POTASSIUM SERPL-SCNC: 4 MMOL/L (ref 3.5–5.1)
RBC # BLD AUTO: 4.81 M/UL (ref 4–5.4)
SARS-COV-2 RNA RESP QL NAA+PROBE: NOT DETECTED
SODIUM SERPL-SCNC: 135 MMOL/L (ref 136–145)
WBC # BLD AUTO: 6.54 K/UL (ref 3.9–12.7)

## 2021-06-13 PROCEDURE — U0005 INFEC AGEN DETEC AMPLI PROBE: HCPCS | Performed by: NURSE PRACTITIONER

## 2021-06-13 PROCEDURE — 63600175 PHARM REV CODE 636 W HCPCS: Performed by: PHYSICIAN ASSISTANT

## 2021-06-13 PROCEDURE — 83735 ASSAY OF MAGNESIUM: CPT | Performed by: PHYSICIAN ASSISTANT

## 2021-06-13 PROCEDURE — 99213 PR OFFICE/OUTPT VISIT, EST, LEVL III, 20-29 MIN: ICD-10-PCS | Mod: ,,, | Performed by: NURSE PRACTITIONER

## 2021-06-13 PROCEDURE — G0378 HOSPITAL OBSERVATION PER HR: HCPCS

## 2021-06-13 PROCEDURE — 99226 PR SUBSEQUENT OBSERVATION CARE,LEVEL III: CPT | Mod: ,,, | Performed by: NURSE PRACTITIONER

## 2021-06-13 PROCEDURE — 99226 PR SUBSEQUENT OBSERVATION CARE,LEVEL III: ICD-10-PCS | Mod: ,,, | Performed by: NURSE PRACTITIONER

## 2021-06-13 PROCEDURE — 36415 COLL VENOUS BLD VENIPUNCTURE: CPT | Performed by: PHYSICIAN ASSISTANT

## 2021-06-13 PROCEDURE — 84100 ASSAY OF PHOSPHORUS: CPT | Performed by: PHYSICIAN ASSISTANT

## 2021-06-13 PROCEDURE — 25000003 PHARM REV CODE 250: Performed by: NURSE PRACTITIONER

## 2021-06-13 PROCEDURE — U0003 INFECTIOUS AGENT DETECTION BY NUCLEIC ACID (DNA OR RNA); SEVERE ACUTE RESPIRATORY SYNDROME CORONAVIRUS 2 (SARS-COV-2) (CORONAVIRUS DISEASE [COVID-19]), AMPLIFIED PROBE TECHNIQUE, MAKING USE OF HIGH THROUGHPUT TECHNOLOGIES AS DESCRIBED BY CMS-2020-01-R: HCPCS | Performed by: NURSE PRACTITIONER

## 2021-06-13 PROCEDURE — 63600175 PHARM REV CODE 636 W HCPCS: Performed by: NURSE PRACTITIONER

## 2021-06-13 PROCEDURE — 85025 COMPLETE CBC W/AUTO DIFF WBC: CPT | Performed by: PHYSICIAN ASSISTANT

## 2021-06-13 PROCEDURE — C9399 UNCLASSIFIED DRUGS OR BIOLOG: HCPCS | Performed by: NURSE PRACTITIONER

## 2021-06-13 PROCEDURE — 99213 OFFICE O/P EST LOW 20 MIN: CPT | Mod: ,,, | Performed by: NURSE PRACTITIONER

## 2021-06-13 PROCEDURE — 25000003 PHARM REV CODE 250: Performed by: PHYSICIAN ASSISTANT

## 2021-06-13 PROCEDURE — 80048 BASIC METABOLIC PNL TOTAL CA: CPT | Performed by: PHYSICIAN ASSISTANT

## 2021-06-13 RX ORDER — INSULIN ASPART 100 [IU]/ML
1-10 INJECTION, SOLUTION INTRAVENOUS; SUBCUTANEOUS
Status: DISCONTINUED | OUTPATIENT
Start: 2021-06-13 | End: 2021-06-14 | Stop reason: HOSPADM

## 2021-06-13 RX ORDER — INSULIN ASPART 100 [IU]/ML
5 INJECTION, SOLUTION INTRAVENOUS; SUBCUTANEOUS
Status: DISCONTINUED | OUTPATIENT
Start: 2021-06-13 | End: 2021-06-14

## 2021-06-13 RX ORDER — IBUPROFEN 200 MG
24 TABLET ORAL
Status: DISCONTINUED | OUTPATIENT
Start: 2021-06-13 | End: 2021-06-14 | Stop reason: HOSPADM

## 2021-06-13 RX ORDER — IBUPROFEN 200 MG
16 TABLET ORAL
Status: DISCONTINUED | OUTPATIENT
Start: 2021-06-13 | End: 2021-06-14 | Stop reason: HOSPADM

## 2021-06-13 RX ORDER — GLUCAGON 1 MG
1 KIT INJECTION
Status: DISCONTINUED | OUTPATIENT
Start: 2021-06-13 | End: 2021-06-14 | Stop reason: HOSPADM

## 2021-06-13 RX ADMIN — INSULIN ASPART 5 UNITS: 100 INJECTION, SOLUTION INTRAVENOUS; SUBCUTANEOUS at 04:06

## 2021-06-13 RX ADMIN — INSULIN ASPART 10 UNITS: 100 INJECTION, SOLUTION INTRAVENOUS; SUBCUTANEOUS at 01:06

## 2021-06-13 RX ADMIN — MIDODRINE HYDROCHLORIDE 5 MG: 5 TABLET ORAL at 12:06

## 2021-06-13 RX ADMIN — MIDODRINE HYDROCHLORIDE 5 MG: 5 TABLET ORAL at 04:06

## 2021-06-13 RX ADMIN — ENOXAPARIN SODIUM 40 MG: 40 INJECTION SUBCUTANEOUS at 04:06

## 2021-06-13 RX ADMIN — MIDODRINE HYDROCHLORIDE 5 MG: 5 TABLET ORAL at 09:06

## 2021-06-13 RX ADMIN — ASPIRIN 81 MG: 81 TABLET, COATED ORAL at 09:06

## 2021-06-13 RX ADMIN — INSULIN DETEMIR 15 UNITS: 100 INJECTION, SOLUTION SUBCUTANEOUS at 02:06

## 2021-06-13 RX ADMIN — INSULIN ASPART 3 UNITS: 100 INJECTION, SOLUTION INTRAVENOUS; SUBCUTANEOUS at 09:06

## 2021-06-13 RX ADMIN — ANASTROZOLE 1 MG: 1 TABLET, COATED ORAL at 09:06

## 2021-06-13 RX ADMIN — CITALOPRAM HYDROBROMIDE 10 MG: 10 TABLET ORAL at 09:06

## 2021-06-14 ENCOUNTER — SSC ENCOUNTER (OUTPATIENT)
Dept: ADMINISTRATIVE | Facility: OTHER | Age: 71
End: 2021-06-14

## 2021-06-14 VITALS
BODY MASS INDEX: 15.6 KG/M2 | TEMPERATURE: 98 F | OXYGEN SATURATION: 99 % | RESPIRATION RATE: 20 BRPM | SYSTOLIC BLOOD PRESSURE: 156 MMHG | HEIGHT: 59 IN | HEART RATE: 90 BPM | DIASTOLIC BLOOD PRESSURE: 80 MMHG | WEIGHT: 77.38 LBS

## 2021-06-14 LAB
ANION GAP SERPL CALC-SCNC: 14 MMOL/L (ref 8–16)
BACTERIA #/AREA URNS AUTO: ABNORMAL /HPF
BASOPHILS # BLD AUTO: 0.05 K/UL (ref 0–0.2)
BASOPHILS NFR BLD: 0.6 % (ref 0–1.9)
BILIRUB UR QL STRIP: NEGATIVE
BUN SERPL-MCNC: 29 MG/DL (ref 8–23)
CALCIUM SERPL-MCNC: 10 MG/DL (ref 8.7–10.5)
CHLORIDE SERPL-SCNC: 102 MMOL/L (ref 95–110)
CLARITY UR REFRACT.AUTO: ABNORMAL
CO2 SERPL-SCNC: 19 MMOL/L (ref 23–29)
COLOR UR AUTO: YELLOW
CREAT SERPL-MCNC: 1.1 MG/DL (ref 0.5–1.4)
DIFFERENTIAL METHOD: ABNORMAL
EOSINOPHIL # BLD AUTO: 0.1 K/UL (ref 0–0.5)
EOSINOPHIL NFR BLD: 0.7 % (ref 0–8)
ERYTHROCYTE [DISTWIDTH] IN BLOOD BY AUTOMATED COUNT: 16.3 % (ref 11.5–14.5)
EST. GFR  (AFRICAN AMERICAN): 58.4 ML/MIN/1.73 M^2
EST. GFR  (NON AFRICAN AMERICAN): 50.6 ML/MIN/1.73 M^2
GLUCOSE SERPL-MCNC: 205 MG/DL (ref 70–110)
GLUCOSE UR QL STRIP: ABNORMAL
HCT VFR BLD AUTO: 39.4 % (ref 37–48.5)
HGB BLD-MCNC: 12.1 G/DL (ref 12–16)
HGB UR QL STRIP: NEGATIVE
IMM GRANULOCYTES # BLD AUTO: 0.02 K/UL (ref 0–0.04)
IMM GRANULOCYTES NFR BLD AUTO: 0.2 % (ref 0–0.5)
KETONES UR QL STRIP: NEGATIVE
LEUKOCYTE ESTERASE UR QL STRIP: ABNORMAL
LYMPHOCYTES # BLD AUTO: 3.6 K/UL (ref 1–4.8)
LYMPHOCYTES NFR BLD: 41.6 % (ref 18–48)
MAGNESIUM SERPL-MCNC: 1.9 MG/DL (ref 1.6–2.6)
MCH RBC QN AUTO: 24.9 PG (ref 27–31)
MCHC RBC AUTO-ENTMCNC: 30.7 G/DL (ref 32–36)
MCV RBC AUTO: 81 FL (ref 82–98)
MICROSCOPIC COMMENT: ABNORMAL
MONOCYTES # BLD AUTO: 0.5 K/UL (ref 0.3–1)
MONOCYTES NFR BLD: 5.4 % (ref 4–15)
NEUTROPHILS # BLD AUTO: 4.5 K/UL (ref 1.8–7.7)
NEUTROPHILS NFR BLD: 51.5 % (ref 38–73)
NITRITE UR QL STRIP: NEGATIVE
NRBC BLD-RTO: 0 /100 WBC
PH UR STRIP: 5 [PH] (ref 5–8)
PHOSPHATE SERPL-MCNC: 3.4 MG/DL (ref 2.7–4.5)
PLATELET # BLD AUTO: 435 K/UL (ref 150–450)
PMV BLD AUTO: 9.7 FL (ref 9.2–12.9)
POCT GLUCOSE: 70 MG/DL (ref 70–110)
POCT GLUCOSE: >500 MG/DL (ref 70–110)
POTASSIUM SERPL-SCNC: 4.2 MMOL/L (ref 3.5–5.1)
PROT UR QL STRIP: NEGATIVE
RBC # BLD AUTO: 4.86 M/UL (ref 4–5.4)
RBC #/AREA URNS AUTO: 2 /HPF (ref 0–4)
SODIUM SERPL-SCNC: 135 MMOL/L (ref 136–145)
SP GR UR STRIP: 1.02 (ref 1–1.03)
SQUAMOUS #/AREA URNS AUTO: 18 /HPF
URN SPEC COLLECT METH UR: ABNORMAL
WBC # BLD AUTO: 8.64 K/UL (ref 3.9–12.7)
WBC #/AREA URNS AUTO: 38 /HPF (ref 0–5)
YEAST UR QL AUTO: ABNORMAL

## 2021-06-14 PROCEDURE — 99213 OFFICE O/P EST LOW 20 MIN: CPT | Mod: ,,, | Performed by: NURSE PRACTITIONER

## 2021-06-14 PROCEDURE — 83735 ASSAY OF MAGNESIUM: CPT | Performed by: PHYSICIAN ASSISTANT

## 2021-06-14 PROCEDURE — 84100 ASSAY OF PHOSPHORUS: CPT | Performed by: PHYSICIAN ASSISTANT

## 2021-06-14 PROCEDURE — 25000003 PHARM REV CODE 250: Performed by: NURSE PRACTITIONER

## 2021-06-14 PROCEDURE — 81001 URINALYSIS AUTO W/SCOPE: CPT | Performed by: PHYSICIAN ASSISTANT

## 2021-06-14 PROCEDURE — G0378 HOSPITAL OBSERVATION PER HR: HCPCS

## 2021-06-14 PROCEDURE — 36415 COLL VENOUS BLD VENIPUNCTURE: CPT | Performed by: PHYSICIAN ASSISTANT

## 2021-06-14 PROCEDURE — 87086 URINE CULTURE/COLONY COUNT: CPT | Performed by: PHYSICIAN ASSISTANT

## 2021-06-14 PROCEDURE — 99213 PR OFFICE/OUTPT VISIT, EST, LEVL III, 20-29 MIN: ICD-10-PCS | Mod: ,,, | Performed by: NURSE PRACTITIONER

## 2021-06-14 PROCEDURE — 99217 PR OBSERVATION CARE DISCHARGE: CPT | Mod: ,,, | Performed by: INTERNAL MEDICINE

## 2021-06-14 PROCEDURE — 99217 PR OBSERVATION CARE DISCHARGE: ICD-10-PCS | Mod: ,,, | Performed by: INTERNAL MEDICINE

## 2021-06-14 PROCEDURE — 25000003 PHARM REV CODE 250: Performed by: PHYSICIAN ASSISTANT

## 2021-06-14 PROCEDURE — 80048 BASIC METABOLIC PNL TOTAL CA: CPT | Performed by: PHYSICIAN ASSISTANT

## 2021-06-14 PROCEDURE — 63600175 PHARM REV CODE 636 W HCPCS: Performed by: NURSE PRACTITIONER

## 2021-06-14 PROCEDURE — 85025 COMPLETE CBC W/AUTO DIFF WBC: CPT | Performed by: PHYSICIAN ASSISTANT

## 2021-06-14 RX ORDER — INSULIN LISPRO 100 [IU]/ML
5 INJECTION, SOLUTION INTRAVENOUS; SUBCUTANEOUS 3 TIMES DAILY
Qty: 15 ML | Refills: 11 | Status: ON HOLD | OUTPATIENT
Start: 2021-06-14 | End: 2021-10-15 | Stop reason: SDUPTHER

## 2021-06-14 RX ORDER — INSULIN ASPART 100 [IU]/ML
5 INJECTION, SOLUTION INTRAVENOUS; SUBCUTANEOUS 3 TIMES DAILY
Refills: 0
Start: 2021-06-14 | End: 2021-06-14 | Stop reason: HOSPADM

## 2021-06-14 RX ORDER — INSULIN ASPART 100 [IU]/ML
7 INJECTION, SOLUTION INTRAVENOUS; SUBCUTANEOUS
Status: DISCONTINUED | OUTPATIENT
Start: 2021-06-14 | End: 2021-06-14 | Stop reason: HOSPADM

## 2021-06-14 RX ADMIN — MIDODRINE HYDROCHLORIDE 5 MG: 5 TABLET ORAL at 07:06

## 2021-06-14 RX ADMIN — ASPIRIN 81 MG: 81 TABLET, COATED ORAL at 07:06

## 2021-06-14 RX ADMIN — POLYETHYLENE GLYCOL 3350 17 G: 17 POWDER, FOR SOLUTION ORAL at 07:06

## 2021-06-14 RX ADMIN — INSULIN ASPART 10 UNITS: 100 INJECTION, SOLUTION INTRAVENOUS; SUBCUTANEOUS at 11:06

## 2021-06-14 RX ADMIN — MIDODRINE HYDROCHLORIDE 5 MG: 5 TABLET ORAL at 11:06

## 2021-06-14 RX ADMIN — ANASTROZOLE 1 MG: 1 TABLET, COATED ORAL at 08:06

## 2021-06-14 RX ADMIN — INSULIN DETEMIR 15 UNITS: 100 INJECTION, SOLUTION SUBCUTANEOUS at 07:06

## 2021-06-14 RX ADMIN — INSULIN ASPART 7 UNITS: 100 INJECTION, SOLUTION INTRAVENOUS; SUBCUTANEOUS at 11:06

## 2021-06-14 RX ADMIN — CITALOPRAM HYDROBROMIDE 10 MG: 10 TABLET ORAL at 07:06

## 2021-06-15 LAB
BACTERIA UR CULT: NORMAL
BACTERIA UR CULT: NORMAL

## 2021-06-17 DIAGNOSIS — C50.411 MALIGNANT NEOPLASM OF UPPER-OUTER QUADRANT OF RIGHT BREAST IN FEMALE, ESTROGEN RECEPTOR POSITIVE: ICD-10-CM

## 2021-06-17 DIAGNOSIS — E11.8 POORLY CONTROLLED TYPE 2 DIABETES MELLITUS WITH COMPLICATION: Primary | ICD-10-CM

## 2021-06-17 DIAGNOSIS — E11.65 POORLY CONTROLLED TYPE 2 DIABETES MELLITUS WITH COMPLICATION: Primary | ICD-10-CM

## 2021-06-17 DIAGNOSIS — Z17.0 MALIGNANT NEOPLASM OF UPPER-OUTER QUADRANT OF RIGHT BREAST IN FEMALE, ESTROGEN RECEPTOR POSITIVE: ICD-10-CM

## 2021-06-17 RX ORDER — MIDODRINE HYDROCHLORIDE 5 MG/1
5 TABLET ORAL
Qty: 90 TABLET | Refills: 11 | Status: ON HOLD | OUTPATIENT
Start: 2021-06-17 | End: 2021-10-22 | Stop reason: SDUPTHER

## 2021-06-18 ENCOUNTER — TELEPHONE (OUTPATIENT)
Dept: HEMATOLOGY/ONCOLOGY | Facility: CLINIC | Age: 71
End: 2021-06-18

## 2021-06-22 ENCOUNTER — CARE AT HOME (OUTPATIENT)
Dept: HOME HEALTH SERVICES | Facility: CLINIC | Age: 71
End: 2021-06-22
Payer: COMMERCIAL

## 2021-06-22 VITALS
SYSTOLIC BLOOD PRESSURE: 140 MMHG | BODY MASS INDEX: 15.52 KG/M2 | HEIGHT: 59 IN | HEART RATE: 76 BPM | TEMPERATURE: 97 F | DIASTOLIC BLOOD PRESSURE: 77 MMHG | WEIGHT: 77 LBS | OXYGEN SATURATION: 96 % | RESPIRATION RATE: 18 BRPM

## 2021-06-22 DIAGNOSIS — Z86.73 HISTORY OF CVA (CEREBROVASCULAR ACCIDENT): Primary | ICD-10-CM

## 2021-06-22 DIAGNOSIS — E11.65 POORLY CONTROLLED TYPE 2 DIABETES MELLITUS WITH COMPLICATION: ICD-10-CM

## 2021-06-22 DIAGNOSIS — E11.8 POORLY CONTROLLED TYPE 2 DIABETES MELLITUS WITH COMPLICATION: ICD-10-CM

## 2021-06-22 DIAGNOSIS — R29.6 FALLS FREQUENTLY: ICD-10-CM

## 2021-06-22 DIAGNOSIS — I95.1 ORTHOSTATIC SYNCOPE: ICD-10-CM

## 2021-06-22 DIAGNOSIS — Z91.148 NONCOMPLIANCE WITH MEDICATIONS: ICD-10-CM

## 2021-06-22 PROCEDURE — 99344 HOME/RES VST NEW MOD MDM 60: CPT | Mod: 25,S$GLB,, | Performed by: NURSE PRACTITIONER

## 2021-06-22 PROCEDURE — 99344 PR HOME VISIT,NEW PATIENT,LEVEL IV: ICD-10-PCS | Mod: 25,S$GLB,, | Performed by: NURSE PRACTITIONER

## 2021-06-22 PROCEDURE — 99497 ADVNCD CARE PLAN 30 MIN: CPT | Mod: S$GLB,,, | Performed by: NURSE PRACTITIONER

## 2021-06-22 PROCEDURE — 99406 PR TOBACCO USE CESSATION INTERMEDIATE 3-10 MINUTES: ICD-10-PCS | Mod: S$GLB,,, | Performed by: NURSE PRACTITIONER

## 2021-06-22 PROCEDURE — 99497 PR ADVNCD CARE PLAN 30 MIN: ICD-10-PCS | Mod: S$GLB,,, | Performed by: NURSE PRACTITIONER

## 2021-06-22 PROCEDURE — 99406 BEHAV CHNG SMOKING 3-10 MIN: CPT | Mod: S$GLB,,, | Performed by: NURSE PRACTITIONER

## 2021-06-23 ENCOUNTER — DOCUMENT SCAN (OUTPATIENT)
Dept: HOME HEALTH SERVICES | Facility: HOSPITAL | Age: 71
End: 2021-06-23
Payer: COMMERCIAL

## 2021-06-24 ENCOUNTER — TELEPHONE (OUTPATIENT)
Dept: HEMATOLOGY/ONCOLOGY | Facility: CLINIC | Age: 71
End: 2021-06-24

## 2021-07-01 ENCOUNTER — HOSPITAL ENCOUNTER (OUTPATIENT)
Dept: RADIOLOGY | Facility: OTHER | Age: 71
Discharge: HOME | End: 2021-07-01
Attending: NURSE PRACTITIONER
Payer: COMMERCIAL

## 2021-07-01 DIAGNOSIS — R74.8 ALKALINE PHOSPHATASE ELEVATION: ICD-10-CM

## 2021-07-01 PROCEDURE — 76700 US EXAM ABDOM COMPLETE: CPT | Mod: TC

## 2021-07-01 PROCEDURE — 76700 US ABDOMEN COMPLETE: ICD-10-PCS | Mod: 26,,, | Performed by: RADIOLOGY

## 2021-07-01 PROCEDURE — 76700 US EXAM ABDOM COMPLETE: CPT | Mod: 26,,, | Performed by: RADIOLOGY

## 2021-08-02 ENCOUNTER — DOCUMENT SCAN (OUTPATIENT)
Dept: HOME HEALTH SERVICES | Facility: HOSPITAL | Age: 71
End: 2021-08-02
Payer: COMMERCIAL

## 2021-08-18 ENCOUNTER — OFFICE VISIT (OUTPATIENT)
Dept: OPTOMETRY | Facility: CLINIC | Age: 71
End: 2021-08-18
Payer: COMMERCIAL

## 2021-08-18 DIAGNOSIS — H52.4 PRESBYOPIA: Primary | ICD-10-CM

## 2021-08-18 PROCEDURE — 92015 DETERMINE REFRACTIVE STATE: CPT | Mod: S$GLB,,, | Performed by: OPTOMETRIST

## 2021-08-18 PROCEDURE — 1157F ADVNC CARE PLAN IN RCRD: CPT | Mod: CPTII,S$GLB,, | Performed by: OPTOMETRIST

## 2021-08-18 PROCEDURE — 99999 PR PBB SHADOW E&M-EST. PATIENT-LVL III: CPT | Mod: PBBFAC,,, | Performed by: OPTOMETRIST

## 2021-08-18 PROCEDURE — 3288F FALL RISK ASSESSMENT DOCD: CPT | Mod: CPTII,S$GLB,, | Performed by: OPTOMETRIST

## 2021-08-18 PROCEDURE — 1159F MED LIST DOCD IN RCRD: CPT | Mod: CPTII,S$GLB,, | Performed by: OPTOMETRIST

## 2021-08-18 PROCEDURE — 1157F PR ADVANCE CARE PLAN OR EQUIV PRESENT IN MEDICAL RECORD: ICD-10-PCS | Mod: CPTII,S$GLB,, | Performed by: OPTOMETRIST

## 2021-08-18 PROCEDURE — 99999 PR PBB SHADOW E&M-EST. PATIENT-LVL III: ICD-10-PCS | Mod: PBBFAC,,, | Performed by: OPTOMETRIST

## 2021-08-18 PROCEDURE — 1126F AMNT PAIN NOTED NONE PRSNT: CPT | Mod: CPTII,S$GLB,, | Performed by: OPTOMETRIST

## 2021-08-18 PROCEDURE — 3046F PR MOST RECENT HEMOGLOBIN A1C LEVEL > 9.0%: ICD-10-PCS | Mod: CPTII,S$GLB,, | Performed by: OPTOMETRIST

## 2021-08-18 PROCEDURE — 1159F PR MEDICATION LIST DOCUMENTED IN MEDICAL RECORD: ICD-10-PCS | Mod: CPTII,S$GLB,, | Performed by: OPTOMETRIST

## 2021-08-18 PROCEDURE — 1100F PR PT FALLS ASSESS DOC 2+ FALLS/FALL W/INJURY/YR: ICD-10-PCS | Mod: CPTII,S$GLB,, | Performed by: OPTOMETRIST

## 2021-08-18 PROCEDURE — 1126F PR PAIN SEVERITY QUANTIFIED, NO PAIN PRESENT: ICD-10-PCS | Mod: CPTII,S$GLB,, | Performed by: OPTOMETRIST

## 2021-08-18 PROCEDURE — 3288F PR FALLS RISK ASSESSMENT DOCUMENTED: ICD-10-PCS | Mod: CPTII,S$GLB,, | Performed by: OPTOMETRIST

## 2021-08-18 PROCEDURE — 92015 PR REFRACTION: ICD-10-PCS | Mod: S$GLB,,, | Performed by: OPTOMETRIST

## 2021-08-18 PROCEDURE — 1100F PTFALLS ASSESS-DOCD GE2>/YR: CPT | Mod: CPTII,S$GLB,, | Performed by: OPTOMETRIST

## 2021-08-18 PROCEDURE — 3046F HEMOGLOBIN A1C LEVEL >9.0%: CPT | Mod: CPTII,S$GLB,, | Performed by: OPTOMETRIST

## 2021-09-28 ENCOUNTER — OFFICE VISIT (OUTPATIENT)
Dept: OPHTHALMOLOGY | Facility: CLINIC | Age: 71
End: 2021-09-28
Payer: COMMERCIAL

## 2021-09-28 DIAGNOSIS — H43.812 POSTERIOR VITREOUS DETACHMENT OF LEFT EYE: ICD-10-CM

## 2021-09-28 DIAGNOSIS — H43.12 VITREOUS HEMORRHAGE OF LEFT EYE: ICD-10-CM

## 2021-09-28 PROCEDURE — 92134 POSTERIOR SEGMENT OCT RETINA (OCULAR COHERENCE TOMOGRAPHY)-BOTH EYES: ICD-10-PCS | Mod: S$GLB,,, | Performed by: OPHTHALMOLOGY

## 2021-09-28 PROCEDURE — 1160F RVW MEDS BY RX/DR IN RCRD: CPT | Mod: CPTII,S$GLB,, | Performed by: OPHTHALMOLOGY

## 2021-09-28 PROCEDURE — 92014 PR EYE EXAM, EST PATIENT,COMPREHESV: ICD-10-PCS | Mod: S$GLB,,, | Performed by: OPHTHALMOLOGY

## 2021-09-28 PROCEDURE — 1157F ADVNC CARE PLAN IN RCRD: CPT | Mod: CPTII,S$GLB,, | Performed by: OPHTHALMOLOGY

## 2021-09-28 PROCEDURE — 1126F PR PAIN SEVERITY QUANTIFIED, NO PAIN PRESENT: ICD-10-PCS | Mod: CPTII,S$GLB,, | Performed by: OPHTHALMOLOGY

## 2021-09-28 PROCEDURE — 1101F PR PT FALLS ASSESS DOC 0-1 FALLS W/OUT INJ PAST YR: ICD-10-PCS | Mod: CPTII,S$GLB,, | Performed by: OPHTHALMOLOGY

## 2021-09-28 PROCEDURE — 1126F AMNT PAIN NOTED NONE PRSNT: CPT | Mod: CPTII,S$GLB,, | Performed by: OPHTHALMOLOGY

## 2021-09-28 PROCEDURE — 3046F HEMOGLOBIN A1C LEVEL >9.0%: CPT | Mod: CPTII,S$GLB,, | Performed by: OPHTHALMOLOGY

## 2021-09-28 PROCEDURE — 3288F PR FALLS RISK ASSESSMENT DOCUMENTED: ICD-10-PCS | Mod: CPTII,S$GLB,, | Performed by: OPHTHALMOLOGY

## 2021-09-28 PROCEDURE — 3288F FALL RISK ASSESSMENT DOCD: CPT | Mod: CPTII,S$GLB,, | Performed by: OPHTHALMOLOGY

## 2021-09-28 PROCEDURE — 92201 PR OPHTHALMOSCOPY, EXT, W/RET DRAW/SCLERAL DEPR, I&R, UNI/BI: ICD-10-PCS | Mod: S$GLB,,, | Performed by: OPHTHALMOLOGY

## 2021-09-28 PROCEDURE — 92014 COMPRE OPH EXAM EST PT 1/>: CPT | Mod: S$GLB,,, | Performed by: OPHTHALMOLOGY

## 2021-09-28 PROCEDURE — 99999 PR PBB SHADOW E&M-EST. PATIENT-LVL III: ICD-10-PCS | Mod: PBBFAC,,, | Performed by: OPHTHALMOLOGY

## 2021-09-28 PROCEDURE — 1160F PR REVIEW ALL MEDS BY PRESCRIBER/CLIN PHARMACIST DOCUMENTED: ICD-10-PCS | Mod: CPTII,S$GLB,, | Performed by: OPHTHALMOLOGY

## 2021-09-28 PROCEDURE — 1159F PR MEDICATION LIST DOCUMENTED IN MEDICAL RECORD: ICD-10-PCS | Mod: CPTII,S$GLB,, | Performed by: OPHTHALMOLOGY

## 2021-09-28 PROCEDURE — 92201 OPSCPY EXTND RTA DRAW UNI/BI: CPT | Mod: S$GLB,,, | Performed by: OPHTHALMOLOGY

## 2021-09-28 PROCEDURE — 1157F PR ADVANCE CARE PLAN OR EQUIV PRESENT IN MEDICAL RECORD: ICD-10-PCS | Mod: CPTII,S$GLB,, | Performed by: OPHTHALMOLOGY

## 2021-09-28 PROCEDURE — 3046F PR MOST RECENT HEMOGLOBIN A1C LEVEL > 9.0%: ICD-10-PCS | Mod: CPTII,S$GLB,, | Performed by: OPHTHALMOLOGY

## 2021-09-28 PROCEDURE — 99999 PR PBB SHADOW E&M-EST. PATIENT-LVL III: CPT | Mod: PBBFAC,,, | Performed by: OPHTHALMOLOGY

## 2021-09-28 PROCEDURE — 92134 CPTRZ OPH DX IMG PST SGM RTA: CPT | Mod: S$GLB,,, | Performed by: OPHTHALMOLOGY

## 2021-09-28 PROCEDURE — 1101F PT FALLS ASSESS-DOCD LE1/YR: CPT | Mod: CPTII,S$GLB,, | Performed by: OPHTHALMOLOGY

## 2021-09-28 PROCEDURE — 1159F MED LIST DOCD IN RCRD: CPT | Mod: CPTII,S$GLB,, | Performed by: OPHTHALMOLOGY

## 2021-10-05 ENCOUNTER — OFFICE VISIT (OUTPATIENT)
Dept: NEUROLOGY | Facility: CLINIC | Age: 71
End: 2021-10-05
Payer: COMMERCIAL

## 2021-10-05 VITALS
WEIGHT: 91.38 LBS | HEART RATE: 98 BPM | DIASTOLIC BLOOD PRESSURE: 75 MMHG | SYSTOLIC BLOOD PRESSURE: 129 MMHG | BODY MASS INDEX: 18.46 KG/M2

## 2021-10-05 DIAGNOSIS — I95.1 ORTHOSTASIS: ICD-10-CM

## 2021-10-05 DIAGNOSIS — E11.43 TYPE 2 DIABETES MELLITUS WITH AUTONOMIC NEUROPATHY, UNSPECIFIED WHETHER LONG TERM INSULIN USE: Primary | ICD-10-CM

## 2021-10-05 DIAGNOSIS — I95.1 ORTHOSTATIC HYPOTENSION: ICD-10-CM

## 2021-10-05 DIAGNOSIS — R29.6 FALLS FREQUENTLY: ICD-10-CM

## 2021-10-05 DIAGNOSIS — Z86.73 HISTORY OF CVA (CEREBROVASCULAR ACCIDENT): ICD-10-CM

## 2021-10-05 PROCEDURE — 3046F HEMOGLOBIN A1C LEVEL >9.0%: CPT | Mod: CPTII,S$GLB,, | Performed by: PSYCHIATRY & NEUROLOGY

## 2021-10-05 PROCEDURE — 99999 PR PBB SHADOW E&M-EST. PATIENT-LVL IV: CPT | Mod: PBBFAC,,, | Performed by: PSYCHIATRY & NEUROLOGY

## 2021-10-05 PROCEDURE — 3074F SYST BP LT 130 MM HG: CPT | Mod: CPTII,S$GLB,, | Performed by: PSYCHIATRY & NEUROLOGY

## 2021-10-05 PROCEDURE — 1100F PR PT FALLS ASSESS DOC 2+ FALLS/FALL W/INJURY/YR: ICD-10-PCS | Mod: CPTII,S$GLB,, | Performed by: PSYCHIATRY & NEUROLOGY

## 2021-10-05 PROCEDURE — 99999 PR PBB SHADOW E&M-EST. PATIENT-LVL IV: ICD-10-PCS | Mod: PBBFAC,,, | Performed by: PSYCHIATRY & NEUROLOGY

## 2021-10-05 PROCEDURE — 3288F PR FALLS RISK ASSESSMENT DOCUMENTED: ICD-10-PCS | Mod: CPTII,S$GLB,, | Performed by: PSYCHIATRY & NEUROLOGY

## 2021-10-05 PROCEDURE — 1157F ADVNC CARE PLAN IN RCRD: CPT | Mod: CPTII,S$GLB,, | Performed by: PSYCHIATRY & NEUROLOGY

## 2021-10-05 PROCEDURE — 3046F PR MOST RECENT HEMOGLOBIN A1C LEVEL > 9.0%: ICD-10-PCS | Mod: CPTII,S$GLB,, | Performed by: PSYCHIATRY & NEUROLOGY

## 2021-10-05 PROCEDURE — 3008F BODY MASS INDEX DOCD: CPT | Mod: CPTII,S$GLB,, | Performed by: PSYCHIATRY & NEUROLOGY

## 2021-10-05 PROCEDURE — 3288F FALL RISK ASSESSMENT DOCD: CPT | Mod: CPTII,S$GLB,, | Performed by: PSYCHIATRY & NEUROLOGY

## 2021-10-05 PROCEDURE — 3008F PR BODY MASS INDEX (BMI) DOCUMENTED: ICD-10-PCS | Mod: CPTII,S$GLB,, | Performed by: PSYCHIATRY & NEUROLOGY

## 2021-10-05 PROCEDURE — 1126F AMNT PAIN NOTED NONE PRSNT: CPT | Mod: CPTII,S$GLB,, | Performed by: PSYCHIATRY & NEUROLOGY

## 2021-10-05 PROCEDURE — 1100F PTFALLS ASSESS-DOCD GE2>/YR: CPT | Mod: CPTII,S$GLB,, | Performed by: PSYCHIATRY & NEUROLOGY

## 2021-10-05 PROCEDURE — 1126F PR PAIN SEVERITY QUANTIFIED, NO PAIN PRESENT: ICD-10-PCS | Mod: CPTII,S$GLB,, | Performed by: PSYCHIATRY & NEUROLOGY

## 2021-10-05 PROCEDURE — 99205 PR OFFICE/OUTPT VISIT, NEW, LEVL V, 60-74 MIN: ICD-10-PCS | Mod: S$GLB,,, | Performed by: PSYCHIATRY & NEUROLOGY

## 2021-10-05 PROCEDURE — 3074F PR MOST RECENT SYSTOLIC BLOOD PRESSURE < 130 MM HG: ICD-10-PCS | Mod: CPTII,S$GLB,, | Performed by: PSYCHIATRY & NEUROLOGY

## 2021-10-05 PROCEDURE — 1157F PR ADVANCE CARE PLAN OR EQUIV PRESENT IN MEDICAL RECORD: ICD-10-PCS | Mod: CPTII,S$GLB,, | Performed by: PSYCHIATRY & NEUROLOGY

## 2021-10-05 PROCEDURE — 1159F PR MEDICATION LIST DOCUMENTED IN MEDICAL RECORD: ICD-10-PCS | Mod: CPTII,S$GLB,, | Performed by: PSYCHIATRY & NEUROLOGY

## 2021-10-05 PROCEDURE — 1159F MED LIST DOCD IN RCRD: CPT | Mod: CPTII,S$GLB,, | Performed by: PSYCHIATRY & NEUROLOGY

## 2021-10-05 PROCEDURE — 3078F DIAST BP <80 MM HG: CPT | Mod: CPTII,S$GLB,, | Performed by: PSYCHIATRY & NEUROLOGY

## 2021-10-05 PROCEDURE — 3078F PR MOST RECENT DIASTOLIC BLOOD PRESSURE < 80 MM HG: ICD-10-PCS | Mod: CPTII,S$GLB,, | Performed by: PSYCHIATRY & NEUROLOGY

## 2021-10-05 PROCEDURE — 99205 OFFICE O/P NEW HI 60 MIN: CPT | Mod: S$GLB,,, | Performed by: PSYCHIATRY & NEUROLOGY

## 2021-10-05 RX ORDER — MIDODRINE HYDROCHLORIDE 2.5 MG/1
7.5 TABLET ORAL EVERY 12 HOURS
Qty: 180 TABLET | Refills: 11 | Status: ON HOLD | OUTPATIENT
Start: 2021-10-05 | End: 2021-10-13 | Stop reason: HOSPADM

## 2021-10-12 ENCOUNTER — HOSPITAL ENCOUNTER (INPATIENT)
Facility: HOSPITAL | Age: 71
LOS: 10 days | Discharge: HOME HEALTH | DRG: 637 | End: 2021-10-22
Attending: EMERGENCY MEDICINE | Admitting: HOSPITALIST
Payer: COMMERCIAL

## 2021-10-12 ENCOUNTER — PATIENT OUTREACH (OUTPATIENT)
Dept: EMERGENCY MEDICINE | Facility: HOSPITAL | Age: 71
End: 2021-10-12

## 2021-10-12 DIAGNOSIS — R55 SYNCOPE: ICD-10-CM

## 2021-10-12 DIAGNOSIS — E11.10 DIABETIC KETOACIDOSIS WITHOUT COMA ASSOCIATED WITH TYPE 2 DIABETES MELLITUS: ICD-10-CM

## 2021-10-12 DIAGNOSIS — R53.1 WEAKNESS: ICD-10-CM

## 2021-10-12 DIAGNOSIS — N17.9 AKI (ACUTE KIDNEY INJURY): Primary | ICD-10-CM

## 2021-10-12 DIAGNOSIS — Z86.73 HISTORY OF CVA (CEREBROVASCULAR ACCIDENT): ICD-10-CM

## 2021-10-12 DIAGNOSIS — E11.10 DKA (DIABETIC KETOACIDOSIS): ICD-10-CM

## 2021-10-12 DIAGNOSIS — R29.6 FALLS FREQUENTLY: ICD-10-CM
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
## 2021-10-12 DIAGNOSIS — R41.3 MEMORY LOSS: ICD-10-CM

## 2021-10-12 DIAGNOSIS — I95.1 ORTHOSTATIC HYPOTENSION: ICD-10-CM

## 2021-10-12 DIAGNOSIS — I95.1 ORTHOSTATIC SYNCOPE: ICD-10-CM

## 2021-10-12 DIAGNOSIS — F32.A DEPRESSION, UNSPECIFIED DEPRESSION TYPE: ICD-10-CM

## 2021-10-12 DIAGNOSIS — R07.9 CHEST PAIN: ICD-10-CM
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
## 2021-10-12 LAB
ALBUMIN SERPL BCP-MCNC: 3.8 G/DL (ref 3.5–5.2)
ALLENS TEST: ABNORMAL
ALP SERPL-CCNC: 136 U/L (ref 55–135)
ALT SERPL W/O P-5'-P-CCNC: 30 U/L (ref 10–44)
ANION GAP SERPL CALC-SCNC: 14 MMOL/L (ref 8–16)
ANION GAP SERPL CALC-SCNC: 18 MMOL/L (ref 8–16)
AST SERPL-CCNC: 26 U/L (ref 10–40)
B-OH-BUTYR BLD STRIP-SCNC: 1 MMOL/L (ref 0–0.5)
BASOPHILS # BLD AUTO: 0.02 K/UL (ref 0–0.2)
BASOPHILS NFR BLD: 0.3 % (ref 0–1.9)
BILIRUB SERPL-MCNC: 1 MG/DL (ref 0.1–1)
BNP SERPL-MCNC: 98 PG/ML (ref 0–99)
BUN SERPL-MCNC: 45 MG/DL (ref 8–23)
BUN SERPL-MCNC: 52 MG/DL (ref 8–23)
BUN SERPL-MCNC: 55 MG/DL (ref 6–30)
CALCIUM SERPL-MCNC: 10.2 MG/DL (ref 8.7–10.5)
CALCIUM SERPL-MCNC: 9.2 MG/DL (ref 8.7–10.5)
CHLORIDE SERPL-SCNC: 101 MMOL/L (ref 95–110)
CHLORIDE SERPL-SCNC: 92 MMOL/L (ref 95–110)
CHLORIDE SERPL-SCNC: 92 MMOL/L (ref 95–110)
CO2 SERPL-SCNC: 21 MMOL/L (ref 23–29)
CO2 SERPL-SCNC: 21 MMOL/L (ref 23–29)
CREAT SERPL-MCNC: 1.6 MG/DL (ref 0.5–1.4)
CREAT SERPL-MCNC: 2.2 MG/DL (ref 0.5–1.4)
CREAT SERPL-MCNC: 2.2 MG/DL (ref 0.5–1.4)
CTP QC/QA: YES
DIFFERENTIAL METHOD: ABNORMAL
EOSINOPHIL # BLD AUTO: 0 K/UL (ref 0–0.5)
EOSINOPHIL NFR BLD: 0 % (ref 0–8)
ERYTHROCYTE [DISTWIDTH] IN BLOOD BY AUTOMATED COUNT: 15.3 % (ref 11.5–14.5)
EST. GFR  (AFRICAN AMERICAN): 25.2 ML/MIN/1.73 M^2
EST. GFR  (AFRICAN AMERICAN): 37.1 ML/MIN/1.73 M^2
EST. GFR  (NON AFRICAN AMERICAN): 21.9 ML/MIN/1.73 M^2
EST. GFR  (NON AFRICAN AMERICAN): 32.2 ML/MIN/1.73 M^2
GLUCOSE SERPL-MCNC: 105 MG/DL (ref 70–110)
GLUCOSE SERPL-MCNC: 155 MG/DL (ref 70–110)
GLUCOSE SERPL-MCNC: 204 MG/DL (ref 70–110)
GLUCOSE SERPL-MCNC: 443 MG/DL (ref 70–110)
GLUCOSE SERPL-MCNC: 447 MG/DL (ref 70–110)
HCO3 UR-SCNC: 30.7 MMOL/L (ref 24–28)
HCT VFR BLD AUTO: 41.4 % (ref 37–48.5)
HCT VFR BLD CALC: 45 %PCV (ref 36–54)
HGB BLD-MCNC: 13.3 G/DL (ref 12–16)
IMM GRANULOCYTES # BLD AUTO: 0.02 K/UL (ref 0–0.04)
IMM GRANULOCYTES NFR BLD AUTO: 0.3 % (ref 0–0.5)
LACTATE SERPL-SCNC: 1.3 MMOL/L (ref 0.5–2.2)
LYMPHOCYTES # BLD AUTO: 1.8 K/UL (ref 1–4.8)
LYMPHOCYTES NFR BLD: 25.4 % (ref 18–48)
MCH RBC QN AUTO: 26.1 PG (ref 27–31)
MCHC RBC AUTO-ENTMCNC: 32.1 G/DL (ref 32–36)
MCV RBC AUTO: 81 FL (ref 82–98)
MONOCYTES # BLD AUTO: 0.4 K/UL (ref 0.3–1)
MONOCYTES NFR BLD: 6.1 % (ref 4–15)
NEUTROPHILS # BLD AUTO: 4.9 K/UL (ref 1.8–7.7)
NEUTROPHILS NFR BLD: 67.9 % (ref 38–73)
NRBC BLD-RTO: 0 /100 WBC
PCO2 BLDA: 58.5 MMHG (ref 35–45)
PH SMN: 7.33 [PH] (ref 7.35–7.45)
PLATELET # BLD AUTO: 356 K/UL (ref 150–450)
PMV BLD AUTO: 9.4 FL (ref 9.2–12.9)
PO2 BLDA: 25 MMHG (ref 40–60)
POC BE: 5 MMOL/L
POC IONIZED CALCIUM: 1.08 MMOL/L (ref 1.06–1.42)
POC SATURATED O2: 40 % (ref 95–100)
POC TCO2 (MEASURED): 27 MMOL/L (ref 23–29)
POC TCO2: 32 MMOL/L (ref 24–29)
POCT GLUCOSE: 105 MG/DL (ref 70–110)
POCT GLUCOSE: 204 MG/DL (ref 70–110)
POCT GLUCOSE: 365 MG/DL (ref 70–110)
POCT GLUCOSE: 398 MG/DL (ref 70–110)
POCT GLUCOSE: 440 MG/DL (ref 70–110)
POCT GLUCOSE: 88 MG/DL (ref 70–110)
POCT GLUCOSE: 92 MG/DL (ref 70–110)
POTASSIUM BLD-SCNC: 4.2 MMOL/L (ref 3.5–5.1)
POTASSIUM SERPL-SCNC: 3.1 MMOL/L (ref 3.5–5.1)
POTASSIUM SERPL-SCNC: 4.1 MMOL/L (ref 3.5–5.1)
PROT SERPL-MCNC: 7.3 G/DL (ref 6–8.4)
RBC # BLD AUTO: 5.1 M/UL (ref 4–5.4)
SAMPLE: ABNORMAL
SAMPLE: ABNORMAL
SARS-COV-2 RDRP RESP QL NAA+PROBE: NEGATIVE
SITE: ABNORMAL
SODIUM BLD-SCNC: 131 MMOL/L (ref 136–145)
SODIUM SERPL-SCNC: 131 MMOL/L (ref 136–145)
SODIUM SERPL-SCNC: 136 MMOL/L (ref 136–145)
TROPONIN I SERPL DL<=0.01 NG/ML-MCNC: 0.23 NG/ML (ref 0–0.03)
TROPONIN I SERPL DL<=0.01 NG/ML-MCNC: 0.26 NG/ML (ref 0–0.03)
TROPONIN I SERPL DL<=0.01 NG/ML-MCNC: 0.26 NG/ML (ref 0–0.03)
WBC # BLD AUTO: 7.25 K/UL (ref 3.9–12.7)

## 2021-10-12 PROCEDURE — 99285 EMERGENCY DEPT VISIT HI MDM: CPT | Mod: ,,, | Performed by: PHYSICIAN ASSISTANT

## 2021-10-12 PROCEDURE — 51702 INSERT TEMP BLADDER CATH: CPT

## 2021-10-12 PROCEDURE — 99900035 HC TECH TIME PER 15 MIN (STAT)

## 2021-10-12 PROCEDURE — U0002 COVID-19 LAB TEST NON-CDC: HCPCS | Performed by: PHYSICIAN ASSISTANT

## 2021-10-12 PROCEDURE — 86803 HEPATITIS C AB TEST: CPT | Performed by: EMERGENCY MEDICINE

## 2021-10-12 PROCEDURE — 83605 ASSAY OF LACTIC ACID: CPT | Performed by: PHYSICIAN ASSISTANT

## 2021-10-12 PROCEDURE — 93005 ELECTROCARDIOGRAM TRACING: CPT

## 2021-10-12 PROCEDURE — 93010 EKG 12-LEAD: ICD-10-PCS | Mod: ,,, | Performed by: INTERNAL MEDICINE

## 2021-10-12 PROCEDURE — 99223 PR INITIAL HOSPITAL CARE,LEVL III: ICD-10-PCS | Mod: ,,, | Performed by: HOSPITALIST

## 2021-10-12 PROCEDURE — 63600175 PHARM REV CODE 636 W HCPCS: Performed by: STUDENT IN AN ORGANIZED HEALTH CARE EDUCATION/TRAINING PROGRAM

## 2021-10-12 PROCEDURE — 80053 COMPREHEN METABOLIC PANEL: CPT | Performed by: PHYSICIAN ASSISTANT

## 2021-10-12 PROCEDURE — 99223 1ST HOSP IP/OBS HIGH 75: CPT | Mod: ,,, | Performed by: HOSPITALIST

## 2021-10-12 PROCEDURE — 80048 BASIC METABOLIC PNL TOTAL CA: CPT | Performed by: STUDENT IN AN ORGANIZED HEALTH CARE EDUCATION/TRAINING PROGRAM

## 2021-10-12 PROCEDURE — 96361 HYDRATE IV INFUSION ADD-ON: CPT

## 2021-10-12 PROCEDURE — 82962 GLUCOSE BLOOD TEST: CPT

## 2021-10-12 PROCEDURE — 84484 ASSAY OF TROPONIN QUANT: CPT | Mod: 91 | Performed by: PHYSICIAN ASSISTANT

## 2021-10-12 PROCEDURE — 20600001 HC STEP DOWN PRIVATE ROOM

## 2021-10-12 PROCEDURE — 25000003 PHARM REV CODE 250: Performed by: STUDENT IN AN ORGANIZED HEALTH CARE EDUCATION/TRAINING PROGRAM

## 2021-10-12 PROCEDURE — 99285 PR EMERGENCY DEPT VISIT,LEVEL V: ICD-10-PCS | Mod: ,,, | Performed by: PHYSICIAN ASSISTANT

## 2021-10-12 PROCEDURE — 80047 BASIC METABLC PNL IONIZED CA: CPT

## 2021-10-12 PROCEDURE — 93010 ELECTROCARDIOGRAM REPORT: CPT | Mod: ,,, | Performed by: INTERNAL MEDICINE

## 2021-10-12 PROCEDURE — 96360 HYDRATION IV INFUSION INIT: CPT

## 2021-10-12 PROCEDURE — 25000003 PHARM REV CODE 250

## 2021-10-12 PROCEDURE — 82803 BLOOD GASES ANY COMBINATION: CPT

## 2021-10-12 PROCEDURE — 99291 CRITICAL CARE FIRST HOUR: CPT | Mod: 25

## 2021-10-12 PROCEDURE — 84484 ASSAY OF TROPONIN QUANT: CPT | Mod: 91 | Performed by: STUDENT IN AN ORGANIZED HEALTH CARE EDUCATION/TRAINING PROGRAM

## 2021-10-12 PROCEDURE — 25000003 PHARM REV CODE 250: Performed by: PHYSICIAN ASSISTANT

## 2021-10-12 PROCEDURE — 25000003 PHARM REV CODE 250: Performed by: HOSPITALIST

## 2021-10-12 PROCEDURE — 82010 KETONE BODYS QUAN: CPT | Performed by: PHYSICIAN ASSISTANT

## 2021-10-12 PROCEDURE — 85025 COMPLETE CBC W/AUTO DIFF WBC: CPT | Performed by: PHYSICIAN ASSISTANT

## 2021-10-12 PROCEDURE — 83880 ASSAY OF NATRIURETIC PEPTIDE: CPT | Performed by: PHYSICIAN ASSISTANT

## 2021-10-12 RX ORDER — LANOLIN ALCOHOL/MO/W.PET/CERES
1 CREAM (GRAM) TOPICAL DAILY
Status: DISCONTINUED | OUTPATIENT
Start: 2021-10-13 | End: 2021-10-22 | Stop reason: HOSPADM

## 2021-10-12 RX ORDER — DEXTROSE MONOHYDRATE, SODIUM CHLORIDE, AND POTASSIUM CHLORIDE 50; 1.49; 4.5 G/1000ML; G/1000ML; G/1000ML
INJECTION, SOLUTION INTRAVENOUS CONTINUOUS
Status: DISCONTINUED | OUTPATIENT
Start: 2021-10-12 | End: 2021-10-13

## 2021-10-12 RX ORDER — NAPROXEN SODIUM 220 MG/1
325 TABLET, FILM COATED ORAL DAILY
Status: DISCONTINUED | OUTPATIENT
Start: 2021-10-13 | End: 2021-10-12

## 2021-10-12 RX ORDER — ASPIRIN 81 MG/1
81 TABLET ORAL DAILY
Status: DISCONTINUED | OUTPATIENT
Start: 2021-10-13 | End: 2021-10-22 | Stop reason: HOSPADM

## 2021-10-12 RX ORDER — GLUCAGON 1 MG
1 KIT INJECTION
Status: DISCONTINUED | OUTPATIENT
Start: 2021-10-12 | End: 2021-10-13

## 2021-10-12 RX ORDER — ACETAMINOPHEN 325 MG/1
650 TABLET ORAL EVERY 4 HOURS PRN
Status: DISCONTINUED | OUTPATIENT
Start: 2021-10-12 | End: 2021-10-22 | Stop reason: HOSPADM

## 2021-10-12 RX ORDER — MUPIROCIN 20 MG/G
OINTMENT TOPICAL 2 TIMES DAILY
Status: COMPLETED | OUTPATIENT
Start: 2021-10-12 | End: 2021-10-17

## 2021-10-12 RX ORDER — AMOXICILLIN 250 MG
1 CAPSULE ORAL 2 TIMES DAILY
Status: DISCONTINUED | OUTPATIENT
Start: 2021-10-12 | End: 2021-10-12

## 2021-10-12 RX ORDER — NAPROXEN SODIUM 220 MG/1
325 TABLET, FILM COATED ORAL
Status: COMPLETED | OUTPATIENT
Start: 2021-10-12 | End: 2021-10-12

## 2021-10-12 RX ORDER — HEPARIN SODIUM 5000 [USP'U]/ML
5000 INJECTION, SOLUTION INTRAVENOUS; SUBCUTANEOUS EVERY 8 HOURS
Status: DISCONTINUED | OUTPATIENT
Start: 2021-10-12 | End: 2021-10-22 | Stop reason: HOSPADM

## 2021-10-12 RX ORDER — SODIUM CHLORIDE 0.9 % (FLUSH) 0.9 %
10 SYRINGE (ML) INJECTION EVERY 12 HOURS PRN
Status: DISCONTINUED | OUTPATIENT
Start: 2021-10-12 | End: 2021-10-22 | Stop reason: HOSPADM

## 2021-10-12 RX ORDER — THIAMINE HCL 100 MG
100 TABLET ORAL DAILY
Status: DISCONTINUED | OUTPATIENT
Start: 2021-10-12 | End: 2021-10-22 | Stop reason: HOSPADM

## 2021-10-12 RX ORDER — CHOLECALCIFEROL (VITAMIN D3) 25 MCG
1000 TABLET ORAL DAILY
Status: DISCONTINUED | OUTPATIENT
Start: 2021-10-13 | End: 2021-10-22 | Stop reason: HOSPADM

## 2021-10-12 RX ORDER — TALC
6 POWDER (GRAM) TOPICAL NIGHTLY PRN
Status: DISCONTINUED | OUTPATIENT
Start: 2021-10-12 | End: 2021-10-22 | Stop reason: HOSPADM

## 2021-10-12 RX ORDER — DEXTROSE MONOHYDRATE 100 MG/ML
INJECTION, SOLUTION INTRAVENOUS
Status: DISCONTINUED | OUTPATIENT
Start: 2021-10-12 | End: 2021-10-13

## 2021-10-12 RX ORDER — SODIUM CHLORIDE AND POTASSIUM CHLORIDE 150; 900 MG/100ML; MG/100ML
INJECTION, SOLUTION INTRAVENOUS CONTINUOUS
Status: DISCONTINUED | OUTPATIENT
Start: 2021-10-12 | End: 2021-10-12

## 2021-10-12 RX ORDER — DEXTROSE MONOHYDRATE 100 MG/ML
INJECTION, SOLUTION INTRAVENOUS
Status: DISCONTINUED | OUTPATIENT
Start: 2021-10-12 | End: 2021-10-22 | Stop reason: HOSPADM

## 2021-10-12 RX ORDER — POLYETHYLENE GLYCOL 3350 17 G/17G
17 POWDER, FOR SOLUTION ORAL DAILY
Status: DISCONTINUED | OUTPATIENT
Start: 2021-10-13 | End: 2021-10-12

## 2021-10-12 RX ORDER — CITALOPRAM 10 MG/1
10 TABLET ORAL DAILY
Status: DISCONTINUED | OUTPATIENT
Start: 2021-10-13 | End: 2021-10-22 | Stop reason: HOSPADM

## 2021-10-12 RX ORDER — MIDODRINE HYDROCHLORIDE 2.5 MG/1
2.5 TABLET ORAL 3 TIMES DAILY
Status: DISCONTINUED | OUTPATIENT
Start: 2021-10-12 | End: 2021-10-12

## 2021-10-12 RX ORDER — NALOXONE HCL 0.4 MG/ML
0.02 VIAL (ML) INJECTION
Status: DISCONTINUED | OUTPATIENT
Start: 2021-10-12 | End: 2021-10-22 | Stop reason: HOSPADM

## 2021-10-12 RX ORDER — ANASTROZOLE 1 MG/1
1 TABLET ORAL DAILY
Status: DISCONTINUED | OUTPATIENT
Start: 2021-10-13 | End: 2021-10-22 | Stop reason: HOSPADM

## 2021-10-12 RX ORDER — IBUPROFEN 200 MG
16 TABLET ORAL
Status: DISCONTINUED | OUTPATIENT
Start: 2021-10-12 | End: 2021-10-13

## 2021-10-12 RX ORDER — POTASSIUM CHLORIDE 20 MEQ/1
40 TABLET, EXTENDED RELEASE ORAL ONCE
Status: COMPLETED | OUTPATIENT
Start: 2021-10-12 | End: 2021-10-12

## 2021-10-12 RX ORDER — IBUPROFEN 200 MG
24 TABLET ORAL
Status: DISCONTINUED | OUTPATIENT
Start: 2021-10-12 | End: 2021-10-13

## 2021-10-12 RX ORDER — SODIUM CHLORIDE 0.9 % (FLUSH) 0.9 %
10 SYRINGE (ML) INJECTION
Status: DISCONTINUED | OUTPATIENT
Start: 2021-10-12 | End: 2021-10-13

## 2021-10-12 RX ADMIN — THIAMINE HCL TAB 100 MG 100 MG: 100 TAB at 10:10

## 2021-10-12 RX ADMIN — HEPARIN SODIUM 5000 UNITS: 5000 INJECTION INTRAVENOUS; SUBCUTANEOUS at 10:10

## 2021-10-12 RX ADMIN — DEXTROSE, SODIUM CHLORIDE, AND POTASSIUM CHLORIDE: 5; .45; .15 INJECTION INTRAVENOUS at 09:10

## 2021-10-12 RX ADMIN — MUPIROCIN: 20 OINTMENT TOPICAL at 10:10

## 2021-10-12 RX ADMIN — MIDODRINE HYDROCHLORIDE 7.5 MG: 2.5 TABLET ORAL at 08:10

## 2021-10-12 RX ADMIN — SODIUM CHLORIDE 500 ML: 0.9 INJECTION, SOLUTION INTRAVENOUS at 06:10

## 2021-10-12 RX ADMIN — SODIUM CHLORIDE 1000 ML: 0.9 INJECTION, SOLUTION INTRAVENOUS at 03:10

## 2021-10-12 RX ADMIN — POTASSIUM CHLORIDE 40 MEQ: 1500 TABLET, EXTENDED RELEASE ORAL at 10:10

## 2021-10-12 RX ADMIN — INSULIN HUMAN 4 UNITS/HR: 1 INJECTION, SOLUTION INTRAVENOUS at 06:10

## 2021-10-12 RX ADMIN — SODIUM CHLORIDE AND POTASSIUM CHLORIDE: .9; .15 SOLUTION INTRAVENOUS at 08:10

## 2021-10-12 RX ADMIN — ASPIRIN 81 MG CHEWABLE TABLET 324 MG: 81 TABLET CHEWABLE at 06:10

## 2021-10-13 LAB
ALBUMIN SERPL BCP-MCNC: 3.4 G/DL (ref 3.5–5.2)
ALP SERPL-CCNC: 124 U/L (ref 55–135)
ALT SERPL W/O P-5'-P-CCNC: 29 U/L (ref 10–44)
ANION GAP SERPL CALC-SCNC: 12 MMOL/L (ref 8–16)
ANION GAP SERPL CALC-SCNC: 13 MMOL/L (ref 8–16)
ANION GAP SERPL CALC-SCNC: 14 MMOL/L (ref 8–16)
AST SERPL-CCNC: 30 U/L (ref 10–40)
BASOPHILS # BLD AUTO: 0.04 K/UL (ref 0–0.2)
BASOPHILS NFR BLD: 0.4 % (ref 0–1.9)
BILIRUB DIRECT SERPL-MCNC: 0.4 MG/DL (ref 0.1–0.3)
BILIRUB SERPL-MCNC: 1 MG/DL (ref 0.1–1)
BUN SERPL-MCNC: 29 MG/DL (ref 8–23)
BUN SERPL-MCNC: 36 MG/DL (ref 8–23)
BUN SERPL-MCNC: 38 MG/DL (ref 8–23)
CALCIUM SERPL-MCNC: 9.4 MG/DL (ref 8.7–10.5)
CALCIUM SERPL-MCNC: 9.4 MG/DL (ref 8.7–10.5)
CALCIUM SERPL-MCNC: 9.8 MG/DL (ref 8.7–10.5)
CHLORIDE SERPL-SCNC: 100 MMOL/L (ref 95–110)
CHLORIDE SERPL-SCNC: 98 MMOL/L (ref 95–110)
CHLORIDE SERPL-SCNC: 99 MMOL/L (ref 95–110)
CO2 SERPL-SCNC: 21 MMOL/L (ref 23–29)
CO2 SERPL-SCNC: 23 MMOL/L (ref 23–29)
CO2 SERPL-SCNC: 26 MMOL/L (ref 23–29)
CREAT SERPL-MCNC: 1 MG/DL (ref 0.5–1.4)
CREAT SERPL-MCNC: 1.2 MG/DL (ref 0.5–1.4)
CREAT SERPL-MCNC: 1.2 MG/DL (ref 0.5–1.4)
DIFFERENTIAL METHOD: ABNORMAL
EOSINOPHIL # BLD AUTO: 0.1 K/UL (ref 0–0.5)
EOSINOPHIL NFR BLD: 0.6 % (ref 0–8)
ERYTHROCYTE [DISTWIDTH] IN BLOOD BY AUTOMATED COUNT: 15.3 % (ref 11.5–14.5)
EST. GFR  (AFRICAN AMERICAN): 52.5 ML/MIN/1.73 M^2
EST. GFR  (AFRICAN AMERICAN): 52.5 ML/MIN/1.73 M^2
EST. GFR  (AFRICAN AMERICAN): >60 ML/MIN/1.73 M^2
EST. GFR  (NON AFRICAN AMERICAN): 45.6 ML/MIN/1.73 M^2
EST. GFR  (NON AFRICAN AMERICAN): 45.6 ML/MIN/1.73 M^2
EST. GFR  (NON AFRICAN AMERICAN): 56.8 ML/MIN/1.73 M^2
ESTIMATED AVG GLUCOSE: 266 MG/DL (ref 68–131)
FERRITIN SERPL-MCNC: 131 NG/ML (ref 20–300)
GLUCOSE SERPL-MCNC: 130 MG/DL (ref 70–110)
GLUCOSE SERPL-MCNC: 144 MG/DL (ref 70–110)
GLUCOSE SERPL-MCNC: 72 MG/DL (ref 70–110)
HAPTOGLOB SERPL-MCNC: 165 MG/DL (ref 30–250)
HBA1C MFR BLD: 10.9 % (ref 4–5.6)
HCT VFR BLD AUTO: 40.1 % (ref 37–48.5)
HCV AB SERPL QL IA: NEGATIVE
HGB BLD-MCNC: 13 G/DL (ref 12–16)
IMM GRANULOCYTES # BLD AUTO: 0.02 K/UL (ref 0–0.04)
IMM GRANULOCYTES NFR BLD AUTO: 0.2 % (ref 0–0.5)
IRON SERPL-MCNC: 72 UG/DL (ref 30–160)
LYMPHOCYTES # BLD AUTO: 2.9 K/UL (ref 1–4.8)
LYMPHOCYTES NFR BLD: 32.8 % (ref 18–48)
MAGNESIUM SERPL-MCNC: 1.9 MG/DL (ref 1.6–2.6)
MCH RBC QN AUTO: 26.2 PG (ref 27–31)
MCHC RBC AUTO-ENTMCNC: 32.4 G/DL (ref 32–36)
MCV RBC AUTO: 81 FL (ref 82–98)
MONOCYTES # BLD AUTO: 0.5 K/UL (ref 0.3–1)
MONOCYTES NFR BLD: 5.6 % (ref 4–15)
NEUTROPHILS # BLD AUTO: 5.4 K/UL (ref 1.8–7.7)
NEUTROPHILS NFR BLD: 60.4 % (ref 38–73)
NRBC BLD-RTO: 0 /100 WBC
PHOSPHATE SERPL-MCNC: 2.3 MG/DL (ref 2.7–4.5)
PLATELET # BLD AUTO: 359 K/UL (ref 150–450)
PMV BLD AUTO: 10 FL (ref 9.2–12.9)
POCT GLUCOSE: 111 MG/DL (ref 70–110)
POCT GLUCOSE: 121 MG/DL (ref 70–110)
POCT GLUCOSE: 146 MG/DL (ref 70–110)
POCT GLUCOSE: 155 MG/DL (ref 70–110)
POCT GLUCOSE: 161 MG/DL (ref 70–110)
POCT GLUCOSE: 169 MG/DL (ref 70–110)
POCT GLUCOSE: 176 MG/DL (ref 70–110)
POCT GLUCOSE: 178 MG/DL (ref 70–110)
POCT GLUCOSE: 180 MG/DL (ref 70–110)
POCT GLUCOSE: 183 MG/DL (ref 70–110)
POCT GLUCOSE: 189 MG/DL (ref 70–110)
POCT GLUCOSE: 193 MG/DL (ref 70–110)
POCT GLUCOSE: 73 MG/DL (ref 70–110)
POTASSIUM SERPL-SCNC: 3.9 MMOL/L (ref 3.5–5.1)
POTASSIUM SERPL-SCNC: 4.4 MMOL/L (ref 3.5–5.1)
POTASSIUM SERPL-SCNC: 4.4 MMOL/L (ref 3.5–5.1)
PROT SERPL-MCNC: 6.7 G/DL (ref 6–8.4)
RBC # BLD AUTO: 4.97 M/UL (ref 4–5.4)
RETICS/RBC NFR AUTO: 2 % (ref 0.5–2.5)
SATURATED IRON: 21 % (ref 20–50)
SODIUM SERPL-SCNC: 135 MMOL/L (ref 136–145)
SODIUM SERPL-SCNC: 135 MMOL/L (ref 136–145)
SODIUM SERPL-SCNC: 136 MMOL/L (ref 136–145)
TOTAL IRON BINDING CAPACITY: 343 UG/DL (ref 250–450)
TRANSFERRIN SERPL-MCNC: 232 MG/DL (ref 200–375)
TRANSFERRIN SERPL-MCNC: 232 MG/DL (ref 200–375)
TROPONIN I SERPL DL<=0.01 NG/ML-MCNC: 0.24 NG/ML (ref 0–0.03)
TROPONIN I SERPL DL<=0.01 NG/ML-MCNC: 0.26 NG/ML (ref 0–0.03)
WBC # BLD AUTO: 8.89 K/UL (ref 3.9–12.7)

## 2021-10-13 PROCEDURE — 63600175 PHARM REV CODE 636 W HCPCS

## 2021-10-13 PROCEDURE — 36415 COLL VENOUS BLD VENIPUNCTURE: CPT | Performed by: STUDENT IN AN ORGANIZED HEALTH CARE EDUCATION/TRAINING PROGRAM

## 2021-10-13 PROCEDURE — 97530 THERAPEUTIC ACTIVITIES: CPT

## 2021-10-13 PROCEDURE — 83010 ASSAY OF HAPTOGLOBIN QUANT: CPT | Performed by: STUDENT IN AN ORGANIZED HEALTH CARE EDUCATION/TRAINING PROGRAM

## 2021-10-13 PROCEDURE — 84484 ASSAY OF TROPONIN QUANT: CPT | Mod: 91

## 2021-10-13 PROCEDURE — 80076 HEPATIC FUNCTION PANEL: CPT | Performed by: STUDENT IN AN ORGANIZED HEALTH CARE EDUCATION/TRAINING PROGRAM

## 2021-10-13 PROCEDURE — 85045 AUTOMATED RETICULOCYTE COUNT: CPT | Performed by: STUDENT IN AN ORGANIZED HEALTH CARE EDUCATION/TRAINING PROGRAM

## 2021-10-13 PROCEDURE — 80048 BASIC METABOLIC PNL TOTAL CA: CPT | Mod: 91 | Performed by: STUDENT IN AN ORGANIZED HEALTH CARE EDUCATION/TRAINING PROGRAM

## 2021-10-13 PROCEDURE — 97165 OT EVAL LOW COMPLEX 30 MIN: CPT

## 2021-10-13 PROCEDURE — 99233 SBSQ HOSP IP/OBS HIGH 50: CPT | Mod: ,,, | Performed by: HOSPITALIST

## 2021-10-13 PROCEDURE — 63600175 PHARM REV CODE 636 W HCPCS: Performed by: STUDENT IN AN ORGANIZED HEALTH CARE EDUCATION/TRAINING PROGRAM

## 2021-10-13 PROCEDURE — C9399 UNCLASSIFIED DRUGS OR BIOLOG: HCPCS

## 2021-10-13 PROCEDURE — 25000003 PHARM REV CODE 250

## 2021-10-13 PROCEDURE — 20600001 HC STEP DOWN PRIVATE ROOM

## 2021-10-13 PROCEDURE — 36415 COLL VENOUS BLD VENIPUNCTURE: CPT

## 2021-10-13 PROCEDURE — 97161 PT EVAL LOW COMPLEX 20 MIN: CPT

## 2021-10-13 PROCEDURE — 80048 BASIC METABOLIC PNL TOTAL CA: CPT | Performed by: STUDENT IN AN ORGANIZED HEALTH CARE EDUCATION/TRAINING PROGRAM

## 2021-10-13 PROCEDURE — 84484 ASSAY OF TROPONIN QUANT: CPT | Performed by: STUDENT IN AN ORGANIZED HEALTH CARE EDUCATION/TRAINING PROGRAM

## 2021-10-13 PROCEDURE — 85025 COMPLETE CBC W/AUTO DIFF WBC: CPT | Performed by: STUDENT IN AN ORGANIZED HEALTH CARE EDUCATION/TRAINING PROGRAM

## 2021-10-13 PROCEDURE — 82728 ASSAY OF FERRITIN: CPT | Performed by: STUDENT IN AN ORGANIZED HEALTH CARE EDUCATION/TRAINING PROGRAM

## 2021-10-13 PROCEDURE — 83036 HEMOGLOBIN GLYCOSYLATED A1C: CPT | Performed by: STUDENT IN AN ORGANIZED HEALTH CARE EDUCATION/TRAINING PROGRAM

## 2021-10-13 PROCEDURE — 84425 ASSAY OF VITAMIN B-1: CPT | Performed by: STUDENT IN AN ORGANIZED HEALTH CARE EDUCATION/TRAINING PROGRAM

## 2021-10-13 PROCEDURE — 25000003 PHARM REV CODE 250: Performed by: HOSPITALIST

## 2021-10-13 PROCEDURE — 84466 ASSAY OF TRANSFERRIN: CPT | Performed by: STUDENT IN AN ORGANIZED HEALTH CARE EDUCATION/TRAINING PROGRAM

## 2021-10-13 PROCEDURE — 99233 PR SUBSEQUENT HOSPITAL CARE,LEVL III: ICD-10-PCS | Mod: ,,, | Performed by: HOSPITALIST

## 2021-10-13 PROCEDURE — 83735 ASSAY OF MAGNESIUM: CPT | Performed by: STUDENT IN AN ORGANIZED HEALTH CARE EDUCATION/TRAINING PROGRAM

## 2021-10-13 PROCEDURE — 25000003 PHARM REV CODE 250: Performed by: STUDENT IN AN ORGANIZED HEALTH CARE EDUCATION/TRAINING PROGRAM

## 2021-10-13 PROCEDURE — 84100 ASSAY OF PHOSPHORUS: CPT | Performed by: STUDENT IN AN ORGANIZED HEALTH CARE EDUCATION/TRAINING PROGRAM

## 2021-10-13 RX ORDER — INSULIN ASPART 100 [IU]/ML
0-5 INJECTION, SOLUTION INTRAVENOUS; SUBCUTANEOUS
Status: DISCONTINUED | OUTPATIENT
Start: 2021-10-13 | End: 2021-10-22 | Stop reason: HOSPADM

## 2021-10-13 RX ORDER — GLUCAGON 1 MG
1 KIT INJECTION
Status: DISCONTINUED | OUTPATIENT
Start: 2021-10-13 | End: 2021-10-13

## 2021-10-13 RX ORDER — GLUCAGON 1 MG
1 KIT INJECTION
Status: DISCONTINUED | OUTPATIENT
Start: 2021-10-13 | End: 2021-10-22 | Stop reason: HOSPADM

## 2021-10-13 RX ORDER — IBUPROFEN 200 MG
24 TABLET ORAL
Status: DISCONTINUED | OUTPATIENT
Start: 2021-10-13 | End: 2021-10-22 | Stop reason: HOSPADM

## 2021-10-13 RX ORDER — INSULIN ASPART 100 [IU]/ML
0-5 INJECTION, SOLUTION INTRAVENOUS; SUBCUTANEOUS
Status: DISCONTINUED | OUTPATIENT
Start: 2021-10-13 | End: 2021-10-13

## 2021-10-13 RX ORDER — IBUPROFEN 200 MG
16 TABLET ORAL
Status: DISCONTINUED | OUTPATIENT
Start: 2021-10-13 | End: 2021-10-13

## 2021-10-13 RX ORDER — IBUPROFEN 200 MG
16 TABLET ORAL
Status: DISCONTINUED | OUTPATIENT
Start: 2021-10-13 | End: 2021-10-22 | Stop reason: HOSPADM

## 2021-10-13 RX ORDER — IBUPROFEN 200 MG
24 TABLET ORAL
Status: DISCONTINUED | OUTPATIENT
Start: 2021-10-13 | End: 2021-10-13

## 2021-10-13 RX ORDER — INSULIN ASPART 100 [IU]/ML
3 INJECTION, SOLUTION INTRAVENOUS; SUBCUTANEOUS
Status: DISCONTINUED | OUTPATIENT
Start: 2021-10-13 | End: 2021-10-14

## 2021-10-13 RX ADMIN — INSULIN ASPART 3 UNITS: 100 INJECTION, SOLUTION INTRAVENOUS; SUBCUTANEOUS at 12:10

## 2021-10-13 RX ADMIN — MUPIROCIN: 20 OINTMENT TOPICAL at 09:10

## 2021-10-13 RX ADMIN — HEPARIN SODIUM 5000 UNITS: 5000 INJECTION INTRAVENOUS; SUBCUTANEOUS at 05:10

## 2021-10-13 RX ADMIN — MIDODRINE HYDROCHLORIDE 7.5 MG: 2.5 TABLET ORAL at 09:10

## 2021-10-13 RX ADMIN — CITALOPRAM HYDROBROMIDE 10 MG: 10 TABLET ORAL at 09:10

## 2021-10-13 RX ADMIN — INSULIN ASPART 3 UNITS: 100 INJECTION, SOLUTION INTRAVENOUS; SUBCUTANEOUS at 03:10

## 2021-10-13 RX ADMIN — THIAMINE HCL TAB 100 MG 100 MG: 100 TAB at 09:10

## 2021-10-13 RX ADMIN — DEXTROSE, SODIUM CHLORIDE, AND POTASSIUM CHLORIDE: 5; .45; .15 INJECTION INTRAVENOUS at 07:10

## 2021-10-13 RX ADMIN — INSULIN DETEMIR 8 UNITS: 100 INJECTION, SOLUTION SUBCUTANEOUS at 09:10

## 2021-10-13 RX ADMIN — Medication 1000 UNITS: at 09:10

## 2021-10-13 RX ADMIN — FERROUS SULFATE TAB 325 MG (65 MG ELEMENTAL FE) 1 EACH: 325 (65 FE) TAB at 09:10

## 2021-10-13 RX ADMIN — HEPARIN SODIUM 5000 UNITS: 5000 INJECTION INTRAVENOUS; SUBCUTANEOUS at 03:10

## 2021-10-13 RX ADMIN — ANASTROZOLE 1 MG: 1 TABLET, COATED ORAL at 09:10

## 2021-10-13 RX ADMIN — HEPARIN SODIUM 5000 UNITS: 5000 INJECTION INTRAVENOUS; SUBCUTANEOUS at 09:10

## 2021-10-13 RX ADMIN — ASPIRIN 81 MG: 81 TABLET, COATED ORAL at 09:10

## 2021-10-14 PROBLEM — N17.9 AKI (ACUTE KIDNEY INJURY): Status: RESOLVED | Noted: 2020-12-27 | Resolved: 2021-10-14

## 2021-10-14 PROBLEM — E83.39 HYPOPHOSPHATEMIA: Status: ACTIVE | Noted: 2021-10-14

## 2021-10-14 PROBLEM — E55.9 VITAMIN D DEFICIENCY: Status: ACTIVE | Noted: 2021-10-14

## 2021-10-14 LAB
ALBUMIN SERPL BCP-MCNC: 3 G/DL (ref 3.5–5.2)
ALP SERPL-CCNC: 116 U/L (ref 55–135)
ALT SERPL W/O P-5'-P-CCNC: 23 U/L (ref 10–44)
ANION GAP SERPL CALC-SCNC: 13 MMOL/L (ref 8–16)
AST SERPL-CCNC: 22 U/L (ref 10–40)
BASOPHILS # BLD AUTO: 0.02 K/UL (ref 0–0.2)
BASOPHILS NFR BLD: 0.1 % (ref 0–1.9)
BILIRUB SERPL-MCNC: 0.9 MG/DL (ref 0.1–1)
BUN SERPL-MCNC: 20 MG/DL (ref 8–23)
CALCIUM SERPL-MCNC: 10 MG/DL (ref 8.7–10.5)
CHLORIDE SERPL-SCNC: 100 MMOL/L (ref 95–110)
CO2 SERPL-SCNC: 22 MMOL/L (ref 23–29)
CREAT SERPL-MCNC: 0.7 MG/DL (ref 0.5–1.4)
DIFFERENTIAL METHOD: ABNORMAL
EOSINOPHIL # BLD AUTO: 0 K/UL (ref 0–0.5)
EOSINOPHIL NFR BLD: 0.1 % (ref 0–8)
ERYTHROCYTE [DISTWIDTH] IN BLOOD BY AUTOMATED COUNT: 15 % (ref 11.5–14.5)
EST. GFR  (AFRICAN AMERICAN): >60 ML/MIN/1.73 M^2
EST. GFR  (NON AFRICAN AMERICAN): >60 ML/MIN/1.73 M^2
GLUCOSE SERPL-MCNC: 90 MG/DL (ref 70–110)
HCT VFR BLD AUTO: 41 % (ref 37–48.5)
HGB BLD-MCNC: 12.9 G/DL (ref 12–16)
IMM GRANULOCYTES # BLD AUTO: 0.06 K/UL (ref 0–0.04)
IMM GRANULOCYTES NFR BLD AUTO: 0.4 % (ref 0–0.5)
LYMPHOCYTES # BLD AUTO: 3.3 K/UL (ref 1–4.8)
LYMPHOCYTES NFR BLD: 22.6 % (ref 18–48)
MAGNESIUM SERPL-MCNC: 1.8 MG/DL (ref 1.6–2.6)
MCH RBC QN AUTO: 25.5 PG (ref 27–31)
MCHC RBC AUTO-ENTMCNC: 31.5 G/DL (ref 32–36)
MCV RBC AUTO: 81 FL (ref 82–98)
MONOCYTES # BLD AUTO: 0.5 K/UL (ref 0.3–1)
MONOCYTES NFR BLD: 3.7 % (ref 4–15)
NEUTROPHILS # BLD AUTO: 10.5 K/UL (ref 1.8–7.7)
NEUTROPHILS NFR BLD: 73.1 % (ref 38–73)
NRBC BLD-RTO: 0 /100 WBC
PHOSPHATE SERPL-MCNC: 1.8 MG/DL (ref 2.7–4.5)
PLATELET # BLD AUTO: 386 K/UL (ref 150–450)
PMV BLD AUTO: 9.6 FL (ref 9.2–12.9)
POCT GLUCOSE: 172 MG/DL (ref 70–110)
POCT GLUCOSE: 205 MG/DL (ref 70–110)
POCT GLUCOSE: 47 MG/DL (ref 70–110)
POCT GLUCOSE: 61 MG/DL (ref 70–110)
POCT GLUCOSE: 74 MG/DL (ref 70–110)
POCT GLUCOSE: 90 MG/DL (ref 70–110)
POTASSIUM SERPL-SCNC: 4.3 MMOL/L (ref 3.5–5.1)
PROT SERPL-MCNC: 6.2 G/DL (ref 6–8.4)
RBC # BLD AUTO: 5.05 M/UL (ref 4–5.4)
SODIUM SERPL-SCNC: 135 MMOL/L (ref 136–145)
WBC # BLD AUTO: 14.41 K/UL (ref 3.9–12.7)

## 2021-10-14 PROCEDURE — 92610 EVALUATE SWALLOWING FUNCTION: CPT

## 2021-10-14 PROCEDURE — 99232 SBSQ HOSP IP/OBS MODERATE 35: CPT | Mod: ,,, | Performed by: HOSPITALIST

## 2021-10-14 PROCEDURE — 25000003 PHARM REV CODE 250

## 2021-10-14 PROCEDURE — 25000003 PHARM REV CODE 250: Performed by: STUDENT IN AN ORGANIZED HEALTH CARE EDUCATION/TRAINING PROGRAM

## 2021-10-14 PROCEDURE — 36415 COLL VENOUS BLD VENIPUNCTURE: CPT | Performed by: STUDENT IN AN ORGANIZED HEALTH CARE EDUCATION/TRAINING PROGRAM

## 2021-10-14 PROCEDURE — 80053 COMPREHEN METABOLIC PANEL: CPT | Performed by: STUDENT IN AN ORGANIZED HEALTH CARE EDUCATION/TRAINING PROGRAM

## 2021-10-14 PROCEDURE — 20600001 HC STEP DOWN PRIVATE ROOM

## 2021-10-14 PROCEDURE — 63600175 PHARM REV CODE 636 W HCPCS: Performed by: STUDENT IN AN ORGANIZED HEALTH CARE EDUCATION/TRAINING PROGRAM

## 2021-10-14 PROCEDURE — 83735 ASSAY OF MAGNESIUM: CPT | Performed by: STUDENT IN AN ORGANIZED HEALTH CARE EDUCATION/TRAINING PROGRAM

## 2021-10-14 PROCEDURE — 25000003 PHARM REV CODE 250: Performed by: HOSPITALIST

## 2021-10-14 PROCEDURE — 85025 COMPLETE CBC W/AUTO DIFF WBC: CPT | Performed by: STUDENT IN AN ORGANIZED HEALTH CARE EDUCATION/TRAINING PROGRAM

## 2021-10-14 PROCEDURE — 99232 PR SUBSEQUENT HOSPITAL CARE,LEVL II: ICD-10-PCS | Mod: ,,, | Performed by: HOSPITALIST

## 2021-10-14 PROCEDURE — 84100 ASSAY OF PHOSPHORUS: CPT | Performed by: STUDENT IN AN ORGANIZED HEALTH CARE EDUCATION/TRAINING PROGRAM

## 2021-10-14 PROCEDURE — C9399 UNCLASSIFIED DRUGS OR BIOLOG: HCPCS

## 2021-10-14 RX ORDER — SODIUM,POTASSIUM PHOSPHATES 280-250MG
2 POWDER IN PACKET (EA) ORAL
Status: CANCELLED | OUTPATIENT
Start: 2021-10-14 | End: 2021-10-14

## 2021-10-14 RX ADMIN — Medication 1000 UNITS: at 08:10

## 2021-10-14 RX ADMIN — MIDODRINE HYDROCHLORIDE 7.5 MG: 2.5 TABLET ORAL at 08:10

## 2021-10-14 RX ADMIN — CITALOPRAM HYDROBROMIDE 10 MG: 10 TABLET ORAL at 08:10

## 2021-10-14 RX ADMIN — INSULIN DETEMIR 8 UNITS: 100 INJECTION, SOLUTION SUBCUTANEOUS at 08:10

## 2021-10-14 RX ADMIN — MUPIROCIN: 20 OINTMENT TOPICAL at 08:10

## 2021-10-14 RX ADMIN — DIBASIC SODIUM PHOSPHATE, MONOBASIC POTASSIUM PHOSPHATE AND MONOBASIC SODIUM PHOSPHATE 2 TABLET: 852; 155; 130 TABLET ORAL at 03:10

## 2021-10-14 RX ADMIN — FERROUS SULFATE TAB 325 MG (65 MG ELEMENTAL FE) 1 EACH: 325 (65 FE) TAB at 08:10

## 2021-10-14 RX ADMIN — ANASTROZOLE 1 MG: 1 TABLET, COATED ORAL at 08:10

## 2021-10-14 RX ADMIN — HEPARIN SODIUM 5000 UNITS: 5000 INJECTION INTRAVENOUS; SUBCUTANEOUS at 06:10

## 2021-10-14 RX ADMIN — THIAMINE HCL TAB 100 MG 100 MG: 100 TAB at 08:10

## 2021-10-14 RX ADMIN — INSULIN ASPART 3 UNITS: 100 INJECTION, SOLUTION INTRAVENOUS; SUBCUTANEOUS at 12:10

## 2021-10-14 RX ADMIN — INSULIN ASPART 3 UNITS: 100 INJECTION, SOLUTION INTRAVENOUS; SUBCUTANEOUS at 08:10

## 2021-10-14 RX ADMIN — ASPIRIN 81 MG: 81 TABLET, COATED ORAL at 08:10

## 2021-10-14 RX ADMIN — DEXTROSE MONOHYDRATE 25 G: 25 INJECTION, SOLUTION INTRAVENOUS at 08:10

## 2021-10-14 RX ADMIN — MIDODRINE HYDROCHLORIDE 7.5 MG: 2.5 TABLET ORAL at 10:10

## 2021-10-14 RX ADMIN — HEPARIN SODIUM 5000 UNITS: 5000 INJECTION INTRAVENOUS; SUBCUTANEOUS at 10:10

## 2021-10-14 RX ADMIN — HEPARIN SODIUM 5000 UNITS: 5000 INJECTION INTRAVENOUS; SUBCUTANEOUS at 03:10

## 2021-10-14 RX ADMIN — MUPIROCIN: 20 OINTMENT TOPICAL at 10:10

## 2021-10-15 ENCOUNTER — TELEPHONE (OUTPATIENT)
Dept: NEUROLOGY | Facility: CLINIC | Age: 71
End: 2021-10-15

## 2021-10-15 PROBLEM — F32.A DEPRESSION: Status: ACTIVE | Noted: 2021-10-15

## 2021-10-15 LAB
ALBUMIN SERPL BCP-MCNC: 3.1 G/DL (ref 3.5–5.2)
ALP SERPL-CCNC: 111 U/L (ref 55–135)
ALT SERPL W/O P-5'-P-CCNC: 18 U/L (ref 10–44)
ANION GAP SERPL CALC-SCNC: 12 MMOL/L (ref 8–16)
AST SERPL-CCNC: 19 U/L (ref 10–40)
BASOPHILS # BLD AUTO: 0.04 K/UL (ref 0–0.2)
BASOPHILS NFR BLD: 0.5 % (ref 0–1.9)
BILIRUB SERPL-MCNC: 0.7 MG/DL (ref 0.1–1)
BUN SERPL-MCNC: 23 MG/DL (ref 8–23)
CALCIUM SERPL-MCNC: 9.8 MG/DL (ref 8.7–10.5)
CHLORIDE SERPL-SCNC: 98 MMOL/L (ref 95–110)
CO2 SERPL-SCNC: 25 MMOL/L (ref 23–29)
CREAT SERPL-MCNC: 0.8 MG/DL (ref 0.5–1.4)
DIFFERENTIAL METHOD: ABNORMAL
EOSINOPHIL # BLD AUTO: 0 K/UL (ref 0–0.5)
EOSINOPHIL NFR BLD: 0.4 % (ref 0–8)
ERYTHROCYTE [DISTWIDTH] IN BLOOD BY AUTOMATED COUNT: 15.4 % (ref 11.5–14.5)
EST. GFR  (AFRICAN AMERICAN): >60 ML/MIN/1.73 M^2
EST. GFR  (NON AFRICAN AMERICAN): >60 ML/MIN/1.73 M^2
FOLATE SERPL-MCNC: 3 NG/ML (ref 4–24)
GLUCOSE SERPL-MCNC: 153 MG/DL (ref 70–110)
HCT VFR BLD AUTO: 41.1 % (ref 37–48.5)
HGB BLD-MCNC: 12.9 G/DL (ref 12–16)
IMM GRANULOCYTES # BLD AUTO: 0.01 K/UL (ref 0–0.04)
IMM GRANULOCYTES NFR BLD AUTO: 0.1 % (ref 0–0.5)
LYMPHOCYTES # BLD AUTO: 2.8 K/UL (ref 1–4.8)
LYMPHOCYTES NFR BLD: 36.2 % (ref 18–48)
MAGNESIUM SERPL-MCNC: 2 MG/DL (ref 1.6–2.6)
MCH RBC QN AUTO: 25.7 PG (ref 27–31)
MCHC RBC AUTO-ENTMCNC: 31.4 G/DL (ref 32–36)
MCV RBC AUTO: 82 FL (ref 82–98)
MONOCYTES # BLD AUTO: 0.4 K/UL (ref 0.3–1)
MONOCYTES NFR BLD: 5.5 % (ref 4–15)
NEUTROPHILS # BLD AUTO: 4.5 K/UL (ref 1.8–7.7)
NEUTROPHILS NFR BLD: 57.3 % (ref 38–73)
NRBC BLD-RTO: 0 /100 WBC
PHOSPHATE SERPL-MCNC: 2 MG/DL (ref 2.7–4.5)
PLATELET # BLD AUTO: 372 K/UL (ref 150–450)
PMV BLD AUTO: 10.3 FL (ref 9.2–12.9)
POCT GLUCOSE: 147 MG/DL (ref 70–110)
POCT GLUCOSE: 197 MG/DL (ref 70–110)
POCT GLUCOSE: 239 MG/DL (ref 70–110)
POCT GLUCOSE: 239 MG/DL (ref 70–110)
POCT GLUCOSE: 314 MG/DL (ref 70–110)
POTASSIUM SERPL-SCNC: 4.2 MMOL/L (ref 3.5–5.1)
PROT SERPL-MCNC: 6.3 G/DL (ref 6–8.4)
RBC # BLD AUTO: 5.01 M/UL (ref 4–5.4)
RPR SER QL: NORMAL
SODIUM SERPL-SCNC: 135 MMOL/L (ref 136–145)
TSH SERPL DL<=0.005 MIU/L-ACNC: 0.7 UIU/ML (ref 0.4–4)
VIT B12 SERPL-MCNC: 948 PG/ML (ref 210–950)
WBC # BLD AUTO: 7.8 K/UL (ref 3.9–12.7)

## 2021-10-15 PROCEDURE — 63600175 PHARM REV CODE 636 W HCPCS

## 2021-10-15 PROCEDURE — 84446 ASSAY OF VITAMIN E: CPT | Performed by: STUDENT IN AN ORGANIZED HEALTH CARE EDUCATION/TRAINING PROGRAM

## 2021-10-15 PROCEDURE — 80053 COMPREHEN METABOLIC PANEL: CPT | Performed by: STUDENT IN AN ORGANIZED HEALTH CARE EDUCATION/TRAINING PROGRAM

## 2021-10-15 PROCEDURE — 86592 SYPHILIS TEST NON-TREP QUAL: CPT | Performed by: STUDENT IN AN ORGANIZED HEALTH CARE EDUCATION/TRAINING PROGRAM

## 2021-10-15 PROCEDURE — 84100 ASSAY OF PHOSPHORUS: CPT | Performed by: STUDENT IN AN ORGANIZED HEALTH CARE EDUCATION/TRAINING PROGRAM

## 2021-10-15 PROCEDURE — 83735 ASSAY OF MAGNESIUM: CPT | Performed by: STUDENT IN AN ORGANIZED HEALTH CARE EDUCATION/TRAINING PROGRAM

## 2021-10-15 PROCEDURE — 99232 PR SUBSEQUENT HOSPITAL CARE,LEVL II: ICD-10-PCS | Mod: ,,, | Performed by: HOSPITALIST

## 2021-10-15 PROCEDURE — 25000003 PHARM REV CODE 250: Performed by: STUDENT IN AN ORGANIZED HEALTH CARE EDUCATION/TRAINING PROGRAM

## 2021-10-15 PROCEDURE — 85025 COMPLETE CBC W/AUTO DIFF WBC: CPT | Performed by: STUDENT IN AN ORGANIZED HEALTH CARE EDUCATION/TRAINING PROGRAM

## 2021-10-15 PROCEDURE — 82607 VITAMIN B-12: CPT | Performed by: STUDENT IN AN ORGANIZED HEALTH CARE EDUCATION/TRAINING PROGRAM

## 2021-10-15 PROCEDURE — 82746 ASSAY OF FOLIC ACID SERUM: CPT | Performed by: STUDENT IN AN ORGANIZED HEALTH CARE EDUCATION/TRAINING PROGRAM

## 2021-10-15 PROCEDURE — 20600001 HC STEP DOWN PRIVATE ROOM

## 2021-10-15 PROCEDURE — 97535 SELF CARE MNGMENT TRAINING: CPT

## 2021-10-15 PROCEDURE — 25000003 PHARM REV CODE 250: Performed by: HOSPITALIST

## 2021-10-15 PROCEDURE — 97110 THERAPEUTIC EXERCISES: CPT

## 2021-10-15 PROCEDURE — 84443 ASSAY THYROID STIM HORMONE: CPT | Performed by: STUDENT IN AN ORGANIZED HEALTH CARE EDUCATION/TRAINING PROGRAM

## 2021-10-15 PROCEDURE — 97530 THERAPEUTIC ACTIVITIES: CPT

## 2021-10-15 PROCEDURE — 63600175 PHARM REV CODE 636 W HCPCS: Performed by: STUDENT IN AN ORGANIZED HEALTH CARE EDUCATION/TRAINING PROGRAM

## 2021-10-15 PROCEDURE — 36415 COLL VENOUS BLD VENIPUNCTURE: CPT | Performed by: STUDENT IN AN ORGANIZED HEALTH CARE EDUCATION/TRAINING PROGRAM

## 2021-10-15 PROCEDURE — 99232 SBSQ HOSP IP/OBS MODERATE 35: CPT | Mod: ,,, | Performed by: HOSPITALIST

## 2021-10-15 RX ORDER — INSULIN ASPART 100 [IU]/ML
2 INJECTION, SOLUTION INTRAVENOUS; SUBCUTANEOUS
Status: DISCONTINUED | OUTPATIENT
Start: 2021-10-15 | End: 2021-10-16

## 2021-10-15 RX ORDER — PEN NEEDLE, DIABETIC 30 GX3/16"
NEEDLE, DISPOSABLE MISCELLANEOUS
Qty: 100 EACH | Refills: 11 | Status: ON HOLD | OUTPATIENT
Start: 2021-10-15 | End: 2022-06-20

## 2021-10-15 RX ORDER — CITALOPRAM 10 MG/1
10 TABLET ORAL DAILY
Qty: 30 TABLET | Refills: 2 | Status: ON HOLD | OUTPATIENT
Start: 2021-10-15 | End: 2022-06-20

## 2021-10-15 RX ORDER — LOPERAMIDE HYDROCHLORIDE 2 MG/1
4 CAPSULE ORAL 4 TIMES DAILY PRN
Status: DISCONTINUED | OUTPATIENT
Start: 2021-10-15 | End: 2021-10-22 | Stop reason: HOSPADM

## 2021-10-15 RX ORDER — INSULIN PUMP SYRINGE, 3 ML
EACH MISCELLANEOUS
Qty: 1 EACH | Refills: 0 | Status: ON HOLD | OUTPATIENT
Start: 2021-10-15 | End: 2022-06-20

## 2021-10-15 RX ORDER — LOPERAMIDE HYDROCHLORIDE 2 MG/1
2 CAPSULE ORAL 4 TIMES DAILY PRN
Status: DISCONTINUED | OUTPATIENT
Start: 2021-10-15 | End: 2021-10-15

## 2021-10-15 RX ORDER — INSULIN GLARGINE 100 [IU]/ML
8 INJECTION, SOLUTION SUBCUTANEOUS DAILY
Qty: 15 ML | Refills: 3 | Status: SHIPPED | OUTPATIENT
Start: 2021-10-15 | End: 2021-10-22 | Stop reason: SDUPTHER

## 2021-10-15 RX ORDER — INSULIN LISPRO 100 [IU]/ML
2 INJECTION, SOLUTION INTRAVENOUS; SUBCUTANEOUS
Qty: 15 ML | Refills: 3 | Status: SHIPPED | OUTPATIENT
Start: 2021-10-15 | End: 2021-10-22 | Stop reason: SDUPTHER

## 2021-10-15 RX ORDER — CHOLESTYRAMINE 4 G/9G
1 POWDER, FOR SUSPENSION ORAL DAILY
Status: DISCONTINUED | OUTPATIENT
Start: 2021-10-15 | End: 2021-10-19

## 2021-10-15 RX ORDER — LANCETS 28 GAUGE
EACH MISCELLANEOUS
Qty: 100 EACH | Refills: 11 | Status: ON HOLD | OUTPATIENT
Start: 2021-10-15 | End: 2022-06-20

## 2021-10-15 RX ORDER — FOLIC ACID 1 MG/1
1 TABLET ORAL DAILY
Qty: 90 TABLET | Refills: 3 | Status: ON HOLD | OUTPATIENT
Start: 2021-10-15 | End: 2022-06-20

## 2021-10-15 RX ORDER — FOLIC ACID 1 MG/1
1 TABLET ORAL DAILY
Status: DISCONTINUED | OUTPATIENT
Start: 2021-10-15 | End: 2021-10-22 | Stop reason: HOSPADM

## 2021-10-15 RX ADMIN — ANASTROZOLE 1 MG: 1 TABLET, COATED ORAL at 10:10

## 2021-10-15 RX ADMIN — MIDODRINE HYDROCHLORIDE 7.5 MG: 2.5 TABLET ORAL at 06:10

## 2021-10-15 RX ADMIN — Medication 6 MG: at 10:10

## 2021-10-15 RX ADMIN — FERROUS SULFATE TAB 325 MG (65 MG ELEMENTAL FE) 1 EACH: 325 (65 FE) TAB at 10:10

## 2021-10-15 RX ADMIN — MUPIROCIN: 20 OINTMENT TOPICAL at 08:10

## 2021-10-15 RX ADMIN — MULTIPLE VITAMINS W/ MINERALS TAB 1 TABLET: TAB at 11:10

## 2021-10-15 RX ADMIN — MUPIROCIN: 20 OINTMENT TOPICAL at 10:10

## 2021-10-15 RX ADMIN — MIDODRINE HYDROCHLORIDE 7.5 MG: 2.5 TABLET ORAL at 10:10

## 2021-10-15 RX ADMIN — INSULIN ASPART 2 UNITS: 100 INJECTION, SOLUTION INTRAVENOUS; SUBCUTANEOUS at 11:10

## 2021-10-15 RX ADMIN — CHOLESTYRAMINE 4 G: 4 POWDER, FOR SUSPENSION ORAL at 04:10

## 2021-10-15 RX ADMIN — HEPARIN SODIUM 5000 UNITS: 5000 INJECTION INTRAVENOUS; SUBCUTANEOUS at 06:10

## 2021-10-15 RX ADMIN — HEPARIN SODIUM 5000 UNITS: 5000 INJECTION INTRAVENOUS; SUBCUTANEOUS at 10:10

## 2021-10-15 RX ADMIN — DIBASIC SODIUM PHOSPHATE, MONOBASIC POTASSIUM PHOSPHATE AND MONOBASIC SODIUM PHOSPHATE 2 TABLET: 852; 155; 130 TABLET ORAL at 05:10

## 2021-10-15 RX ADMIN — FOLIC ACID 1 MG: 1 TABLET ORAL at 10:10

## 2021-10-15 RX ADMIN — ASPIRIN 81 MG: 81 TABLET, COATED ORAL at 10:10

## 2021-10-15 RX ADMIN — CITALOPRAM HYDROBROMIDE 10 MG: 10 TABLET ORAL at 10:10

## 2021-10-15 RX ADMIN — INSULIN ASPART 2 UNITS: 100 INJECTION, SOLUTION INTRAVENOUS; SUBCUTANEOUS at 05:10

## 2021-10-15 RX ADMIN — Medication 1000 UNITS: at 10:10

## 2021-10-15 RX ADMIN — THIAMINE HCL TAB 100 MG 100 MG: 100 TAB at 10:10

## 2021-10-15 RX ADMIN — HEPARIN SODIUM 5000 UNITS: 5000 INJECTION INTRAVENOUS; SUBCUTANEOUS at 03:10

## 2021-10-15 RX ADMIN — INSULIN ASPART 4 UNITS: 100 INJECTION, SOLUTION INTRAVENOUS; SUBCUTANEOUS at 09:10

## 2021-10-15 RX ADMIN — DIBASIC SODIUM PHOSPHATE, MONOBASIC POTASSIUM PHOSPHATE AND MONOBASIC SODIUM PHOSPHATE 2 TABLET: 852; 155; 130 TABLET ORAL at 11:10

## 2021-10-16 LAB
ALBUMIN SERPL BCP-MCNC: 3 G/DL (ref 3.5–5.2)
ALP SERPL-CCNC: 104 U/L (ref 55–135)
ALT SERPL W/O P-5'-P-CCNC: 15 U/L (ref 10–44)
ANION GAP SERPL CALC-SCNC: 11 MMOL/L (ref 8–16)
AST SERPL-CCNC: 16 U/L (ref 10–40)
BASOPHILS # BLD AUTO: 0.04 K/UL (ref 0–0.2)
BASOPHILS NFR BLD: 0.5 % (ref 0–1.9)
BILIRUB SERPL-MCNC: 0.7 MG/DL (ref 0.1–1)
BUN SERPL-MCNC: 18 MG/DL (ref 8–23)
CALCIUM SERPL-MCNC: 9.6 MG/DL (ref 8.7–10.5)
CHLORIDE SERPL-SCNC: 99 MMOL/L (ref 95–110)
CO2 SERPL-SCNC: 27 MMOL/L (ref 23–29)
CREAT SERPL-MCNC: 0.8 MG/DL (ref 0.5–1.4)
DIFFERENTIAL METHOD: ABNORMAL
EOSINOPHIL # BLD AUTO: 0.1 K/UL (ref 0–0.5)
EOSINOPHIL NFR BLD: 0.8 % (ref 0–8)
ERYTHROCYTE [DISTWIDTH] IN BLOOD BY AUTOMATED COUNT: 15.5 % (ref 11.5–14.5)
EST. GFR  (AFRICAN AMERICAN): >60 ML/MIN/1.73 M^2
EST. GFR  (NON AFRICAN AMERICAN): >60 ML/MIN/1.73 M^2
GLUCOSE SERPL-MCNC: 154 MG/DL (ref 70–110)
HCT VFR BLD AUTO: 36.6 % (ref 37–48.5)
HGB BLD-MCNC: 11.6 G/DL (ref 12–16)
IMM GRANULOCYTES # BLD AUTO: 0.02 K/UL (ref 0–0.04)
IMM GRANULOCYTES NFR BLD AUTO: 0.3 % (ref 0–0.5)
LYMPHOCYTES # BLD AUTO: 3.5 K/UL (ref 1–4.8)
LYMPHOCYTES NFR BLD: 46.2 % (ref 18–48)
MAGNESIUM SERPL-MCNC: 2 MG/DL (ref 1.6–2.6)
MCH RBC QN AUTO: 26.1 PG (ref 27–31)
MCHC RBC AUTO-ENTMCNC: 31.7 G/DL (ref 32–36)
MCV RBC AUTO: 82 FL (ref 82–98)
MONOCYTES # BLD AUTO: 0.4 K/UL (ref 0.3–1)
MONOCYTES NFR BLD: 5.3 % (ref 4–15)
NEUTROPHILS # BLD AUTO: 3.5 K/UL (ref 1.8–7.7)
NEUTROPHILS NFR BLD: 46.9 % (ref 38–73)
NRBC BLD-RTO: 0 /100 WBC
PHOSPHATE SERPL-MCNC: 3.2 MG/DL (ref 2.7–4.5)
PLATELET # BLD AUTO: 321 K/UL (ref 150–450)
PMV BLD AUTO: 9.7 FL (ref 9.2–12.9)
POCT GLUCOSE: 152 MG/DL (ref 70–110)
POCT GLUCOSE: 158 MG/DL (ref 70–110)
POCT GLUCOSE: 187 MG/DL (ref 70–110)
POCT GLUCOSE: 221 MG/DL (ref 70–110)
POCT GLUCOSE: 295 MG/DL (ref 70–110)
POCT GLUCOSE: 65 MG/DL (ref 70–110)
POTASSIUM SERPL-SCNC: 4 MMOL/L (ref 3.5–5.1)
PROT SERPL-MCNC: 6.1 G/DL (ref 6–8.4)
RBC # BLD AUTO: 4.45 M/UL (ref 4–5.4)
SODIUM SERPL-SCNC: 137 MMOL/L (ref 136–145)
WBC # BLD AUTO: 7.51 K/UL (ref 3.9–12.7)

## 2021-10-16 PROCEDURE — 99232 SBSQ HOSP IP/OBS MODERATE 35: CPT | Mod: ,,, | Performed by: HOSPITALIST

## 2021-10-16 PROCEDURE — 99232 PR SUBSEQUENT HOSPITAL CARE,LEVL II: ICD-10-PCS | Mod: ,,, | Performed by: HOSPITALIST

## 2021-10-16 PROCEDURE — 83735 ASSAY OF MAGNESIUM: CPT | Performed by: STUDENT IN AN ORGANIZED HEALTH CARE EDUCATION/TRAINING PROGRAM

## 2021-10-16 PROCEDURE — 36415 COLL VENOUS BLD VENIPUNCTURE: CPT | Performed by: STUDENT IN AN ORGANIZED HEALTH CARE EDUCATION/TRAINING PROGRAM

## 2021-10-16 PROCEDURE — 93010 EKG 12-LEAD: ICD-10-PCS | Mod: ,,, | Performed by: INTERNAL MEDICINE

## 2021-10-16 PROCEDURE — 63600175 PHARM REV CODE 636 W HCPCS: Performed by: STUDENT IN AN ORGANIZED HEALTH CARE EDUCATION/TRAINING PROGRAM

## 2021-10-16 PROCEDURE — 84100 ASSAY OF PHOSPHORUS: CPT | Performed by: STUDENT IN AN ORGANIZED HEALTH CARE EDUCATION/TRAINING PROGRAM

## 2021-10-16 PROCEDURE — 93010 ELECTROCARDIOGRAM REPORT: CPT | Mod: ,,, | Performed by: INTERNAL MEDICINE

## 2021-10-16 PROCEDURE — 25000003 PHARM REV CODE 250: Performed by: STUDENT IN AN ORGANIZED HEALTH CARE EDUCATION/TRAINING PROGRAM

## 2021-10-16 PROCEDURE — 20600001 HC STEP DOWN PRIVATE ROOM

## 2021-10-16 PROCEDURE — 93005 ELECTROCARDIOGRAM TRACING: CPT

## 2021-10-16 PROCEDURE — 25000003 PHARM REV CODE 250

## 2021-10-16 PROCEDURE — 80053 COMPREHEN METABOLIC PANEL: CPT | Performed by: STUDENT IN AN ORGANIZED HEALTH CARE EDUCATION/TRAINING PROGRAM

## 2021-10-16 PROCEDURE — 85025 COMPLETE CBC W/AUTO DIFF WBC: CPT | Performed by: STUDENT IN AN ORGANIZED HEALTH CARE EDUCATION/TRAINING PROGRAM

## 2021-10-16 RX ORDER — INSULIN ASPART 100 [IU]/ML
3 INJECTION, SOLUTION INTRAVENOUS; SUBCUTANEOUS
Status: DISCONTINUED | OUTPATIENT
Start: 2021-10-16 | End: 2021-10-19

## 2021-10-16 RX ADMIN — MULTIPLE VITAMINS W/ MINERALS TAB 1 TABLET: TAB at 08:10

## 2021-10-16 RX ADMIN — CHOLESTYRAMINE 4 G: 4 POWDER, FOR SUSPENSION ORAL at 08:10

## 2021-10-16 RX ADMIN — FOLIC ACID 1 MG: 1 TABLET ORAL at 08:10

## 2021-10-16 RX ADMIN — HEPARIN SODIUM 5000 UNITS: 5000 INJECTION INTRAVENOUS; SUBCUTANEOUS at 01:10

## 2021-10-16 RX ADMIN — MUPIROCIN: 20 OINTMENT TOPICAL at 08:10

## 2021-10-16 RX ADMIN — INSULIN ASPART 3 UNITS: 100 INJECTION, SOLUTION INTRAVENOUS; SUBCUTANEOUS at 12:10

## 2021-10-16 RX ADMIN — ASPIRIN 81 MG: 81 TABLET, COATED ORAL at 08:10

## 2021-10-16 RX ADMIN — HEPARIN SODIUM 5000 UNITS: 5000 INJECTION INTRAVENOUS; SUBCUTANEOUS at 10:10

## 2021-10-16 RX ADMIN — CITALOPRAM HYDROBROMIDE 10 MG: 10 TABLET ORAL at 08:10

## 2021-10-16 RX ADMIN — INSULIN ASPART 3 UNITS: 100 INJECTION, SOLUTION INTRAVENOUS; SUBCUTANEOUS at 04:10

## 2021-10-16 RX ADMIN — Medication 16 G: at 10:10

## 2021-10-16 RX ADMIN — INSULIN ASPART 2 UNITS: 100 INJECTION, SOLUTION INTRAVENOUS; SUBCUTANEOUS at 08:10

## 2021-10-16 RX ADMIN — THIAMINE HCL TAB 100 MG 100 MG: 100 TAB at 08:10

## 2021-10-16 RX ADMIN — Medication 1000 UNITS: at 08:10

## 2021-10-16 RX ADMIN — HEPARIN SODIUM 5000 UNITS: 5000 INJECTION INTRAVENOUS; SUBCUTANEOUS at 05:10

## 2021-10-16 RX ADMIN — INSULIN ASPART 2 UNITS: 100 INJECTION, SOLUTION INTRAVENOUS; SUBCUTANEOUS at 12:10

## 2021-10-16 RX ADMIN — INSULIN ASPART 2 UNITS: 100 INJECTION, SOLUTION INTRAVENOUS; SUBCUTANEOUS at 04:10

## 2021-10-16 RX ADMIN — MIDODRINE HYDROCHLORIDE 7.5 MG: 2.5 TABLET ORAL at 08:10

## 2021-10-16 RX ADMIN — FERROUS SULFATE TAB 325 MG (65 MG ELEMENTAL FE) 1 EACH: 325 (65 FE) TAB at 08:10

## 2021-10-16 RX ADMIN — ANASTROZOLE 1 MG: 1 TABLET, COATED ORAL at 08:10

## 2021-10-16 RX ADMIN — MUPIROCIN: 20 OINTMENT TOPICAL at 09:10

## 2021-10-16 RX ADMIN — ACETAMINOPHEN 650 MG: 325 TABLET ORAL at 05:10

## 2021-10-17 LAB
ALBUMIN SERPL BCP-MCNC: 2.9 G/DL (ref 3.5–5.2)
ALP SERPL-CCNC: 99 U/L (ref 55–135)
ALT SERPL W/O P-5'-P-CCNC: 18 U/L (ref 10–44)
ANION GAP SERPL CALC-SCNC: 8 MMOL/L (ref 8–16)
AST SERPL-CCNC: 17 U/L (ref 10–40)
BASOPHILS # BLD AUTO: 0.04 K/UL (ref 0–0.2)
BASOPHILS NFR BLD: 0.5 % (ref 0–1.9)
BILIRUB SERPL-MCNC: 0.4 MG/DL (ref 0.1–1)
BUN SERPL-MCNC: 21 MG/DL (ref 8–23)
CALCIUM SERPL-MCNC: 9.5 MG/DL (ref 8.7–10.5)
CHLORIDE SERPL-SCNC: 98 MMOL/L (ref 95–110)
CO2 SERPL-SCNC: 27 MMOL/L (ref 23–29)
CREAT SERPL-MCNC: 0.8 MG/DL (ref 0.5–1.4)
DIFFERENTIAL METHOD: ABNORMAL
EOSINOPHIL # BLD AUTO: 0 K/UL (ref 0–0.5)
EOSINOPHIL NFR BLD: 0.4 % (ref 0–8)
ERYTHROCYTE [DISTWIDTH] IN BLOOD BY AUTOMATED COUNT: 15.2 % (ref 11.5–14.5)
EST. GFR  (AFRICAN AMERICAN): >60 ML/MIN/1.73 M^2
EST. GFR  (NON AFRICAN AMERICAN): >60 ML/MIN/1.73 M^2
GLUCOSE SERPL-MCNC: 291 MG/DL (ref 70–110)
HCT VFR BLD AUTO: 37.1 % (ref 37–48.5)
HGB BLD-MCNC: 11.8 G/DL (ref 12–16)
IMM GRANULOCYTES # BLD AUTO: 0.02 K/UL (ref 0–0.04)
IMM GRANULOCYTES NFR BLD AUTO: 0.3 % (ref 0–0.5)
LYMPHOCYTES # BLD AUTO: 2.6 K/UL (ref 1–4.8)
LYMPHOCYTES NFR BLD: 34.2 % (ref 18–48)
MAGNESIUM SERPL-MCNC: 2 MG/DL (ref 1.6–2.6)
MCH RBC QN AUTO: 26.2 PG (ref 27–31)
MCHC RBC AUTO-ENTMCNC: 31.8 G/DL (ref 32–36)
MCV RBC AUTO: 82 FL (ref 82–98)
MONOCYTES # BLD AUTO: 0.4 K/UL (ref 0.3–1)
MONOCYTES NFR BLD: 5.7 % (ref 4–15)
NEUTROPHILS # BLD AUTO: 4.4 K/UL (ref 1.8–7.7)
NEUTROPHILS NFR BLD: 58.9 % (ref 38–73)
NRBC BLD-RTO: 0 /100 WBC
PHOSPHATE SERPL-MCNC: 2.4 MG/DL (ref 2.7–4.5)
PLATELET # BLD AUTO: 353 K/UL (ref 150–450)
PMV BLD AUTO: 9.8 FL (ref 9.2–12.9)
POCT GLUCOSE: 254 MG/DL (ref 70–110)
POCT GLUCOSE: 317 MG/DL (ref 70–110)
POCT GLUCOSE: 365 MG/DL (ref 70–110)
POCT GLUCOSE: 60 MG/DL (ref 70–110)
POCT GLUCOSE: 77 MG/DL (ref 70–110)
POTASSIUM SERPL-SCNC: 4.1 MMOL/L (ref 3.5–5.1)
PROT SERPL-MCNC: 5.9 G/DL (ref 6–8.4)
RBC # BLD AUTO: 4.51 M/UL (ref 4–5.4)
SODIUM SERPL-SCNC: 133 MMOL/L (ref 136–145)
WBC # BLD AUTO: 7.48 K/UL (ref 3.9–12.7)

## 2021-10-17 PROCEDURE — 83735 ASSAY OF MAGNESIUM: CPT | Performed by: STUDENT IN AN ORGANIZED HEALTH CARE EDUCATION/TRAINING PROGRAM

## 2021-10-17 PROCEDURE — 25000003 PHARM REV CODE 250

## 2021-10-17 PROCEDURE — 63600175 PHARM REV CODE 636 W HCPCS: Performed by: STUDENT IN AN ORGANIZED HEALTH CARE EDUCATION/TRAINING PROGRAM

## 2021-10-17 PROCEDURE — 84100 ASSAY OF PHOSPHORUS: CPT | Performed by: STUDENT IN AN ORGANIZED HEALTH CARE EDUCATION/TRAINING PROGRAM

## 2021-10-17 PROCEDURE — 36415 COLL VENOUS BLD VENIPUNCTURE: CPT | Performed by: STUDENT IN AN ORGANIZED HEALTH CARE EDUCATION/TRAINING PROGRAM

## 2021-10-17 PROCEDURE — 80053 COMPREHEN METABOLIC PANEL: CPT | Performed by: STUDENT IN AN ORGANIZED HEALTH CARE EDUCATION/TRAINING PROGRAM

## 2021-10-17 PROCEDURE — 99232 PR SUBSEQUENT HOSPITAL CARE,LEVL II: ICD-10-PCS | Mod: ,,, | Performed by: HOSPITALIST

## 2021-10-17 PROCEDURE — 25000003 PHARM REV CODE 250: Performed by: STUDENT IN AN ORGANIZED HEALTH CARE EDUCATION/TRAINING PROGRAM

## 2021-10-17 PROCEDURE — 85025 COMPLETE CBC W/AUTO DIFF WBC: CPT | Performed by: STUDENT IN AN ORGANIZED HEALTH CARE EDUCATION/TRAINING PROGRAM

## 2021-10-17 PROCEDURE — 99232 SBSQ HOSP IP/OBS MODERATE 35: CPT | Mod: ,,, | Performed by: HOSPITALIST

## 2021-10-17 PROCEDURE — 20600001 HC STEP DOWN PRIVATE ROOM

## 2021-10-17 RX ADMIN — MIDODRINE HYDROCHLORIDE 7.5 MG: 5 TABLET ORAL at 12:10

## 2021-10-17 RX ADMIN — INSULIN ASPART 3 UNITS: 100 INJECTION, SOLUTION INTRAVENOUS; SUBCUTANEOUS at 08:10

## 2021-10-17 RX ADMIN — FERROUS SULFATE TAB 325 MG (65 MG ELEMENTAL FE) 1 EACH: 325 (65 FE) TAB at 08:10

## 2021-10-17 RX ADMIN — MIDODRINE HYDROCHLORIDE 7.5 MG: 5 TABLET ORAL at 04:10

## 2021-10-17 RX ADMIN — MUPIROCIN: 20 OINTMENT TOPICAL at 08:10

## 2021-10-17 RX ADMIN — INSULIN ASPART 2 UNITS: 100 INJECTION, SOLUTION INTRAVENOUS; SUBCUTANEOUS at 10:10

## 2021-10-17 RX ADMIN — HEPARIN SODIUM 5000 UNITS: 5000 INJECTION INTRAVENOUS; SUBCUTANEOUS at 09:10

## 2021-10-17 RX ADMIN — FOLIC ACID 1 MG: 1 TABLET ORAL at 08:10

## 2021-10-17 RX ADMIN — CHOLESTYRAMINE 4 G: 4 POWDER, FOR SUSPENSION ORAL at 08:10

## 2021-10-17 RX ADMIN — INSULIN ASPART 3 UNITS: 100 INJECTION, SOLUTION INTRAVENOUS; SUBCUTANEOUS at 12:10

## 2021-10-17 RX ADMIN — THIAMINE HCL TAB 100 MG 100 MG: 100 TAB at 08:10

## 2021-10-17 RX ADMIN — ANASTROZOLE 1 MG: 1 TABLET, COATED ORAL at 08:10

## 2021-10-17 RX ADMIN — Medication 1000 UNITS: at 08:10

## 2021-10-17 RX ADMIN — HEPARIN SODIUM 5000 UNITS: 5000 INJECTION INTRAVENOUS; SUBCUTANEOUS at 05:10

## 2021-10-17 RX ADMIN — HEPARIN SODIUM 5000 UNITS: 5000 INJECTION INTRAVENOUS; SUBCUTANEOUS at 01:10

## 2021-10-17 RX ADMIN — INSULIN ASPART 5 UNITS: 100 INJECTION, SOLUTION INTRAVENOUS; SUBCUTANEOUS at 12:10

## 2021-10-17 RX ADMIN — ASPIRIN 81 MG: 81 TABLET, COATED ORAL at 08:10

## 2021-10-17 RX ADMIN — CITALOPRAM HYDROBROMIDE 10 MG: 10 TABLET ORAL at 08:10

## 2021-10-17 RX ADMIN — MULTIPLE VITAMINS W/ MINERALS TAB 1 TABLET: TAB at 08:10

## 2021-10-18 LAB
ALBUMIN SERPL BCP-MCNC: 3.1 G/DL (ref 3.5–5.2)
ALP SERPL-CCNC: 102 U/L (ref 55–135)
ALT SERPL W/O P-5'-P-CCNC: 22 U/L (ref 10–44)
ANION GAP SERPL CALC-SCNC: 5 MMOL/L (ref 8–16)
AST SERPL-CCNC: 21 U/L (ref 10–40)
BASOPHILS # BLD AUTO: 0.05 K/UL (ref 0–0.2)
BASOPHILS NFR BLD: 0.6 % (ref 0–1.9)
BILIRUB SERPL-MCNC: 0.5 MG/DL (ref 0.1–1)
BUN SERPL-MCNC: 19 MG/DL (ref 8–23)
CALCIUM SERPL-MCNC: 9.6 MG/DL (ref 8.7–10.5)
CHLORIDE SERPL-SCNC: 97 MMOL/L (ref 95–110)
CO2 SERPL-SCNC: 33 MMOL/L (ref 23–29)
CREAT SERPL-MCNC: 0.9 MG/DL (ref 0.5–1.4)
DIFFERENTIAL METHOD: ABNORMAL
EOSINOPHIL # BLD AUTO: 0.1 K/UL (ref 0–0.5)
EOSINOPHIL NFR BLD: 0.6 % (ref 0–8)
ERYTHROCYTE [DISTWIDTH] IN BLOOD BY AUTOMATED COUNT: 15.3 % (ref 11.5–14.5)
EST. GFR  (AFRICAN AMERICAN): >60 ML/MIN/1.73 M^2
EST. GFR  (NON AFRICAN AMERICAN): >60 ML/MIN/1.73 M^2
GLUCOSE SERPL-MCNC: 188 MG/DL (ref 70–110)
HCT VFR BLD AUTO: 39.4 % (ref 37–48.5)
HGB BLD-MCNC: 12.5 G/DL (ref 12–16)
IMM GRANULOCYTES # BLD AUTO: 0.01 K/UL (ref 0–0.04)
IMM GRANULOCYTES NFR BLD AUTO: 0.1 % (ref 0–0.5)
LYMPHOCYTES # BLD AUTO: 3.5 K/UL (ref 1–4.8)
LYMPHOCYTES NFR BLD: 44.5 % (ref 18–48)
MAGNESIUM SERPL-MCNC: 1.9 MG/DL (ref 1.6–2.6)
MCH RBC QN AUTO: 26 PG (ref 27–31)
MCHC RBC AUTO-ENTMCNC: 31.7 G/DL (ref 32–36)
MCV RBC AUTO: 82 FL (ref 82–98)
MONOCYTES # BLD AUTO: 0.4 K/UL (ref 0.3–1)
MONOCYTES NFR BLD: 5.1 % (ref 4–15)
NEUTROPHILS # BLD AUTO: 3.8 K/UL (ref 1.8–7.7)
NEUTROPHILS NFR BLD: 49.1 % (ref 38–73)
NRBC BLD-RTO: 0 /100 WBC
PHOSPHATE SERPL-MCNC: 2.2 MG/DL (ref 2.7–4.5)
PLATELET # BLD AUTO: 371 K/UL (ref 150–450)
PMV BLD AUTO: 9.7 FL (ref 9.2–12.9)
POCT GLUCOSE: 116 MG/DL (ref 70–110)
POCT GLUCOSE: 204 MG/DL (ref 70–110)
POCT GLUCOSE: 331 MG/DL (ref 70–110)
POCT GLUCOSE: 96 MG/DL (ref 70–110)
POTASSIUM SERPL-SCNC: 4.2 MMOL/L (ref 3.5–5.1)
PROT SERPL-MCNC: 6.1 G/DL (ref 6–8.4)
RBC # BLD AUTO: 4.81 M/UL (ref 4–5.4)
SODIUM SERPL-SCNC: 135 MMOL/L (ref 136–145)
VIT B1 BLD-MCNC: 83 UG/L (ref 38–122)
WBC # BLD AUTO: 7.78 K/UL (ref 3.9–12.7)

## 2021-10-18 PROCEDURE — 85025 COMPLETE CBC W/AUTO DIFF WBC: CPT | Performed by: STUDENT IN AN ORGANIZED HEALTH CARE EDUCATION/TRAINING PROGRAM

## 2021-10-18 PROCEDURE — 84100 ASSAY OF PHOSPHORUS: CPT | Performed by: STUDENT IN AN ORGANIZED HEALTH CARE EDUCATION/TRAINING PROGRAM

## 2021-10-18 PROCEDURE — 97530 THERAPEUTIC ACTIVITIES: CPT | Mod: CQ

## 2021-10-18 PROCEDURE — 25000003 PHARM REV CODE 250: Performed by: STUDENT IN AN ORGANIZED HEALTH CARE EDUCATION/TRAINING PROGRAM

## 2021-10-18 PROCEDURE — 97530 THERAPEUTIC ACTIVITIES: CPT

## 2021-10-18 PROCEDURE — 25000003 PHARM REV CODE 250

## 2021-10-18 PROCEDURE — 83735 ASSAY OF MAGNESIUM: CPT | Performed by: STUDENT IN AN ORGANIZED HEALTH CARE EDUCATION/TRAINING PROGRAM

## 2021-10-18 PROCEDURE — 20600001 HC STEP DOWN PRIVATE ROOM

## 2021-10-18 PROCEDURE — 99232 PR SUBSEQUENT HOSPITAL CARE,LEVL II: ICD-10-PCS | Mod: ,,, | Performed by: HOSPITALIST

## 2021-10-18 PROCEDURE — 36415 COLL VENOUS BLD VENIPUNCTURE: CPT | Performed by: STUDENT IN AN ORGANIZED HEALTH CARE EDUCATION/TRAINING PROGRAM

## 2021-10-18 PROCEDURE — 80053 COMPREHEN METABOLIC PANEL: CPT | Performed by: STUDENT IN AN ORGANIZED HEALTH CARE EDUCATION/TRAINING PROGRAM

## 2021-10-18 PROCEDURE — 97110 THERAPEUTIC EXERCISES: CPT

## 2021-10-18 PROCEDURE — 99232 SBSQ HOSP IP/OBS MODERATE 35: CPT | Mod: ,,, | Performed by: HOSPITALIST

## 2021-10-18 PROCEDURE — 63600175 PHARM REV CODE 636 W HCPCS: Performed by: STUDENT IN AN ORGANIZED HEALTH CARE EDUCATION/TRAINING PROGRAM

## 2021-10-18 RX ORDER — ONDANSETRON 4 MG/1
4 TABLET, FILM COATED ORAL EVERY 6 HOURS PRN
Status: DISCONTINUED | OUTPATIENT
Start: 2021-10-18 | End: 2021-10-22 | Stop reason: HOSPADM

## 2021-10-18 RX ADMIN — FERROUS SULFATE TAB 325 MG (65 MG ELEMENTAL FE) 1 EACH: 325 (65 FE) TAB at 09:10

## 2021-10-18 RX ADMIN — INSULIN ASPART 3 UNITS: 100 INJECTION, SOLUTION INTRAVENOUS; SUBCUTANEOUS at 08:10

## 2021-10-18 RX ADMIN — DIBASIC SODIUM PHOSPHATE, MONOBASIC POTASSIUM PHOSPHATE AND MONOBASIC SODIUM PHOSPHATE 1 TABLET: 852; 155; 130 TABLET ORAL at 08:10

## 2021-10-18 RX ADMIN — MIDODRINE HYDROCHLORIDE 7.5 MG: 5 TABLET ORAL at 05:10

## 2021-10-18 RX ADMIN — CITALOPRAM HYDROBROMIDE 10 MG: 10 TABLET ORAL at 09:10

## 2021-10-18 RX ADMIN — MIDODRINE HYDROCHLORIDE 7.5 MG: 5 TABLET ORAL at 08:10

## 2021-10-18 RX ADMIN — INSULIN ASPART 3 UNITS: 100 INJECTION, SOLUTION INTRAVENOUS; SUBCUTANEOUS at 12:10

## 2021-10-18 RX ADMIN — ACETAMINOPHEN 650 MG: 325 TABLET ORAL at 01:10

## 2021-10-18 RX ADMIN — CHOLESTYRAMINE 4 G: 4 POWDER, FOR SUSPENSION ORAL at 09:10

## 2021-10-18 RX ADMIN — ASPIRIN 81 MG: 81 TABLET, COATED ORAL at 09:10

## 2021-10-18 RX ADMIN — ANASTROZOLE 1 MG: 1 TABLET, COATED ORAL at 09:10

## 2021-10-18 RX ADMIN — DIBASIC SODIUM PHOSPHATE, MONOBASIC POTASSIUM PHOSPHATE AND MONOBASIC SODIUM PHOSPHATE 1 TABLET: 852; 155; 130 TABLET ORAL at 09:10

## 2021-10-18 RX ADMIN — MULTIPLE VITAMINS W/ MINERALS TAB 1 TABLET: TAB at 09:10

## 2021-10-18 RX ADMIN — INSULIN ASPART 3 UNITS: 100 INJECTION, SOLUTION INTRAVENOUS; SUBCUTANEOUS at 05:10

## 2021-10-18 RX ADMIN — Medication 1000 UNITS: at 09:10

## 2021-10-18 RX ADMIN — HEPARIN SODIUM 5000 UNITS: 5000 INJECTION INTRAVENOUS; SUBCUTANEOUS at 03:10

## 2021-10-18 RX ADMIN — HEPARIN SODIUM 5000 UNITS: 5000 INJECTION INTRAVENOUS; SUBCUTANEOUS at 06:10

## 2021-10-18 RX ADMIN — THIAMINE HCL TAB 100 MG 100 MG: 100 TAB at 09:10

## 2021-10-18 RX ADMIN — HEPARIN SODIUM 5000 UNITS: 5000 INJECTION INTRAVENOUS; SUBCUTANEOUS at 09:10

## 2021-10-18 RX ADMIN — INSULIN ASPART 2 UNITS: 100 INJECTION, SOLUTION INTRAVENOUS; SUBCUTANEOUS at 08:10

## 2021-10-18 RX ADMIN — MIDODRINE HYDROCHLORIDE 7.5 MG: 5 TABLET ORAL at 12:10

## 2021-10-18 RX ADMIN — FOLIC ACID 1 MG: 1 TABLET ORAL at 09:10

## 2021-10-19 PROBLEM — E43 SEVERE MALNUTRITION: Status: ACTIVE | Noted: 2021-10-19

## 2021-10-19 LAB
A-TOCOPHEROL VIT E SERPL-MCNC: 934 UG/DL (ref 500–1800)
ALBUMIN SERPL BCP-MCNC: 2.9 G/DL (ref 3.5–5.2)
ALP SERPL-CCNC: 95 U/L (ref 55–135)
ALT SERPL W/O P-5'-P-CCNC: 23 U/L (ref 10–44)
ANION GAP SERPL CALC-SCNC: 5 MMOL/L (ref 8–16)
ANION GAP SERPL CALC-SCNC: 8 MMOL/L (ref 8–16)
AST SERPL-CCNC: 23 U/L (ref 10–40)
B-OH-BUTYR BLD STRIP-SCNC: 0.1 MMOL/L (ref 0–0.5)
BASOPHILS # BLD AUTO: 0.04 K/UL (ref 0–0.2)
BASOPHILS NFR BLD: 0.5 % (ref 0–1.9)
BILIRUB SERPL-MCNC: 0.6 MG/DL (ref 0.1–1)
BUN SERPL-MCNC: 18 MG/DL (ref 8–23)
BUN SERPL-MCNC: 24 MG/DL (ref 8–23)
CALCIUM SERPL-MCNC: 9.6 MG/DL (ref 8.7–10.5)
CALCIUM SERPL-MCNC: 9.7 MG/DL (ref 8.7–10.5)
CHLORIDE SERPL-SCNC: 101 MMOL/L (ref 95–110)
CHLORIDE SERPL-SCNC: 99 MMOL/L (ref 95–110)
CO2 SERPL-SCNC: 27 MMOL/L (ref 23–29)
CO2 SERPL-SCNC: 29 MMOL/L (ref 23–29)
CREAT SERPL-MCNC: 0.8 MG/DL (ref 0.5–1.4)
CREAT SERPL-MCNC: 1 MG/DL (ref 0.5–1.4)
DIFFERENTIAL METHOD: ABNORMAL
EOSINOPHIL # BLD AUTO: 0 K/UL (ref 0–0.5)
EOSINOPHIL NFR BLD: 0.4 % (ref 0–8)
ERYTHROCYTE [DISTWIDTH] IN BLOOD BY AUTOMATED COUNT: 15.6 % (ref 11.5–14.5)
EST. GFR  (AFRICAN AMERICAN): >60 ML/MIN/1.73 M^2
EST. GFR  (AFRICAN AMERICAN): >60 ML/MIN/1.73 M^2
EST. GFR  (NON AFRICAN AMERICAN): 56.8 ML/MIN/1.73 M^2
EST. GFR  (NON AFRICAN AMERICAN): >60 ML/MIN/1.73 M^2
GLUCOSE SERPL-MCNC: 169 MG/DL (ref 70–110)
GLUCOSE SERPL-MCNC: 416 MG/DL (ref 70–110)
HCT VFR BLD AUTO: 38.1 % (ref 37–48.5)
HGB BLD-MCNC: 12.2 G/DL (ref 12–16)
IMM GRANULOCYTES # BLD AUTO: 0.02 K/UL (ref 0–0.04)
IMM GRANULOCYTES NFR BLD AUTO: 0.2 % (ref 0–0.5)
LYMPHOCYTES # BLD AUTO: 3.2 K/UL (ref 1–4.8)
LYMPHOCYTES NFR BLD: 37.8 % (ref 18–48)
MAGNESIUM SERPL-MCNC: 2 MG/DL (ref 1.6–2.6)
MCH RBC QN AUTO: 26 PG (ref 27–31)
MCHC RBC AUTO-ENTMCNC: 32 G/DL (ref 32–36)
MCV RBC AUTO: 81 FL (ref 82–98)
MONOCYTES # BLD AUTO: 0.4 K/UL (ref 0.3–1)
MONOCYTES NFR BLD: 5.1 % (ref 4–15)
NEUTROPHILS # BLD AUTO: 4.7 K/UL (ref 1.8–7.7)
NEUTROPHILS NFR BLD: 56 % (ref 38–73)
NRBC BLD-RTO: 0 /100 WBC
PHOSPHATE SERPL-MCNC: 3 MG/DL (ref 2.7–4.5)
PLATELET # BLD AUTO: 342 K/UL (ref 150–450)
PMV BLD AUTO: 9.3 FL (ref 9.2–12.9)
POCT GLUCOSE: 118 MG/DL (ref 70–110)
POCT GLUCOSE: 239 MG/DL (ref 70–110)
POCT GLUCOSE: 271 MG/DL (ref 70–110)
POCT GLUCOSE: 436 MG/DL (ref 70–110)
POCT GLUCOSE: 460 MG/DL (ref 70–110)
POTASSIUM SERPL-SCNC: 4.2 MMOL/L (ref 3.5–5.1)
POTASSIUM SERPL-SCNC: 4.3 MMOL/L (ref 3.5–5.1)
PROT SERPL-MCNC: 5.8 G/DL (ref 6–8.4)
RBC # BLD AUTO: 4.69 M/UL (ref 4–5.4)
SODIUM SERPL-SCNC: 133 MMOL/L (ref 136–145)
SODIUM SERPL-SCNC: 136 MMOL/L (ref 136–145)
WBC # BLD AUTO: 8.45 K/UL (ref 3.9–12.7)

## 2021-10-19 PROCEDURE — 95720 EEG PHY/QHP EA INCR W/VEEG: CPT | Mod: ,,, | Performed by: PSYCHIATRY & NEUROLOGY

## 2021-10-19 PROCEDURE — 80053 COMPREHEN METABOLIC PANEL: CPT | Performed by: STUDENT IN AN ORGANIZED HEALTH CARE EDUCATION/TRAINING PROGRAM

## 2021-10-19 PROCEDURE — 99232 PR SUBSEQUENT HOSPITAL CARE,LEVL II: ICD-10-PCS | Mod: ,,, | Performed by: HOSPITALIST

## 2021-10-19 PROCEDURE — 25000003 PHARM REV CODE 250: Performed by: STUDENT IN AN ORGANIZED HEALTH CARE EDUCATION/TRAINING PROGRAM

## 2021-10-19 PROCEDURE — 82010 KETONE BODYS QUAN: CPT | Performed by: STUDENT IN AN ORGANIZED HEALTH CARE EDUCATION/TRAINING PROGRAM

## 2021-10-19 PROCEDURE — 25000003 PHARM REV CODE 250

## 2021-10-19 PROCEDURE — 36415 COLL VENOUS BLD VENIPUNCTURE: CPT | Performed by: STUDENT IN AN ORGANIZED HEALTH CARE EDUCATION/TRAINING PROGRAM

## 2021-10-19 PROCEDURE — 63600175 PHARM REV CODE 636 W HCPCS: Performed by: STUDENT IN AN ORGANIZED HEALTH CARE EDUCATION/TRAINING PROGRAM

## 2021-10-19 PROCEDURE — 80048 BASIC METABOLIC PNL TOTAL CA: CPT | Performed by: STUDENT IN AN ORGANIZED HEALTH CARE EDUCATION/TRAINING PROGRAM

## 2021-10-19 PROCEDURE — 85025 COMPLETE CBC W/AUTO DIFF WBC: CPT | Performed by: STUDENT IN AN ORGANIZED HEALTH CARE EDUCATION/TRAINING PROGRAM

## 2021-10-19 PROCEDURE — 99232 SBSQ HOSP IP/OBS MODERATE 35: CPT | Mod: ,,, | Performed by: HOSPITALIST

## 2021-10-19 PROCEDURE — 95700 EEG CONT REC W/VID EEG TECH: CPT

## 2021-10-19 PROCEDURE — 84100 ASSAY OF PHOSPHORUS: CPT | Performed by: STUDENT IN AN ORGANIZED HEALTH CARE EDUCATION/TRAINING PROGRAM

## 2021-10-19 PROCEDURE — 95714 VEEG EA 12-26 HR UNMNTR: CPT

## 2021-10-19 PROCEDURE — 95720 PR EEG, W/VIDEO, CONT RECORD, I&R, >12<26 HRS: ICD-10-PCS | Mod: ,,, | Performed by: PSYCHIATRY & NEUROLOGY

## 2021-10-19 PROCEDURE — 20600001 HC STEP DOWN PRIVATE ROOM

## 2021-10-19 PROCEDURE — 83735 ASSAY OF MAGNESIUM: CPT | Performed by: STUDENT IN AN ORGANIZED HEALTH CARE EDUCATION/TRAINING PROGRAM

## 2021-10-19 RX ORDER — MIDODRINE HYDROCHLORIDE 5 MG/1
10 TABLET ORAL
Status: DISCONTINUED | OUTPATIENT
Start: 2021-10-19 | End: 2021-10-21

## 2021-10-19 RX ORDER — CHOLESTYRAMINE 4 G/9G
2 POWDER, FOR SUSPENSION ORAL 2 TIMES DAILY
Status: DISCONTINUED | OUTPATIENT
Start: 2021-10-19 | End: 2021-10-22 | Stop reason: HOSPADM

## 2021-10-19 RX ORDER — INSULIN ASPART 100 [IU]/ML
5 INJECTION, SOLUTION INTRAVENOUS; SUBCUTANEOUS
Status: DISCONTINUED | OUTPATIENT
Start: 2021-10-19 | End: 2021-10-22 | Stop reason: HOSPADM

## 2021-10-19 RX ADMIN — CITALOPRAM HYDROBROMIDE 10 MG: 10 TABLET ORAL at 08:10

## 2021-10-19 RX ADMIN — INSULIN ASPART 3 UNITS: 100 INJECTION, SOLUTION INTRAVENOUS; SUBCUTANEOUS at 08:10

## 2021-10-19 RX ADMIN — THIAMINE HCL TAB 100 MG 100 MG: 100 TAB at 08:10

## 2021-10-19 RX ADMIN — HEPARIN SODIUM 5000 UNITS: 5000 INJECTION INTRAVENOUS; SUBCUTANEOUS at 09:10

## 2021-10-19 RX ADMIN — HEPARIN SODIUM 5000 UNITS: 5000 INJECTION INTRAVENOUS; SUBCUTANEOUS at 05:10

## 2021-10-19 RX ADMIN — MIDODRINE HYDROCHLORIDE 7.5 MG: 5 TABLET ORAL at 08:10

## 2021-10-19 RX ADMIN — MIDODRINE HYDROCHLORIDE 10 MG: 5 TABLET ORAL at 05:10

## 2021-10-19 RX ADMIN — MIDODRINE HYDROCHLORIDE 7.5 MG: 5 TABLET ORAL at 12:10

## 2021-10-19 RX ADMIN — ASPIRIN 81 MG: 81 TABLET, COATED ORAL at 08:10

## 2021-10-19 RX ADMIN — FOLIC ACID 1 MG: 1 TABLET ORAL at 08:10

## 2021-10-19 RX ADMIN — FERROUS SULFATE TAB 325 MG (65 MG ELEMENTAL FE) 1 EACH: 325 (65 FE) TAB at 08:10

## 2021-10-19 RX ADMIN — INSULIN ASPART 3 UNITS: 100 INJECTION, SOLUTION INTRAVENOUS; SUBCUTANEOUS at 05:10

## 2021-10-19 RX ADMIN — CHOLESTYRAMINE 8 G: 4 POWDER, FOR SUSPENSION ORAL at 09:10

## 2021-10-19 RX ADMIN — ANASTROZOLE 1 MG: 1 TABLET, COATED ORAL at 08:10

## 2021-10-19 RX ADMIN — INSULIN ASPART 3 UNITS: 100 INJECTION, SOLUTION INTRAVENOUS; SUBCUTANEOUS at 12:10

## 2021-10-19 RX ADMIN — Medication 1000 UNITS: at 08:10

## 2021-10-19 RX ADMIN — LOPERAMIDE HYDROCHLORIDE 4 MG: 2 CAPSULE ORAL at 03:10

## 2021-10-19 RX ADMIN — MULTIPLE VITAMINS W/ MINERALS TAB 1 TABLET: TAB at 08:10

## 2021-10-19 RX ADMIN — INSULIN ASPART 5 UNITS: 100 INJECTION, SOLUTION INTRAVENOUS; SUBCUTANEOUS at 12:10

## 2021-10-19 RX ADMIN — HEPARIN SODIUM 5000 UNITS: 5000 INJECTION INTRAVENOUS; SUBCUTANEOUS at 02:10

## 2021-10-19 RX ADMIN — CHOLESTYRAMINE 4 G: 4 POWDER, FOR SUSPENSION ORAL at 09:10

## 2021-10-19 RX ADMIN — INSULIN ASPART 5 UNITS: 100 INJECTION, SOLUTION INTRAVENOUS; SUBCUTANEOUS at 05:10

## 2021-10-20 ENCOUNTER — TELEPHONE (OUTPATIENT)
Dept: NEUROLOGY | Facility: CLINIC | Age: 71
End: 2021-10-20

## 2021-10-20 LAB
25(OH)D3+25(OH)D2 SERPL-MCNC: 35 NG/ML (ref 30–96)
POCT GLUCOSE: 109 MG/DL (ref 70–110)
POCT GLUCOSE: 170 MG/DL (ref 70–110)
POCT GLUCOSE: 309 MG/DL (ref 70–110)
POCT GLUCOSE: 313 MG/DL (ref 70–110)
SARS-COV-2 RNA RESP QL NAA+PROBE: NOT DETECTED

## 2021-10-20 PROCEDURE — 36415 COLL VENOUS BLD VENIPUNCTURE: CPT | Performed by: HOSPITALIST

## 2021-10-20 PROCEDURE — 97530 THERAPEUTIC ACTIVITIES: CPT

## 2021-10-20 PROCEDURE — U0003 INFECTIOUS AGENT DETECTION BY NUCLEIC ACID (DNA OR RNA); SEVERE ACUTE RESPIRATORY SYNDROME CORONAVIRUS 2 (SARS-COV-2) (CORONAVIRUS DISEASE [COVID-19]), AMPLIFIED PROBE TECHNIQUE, MAKING USE OF HIGH THROUGHPUT TECHNOLOGIES AS DESCRIBED BY CMS-2020-01-R: HCPCS | Performed by: HOSPITALIST

## 2021-10-20 PROCEDURE — 20600001 HC STEP DOWN PRIVATE ROOM

## 2021-10-20 PROCEDURE — 25000003 PHARM REV CODE 250: Performed by: STUDENT IN AN ORGANIZED HEALTH CARE EDUCATION/TRAINING PROGRAM

## 2021-10-20 PROCEDURE — 99232 SBSQ HOSP IP/OBS MODERATE 35: CPT | Mod: ,,, | Performed by: HOSPITALIST

## 2021-10-20 PROCEDURE — U0005 INFEC AGEN DETEC AMPLI PROBE: HCPCS | Performed by: HOSPITALIST

## 2021-10-20 PROCEDURE — 82306 VITAMIN D 25 HYDROXY: CPT | Performed by: HOSPITALIST

## 2021-10-20 PROCEDURE — 99232 PR SUBSEQUENT HOSPITAL CARE,LEVL II: ICD-10-PCS | Mod: ,,, | Performed by: HOSPITALIST

## 2021-10-20 PROCEDURE — 63600175 PHARM REV CODE 636 W HCPCS: Performed by: STUDENT IN AN ORGANIZED HEALTH CARE EDUCATION/TRAINING PROGRAM

## 2021-10-20 RX ADMIN — INSULIN ASPART 4 UNITS: 100 INJECTION, SOLUTION INTRAVENOUS; SUBCUTANEOUS at 12:10

## 2021-10-20 RX ADMIN — INSULIN ASPART 5 UNITS: 100 INJECTION, SOLUTION INTRAVENOUS; SUBCUTANEOUS at 05:10

## 2021-10-20 RX ADMIN — CITALOPRAM HYDROBROMIDE 10 MG: 10 TABLET ORAL at 08:10

## 2021-10-20 RX ADMIN — FERROUS SULFATE TAB 325 MG (65 MG ELEMENTAL FE) 1 EACH: 325 (65 FE) TAB at 08:10

## 2021-10-20 RX ADMIN — MIDODRINE HYDROCHLORIDE 10 MG: 5 TABLET ORAL at 12:10

## 2021-10-20 RX ADMIN — MIDODRINE HYDROCHLORIDE 10 MG: 5 TABLET ORAL at 04:10

## 2021-10-20 RX ADMIN — CHOLESTYRAMINE 8 G: 4 POWDER, FOR SUSPENSION ORAL at 08:10

## 2021-10-20 RX ADMIN — INSULIN ASPART 5 UNITS: 100 INJECTION, SOLUTION INTRAVENOUS; SUBCUTANEOUS at 12:10

## 2021-10-20 RX ADMIN — Medication 6 MG: at 08:10

## 2021-10-20 RX ADMIN — HEPARIN SODIUM 5000 UNITS: 5000 INJECTION INTRAVENOUS; SUBCUTANEOUS at 09:10

## 2021-10-20 RX ADMIN — MIDODRINE HYDROCHLORIDE 10 MG: 5 TABLET ORAL at 08:10

## 2021-10-20 RX ADMIN — ASPIRIN 81 MG: 81 TABLET, COATED ORAL at 08:10

## 2021-10-20 RX ADMIN — THIAMINE HCL TAB 100 MG 100 MG: 100 TAB at 08:10

## 2021-10-20 RX ADMIN — HEPARIN SODIUM 5000 UNITS: 5000 INJECTION INTRAVENOUS; SUBCUTANEOUS at 06:10

## 2021-10-20 RX ADMIN — Medication 1000 UNITS: at 08:10

## 2021-10-20 RX ADMIN — INSULIN ASPART 4 UNITS: 100 INJECTION, SOLUTION INTRAVENOUS; SUBCUTANEOUS at 08:10

## 2021-10-20 RX ADMIN — ANASTROZOLE 1 MG: 1 TABLET, COATED ORAL at 08:10

## 2021-10-20 RX ADMIN — HEPARIN SODIUM 5000 UNITS: 5000 INJECTION INTRAVENOUS; SUBCUTANEOUS at 02:10

## 2021-10-20 RX ADMIN — MULTIPLE VITAMINS W/ MINERALS TAB 1 TABLET: TAB at 08:10

## 2021-10-20 RX ADMIN — INSULIN ASPART 5 UNITS: 100 INJECTION, SOLUTION INTRAVENOUS; SUBCUTANEOUS at 08:10

## 2021-10-20 RX ADMIN — FOLIC ACID 1 MG: 1 TABLET ORAL at 08:10

## 2021-10-21 LAB
ALBUMIN SERPL BCP-MCNC: 3 G/DL (ref 3.5–5.2)
ALP SERPL-CCNC: 88 U/L (ref 55–135)
ALT SERPL W/O P-5'-P-CCNC: 20 U/L (ref 10–44)
ANION GAP SERPL CALC-SCNC: 10 MMOL/L (ref 8–16)
AST SERPL-CCNC: 21 U/L (ref 10–40)
BASOPHILS # BLD AUTO: 0.05 K/UL (ref 0–0.2)
BASOPHILS NFR BLD: 0.5 % (ref 0–1.9)
BILIRUB SERPL-MCNC: 0.5 MG/DL (ref 0.1–1)
BUN SERPL-MCNC: 27 MG/DL (ref 8–23)
CALCIUM SERPL-MCNC: 9.8 MG/DL (ref 8.7–10.5)
CHLORIDE SERPL-SCNC: 106 MMOL/L (ref 95–110)
CO2 SERPL-SCNC: 26 MMOL/L (ref 23–29)
CREAT SERPL-MCNC: 0.8 MG/DL (ref 0.5–1.4)
DIFFERENTIAL METHOD: ABNORMAL
EOSINOPHIL # BLD AUTO: 0.1 K/UL (ref 0–0.5)
EOSINOPHIL NFR BLD: 0.7 % (ref 0–8)
ERYTHROCYTE [DISTWIDTH] IN BLOOD BY AUTOMATED COUNT: 15.7 % (ref 11.5–14.5)
EST. GFR  (AFRICAN AMERICAN): >60 ML/MIN/1.73 M^2
EST. GFR  (NON AFRICAN AMERICAN): >60 ML/MIN/1.73 M^2
GLUCOSE SERPL-MCNC: 116 MG/DL (ref 70–110)
HCT VFR BLD AUTO: 37.1 % (ref 37–48.5)
HGB BLD-MCNC: 11.2 G/DL (ref 12–16)
IMM GRANULOCYTES # BLD AUTO: 0.02 K/UL (ref 0–0.04)
IMM GRANULOCYTES NFR BLD AUTO: 0.2 % (ref 0–0.5)
LYMPHOCYTES # BLD AUTO: 4.4 K/UL (ref 1–4.8)
LYMPHOCYTES NFR BLD: 44.3 % (ref 18–48)
MAGNESIUM SERPL-MCNC: 2 MG/DL (ref 1.6–2.6)
MCH RBC QN AUTO: 25.7 PG (ref 27–31)
MCHC RBC AUTO-ENTMCNC: 30.2 G/DL (ref 32–36)
MCV RBC AUTO: 85 FL (ref 82–98)
MONOCYTES # BLD AUTO: 0.5 K/UL (ref 0.3–1)
MONOCYTES NFR BLD: 5.4 % (ref 4–15)
NEUTROPHILS # BLD AUTO: 4.9 K/UL (ref 1.8–7.7)
NEUTROPHILS NFR BLD: 48.9 % (ref 38–73)
NRBC BLD-RTO: 0 /100 WBC
PHOSPHATE SERPL-MCNC: 2.9 MG/DL (ref 2.7–4.5)
PLATELET # BLD AUTO: 351 K/UL (ref 150–450)
PMV BLD AUTO: 9.3 FL (ref 9.2–12.9)
POCT GLUCOSE: 232 MG/DL (ref 70–110)
POCT GLUCOSE: 271 MG/DL (ref 70–110)
POCT GLUCOSE: 278 MG/DL (ref 70–110)
POCT GLUCOSE: 306 MG/DL (ref 70–110)
POTASSIUM SERPL-SCNC: 4.3 MMOL/L (ref 3.5–5.1)
PROT SERPL-MCNC: 5.8 G/DL (ref 6–8.4)
RBC # BLD AUTO: 4.35 M/UL (ref 4–5.4)
SODIUM SERPL-SCNC: 142 MMOL/L (ref 136–145)
WBC # BLD AUTO: 10.02 K/UL (ref 3.9–12.7)

## 2021-10-21 PROCEDURE — 25000003 PHARM REV CODE 250: Performed by: STUDENT IN AN ORGANIZED HEALTH CARE EDUCATION/TRAINING PROGRAM

## 2021-10-21 PROCEDURE — 84100 ASSAY OF PHOSPHORUS: CPT | Performed by: STUDENT IN AN ORGANIZED HEALTH CARE EDUCATION/TRAINING PROGRAM

## 2021-10-21 PROCEDURE — 80053 COMPREHEN METABOLIC PANEL: CPT | Performed by: STUDENT IN AN ORGANIZED HEALTH CARE EDUCATION/TRAINING PROGRAM

## 2021-10-21 PROCEDURE — 63600175 PHARM REV CODE 636 W HCPCS: Performed by: STUDENT IN AN ORGANIZED HEALTH CARE EDUCATION/TRAINING PROGRAM

## 2021-10-21 PROCEDURE — 85025 COMPLETE CBC W/AUTO DIFF WBC: CPT | Performed by: STUDENT IN AN ORGANIZED HEALTH CARE EDUCATION/TRAINING PROGRAM

## 2021-10-21 PROCEDURE — 99233 PR SUBSEQUENT HOSPITAL CARE,LEVL III: ICD-10-PCS | Mod: ,,, | Performed by: HOSPITALIST

## 2021-10-21 PROCEDURE — 20600001 HC STEP DOWN PRIVATE ROOM

## 2021-10-21 PROCEDURE — 36415 COLL VENOUS BLD VENIPUNCTURE: CPT | Performed by: STUDENT IN AN ORGANIZED HEALTH CARE EDUCATION/TRAINING PROGRAM

## 2021-10-21 PROCEDURE — 83735 ASSAY OF MAGNESIUM: CPT | Performed by: STUDENT IN AN ORGANIZED HEALTH CARE EDUCATION/TRAINING PROGRAM

## 2021-10-21 PROCEDURE — 99233 SBSQ HOSP IP/OBS HIGH 50: CPT | Mod: ,,, | Performed by: HOSPITALIST

## 2021-10-21 RX ORDER — MIDODRINE HYDROCHLORIDE 5 MG/1
5 TABLET ORAL
Status: DISCONTINUED | OUTPATIENT
Start: 2021-10-22 | End: 2021-10-22 | Stop reason: HOSPADM

## 2021-10-21 RX ADMIN — FERROUS SULFATE TAB 325 MG (65 MG ELEMENTAL FE) 1 EACH: 325 (65 FE) TAB at 09:10

## 2021-10-21 RX ADMIN — CHOLESTYRAMINE 8 G: 4 POWDER, FOR SUSPENSION ORAL at 09:10

## 2021-10-21 RX ADMIN — INSULIN ASPART 5 UNITS: 100 INJECTION, SOLUTION INTRAVENOUS; SUBCUTANEOUS at 08:10

## 2021-10-21 RX ADMIN — CITALOPRAM HYDROBROMIDE 10 MG: 10 TABLET ORAL at 09:10

## 2021-10-21 RX ADMIN — INSULIN ASPART 4 UNITS: 100 INJECTION, SOLUTION INTRAVENOUS; SUBCUTANEOUS at 12:10

## 2021-10-21 RX ADMIN — Medication 1000 UNITS: at 09:10

## 2021-10-21 RX ADMIN — INSULIN ASPART 1 UNITS: 100 INJECTION, SOLUTION INTRAVENOUS; SUBCUTANEOUS at 08:10

## 2021-10-21 RX ADMIN — INSULIN ASPART 2 UNITS: 100 INJECTION, SOLUTION INTRAVENOUS; SUBCUTANEOUS at 08:10

## 2021-10-21 RX ADMIN — Medication 6 MG: at 08:10

## 2021-10-21 RX ADMIN — MIDODRINE HYDROCHLORIDE 10 MG: 5 TABLET ORAL at 09:10

## 2021-10-21 RX ADMIN — HEPARIN SODIUM 5000 UNITS: 5000 INJECTION INTRAVENOUS; SUBCUTANEOUS at 08:10

## 2021-10-21 RX ADMIN — THIAMINE HCL TAB 100 MG 100 MG: 100 TAB at 09:10

## 2021-10-21 RX ADMIN — HEPARIN SODIUM 5000 UNITS: 5000 INJECTION INTRAVENOUS; SUBCUTANEOUS at 05:10

## 2021-10-21 RX ADMIN — ANASTROZOLE 1 MG: 1 TABLET, COATED ORAL at 09:10

## 2021-10-21 RX ADMIN — HEPARIN SODIUM 5000 UNITS: 5000 INJECTION INTRAVENOUS; SUBCUTANEOUS at 02:10

## 2021-10-21 RX ADMIN — INSULIN ASPART 5 UNITS: 100 INJECTION, SOLUTION INTRAVENOUS; SUBCUTANEOUS at 05:10

## 2021-10-21 RX ADMIN — ASPIRIN 81 MG: 81 TABLET, COATED ORAL at 09:10

## 2021-10-21 RX ADMIN — MULTIPLE VITAMINS W/ MINERALS TAB 1 TABLET: TAB at 09:10

## 2021-10-21 RX ADMIN — FOLIC ACID 1 MG: 1 TABLET ORAL at 09:10

## 2021-10-22 VITALS
DIASTOLIC BLOOD PRESSURE: 94 MMHG | RESPIRATION RATE: 18 BRPM | HEIGHT: 59 IN | HEART RATE: 82 BPM | WEIGHT: 94.38 LBS | BODY MASS INDEX: 19.03 KG/M2 | TEMPERATURE: 95 F | OXYGEN SATURATION: 98 % | SYSTOLIC BLOOD PRESSURE: 135 MMHG

## 2021-10-22 PROBLEM — E11.10 DKA (DIABETIC KETOACIDOSIS): Status: RESOLVED | Noted: 2021-10-12 | Resolved: 2021-10-22

## 2021-10-22 LAB
POCT GLUCOSE: 249 MG/DL (ref 70–110)
POCT GLUCOSE: 324 MG/DL (ref 70–110)
POCT GLUCOSE: 382 MG/DL (ref 70–110)

## 2021-10-22 PROCEDURE — 25000003 PHARM REV CODE 250: Performed by: STUDENT IN AN ORGANIZED HEALTH CARE EDUCATION/TRAINING PROGRAM

## 2021-10-22 PROCEDURE — 63600175 PHARM REV CODE 636 W HCPCS: Performed by: STUDENT IN AN ORGANIZED HEALTH CARE EDUCATION/TRAINING PROGRAM

## 2021-10-22 PROCEDURE — 25000003 PHARM REV CODE 250: Performed by: HOSPITALIST

## 2021-10-22 PROCEDURE — 99239 HOSP IP/OBS DSCHRG MGMT >30: CPT | Mod: ,,, | Performed by: HOSPITALIST

## 2021-10-22 PROCEDURE — 97535 SELF CARE MNGMENT TRAINING: CPT

## 2021-10-22 PROCEDURE — 99239 PR HOSPITAL DISCHARGE DAY,>30 MIN: ICD-10-PCS | Mod: ,,, | Performed by: HOSPITALIST

## 2021-10-22 RX ORDER — INSULIN LISPRO 100 [IU]/ML
7 INJECTION, SOLUTION INTRAVENOUS; SUBCUTANEOUS
Qty: 15 ML | Refills: 3 | Status: ON HOLD | OUTPATIENT
Start: 2021-10-22 | End: 2021-10-25 | Stop reason: SDUPTHER

## 2021-10-22 RX ORDER — INSULIN GLARGINE 100 [IU]/ML
6 INJECTION, SOLUTION SUBCUTANEOUS DAILY
Qty: 15 ML | Refills: 3 | Status: SHIPPED | OUTPATIENT
Start: 2021-10-22 | End: 2021-10-22 | Stop reason: HOSPADM

## 2021-10-22 RX ORDER — INSULIN LISPRO 100 [IU]/ML
3 INJECTION, SOLUTION INTRAVENOUS; SUBCUTANEOUS
Qty: 15 ML | Refills: 3 | Status: SHIPPED | OUTPATIENT
Start: 2021-10-22 | End: 2021-10-22 | Stop reason: SDUPTHER

## 2021-10-22 RX ORDER — MIDODRINE HYDROCHLORIDE 2.5 MG/1
7.5 TABLET ORAL
Qty: 270 TABLET | Refills: 11 | Status: ON HOLD | OUTPATIENT
Start: 2021-10-22 | End: 2022-06-20

## 2021-10-22 RX ADMIN — INSULIN ASPART 5 UNITS: 100 INJECTION, SOLUTION INTRAVENOUS; SUBCUTANEOUS at 05:10

## 2021-10-22 RX ADMIN — FERROUS SULFATE TAB 325 MG (65 MG ELEMENTAL FE) 1 EACH: 325 (65 FE) TAB at 08:10

## 2021-10-22 RX ADMIN — MULTIPLE VITAMINS W/ MINERALS TAB 1 TABLET: TAB at 08:10

## 2021-10-22 RX ADMIN — INSULIN ASPART 5 UNITS: 100 INJECTION, SOLUTION INTRAVENOUS; SUBCUTANEOUS at 08:10

## 2021-10-22 RX ADMIN — ASPIRIN 81 MG: 81 TABLET, COATED ORAL at 09:10

## 2021-10-22 RX ADMIN — THIAMINE HCL TAB 100 MG 100 MG: 100 TAB at 08:10

## 2021-10-22 RX ADMIN — ANASTROZOLE 1 MG: 1 TABLET, COATED ORAL at 08:10

## 2021-10-22 RX ADMIN — HEPARIN SODIUM 5000 UNITS: 5000 INJECTION INTRAVENOUS; SUBCUTANEOUS at 02:10

## 2021-10-22 RX ADMIN — CHOLESTYRAMINE 8 G: 4 POWDER, FOR SUSPENSION ORAL at 09:10

## 2021-10-22 RX ADMIN — CITALOPRAM HYDROBROMIDE 10 MG: 10 TABLET ORAL at 08:10

## 2021-10-22 RX ADMIN — Medication 1000 UNITS: at 08:10

## 2021-10-22 RX ADMIN — INSULIN ASPART 5 UNITS: 100 INJECTION, SOLUTION INTRAVENOUS; SUBCUTANEOUS at 12:10

## 2021-10-22 RX ADMIN — FOLIC ACID 1 MG: 1 TABLET ORAL at 08:10

## 2021-10-22 RX ADMIN — HEPARIN SODIUM 5000 UNITS: 5000 INJECTION INTRAVENOUS; SUBCUTANEOUS at 06:10

## 2021-10-22 RX ADMIN — MIDODRINE HYDROCHLORIDE 5 MG: 5 TABLET ORAL at 08:10

## 2021-10-23 ENCOUNTER — HOSPITAL ENCOUNTER (OUTPATIENT)
Facility: HOSPITAL | Age: 71
Discharge: HOME | End: 2021-10-28
Attending: EMERGENCY MEDICINE | Admitting: HOSPITALIST
Payer: COMMERCIAL

## 2021-10-23 DIAGNOSIS — R00.0 TACHYCARDIA: ICD-10-CM

## 2021-10-23 DIAGNOSIS — E86.0 DEHYDRATION: ICD-10-CM

## 2021-10-23 DIAGNOSIS — R74.01 ELEVATED LIVER TRANSAMINASE LEVEL: ICD-10-CM
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
## 2021-10-23 DIAGNOSIS — R53.81 PHYSICAL DECONDITIONING: ICD-10-CM

## 2021-10-23 DIAGNOSIS — R79.9 ELEVATED BUN: ICD-10-CM

## 2021-10-23 DIAGNOSIS — R55 SYNCOPAL EPISODES: ICD-10-CM

## 2021-10-23 DIAGNOSIS — R55 SYNCOPE: ICD-10-CM

## 2021-10-23 DIAGNOSIS — I95.1 ORTHOSTATIC HYPOTENSION: Primary | ICD-10-CM

## 2021-10-23 DIAGNOSIS — N39.0 URINARY TRACT INFECTION WITHOUT HEMATURIA, SITE UNSPECIFIED: ICD-10-CM

## 2021-10-23 DIAGNOSIS — R53.83 FATIGUE: ICD-10-CM
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
## 2021-10-23 DIAGNOSIS — R79.89 ELEVATED TROPONIN: ICD-10-CM
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
## 2021-10-23 LAB
ALBUMIN SERPL BCP-MCNC: 2.9 G/DL (ref 3.5–5.2)
ALBUMIN SERPL BCP-MCNC: 3.1 G/DL (ref 3.5–5.2)
ALP SERPL-CCNC: 135 U/L (ref 55–135)
ALP SERPL-CCNC: 154 U/L (ref 55–135)
ALT SERPL W/O P-5'-P-CCNC: 235 U/L (ref 10–44)
ALT SERPL W/O P-5'-P-CCNC: 424 U/L (ref 10–44)
ANION GAP SERPL CALC-SCNC: 11 MMOL/L (ref 8–16)
ANION GAP SERPL CALC-SCNC: 9 MMOL/L (ref 8–16)
AST SERPL-CCNC: 442 U/L (ref 10–40)
AST SERPL-CCNC: 660 U/L (ref 10–40)
B-OH-BUTYR BLD STRIP-SCNC: 0.1 MMOL/L (ref 0–0.5)
BACTERIA #/AREA URNS AUTO: ABNORMAL /HPF
BASOPHILS # BLD AUTO: 0.05 K/UL (ref 0–0.2)
BASOPHILS # BLD AUTO: 0.05 K/UL (ref 0–0.2)
BASOPHILS NFR BLD: 0.7 % (ref 0–1.9)
BASOPHILS NFR BLD: 0.8 % (ref 0–1.9)
BILIRUB SERPL-MCNC: 0.5 MG/DL (ref 0.1–1)
BILIRUB SERPL-MCNC: 0.6 MG/DL (ref 0.1–1)
BILIRUB UR QL STRIP: NEGATIVE
BUN SERPL-MCNC: 24 MG/DL (ref 8–23)
BUN SERPL-MCNC: 31 MG/DL (ref 8–23)
CALCIUM SERPL-MCNC: 9.4 MG/DL (ref 8.7–10.5)
CALCIUM SERPL-MCNC: 9.7 MG/DL (ref 8.7–10.5)
CHLORIDE SERPL-SCNC: 101 MMOL/L (ref 95–110)
CHLORIDE SERPL-SCNC: 106 MMOL/L (ref 95–110)
CK SERPL-CCNC: 28 U/L (ref 20–180)
CLARITY UR REFRACT.AUTO: ABNORMAL
CO2 SERPL-SCNC: 26 MMOL/L (ref 23–29)
CO2 SERPL-SCNC: 28 MMOL/L (ref 23–29)
COLOR UR AUTO: YELLOW
CREAT SERPL-MCNC: 0.8 MG/DL (ref 0.5–1.4)
CREAT SERPL-MCNC: 0.8 MG/DL (ref 0.5–1.4)
CTP QC/QA: YES
DIFFERENTIAL METHOD: ABNORMAL
DIFFERENTIAL METHOD: ABNORMAL
EOSINOPHIL # BLD AUTO: 0 K/UL (ref 0–0.5)
EOSINOPHIL # BLD AUTO: 0.1 K/UL (ref 0–0.5)
EOSINOPHIL NFR BLD: 0.6 % (ref 0–8)
EOSINOPHIL NFR BLD: 0.8 % (ref 0–8)
ERYTHROCYTE [DISTWIDTH] IN BLOOD BY AUTOMATED COUNT: 15.9 % (ref 11.5–14.5)
ERYTHROCYTE [DISTWIDTH] IN BLOOD BY AUTOMATED COUNT: 15.9 % (ref 11.5–14.5)
EST. GFR  (AFRICAN AMERICAN): >60 ML/MIN/1.73 M^2
EST. GFR  (AFRICAN AMERICAN): >60 ML/MIN/1.73 M^2
EST. GFR  (NON AFRICAN AMERICAN): >60 ML/MIN/1.73 M^2
EST. GFR  (NON AFRICAN AMERICAN): >60 ML/MIN/1.73 M^2
GLUCOSE SERPL-MCNC: 168 MG/DL (ref 70–110)
GLUCOSE SERPL-MCNC: 234 MG/DL (ref 70–110)
GLUCOSE UR QL STRIP: ABNORMAL
HCT VFR BLD AUTO: 35.2 % (ref 37–48.5)
HCT VFR BLD AUTO: 35.9 % (ref 37–48.5)
HGB BLD-MCNC: 11.2 G/DL (ref 12–16)
HGB BLD-MCNC: 11.3 G/DL (ref 12–16)
HGB UR QL STRIP: NEGATIVE
IMM GRANULOCYTES # BLD AUTO: 0.01 K/UL (ref 0–0.04)
IMM GRANULOCYTES # BLD AUTO: 0.01 K/UL (ref 0–0.04)
IMM GRANULOCYTES NFR BLD AUTO: 0.1 % (ref 0–0.5)
IMM GRANULOCYTES NFR BLD AUTO: 0.2 % (ref 0–0.5)
KETONES UR QL STRIP: NEGATIVE
LACTATE SERPL-SCNC: 1.1 MMOL/L (ref 0.5–2.2)
LEUKOCYTE ESTERASE UR QL STRIP: ABNORMAL
LYMPHOCYTES # BLD AUTO: 3.1 K/UL (ref 1–4.8)
LYMPHOCYTES # BLD AUTO: 3.3 K/UL (ref 1–4.8)
LYMPHOCYTES NFR BLD: 45.4 % (ref 18–48)
LYMPHOCYTES NFR BLD: 48 % (ref 18–48)
MAGNESIUM SERPL-MCNC: 2.1 MG/DL (ref 1.6–2.6)
MCH RBC QN AUTO: 26.2 PG (ref 27–31)
MCH RBC QN AUTO: 26.4 PG (ref 27–31)
MCHC RBC AUTO-ENTMCNC: 31.5 G/DL (ref 32–36)
MCHC RBC AUTO-ENTMCNC: 31.8 G/DL (ref 32–36)
MCV RBC AUTO: 83 FL (ref 82–98)
MCV RBC AUTO: 83 FL (ref 82–98)
MICROSCOPIC COMMENT: ABNORMAL
MONOCYTES # BLD AUTO: 0.2 K/UL (ref 0.3–1)
MONOCYTES # BLD AUTO: 0.3 K/UL (ref 0.3–1)
MONOCYTES NFR BLD: 3.5 % (ref 4–15)
MONOCYTES NFR BLD: 4.6 % (ref 4–15)
NEUTROPHILS # BLD AUTO: 3 K/UL (ref 1.8–7.7)
NEUTROPHILS # BLD AUTO: 3.5 K/UL (ref 1.8–7.7)
NEUTROPHILS NFR BLD: 46.9 % (ref 38–73)
NEUTROPHILS NFR BLD: 48.4 % (ref 38–73)
NITRITE UR QL STRIP: NEGATIVE
NRBC BLD-RTO: 0 /100 WBC
NRBC BLD-RTO: 0 /100 WBC
PH UR STRIP: 6 [PH] (ref 5–8)
PHOSPHATE SERPL-MCNC: 2.8 MG/DL (ref 2.7–4.5)
PLATELET # BLD AUTO: 350 K/UL (ref 150–450)
PLATELET # BLD AUTO: 356 K/UL (ref 150–450)
PMV BLD AUTO: 9.2 FL (ref 9.2–12.9)
PMV BLD AUTO: 9.8 FL (ref 9.2–12.9)
POTASSIUM SERPL-SCNC: 4.2 MMOL/L (ref 3.5–5.1)
POTASSIUM SERPL-SCNC: 4.4 MMOL/L (ref 3.5–5.1)
PROT SERPL-MCNC: 6 G/DL (ref 6–8.4)
PROT SERPL-MCNC: 6.3 G/DL (ref 6–8.4)
PROT UR QL STRIP: NEGATIVE
RBC # BLD AUTO: 4.24 M/UL (ref 4–5.4)
RBC # BLD AUTO: 4.32 M/UL (ref 4–5.4)
RBC #/AREA URNS AUTO: 31 /HPF (ref 0–4)
SARS-COV-2 RDRP RESP QL NAA+PROBE: NEGATIVE
SODIUM SERPL-SCNC: 140 MMOL/L (ref 136–145)
SODIUM SERPL-SCNC: 141 MMOL/L (ref 136–145)
SP GR UR STRIP: 1.01 (ref 1–1.03)
SQUAMOUS #/AREA URNS AUTO: 1 /HPF
TROPONIN I SERPL DL<=0.01 NG/ML-MCNC: 0.03 NG/ML (ref 0–0.03)
URN SPEC COLLECT METH UR: ABNORMAL
WBC # BLD AUTO: 6.36 K/UL (ref 3.9–12.7)
WBC # BLD AUTO: 7.25 K/UL (ref 3.9–12.7)
WBC #/AREA URNS AUTO: >100 /HPF (ref 0–5)
YEAST UR QL AUTO: ABNORMAL

## 2021-10-23 PROCEDURE — 99291 PR CRITICAL CARE, E/M 30-74 MINUTES: ICD-10-PCS | Mod: CS,,, | Performed by: PHYSICIAN ASSISTANT

## 2021-10-23 PROCEDURE — 87086 URINE CULTURE/COLONY COUNT: CPT | Performed by: PHYSICIAN ASSISTANT

## 2021-10-23 PROCEDURE — 84100 ASSAY OF PHOSPHORUS: CPT

## 2021-10-23 PROCEDURE — 96372 THER/PROPH/DIAG INJ SC/IM: CPT | Mod: 59 | Performed by: EMERGENCY MEDICINE

## 2021-10-23 PROCEDURE — 82550 ASSAY OF CK (CPK): CPT | Performed by: PHYSICIAN ASSISTANT

## 2021-10-23 PROCEDURE — 93010 EKG 12-LEAD: ICD-10-PCS | Mod: ,,, | Performed by: INTERNAL MEDICINE

## 2021-10-23 PROCEDURE — 80053 COMPREHEN METABOLIC PANEL: CPT | Mod: 91 | Performed by: PHYSICIAN ASSISTANT

## 2021-10-23 PROCEDURE — 25000003 PHARM REV CODE 250

## 2021-10-23 PROCEDURE — 36415 COLL VENOUS BLD VENIPUNCTURE: CPT

## 2021-10-23 PROCEDURE — 80053 COMPREHEN METABOLIC PANEL: CPT

## 2021-10-23 PROCEDURE — G0378 HOSPITAL OBSERVATION PER HR: HCPCS

## 2021-10-23 PROCEDURE — 96360 HYDRATION IV INFUSION INIT: CPT

## 2021-10-23 PROCEDURE — 99220 PR INITIAL OBSERVATION CARE,LEVL III: ICD-10-PCS | Mod: ,,, | Performed by: HOSPITALIST

## 2021-10-23 PROCEDURE — 84484 ASSAY OF TROPONIN QUANT: CPT | Performed by: PHYSICIAN ASSISTANT

## 2021-10-23 PROCEDURE — 99220 PR INITIAL OBSERVATION CARE,LEVL III: CPT | Mod: ,,, | Performed by: HOSPITALIST

## 2021-10-23 PROCEDURE — 85025 COMPLETE CBC W/AUTO DIFF WBC: CPT | Mod: 91 | Performed by: PHYSICIAN ASSISTANT

## 2021-10-23 PROCEDURE — C9399 UNCLASSIFIED DRUGS OR BIOLOG: HCPCS

## 2021-10-23 PROCEDURE — 93005 ELECTROCARDIOGRAM TRACING: CPT

## 2021-10-23 PROCEDURE — 63600175 PHARM REV CODE 636 W HCPCS

## 2021-10-23 PROCEDURE — 93010 ELECTROCARDIOGRAM REPORT: CPT | Mod: ,,, | Performed by: INTERNAL MEDICINE

## 2021-10-23 PROCEDURE — 99291 CRITICAL CARE FIRST HOUR: CPT | Mod: CS,,, | Performed by: PHYSICIAN ASSISTANT

## 2021-10-23 PROCEDURE — 25000003 PHARM REV CODE 250: Performed by: PHYSICIAN ASSISTANT

## 2021-10-23 PROCEDURE — 82010 KETONE BODYS QUAN: CPT | Performed by: PHYSICIAN ASSISTANT

## 2021-10-23 PROCEDURE — 81001 URINALYSIS AUTO W/SCOPE: CPT | Performed by: PHYSICIAN ASSISTANT

## 2021-10-23 PROCEDURE — U0002 COVID-19 LAB TEST NON-CDC: HCPCS | Performed by: PHYSICIAN ASSISTANT

## 2021-10-23 PROCEDURE — 83735 ASSAY OF MAGNESIUM: CPT

## 2021-10-23 PROCEDURE — 85025 COMPLETE CBC W/AUTO DIFF WBC: CPT

## 2021-10-23 PROCEDURE — 99291 CRITICAL CARE FIRST HOUR: CPT | Mod: 25

## 2021-10-23 PROCEDURE — 83605 ASSAY OF LACTIC ACID: CPT | Performed by: PHYSICIAN ASSISTANT

## 2021-10-23 RX ORDER — HEPARIN SODIUM 5000 [USP'U]/ML
5000 INJECTION, SOLUTION INTRAVENOUS; SUBCUTANEOUS EVERY 8 HOURS
Status: DISCONTINUED | OUTPATIENT
Start: 2021-10-23 | End: 2021-10-28 | Stop reason: HOSPADM

## 2021-10-23 RX ORDER — SODIUM CHLORIDE 0.9 % (FLUSH) 0.9 %
10 SYRINGE (ML) INJECTION EVERY 12 HOURS PRN
Status: DISCONTINUED | OUTPATIENT
Start: 2021-10-23 | End: 2021-10-28 | Stop reason: HOSPADM

## 2021-10-23 RX ORDER — GLUCAGON 1 MG
1 KIT INJECTION
Status: DISCONTINUED | OUTPATIENT
Start: 2021-10-23 | End: 2021-10-28 | Stop reason: HOSPADM

## 2021-10-23 RX ORDER — ASPIRIN 81 MG/1
81 TABLET ORAL DAILY
Status: DISCONTINUED | OUTPATIENT
Start: 2021-10-24 | End: 2021-10-28 | Stop reason: HOSPADM

## 2021-10-23 RX ORDER — TALC
6 POWDER (GRAM) TOPICAL NIGHTLY PRN
Status: DISCONTINUED | OUTPATIENT
Start: 2021-10-23 | End: 2021-10-28 | Stop reason: HOSPADM

## 2021-10-23 RX ORDER — LOPERAMIDE HYDROCHLORIDE 2 MG/1
4 CAPSULE ORAL 4 TIMES DAILY PRN
Status: DISCONTINUED | OUTPATIENT
Start: 2021-10-23 | End: 2021-10-28 | Stop reason: HOSPADM

## 2021-10-23 RX ORDER — INSULIN ASPART 100 [IU]/ML
0-5 INJECTION, SOLUTION INTRAVENOUS; SUBCUTANEOUS
Status: DISCONTINUED | OUTPATIENT
Start: 2021-10-23 | End: 2021-10-28

## 2021-10-23 RX ORDER — ONDANSETRON 4 MG/1
4 TABLET, FILM COATED ORAL EVERY 6 HOURS PRN
Status: DISCONTINUED | OUTPATIENT
Start: 2021-10-23 | End: 2021-10-28 | Stop reason: HOSPADM

## 2021-10-23 RX ORDER — CHOLESTYRAMINE 4 G/9G
2 POWDER, FOR SUSPENSION ORAL 2 TIMES DAILY
Status: DISCONTINUED | OUTPATIENT
Start: 2021-10-23 | End: 2021-10-28 | Stop reason: HOSPADM

## 2021-10-23 RX ORDER — IBUPROFEN 200 MG
24 TABLET ORAL
Status: DISCONTINUED | OUTPATIENT
Start: 2021-10-23 | End: 2021-10-28 | Stop reason: HOSPADM

## 2021-10-23 RX ORDER — LANOLIN ALCOHOL/MO/W.PET/CERES
1 CREAM (GRAM) TOPICAL DAILY
Status: DISCONTINUED | OUTPATIENT
Start: 2021-10-24 | End: 2021-10-28 | Stop reason: HOSPADM

## 2021-10-23 RX ORDER — FOLIC ACID 1 MG/1
1 TABLET ORAL DAILY
Status: DISCONTINUED | OUTPATIENT
Start: 2021-10-24 | End: 2021-10-28 | Stop reason: HOSPADM

## 2021-10-23 RX ORDER — ACETAMINOPHEN 325 MG/1
650 TABLET ORAL EVERY 4 HOURS PRN
Status: DISCONTINUED | OUTPATIENT
Start: 2021-10-23 | End: 2021-10-28 | Stop reason: HOSPADM

## 2021-10-23 RX ORDER — NALOXONE HCL 0.4 MG/ML
0.02 VIAL (ML) INJECTION
Status: DISCONTINUED | OUTPATIENT
Start: 2021-10-23 | End: 2021-10-28 | Stop reason: HOSPADM

## 2021-10-23 RX ORDER — CHOLECALCIFEROL (VITAMIN D3) 25 MCG
1000 TABLET ORAL DAILY
Status: DISCONTINUED | OUTPATIENT
Start: 2021-10-24 | End: 2021-10-28 | Stop reason: HOSPADM

## 2021-10-23 RX ORDER — INSULIN ASPART 100 [IU]/ML
5 INJECTION, SOLUTION INTRAVENOUS; SUBCUTANEOUS
Status: DISCONTINUED | OUTPATIENT
Start: 2021-10-24 | End: 2021-10-28

## 2021-10-23 RX ORDER — ANASTROZOLE 1 MG/1
1 TABLET ORAL DAILY
Status: DISCONTINUED | OUTPATIENT
Start: 2021-10-24 | End: 2021-10-28 | Stop reason: HOSPADM

## 2021-10-23 RX ORDER — IBUPROFEN 200 MG
16 TABLET ORAL
Status: DISCONTINUED | OUTPATIENT
Start: 2021-10-23 | End: 2021-10-28 | Stop reason: HOSPADM

## 2021-10-23 RX ORDER — SODIUM CHLORIDE 0.9 % (FLUSH) 0.9 %
10 SYRINGE (ML) INJECTION
Status: DISCONTINUED | OUTPATIENT
Start: 2021-10-23 | End: 2021-10-28 | Stop reason: HOSPADM

## 2021-10-23 RX ORDER — DEXTROSE MONOHYDRATE 100 MG/ML
INJECTION, SOLUTION INTRAVENOUS
Status: DISCONTINUED | OUTPATIENT
Start: 2021-10-23 | End: 2021-10-28 | Stop reason: HOSPADM

## 2021-10-23 RX ORDER — THIAMINE HCL 100 MG
100 TABLET ORAL DAILY
Status: DISCONTINUED | OUTPATIENT
Start: 2021-10-24 | End: 2021-10-28 | Stop reason: HOSPADM

## 2021-10-23 RX ORDER — MIDODRINE HYDROCHLORIDE 5 MG/1
5 TABLET ORAL
Status: DISCONTINUED | OUTPATIENT
Start: 2021-10-24 | End: 2021-10-23

## 2021-10-23 RX ORDER — CITALOPRAM 10 MG/1
10 TABLET ORAL DAILY
Status: DISCONTINUED | OUTPATIENT
Start: 2021-10-24 | End: 2021-10-28 | Stop reason: HOSPADM

## 2021-10-23 RX ADMIN — SODIUM CHLORIDE 1000 ML: 0.9 INJECTION, SOLUTION INTRAVENOUS at 05:10

## 2021-10-23 RX ADMIN — HEPARIN SODIUM 5000 UNITS: 5000 INJECTION INTRAVENOUS; SUBCUTANEOUS at 10:10

## 2021-10-23 RX ADMIN — INSULIN DETEMIR 4 UNITS: 100 INJECTION, SOLUTION SUBCUTANEOUS at 10:10

## 2021-10-24 LAB
BACTERIA UR CULT: NORMAL
POCT GLUCOSE: 158 MG/DL (ref 70–110)
POCT GLUCOSE: 243 MG/DL (ref 70–110)
POCT GLUCOSE: 306 MG/DL (ref 70–110)
POCT GLUCOSE: 67 MG/DL (ref 70–110)

## 2021-10-24 PROCEDURE — 97530 THERAPEUTIC ACTIVITIES: CPT

## 2021-10-24 PROCEDURE — G0378 HOSPITAL OBSERVATION PER HR: HCPCS

## 2021-10-24 PROCEDURE — 25000003 PHARM REV CODE 250

## 2021-10-24 PROCEDURE — 99225 PR SUBSEQUENT OBSERVATION CARE,LEVEL II: CPT | Mod: ,,, | Performed by: HOSPITALIST

## 2021-10-24 PROCEDURE — 96372 THER/PROPH/DIAG INJ SC/IM: CPT | Mod: 59 | Performed by: EMERGENCY MEDICINE

## 2021-10-24 PROCEDURE — C9399 UNCLASSIFIED DRUGS OR BIOLOG: HCPCS

## 2021-10-24 PROCEDURE — 80074 ACUTE HEPATITIS PANEL: CPT | Performed by: HOSPITALIST

## 2021-10-24 PROCEDURE — 97165 OT EVAL LOW COMPLEX 30 MIN: CPT

## 2021-10-24 PROCEDURE — 36415 COLL VENOUS BLD VENIPUNCTURE: CPT | Performed by: HOSPITALIST

## 2021-10-24 PROCEDURE — 99225 PR SUBSEQUENT OBSERVATION CARE,LEVEL II: ICD-10-PCS | Mod: ,,, | Performed by: HOSPITALIST

## 2021-10-24 PROCEDURE — 97161 PT EVAL LOW COMPLEX 20 MIN: CPT

## 2021-10-24 PROCEDURE — 63600175 PHARM REV CODE 636 W HCPCS

## 2021-10-24 PROCEDURE — 97535 SELF CARE MNGMENT TRAINING: CPT

## 2021-10-24 RX ORDER — CHOLESTYRAMINE 4 G/9G
2 POWDER, FOR SUSPENSION ORAL 2 TIMES DAILY
Qty: 360 PACKET | Refills: 3 | Status: ON HOLD | OUTPATIENT
Start: 2021-10-24 | End: 2022-06-20

## 2021-10-24 RX ORDER — INSULIN ASPART 100 [IU]/ML
5 INJECTION, SOLUTION INTRAVENOUS; SUBCUTANEOUS 3 TIMES DAILY
Qty: 4.5 ML | Refills: 11 | Status: SHIPPED | OUTPATIENT
Start: 2021-10-24 | End: 2021-10-25 | Stop reason: HOSPADM

## 2021-10-24 RX ADMIN — CITALOPRAM HYDROBROMIDE 10 MG: 10 TABLET ORAL at 09:10

## 2021-10-24 RX ADMIN — Medication 1000 UNITS: at 09:10

## 2021-10-24 RX ADMIN — INSULIN DETEMIR 4 UNITS: 100 INJECTION, SOLUTION SUBCUTANEOUS at 09:10

## 2021-10-24 RX ADMIN — CHOLESTYRAMINE 8 G: 4 POWDER, FOR SUSPENSION ORAL at 01:10

## 2021-10-24 RX ADMIN — CHOLESTYRAMINE 8 G: 4 POWDER, FOR SUSPENSION ORAL at 10:10

## 2021-10-24 RX ADMIN — INSULIN ASPART 5 UNITS: 100 INJECTION, SOLUTION INTRAVENOUS; SUBCUTANEOUS at 01:10

## 2021-10-24 RX ADMIN — INSULIN ASPART 4 UNITS: 100 INJECTION, SOLUTION INTRAVENOUS; SUBCUTANEOUS at 01:10

## 2021-10-24 RX ADMIN — FOLIC ACID 1 MG: 1 TABLET ORAL at 09:10

## 2021-10-24 RX ADMIN — MIDODRINE HYDROCHLORIDE 7.5 MG: 5 TABLET ORAL at 05:10

## 2021-10-24 RX ADMIN — INSULIN ASPART 5 UNITS: 100 INJECTION, SOLUTION INTRAVENOUS; SUBCUTANEOUS at 05:10

## 2021-10-24 RX ADMIN — HEPARIN SODIUM 5000 UNITS: 5000 INJECTION INTRAVENOUS; SUBCUTANEOUS at 03:10

## 2021-10-24 RX ADMIN — CHOLESTYRAMINE 8 G: 4 POWDER, FOR SUSPENSION ORAL at 09:10

## 2021-10-24 RX ADMIN — THIAMINE HCL TAB 100 MG 100 MG: 100 TAB at 09:10

## 2021-10-24 RX ADMIN — INSULIN ASPART 5 UNITS: 100 INJECTION, SOLUTION INTRAVENOUS; SUBCUTANEOUS at 07:10

## 2021-10-24 RX ADMIN — FERROUS SULFATE TAB 325 MG (65 MG ELEMENTAL FE) 1 EACH: 325 (65 FE) TAB at 09:10

## 2021-10-24 RX ADMIN — ASPIRIN 81 MG: 81 TABLET, COATED ORAL at 09:10

## 2021-10-24 RX ADMIN — HEPARIN SODIUM 5000 UNITS: 5000 INJECTION INTRAVENOUS; SUBCUTANEOUS at 09:10

## 2021-10-24 RX ADMIN — ANASTROZOLE 1 MG: 1 TABLET, COATED ORAL at 10:10

## 2021-10-24 RX ADMIN — INSULIN ASPART 2 UNITS: 100 INJECTION, SOLUTION INTRAVENOUS; SUBCUTANEOUS at 07:10

## 2021-10-25 ENCOUNTER — PATIENT OUTREACH (OUTPATIENT)
Dept: EMERGENCY MEDICINE | Facility: HOSPITAL | Age: 71
End: 2021-10-25
Payer: COMMERCIAL

## 2021-10-25 LAB
ALBUMIN SERPL BCP-MCNC: 3 G/DL (ref 3.5–5.2)
ALP SERPL-CCNC: 134 U/L (ref 55–135)
ALT SERPL W/O P-5'-P-CCNC: 219 U/L (ref 10–44)
ANION GAP SERPL CALC-SCNC: 7 MMOL/L (ref 8–16)
AST SERPL-CCNC: 92 U/L (ref 10–40)
BASOPHILS # BLD AUTO: 0.06 K/UL (ref 0–0.2)
BASOPHILS NFR BLD: 0.6 % (ref 0–1.9)
BILIRUB SERPL-MCNC: 0.4 MG/DL (ref 0.1–1)
BUN SERPL-MCNC: 25 MG/DL (ref 8–23)
CALCIUM SERPL-MCNC: 10.1 MG/DL (ref 8.7–10.5)
CHLORIDE SERPL-SCNC: 104 MMOL/L (ref 95–110)
CO2 SERPL-SCNC: 26 MMOL/L (ref 23–29)
CREAT SERPL-MCNC: 1 MG/DL (ref 0.5–1.4)
DIFFERENTIAL METHOD: ABNORMAL
EOSINOPHIL # BLD AUTO: 0 K/UL (ref 0–0.5)
EOSINOPHIL NFR BLD: 0.2 % (ref 0–8)
ERYTHROCYTE [DISTWIDTH] IN BLOOD BY AUTOMATED COUNT: 16.3 % (ref 11.5–14.5)
EST. GFR  (AFRICAN AMERICAN): >60 ML/MIN/1.73 M^2
EST. GFR  (NON AFRICAN AMERICAN): 56.8 ML/MIN/1.73 M^2
GLUCOSE SERPL-MCNC: 317 MG/DL (ref 70–110)
HAV IGM SERPL QL IA: NEGATIVE
HBV CORE IGM SERPL QL IA: NEGATIVE
HBV SURFACE AG SERPL QL IA: NEGATIVE
HCT VFR BLD AUTO: 36.2 % (ref 37–48.5)
HCV AB SERPL QL IA: NEGATIVE
HGB BLD-MCNC: 11.3 G/DL (ref 12–16)
IMM GRANULOCYTES # BLD AUTO: 0.07 K/UL (ref 0–0.04)
IMM GRANULOCYTES NFR BLD AUTO: 0.7 % (ref 0–0.5)
LYMPHOCYTES # BLD AUTO: 3 K/UL (ref 1–4.8)
LYMPHOCYTES NFR BLD: 29.1 % (ref 18–48)
MAGNESIUM SERPL-MCNC: 1.9 MG/DL (ref 1.6–2.6)
MCH RBC QN AUTO: 26.3 PG (ref 27–31)
MCHC RBC AUTO-ENTMCNC: 31.2 G/DL (ref 32–36)
MCV RBC AUTO: 84 FL (ref 82–98)
MONOCYTES # BLD AUTO: 0.3 K/UL (ref 0.3–1)
MONOCYTES NFR BLD: 2.9 % (ref 4–15)
NEUTROPHILS # BLD AUTO: 7 K/UL (ref 1.8–7.7)
NEUTROPHILS NFR BLD: 66.5 % (ref 38–73)
NRBC BLD-RTO: 0 /100 WBC
PHOSPHATE SERPL-MCNC: 2.9 MG/DL (ref 2.7–4.5)
PLATELET # BLD AUTO: 382 K/UL (ref 150–450)
PMV BLD AUTO: 9.6 FL (ref 9.2–12.9)
POCT GLUCOSE: 161 MG/DL (ref 70–110)
POCT GLUCOSE: 173 MG/DL (ref 70–110)
POCT GLUCOSE: 214 MG/DL (ref 70–110)
POCT GLUCOSE: 347 MG/DL (ref 70–110)
POTASSIUM SERPL-SCNC: 4.5 MMOL/L (ref 3.5–5.1)
PROT SERPL-MCNC: 6.2 G/DL (ref 6–8.4)
RBC # BLD AUTO: 4.29 M/UL (ref 4–5.4)
SODIUM SERPL-SCNC: 137 MMOL/L (ref 136–145)
WBC # BLD AUTO: 10.46 K/UL (ref 3.9–12.7)

## 2021-10-25 PROCEDURE — G0378 HOSPITAL OBSERVATION PER HR: HCPCS

## 2021-10-25 PROCEDURE — 96372 THER/PROPH/DIAG INJ SC/IM: CPT | Mod: 59 | Performed by: EMERGENCY MEDICINE

## 2021-10-25 PROCEDURE — 84100 ASSAY OF PHOSPHORUS: CPT

## 2021-10-25 PROCEDURE — 99225 PR SUBSEQUENT OBSERVATION CARE,LEVEL II: CPT | Mod: ,,, | Performed by: HOSPITALIST

## 2021-10-25 PROCEDURE — 36415 COLL VENOUS BLD VENIPUNCTURE: CPT

## 2021-10-25 PROCEDURE — 63600175 PHARM REV CODE 636 W HCPCS

## 2021-10-25 PROCEDURE — 93010 EKG 12-LEAD: ICD-10-PCS | Mod: ,,, | Performed by: INTERNAL MEDICINE

## 2021-10-25 PROCEDURE — 93010 ELECTROCARDIOGRAM REPORT: CPT | Mod: ,,, | Performed by: INTERNAL MEDICINE

## 2021-10-25 PROCEDURE — 25000003 PHARM REV CODE 250

## 2021-10-25 PROCEDURE — 36415 COLL VENOUS BLD VENIPUNCTURE: CPT | Performed by: STUDENT IN AN ORGANIZED HEALTH CARE EDUCATION/TRAINING PROGRAM

## 2021-10-25 PROCEDURE — 93005 ELECTROCARDIOGRAM TRACING: CPT

## 2021-10-25 PROCEDURE — 86580 TB INTRADERMAL TEST: CPT | Performed by: HOSPITALIST

## 2021-10-25 PROCEDURE — 80053 COMPREHEN METABOLIC PANEL: CPT | Performed by: STUDENT IN AN ORGANIZED HEALTH CARE EDUCATION/TRAINING PROGRAM

## 2021-10-25 PROCEDURE — 83735 ASSAY OF MAGNESIUM: CPT | Performed by: STUDENT IN AN ORGANIZED HEALTH CARE EDUCATION/TRAINING PROGRAM

## 2021-10-25 PROCEDURE — 30200315 PPD INTRADERMAL TEST REV CODE 302: Performed by: HOSPITALIST

## 2021-10-25 PROCEDURE — 85025 COMPLETE CBC W/AUTO DIFF WBC: CPT | Performed by: STUDENT IN AN ORGANIZED HEALTH CARE EDUCATION/TRAINING PROGRAM

## 2021-10-25 PROCEDURE — 99225 PR SUBSEQUENT OBSERVATION CARE,LEVEL II: ICD-10-PCS | Mod: ,,, | Performed by: HOSPITALIST

## 2021-10-25 RX ORDER — INSULIN LISPRO 100 [IU]/ML
7 INJECTION, SOLUTION INTRAVENOUS; SUBCUTANEOUS
Qty: 15 ML | Refills: 3 | Status: ON HOLD
Start: 2021-10-25 | End: 2022-06-20

## 2021-10-25 RX ADMIN — CITALOPRAM HYDROBROMIDE 10 MG: 10 TABLET ORAL at 08:10

## 2021-10-25 RX ADMIN — INSULIN ASPART 5 UNITS: 100 INJECTION, SOLUTION INTRAVENOUS; SUBCUTANEOUS at 05:10

## 2021-10-25 RX ADMIN — HEPARIN SODIUM 5000 UNITS: 5000 INJECTION INTRAVENOUS; SUBCUTANEOUS at 05:10

## 2021-10-25 RX ADMIN — ACETAMINOPHEN 650 MG: 325 TABLET ORAL at 08:10

## 2021-10-25 RX ADMIN — Medication 6 MG: at 08:10

## 2021-10-25 RX ADMIN — FOLIC ACID 1 MG: 1 TABLET ORAL at 08:10

## 2021-10-25 RX ADMIN — INSULIN ASPART 5 UNITS: 100 INJECTION, SOLUTION INTRAVENOUS; SUBCUTANEOUS at 12:10

## 2021-10-25 RX ADMIN — HEPARIN SODIUM 5000 UNITS: 5000 INJECTION INTRAVENOUS; SUBCUTANEOUS at 08:10

## 2021-10-25 RX ADMIN — ASPIRIN 81 MG: 81 TABLET, COATED ORAL at 08:10

## 2021-10-25 RX ADMIN — MIDODRINE HYDROCHLORIDE 7.5 MG: 5 TABLET ORAL at 04:10

## 2021-10-25 RX ADMIN — HEPARIN SODIUM 5000 UNITS: 5000 INJECTION INTRAVENOUS; SUBCUTANEOUS at 03:10

## 2021-10-25 RX ADMIN — INSULIN DETEMIR 4 UNITS: 100 INJECTION, SOLUTION SUBCUTANEOUS at 08:10

## 2021-10-25 RX ADMIN — MIDODRINE HYDROCHLORIDE 7.5 MG: 5 TABLET ORAL at 08:10

## 2021-10-25 RX ADMIN — FERROUS SULFATE TAB 325 MG (65 MG ELEMENTAL FE) 1 EACH: 325 (65 FE) TAB at 08:10

## 2021-10-25 RX ADMIN — THIAMINE HCL TAB 100 MG 100 MG: 100 TAB at 08:10

## 2021-10-25 RX ADMIN — MIDODRINE HYDROCHLORIDE 7.5 MG: 5 TABLET ORAL at 12:10

## 2021-10-25 RX ADMIN — INSULIN ASPART 4 UNITS: 100 INJECTION, SOLUTION INTRAVENOUS; SUBCUTANEOUS at 12:10

## 2021-10-25 RX ADMIN — Medication 1000 UNITS: at 08:10

## 2021-10-25 RX ADMIN — INSULIN ASPART 5 UNITS: 100 INJECTION, SOLUTION INTRAVENOUS; SUBCUTANEOUS at 08:10

## 2021-10-25 RX ADMIN — TUBERCULIN PURIFIED PROTEIN DERIVATIVE 5 UNITS: 5 INJECTION, SOLUTION INTRADERMAL at 11:10

## 2021-10-25 RX ADMIN — ANASTROZOLE 1 MG: 1 TABLET, COATED ORAL at 08:10

## 2021-10-26 LAB
ALBUMIN SERPL BCP-MCNC: 3 G/DL (ref 3.5–5.2)
ALP SERPL-CCNC: 131 U/L (ref 55–135)
ALT SERPL W/O P-5'-P-CCNC: 156 U/L (ref 10–44)
ANION GAP SERPL CALC-SCNC: 6 MMOL/L (ref 8–16)
AST SERPL-CCNC: 47 U/L (ref 10–40)
BASOPHILS # BLD AUTO: 0.05 K/UL (ref 0–0.2)
BASOPHILS NFR BLD: 0.6 % (ref 0–1.9)
BILIRUB SERPL-MCNC: 0.3 MG/DL (ref 0.1–1)
BUN SERPL-MCNC: 21 MG/DL (ref 8–23)
CALCIUM SERPL-MCNC: 10.4 MG/DL (ref 8.7–10.5)
CHLORIDE SERPL-SCNC: 101 MMOL/L (ref 95–110)
CO2 SERPL-SCNC: 29 MMOL/L (ref 23–29)
CREAT SERPL-MCNC: 0.8 MG/DL (ref 0.5–1.4)
DIFFERENTIAL METHOD: ABNORMAL
EOSINOPHIL # BLD AUTO: 0.1 K/UL (ref 0–0.5)
EOSINOPHIL NFR BLD: 0.7 % (ref 0–8)
ERYTHROCYTE [DISTWIDTH] IN BLOOD BY AUTOMATED COUNT: 15.9 % (ref 11.5–14.5)
EST. GFR  (AFRICAN AMERICAN): >60 ML/MIN/1.73 M^2
EST. GFR  (NON AFRICAN AMERICAN): >60 ML/MIN/1.73 M^2
GLUCOSE SERPL-MCNC: 296 MG/DL (ref 70–110)
HCT VFR BLD AUTO: 35.6 % (ref 37–48.5)
HGB BLD-MCNC: 11.3 G/DL (ref 12–16)
IMM GRANULOCYTES # BLD AUTO: 0.03 K/UL (ref 0–0.04)
IMM GRANULOCYTES NFR BLD AUTO: 0.4 % (ref 0–0.5)
LYMPHOCYTES # BLD AUTO: 3.2 K/UL (ref 1–4.8)
LYMPHOCYTES NFR BLD: 38.7 % (ref 18–48)
MAGNESIUM SERPL-MCNC: 2 MG/DL (ref 1.6–2.6)
MCH RBC QN AUTO: 26.4 PG (ref 27–31)
MCHC RBC AUTO-ENTMCNC: 31.7 G/DL (ref 32–36)
MCV RBC AUTO: 83 FL (ref 82–98)
MONOCYTES # BLD AUTO: 0.4 K/UL (ref 0.3–1)
MONOCYTES NFR BLD: 4.5 % (ref 4–15)
NEUTROPHILS # BLD AUTO: 4.6 K/UL (ref 1.8–7.7)
NEUTROPHILS NFR BLD: 55.1 % (ref 38–73)
NRBC BLD-RTO: 0 /100 WBC
PLATELET # BLD AUTO: 392 K/UL (ref 150–450)
PMV BLD AUTO: 9.9 FL (ref 9.2–12.9)
POCT GLUCOSE: 101 MG/DL (ref 70–110)
POCT GLUCOSE: 105 MG/DL (ref 70–110)
POCT GLUCOSE: 397 MG/DL (ref 70–110)
POCT GLUCOSE: >500 MG/DL (ref 70–110)
POTASSIUM SERPL-SCNC: 4.7 MMOL/L (ref 3.5–5.1)
PROT SERPL-MCNC: 6.5 G/DL (ref 6–8.4)
RBC # BLD AUTO: 4.28 M/UL (ref 4–5.4)
SODIUM SERPL-SCNC: 136 MMOL/L (ref 136–145)
WBC # BLD AUTO: 8.25 K/UL (ref 3.9–12.7)

## 2021-10-26 PROCEDURE — 36415 COLL VENOUS BLD VENIPUNCTURE: CPT | Performed by: STUDENT IN AN ORGANIZED HEALTH CARE EDUCATION/TRAINING PROGRAM

## 2021-10-26 PROCEDURE — G0378 HOSPITAL OBSERVATION PER HR: HCPCS

## 2021-10-26 PROCEDURE — 99225 PR SUBSEQUENT OBSERVATION CARE,LEVEL II: ICD-10-PCS | Mod: ,,, | Performed by: HOSPITALIST

## 2021-10-26 PROCEDURE — 93010 EKG 12-LEAD: ICD-10-PCS | Mod: ,,, | Performed by: INTERNAL MEDICINE

## 2021-10-26 PROCEDURE — 63600175 PHARM REV CODE 636 W HCPCS

## 2021-10-26 PROCEDURE — 85025 COMPLETE CBC W/AUTO DIFF WBC: CPT | Performed by: STUDENT IN AN ORGANIZED HEALTH CARE EDUCATION/TRAINING PROGRAM

## 2021-10-26 PROCEDURE — 25000003 PHARM REV CODE 250

## 2021-10-26 PROCEDURE — 99225 PR SUBSEQUENT OBSERVATION CARE,LEVEL II: CPT | Mod: ,,, | Performed by: HOSPITALIST

## 2021-10-26 PROCEDURE — 83735 ASSAY OF MAGNESIUM: CPT | Performed by: STUDENT IN AN ORGANIZED HEALTH CARE EDUCATION/TRAINING PROGRAM

## 2021-10-26 PROCEDURE — 80053 COMPREHEN METABOLIC PANEL: CPT | Performed by: STUDENT IN AN ORGANIZED HEALTH CARE EDUCATION/TRAINING PROGRAM

## 2021-10-26 PROCEDURE — 97535 SELF CARE MNGMENT TRAINING: CPT | Mod: 59

## 2021-10-26 PROCEDURE — 97530 THERAPEUTIC ACTIVITIES: CPT | Mod: CQ

## 2021-10-26 PROCEDURE — 96372 THER/PROPH/DIAG INJ SC/IM: CPT | Mod: 59 | Performed by: EMERGENCY MEDICINE

## 2021-10-26 PROCEDURE — 97530 THERAPEUTIC ACTIVITIES: CPT

## 2021-10-26 PROCEDURE — 97116 GAIT TRAINING THERAPY: CPT | Mod: CQ

## 2021-10-26 PROCEDURE — 93005 ELECTROCARDIOGRAM TRACING: CPT

## 2021-10-26 PROCEDURE — 93010 ELECTROCARDIOGRAM REPORT: CPT | Mod: ,,, | Performed by: INTERNAL MEDICINE

## 2021-10-26 RX ADMIN — CHOLESTYRAMINE 8 G: 4 POWDER, FOR SUSPENSION ORAL at 08:10

## 2021-10-26 RX ADMIN — INSULIN ASPART 5 UNITS: 100 INJECTION, SOLUTION INTRAVENOUS; SUBCUTANEOUS at 07:10

## 2021-10-26 RX ADMIN — HEPARIN SODIUM 5000 UNITS: 5000 INJECTION INTRAVENOUS; SUBCUTANEOUS at 05:10

## 2021-10-26 RX ADMIN — INSULIN DETEMIR 4 UNITS: 100 INJECTION, SOLUTION SUBCUTANEOUS at 08:10

## 2021-10-26 RX ADMIN — MIDODRINE HYDROCHLORIDE 7.5 MG: 5 TABLET ORAL at 04:10

## 2021-10-26 RX ADMIN — FERROUS SULFATE TAB 325 MG (65 MG ELEMENTAL FE) 1 EACH: 325 (65 FE) TAB at 08:10

## 2021-10-26 RX ADMIN — MIDODRINE HYDROCHLORIDE 7.5 MG: 5 TABLET ORAL at 12:10

## 2021-10-26 RX ADMIN — FOLIC ACID 1 MG: 1 TABLET ORAL at 08:10

## 2021-10-26 RX ADMIN — ASPIRIN 81 MG: 81 TABLET, COATED ORAL at 08:10

## 2021-10-26 RX ADMIN — ANASTROZOLE 1 MG: 1 TABLET, COATED ORAL at 08:10

## 2021-10-26 RX ADMIN — Medication 1000 UNITS: at 08:10

## 2021-10-26 RX ADMIN — MIDODRINE HYDROCHLORIDE 7.5 MG: 5 TABLET ORAL at 07:10

## 2021-10-26 RX ADMIN — ACETAMINOPHEN 650 MG: 325 TABLET ORAL at 08:10

## 2021-10-26 RX ADMIN — HEPARIN SODIUM 5000 UNITS: 5000 INJECTION INTRAVENOUS; SUBCUTANEOUS at 08:10

## 2021-10-26 RX ADMIN — Medication 6 MG: at 08:10

## 2021-10-26 RX ADMIN — INSULIN ASPART 5 UNITS: 100 INJECTION, SOLUTION INTRAVENOUS; SUBCUTANEOUS at 11:10

## 2021-10-26 RX ADMIN — INSULIN ASPART 5 UNITS: 100 INJECTION, SOLUTION INTRAVENOUS; SUBCUTANEOUS at 04:10

## 2021-10-26 RX ADMIN — THIAMINE HCL TAB 100 MG 100 MG: 100 TAB at 08:10

## 2021-10-26 RX ADMIN — CITALOPRAM HYDROBROMIDE 10 MG: 10 TABLET ORAL at 09:10

## 2021-10-26 RX ADMIN — HEPARIN SODIUM 5000 UNITS: 5000 INJECTION INTRAVENOUS; SUBCUTANEOUS at 01:10

## 2021-10-27 LAB
PHOSPHATE SERPL-MCNC: 3.2 MG/DL (ref 2.7–4.5)
POCT GLUCOSE: 100 MG/DL (ref 70–110)
POCT GLUCOSE: 248 MG/DL (ref 70–110)
POCT GLUCOSE: 302 MG/DL (ref 70–110)

## 2021-10-27 PROCEDURE — 25000003 PHARM REV CODE 250

## 2021-10-27 PROCEDURE — 99224 PR SUBSEQUENT OBSERVATION CARE,LEVEL I: CPT | Mod: ,,, | Performed by: HOSPITALIST

## 2021-10-27 PROCEDURE — 36415 COLL VENOUS BLD VENIPUNCTURE: CPT

## 2021-10-27 PROCEDURE — G0378 HOSPITAL OBSERVATION PER HR: HCPCS

## 2021-10-27 PROCEDURE — 84100 ASSAY OF PHOSPHORUS: CPT

## 2021-10-27 PROCEDURE — 63600175 PHARM REV CODE 636 W HCPCS

## 2021-10-27 PROCEDURE — 96372 THER/PROPH/DIAG INJ SC/IM: CPT | Mod: 59 | Performed by: EMERGENCY MEDICINE

## 2021-10-27 PROCEDURE — 99224 PR SUBSEQUENT OBSERVATION CARE,LEVEL I: ICD-10-PCS | Mod: ,,, | Performed by: HOSPITALIST

## 2021-10-27 RX ADMIN — INSULIN ASPART 5 UNITS: 100 INJECTION, SOLUTION INTRAVENOUS; SUBCUTANEOUS at 09:10

## 2021-10-27 RX ADMIN — INSULIN ASPART 2 UNITS: 100 INJECTION, SOLUTION INTRAVENOUS; SUBCUTANEOUS at 05:10

## 2021-10-27 RX ADMIN — INSULIN ASPART 5 UNITS: 100 INJECTION, SOLUTION INTRAVENOUS; SUBCUTANEOUS at 05:10

## 2021-10-27 RX ADMIN — HEPARIN SODIUM 5000 UNITS: 5000 INJECTION INTRAVENOUS; SUBCUTANEOUS at 08:10

## 2021-10-27 RX ADMIN — CHOLESTYRAMINE 8 G: 4 POWDER, FOR SUSPENSION ORAL at 09:10

## 2021-10-27 RX ADMIN — CITALOPRAM HYDROBROMIDE 10 MG: 10 TABLET ORAL at 09:10

## 2021-10-27 RX ADMIN — ANASTROZOLE 1 MG: 1 TABLET, COATED ORAL at 09:10

## 2021-10-27 RX ADMIN — INSULIN ASPART 4 UNITS: 100 INJECTION, SOLUTION INTRAVENOUS; SUBCUTANEOUS at 12:10

## 2021-10-27 RX ADMIN — MIDODRINE HYDROCHLORIDE 7.5 MG: 5 TABLET ORAL at 12:10

## 2021-10-27 RX ADMIN — HEPARIN SODIUM 5000 UNITS: 5000 INJECTION INTRAVENOUS; SUBCUTANEOUS at 02:10

## 2021-10-27 RX ADMIN — Medication 1000 UNITS: at 09:10

## 2021-10-27 RX ADMIN — FOLIC ACID 1 MG: 1 TABLET ORAL at 09:10

## 2021-10-27 RX ADMIN — ASPIRIN 81 MG: 81 TABLET, COATED ORAL at 09:10

## 2021-10-27 RX ADMIN — MIDODRINE HYDROCHLORIDE 7.5 MG: 5 TABLET ORAL at 07:10

## 2021-10-27 RX ADMIN — ACETAMINOPHEN 650 MG: 325 TABLET ORAL at 08:10

## 2021-10-27 RX ADMIN — Medication 6 MG: at 08:10

## 2021-10-27 RX ADMIN — THIAMINE HCL TAB 100 MG 100 MG: 100 TAB at 09:10

## 2021-10-27 RX ADMIN — LOPERAMIDE HYDROCHLORIDE 4 MG: 2 CAPSULE ORAL at 05:10

## 2021-10-27 RX ADMIN — ONDANSETRON HYDROCHLORIDE 4 MG: 4 TABLET, FILM COATED ORAL at 09:10

## 2021-10-27 RX ADMIN — HEPARIN SODIUM 5000 UNITS: 5000 INJECTION INTRAVENOUS; SUBCUTANEOUS at 05:10

## 2021-10-27 RX ADMIN — FERROUS SULFATE TAB 325 MG (65 MG ELEMENTAL FE) 1 EACH: 325 (65 FE) TAB at 09:10

## 2021-10-27 RX ADMIN — INSULIN ASPART 5 UNITS: 100 INJECTION, SOLUTION INTRAVENOUS; SUBCUTANEOUS at 12:10

## 2021-10-28 VITALS
BODY MASS INDEX: 17.34 KG/M2 | SYSTOLIC BLOOD PRESSURE: 142 MMHG | RESPIRATION RATE: 29 BRPM | HEART RATE: 90 BPM | OXYGEN SATURATION: 98 % | TEMPERATURE: 98 F | HEIGHT: 59 IN | DIASTOLIC BLOOD PRESSURE: 73 MMHG | WEIGHT: 86 LBS

## 2021-10-28 PROBLEM — N39.0 UTI (URINARY TRACT INFECTION): Status: RESOLVED | Noted: 2020-07-23 | Resolved: 2021-10-28

## 2021-10-28 PROBLEM — C50.411 MALIGNANT NEOPLASM OF UPPER-OUTER QUADRANT OF RIGHT BREAST IN FEMALE, ESTROGEN RECEPTOR POSITIVE: Chronic | Status: ACTIVE | Noted: 2020-04-06

## 2021-10-28 PROBLEM — Z86.73 HISTORY OF CVA (CEREBROVASCULAR ACCIDENT): Chronic | Status: ACTIVE | Noted: 2020-07-24

## 2021-10-28 PROBLEM — I95.1 ORTHOSTATIC HYPOTENSION: Chronic | Status: ACTIVE | Noted: 2020-12-27

## 2021-10-28 PROBLEM — Z17.0 MALIGNANT NEOPLASM OF UPPER-OUTER QUADRANT OF RIGHT BREAST IN FEMALE, ESTROGEN RECEPTOR POSITIVE: Chronic | Status: ACTIVE | Noted: 2020-04-06

## 2021-10-28 LAB
ALBUMIN SERPL BCP-MCNC: 2.9 G/DL (ref 3.5–5.2)
ALP SERPL-CCNC: 122 U/L (ref 55–135)
ALT SERPL W/O P-5'-P-CCNC: 107 U/L (ref 10–44)
ANION GAP SERPL CALC-SCNC: 9 MMOL/L (ref 8–16)
AST SERPL-CCNC: 48 U/L (ref 10–40)
BASOPHILS # BLD AUTO: 0.05 K/UL (ref 0–0.2)
BASOPHILS NFR BLD: 0.6 % (ref 0–1.9)
BILIRUB SERPL-MCNC: 0.4 MG/DL (ref 0.1–1)
BUN SERPL-MCNC: 29 MG/DL (ref 8–23)
CALCIUM SERPL-MCNC: 10.1 MG/DL (ref 8.7–10.5)
CHLORIDE SERPL-SCNC: 106 MMOL/L (ref 95–110)
CO2 SERPL-SCNC: 25 MMOL/L (ref 23–29)
CREAT SERPL-MCNC: 0.8 MG/DL (ref 0.5–1.4)
DIFFERENTIAL METHOD: ABNORMAL
EOSINOPHIL # BLD AUTO: 0.1 K/UL (ref 0–0.5)
EOSINOPHIL NFR BLD: 0.6 % (ref 0–8)
ERYTHROCYTE [DISTWIDTH] IN BLOOD BY AUTOMATED COUNT: 16.5 % (ref 11.5–14.5)
EST. GFR  (AFRICAN AMERICAN): >60 ML/MIN/1.73 M^2
EST. GFR  (NON AFRICAN AMERICAN): >60 ML/MIN/1.73 M^2
GLUCOSE SERPL-MCNC: 75 MG/DL (ref 70–110)
HCT VFR BLD AUTO: 35.4 % (ref 37–48.5)
HGB BLD-MCNC: 11.1 G/DL (ref 12–16)
IMM GRANULOCYTES # BLD AUTO: 0.03 K/UL (ref 0–0.04)
IMM GRANULOCYTES NFR BLD AUTO: 0.4 % (ref 0–0.5)
LYMPHOCYTES # BLD AUTO: 3.6 K/UL (ref 1–4.8)
LYMPHOCYTES NFR BLD: 44.9 % (ref 18–48)
MAGNESIUM SERPL-MCNC: 2 MG/DL (ref 1.6–2.6)
MCH RBC QN AUTO: 26.7 PG (ref 27–31)
MCHC RBC AUTO-ENTMCNC: 31.4 G/DL (ref 32–36)
MCV RBC AUTO: 85 FL (ref 82–98)
MONOCYTES # BLD AUTO: 0.4 K/UL (ref 0.3–1)
MONOCYTES NFR BLD: 4.4 % (ref 4–15)
NEUTROPHILS # BLD AUTO: 3.9 K/UL (ref 1.8–7.7)
NEUTROPHILS NFR BLD: 49.1 % (ref 38–73)
NRBC BLD-RTO: 0 /100 WBC
PLATELET # BLD AUTO: 381 K/UL (ref 150–450)
PMV BLD AUTO: 9.5 FL (ref 9.2–12.9)
POCT GLUCOSE: 232 MG/DL (ref 70–110)
POCT GLUCOSE: 258 MG/DL (ref 70–110)
POCT GLUCOSE: 71 MG/DL (ref 70–110)
POTASSIUM SERPL-SCNC: 4.7 MMOL/L (ref 3.5–5.1)
PROT SERPL-MCNC: 6.2 G/DL (ref 6–8.4)
RBC # BLD AUTO: 4.16 M/UL (ref 4–5.4)
SODIUM SERPL-SCNC: 140 MMOL/L (ref 136–145)
WBC # BLD AUTO: 7.91 K/UL (ref 3.9–12.7)

## 2021-10-28 PROCEDURE — 96372 THER/PROPH/DIAG INJ SC/IM: CPT | Mod: 59 | Performed by: EMERGENCY MEDICINE

## 2021-10-28 PROCEDURE — 63600175 PHARM REV CODE 636 W HCPCS

## 2021-10-28 PROCEDURE — 99217 PR OBSERVATION CARE DISCHARGE: CPT | Mod: ,,, | Performed by: STUDENT IN AN ORGANIZED HEALTH CARE EDUCATION/TRAINING PROGRAM

## 2021-10-28 PROCEDURE — G0378 HOSPITAL OBSERVATION PER HR: HCPCS

## 2021-10-28 PROCEDURE — 85025 COMPLETE CBC W/AUTO DIFF WBC: CPT

## 2021-10-28 PROCEDURE — 80053 COMPREHEN METABOLIC PANEL: CPT

## 2021-10-28 PROCEDURE — 83735 ASSAY OF MAGNESIUM: CPT

## 2021-10-28 PROCEDURE — 99217 PR OBSERVATION CARE DISCHARGE: ICD-10-PCS | Mod: ,,, | Performed by: STUDENT IN AN ORGANIZED HEALTH CARE EDUCATION/TRAINING PROGRAM

## 2021-10-28 PROCEDURE — 25000003 PHARM REV CODE 250

## 2021-10-28 PROCEDURE — 36415 COLL VENOUS BLD VENIPUNCTURE: CPT

## 2021-10-28 RX ORDER — INSULIN ASPART 100 [IU]/ML
5 INJECTION, SOLUTION INTRAVENOUS; SUBCUTANEOUS
Status: DISCONTINUED | OUTPATIENT
Start: 2021-10-28 | End: 2021-10-28 | Stop reason: HOSPADM

## 2021-10-28 RX ORDER — INSULIN ASPART 100 [IU]/ML
6 INJECTION, SOLUTION INTRAVENOUS; SUBCUTANEOUS
Status: DISCONTINUED | OUTPATIENT
Start: 2021-10-28 | End: 2021-10-28

## 2021-10-28 RX ORDER — INSULIN ASPART 100 [IU]/ML
0-10 INJECTION, SOLUTION INTRAVENOUS; SUBCUTANEOUS
Status: DISCONTINUED | OUTPATIENT
Start: 2021-10-28 | End: 2021-10-28 | Stop reason: HOSPADM

## 2021-10-28 RX ADMIN — FOLIC ACID 1 MG: 1 TABLET ORAL at 09:10

## 2021-10-28 RX ADMIN — INSULIN ASPART 2 UNITS: 100 INJECTION, SOLUTION INTRAVENOUS; SUBCUTANEOUS at 09:10

## 2021-10-28 RX ADMIN — THIAMINE HCL TAB 100 MG 100 MG: 100 TAB at 09:10

## 2021-10-28 RX ADMIN — ASPIRIN 81 MG: 81 TABLET, COATED ORAL at 09:10

## 2021-10-28 RX ADMIN — Medication 1000 UNITS: at 09:10

## 2021-10-28 RX ADMIN — CITALOPRAM HYDROBROMIDE 10 MG: 10 TABLET ORAL at 09:10

## 2021-10-28 RX ADMIN — FERROUS SULFATE TAB 325 MG (65 MG ELEMENTAL FE) 1 EACH: 325 (65 FE) TAB at 09:10

## 2021-10-28 RX ADMIN — CHOLESTYRAMINE 8 G: 4 POWDER, FOR SUSPENSION ORAL at 09:10

## 2021-10-28 RX ADMIN — INSULIN ASPART 5 UNITS: 100 INJECTION, SOLUTION INTRAVENOUS; SUBCUTANEOUS at 09:10

## 2021-10-28 RX ADMIN — ANASTROZOLE 1 MG: 1 TABLET, COATED ORAL at 09:10

## 2021-10-28 RX ADMIN — HEPARIN SODIUM 5000 UNITS: 5000 INJECTION INTRAVENOUS; SUBCUTANEOUS at 05:10

## 2021-11-02 ENCOUNTER — TELEPHONE (OUTPATIENT)
Dept: NEUROLOGY | Facility: CLINIC | Age: 71
End: 2021-11-02
Payer: COMMERCIAL

## 2021-11-16 ENCOUNTER — PATIENT OUTREACH (OUTPATIENT)
Dept: EMERGENCY MEDICINE | Facility: HOSPITAL | Age: 71
End: 2021-11-16
Payer: COMMERCIAL

## 2022-06-17 ENCOUNTER — HOSPITAL ENCOUNTER (INPATIENT)
Facility: HOSPITAL | Age: 72
LOS: 27 days | Discharge: HOSPICE | DRG: 622 | End: 2022-07-16
Attending: EMERGENCY MEDICINE | Admitting: STUDENT IN AN ORGANIZED HEALTH CARE EDUCATION/TRAINING PROGRAM
Payer: COMMERCIAL

## 2022-06-17 DIAGNOSIS — E87.0 HYPERNATREMIA: ICD-10-CM

## 2022-06-17 DIAGNOSIS — R56.9 SEIZURES: ICD-10-CM

## 2022-06-17 DIAGNOSIS — Z93.0 TRACHEOSTOMY IN PLACE: ICD-10-CM

## 2022-06-17 DIAGNOSIS — L89.154 SACRAL DECUBITUS ULCER, STAGE IV: ICD-10-CM

## 2022-06-17 DIAGNOSIS — Z91.89 AT RISK FOR LONG QT SYNDROME: ICD-10-CM

## 2022-06-17 DIAGNOSIS — Z75.8 DISCHARGE PLANNING ISSUES: ICD-10-CM

## 2022-06-17 DIAGNOSIS — Z51.5 PALLIATIVE CARE ENCOUNTER: ICD-10-CM

## 2022-06-17 DIAGNOSIS — T14.8XXA BLOOD BLISTER: ICD-10-CM

## 2022-06-17 DIAGNOSIS — Z71.89 ACP (ADVANCE CARE PLANNING): ICD-10-CM

## 2022-06-17 DIAGNOSIS — Z71.89 COUNSELING REGARDING GOALS OF CARE: Primary | ICD-10-CM

## 2022-06-17 DIAGNOSIS — G93.40 ENCEPHALOPATHY: ICD-10-CM

## 2022-06-17 DIAGNOSIS — Z86.73 HISTORY OF CVA (CEREBROVASCULAR ACCIDENT): Chronic | ICD-10-CM

## 2022-06-17 DIAGNOSIS — Z93.1 G TUBE FEEDINGS: ICD-10-CM

## 2022-06-17 DIAGNOSIS — R07.9 CHEST PAIN: ICD-10-CM

## 2022-06-17 DIAGNOSIS — Z71.89 GOALS OF CARE, COUNSELING/DISCUSSION: ICD-10-CM

## 2022-06-17 PROCEDURE — 96360 HYDRATION IV INFUSION INIT: CPT

## 2022-06-17 PROCEDURE — 99285 EMERGENCY DEPT VISIT HI MDM: CPT | Mod: 25

## 2022-06-17 PROCEDURE — 99285 PR EMERGENCY DEPT VISIT,LEVEL V: ICD-10-PCS | Mod: GW,CS,, | Performed by: EMERGENCY MEDICINE

## 2022-06-17 PROCEDURE — 99285 EMERGENCY DEPT VISIT HI MDM: CPT | Mod: GW,CS,, | Performed by: EMERGENCY MEDICINE

## 2022-06-18 PROBLEM — Z93.0 TRACHEOSTOMY IN PLACE: Status: ACTIVE | Noted: 2022-06-18

## 2022-06-18 PROBLEM — L89.154 SACRAL DECUBITUS ULCER, STAGE IV: Status: ACTIVE | Noted: 2022-06-18

## 2022-06-18 PROBLEM — E87.0 HYPERNATREMIA: Status: ACTIVE | Noted: 2022-06-18

## 2022-06-18 PROBLEM — D72.829 LEUKOCYTOSIS: Status: ACTIVE | Noted: 2022-06-18

## 2022-06-18 PROBLEM — R56.9 SEIZURES: Status: ACTIVE | Noted: 2022-06-18

## 2022-06-18 PROBLEM — Z93.1 G TUBE FEEDINGS: Status: ACTIVE | Noted: 2022-06-18

## 2022-06-18 PROBLEM — Z75.8 DISCHARGE PLANNING ISSUES: Status: ACTIVE | Noted: 2022-06-18

## 2022-06-18 LAB
ALBUMIN SERPL BCP-MCNC: 1.5 G/DL (ref 3.5–5.2)
ALBUMIN SERPL BCP-MCNC: 1.5 G/DL (ref 3.5–5.2)
ALP SERPL-CCNC: 193 U/L (ref 55–135)
ALP SERPL-CCNC: 202 U/L (ref 55–135)
ALT SERPL W/O P-5'-P-CCNC: 33 U/L (ref 10–44)
ALT SERPL W/O P-5'-P-CCNC: 37 U/L (ref 10–44)
ANION GAP SERPL CALC-SCNC: 11 MMOL/L (ref 8–16)
ANION GAP SERPL CALC-SCNC: 13 MMOL/L (ref 8–16)
ANION GAP SERPL CALC-SCNC: 13 MMOL/L (ref 8–16)
AST SERPL-CCNC: 35 U/L (ref 10–40)
AST SERPL-CCNC: 40 U/L (ref 10–40)
BACTERIA #/AREA URNS AUTO: ABNORMAL /HPF
BASOPHILS # BLD AUTO: 0.04 K/UL (ref 0–0.2)
BASOPHILS # BLD AUTO: 0.05 K/UL (ref 0–0.2)
BASOPHILS NFR BLD: 0.3 % (ref 0–1.9)
BASOPHILS NFR BLD: 0.3 % (ref 0–1.9)
BILIRUB SERPL-MCNC: 0.2 MG/DL (ref 0.1–1)
BILIRUB SERPL-MCNC: 0.2 MG/DL (ref 0.1–1)
BILIRUB UR QL STRIP: NEGATIVE
BUN SERPL-MCNC: 65 MG/DL (ref 8–23)
BUN SERPL-MCNC: 67 MG/DL (ref 8–23)
BUN SERPL-MCNC: 69 MG/DL (ref 8–23)
CALCIUM SERPL-MCNC: 9.5 MG/DL (ref 8.7–10.5)
CALCIUM SERPL-MCNC: 9.7 MG/DL (ref 8.7–10.5)
CALCIUM SERPL-MCNC: 9.8 MG/DL (ref 8.7–10.5)
CHLORIDE SERPL-SCNC: 122 MMOL/L (ref 95–110)
CLARITY UR REFRACT.AUTO: ABNORMAL
CO2 SERPL-SCNC: 20 MMOL/L (ref 23–29)
CO2 SERPL-SCNC: 22 MMOL/L (ref 23–29)
CO2 SERPL-SCNC: 22 MMOL/L (ref 23–29)
COLOR UR AUTO: YELLOW
CREAT SERPL-MCNC: 0.9 MG/DL (ref 0.5–1.4)
CREAT SERPL-MCNC: 0.9 MG/DL (ref 0.5–1.4)
CREAT SERPL-MCNC: 1 MG/DL (ref 0.5–1.4)
CTP QC/QA: YES
DIFFERENTIAL METHOD: ABNORMAL
DIFFERENTIAL METHOD: ABNORMAL
EOSINOPHIL # BLD AUTO: 0 K/UL (ref 0–0.5)
EOSINOPHIL # BLD AUTO: 0 K/UL (ref 0–0.5)
EOSINOPHIL NFR BLD: 0 % (ref 0–8)
EOSINOPHIL NFR BLD: 0 % (ref 0–8)
ERYTHROCYTE [DISTWIDTH] IN BLOOD BY AUTOMATED COUNT: 18.3 % (ref 11.5–14.5)
ERYTHROCYTE [DISTWIDTH] IN BLOOD BY AUTOMATED COUNT: 18.3 % (ref 11.5–14.5)
EST. GFR  (AFRICAN AMERICAN): >60 ML/MIN/1.73 M^2
EST. GFR  (NON AFRICAN AMERICAN): 56.4 ML/MIN/1.73 M^2
EST. GFR  (NON AFRICAN AMERICAN): >60 ML/MIN/1.73 M^2
EST. GFR  (NON AFRICAN AMERICAN): >60 ML/MIN/1.73 M^2
ESTIMATED AVG GLUCOSE: 151 MG/DL (ref 68–131)
GLUCOSE SERPL-MCNC: 102 MG/DL (ref 70–110)
GLUCOSE SERPL-MCNC: 132 MG/DL (ref 70–110)
GLUCOSE SERPL-MCNC: 239 MG/DL (ref 70–110)
GLUCOSE UR QL STRIP: NEGATIVE
HBA1C MFR BLD: 6.9 % (ref 4–5.6)
HCT VFR BLD AUTO: 30.3 % (ref 37–48.5)
HCT VFR BLD AUTO: 31.1 % (ref 37–48.5)
HGB BLD-MCNC: 9.3 G/DL (ref 12–16)
HGB BLD-MCNC: 9.4 G/DL (ref 12–16)
HGB UR QL STRIP: NEGATIVE
IMM GRANULOCYTES # BLD AUTO: 0.04 K/UL (ref 0–0.04)
IMM GRANULOCYTES # BLD AUTO: 0.04 K/UL (ref 0–0.04)
IMM GRANULOCYTES NFR BLD AUTO: 0.3 % (ref 0–0.5)
IMM GRANULOCYTES NFR BLD AUTO: 0.3 % (ref 0–0.5)
KETONES UR QL STRIP: ABNORMAL
LACTATE SERPL-SCNC: 1 MMOL/L (ref 0.5–2.2)
LEUKOCYTE ESTERASE UR QL STRIP: ABNORMAL
LYMPHOCYTES # BLD AUTO: 2.6 K/UL (ref 1–4.8)
LYMPHOCYTES # BLD AUTO: 2.7 K/UL (ref 1–4.8)
LYMPHOCYTES NFR BLD: 18 % (ref 18–48)
LYMPHOCYTES NFR BLD: 18.7 % (ref 18–48)
MAGNESIUM SERPL-MCNC: 2.1 MG/DL (ref 1.6–2.6)
MAGNESIUM SERPL-MCNC: 2.1 MG/DL (ref 1.6–2.6)
MCH RBC QN AUTO: 27.5 PG (ref 27–31)
MCH RBC QN AUTO: 27.6 PG (ref 27–31)
MCHC RBC AUTO-ENTMCNC: 30.2 G/DL (ref 32–36)
MCHC RBC AUTO-ENTMCNC: 30.7 G/DL (ref 32–36)
MCV RBC AUTO: 90 FL (ref 82–98)
MCV RBC AUTO: 91 FL (ref 82–98)
MICROSCOPIC COMMENT: ABNORMAL
MONOCYTES # BLD AUTO: 0.5 K/UL (ref 0.3–1)
MONOCYTES # BLD AUTO: 0.5 K/UL (ref 0.3–1)
MONOCYTES NFR BLD: 3.2 % (ref 4–15)
MONOCYTES NFR BLD: 3.3 % (ref 4–15)
NEUTROPHILS # BLD AUTO: 10.8 K/UL (ref 1.8–7.7)
NEUTROPHILS # BLD AUTO: 11.6 K/UL (ref 1.8–7.7)
NEUTROPHILS NFR BLD: 77.4 % (ref 38–73)
NEUTROPHILS NFR BLD: 78.2 % (ref 38–73)
NITRITE UR QL STRIP: NEGATIVE
NRBC BLD-RTO: 0 /100 WBC
NRBC BLD-RTO: 0 /100 WBC
PH UR STRIP: 5 [PH] (ref 5–8)
PHOSPHATE SERPL-MCNC: 4 MG/DL (ref 2.7–4.5)
PHOSPHATE SERPL-MCNC: 4 MG/DL (ref 2.7–4.5)
PLATELET # BLD AUTO: 452 K/UL (ref 150–450)
PLATELET # BLD AUTO: 496 K/UL (ref 150–450)
PMV BLD AUTO: 9 FL (ref 9.2–12.9)
PMV BLD AUTO: 9 FL (ref 9.2–12.9)
POCT GLUCOSE: 116 MG/DL (ref 70–110)
POCT GLUCOSE: 167 MG/DL (ref 70–110)
POCT GLUCOSE: 275 MG/DL (ref 70–110)
POCT GLUCOSE: 386 MG/DL (ref 70–110)
POTASSIUM SERPL-SCNC: 4.3 MMOL/L (ref 3.5–5.1)
POTASSIUM SERPL-SCNC: 4.4 MMOL/L (ref 3.5–5.1)
POTASSIUM SERPL-SCNC: 4.9 MMOL/L (ref 3.5–5.1)
PROT SERPL-MCNC: 6.7 G/DL (ref 6–8.4)
PROT SERPL-MCNC: 6.8 G/DL (ref 6–8.4)
PROT UR QL STRIP: NEGATIVE
RBC # BLD AUTO: 3.38 M/UL (ref 4–5.4)
RBC # BLD AUTO: 3.41 M/UL (ref 4–5.4)
RBC #/AREA URNS AUTO: 23 /HPF (ref 0–4)
SARS-COV-2 RDRP RESP QL NAA+PROBE: NEGATIVE
SODIUM SERPL-SCNC: 148 MMOL/L (ref 136–145)
SODIUM SERPL-SCNC: 155 MMOL/L (ref 136–145)
SODIUM SERPL-SCNC: 156 MMOL/L (ref 136–145)
SODIUM SERPL-SCNC: 157 MMOL/L (ref 136–145)
SP GR UR STRIP: 1.02 (ref 1–1.03)
SQUAMOUS #/AREA URNS AUTO: 3 /HPF
URN SPEC COLLECT METH UR: ABNORMAL
WBC # BLD AUTO: 13.91 K/UL (ref 3.9–12.7)
WBC # BLD AUTO: 14.87 K/UL (ref 3.9–12.7)
WBC #/AREA URNS AUTO: >100 /HPF (ref 0–5)
YEAST UR QL AUTO: ABNORMAL

## 2022-06-18 PROCEDURE — 25000003 PHARM REV CODE 250: Performed by: STUDENT IN AN ORGANIZED HEALTH CARE EDUCATION/TRAINING PROGRAM

## 2022-06-18 PROCEDURE — 99900035 HC TECH TIME PER 15 MIN (STAT)

## 2022-06-18 PROCEDURE — 85025 COMPLETE CBC W/AUTO DIFF WBC: CPT | Mod: 91 | Performed by: STUDENT IN AN ORGANIZED HEALTH CARE EDUCATION/TRAINING PROGRAM

## 2022-06-18 PROCEDURE — G0378 HOSPITAL OBSERVATION PER HR: HCPCS

## 2022-06-18 PROCEDURE — 27000221 HC OXYGEN, UP TO 24 HOURS

## 2022-06-18 PROCEDURE — 94761 N-INVAS EAR/PLS OXIMETRY MLT: CPT

## 2022-06-18 PROCEDURE — 85025 COMPLETE CBC W/AUTO DIFF WBC: CPT | Performed by: STUDENT IN AN ORGANIZED HEALTH CARE EDUCATION/TRAINING PROGRAM

## 2022-06-18 PROCEDURE — 96372 THER/PROPH/DIAG INJ SC/IM: CPT | Performed by: STUDENT IN AN ORGANIZED HEALTH CARE EDUCATION/TRAINING PROGRAM

## 2022-06-18 PROCEDURE — U0002 COVID-19 LAB TEST NON-CDC: HCPCS | Performed by: STUDENT IN AN ORGANIZED HEALTH CARE EDUCATION/TRAINING PROGRAM

## 2022-06-18 PROCEDURE — 80053 COMPREHEN METABOLIC PANEL: CPT | Mod: 91 | Performed by: STUDENT IN AN ORGANIZED HEALTH CARE EDUCATION/TRAINING PROGRAM

## 2022-06-18 PROCEDURE — 84100 ASSAY OF PHOSPHORUS: CPT | Performed by: STUDENT IN AN ORGANIZED HEALTH CARE EDUCATION/TRAINING PROGRAM

## 2022-06-18 PROCEDURE — 99220 PR INITIAL OBSERVATION CARE,LEVL III: ICD-10-PCS | Mod: ,,, | Performed by: STUDENT IN AN ORGANIZED HEALTH CARE EDUCATION/TRAINING PROGRAM

## 2022-06-18 PROCEDURE — 84100 ASSAY OF PHOSPHORUS: CPT | Mod: 91 | Performed by: STUDENT IN AN ORGANIZED HEALTH CARE EDUCATION/TRAINING PROGRAM

## 2022-06-18 PROCEDURE — 87040 BLOOD CULTURE FOR BACTERIA: CPT | Performed by: STUDENT IN AN ORGANIZED HEALTH CARE EDUCATION/TRAINING PROGRAM

## 2022-06-18 PROCEDURE — 83735 ASSAY OF MAGNESIUM: CPT | Mod: 91 | Performed by: STUDENT IN AN ORGANIZED HEALTH CARE EDUCATION/TRAINING PROGRAM

## 2022-06-18 PROCEDURE — 87088 URINE BACTERIA CULTURE: CPT | Performed by: STUDENT IN AN ORGANIZED HEALTH CARE EDUCATION/TRAINING PROGRAM

## 2022-06-18 PROCEDURE — 99900026 HC AIRWAY MAINTENANCE (STAT)

## 2022-06-18 PROCEDURE — 81001 URINALYSIS AUTO W/SCOPE: CPT | Performed by: STUDENT IN AN ORGANIZED HEALTH CARE EDUCATION/TRAINING PROGRAM

## 2022-06-18 PROCEDURE — 83735 ASSAY OF MAGNESIUM: CPT | Performed by: STUDENT IN AN ORGANIZED HEALTH CARE EDUCATION/TRAINING PROGRAM

## 2022-06-18 PROCEDURE — 83605 ASSAY OF LACTIC ACID: CPT | Performed by: STUDENT IN AN ORGANIZED HEALTH CARE EDUCATION/TRAINING PROGRAM

## 2022-06-18 PROCEDURE — 63600175 PHARM REV CODE 636 W HCPCS: Performed by: STUDENT IN AN ORGANIZED HEALTH CARE EDUCATION/TRAINING PROGRAM

## 2022-06-18 PROCEDURE — 36415 COLL VENOUS BLD VENIPUNCTURE: CPT

## 2022-06-18 PROCEDURE — 25000003 PHARM REV CODE 250

## 2022-06-18 PROCEDURE — 99220 PR INITIAL OBSERVATION CARE,LEVL III: CPT | Mod: ,,, | Performed by: STUDENT IN AN ORGANIZED HEALTH CARE EDUCATION/TRAINING PROGRAM

## 2022-06-18 PROCEDURE — 80053 COMPREHEN METABOLIC PANEL: CPT | Performed by: STUDENT IN AN ORGANIZED HEALTH CARE EDUCATION/TRAINING PROGRAM

## 2022-06-18 PROCEDURE — 87086 URINE CULTURE/COLONY COUNT: CPT | Performed by: STUDENT IN AN ORGANIZED HEALTH CARE EDUCATION/TRAINING PROGRAM

## 2022-06-18 PROCEDURE — 36415 COLL VENOUS BLD VENIPUNCTURE: CPT | Performed by: STUDENT IN AN ORGANIZED HEALTH CARE EDUCATION/TRAINING PROGRAM

## 2022-06-18 PROCEDURE — 83036 HEMOGLOBIN GLYCOSYLATED A1C: CPT | Performed by: STUDENT IN AN ORGANIZED HEALTH CARE EDUCATION/TRAINING PROGRAM

## 2022-06-18 PROCEDURE — 87106 FUNGI IDENTIFICATION YEAST: CPT | Performed by: STUDENT IN AN ORGANIZED HEALTH CARE EDUCATION/TRAINING PROGRAM

## 2022-06-18 PROCEDURE — 84295 ASSAY OF SERUM SODIUM: CPT | Mod: 91 | Performed by: STUDENT IN AN ORGANIZED HEALTH CARE EDUCATION/TRAINING PROGRAM

## 2022-06-18 PROCEDURE — 80048 BASIC METABOLIC PNL TOTAL CA: CPT | Mod: XB

## 2022-06-18 RX ORDER — IBUPROFEN 200 MG
24 TABLET ORAL
Status: DISCONTINUED | OUTPATIENT
Start: 2022-06-18 | End: 2022-07-17 | Stop reason: HOSPADM

## 2022-06-18 RX ORDER — IBUPROFEN 200 MG
16 TABLET ORAL
Status: DISCONTINUED | OUTPATIENT
Start: 2022-06-18 | End: 2022-07-17 | Stop reason: HOSPADM

## 2022-06-18 RX ORDER — HEPARIN SODIUM 5000 [USP'U]/ML
5000 INJECTION, SOLUTION INTRAVENOUS; SUBCUTANEOUS EVERY 12 HOURS
Status: DISCONTINUED | OUTPATIENT
Start: 2022-06-18 | End: 2022-07-17 | Stop reason: HOSPADM

## 2022-06-18 RX ORDER — GLUCAGON 1 MG
1 KIT INJECTION
Status: DISCONTINUED | OUTPATIENT
Start: 2022-06-18 | End: 2022-06-18

## 2022-06-18 RX ORDER — ATORVASTATIN CALCIUM 20 MG/1
80 TABLET, FILM COATED ORAL DAILY
Status: DISCONTINUED | OUTPATIENT
Start: 2022-06-18 | End: 2022-07-17 | Stop reason: HOSPADM

## 2022-06-18 RX ORDER — DEXTROSE MONOHYDRATE 50 MG/ML
INJECTION, SOLUTION INTRAVENOUS CONTINUOUS
Status: DISCONTINUED | OUTPATIENT
Start: 2022-06-18 | End: 2022-06-18

## 2022-06-18 RX ORDER — PHENYTOIN 125 MG/5ML
150 SUSPENSION ORAL 3 TIMES DAILY
Status: DISCONTINUED | OUTPATIENT
Start: 2022-06-18 | End: 2022-06-19

## 2022-06-18 RX ORDER — GLUCAGON 1 MG
1 KIT INJECTION
Status: DISCONTINUED | OUTPATIENT
Start: 2022-06-19 | End: 2022-06-24

## 2022-06-18 RX ORDER — INSULIN ASPART 100 [IU]/ML
0-5 INJECTION, SOLUTION INTRAVENOUS; SUBCUTANEOUS EVERY 6 HOURS PRN
Status: DISCONTINUED | OUTPATIENT
Start: 2022-06-19 | End: 2022-06-24

## 2022-06-18 RX ORDER — LEVETIRACETAM 100 MG/ML
500 SOLUTION ORAL 2 TIMES DAILY
Status: DISCONTINUED | OUTPATIENT
Start: 2022-06-18 | End: 2022-06-21

## 2022-06-18 RX ORDER — LACOSAMIDE 50 MG/1
150 TABLET ORAL EVERY 12 HOURS
Status: DISCONTINUED | OUTPATIENT
Start: 2022-06-18 | End: 2022-06-21

## 2022-06-18 RX ORDER — NALOXONE HCL 0.4 MG/ML
0.02 VIAL (ML) INJECTION
Status: DISCONTINUED | OUTPATIENT
Start: 2022-06-18 | End: 2022-07-17 | Stop reason: HOSPADM

## 2022-06-18 RX ORDER — CHOLESTYRAMINE 4 G/9G
2 POWDER, FOR SUSPENSION ORAL 2 TIMES DAILY
Status: DISCONTINUED | OUTPATIENT
Start: 2022-06-18 | End: 2022-07-17 | Stop reason: HOSPADM

## 2022-06-18 RX ORDER — SODIUM CHLORIDE 0.9 % (FLUSH) 0.9 %
10 SYRINGE (ML) INJECTION EVERY 12 HOURS PRN
Status: DISCONTINUED | OUTPATIENT
Start: 2022-06-18 | End: 2022-07-17 | Stop reason: HOSPADM

## 2022-06-18 RX ADMIN — PHENYTOIN 150 MG: 125 SUSPENSION ORAL at 07:06

## 2022-06-18 RX ADMIN — SODIUM CHLORIDE 1000 ML: 0.9 INJECTION, SOLUTION INTRAVENOUS at 03:06

## 2022-06-18 RX ADMIN — HEPARIN SODIUM 5000 UNITS: 5000 INJECTION INTRAVENOUS; SUBCUTANEOUS at 11:06

## 2022-06-18 RX ADMIN — LEVETIRACETAM 500 MG: 500 SOLUTION ORAL at 10:06

## 2022-06-18 RX ADMIN — LACOSAMIDE 150 MG: 50 TABLET, FILM COATED ORAL at 07:06

## 2022-06-18 RX ADMIN — CHOLESTYRAMINE 8 G: 4 POWDER, FOR SUSPENSION ORAL at 07:06

## 2022-06-18 RX ADMIN — LEVETIRACETAM 500 MG: 500 SOLUTION ORAL at 07:06

## 2022-06-18 RX ADMIN — ATORVASTATIN CALCIUM 80 MG: 20 TABLET, FILM COATED ORAL at 10:06

## 2022-06-18 RX ADMIN — HEPARIN SODIUM 5000 UNITS: 5000 INJECTION INTRAVENOUS; SUBCUTANEOUS at 07:06

## 2022-06-18 RX ADMIN — LACOSAMIDE 150 MG: 50 TABLET, FILM COATED ORAL at 10:06

## 2022-06-18 RX ADMIN — DEXTROSE: 5 SOLUTION INTRAVENOUS at 10:06

## 2022-06-18 RX ADMIN — PHENYTOIN 150 MG: 125 SUSPENSION ORAL at 04:06

## 2022-06-18 RX ADMIN — INSULIN ASPART 3 UNITS: 100 INJECTION, SOLUTION INTRAVENOUS; SUBCUTANEOUS at 11:06

## 2022-06-18 RX ADMIN — PHENYTOIN 150 MG: 125 SUSPENSION ORAL at 10:06

## 2022-06-18 RX ADMIN — DEXTROSE: 5 SOLUTION INTRAVENOUS at 07:06

## 2022-06-18 RX ADMIN — CHOLESTYRAMINE 8 G: 4 POWDER, FOR SUSPENSION ORAL at 10:06

## 2022-06-18 NOTE — ASSESSMENT & PLAN NOTE
Cabrera dependent. Will swap cabrera and check UA     - CXR, lactic, blood cultures.  - Had recent wound debridement a few days ago. Cultures unavailable.   - Wound care consulted. Can consider deep culture if concerning for infected wound

## 2022-06-18 NOTE — ED TRIAGE NOTES
Darlene Avila, an 71 y.o. female presents to the ED via EMS. Per EMS pt family revoked hospice and wants more aggressive treatment for Pt. Pt is on hospice for anoxic brain injury. Pt is unresponsive at baseline. Pt is currently on 5 L o2 via trach collar.    Review of patient's allergies indicates:   Allergen Reactions    Iodinated contrast media Hives     Chief Complaint   Patient presents with    Other Misc     Per EMS Pt.'s family revoked hospice and wants more aggressive treatment for Pt. Pt is on hospice for anoxic brain injury. Pt is unresponsive at baseline. Pt is on 8 L nonrebreather per trach collar.        Past Medical History:   Diagnosis Date    Arthritis     Cancer of breast     s/p mastectomy (on anastrozole)     Cataract     Diabetes mellitus     c/b Diabetic retinopathy    Hypertension     resolved    Hypoxia 4/15/2021    Memory loss     due to strokes    Orthostatic hypotension 12/27/2020    frequent falls, on midodrine.  4/2021 seen by both cards and palliative care    TIA (transient ischemic attack) 7309-1499    CTH with remote infarcts    Vitamin D deficiency 10/14/2021

## 2022-06-18 NOTE — ASSESSMENT & PLAN NOTE
Unclear of patient's current medications. Per med review on care-everywhere, the patient is on statin but not on antiplatelet therapy.     - Appreciate pharmacy's assistance with medication reconciliation with facility- start antiplatelet therapy if no contraindications.   - Continue statin via G-tube.

## 2022-06-18 NOTE — ED PROVIDER NOTES
"Encounter Date: 6/17/2022       History     Chief Complaint   Patient presents with    Other Misc     Per EMS Pt.'s family revoked hospice and wants more aggressive treatment for Pt. Pt is on hospice for anoxic brain injury. Pt is unresponsive at baseline. Pt is on 8 L nonrebreather per trach collar.        71 y.o. female with arthritis, HTN, history of strokes, seizures, sacral wound, DM presents from hospice by family request.  Patient was admitted to hospital in [facility] for recurrent seizures/status epilepticus which resulted in an anoxic brain injury and respiratory failure.  Patient was intubated followed by tracheostomy placement.  Patient was discharged to nursing home and subsequently discharged to hospice care at the request of patient's sister.  Per sister today, she would like to place the patient in a nursing facility and would like to rescind her hospice care at this time.  There are no new complaints currently.  Patient's sister states that she would like more medical care"    The history is provided by a relative and medical records.     Review of patient's allergies indicates:   Allergen Reactions    Iodinated contrast media Hives     Past Medical History:   Diagnosis Date    Arthritis     Cancer of breast     s/p mastectomy (on anastrozole)     Cataract     Diabetes mellitus     c/b Diabetic retinopathy    Hypertension     resolved    Hypoxia 4/15/2021    Memory loss     due to strokes    Orthostatic hypotension 12/27/2020    frequent falls, on midodrine.  4/2021 seen by both cards and palliative care    TIA (transient ischemic attack) 4425-1008    CT with remote infarcts    Vitamin D deficiency 10/14/2021     Past Surgical History:   Procedure Laterality Date    CAPSULOTOMY  6/26/13    yag left eye    CATARACT EXTRACTION  6/3/2013    right eye    CHOLECYSTECTOMY      HYSTERECTOMY      MASTECTOMY Right 9/28/2020    Procedure: MASTECTOMY-Right;  Surgeon: Krysta Clark MD;  " Location: [facility];  Service: General;  Laterality: Right;    SENTINEL LYMPH NODE BIOPSY Right 9/28/2020    Procedure: BIOPSY, LYMPH NODE, SENTINEL-Right;  Surgeon: Krysta Clark MD;  Location: [facility];  Service: General;  Laterality: Right;    TOTAL ABDOMINAL HYSTERECTOMY W/ BILATERAL SALPINGOOPHORECTOMY       Family History   Problem Relation Age of Onset    Glaucoma Mother     Diabetes Mother     Colon cancer Mother 82    Cataracts Mother     Cancer Mother     Diabetes Father     Diabetes Sister     Breast cancer Sister 65    Cancer Sister     Hypertension Sister     Diabetes Maternal Uncle     Diabetes Paternal Uncle     Diabetes Maternal Grandmother     Hypertension Maternal Grandmother     Diabetes Maternal Grandfather     Amblyopia Neg Hx     Blindness Neg Hx     Macular degeneration Neg Hx     Retinal detachment Neg Hx     Strabismus Neg Hx      Social History     Tobacco Use    Smoking status: Current Every Day Smoker     Packs/day: 1.50     Types: Cigarettes    Smokeless tobacco: Never Used   Substance Use Topics    Alcohol use: No    Drug use: No     Review of Systems   Unable to perform ROS: Patient nonverbal       Physical Exam     Initial Vitals   BP Pulse Resp Temp SpO2   06/17/22 2320 06/17/22 2320 06/17/22 2321 06/18/22 0410 06/17/22 2320   (!) 124/50 90 (!) 22 98.2 °F (36.8 °C) 98 %      MAP       --                Physical Exam    Nursing note and vitals reviewed.  Constitutional:   Alert, ill-appearing, follows commands   HENT:   Head: Normocephalic and atraumatic.   Oropharynx dry   Eyes: Conjunctivae and EOM are normal. Pupils are equal, round, and reactive to light.   Neck:   Trach collar in place   Cardiovascular: Normal rate, regular rhythm, normal heart sounds and intact distal pulses.   Pulmonary/Chest: Breath sounds normal. No stridor. No respiratory distress.   Abdominal: Abdomen is soft. She exhibits no distension. There is no abdominal tenderness.    Musculoskeletal:      Comments: Sacral decubitus ulcers present     Neurological: She is alert.   Follows commands, diminished strength diffusely, tracks with eyes, nonverbal but mouth some incomprehensible words   Skin: Skin is warm and dry. No rash noted.   Psychiatric:   Flat affect         ED Course   Procedures  Labs Reviewed   CBC W/ AUTO DIFFERENTIAL - Abnormal; Notable for the following components:       Result Value    WBC 13.91 (*)     RBC 3.41 (*)     Hemoglobin 9.4 (*)     Hematocrit 31.1 (*)     MCHC 30.2 (*)     RDW 18.3 (*)     Platelets 496 (*)     MPV 9.0 (*)     Gran # (ANC) 10.8 (*)     Gran % 77.4 (*)     Mono % 3.3 (*)     All other components within normal limits   COMPREHENSIVE METABOLIC PANEL - Abnormal; Notable for the following components:    Sodium 157 (*)     Chloride 122 (*)     CO2 22 (*)     BUN 69 (*)     Albumin 1.5 (*)     Alkaline Phosphatase 202 (*)     eGFR if non  56.4 (*)     All other components within normal limits   MAGNESIUM   PHOSPHORUS   COMPREHENSIVE METABOLIC PANEL   MAGNESIUM   PHOSPHORUS   CBC W/ AUTO DIFFERENTIAL   SARS-COV-2 RDRP GENE          Imaging Results    None          Medications   sodium chloride 0.9% flush 10 mL (has no administration in time range)   naloxone 0.4 mg/mL injection 0.02 mg (has no administration in time range)   glucose chewable tablet 16 g (has no administration in time range)   glucose chewable tablet 24 g (has no administration in time range)   glucagon (human recombinant) injection 1 mg (has no administration in time range)   dextrose 10% bolus 125 mL (has no administration in time range)   dextrose 10% bolus 250 mL (has no administration in time range)   heparin (porcine) injection 5,000 Units (has no administration in time range)   levetiracetam 500 mg/5 mL (5 mL) liquid Soln 500 mg (has no administration in time range)   lacosamide tablet 150 mg (has no administration in time range)   phenytoin (DILANTIN) suspension  150 mg (has no administration in time range)   cholestyramine 4 gram packet 8 g (has no administration in time range)   atorvastatin tablet 80 mg (has no administration in time range)   sodium chloride 0.9% bolus 1,000 mL (0 mLs Intravenous Stopped 6/18/22 1448)     Medical Decision Making:   History:   I obtained history from: EMS provider and someone other than patient.  Old Medical Records: I decided to obtain old medical records.  Old Records Summarized: records from clinic visits and records from previous admission(s).  Initial Assessment:   71 y.o. female with arthritis, HTN, history of strokes, seizures, sacral wound, DM presents from hospice by family request  Presentation most consistent with seeking medical placement, chronic issues including trach collar dependence, anoxic brain injury  Differentials include metabolic derangement  Patient following commands but unable to participate in meaningful conversation.  Discussed with patient's sister who is next of kin per chart, she reports that she would like patient placed into nursing facility as hospice is not providing care for her.  Prolonged conversation was had with sister including the role hospice is playing in the type of care provided via hospice.  Despite multiple conversations to re-evaluate and clarify the prior established plan, patient's sister with still leg patient placed into a nursing facility for further management.  Patient's sister does state that the patient should continue to be a DNR per her wishes.  Patient admitted to hospital medicine for placement, blood work ordered to evaluate further  Clinical Tests:   Lab Tests: Ordered and Reviewed            Attending Attestation:   Physician Attestation Statement for Resident:  As the supervising MD   Physician Attestation Statement: I have personally seen and examined this patient.   I agree with the above history. -:   As the supervising MD I agree with the above PE.    As the supervising  MD I agree with the above treatment, course, plan, and disposition.  I have reviewed and agree with the residents interpretation of the following: lab data.  I have reviewed the following: old records at this facility.                ED Course as of 06/18/22 0714   Sat Jun 18, 2022   0148 BP(!): 124/50 [OK]   0149 SpO2: 98 %  On 5L trach collar [OK]      ED Course User Index  [OK] Yohan Díaz MD             Clinical Impression:   Final diagnoses:  [Z71.89] Counseling regarding goals of care (Primary)  [E87.0] Hypernatremia          ED Disposition Condition    Observation               Yohan Díaz MD  Resident  06/18/22 0714       Lanny Traylor MD  06/18/22 1252

## 2022-06-18 NOTE — RESPIRATORY THERAPY
RAPID RESPONSE RESPIRATORY THERAPY PROACTIVE NOTE           Time of visit:      Code Status: DNR   : 1950  Bed: 45733/76930 A:   MRN: [id number]  Time spent at the bedside: < 15 min    SITUATION    Evaluated patient for: LDA Check     BACKGROUND    Patient has a past medical history of Arthritis, Cancer of breast, Cataract, Diabetes mellitus, Hypertension, Hypoxia, Memory loss, Orthostatic hypotension, TIA (transient ischemic attack), and Vitamin D deficiency.  Clinically Significant Surgical Hx: tracheostomy    24 Hours Vitals Range:  Temp:  [96 °F (35.6 °C)-98.2 °F (36.8 °C)]   Pulse:  [75-92]   Resp:  [11-22]   BP: (124-136)/(50-63)   SpO2:  [96 %-100 %]     Labs:    Recent Labs     22  0215 22  1107 22  1546   * 155*  155*  156* 155*   K 4.9 4.4 4.3   * 122* 122*   CO2 22* 22* 20*   CREATININE 1.0 0.9 0.9    132* 239*   PHOS 4.0 4.0  --    MG 2.1 2.1  --         No results for input(s): PH, PCO2, PO2, HCO3, POCSATURATED, BE in the last 72 hours.    ASSESSMENT/INTERVENTIONS    Upon arrival in room pt resting comfortably in bed, on 5L 28% trach collar, All supplies in room. Extra cuffed Shiley trachs at bedside, sizes 6 & 8.    Last VS   Temp: 96 °F (35.6 °C) (1630)  Pulse: 75 (1630)  Resp: 20 (1635)  BP: 129/63 (1630)  SpO2: 98 % (1630)    Level of Consciousness: Level of Consciousness (AVPU): alert  Respiratory Effort: Respiratory Effort: Unlabored Expansion/Accessory Muscle Usage: Expansion/Accessory Muscles/Retractions: expansion symmetric, no retractions, other (see comments)  All Lung Field Breath Sounds: All Lung Fields Breath Sounds: coarse  O2 Device/Concentration: Flow (L/min): 5, Oxygen Concentration (%): 28, O2 Device (Oxygen Therapy): Trach Collar  Surgical airway: Yes, Type: Portex Size: 7, cuffed  Ambu at bedside: Ambu bag with the patient?: Yes, Adult Ambu     Active Orders   Respiratory Care    Oxygen Continuous      Frequency: Continuous     Number of Occurrences: Until Specified     Order Questions:      Device type: Low flow      Device: Trach Collar      FiO2%: 28      Titrate O2 per Oxygen Titration Protocol: Yes      To maintain SpO2 goal of: >= 90%      Notify MD of: Inability to achieve desired SpO2; Sudden change in patient status and requires 20% increase in FiO2; Patient requires >60% FiO2    RESPIRATORY COMMUNICATION     Frequency: Daily     Number of Occurrences: Until Specified     Order Comments: Trach care         RECOMMENDATIONS    We recommend: RRT Recs: Continue POC per primary team.    ESCALATION        FOLLOW-UP    Please call back the Rapid Response RT, Dennis Bentley RRT at x [phone] for any questions or concerns.

## 2022-06-18 NOTE — PLAN OF CARE
Problem: Adult Inpatient Plan of Care  Goal: Plan of Care Review  Outcome: Ongoing, Progressing  Goal: Patient-Specific Goal (Individualized)  Outcome: Ongoing, Progressing  Goal: Absence of Hospital-Acquired Illness or Injury  Outcome: Ongoing, Progressing  Goal: Optimal Comfort and Wellbeing  Outcome: Ongoing, Progressing  Goal: Readiness for Transition of Care  Outcome: Ongoing, Progressing     Problem: Diabetes Comorbidity  Goal: Blood Glucose Level Within Targeted Range  Outcome: Ongoing, Progressing     Problem: Impaired Wound Healing  Goal: Optimal Wound Healing  Outcome: Ongoing, Progressing     Problem: Infection  Goal: Absence of Infection Signs and Symptoms  Outcome: Ongoing, Progressing     Problem: Skin Injury Risk Increased  Goal: Skin Health and Integrity  Outcome: Ongoing, Progressing     Problem: Fall Injury Risk  Goal: Absence of Fall and Fall-Related Injury  Outcome: Ongoing, Progressing       Pt AO to self. On 5L via trach collar maintaining O2 sats >95%. VS monitored, pt remains afebrile. BG monitored this shift, WNL.  WC performed, wet to dry dressing placed to sacrum. Pt able to mouth wants and needs, requested food and juice multiple times. Safety rounds performed q1-2 hours this shift, bed locked, side rails up x 2, call light in reach. No acute issues observed at this time.

## 2022-06-18 NOTE — H&P
[facility] - Intensive Care ([address] Medicine  History & Physical    Patient Name: Darlene Avila  MRN: [id number]  Patient Class: OP- Observation  Admission Date: 6/17/2022  Attending Physician: Howie Grey MD   Primary Care Provider: Kirill Cabrera Iii, MD         Patient information was obtained from relative(s), past medical records and ER records.     Subjective:     Principal Problem:Hypernatremia    Chief Complaint:   Chief Complaint   Patient presents with    Other Misc     Per EMS Pt.'s family revoked hospice and wants more aggressive treatment for Pt. Pt is on hospice for anoxic brain injury. Pt is unresponsive at baseline. Pt is on 8 L nonrebreather per trach collar.           HPI: Darlene Avila is a 71 year old F with history of diabetes type 2, seizure c/b anoxic brain injury s/p trach, PEG, bed bound status, Stage IV sacral ulcer, ischemic CVA, IBS, history of breast cancer s/p right mastectomy and failure to thrive who was brought in by her HPOA (sister) for nursing home placement. Patient is unable to provide history.     Patient's sister was contacted who stated that the patient was admitted to [facility] in [facility] for recurrent seizures/status epilepticus which resulted in an anoxic brain injury and respiratory failure.  Patient was intubated followed by tracheostomy and PEG placement .  Patient was discharged to nursing home and subsequently discharged to hospice care at the request of patient's sister. However a few days ago, she visited the patient at [facility] recovery facility and noticed they weren't feeding or taking care of the patient due to her 'hospice status'. Per sister today, she would like to place the patient in a skilled nursing facility and would like to rescind her hospice care at this time.  She is no longer interested in hospice placement. She states the patient at baseline is only able to follow some simple commands. She is unsure how much O2 level is  her baseline via trach collar.   Of note she has a Stage IV sacral wound that is s/p debridement on 06/09 by Gen surgery in [facility].     Upon arrival to the ED, the patient was hemodynamically stable. Labs revealed Na 157, WBC 14. Hgb 9.4 (baseline 7-9). The patient was given 1L of IV NS and was admitted to hospital medicine for further management.       Past Medical History:   Diagnosis Date    Arthritis     Cancer of breast     s/p mastectomy (on anastrozole)     Cataract     Diabetes mellitus     c/b Diabetic retinopathy    Hypertension     resolved    Hypoxia 4/15/2021    Memory loss     due to strokes    Orthostatic hypotension 12/27/2020    frequent falls, on midodrine.  4/2021 seen by both cards and palliative care    TIA (transient ischemic attack) [id number]    CT with remote infarcts    Vitamin D deficiency 10/14/2021       Past Surgical History:   Procedure Laterality Date    CAPSULOTOMY  6/26/13    yag left eye    CATARACT EXTRACTION  6/3/2013    right eye    CHOLECYSTECTOMY      HYSTERECTOMY      MASTECTOMY Right 9/28/2020    Procedure: MASTECTOMY-Right;  Surgeon: Krysta Clark MD;  Location: [facility] OR;  Service: General;  Laterality: Right;    SENTINEL LYMPH NODE BIOPSY Right 9/28/2020    Procedure: BIOPSY, LYMPH NODE, SENTINEL-Right;  Surgeon: Krysta Clark MD;  Location: [facility] OR;  Service: General;  Laterality: Right;    TOTAL ABDOMINAL HYSTERECTOMY W/ BILATERAL SALPINGOOPHORECTOMY         Review of patient's allergies indicates:   Allergen Reactions    Iodinated contrast media Hives       No current facility-administered medications on file prior to encounter.     Current Outpatient Medications on File Prior to Encounter   Medication Sig    acetaminophen (TYLENOL) 325 MG tablet Take 2 tablets (650 mg total) by mouth every 6 (six) hours as needed for Pain (headache).    anastrozole (ARIMIDEX) 1 mg Tab Take 1 mg by mouth once daily.    aspirin (ECOTRIN) 81 MG EC tablet Take 81  "mg by mouth once daily.    blood sugar diagnostic Strp Check blood glucose 3-4 times daily    blood-glucose meter kit Check blood glucose 3-4 times daily    calcium carbonate (OS-JABIER) 600 mg calcium (1,500 mg) Tab Take 600 mg by mouth once.    cholestyramine (QUESTRAN) 4 gram packet Take 2 packets (8 g total) by mouth 2 (two) times daily.    citalopram (CELEXA) 10 MG tablet Take 1 tablet (10 mg total) by mouth once daily.    folic acid (FOLVITE) 1 MG tablet Take 1 tablet (1 mg total) by mouth once daily.    insulin detemir U-100 (LEVEMIR FLEXTOUCH) 100 unit/mL (3 mL) SubQ InPn pen Inject 10 Units into the skin 2 (two) times daily.    insulin lispro 100 unit/mL pen Inject 7 Units into the skin 3 (three) times daily with meals.    lancets 28 gauge Misc Check blood glucose 3-4 times daily    midodrine (PROAMATINE) 2.5 MG Tab Take 3 tablets (7.5 mg total) by mouth 3 (three) times daily with meals.    pen needle, diabetic 31 gauge x 3/16" Ndle Use as directed to inject insulin 4 times daily    vitamin D (VITAMIN D3) 1000 units Tab Take 1,000 Units by mouth once daily.     Family History       Problem Relation (Age of Onset)    Breast cancer Sister (65)    Cancer Mother, Sister    Cataracts Mother    Colon cancer Mother (82)    Diabetes Mother, Father, Sister, Maternal Uncle, Paternal Uncle, Maternal Grandmother, Maternal Grandfather    Glaucoma Mother    Hypertension Sister, Maternal Grandmother          Tobacco Use    Smoking status: Current Every Day Smoker     Packs/day: 1.50     Types: Cigarettes    Smokeless tobacco: Never Used   Substance and Sexual Activity    Alcohol use: No    Drug use: No    Sexual activity: Not Currently     Review of Systems   Unable to perform ROS: Mental status change   Objective:     Vital Signs (Most Recent):  Temp: 98.2 °F (36.8 °C) (06/18/22 0410)  Pulse: 92 (06/18/22 0410)  Resp: 16 (06/18/22 0410)  BP: 136/61 (06/18/22 0410)  SpO2: 99 % (06/18/22 0410) Vital Signs " (24h Range):  Temp:  [98.2 °F (36.8 °C)] 98.2 °F (36.8 °C)  Pulse:  [84-92] 92  Resp:  [11-22] 16  SpO2:  [96 %-100 %] 99 %  BP: (124-136)/(50-61) 136/61     Weight: 50.2 kg (110 lb 10.7 oz)  Body mass index is 22.35 kg/m².    Physical Exam  Vitals and nursing note reviewed.   Constitutional:       General: She is not in acute distress.     Appearance: She is ill-appearing. She is not toxic-appearing or diaphoretic.   HENT:      Head: Normocephalic.      Mouth/Throat:      Mouth: Mucous membranes are dry.      Comments: Dried oral secretions adhered to tongue  Cardiovascular:      Rate and Rhythm: Normal rate and regular rhythm.      Pulses: Normal pulses.      Heart sounds: Normal heart sounds. No murmur heard.  Pulmonary:      Effort: Pulmonary effort is normal. No respiratory distress.      Breath sounds: Normal breath sounds. No wheezing or rales.   Abdominal:      General: Abdomen is flat. Bowel sounds are normal. There is no distension.      Palpations: Abdomen is soft.      Tenderness: There is no abdominal tenderness.   Musculoskeletal:         General: No swelling or tenderness. Normal range of motion.      Cervical back: Normal range of motion.   Skin:     General: Skin is warm and dry.      Coloration: Skin is not jaundiced or pale.      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    Neurological:      Mental Status: She is alert. She is disoriented.      Comments: Alert, able to follow some simple commands.    Psychiatric:      Comments: Unable to assess             Significant Labs: All pertinent labs within the past 24 hours have been reviewed.  Blood Culture: No results for input(s): LABBLOO in the last 48 hours.  BMP:   Recent Labs   Lab 06/18/22 0215      *   K 4.9   *   CO2 22*   BUN 69*   CREATININE 1.0   CALCIUM 9.7   MG 2.1     CBC:   Recent Labs   Lab 06/18/22 0215   WBC 13.91*   HGB 9.4*   HCT 31.1*   *     CMP:   Recent Labs   Lab  06/18/22  0215   *   K 4.9   *   CO2 22*      BUN 69*   CREATININE 1.0   CALCIUM 9.7   PROT 6.8   ALBUMIN 1.5*   BILITOT 0.2   ALKPHOS 202*   AST 40   ALT 37   ANIONGAP 13   EGFRNONAA 56.4*     Lactic Acid: No results for input(s): LACTATE in the last 48 hours.    Significant Imaging: I have reviewed all pertinent imaging results/findings within the past 24 hours.    Assessment/Plan:     * Discharge planning issues  Darlene Avila is a 71 year old F with history of diabetes type 2, seizure, ischemic CVA, IBS, history of breast cancer s/p right mastectomy and failure to thrive who was brought in by her HPOA (sister) for skilled nursing home placement due to concern for neglect at recent hospice facility    - Patient follows very simple commands and doubt she can engage in skilled therapy. care home nursing facility likely more of an option.    - SW to assist with placement.   - Appreciate pharmacy's assistance with med rec.     Hypernatremia  Na 157. Suspect from dehydration while at her recovery facility.   - FWD 2.7.   S/p 1L of NS in the ED.     Plan  - recheck Na. Can start D5W for hypernatremia following pending Na level to avoid overcorrection  - patient on 5L trach collar- monitor oxygen levels with fluids   - 400cc QID free water boluses  - Do not overcorrect by >6-8mEq within 24 hours.   - Na q4 hrs.       Seizures  Continue phenytoin, lacosamide and keppra via G-tube      G tube feedings  Tube feed dependent via G tube  - Nutrition to assist with TF recs      Tracheostomy in place  On 5l O2 via trach collar.   - Respiratory therapy to assist with trach care.       Sacral decubitus ulcer, stage IV  S/p recent debridement at OSH  Wound care consulted      Leukocytosis  Cabrera dependent. Will swap cabrera and check UA     - CXR, lactic, blood cultures.  - Had recent wound debridement a few days ago. Cultures unavailable.   - Wound care consulted. Can consider deep culture if concerning for  infected wound      ACP (advance care planning)  Patient is DNR. [facility] on file.       History of CVA (cerebrovascular accident)  Unclear of patient's current medications. Per med review on care-everywhere, the patient is on statin but not on antiplatelet therapy.     - Appreciate pharmacy's assistance with medication reconciliation with facility- start antiplatelet therapy if no contraindications.   - Continue statin via G-tube.       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 A1c repeated    - LDSSI   - POCT glucose q6hrs  - Maintain -180    VTE Risk Mitigation (From admission, onward)         Ordered     heparin (porcine) injection 5,000 Units  Every 12 hours         06/18/22 0355     IP VTE HIGH RISK PATIENT  Once         06/18/22 0355     Place sequential compression device  Until discontinued         06/18/22 0355                   Johnny Reilly MD  Department of Hospital Medicine   [facility] - Intensive Care ([address])

## 2022-06-18 NOTE — ASSESSMENT & PLAN NOTE
On insulin glargine and lispro (unclear doses). A1c repeated 6.9.    - LDSSI   - POCT glucose q6hrs  - Maintain -180  - Titrate basal/bolus regimen to goal as above.

## 2022-06-18 NOTE — NURSING
"Nurse asked Pt sister about med reconciliation to complete pt med rec list but sister said she was not sure what meds pt takes at home but she know there were 3 meds for seizure. Nurse looked in pt home meds chart and does not see any meds for seizure. Sister called to hospital in [place] where pt was transferred from to [facility] here before coming to [facility] and also called to [facility] to asked for med rec list but both places requested for doctor here at [facility] to complete "release of information form" for them to release information. Nurse obtained both [facility] fax number and [facility] in [place] fax number and notified Dr. Grey when Dr. Grey arrived to pt bedside to speak to sister Sabi.  "

## 2022-06-18 NOTE — SUBJECTIVE & OBJECTIVE
Interval History: AF HDS NAEON. Patient at baseline mentation (minimally responsive on minimal vent settings)    Review of Systems   Unable to perform ROS: Mental status change   Objective:     Vital Signs (Most Recent):  Temp: 97.5 °F (36.4 °C) (06/18/22 1137)  Pulse: 82 (06/18/22 1137)  Resp: 20 (06/18/22 1234)  BP: 133/62 (06/18/22 1137)  SpO2: 96 % (06/18/22 1137) Vital Signs (24h Range):  Temp:  [97.5 °F (36.4 °C)-98.2 °F (36.8 °C)] 97.5 °F (36.4 °C)  Pulse:  [82-92] 82  Resp:  [11-22] 20  SpO2:  [96 %-100 %] 96 %  BP: (124-136)/(50-62) 133/62     Weight: 50.2 kg (110 lb 10.7 oz)  Body mass index is 22.35 kg/m².    Intake/Output Summary (Last 24 hours) at 6/18/2022 1346  Last data filed at 6/18/2022 1100  Gross per 24 hour   Intake 150 ml   Output 675 ml   Net -525 ml      Physical Exam  Vitals and nursing note reviewed.   Constitutional:       General: She is not in acute distress.     Appearance: She is ill-appearing. She is not toxic-appearing or diaphoretic.   HENT:      Head: Normocephalic.      Mouth/Throat:      Mouth: Mucous membranes are dry.      Comments: Dried oral secretions adhered to tongue  Cardiovascular:      Rate and Rhythm: Normal rate and regular rhythm.      Pulses: Normal pulses.      Heart sounds: Normal heart sounds. No murmur heard.  Pulmonary:      Effort: Pulmonary effort is normal. No respiratory distress.      Breath sounds: Normal breath sounds. No wheezing or rales.   Abdominal:      General: Abdomen is flat. Bowel sounds are normal. There is no distension.      Palpations: Abdomen is soft.      Tenderness: There is no abdominal tenderness.   Musculoskeletal:         General: No swelling or tenderness. Normal range of motion.      Cervical back: Normal range of motion.   Skin:     General: Skin is warm and dry.      Coloration: Skin is not jaundiced or pale.      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    Neurological:      Mental Status: She is  alert. She is disoriented.      Comments: Minimally alert, able to follow some simple commands.    Psychiatric:      Comments: Unable to assess       Significant Labs: All pertinent labs within the past 24 hours have been reviewed.    Significant Imaging: I have reviewed all pertinent imaging results/findings within the past 24 hours.

## 2022-06-18 NOTE — ASSESSMENT & PLAN NOTE
Na 157. Suspect from dehydration while at her recovery facility.   - FWD 2.7.   S/p 1L of NS in the ED.     Plan  - recheck Na. Can start D5W for hypernatremia following pending Na level to avoid overcorrection  - patient on 5L trach collar- monitor oxygen levels with fluids   - 400cc QID free water boluses  - Do not overcorrect by >6-8mEq within 24 hours.   - Na q4 hrs.

## 2022-06-18 NOTE — HPI
Darlene Avila is a 71 year old F with history of diabetes type 2, seizure c/b anoxic brain injury s/p trach, PEG, bed bound status, Stage IV sacral ulcer, ischemic CVA, IBS, history of breast cancer s/p right mastectomy and failure to thrive who was brought in by her HPOA (sister) for nursing home placement. Patient is unable to provide history.     Patient's sister was contacted who stated that the patient was admitted to [facility] in [facility] for recurrent seizures/status epilepticus which resulted in an anoxic brain injury and respiratory failure.  Patient was intubated followed by tracheostomy and PEG placement .  Patient was discharged to nursing home and subsequently discharged to hospice care at the request of patient's sister. However a few days ago, she visited the patient at [facility] recovery facility and noticed they weren't feeding or taking care of the patient due to her 'hospice status'. Per sister today, she would like to place the patient in a skilled nursing facility and would like to rescind her hospice care at this time.  She is no longer interested in hospice placement. She states the patient at baseline is only able to follow some simple commands. She is unsure how much O2 level is her baseline via trach collar.   Of note she has a Stage IV sacral wound that is s/p debridement on 06/09 by Gen surgery in [facility].     Upon arrival to the ED, the patient was hemodynamically stable. Labs revealed Na 157, WBC 14. Hgb 9.4 (baseline 7-9). The patient was given 1L of IV NS and was admitted to hospital medicine for further management.

## 2022-06-18 NOTE — SUBJECTIVE & OBJECTIVE
Past Medical History:   Diagnosis Date    Arthritis     Cancer of breast     s/p mastectomy (on anastrozole)     Cataract     Diabetes mellitus     c/b Diabetic retinopathy    Hypertension     resolved    Hypoxia 4/15/2021    Memory loss     due to strokes    Orthostatic hypotension 12/27/2020    frequent falls, on midodrine.  4/2021 seen by both cards and palliative care    TIA (transient ischemic attack) 4617-7919    CT with remote infarcts    Vitamin D deficiency 10/14/2021       Past Surgical History:   Procedure Laterality Date    CAPSULOTOMY  6/26/13    yag left eye    CATARACT EXTRACTION  6/3/2013    right eye    CHOLECYSTECTOMY      HYSTERECTOMY      MASTECTOMY Right 9/28/2020    Procedure: MASTECTOMY-Right;  Surgeon: Krysta Clark MD;  Location: [facility] OR;  Service: General;  Laterality: Right;    SENTINEL LYMPH NODE BIOPSY Right 9/28/2020    Procedure: BIOPSY, LYMPH NODE, SENTINEL-Right;  Surgeon: Krysta Clark MD;  Location: [facility] OR;  Service: General;  Laterality: Right;    TOTAL ABDOMINAL HYSTERECTOMY W/ BILATERAL SALPINGOOPHORECTOMY         Review of patient's allergies indicates:   Allergen Reactions    Iodinated contrast media Hives       No current facility-administered medications on file prior to encounter.     Current Outpatient Medications on File Prior to Encounter   Medication Sig    acetaminophen (TYLENOL) 325 MG tablet Take 2 tablets (650 mg total) by mouth every 6 (six) hours as needed for Pain (headache).    anastrozole (ARIMIDEX) 1 mg Tab Take 1 mg by mouth once daily.    aspirin (ECOTRIN) 81 MG EC tablet Take 81 mg by mouth once daily.    blood sugar diagnostic Strp Check blood glucose 3-4 times daily    blood-glucose meter kit Check blood glucose 3-4 times daily    calcium carbonate (OS-JABIER) 600 mg calcium (1,500 mg) Tab Take 600 mg by mouth once.    cholestyramine (QUESTRAN) 4 gram packet Take 2 packets (8 g total) by mouth 2 (two) times daily.    citalopram (CELEXA) 10 MG tablet Take 1  "tablet (10 mg total) by mouth once daily.    folic acid (FOLVITE) 1 MG tablet Take 1 tablet (1 mg total) by mouth once daily.    insulin detemir U-100 (LEVEMIR FLEXTOUCH) 100 unit/mL (3 mL) SubQ InPn pen Inject 10 Units into the skin 2 (two) times daily.    insulin lispro 100 unit/mL pen Inject 7 Units into the skin 3 (three) times daily with meals.    lancets 28 gauge Misc Check blood glucose 3-4 times daily    midodrine (PROAMATINE) 2.5 MG Tab Take 3 tablets (7.5 mg total) by mouth 3 (three) times daily with meals.    pen needle, diabetic 31 gauge x 3/16" Ndle Use as directed to inject insulin 4 times daily    vitamin D (VITAMIN D3) 1000 units Tab Take 1,000 Units by mouth once daily.     Family History       Problem Relation (Age of Onset)    Breast cancer Sister (65)    Cancer Mother, Sister    Cataracts Mother    Colon cancer Mother (82)    Diabetes Mother, Father, Sister, Maternal Uncle, Paternal Uncle, Maternal Grandmother, Maternal Grandfather    Glaucoma Mother    Hypertension Sister, Maternal Grandmother          Tobacco Use    Smoking status: Current Every Day Smoker     Packs/day: 1.50     Types: Cigarettes    Smokeless tobacco: Never Used   Substance and Sexual Activity    Alcohol use: No    Drug use: No    Sexual activity: Not Currently     Review of Systems   Unable to perform ROS: Mental status change   Objective:     Vital Signs (Most Recent):  Temp: 98.2 °F (36.8 °C) (06/18/22 0410)  Pulse: 92 (06/18/22 0410)  Resp: 16 (06/18/22 0410)  BP: 136/61 (06/18/22 0410)  SpO2: 99 % (06/18/22 0410) Vital Signs (24h Range):  Temp:  [98.2 °F (36.8 °C)] 98.2 °F (36.8 °C)  Pulse:  [84-92] 92  Resp:  [11-22] 16  SpO2:  [96 %-100 %] 99 %  BP: (124-136)/(50-61) 136/61     Weight: 50.2 kg (110 lb 10.7 oz)  Body mass index is 22.35 kg/m².    Physical Exam  Vitals and nursing note reviewed.   Constitutional:       General: She is not in acute distress.     Appearance: She is ill-appearing. She is not toxic-appearing " or diaphoretic.   HENT:      Head: Normocephalic.      Mouth/Throat:      Mouth: Mucous membranes are dry.      Comments: Dried oral secretions adhered to tongue  Cardiovascular:      Rate and Rhythm: Normal rate and regular rhythm.      Pulses: Normal pulses.      Heart sounds: Normal heart sounds. No murmur heard.  Pulmonary:      Effort: Pulmonary effort is normal. No respiratory distress.      Breath sounds: Normal breath sounds. No wheezing or rales.   Abdominal:      General: Abdomen is flat. Bowel sounds are normal. There is no distension.      Palpations: Abdomen is soft.      Tenderness: There is no abdominal tenderness.   Musculoskeletal:         General: No swelling or tenderness. Normal range of motion.      Cervical back: Normal range of motion.   Skin:     General: Skin is warm and dry.      Coloration: Skin is not jaundiced or pale.      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    Neurological:      Mental Status: She is alert. She is disoriented.      Comments: Alert, able to follow some simple commands.    Psychiatric:      Comments: Unable to assess             Significant Labs: All pertinent labs within the past 24 hours have been reviewed.  Blood Culture: No results for input(s): LABBLOO in the last 48 hours.  BMP:   Recent Labs   Lab 06/18/22 0215      *   K 4.9   *   CO2 22*   BUN 69*   CREATININE 1.0   CALCIUM 9.7   MG 2.1     CBC:   Recent Labs   Lab 06/18/22 0215   WBC 13.91*   HGB 9.4*   HCT 31.1*   *     CMP:   Recent Labs   Lab 06/18/22 0215   *   K 4.9   *   CO2 22*      BUN 69*   CREATININE 1.0   CALCIUM 9.7   PROT 6.8   ALBUMIN 1.5*   BILITOT 0.2   ALKPHOS 202*   AST 40   ALT 37   ANIONGAP 13   EGFRNONAA 56.4*     Lactic Acid: No results for input(s): LACTATE in the last 48 hours.    Significant Imaging: I have reviewed all pertinent imaging results/findings within the past 24 hours.

## 2022-06-18 NOTE — ASSESSMENT & PLAN NOTE
Darlene Avila is a 71 year old F with history of diabetes type 2, seizure, ischemic CVA, IBS, history of breast cancer s/p right mastectomy and failure to thrive who was brought in by her HPOA (sister) for skilled nursing home placement due to concern for neglect at recent hospice facility    - Patient follows very simple commands and doubt she can engage in skilled therapy. snf nursing facility likely more of an option.    - SW to assist with placement.   - Appreciate pharmacy's assistance with med rec.

## 2022-06-19 LAB
ALBUMIN SERPL BCP-MCNC: 1.5 G/DL (ref 3.5–5.2)
ALBUMIN SERPL BCP-MCNC: 1.5 G/DL (ref 3.5–5.2)
ALP SERPL-CCNC: 164 U/L (ref 55–135)
ALP SERPL-CCNC: 176 U/L (ref 55–135)
ALT SERPL W/O P-5'-P-CCNC: 25 U/L (ref 10–44)
ALT SERPL W/O P-5'-P-CCNC: 29 U/L (ref 10–44)
ANION GAP SERPL CALC-SCNC: 10 MMOL/L (ref 8–16)
ANION GAP SERPL CALC-SCNC: 11 MMOL/L (ref 8–16)
ANION GAP SERPL CALC-SCNC: 9 MMOL/L (ref 8–16)
AST SERPL-CCNC: 18 U/L (ref 10–40)
AST SERPL-CCNC: 23 U/L (ref 10–40)
BACTERIA UR CULT: ABNORMAL
BASOPHILS # BLD AUTO: 0.03 K/UL (ref 0–0.2)
BASOPHILS NFR BLD: 0.3 % (ref 0–1.9)
BILIRUB SERPL-MCNC: 0.1 MG/DL (ref 0.1–1)
BILIRUB SERPL-MCNC: 0.2 MG/DL (ref 0.1–1)
BUN SERPL-MCNC: 47 MG/DL (ref 8–23)
BUN SERPL-MCNC: 56 MG/DL (ref 8–23)
BUN SERPL-MCNC: 58 MG/DL (ref 8–23)
CALCIUM SERPL-MCNC: 8.8 MG/DL (ref 8.7–10.5)
CALCIUM SERPL-MCNC: 9.2 MG/DL (ref 8.7–10.5)
CALCIUM SERPL-MCNC: 9.3 MG/DL (ref 8.7–10.5)
CHLORIDE SERPL-SCNC: 119 MMOL/L (ref 95–110)
CHLORIDE SERPL-SCNC: 120 MMOL/L (ref 95–110)
CHLORIDE SERPL-SCNC: 122 MMOL/L (ref 95–110)
CO2 SERPL-SCNC: 19 MMOL/L (ref 23–29)
CO2 SERPL-SCNC: 22 MMOL/L (ref 23–29)
CO2 SERPL-SCNC: 24 MMOL/L (ref 23–29)
CREAT SERPL-MCNC: 0.8 MG/DL (ref 0.5–1.4)
DIFFERENTIAL METHOD: ABNORMAL
EOSINOPHIL # BLD AUTO: 0 K/UL (ref 0–0.5)
EOSINOPHIL NFR BLD: 0.3 % (ref 0–8)
ERYTHROCYTE [DISTWIDTH] IN BLOOD BY AUTOMATED COUNT: 18.4 % (ref 11.5–14.5)
EST. GFR  (AFRICAN AMERICAN): >60 ML/MIN/1.73 M^2
EST. GFR  (NON AFRICAN AMERICAN): >60 ML/MIN/1.73 M^2
GLUCOSE SERPL-MCNC: 269 MG/DL (ref 70–110)
GLUCOSE SERPL-MCNC: 340 MG/DL (ref 70–110)
GLUCOSE SERPL-MCNC: 84 MG/DL (ref 70–110)
HCT VFR BLD AUTO: 28.9 % (ref 37–48.5)
HGB BLD-MCNC: 8.7 G/DL (ref 12–16)
IMM GRANULOCYTES # BLD AUTO: 0.03 K/UL (ref 0–0.04)
IMM GRANULOCYTES NFR BLD AUTO: 0.3 % (ref 0–0.5)
LYMPHOCYTES # BLD AUTO: 2.6 K/UL (ref 1–4.8)
LYMPHOCYTES NFR BLD: 22.7 % (ref 18–48)
MAGNESIUM SERPL-MCNC: 2 MG/DL (ref 1.6–2.6)
MCH RBC QN AUTO: 27.5 PG (ref 27–31)
MCHC RBC AUTO-ENTMCNC: 30.1 G/DL (ref 32–36)
MCV RBC AUTO: 92 FL (ref 82–98)
MONOCYTES # BLD AUTO: 0.5 K/UL (ref 0.3–1)
MONOCYTES NFR BLD: 4.3 % (ref 4–15)
NEUTROPHILS # BLD AUTO: 8.3 K/UL (ref 1.8–7.7)
NEUTROPHILS NFR BLD: 72.1 % (ref 38–73)
NRBC BLD-RTO: 0 /100 WBC
PHOSPHATE SERPL-MCNC: 3.2 MG/DL (ref 2.7–4.5)
PLATELET # BLD AUTO: 469 K/UL (ref 150–450)
PMV BLD AUTO: 9.4 FL (ref 9.2–12.9)
POCT GLUCOSE: 207 MG/DL (ref 70–110)
POCT GLUCOSE: 267 MG/DL (ref 70–110)
POCT GLUCOSE: 311 MG/DL (ref 70–110)
POTASSIUM SERPL-SCNC: 4 MMOL/L (ref 3.5–5.1)
POTASSIUM SERPL-SCNC: 4.1 MMOL/L (ref 3.5–5.1)
POTASSIUM SERPL-SCNC: 4.2 MMOL/L (ref 3.5–5.1)
PROT SERPL-MCNC: 5.6 G/DL (ref 6–8.4)
PROT SERPL-MCNC: 5.6 G/DL (ref 6–8.4)
RBC # BLD AUTO: 3.16 M/UL (ref 4–5.4)
SODIUM SERPL-SCNC: 149 MMOL/L (ref 136–145)
SODIUM SERPL-SCNC: 150 MMOL/L (ref 136–145)
SODIUM SERPL-SCNC: 150 MMOL/L (ref 136–145)
SODIUM SERPL-SCNC: 151 MMOL/L (ref 136–145)
SODIUM SERPL-SCNC: 151 MMOL/L (ref 136–145)
SODIUM SERPL-SCNC: 153 MMOL/L (ref 136–145)
SODIUM SERPL-SCNC: 155 MMOL/L (ref 136–145)
WBC # BLD AUTO: 11.53 K/UL (ref 3.9–12.7)

## 2022-06-19 PROCEDURE — 96372 THER/PROPH/DIAG INJ SC/IM: CPT | Performed by: STUDENT IN AN ORGANIZED HEALTH CARE EDUCATION/TRAINING PROGRAM

## 2022-06-19 PROCEDURE — 99232 SBSQ HOSP IP/OBS MODERATE 35: CPT | Mod: ,,, | Performed by: STUDENT IN AN ORGANIZED HEALTH CARE EDUCATION/TRAINING PROGRAM

## 2022-06-19 PROCEDURE — 99900035 HC TECH TIME PER 15 MIN (STAT)

## 2022-06-19 PROCEDURE — 84100 ASSAY OF PHOSPHORUS: CPT | Performed by: STUDENT IN AN ORGANIZED HEALTH CARE EDUCATION/TRAINING PROGRAM

## 2022-06-19 PROCEDURE — 83735 ASSAY OF MAGNESIUM: CPT | Performed by: STUDENT IN AN ORGANIZED HEALTH CARE EDUCATION/TRAINING PROGRAM

## 2022-06-19 PROCEDURE — 94761 N-INVAS EAR/PLS OXIMETRY MLT: CPT

## 2022-06-19 PROCEDURE — 36415 COLL VENOUS BLD VENIPUNCTURE: CPT | Performed by: STUDENT IN AN ORGANIZED HEALTH CARE EDUCATION/TRAINING PROGRAM

## 2022-06-19 PROCEDURE — 27201112

## 2022-06-19 PROCEDURE — 25000003 PHARM REV CODE 250

## 2022-06-19 PROCEDURE — 85025 COMPLETE CBC W/AUTO DIFF WBC: CPT | Performed by: STUDENT IN AN ORGANIZED HEALTH CARE EDUCATION/TRAINING PROGRAM

## 2022-06-19 PROCEDURE — 63600175 PHARM REV CODE 636 W HCPCS: Performed by: STUDENT IN AN ORGANIZED HEALTH CARE EDUCATION/TRAINING PROGRAM

## 2022-06-19 PROCEDURE — 63600175 PHARM REV CODE 636 W HCPCS

## 2022-06-19 PROCEDURE — 36415 COLL VENOUS BLD VENIPUNCTURE: CPT

## 2022-06-19 PROCEDURE — 80053 COMPREHEN METABOLIC PANEL: CPT | Mod: 91 | Performed by: STUDENT IN AN ORGANIZED HEALTH CARE EDUCATION/TRAINING PROGRAM

## 2022-06-19 PROCEDURE — 95720 EEG PHY/QHP EA INCR W/VEEG: CPT | Mod: ,,, | Performed by: PSYCHIATRY & NEUROLOGY

## 2022-06-19 PROCEDURE — C9399 UNCLASSIFIED DRUGS OR BIOLOG: HCPCS

## 2022-06-19 PROCEDURE — 12000002 HC ACUTE/MED SURGE SEMI-PRIVATE ROOM

## 2022-06-19 PROCEDURE — 25000003 PHARM REV CODE 250: Performed by: STUDENT IN AN ORGANIZED HEALTH CARE EDUCATION/TRAINING PROGRAM

## 2022-06-19 PROCEDURE — 84295 ASSAY OF SERUM SODIUM: CPT | Performed by: STUDENT IN AN ORGANIZED HEALTH CARE EDUCATION/TRAINING PROGRAM

## 2022-06-19 PROCEDURE — 27000221 HC OXYGEN, UP TO 24 HOURS

## 2022-06-19 PROCEDURE — 99900026 HC AIRWAY MAINTENANCE (STAT)

## 2022-06-19 PROCEDURE — 95720 PR EEG, W/VIDEO, CONT RECORD, I&R, >12<26 HRS: ICD-10-PCS | Mod: ,,, | Performed by: PSYCHIATRY & NEUROLOGY

## 2022-06-19 PROCEDURE — 96372 THER/PROPH/DIAG INJ SC/IM: CPT

## 2022-06-19 PROCEDURE — 80053 COMPREHEN METABOLIC PANEL: CPT | Performed by: STUDENT IN AN ORGANIZED HEALTH CARE EDUCATION/TRAINING PROGRAM

## 2022-06-19 PROCEDURE — 84295 ASSAY OF SERUM SODIUM: CPT | Mod: 91

## 2022-06-19 PROCEDURE — 80048 BASIC METABOLIC PNL TOTAL CA: CPT | Mod: XB

## 2022-06-19 PROCEDURE — 95714 VEEG EA 12-26 HR UNMNTR: CPT

## 2022-06-19 PROCEDURE — 99232 PR SUBSEQUENT HOSPITAL CARE,LEVL II: ICD-10-PCS | Mod: ,,, | Performed by: STUDENT IN AN ORGANIZED HEALTH CARE EDUCATION/TRAINING PROGRAM

## 2022-06-19 PROCEDURE — 27200966 HC CLOSED SUCTION SYSTEM

## 2022-06-19 PROCEDURE — 95700 EEG CONT REC W/VID EEG TECH: CPT

## 2022-06-19 RX ORDER — DEXTROSE MONOHYDRATE 50 MG/ML
INJECTION, SOLUTION INTRAVENOUS CONTINUOUS
Status: DISCONTINUED | OUTPATIENT
Start: 2022-06-19 | End: 2022-06-22

## 2022-06-19 RX ORDER — INSULIN ASPART 100 [IU]/ML
5 INJECTION, SOLUTION INTRAVENOUS; SUBCUTANEOUS
Status: DISCONTINUED | OUTPATIENT
Start: 2022-06-19 | End: 2022-06-26

## 2022-06-19 RX ADMIN — LEVETIRACETAM 500 MG: 500 SOLUTION ORAL at 10:06

## 2022-06-19 RX ADMIN — INSULIN ASPART 4 UNITS: 100 INJECTION, SOLUTION INTRAVENOUS; SUBCUTANEOUS at 05:06

## 2022-06-19 RX ADMIN — CHOLESTYRAMINE 8 G: 4 POWDER, FOR SUSPENSION ORAL at 10:06

## 2022-06-19 RX ADMIN — INSULIN ASPART 5 UNITS: 100 INJECTION, SOLUTION INTRAVENOUS; SUBCUTANEOUS at 12:06

## 2022-06-19 RX ADMIN — INSULIN DETEMIR 10 UNITS: 100 INJECTION, SOLUTION SUBCUTANEOUS at 10:06

## 2022-06-19 RX ADMIN — LACOSAMIDE 150 MG: 50 TABLET, FILM COATED ORAL at 10:06

## 2022-06-19 RX ADMIN — HEPARIN SODIUM 5000 UNITS: 5000 INJECTION INTRAVENOUS; SUBCUTANEOUS at 10:06

## 2022-06-19 RX ADMIN — ATORVASTATIN CALCIUM 80 MG: 20 TABLET, FILM COATED ORAL at 10:06

## 2022-06-19 RX ADMIN — INSULIN ASPART 5 UNITS: 100 INJECTION, SOLUTION INTRAVENOUS; SUBCUTANEOUS at 04:06

## 2022-06-19 RX ADMIN — PHENYTOIN 150 MG: 125 SUSPENSION ORAL at 10:06

## 2022-06-19 NOTE — NURSING
Pt has been not alert or oriented today, provider has been made aware. Sister is at bedside and stated she was awake and responsive yesterday but prior to yesterday this was her baseline behavior.

## 2022-06-19 NOTE — SUBJECTIVE & OBJECTIVE
"Medications:  Continuous Infusions:  Scheduled Meds:   atorvastatin  80 mg Per G Tube Daily    cholestyramine  2 packet Per G Tube BID    heparin (porcine)  5,000 Units Subcutaneous Q12H    insulin aspart U-100  5 Units Subcutaneous TIDWM    insulin detemir U-100  10 Units Subcutaneous Daily    lacosamide  150 mg Per G Tube Q12H    levetiracetam  500 mg Per G Tube BID    phenytoin  150 mg Per G Tube TID     PRN Meds:dextrose 10%, dextrose 10%, glucagon (human recombinant), glucose, glucose, insulin aspart U-100, naloxone, sodium chloride 0.9%     No current facility-administered medications on file prior to encounter.     Current Outpatient Medications on File Prior to Encounter   Medication Sig    acetaminophen (TYLENOL) 325 MG tablet Take 2 tablets (650 mg total) by mouth every 6 (six) hours as needed for Pain (headache).    anastrozole (ARIMIDEX) 1 mg Tab Take 1 mg by mouth once daily.    aspirin (ECOTRIN) 81 MG EC tablet Take 81 mg by mouth once daily.    blood sugar diagnostic Strp Check blood glucose 3-4 times daily    blood-glucose meter kit Check blood glucose 3-4 times daily    calcium carbonate (OS-JABIER) 600 mg calcium (1,500 mg) Tab Take 600 mg by mouth once.    cholestyramine (QUESTRAN) 4 gram packet Take 2 packets (8 g total) by mouth 2 (two) times daily.    citalopram (CELEXA) 10 MG tablet Take 1 tablet (10 mg total) by mouth once daily.    folic acid (FOLVITE) 1 MG tablet Take 1 tablet (1 mg total) by mouth once daily.    insulin detemir U-100 (LEVEMIR FLEXTOUCH) 100 unit/mL (3 mL) SubQ InPn pen Inject 10 Units into the skin 2 (two) times daily.    insulin lispro 100 unit/mL pen Inject 7 Units into the skin 3 (three) times daily with meals.    lancets 28 gauge Misc Check blood glucose 3-4 times daily    midodrine (PROAMATINE) 2.5 MG Tab Take 3 tablets (7.5 mg total) by mouth 3 (three) times daily with meals.    pen needle, diabetic 31 gauge x 3/16" Ndle Use as directed to inject insulin 4 times daily "    vitamin D (VITAMIN D3) 1000 units Tab Take 1,000 Units by mouth once daily.     Review of patient's allergies indicates:   Allergen Reactions    Iodinated contrast media Hives     Past Medical History:   Diagnosis Date    Arthritis     Cancer of breast     s/p mastectomy (on anastrozole)     Cataract     Diabetes mellitus     c/b Diabetic retinopathy    Hypertension     resolved    Hypoxia 4/15/2021    Memory loss     due to strokes    Orthostatic hypotension 12/27/2020    frequent falls, on midodrine.  4/2021 seen by both cards and palliative care    TIA (transient ischemic attack) [id number]    CTH with remote infarcts    Vitamin D deficiency 10/14/2021     Past Surgical History:   Procedure Laterality Date    CAPSULOTOMY  6/26/13    yag left eye    CATARACT EXTRACTION  6/3/2013    right eye    CHOLECYSTECTOMY      HYSTERECTOMY      MASTECTOMY Right 9/28/2020    Procedure: MASTECTOMY-Right;  Surgeon: Krysta Clark MD;  Location: [facility] OR;  Service: General;  Laterality: Right;    SENTINEL LYMPH NODE BIOPSY Right 9/28/2020    Procedure: BIOPSY, LYMPH NODE, SENTINEL-Right;  Surgeon: Krysta Clark MD;  Location: [facility] OR;  Service: General;  Laterality: Right;    TOTAL ABDOMINAL HYSTERECTOMY W/ BILATERAL SALPINGOOPHORECTOMY       Family History       Problem Relation (Age of Onset)    Breast cancer Sister (65)    Cancer Mother, Sister    Cataracts Mother    Colon cancer Mother (82)    Diabetes Mother, Father, Sister, Maternal Uncle, Paternal Uncle, Maternal Grandmother, Maternal Grandfather    Glaucoma Mother    Hypertension Sister, Maternal Grandmother          Tobacco Use    Smoking status: Current Every Day Smoker     Packs/day: 1.50     Types: Cigarettes    Smokeless tobacco: Never Used   Substance and Sexual Activity    Alcohol use: No    Drug use: No    Sexual activity: Not Currently     Review of Systems  Objective:     Vital Signs (Most Recent):  Temp: 98 °F (36.7 °C) (06/19/22 0758)  Pulse: 74 (06/19/22  0758)  Resp: 20 (06/19/22 0758)  BP: 131/60 (06/19/22 0758)  SpO2: (!) 94 % (06/19/22 0758)   Vital Signs (24h Range):  Temp:  [95.9 °F (35.5 °C)-98 °F (36.7 °C)] 98 °F (36.7 °C)  Pulse:  [71-82] 74  Resp:  [18-20] 20  SpO2:  [94 %-100 %] 94 %  BP: (115-149)/(56-66) 131/60     Weight: 50.2 kg (110 lb 10.7 oz)  Body mass index is 22.35 kg/m².    Physical Exam  Resting comfortably on bed  Unable to phonate, no eye contact  7-0 Portex Bivona in good position, neck is soft and supple    Tracheostomy Tube Exchange Procedure Note  The patient was brought supine. The cuff was confirmed down. The tracheostomy tube was removed. There was a clear view to the trachea and evidence of good healing tissue. A 6-0 cuffless Shiley tracheostomy tube was inserted into the stoma. Flexible tracheoscopy reveals good position with view to vladimir. The patient tolerated the procedure with no obvious complications.    Significant Labs:  BMP:   Recent Labs   Lab 06/19/22  0750   *   *   CO2 22*   BUN 56*   CREATININE 0.8   CALCIUM 8.8   MG 2.0     CBC:   Recent Labs   Lab 06/19/22  0750   WBC 11.53   RBC 3.16*   HGB 8.7*   HCT 28.9*   *   MCV 92   MCH 27.5   MCHC 30.1*     Significant Diagnostics:  X-Ray: I have reviewed all pertinent results/findings within the past 24 hours and my personal findings are:  tracheostomy tube in good position

## 2022-06-19 NOTE — ASSESSMENT & PLAN NOTE
S/p recent debridement at OSH 06/08. OSH Wcx with enterococcus faecalis, bacteriodes fragilus.    - Wound care consulted  - Consideration for wound vac given depth of injury  - Turn q2h

## 2022-06-19 NOTE — HOSPITAL COURSE
Patient admitted to hospital medicine with pre-existing anoxic brain injury, hypernatremia, sacral decubitus wound s/p debridement 06/08 in [place], [place]. Patient initially admitted to hospice following discharge from [facility] ICU, but discharged from hospice as patient family believed she was not getting appropriate care. Na 157, corrected with IVF (NS and hypotonic solutions) and enteral FWF down to 148. Trach collar exchanged by ENT due to lack of supplies at facility. Wound care consulted for sacral decubitus, recommended General Surgery consult for possible debridement as wound appears purulent. Persistent leukocytosis, started Zosyn per previous I&D wound cultures growing bacteriodes/enterococcus and new wound findings. cEEG started and Epilepsy consulted for assistance with antiepileptic medications; conflicting reports if patient was on only Keppra/Lacosamide vs Keppra/Lacosamide/Phenytoin. General surgery consulted for debridement of sacral decubitus ulcer, s/p I&D 06/24. Palliative Care consulted for assistance in GOC; decision to pursue medical/surgical care decided. ENT consulted for possible trach decannulation. ID consulted, planning for 6-weeks course of zosyn.Sodium improved; will continue free water flushes.Patient remains with trach on humidifier. Mentation waxes and wanes. Wound care team saw patient for her blood blister on her scalp. Foam dressing applied.     CM/JAZMIN to assist with dispo. Due to trach placement, intermediate placement for SNF limited. Long discussion with patient's sister about long term care options for patient. Sister states that she is unable to provide patient the care she needs at home on her own. JAZMIN spoke to patient's sister and reports that she is agreeable to placing patient at [facility]. [facility] will put patient on the wait list. JAZMIN sent e-mail for [employer] referral to assist with medicare. Discuss with patient's sister, Sabi, about hospice. [facility] met with patient's sister. They are able to provide trach and PEG tube care. Patient's sister is agreeable with placing patient with [facility], however, they cannot give round the clock care. We are looking into finding sitters for patient. [facility] stated that they are unable to accept patient. [facility] is [facility] patient. Sabi was given both options by SW and she would like for patient to be discharged home with [facility]. She is actively looking for sitters. Sabi confirmed that she has all neccessary equipments for patient at home. ABX regimen switched to PO augmentin. [facility] and Sabi both agreed on discharge 7/16.

## 2022-06-19 NOTE — ASSESSMENT & PLAN NOTE
Na 157. Suspect from dehydration while at her recovery facility.   - FWD 2.7.   S/p 1L of NS in the ED.     - Appropriate recovery to 148 with hypotonic fluids and enteral FWF; hypotonic fluids stopped, trend Na as below  - 400cc QID free water boluses  - Do not overcorrect by >6-8mEq within 24 hours.   - Na q8h  - If Na continues to trend upwards, will increase FWF volumes and consider additional hypotonic fluids at low rate

## 2022-06-19 NOTE — ASSESSMENT & PLAN NOTE
Darlene Avila is a 71 year old F with history of diabetes type 2, seizure, ischemic CVA, IBS, history of breast cancer s/p right mastectomy and failure to thrive who was brought in by her HPOA (sister) for skilled nursing home placement due to concern for neglect at recent hospice facility. Patient follows very simple commands and doubt she can engage in skilled therapy. [facility] nursing facility likely more of an option.     - Appreciate CM/SW assistance with placement.   - Appreciate pharmacy's assistance with med rec.

## 2022-06-19 NOTE — PROGRESS NOTES
[facility] - Intensive Care ([place] [address])  [facility] Medicine  Progress Note    Patient Name: Darlene Avila  MRN: [id number]  Patient Class: OP- Observation   Admission Date: 6/17/2022  Length of Stay: 0 days  Attending Physician: Howie Grey MD  Primary Care Provider: Kirill Cabrera Iii, MD        Subjective:     Principal Problem:Hypernatremia        HPI:  Darlene Avila is a 71 year old F with history of diabetes type 2, seizure c/b anoxic brain injury s/p trach, PEG, bed bound status, Stage IV sacral ulcer, ischemic CVA, IBS, history of breast cancer s/p right mastectomy and failure to thrive who was brought in by her HPOA (sister) for nursing home placement. Patient is unable to provide history.     Patient's sister was contacted who stated that the patient was admitted to [facility] in [facility] for recurrent seizures/status epilepticus which resulted in an anoxic brain injury and respiratory failure.  Patient was intubated followed by tracheostomy and PEG placement .  Patient was discharged to nursing home and subsequently discharged to hospice care at the request of patient's sister. However a few days ago, she visited the patient at [facility] recovery facility and noticed they weren't feeding or taking care of the patient due to her 'hospice status'. Per sister today, she would like to place the patient in a skilled nursing facility and would like to rescind her hospice care at this time.  She is no longer interested in hospice placement. She states the patient at baseline is only able to follow some simple commands. She is unsure how much O2 level is her baseline via trach collar.   Of note she has a Stage IV sacral wound that is s/p debridement on 06/09 by Gen surgery in [facility].     Upon arrival to the ED, the patient was hemodynamically stable. Labs revealed Na 157, WBC 14. Hgb 9.4 (baseline 7-9). The patient was given 1L of IV NS and was admitted to hospital medicine for further management.        Overview/Hospital Course:  Patient admitted to hospital medicine with pre-existing anoxic brain injury, hypernatremia, sacral decubitus wound s/p debridement 06/08 in [place], [place]. Patient initially admitted to hospice following discharge from [facility] ICU, but discharged from hospice as patient family believed she was not getting appropriate care. Na 157, corrected with IVF (NS and hypotonic solutions) and enteral FWF down to 148. Trach collar exchanged by ENT due to lack of supplies at facility.    CM/SW to assist with dispo.       Interval History: AF HDS NAEON. Patient at baseline mentation (minimally responsive on minimal vent settings)    Review of Systems   Unable to perform ROS: Mental status change   Objective:     Vital Signs (Most Recent):  Temp: 97.5 °F (36.4 °C) (06/18/22 1137)  Pulse: 82 (06/18/22 1137)  Resp: 20 (06/18/22 1234)  BP: 133/62 (06/18/22 1137)  SpO2: 96 % (06/18/22 1137) Vital Signs (24h Range):  Temp:  [97.5 °F (36.4 °C)-98.2 °F (36.8 °C)] 97.5 °F (36.4 °C)  Pulse:  [82-92] 82  Resp:  [11-22] 20  SpO2:  [96 %-100 %] 96 %  BP: (124-136)/(50-62) 133/62     Weight: 50.2 kg (110 lb 10.7 oz)  Body mass index is 22.35 kg/m².    Intake/Output Summary (Last 24 hours) at 6/18/2022 1346  Last data filed at 6/18/2022 1100  Gross per 24 hour   Intake 150 ml   Output 675 ml   Net -525 ml      Physical Exam  Vitals and nursing note reviewed.   Constitutional:       General: She is not in acute distress.     Appearance: She is ill-appearing. She is not toxic-appearing or diaphoretic.   HENT:      Head: Normocephalic.      Mouth/Throat:      Mouth: Mucous membranes are dry.      Comments: Dried oral secretions adhered to tongue  Cardiovascular:      Rate and Rhythm: Normal rate and regular rhythm.      Pulses: Normal pulses.      Heart sounds: Normal heart sounds. No murmur heard.  Pulmonary:      Effort: Pulmonary effort is normal. No respiratory distress.      Breath sounds: Normal breath sounds.  No wheezing or rales.   Abdominal:      General: Abdomen is flat. Bowel sounds are normal. There is no distension.      Palpations: Abdomen is soft.      Tenderness: There is no abdominal tenderness.   Musculoskeletal:         General: No swelling or tenderness. Normal range of motion.      Cervical back: Normal range of motion.   Skin:     General: Skin is warm and dry.      Coloration: Skin is not jaundiced or pale.      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    Neurological:      Mental Status: She is alert. She is disoriented.      Comments: Minimally alert, able to follow some simple commands.    Psychiatric:      Comments: Unable to assess       Significant Labs: All pertinent labs within the past 24 hours have been reviewed.    Significant Imaging: I have reviewed all pertinent imaging results/findings within the past 24 hours.      Assessment/Plan:      * Hypernatremia  Na 157. Suspect from dehydration while at her recovery facility.   - FWD 2.7.   S/p 1L of NS in the ED.     - Appropriate recovery to 148 with hypotonic fluids and enteral FWF; hypotonic fluids stopped, trend Na as below  - 400cc QID free water boluses  - Do not overcorrect by >6-8mEq within 24 hours.   - Na q8h  - If Na continues to trend upwards, will increase FWF volumes and consider additional hypotonic fluids at low rate    Discharge planning issues  Darlene Avila is a 71 year old F with history of diabetes type 2, seizure, ischemic CVA, IBS, history of breast cancer s/p right mastectomy and failure to thrive who was brought in by her HPOA (sister) for skilled nursing home placement due to concern for neglect at recent hospice facility. Patient follows very simple commands and doubt she can engage in skilled therapy. intermediate nursing facility likely more of an option.     - Appreciate CM/SW assistance with placement.   - Appreciate pharmacy's assistance with med rec.     Sacral decubitus ulcer, stage IV  S/p  recent debridement at OSH 06/08. OSH Wcx with enterococcus faecalis, bacteriodes fragilus.    - Wound care consulted  - Consideration for wound vac given depth of injury  - Turn q2h    Leukocytosis  Patient admitted with elevated WBC to 14s. Previous episodes of aspiration PNA at OSH, indwelling cabrera given acuity of condition, sacral decubitus ulcer stave IV s/p debridement with enterococcus faecalis / bacteriodes fragilis in cx. Patient afebrile with stable hemodynamics on admission at this time. CXR without acute abnormality. UA with yeast growing, chronic per previous OSH review.    Leukocytosis may be explained by infection vs hemoconcentration given extensive dehydration and hypernatremia from lack of enteral fluids or IVF at outside hospice facility prior to transfer to [facility].     - Defer antibiotics at this time given stable hemodynamics, afebrile, and resolution of leukocytosis with fluids to treat hypernatremia  - Continue monitoring CBC  - Wound care per Sacral Decubitus Ulcer A/P  - Aspiration precautions  - Trend fever curve  - Trend BCx    Tracheostomy in place  On 5L O2 via trach collar.     - ENT consulted for trach exchange given lack of compatible equipment; appreciate assistance  - Respiratory therapy to assist with trach care.     Seizures  Patient previously started on Lacosamide and Keppra at OSH during initial episode of status epilepticus. Phenytoin added as patient continued to have breakthrough seizures.    - Continue phenytoin, lacosamide and Keppra via G-tube      G tube feedings  Tube feed dependent via G tube  - Nutrition to assist with TF recs      ACP (advance care planning)  Patient is DNR. LaPOST on file.       History of CVA (cerebrovascular accident)  Unclear of patient's current medications. Per med review on care-everywhere, the patient is on statin but not on antiplatelet therapy.     - Appreciate pharmacy's assistance with medication reconciliation with facility- start  antiplatelet therapy if no contraindications.   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 A1c repeated 6.9.    - LDSSI   - POCT glucose q6hrs  - Maintain -180  - Titrate basal/bolus regimen to goal as above.      VTE Risk Mitigation (From admission, onward)         Ordered     heparin (porcine) injection 5,000 Units  Every 12 hours         06/18/22 0355     IP VTE HIGH RISK PATIENT  Once         06/18/22 0355     Place sequential compression device  Until discontinued         06/18/22 0355                Discharge Planning   JUDIT:      Code Status: DNR   Is the patient medically ready for discharge?:     Reason for patient still in hospital (select all that apply): Treatment and Pending disposition                     Dawood Self MD  Department of Hospital Medicine   [facility] - Intensive Care ([place]

## 2022-06-19 NOTE — NURSING
. Dextrose 5% infusion discontinued per order. Temp 95.9 Axillary. Med team 2 notified. Advised to put on warming blanket. Med team 2 to place orders. Will continue to monitor.

## 2022-06-19 NOTE — PLAN OF CARE
Recommendations    1. Initiate TFs of Glucerna 1.5, goal rate of 35ml/hr to provide 1260 kcal, 69g protein, and 640ml fluid. Additional FW per MD.     2. RD following.     Goals: Pt to receive nutrition by RD follow up  Nutrition Goal Status: new  Communication of RD Recs:  (POC)

## 2022-06-19 NOTE — CONSULTS
"[facility] - Intensive Care ([facility]  Adult Nutrition  Consult Note    SUMMARY     Recommendations    1. Initiate TFs of Glucerna 1.5, goal rate of 35ml/hr to provide 1260 kcal, 69g protein, and 640ml fluid. Additional FW per MD.     2. RD following.     Goals: Pt to receive nutrition by RD follow up  Nutrition Goal Status: new  Communication of RD Recs:  (POC)    Assessment and Plan    Nutrition Problem  Inadequate energy intake     Related to (etiology):   Inability to consume sufficient energy     Signs and Symptoms (as evidenced by):   NPO, PEG dependent      Interventions (treatment strategy):  Collaboration of nutrition care with other providers  Enteral nutrition     Nutrition Diagnosis Status:   New    Reason for Assessment    Reason For Assessment: consult  Diagnosis: other (see comments) (discharge planning issues)  Relevant Medical History: T2DM, seizures c/b anoxic brain injury, s/p trach/PEG, bed bound, stage IV sacral ulcer, IBS  Interdisciplinary Rounds: did not attend  General Information Comments: Pt with trach/PEG. Pt came from nursing home, unable to communicate for herself. Unsure of TF formula PTA. Pt diagnosed with malnutrition 8 months ago, since then with 25# wt gain. UBW per 8 months ago was #s. Visually Pt appears frail, but is improving per wt gain and seems to be at baseline. RD to continue to monitor/fu.  Nutrition Discharge Planning: Pending clinical course    Nutrition Risk Screen    Nutrition Risk Screen: no indicators present    Nutrition/Diet History    Food Allergies: NKFA  Factors Affecting Nutritional Intake: NPO    Anthropometrics    Temp: 98 °F (36.7 °C)  Height Method: Stated  Height: 4' 11" (149.9 cm)  Height (inches): 59 in  Weight Method: Bed Scale  Weight: 50.2 kg (110 lb 10.7 oz)  Weight (lb): 110.67 lb  Ideal Body Weight (IBW), Female: 95 lb  % Ideal Body Weight, Female (lb): 116.49 %  BMI (Calculated): 22.3       Lab/Procedures/Meds    Pertinent Labs " Reviewed: reviewed  Pertinent Labs Comments: Na 151, BUN 56, Glu 269  Pertinent Medications Reviewed: reviewed  Pertinent Medications Comments: insulin    Physical Findings/Assessment         Estimated/Assessed Needs    Weight Used For Calorie Calculations: 50.2 kg (110 lb 10.7 oz)  Energy Calorie Requirements (kcal): 1255kcal  Energy Need Method: Kcal/kg (25kcal/kg)  Protein Requirements: 50-60g (1-1.2g/kg)  Weight Used For Protein Calculations: 50.2 kg (110 lb 10.7 oz)  Fluid Requirements (mL): 1ml/kcal or per MD     RDA Method (mL): 1255  CHO Requirement: 157g      Nutrition Prescription Ordered    Current Diet Order: NPO    Evaluation of Received Nutrient/Fluid Intake    I/O: -150  Comments: LBM 6/16  % Intake of Estimated Energy Needs: 0 - 25 %  % Meal Intake: NPO    Nutrition Risk    Level of Risk/Frequency of Follow-up: low       Monitor and Evaluation    Food and Nutrient Intake: enteral nutrition intake  Food and Nutrient Adminstration: enteral and parenteral nutrition administration  Knowledge/Beliefs/Attitudes: food and nutrition knowledge/skill, beliefs and attitudes  Physical Activity and Function: nutrition-related ADLs and IADLs  Anthropometric Measurements: weight, weight change, body mass index  Biochemical Data, Medical Tests and Procedures: electrolyte and renal panel, lipid profile, gastrointestinal profile, glucose/endocrine profile, inflammatory profile  Nutrition-Focused Physical Findings: overall appearance, extremities, muscles and bones       Nutrition Follow-Up    RD Follow-up?: Yes

## 2022-06-19 NOTE — PROCEDURES
PRELIMINARY EEG REPORT    The first 40 minutes of the long term EEG were reviewed. The background shows diffuse low to moderate amplitude 1-6 Hz mixed delta to theta slowing. Indicative of encephalopathy, but nonspecific for etiology. No epileptiform discharges or seizures were seen.     Full EEG report to follow

## 2022-06-19 NOTE — CONSULTS
[facility] - Intensive Care ([place] [address])  Otorhinolaryngology-Head & Neck Surgery  Consult Note    Patient Name: Darlene Avila  MRN: [id number]  Code Status: DNR  Admission Date: 6/17/2022  Hospital Length of Stay: 0 days  Attending Physician: Howie Grey MD  Primary Care Provider: Kirill Cabrera Iii, MD    Patient information was obtained from past medical records and primary team.     Inpatient consult to ENT  Consult performed by: Emmett Guidry MD  Consult ordered by: Marge Brown MD        Subjective:     Chief Complaint/Reason for Consult: tracheostomy dependence    History of Present Illness: Ms. Avila is a 71 year-old female with a history of anoxic brain injury s/p tracheostomy and PEG tube placement on 5/10/22 at OSH, previously on hospice care in [place], [place], who presents to [facility] for SNF placement as family wishes to rescind hospice care. Patient has a 7-0 Portex Bivona cuffed tracheostomy tube, [facility]. ENT consulted for a compatible tracheostomy tube exchange.      Medications:  Continuous Infusions:  Scheduled Meds:   atorvastatin  80 mg Per G Tube Daily    cholestyramine  2 packet Per G Tube BID    heparin (porcine)  5,000 Units Subcutaneous Q12H    insulin aspart U-100  5 Units Subcutaneous TIDWM    insulin detemir U-100  10 Units Subcutaneous Daily    lacosamide  150 mg Per G Tube Q12H    levetiracetam  500 mg Per G Tube BID    phenytoin  150 mg Per G Tube TID     PRN Meds:dextrose 10%, dextrose 10%, glucagon (human recombinant), glucose, glucose, insulin aspart U-100, naloxone, sodium chloride 0.9%     No current facility-administered medications on file prior to encounter.     Current Outpatient Medications on File Prior to Encounter   Medication Sig    acetaminophen (TYLENOL) 325 MG tablet Take 2 tablets (650 mg total) by mouth every 6 (six) hours as needed for Pain (headache).    anastrozole (ARIMIDEX) 1 mg Tab Take 1 mg by mouth once daily.    aspirin  "(ECOTRIN) 81 MG EC tablet Take 81 mg by mouth once daily.    blood sugar diagnostic Strp Check blood glucose 3-4 times daily    blood-glucose meter kit Check blood glucose 3-4 times daily    calcium carbonate (OS-JABIER) 600 mg calcium (1,500 mg) Tab Take 600 mg by mouth once.    cholestyramine (QUESTRAN) 4 gram packet Take 2 packets (8 g total) by mouth 2 (two) times daily.    citalopram (CELEXA) 10 MG tablet Take 1 tablet (10 mg total) by mouth once daily.    folic acid (FOLVITE) 1 MG tablet Take 1 tablet (1 mg total) by mouth once daily.    insulin detemir U-100 (LEVEMIR FLEXTOUCH) 100 unit/mL (3 mL) SubQ InPn pen Inject 10 Units into the skin 2 (two) times daily.    insulin lispro 100 unit/mL pen Inject 7 Units into the skin 3 (three) times daily with meals.    lancets 28 gauge Misc Check blood glucose 3-4 times daily    midodrine (PROAMATINE) 2.5 MG Tab Take 3 tablets (7.5 mg total) by mouth 3 (three) times daily with meals.    pen needle, diabetic 31 gauge x 3/16" Ndle Use as directed to inject insulin 4 times daily    vitamin D (VITAMIN D3) 1000 units Tab Take 1,000 Units by mouth once daily.     Review of patient's allergies indicates:   Allergen Reactions    Iodinated contrast media Hives     Past Medical History:   Diagnosis Date    Arthritis     Cancer of breast     s/p mastectomy (on anastrozole)     Cataract     Diabetes mellitus     c/b Diabetic retinopathy    Hypertension     resolved    Hypoxia 4/15/2021    Memory loss     due to strokes    Orthostatic hypotension 12/27/2020    frequent falls, on midodrine.  4/2021 seen by both cards and palliative care    TIA (transient ischemic attack) 8429-2567    [facility] with remote infarcts    Vitamin D deficiency 10/14/2021     Past Surgical History:   Procedure Laterality Date    CAPSULOTOMY  6/26/13    yag left eye    CATARACT EXTRACTION  6/3/2013    right eye    CHOLECYSTECTOMY      HYSTERECTOMY      MASTECTOMY Right 9/28/2020    " Procedure: MASTECTOMY-Right;  Surgeon: Krysta Clark MD;  Location: [facility];  Service: General;  Laterality: Right;    SENTINEL LYMPH NODE BIOPSY Right 9/28/2020    Procedure: BIOPSY, LYMPH NODE, SENTINEL-Right;  Surgeon: Krysta Clark MD;  Location: [facility] OR;  Service: General;  Laterality: Right;    TOTAL ABDOMINAL HYSTERECTOMY W/ BILATERAL SALPINGOOPHORECTOMY       Family History       Problem Relation (Age of Onset)    Breast cancer Sister (65)    Cancer Mother, Sister    Cataracts Mother    Colon cancer Mother (82)    Diabetes Mother, Father, Sister, Maternal Uncle, Paternal Uncle, Maternal Grandmother, Maternal Grandfather    Glaucoma Mother    Hypertension Sister, Maternal Grandmother          Tobacco Use    Smoking status: Current Every Day Smoker     Packs/day: 1.50     Types: Cigarettes    Smokeless tobacco: Never Used   Substance and Sexual Activity    Alcohol use: No    Drug use: No    Sexual activity: Not Currently     Review of Systems  Objective:     Vital Signs (Most Recent):  Temp: 98 °F (36.7 °C) (06/19/22 0758)  Pulse: 74 (06/19/22 0758)  Resp: 20 (06/19/22 0758)  BP: 131/60 (06/19/22 0758)  SpO2: (!) 94 % (06/19/22 0758)   Vital Signs (24h Range):  Temp:  [95.9 °F (35.5 °C)-98 °F (36.7 °C)] 98 °F (36.7 °C)  Pulse:  [71-82] 74  Resp:  [18-20] 20  SpO2:  [94 %-100 %] 94 %  BP: (115-149)/(56-66) 131/60     Weight: 50.2 kg (110 lb 10.7 oz)  Body mass index is 22.35 kg/m².    Physical Exam  Resting comfortably on bed  Unable to phonate, no eye contact  7-0 Portex Bivona in good position, neck is soft and supple    Tracheostomy Tube Exchange Procedure Note  The patient was brought supine. The cuff was confirmed down. The tracheostomy tube was removed. There was a clear view to the trachea and evidence of good healing tissue. A 6-0 cuffless Shiley tracheostomy tube was inserted into the stoma. Flexible tracheoscopy reveals good position with view to vladimir. The patient tolerated the procedure with  no obvious complications.    Significant Labs:  BMP:   Recent Labs   Lab 06/19/22  0750   *   *   CO2 22*   BUN 56*   CREATININE 0.8   CALCIUM 8.8   MG 2.0     CBC:   Recent Labs   Lab 06/19/22  0750   WBC 11.53   RBC 3.16*   HGB 8.7*   HCT 28.9*   *   MCV 92   MCH 27.5   MCHC 30.1*     Significant Diagnostics:  X-Ray: I have reviewed all pertinent results/findings within the past 24 hours and my personal findings are:  tracheostomy tube in good position      Assessment/Plan:     Tracheostomy in place  72 y/o F with tracheostomy dependence due to anoxic brain injury. Trach placed on 5/10/22. Successfully exchanged to 6-0 Shiley cuffless. Placement visually confirmed with flexible tracheoscopy.     -- Routine trach care per RT  -- Please Secure Chat me for any questions, page ENT on-call if unresponsive or urgent        VTE Risk Mitigation (From admission, onward)         Ordered     heparin (porcine) injection 5,000 Units  Every 12 hours         06/18/22 0355     IP VTE HIGH RISK PATIENT  Once         06/18/22 0355     Place sequential compression device  Until discontinued         06/18/22 0355                Thank you for your consult. I will sign off. Please contact us if you have any additional questions.    Emmett Guidry MD  Otorhinolaryngology-Head & Neck Surgery  [facility] - Intensive Care ([facility])

## 2022-06-19 NOTE — ASSESSMENT & PLAN NOTE
72 y/o F with tracheostomy dependence due to anoxic brain injury. Trach placed on 5/10/22. Successfully exchanged to 6-0 Shiley cuffless. Placement visually confirmed with flexible tracheoscopy.     -- Routine trach care per RT  -- Please Secure Chat me for any questions, page ENT on-call if unresponsive or urgent

## 2022-06-19 NOTE — ASSESSMENT & PLAN NOTE
On 5L O2 via trach collar.     - ENT consulted for trach exchange given lack of compatible equipment; appreciate assistance  - Respiratory therapy to assist with trach care.

## 2022-06-19 NOTE — ASSESSMENT & PLAN NOTE
Patient admitted with elevated WBC to 14s. Previous episodes of aspiration PNA at OSH, indwelling cabrera given acuity of condition, sacral decubitus ulcer stave IV s/p debridement with enterococcus faecalis / bacteriodes fragilis in cx. Patient afebrile with stable hemodynamics on admission at this time. CXR without acute abnormality. UA with yeast growing, chronic per previous OSH review.    Leukocytosis may be explained by infection vs hemoconcentration given extensive dehydration and hypernatremia from lack of enteral fluids or IVF at outside hospice facility prior to [place] [facility].     - Defer antibiotics at this time given stable hemodynamics, afebrile, and resolution of leukocytosis with fluids to treat hypernatremia  - Continue monitoring CBC  - Wound care per Sacral Decubitus Ulcer A/P  - Aspiration precautions  - Trend fever curve  - Trend BCx

## 2022-06-19 NOTE — ASSESSMENT & PLAN NOTE
Patient previously started on Lacosamide and Keppra at OSH during initial episode of status epilepticus. Phenytoin added as patient continued to have breakthrough seizures.    - Continue phenytoin, lacosamide and Keppra via G-tube

## 2022-06-19 NOTE — ASSESSMENT & PLAN NOTE
Unclear of patient's current medications. Per med review on care-everywhere, the patient is on statin but not on antiplatelet therapy.     - Appreciate pharmacy's assistance with medication reconciliation with facility- start antiplatelet therapy if no contraindications.   - Continue statin via G-tube

## 2022-06-19 NOTE — HPI
Ms. Avila is a 71 year-old female with a history of anoxic brain injury s/p tracheostomy and PEG tube placement on 5/10/22 at OSH, previously on hospice care in [place], [place], who presents [place] [facility] [place] as family wishes to rescind hospice care. Patient has a 7-0 Portex Bivona cuffed tracheostomy tube, which [facility] does not carry. ENT consulted for a compatible tracheostomy tube exchange.

## 2022-06-20 LAB
ALBUMIN SERPL BCP-MCNC: 1.4 G/DL (ref 3.5–5.2)
ALP SERPL-CCNC: 160 U/L (ref 55–135)
ALT SERPL W/O P-5'-P-CCNC: 24 U/L (ref 10–44)
ANION GAP SERPL CALC-SCNC: 8 MMOL/L (ref 8–16)
AST SERPL-CCNC: 20 U/L (ref 10–40)
BASOPHILS # BLD AUTO: 0.02 K/UL (ref 0–0.2)
BASOPHILS NFR BLD: 0.2 % (ref 0–1.9)
BILIRUB SERPL-MCNC: 0.1 MG/DL (ref 0.1–1)
BUN SERPL-MCNC: 42 MG/DL (ref 8–23)
CALCIUM SERPL-MCNC: 9 MG/DL (ref 8.7–10.5)
CHLORIDE SERPL-SCNC: 121 MMOL/L (ref 95–110)
CO2 SERPL-SCNC: 22 MMOL/L (ref 23–29)
CREAT SERPL-MCNC: 0.6 MG/DL (ref 0.5–1.4)
DIFFERENTIAL METHOD: ABNORMAL
EOSINOPHIL # BLD AUTO: 0 K/UL (ref 0–0.5)
EOSINOPHIL NFR BLD: 0.2 % (ref 0–8)
ERYTHROCYTE [DISTWIDTH] IN BLOOD BY AUTOMATED COUNT: 18.1 % (ref 11.5–14.5)
EST. GFR  (AFRICAN AMERICAN): >60 ML/MIN/1.73 M^2
EST. GFR  (NON AFRICAN AMERICAN): >60 ML/MIN/1.73 M^2
GLUCOSE SERPL-MCNC: 90 MG/DL (ref 70–110)
HCT VFR BLD AUTO: 28.9 % (ref 37–48.5)
HGB BLD-MCNC: 8.8 G/DL (ref 12–16)
IMM GRANULOCYTES # BLD AUTO: 0.03 K/UL (ref 0–0.04)
IMM GRANULOCYTES NFR BLD AUTO: 0.2 % (ref 0–0.5)
LYMPHOCYTES # BLD AUTO: 3.1 K/UL (ref 1–4.8)
LYMPHOCYTES NFR BLD: 23.1 % (ref 18–48)
MAGNESIUM SERPL-MCNC: 1.7 MG/DL (ref 1.6–2.6)
MCH RBC QN AUTO: 27.2 PG (ref 27–31)
MCHC RBC AUTO-ENTMCNC: 30.4 G/DL (ref 32–36)
MCV RBC AUTO: 89 FL (ref 82–98)
MONOCYTES # BLD AUTO: 0.6 K/UL (ref 0.3–1)
MONOCYTES NFR BLD: 4.7 % (ref 4–15)
NEUTROPHILS # BLD AUTO: 9.5 K/UL (ref 1.8–7.7)
NEUTROPHILS NFR BLD: 71.6 % (ref 38–73)
NRBC BLD-RTO: 0 /100 WBC
PHOSPHATE SERPL-MCNC: 2.7 MG/DL (ref 2.7–4.5)
PLATELET # BLD AUTO: 434 K/UL (ref 150–450)
PMV BLD AUTO: 9.2 FL (ref 9.2–12.9)
POCT GLUCOSE: 100 MG/DL (ref 70–110)
POCT GLUCOSE: 104 MG/DL (ref 70–110)
POCT GLUCOSE: 114 MG/DL (ref 70–110)
POCT GLUCOSE: 170 MG/DL (ref 70–110)
POCT GLUCOSE: 177 MG/DL (ref 70–110)
POTASSIUM SERPL-SCNC: 4 MMOL/L (ref 3.5–5.1)
PROT SERPL-MCNC: 5.4 G/DL (ref 6–8.4)
RBC # BLD AUTO: 3.24 M/UL (ref 4–5.4)
SODIUM SERPL-SCNC: 149 MMOL/L (ref 136–145)
SODIUM SERPL-SCNC: 150 MMOL/L (ref 136–145)
SODIUM SERPL-SCNC: 151 MMOL/L (ref 136–145)
SODIUM SERPL-SCNC: 152 MMOL/L (ref 136–145)
WBC # BLD AUTO: 13.21 K/UL (ref 3.9–12.7)

## 2022-06-20 PROCEDURE — 36415 COLL VENOUS BLD VENIPUNCTURE: CPT | Performed by: STUDENT IN AN ORGANIZED HEALTH CARE EDUCATION/TRAINING PROGRAM

## 2022-06-20 PROCEDURE — 83735 ASSAY OF MAGNESIUM: CPT

## 2022-06-20 PROCEDURE — 99233 PR SUBSEQUENT HOSPITAL CARE,LEVL III: ICD-10-PCS | Mod: ,,, | Performed by: STUDENT IN AN ORGANIZED HEALTH CARE EDUCATION/TRAINING PROGRAM

## 2022-06-20 PROCEDURE — 99900035 HC TECH TIME PER 15 MIN (STAT)

## 2022-06-20 PROCEDURE — 12000002 HC ACUTE/MED SURGE SEMI-PRIVATE ROOM

## 2022-06-20 PROCEDURE — 87040 BLOOD CULTURE FOR BACTERIA: CPT | Mod: 59 | Performed by: STUDENT IN AN ORGANIZED HEALTH CARE EDUCATION/TRAINING PROGRAM

## 2022-06-20 PROCEDURE — 25000003 PHARM REV CODE 250

## 2022-06-20 PROCEDURE — 25000003 PHARM REV CODE 250: Performed by: STUDENT IN AN ORGANIZED HEALTH CARE EDUCATION/TRAINING PROGRAM

## 2022-06-20 PROCEDURE — 85025 COMPLETE CBC W/AUTO DIFF WBC: CPT

## 2022-06-20 PROCEDURE — 99233 SBSQ HOSP IP/OBS HIGH 50: CPT | Mod: ,,, | Performed by: STUDENT IN AN ORGANIZED HEALTH CARE EDUCATION/TRAINING PROGRAM

## 2022-06-20 PROCEDURE — 95720 EEG PHY/QHP EA INCR W/VEEG: CPT | Mod: ,,, | Performed by: PSYCHIATRY & NEUROLOGY

## 2022-06-20 PROCEDURE — 99900026 HC AIRWAY MAINTENANCE (STAT)

## 2022-06-20 PROCEDURE — 94761 N-INVAS EAR/PLS OXIMETRY MLT: CPT

## 2022-06-20 PROCEDURE — 36415 COLL VENOUS BLD VENIPUNCTURE: CPT

## 2022-06-20 PROCEDURE — 27200966 HC CLOSED SUCTION SYSTEM

## 2022-06-20 PROCEDURE — 27000221 HC OXYGEN, UP TO 24 HOURS

## 2022-06-20 PROCEDURE — 63600175 PHARM REV CODE 636 W HCPCS: Performed by: STUDENT IN AN ORGANIZED HEALTH CARE EDUCATION/TRAINING PROGRAM

## 2022-06-20 PROCEDURE — 95714 VEEG EA 12-26 HR UNMNTR: CPT

## 2022-06-20 PROCEDURE — 84295 ASSAY OF SERUM SODIUM: CPT | Mod: 91

## 2022-06-20 PROCEDURE — 80053 COMPREHEN METABOLIC PANEL: CPT

## 2022-06-20 PROCEDURE — 63600175 PHARM REV CODE 636 W HCPCS

## 2022-06-20 PROCEDURE — C9399 UNCLASSIFIED DRUGS OR BIOLOG: HCPCS

## 2022-06-20 PROCEDURE — 95720 PR EEG, W/VIDEO, CONT RECORD, I&R, >12<26 HRS: ICD-10-PCS | Mod: ,,, | Performed by: PSYCHIATRY & NEUROLOGY

## 2022-06-20 PROCEDURE — 84100 ASSAY OF PHOSPHORUS: CPT

## 2022-06-20 RX ORDER — BISACODYL 10 MG
10 SUPPOSITORY, RECTAL RECTAL 2 TIMES DAILY PRN
COMMUNITY

## 2022-06-20 RX ORDER — LORAZEPAM 2 MG/ML
CONCENTRATE ORAL
COMMUNITY

## 2022-06-20 RX ORDER — HYOSCYAMINE SULFATE 0.12 MG/1
0.12 TABLET SUBLINGUAL
COMMUNITY

## 2022-06-20 RX ORDER — MORPHINE SULFATE 20 MG/ML
SOLUTION ORAL
COMMUNITY

## 2022-06-20 RX ADMIN — HEPARIN SODIUM 5000 UNITS: 5000 INJECTION INTRAVENOUS; SUBCUTANEOUS at 10:06

## 2022-06-20 RX ADMIN — INSULIN ASPART 5 UNITS: 100 INJECTION, SOLUTION INTRAVENOUS; SUBCUTANEOUS at 09:06

## 2022-06-20 RX ADMIN — ATORVASTATIN CALCIUM 80 MG: 20 TABLET, FILM COATED ORAL at 09:06

## 2022-06-20 RX ADMIN — LACOSAMIDE 150 MG: 50 TABLET, FILM COATED ORAL at 09:06

## 2022-06-20 RX ADMIN — INSULIN ASPART 5 UNITS: 100 INJECTION, SOLUTION INTRAVENOUS; SUBCUTANEOUS at 06:06

## 2022-06-20 RX ADMIN — PIPERACILLIN SODIUM AND TAZOBACTAM SODIUM 4.5 G: 4; .5 INJECTION, POWDER, LYOPHILIZED, FOR SOLUTION INTRAVENOUS at 06:06

## 2022-06-20 RX ADMIN — LEVETIRACETAM 500 MG: 500 SOLUTION ORAL at 09:06

## 2022-06-20 RX ADMIN — DEXTROSE: 5 SOLUTION INTRAVENOUS at 12:06

## 2022-06-20 RX ADMIN — CHOLESTYRAMINE 8 G: 4 POWDER, FOR SUSPENSION ORAL at 10:06

## 2022-06-20 RX ADMIN — INSULIN DETEMIR 10 UNITS: 100 INJECTION, SOLUTION SUBCUTANEOUS at 09:06

## 2022-06-20 RX ADMIN — PIPERACILLIN SODIUM AND TAZOBACTAM SODIUM 4.5 G: 4; .5 INJECTION, POWDER, LYOPHILIZED, FOR SOLUTION INTRAVENOUS at 10:06

## 2022-06-20 RX ADMIN — HEPARIN SODIUM 5000 UNITS: 5000 INJECTION INTRAVENOUS; SUBCUTANEOUS at 09:06

## 2022-06-20 RX ADMIN — CHOLESTYRAMINE 8 G: 4 POWDER, FOR SUSPENSION ORAL at 09:06

## 2022-06-20 RX ADMIN — LEVETIRACETAM 500 MG: 500 SOLUTION ORAL at 10:06

## 2022-06-20 RX ADMIN — DEXTROSE: 5 SOLUTION INTRAVENOUS at 06:06

## 2022-06-20 RX ADMIN — LACOSAMIDE 150 MG: 50 TABLET, FILM COATED ORAL at 10:06

## 2022-06-20 RX ADMIN — INSULIN ASPART 5 UNITS: 100 INJECTION, SOLUTION INTRAVENOUS; SUBCUTANEOUS at 12:06

## 2022-06-20 NOTE — ASSESSMENT & PLAN NOTE
Patient previously started on Lacosamide and Keppra at OSH during initial episode of status epilepticus. Phenytoin added as patient continued to have breakthrough seizures.    - Discontinue phenytoin  - Continue Lacosamide and Keppra via G-tube  - cEEG; appreciate EP's assistance in medication management

## 2022-06-20 NOTE — ASSESSMENT & PLAN NOTE
Patient admitted with elevated WBC to 14s. Previous episodes of aspiration PNA at OSH, indwelling cabrera given acuity of condition, sacral decubitus ulcer stave IV s/p debridement with enterococcus faecalis / bacteriodes fragilis in cx. Patient afebrile with stable hemodynamics on admission at this time. CXR without acute abnormality. UA with yeast growing, chronic per previous OSH review.    Leukocytosis may be explained by soft tissue infection vs hemoconcentration given extensive dehydration and hypernatremia from lack of enteral fluids or IVF at outside hospice facility prior [place] [facility]     - Zosyn given previous cultures and persistent leukocytosis  - Continue monitoring CBC  - Wound care per Sacral Decubitus Ulcer A/P  - Aspiration precautions  - Trend fever curve  - Trend BCx

## 2022-06-20 NOTE — ASSESSMENT & PLAN NOTE
Na 157. Suspect from dehydration while at her recovery facility.   - FWD 2.7.   S/p 1L of NS in the ED.     - Following cessation of IVF, Na increased; restarted D5 at 100/hr  - Increased to 500cc QID free water boluses  - Do not overcorrect by >6-8mEq within 24 hours; goal 152 > 144.  - Na q6h

## 2022-06-20 NOTE — ASSESSMENT & PLAN NOTE
Tube feed dependent via G tube  - Nutrition to assist with TF recs; see recommendations in note; appreciate assistance

## 2022-06-20 NOTE — PLAN OF CARE
[facility] - [facility] Care ([facility]  Initial Discharge Assessment       Primary Care Provider: Kirill Cabrera Iii, MD    Admission Diagnosis: Hypernatremia [E87.0]  Counseling regarding goals of care [Z71.89]  Chest pain [R07.9]    Admission Date: 6/17/2022  Expected Discharge Date:     Discharge Barriers Identified: None    Payor: BLUE CROSS BLUE SHIELD / Plan: [place] / Product Type: Commercial /     Extended Emergency Contact Information  Primary Emergency Contact: Sabi Avila  Address: [address] [place] [address] [id number] [place] [address]  Mobile Phone: [phone]  Relation: Sister  Preferred language: English    Discharge Plan A: Skilled Nursing Facility  Discharge Plan B: New Nursing Home placement - long term care facility      [address] [address]  Phone: [phone] Fax: [phone]    Discharge assessment information obtained from patient's sister, Sabi, by phone.   Patient had briefly admitted to [facility] inpatient hospice.   Patient's sister rescinded hospice and had patient brought to [facility].    She would now like either SNF versus long term nursing home placement.       Initial Assessment (most recent)     Adult Discharge Assessment - 06/20/22 1556        Discharge Assessment    Assessment Type Discharge Planning Assessment     Confirmed/corrected address, phone number and insurance Yes     Confirmed Demographics Correct on Facesheet     Source of Information family     Reason For Admission hypernatremia     Facility Arrived From: [facility]     Do you expect to return to your current living situation? No     Prior to hospitilization cognitive status: Unable to Assess     Current cognitive status: Unable to Assess     Walking or Climbing Stairs Difficulty ambulation difficulty, dependent;transferring difficulty, dependent;stair climbing difficulty, dependent      Dressing/Bathing Difficulty bathing difficulty, dependent;dressing difficulty, dependent     Readmission within 30 days? No     Do you currently have service(s) that help you manage your care at home? No     Do you take prescription medications? Yes     Do you have prescription coverage? Yes     [facility] [place] [facility]     Do you have any problems affording any of your prescribed medications? No     Are you on dialysis? No     Do you take coumadin? No     Discharge Plan A Skilled Nursing Facility     Discharge Plan B New Nursing Home placement - [facility] care facility     DME Needed Upon Discharge  --   tbd    Discharge Plan discussed with: Sibling     Name(s) and Number(s) Sabi Avila ([phone]     Discharge Barriers Identified None
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
## 2022-06-20 NOTE — SUBJECTIVE & OBJECTIVE
Interval History: AF HDS NAEON. Patient more responsive to sound, tracks to sound, opens eyes, attempts to mouth words, but no verbal communication.    Review of Systems   Unable to perform ROS: Mental status change   Objective:     Vital Signs (Most Recent):  Temp: 98.3 °F (36.8 °C) (06/20/22 1238)  Pulse: 84 (06/20/22 1407)  Resp: 16 (06/20/22 1407)  BP: 137/60 (06/20/22 1238)  SpO2: 99 % (06/20/22 1407) Vital Signs (24h Range):  Temp:  [97.6 °F (36.4 °C)-98.5 °F (36.9 °C)] 98.3 °F (36.8 °C)  Pulse:  [68-85] 84  Resp:  [14-18] 16  SpO2:  [98 %-100 %] 99 %  BP: (124-142)/(56-63) 137/60     Weight: 50.2 kg (110 lb 10.7 oz)  Body mass index is 22.35 kg/m².    Intake/Output Summary (Last 24 hours) at 6/20/2022 1443  Last data filed at 6/19/2022 1800  Gross per 24 hour   Intake --   Output 350 ml   Net -350 ml      Physical Exam  Vitals and nursing note reviewed.   Constitutional:       General: She is not in acute distress.     Appearance: She is not ill-appearing, toxic-appearing or diaphoretic.   HENT:      Head: Normocephalic.      Mouth/Throat:      Mouth: Mucous membranes are dry.      Comments: Dried oral secretions adhered to tongue  Cardiovascular:      Rate and Rhythm: Normal rate and regular rhythm.      Pulses: Normal pulses.      Heart sounds: Normal heart sounds. No murmur heard.  Pulmonary:      Effort: Pulmonary effort is normal. No respiratory distress.      Breath sounds: Normal breath sounds. No wheezing or rales.   Abdominal:      General: Abdomen is flat. Bowel sounds are normal. There is no distension.      Palpations: Abdomen is soft.      Tenderness: There is no abdominal tenderness.   Musculoskeletal:         General: No swelling or tenderness. Normal range of motion.      Cervical back: Normal range of motion.   Skin:     General: Skin is warm and dry.      Coloration: Skin is not jaundiced or pale.      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    Neurological:       Mental Status: She is alert. She is disoriented.      Comments: Minimally alert, opens eyes to sound/pain, tracks to sound, attempts to speak, but does not form words/sounds   Psychiatric:      Comments: Unable to assess       Significant Labs: All pertinent labs within the past 24 hours have been reviewed.    Significant Imaging: I have reviewed all pertinent imaging results/findings within the past 24 hours.

## 2022-06-20 NOTE — RESPIRATORY THERAPY
RAPID RESPONSE RESPIRATORY THERAPY PROACTIVE NOTE           Time of visit: 1402     Code Status: DNR   : 1950  Bed: 34373/31173 A:   MRN: [id number]  Time spent at the bedside: < 15 min    SITUATION    Evaluated patient for: LDA Check     BACKGROUND    Patient has a past medical history of Arthritis, Cancer of breast, Cataract, Diabetes mellitus, Hypertension, Hypoxia, Memory loss, Orthostatic hypotension, TIA (transient ischemic attack), and Vitamin D deficiency.  Clinically Significant Surgical Hx: tracheostomy    24 Hours Vitals Range:  Temp:  [97.6 °F (36.4 °C)-98.5 °F (36.9 °C)]   Pulse:  [68-85]   Resp:  [14-18]   BP: (124-142)/(56-63)   SpO2:  [98 %-100 %]     Labs:    Recent Labs     22  1107 22  1546 22  0750 22  1221 22  2130 22  2346 22  0437 22  1232   *  155*  156*   < > 151*  151*  150*   < > 155* 152* 151* 149*   K 4.4   < > 4.2  --  4.1  --  4.0  --    *   < > 119*  --  122*  --  121*  --    CO2 22*   < > 22*  --  24  --  22*  --    CREATININE 0.9   < > 0.8  --  0.8  --  0.6  --    *   < > 269*  --  84  --  90  --    PHOS 4.0  --  3.2  --   --   --  2.7  --    MG 2.1  --  2.0  --   --   --  1.7  --     < > = values in this interval not displayed.        No results for input(s): PH, PCO2, PO2, HCO3, POCSATURATED, BE in the last 72 hours.    ASSESSMENT/INTERVENTIONS    Upon arrival in room  pt resting comfortably in bed, on 5L 28% trach collar, All supplies in room. Extra cuffed Shiley trachs at bedside, sizes 6 & 8.    Last VS   Temp: 98.3 °F (36.8 °C) ( 1238)  Pulse: 84 ( 1407)  Resp: 16 ( 1407)  BP: 137/60 ( 1238)  SpO2: 99 % ( 1407)    Level of Consciousness: Level of Consciousness (AVPU): responds to voice  Respiratory Effort: Respiratory Effort: Normal, Unlabored Expansion/Accessory Muscle Usage: Expansion/Accessory Muscles/Retractions: no use of accessory muscles  All Lung Field Breath Sounds:  All Lung Fields Breath Sounds: diminished, rhonchi  O2 Device/Concentration: Flow (L/min): 5, Oxygen Concentration (%): 28, O2 Device (Oxygen Therapy): Trach Collar  Surgical airway: Yes, Type: Shiley Size: 6, cuffed  Ambu at bedside: Ambu bag with the patient?: Yes, Adult Ambu     Active Orders   Respiratory Care    Oxygen Continuous     Frequency: Continuous     Number of Occurrences: Until Specified     Order Questions:      Device type: Low flow      Device: Trach Collar      FiO2%: 28      Titrate O2 per Oxygen Titration Protocol: Yes      To maintain SpO2 goal of: >= 90%      Notify MD of: Inability to achieve desired SpO2; Sudden change in patient status and requires 20% increase in FiO2; Patient requires >60% FiO2    RESPIRATORY COMMUNICATION     Frequency: Daily     Number of Occurrences: Until Specified     Order Comments: Trach care         RECOMMENDATIONS    We recommend: RRT Recs: Continue POC per primary team.    ESCALATION        FOLLOW-UP    Please call back the Rapid Response RT, Abbi Harrington, RRT at x [phone] for any questions or concerns.

## 2022-06-20 NOTE — PLAN OF CARE
Problem: Adult Inpatient Plan of Care  Goal: Optimal Comfort and Wellbeing  Outcome: Ongoing, Progressing     Problem: Impaired Wound Healing  Goal: Optimal Wound Healing  Outcome: Ongoing, Progressing   Wound care performed. D5W running at 100mL/hr. Response to voice. Bed locked and in lowest position. Call light in reach. Isosource running at 30mL/hr.
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
## 2022-06-20 NOTE — PROGRESS NOTES
[facility] - Intensive Care ([place] [address])  Wound Care    Patient Name:  Darlene Avila   MRN:  [id number]  Date: 6/20/2022  Diagnosis: Hypernatremia    History:     Past Medical History:   Diagnosis Date    Arthritis     Cancer of breast     s/p mastectomy (on anastrozole)     Cataract     Diabetes mellitus     c/b Diabetic retinopathy    Hypertension     resolved    Hypoxia 4/15/2021    Memory loss     due to strokes    Orthostatic hypotension 12/27/2020    frequent falls, on midodrine.  4/2021 seen by both cards and palliative care    TIA (transient ischemic attack) [id number]    CTH with remote infarcts    Vitamin D deficiency 10/14/2021       Social History     Socioeconomic History    Marital status: Single   Tobacco Use    Smoking status: Current Every Day Smoker     Packs/day: 1.50     Types: Cigarettes    Smokeless tobacco: Never Used   Substance and Sexual Activity    Alcohol use: No    Drug use: No    Sexual activity: Not Currently     Social Determinants of Health     Financial Resource Strain: Low Risk     Difficulty of Paying Living Expenses: Not very hard   Food Insecurity: No Food Insecurity    Worried About Running Out of Food in the Last Year: Never true    Ran Out of Food in the Last Year: Never true   Transportation Needs: No Transportation Needs    Lack of Transportation (Medical): No    Lack of Transportation (Non-Medical): No   Physical Activity: Inactive    Days of Exercise per Week: 0 days    Minutes of Exercise per Session: 0 min   Stress: No Stress Concern Present    Feeling of Stress : Only a little   Social Connections: Unknown    Frequency of Communication with Friends and Family: More than three times a week    Frequency of Social Gatherings with Friends and Family: More than three times a week    Attends Cheondoism Services: Never    Active Member of Clubs or Organizations: No    Attends Club or Organization Meetings: Never   Housing Stability: Unknown     Unable to Pay for Housing in the Last Year: No    Unstable Housing in the Last Year: No       Precautions:     Allergies as of 06/17/2022 - Reviewed 10/23/2021   Allergen Reaction Noted    Iodinated contrast media Hives 03/17/2013       WO Assessment Details/Treatment     Received wound care consult from MD for sacral wound. Upon entering room, verified correct patient using 2 patient identifiers. Charge nurse assisted with wound care consult. Patient agreeable to consult. Assessment completed per flowsheet. Wound is [id number] with slough covering a large part of the wound.     Waffle mattress and heel boots in place.    Recommended surgical consult for debridement.       06/20/22 1125   WOCN Assessment   WOCN Total Time (mins) 45   Visit Date 06/20/22   Visit Time 1125   Consult Type New   WO Speciality Wound   Intervention assessed;changed;applied;chart review;coordination of care;team conference;orders   Teaching on-going        Wound 06/18/22 0000 Other (comment)  Sacral Spine   Date First Assessed/Time First Assessed: 06/18/22 0000   Pre-existing: Yes  Primary Wound Type: (c) Other (comment)  Side: (c)   Location: Sacral Spine   Wound Image    Dressing Appearance Dry;Intact;Clean   Drainage Amount Moderate   Drainage Characteristics/Odor Serosanguineous;Creamy;Tan   Appearance Pink;Red;Slough   Tissue loss description Full thickness   Periwound Area Other (see comments)  (purple)   Wound Edges Open   Wound Length (cm) 11 cm   Wound Width (cm) 12 cm   Wound Depth (cm) 2 cm   Wound Volume (cm^3) 264 cm^3   Wound Surface Area (cm^2) 132 cm^2   Care Cleansed with:;Wound cleanser   Dressing Applied  (dakins soaked gauze, covered with sacral mepilex)   Periwound Care Dry periwound area maintained       Recommendations made to primary team for Nursing to perform wound care as follows until surgical consult:    Sacrum- clean wound with wound cleanser, pack with dakins soaked kerlex, cover with sacral mepilex  daily.     Orders placed.     Kelsi Gan RN  Wound Care-[facility] 16  06/20/2022

## 2022-06-20 NOTE — ASSESSMENT & PLAN NOTE
S/p recent debridement at OSH 06/08. OSH Wcx with enterococcus faecalis, bacteriodes fragilus.    - Wound care consulted; recommend general surgery consult  - General surgery consulted for possible I&D  - Consideration for wound vac given depth of injury  - Turn q2h

## 2022-06-20 NOTE — PHARMACY MED REC
"Admission Medication History     The home medication history was taken by Leidy Lundberg.    You may go to "Admission" then "Reconcile Home Medications" tabs to review and/or act upon these items.      The home medication list has been updated by the Pharmacy department.    Please read ALL comments highlighted in yellow.    Please address this information as you see fit.     Feel free to contact us if you have any questions or require assistance.      The medications listed below were removed from the home medication list. Please reorder if appropriate:  Patient reports no longer taking the following medication(s):   ACETAMINOPHEN 325 MG    ANASTRAZOLE 1 MG   ASPIRIN 1 MG   CALCIUM CARBONATE 600 MG   CHOLESTYRAMINE 4 G   CITALOPRAM 10 MG   FOLIC ACID 1 MG   INSULIN DETEMIR 100 UNIT/ML    INSULIN INSPRO 100 UNIT/ML    MIDODRINE 2.5 MG   VITAMIN D 1000 UNIT    Medications listed below were obtained from: Patient/family  PTA Medications   Medication Sig    bisacodyL (DULCOLAX) 10 mg Supp Place 10 mg rectally 2 (two) times daily as needed (CONSTIPATION).    hyoscyamine (LEVSIN/SL) 0.125 mg Subl Place 0.125 mg under the tongue every 2 (two) hours as needed (EXCESS SECRETIONS).    lorazepam (ATIVAN) 2 mg/mL Conc TAKE 0.25-1ML BY MOUTH EVERY 2 HOURS AS NEEDED FOR RESTLESSNESS OR AGITATION    morphine 100 mg/5 mL (20 mg/mL) concentrated solution TAKE 0.25-1 ML BY MOUTH EVERY 2 HOURS AS NEEDED FOR PAIN OR SHORTNESS OF BREATH       Potential issues to be addressed PRIOR TO DISCHARGE  The listed medications were obtained from another facility ([facility]). The patient may not have been able to fill these prescriptions prior to this admission and may require new scripts upon discharge.     Leidy Lundberg  EXT [id number]                  .          "

## 2022-06-20 NOTE — CARE UPDATE
Goals of Care conversation held with patient's sister. Patient was previously discharged from [place], [place] to [facility] to [facility]. However, patient family withdrew patient from hospice as there was a miscommunication about the level of care that one would receive at hospice. Patient sister states that she desires medical treatment for the patient, and does not wish to pursue comfort measures or hospice measures at this time.    Ro Self MD  Internal Medicine PGY-1  [facility]

## 2022-06-21 PROBLEM — Z51.5 PALLIATIVE CARE ENCOUNTER: Status: ACTIVE | Noted: 2022-06-21

## 2022-06-21 LAB
ALBUMIN SERPL BCP-MCNC: 1.5 G/DL (ref 3.5–5.2)
ALP SERPL-CCNC: 153 U/L (ref 55–135)
ALT SERPL W/O P-5'-P-CCNC: 20 U/L (ref 10–44)
ANION GAP SERPL CALC-SCNC: 10 MMOL/L (ref 8–16)
AST SERPL-CCNC: 15 U/L (ref 10–40)
BASOPHILS # BLD AUTO: 0.03 K/UL (ref 0–0.2)
BASOPHILS NFR BLD: 0.2 % (ref 0–1.9)
BILIRUB SERPL-MCNC: 0.2 MG/DL (ref 0.1–1)
BUN SERPL-MCNC: 32 MG/DL (ref 8–23)
CALCIUM SERPL-MCNC: 9.1 MG/DL (ref 8.7–10.5)
CHLORIDE SERPL-SCNC: 118 MMOL/L (ref 95–110)
CO2 SERPL-SCNC: 21 MMOL/L (ref 23–29)
CREAT SERPL-MCNC: 0.7 MG/DL (ref 0.5–1.4)
DIFFERENTIAL METHOD: ABNORMAL
EOSINOPHIL # BLD AUTO: 0.1 K/UL (ref 0–0.5)
EOSINOPHIL NFR BLD: 0.4 % (ref 0–8)
ERYTHROCYTE [DISTWIDTH] IN BLOOD BY AUTOMATED COUNT: 18 % (ref 11.5–14.5)
EST. GFR  (AFRICAN AMERICAN): >60 ML/MIN/1.73 M^2
EST. GFR  (NON AFRICAN AMERICAN): >60 ML/MIN/1.73 M^2
GLUCOSE SERPL-MCNC: 125 MG/DL (ref 70–110)
HCT VFR BLD AUTO: 31.7 % (ref 37–48.5)
HGB BLD-MCNC: 9.7 G/DL (ref 12–16)
IMM GRANULOCYTES # BLD AUTO: 0.04 K/UL (ref 0–0.04)
IMM GRANULOCYTES NFR BLD AUTO: 0.3 % (ref 0–0.5)
LYMPHOCYTES # BLD AUTO: 2.6 K/UL (ref 1–4.8)
LYMPHOCYTES NFR BLD: 18.7 % (ref 18–48)
MAGNESIUM SERPL-MCNC: 1.8 MG/DL (ref 1.6–2.6)
MCH RBC QN AUTO: 27.2 PG (ref 27–31)
MCHC RBC AUTO-ENTMCNC: 30.6 G/DL (ref 32–36)
MCV RBC AUTO: 89 FL (ref 82–98)
MONOCYTES # BLD AUTO: 0.5 K/UL (ref 0.3–1)
MONOCYTES NFR BLD: 3.3 % (ref 4–15)
NEUTROPHILS # BLD AUTO: 10.8 K/UL (ref 1.8–7.7)
NEUTROPHILS NFR BLD: 77.1 % (ref 38–73)
NRBC BLD-RTO: 0 /100 WBC
PHOSPHATE SERPL-MCNC: 2.6 MG/DL (ref 2.7–4.5)
PLATELET # BLD AUTO: 498 K/UL (ref 150–450)
PMV BLD AUTO: 9.3 FL (ref 9.2–12.9)
POCT GLUCOSE: 143 MG/DL (ref 70–110)
POCT GLUCOSE: 248 MG/DL (ref 70–110)
POCT GLUCOSE: 255 MG/DL (ref 70–110)
POCT GLUCOSE: 289 MG/DL (ref 70–110)
POTASSIUM SERPL-SCNC: 4 MMOL/L (ref 3.5–5.1)
PROT SERPL-MCNC: 5.9 G/DL (ref 6–8.4)
RBC # BLD AUTO: 3.57 M/UL (ref 4–5.4)
SODIUM SERPL-SCNC: 146 MMOL/L (ref 136–145)
SODIUM SERPL-SCNC: 147 MMOL/L (ref 136–145)
SODIUM SERPL-SCNC: 149 MMOL/L (ref 136–145)
WBC # BLD AUTO: 13.94 K/UL (ref 3.9–12.7)

## 2022-06-21 PROCEDURE — 99497 PR ADVNCD CARE PLAN 30 MIN: ICD-10-PCS | Mod: 25,,, | Performed by: CLINICAL NURSE SPECIALIST

## 2022-06-21 PROCEDURE — 99900026 HC AIRWAY MAINTENANCE (STAT)

## 2022-06-21 PROCEDURE — 99900035 HC TECH TIME PER 15 MIN (STAT)

## 2022-06-21 PROCEDURE — 95714 VEEG EA 12-26 HR UNMNTR: CPT

## 2022-06-21 PROCEDURE — 25000003 PHARM REV CODE 250: Performed by: STUDENT IN AN ORGANIZED HEALTH CARE EDUCATION/TRAINING PROGRAM

## 2022-06-21 PROCEDURE — 36415 COLL VENOUS BLD VENIPUNCTURE: CPT

## 2022-06-21 PROCEDURE — 84100 ASSAY OF PHOSPHORUS: CPT

## 2022-06-21 PROCEDURE — 99497 ADVNCD CARE PLAN 30 MIN: CPT | Mod: 25,,, | Performed by: CLINICAL NURSE SPECIALIST

## 2022-06-21 PROCEDURE — 94761 N-INVAS EAR/PLS OXIMETRY MLT: CPT

## 2022-06-21 PROCEDURE — 85025 COMPLETE CBC W/AUTO DIFF WBC: CPT

## 2022-06-21 PROCEDURE — 95720 EEG PHY/QHP EA INCR W/VEEG: CPT | Mod: ,,, | Performed by: PSYCHIATRY & NEUROLOGY

## 2022-06-21 PROCEDURE — 99232 PR SUBSEQUENT HOSPITAL CARE,LEVL II: ICD-10-PCS | Mod: ,,, | Performed by: STUDENT IN AN ORGANIZED HEALTH CARE EDUCATION/TRAINING PROGRAM

## 2022-06-21 PROCEDURE — 80053 COMPREHEN METABOLIC PANEL: CPT

## 2022-06-21 PROCEDURE — 27000221 HC OXYGEN, UP TO 24 HOURS

## 2022-06-21 PROCEDURE — 84295 ASSAY OF SERUM SODIUM: CPT | Mod: 91

## 2022-06-21 PROCEDURE — 25000003 PHARM REV CODE 250

## 2022-06-21 PROCEDURE — 99223 1ST HOSP IP/OBS HIGH 75: CPT | Mod: ,,, | Performed by: CLINICAL NURSE SPECIALIST

## 2022-06-21 PROCEDURE — 27200966 HC CLOSED SUCTION SYSTEM

## 2022-06-21 PROCEDURE — 12000002 HC ACUTE/MED SURGE SEMI-PRIVATE ROOM

## 2022-06-21 PROCEDURE — 99223 PR INITIAL HOSPITAL CARE,LEVL III: ICD-10-PCS | Mod: ,,, | Performed by: PSYCHIATRY & NEUROLOGY

## 2022-06-21 PROCEDURE — 83735 ASSAY OF MAGNESIUM: CPT

## 2022-06-21 PROCEDURE — 99232 SBSQ HOSP IP/OBS MODERATE 35: CPT | Mod: ,,, | Performed by: STUDENT IN AN ORGANIZED HEALTH CARE EDUCATION/TRAINING PROGRAM

## 2022-06-21 PROCEDURE — 95720 PR EEG, W/VIDEO, CONT RECORD, I&R, >12<26 HRS: ICD-10-PCS | Mod: ,,, | Performed by: PSYCHIATRY & NEUROLOGY

## 2022-06-21 PROCEDURE — 99223 1ST HOSP IP/OBS HIGH 75: CPT | Mod: ,,, | Performed by: PSYCHIATRY & NEUROLOGY

## 2022-06-21 PROCEDURE — 63600175 PHARM REV CODE 636 W HCPCS

## 2022-06-21 PROCEDURE — 84295 ASSAY OF SERUM SODIUM: CPT

## 2022-06-21 PROCEDURE — 99223 PR INITIAL HOSPITAL CARE,LEVL III: ICD-10-PCS | Mod: ,,, | Performed by: CLINICAL NURSE SPECIALIST

## 2022-06-21 PROCEDURE — 63600175 PHARM REV CODE 636 W HCPCS: Performed by: STUDENT IN AN ORGANIZED HEALTH CARE EDUCATION/TRAINING PROGRAM

## 2022-06-21 RX ORDER — LEVETIRACETAM 100 MG/ML
750 SOLUTION ORAL 2 TIMES DAILY
Status: DISCONTINUED | OUTPATIENT
Start: 2022-06-21 | End: 2022-07-17 | Stop reason: HOSPADM

## 2022-06-21 RX ORDER — MUPIROCIN 20 MG/G
OINTMENT TOPICAL 2 TIMES DAILY
Status: COMPLETED | OUTPATIENT
Start: 2022-06-21 | End: 2022-06-26

## 2022-06-21 RX ADMIN — INSULIN ASPART 2 UNITS: 100 INJECTION, SOLUTION INTRAVENOUS; SUBCUTANEOUS at 12:06

## 2022-06-21 RX ADMIN — INSULIN ASPART 5 UNITS: 100 INJECTION, SOLUTION INTRAVENOUS; SUBCUTANEOUS at 12:06

## 2022-06-21 RX ADMIN — INSULIN DETEMIR 10 UNITS: 100 INJECTION, SOLUTION SUBCUTANEOUS at 09:06

## 2022-06-21 RX ADMIN — CHOLESTYRAMINE 8 G: 4 POWDER, FOR SUSPENSION ORAL at 09:06

## 2022-06-21 RX ADMIN — INSULIN ASPART 3 UNITS: 100 INJECTION, SOLUTION INTRAVENOUS; SUBCUTANEOUS at 05:06

## 2022-06-21 RX ADMIN — LEVETIRACETAM 750 MG: 100 SOLUTION ORAL at 09:06

## 2022-06-21 RX ADMIN — HEPARIN SODIUM 5000 UNITS: 5000 INJECTION INTRAVENOUS; SUBCUTANEOUS at 09:06

## 2022-06-21 RX ADMIN — PIPERACILLIN SODIUM AND TAZOBACTAM SODIUM 4.5 G: 4; .5 INJECTION, POWDER, LYOPHILIZED, FOR SOLUTION INTRAVENOUS at 05:06

## 2022-06-21 RX ADMIN — LACOSAMIDE 150 MG: 50 TABLET, FILM COATED ORAL at 09:06

## 2022-06-21 RX ADMIN — PIPERACILLIN SODIUM AND TAZOBACTAM SODIUM 4.5 G: 4; .5 INJECTION, POWDER, LYOPHILIZED, FOR SOLUTION INTRAVENOUS at 02:06

## 2022-06-21 RX ADMIN — DEXTROSE: 5 SOLUTION INTRAVENOUS at 09:06

## 2022-06-21 RX ADMIN — MUPIROCIN: 20 OINTMENT TOPICAL at 09:06

## 2022-06-21 RX ADMIN — ATORVASTATIN CALCIUM 80 MG: 20 TABLET, FILM COATED ORAL at 09:06

## 2022-06-21 RX ADMIN — INSULIN ASPART 2 UNITS: 100 INJECTION, SOLUTION INTRAVENOUS; SUBCUTANEOUS at 09:06

## 2022-06-21 RX ADMIN — INSULIN ASPART 5 UNITS: 100 INJECTION, SOLUTION INTRAVENOUS; SUBCUTANEOUS at 09:06

## 2022-06-21 RX ADMIN — INSULIN ASPART 5 UNITS: 100 INJECTION, SOLUTION INTRAVENOUS; SUBCUTANEOUS at 05:06

## 2022-06-21 RX ADMIN — LEVETIRACETAM 500 MG: 500 SOLUTION ORAL at 09:06

## 2022-06-21 RX ADMIN — PIPERACILLIN SODIUM AND TAZOBACTAM SODIUM 4.5 G: 4; .5 INJECTION, POWDER, LYOPHILIZED, FOR SOLUTION INTRAVENOUS at 09:06

## 2022-06-21 NOTE — ASSESSMENT & PLAN NOTE
Patient admitted with elevated WBC to 14s. Previous episodes of aspiration PNA at OSH, indwelling cabrera given acuity of condition, sacral decubitus ulcer stave IV s/p debridement with enterococcus faecalis / bacteriodes fragilis in cx. Patient afebrile with stable hemodynamics on admission at this time. CXR without acute abnormality. UA with yeast growing, chronic per previous OSH review.    Leukocytosis may be explained by soft tissue infection vs hemoconcentration given extensive dehydration and hypernatremia from lack of enteral fluids or IVF at outside hospice facility prior to transfer to [facility].     - Zosyn given previous cultures and persistent leukocytosis  - Etiology may be soft tissue infection; see Sacral Decubitus Ulcer A/P  - Continue monitoring CBC  - Wound care per Sacral Decubitus Ulcer A/P  - Aspiration precautions  - Trend fever curve  - Trend BCx

## 2022-06-21 NOTE — PROCEDURES
Date of service  6/20/2022-6/21/2022    Introduction  Electroencephalographic (EEG) is recorded with electrodes placed according to the International 10-20 placement system.  Thirty two (32) channels  of digital signal (sampling rate of 512/sec), including T1 and T2, were simultaneously recorded from the scalp and may include EKG, EMG, and/or eye monitors.  Recording band pass was 0.1 to 512 Hz.  Digital video recording of the patient is simultaneously recorded with the EEG.  The patient is instructed to report clinical symptoms which may occur during the recording session.  EEG and video recording are stored and archived in digital format.  Activation procedures, which include photic stimulation, hyperventilation and instructing patients to perform simple tasks, are done in selected patients.    The EEG is displayed on a monitor screen and can be reviewed using different montages.  Computer-assisted analysis is employed to detect spike and electrographic seizure activity.   The entire record is submitted for computer analysis.  The entire recording is visually reviewed, and the times identified by computer analysis as being spikes or seizures are reviewed again.      Compressed spectral analysis (CSA) is also performed on the activity recorded from each individual channel.  This is displayed as a power display of frequencies from 0 to 30 Hz over time.   The CSA is reviewed looking for asymmetries in power between homologous areas of the scalp, then compared with the original EEG recording.      UIEvolution software was also utilized in the review of this study. This software suite analyzes the EEG recording in multiple domains. Coherence and rhythmicity are computed to identify EEG sections which may contain organized seizures. Each channel undergoes analysis to detect the presence of spike and sharp waves which have special and morphological characteristics of epileptic activity. The routine EEG recording is converted  from special into frequency domain. This is then displayed comparing homologous areas to identify areas of significant asymmetry. Algorithm to identify non-cortically generated artifact is used to separate artifact from the EEG.    Recording Times  Start on 6/20/22 at 0701  Stop on 6/21/22 at 0700  A total of 24 hours of EEG/video telemetry was recorded.    Findings  The patient's background consists of diffuse slowing, with predominantly theta to delta range frequencies appreciated.  There is no focality to the slowing.  There are no focal, lateralized, or epileptiform transients.  No electrographic seizures are seen.    Normal sleep architecture is not noted.    Hyperventilation and photic stimulation were not performed.     EKG demonstrates sinus rhythm.    Interpretation  This is an abnormal LTM-EEG due to the presence of diffuse slowing as described above.  These findings are indicative of a moderate to severe encephalopathy, though they are not specific for a particular underlying etiology.

## 2022-06-21 NOTE — SUBJECTIVE & OBJECTIVE
Interval History: AF HDS NAEON. Patient alert as per yesterday's exam. Responsive to sound and tracking with eyes. Attempting to speak, but no words. Follows simple directions.    Review of Systems   Unable to perform ROS: Mental status change   Objective:     Vital Signs (Most Recent):  Temp: 98.3 °F (36.8 °C) (06/21/22 1145)  Pulse: 91 (06/21/22 1145)  Resp: 16 (06/21/22 1145)  BP: 121/60 (06/21/22 1145)  SpO2: 97 % (06/21/22 1145) Vital Signs (24h Range):  Temp:  [97.4 °F (36.3 °C)-98.3 °F (36.8 °C)] 98.3 °F (36.8 °C)  Pulse:  [77-91] 91  Resp:  [14-20] 16  SpO2:  [92 %-100 %] 97 %  BP: ()/(53-60) 121/60     Weight: 50.2 kg (110 lb 10.7 oz)  Body mass index is 22.35 kg/m².    Intake/Output Summary (Last 24 hours) at 6/21/2022 1451  Last data filed at 6/21/2022 1314  Gross per 24 hour   Intake 1290 ml   Output --   Net 1290 ml      Physical Exam  Vitals and nursing note reviewed.   Constitutional:       General: She is not in acute distress.     Appearance: She is not ill-appearing, toxic-appearing or diaphoretic.   HENT:      Head: Normocephalic.      Mouth/Throat:      Mouth: Mucous membranes are dry.      Comments: Dried oral secretions adhered to tongue  Cardiovascular:      Rate and Rhythm: Normal rate and regular rhythm.      Pulses: Normal pulses.      Heart sounds: Normal heart sounds. No murmur heard.  Pulmonary:      Effort: Pulmonary effort is normal. No respiratory distress.      Breath sounds: Normal breath sounds. No wheezing or rales.   Abdominal:      General: Abdomen is flat. Bowel sounds are normal. There is no distension.      Palpations: Abdomen is soft.      Tenderness: There is no abdominal tenderness.   Musculoskeletal:         General: No swelling or tenderness. Normal range of motion.      Cervical back: Normal range of motion.   Skin:     General: Skin is warm and dry.      Coloration: Skin is not jaundiced or pale.      Comments: Stage IV sacral wound that is deep with dressing in  place.    Neurological:      Mental Status: She is alert. She is disoriented.      Comments: Minimally alert, opens eyes to sound/pain, tracks to sound, attempts to speak, but does not form words/sounds   Psychiatric:      Comments: Unable to assess       Significant Labs: All pertinent labs within the past 24 hours have been reviewed.    Significant Imaging: I have reviewed all pertinent imaging results/findings within the past 24 hours.

## 2022-06-21 NOTE — SUBJECTIVE & OBJECTIVE
Interval History:     Past Medical History:   Diagnosis Date    Arthritis     Cancer of breast     s/p mastectomy (on anastrozole)     Cataract     Diabetes mellitus     c/b Diabetic retinopathy    Hypertension     resolved    Hypoxia 4/15/2021    Memory loss     due to strokes    Orthostatic hypotension 12/27/2020    frequent falls, on midodrine.  4/2021 seen by both cards and palliative care    TIA (transient ischemic attack) 7903-1340    CTH with remote infarcts    Vitamin D deficiency 10/14/2021       Past Surgical History:   Procedure Laterality Date    CAPSULOTOMY  6/26/13    yag left eye    CATARACT EXTRACTION  6/3/2013    right eye    CHOLECYSTECTOMY      HYSTERECTOMY      MASTECTOMY Right 9/28/2020    Procedure: MASTECTOMY-Right;  Surgeon: Krysta Clark MD;  Location: [facility] OR;  Service: General;  Laterality: Right;    SENTINEL LYMPH NODE BIOPSY Right 9/28/2020    Procedure: BIOPSY, LYMPH NODE, SENTINEL-Right;  Surgeon: Krysta Clark MD;  Location: [facility] OR;  Service: General;  Laterality: Right;    TOTAL ABDOMINAL HYSTERECTOMY W/ BILATERAL SALPINGOOPHORECTOMY         Review of patient's allergies indicates:   Allergen Reactions    Iodinated contrast media Hives       Medications:  Continuous Infusions:   dextrose 5 % 100 mL/hr at 06/20/22 0613     Scheduled Meds:   atorvastatin  80 mg Per G Tube Daily    cholestyramine  2 packet Per G Tube BID    heparin (porcine)  5,000 Units Subcutaneous Q12H    insulin aspart U-100  5 Units Subcutaneous TIDWM    insulin detemir U-100  10 Units Subcutaneous Daily    lacosamide  150 mg Per G Tube Q12H    levetiracetam  500 mg Per G Tube BID    piperacillin-tazobactam (ZOSYN) IVPB  4.5 g Intravenous Q8H     PRN Meds:dextrose 10%, dextrose 10%, glucagon (human recombinant), glucose, glucose, insulin aspart U-100, naloxone, sodium chloride 0.9%    Family History       Problem Relation (Age of Onset)    Breast cancer Sister (65)    Cancer Mother, Sister    Cataracts Mother     Colon cancer Mother (82)    Diabetes Mother, Father, Sister, Maternal Uncle, Paternal Uncle, Maternal Grandmother, Maternal Grandfather    Glaucoma Mother    Hypertension Sister, Maternal Grandmother          Tobacco Use    Smoking status: Current Every Day Smoker     Packs/day: 1.50     Types: Cigarettes    Smokeless tobacco: Never Used   Substance and Sexual Activity    Alcohol use: No    Drug use: No    Sexual activity: Not Currently       Review of Systems   Unable to perform ROS: Other (patient only able to follow simple commands and appears oriented to self only)   Objective:     Vital Signs (Most Recent):  Temp: 97.4 °F (36.3 °C) (06/21/22 0751)  Pulse: 86 (06/21/22 0827)  Resp: 20 (06/21/22 0827)  BP: (!) 112/54 (06/21/22 0752)  SpO2: 98 % (06/21/22 0827)   Vital Signs (24h Range):  Temp:  [97.4 °F (36.3 °C)-98.3 °F (36.8 °C)] 97.4 °F (36.3 °C)  Pulse:  [77-89] 86  Resp:  [14-20] 20  SpO2:  [92 %-100 %] 98 %  BP: ()/(53-60) 112/54     Weight: 50.2 kg (110 lb 10.7 oz)  Body mass index is 22.35 kg/m².    Physical Exam  Vitals and nursing note reviewed.   Constitutional:       Appearance: She is ill-appearing.   HENT:      Head: Normocephalic.      Comments: Dressing intact, cont eeg in progress     Right Ear: External ear normal.      Left Ear: External ear normal.      Nose: Nose normal. No congestion.      Mouth/Throat:      Mouth: Mucous membranes are dry.      Comments: Dry film on tongue and lips   Eyes:      Conjunctiva/sclera: Conjunctivae normal.      Pupils: Pupils are equal, round, and reactive to light.   Cardiovascular:      Rate and Rhythm: Normal rate and regular rhythm.   Pulmonary:      Comments: Supplemental oxygen 8 L by trach collar   Abdominal:      Comments: PEG intact, tolerating tube feeding   Genitourinary:     Comments: Urethral catheter, clear yellow urine   Musculoskeletal:         General: Normal range of motion.      Cervical back: Normal range of motion and neck supple.       Right lower leg: No edema.      Left lower leg: No edema.      Comments: Bedbound,    Skin:     General: Skin is dry.      Comments: Stage IV sacral wound   Neurological:      Mental Status: She is alert. She is disoriented.      Comments: Alert, arouses to sound, oriented to self, able to track, follow simple commands - blink eyes, squeeze hand.  Able to mouth words, not able to speak   Psychiatric:         Mood and Affect: Mood normal.         Behavior: Behavior normal.      Comments: Anoxic brain injury- unable to fully assess judgement, thought content        Review of Symptoms      Symptom Assessment (ESAS 0-10 Scale)  Unable to complete assessment due to Acuity of condition       Pain Assessment:  OME in 24 hours:  0  Location(s):      Pain Assessment in Advanced Demential Scale (PAINAD)   Breathing - Independent of vocalization:  0  Negative vocalization:  0  Facial expression:  0  Body language:  0  Consolability:  0  Total:  0    Living Arrangements:  Lives in nursing home    Advance Care Planning   Advance Directives:   Living Will: Yes        Copy on chart: Yes        Oral Declaration: No    [place]POST: Yes    Do Not Resuscitate Status: Yes    Medical Power of : Yes        Oral Declaration: No    Agent's Name:  Sabi Avila   Agent's Contact Number:  [phone]    Decision Making:  Patient unable to communicate due to disease severity/cognitive impairment       Significant Labs: All pertinent labs within the past 24 hours have been reviewed.  CBC:   Recent Labs   Lab 06/21/22 0623   WBC 13.94*   HGB 9.7*   HCT 31.7*   MCV 89   *     BMP:  Recent Labs   Lab 06/21/22 0623   *   *  149*   K 4.0   *   CO2 21*   BUN 32*   CREATININE 0.7   CALCIUM 9.1   MG 1.8     LFT:  Lab Results   Component Value Date    AST 15 06/21/2022    ALKPHOS 153 (H) 06/21/2022    BILITOT 0.2 06/21/2022     Albumin:   Albumin   Date Value Ref Range Status   06/21/2022 1.5 (L) 3.5 - 5.2 g/dL Final      Protein:   Total Protein   Date Value Ref Range Status   06/21/2022 5.9 (L) 6.0 - 8.4 g/dL Final     Lactic acid:   Lab Results   Component Value Date    LACTATE 1.0 06/18/2022    LACTATE 1.1 10/23/2021       Significant Imaging: I have reviewed all pertinent imaging results/findings within the past 24 hours.

## 2022-06-21 NOTE — CONSULTS
[facility] - [facility] ([place] [address])  Palliative Medicine  Consult Note    Patient Name: Darlene Avila  MRN: [id number]  Admission Date: 6/17/2022  Hospital Length of Stay: 2 days  Code Status: DNR   Attending Provider: Howie Grey MD  Consulting Provider: BEN Sears  Primary Care Physician: Kirill Cabrera Iii, MD  Principal Problem:Hypernatremia    Patient information was obtained from relative(s), past medical records, ER records and primary team.      Consults  Assessment/Plan:     Palliative care encounter  Palliative medicine consulted for goals of care and advanced care planning for Ms. Avila a 70 yo lady with history of anoxic brain injury. S/P trach and PEG. Presented to [facility] from nursing facility after family has revoked hospice and seeking higher level of care. Chart reviewed and patient discussed with primary team Dr. Self.  Pal med consulted as per Gen Sx recommendation. Primary team has had initial goals of care conversation with the patient's sister and HPOA.     At time of this encounter Ms Avila is awake, alert, oriented to self only.  Able to follow simple commands and not able to participate in conversation. She appears comfortable with no facial grimacing, moaning, supplemental oxygen via trach collar.  Oxygen saturation is 92% to 100%. No acute distress    No family in person to have goals of care conversation.      Advance Care Planning     - ACP documents - HPOA, LW and LaPOST received  - HPOA is sister Sabi Avila [phone]  LaPOST indicates DNR - natural death, no intubation - noninvasive CPAP/BiPAP and trial of artificial nutrition by tube   - DNR per primary team     Goals of Care     - met with patient's sister and HPOA Sbai Avila.  Palliative medicine introduced.   - appears patient family has limited understanding of current clinical condition and role of the HPOA  - patient sister struggling to make right decisions as she does not want to give up.    -  encouraged family see situation as honoring the patient's wishes.  Sabi states she is not able to do this.    [facility]  I engaged the family in a conversation about advance care planning and we specifically addressed what the goals of care would be moving forward, in light of the patient's change in clinical status, specifically anoxic brain injury, stage IV sacral decubitus, debility  And poor prognosis - had been receiving inpatient  hospice services.   .  We explored the patient's values and preferences for future care. Reviewed the patients advanced care planning documents indicating the patient's wish was not having prolonged artifical nutrition and or life support.    The family and healthcare power of   endorses that what is most important right now is to focus on extending life as long as possible, even it it means sacrificing quality and curative/life-prolongation (regardless of treatment burdens)    Accordingly, we have decided that the best plan to meet the patient's goals includes continuing with treatment  Lengthy discussion regarding the appropriateness of hospice care at this stage of the patient's illness, including its ability to help the patient live with the best quality of life possible.  Despite this information and the patient's advanced directives [facility] is not accepting of the hospice philosophy and we We will not be making a hospice referral. In addition the family is seeking to continue with higher level of care including surgical debridement of the sacral ulcer.  It is the family's goal to continue care in group home nursing home setting after discharge.      I spent a total of 25 minutes engaging the patient in this advance care planning discussion.    Anoxic brain injury/ Sacral Decubitus stage IV/ hx of  CVA/ Seizures   - managed per primary team and specialty consultants   - serious illness in setting of comorbid conditions appropriate for transition to hospice care   - pal med  following for goals of care - see above [facility]      Plan/Recommendations  - Family is not amenable to hospice care. Believes this is not what her sister wanted when completing ACP documents. .  - family amenable to surgical debridement - surgery team will schedule     - Discussed challenges of providing post acute care in home setting.  Family amenable to nursing home placement.   - Patient and family would benefit from continued information and educations especially in relationship to future expectations   - Family will need assistance making transition to nursing home placement.  Primary team  aware.  - emotional support provided     Primary team and surgical team notified per secure chat message.  Thank you of consult and opportunity to participate in Ms Avila's care.                Thank you for your consult. I will follow-up with patient. Please contact us if you have any additional questions.    Subjective:     HPI:   HPI obtained from chart review:   Per EMS Pt.'s family revoked hospice and wants more aggressive treatment for Pt. Pt is on hospice for anoxic brain injury. Pt is unresponsive at baseline. Pt is on 8 L nonrebreather per trach collar.             HPI:  Mrs. Avila is a 70 yo lady with PMH of:  diabetes type 2, seizure c/b anoxic brain injury s/p trach, PEG, bed bound status, Stage IV sacral ulcer, ischemic CVA, IBS, history of breast cancer s/p right mastectomy and failure to thrive. She presented to [facility]  via EMS from [facility]  as family is seeking higher level of care.  History obtained by her sister Sabi Avila.      Patient's sister was contacted who stated that the patient was admitted to [facility] in [facility] for recurrent seizures/status epilepticus which resulted in an anoxic brain injury and respiratory failure.  Patient was intubated followed by tracheostomy and PEG placement .  Patient was discharged to nursing home and subsequently discharged to hospice care  at the request of patient's sister. However a few days ago, she visited the patient at [facility] recovery facility and noticed they weren't feeding or taking care of the patient due to her 'hospice status'. Per sister today, she would like to place the patient in a skilled nursing facility and would like to rescind her hospice care at this time.  She is no longer interested in hospice placement. She states the patient at baseline is only able to follow some simple commands. She is unsure how much O2 level is her baseline via trach collar.   Of note she has a Stage IV sacral wound that is s/p debridement on 06/09 by Gen surgery in [facility].      Upon arrival to the ED, the patient was hemodynamically stable. Labs revealed Na 157, WBC 14. Hgb 9.4 (baseline 7-9). The patient was given 1L of IV NS and was admitted to hospital medicine for further management.     Palliative medicine consulted for goals of care and advanced care planning               Hospital Course:  No notes on file    Interval History:     Past Medical History:   Diagnosis Date    Arthritis     Cancer of breast     s/p mastectomy (on anastrozole)     Cataract     Diabetes mellitus     c/b Diabetic retinopathy    Hypertension     resolved    Hypoxia 4/15/2021    Memory loss     due to strokes    Orthostatic hypotension 12/27/2020    frequent falls, on midodrine.  4/2021 seen by both cards and palliative care    TIA (transient ischemic attack) 0199-7308    CT with remote infarcts    Vitamin D deficiency 10/14/2021       Past Surgical History:   Procedure Laterality Date    CAPSULOTOMY  6/26/13    yag left eye    CATARACT EXTRACTION  6/3/2013    right eye    CHOLECYSTECTOMY      HYSTERECTOMY      MASTECTOMY Right 9/28/2020    Procedure: MASTECTOMY-Right;  Surgeon: Krysta Clark MD;  Location: [facility];  Service: General;  Laterality: Right;    SENTINEL LYMPH NODE BIOPSY Right 9/28/2020    Procedure: BIOPSY, LYMPH NODE,  SENTINEL-Right;  Surgeon: Krysta Clark MD;  Location: [facility];  Service: General;  Laterality: Right;    TOTAL ABDOMINAL HYSTERECTOMY W/ BILATERAL SALPINGOOPHORECTOMY         Review of patient's allergies indicates:   Allergen Reactions    Iodinated contrast media Hives       Medications:  Continuous Infusions:   dextrose 5 % 100 mL/hr at 06/20/22 0613     Scheduled Meds:   atorvastatin  80 mg Per G Tube Daily    cholestyramine  2 packet Per G Tube BID    heparin (porcine)  5,000 Units Subcutaneous Q12H    insulin aspart U-100  5 Units Subcutaneous TIDWM    insulin detemir U-100  10 Units Subcutaneous Daily    lacosamide  150 mg Per G Tube Q12H    levetiracetam  500 mg Per G Tube BID    piperacillin-tazobactam (ZOSYN) IVPB  4.5 g Intravenous Q8H     PRN Meds:dextrose 10%, dextrose 10%, glucagon (human recombinant), glucose, glucose, insulin aspart U-100, naloxone, sodium chloride 0.9%    Family History       Problem Relation (Age of Onset)    Breast cancer Sister (65)    Cancer Mother, Sister    Cataracts Mother    Colon cancer Mother (82)    Diabetes Mother, Father, Sister, Maternal Uncle, Paternal Uncle, Maternal Grandmother, Maternal Grandfather    Glaucoma Mother    Hypertension Sister, Maternal Grandmother          Tobacco Use    Smoking status: Current Every Day Smoker     Packs/day: 1.50     Types: Cigarettes    Smokeless tobacco: Never Used   Substance and Sexual Activity    Alcohol use: No    Drug use: No    Sexual activity: Not Currently       Review of Systems   Unable to perform ROS: Other (patient only able to follow simple commands and appears oriented to self only)   Objective:     Vital Signs (Most Recent):  Temp: 97.4 °F (36.3 °C) (06/21/22 0751)  Pulse: 86 (06/21/22 0827)  Resp: 20 (06/21/22 0827)  BP: (!) 112/54 (06/21/22 0752)  SpO2: 98 % (06/21/22 0827)   Vital Signs (24h Range):  Temp:  [97.4 °F (36.3 °C)-98.3 °F (36.8 °C)] 97.4 °F (36.3 °C)  Pulse:  [77-89] 86  Resp:  [14-20]  20  SpO2:  [92 %-100 %] 98 %  BP: ()/(53-60) 112/54     Weight: 50.2 kg (110 lb 10.7 oz)  Body mass index is 22.35 kg/m².    Physical Exam  Vitals and nursing note reviewed.   Constitutional:       Appearance: She is ill-appearing.   HENT:      Head: Normocephalic.      Comments: Dressing intact, cont eeg in progress     Right Ear: External ear normal.      Left Ear: External ear normal.      Nose: Nose normal. No congestion.      Mouth/Throat:      Mouth: Mucous membranes are dry.      Comments: Dry film on tongue and lips   Eyes:      Conjunctiva/sclera: Conjunctivae normal.      Pupils: Pupils are equal, round, and reactive to light.   Cardiovascular:      Rate and Rhythm: Normal rate and regular rhythm.   Pulmonary:      Comments: Supplemental oxygen 8 L by trach collar   Abdominal:      Comments: PEG intact, tolerating tube feeding   Genitourinary:     Comments: Urethral catheter, clear yellow urine   Musculoskeletal:         General: Normal range of motion.      Cervical back: Normal range of motion and neck supple.      Right lower leg: No edema.      Left lower leg: No edema.      Comments: Bedbound,    Skin:     General: Skin is dry.      Comments: Stage IV sacral wound   Neurological:      Mental Status: She is alert. She is disoriented.      Comments: Alert, arouses to sound, oriented to self, able to track, follow simple commands - blink eyes, squeeze hand.  Able to mouth words, not able to speak   Psychiatric:         Mood and Affect: Mood normal.         Behavior: Behavior normal.      Comments: Anoxic brain injury- unable to fully assess judgement, thought content        Review of Symptoms      Symptom Assessment (ESAS 0-10 Scale)  Unable to complete assessment due to Acuity of condition       Pain Assessment:  OME in 24 hours:  0  Location(s):      Pain Assessment in Advanced Demential Scale (PAINAD)   Breathing - Independent of vocalization:  0  Negative vocalization:  0  Facial expression:   0  Body language:  0  Consolability:  0  Total:  0    Living Arrangements:  Lives in nursing home    Advance Care Planning   Advance Directives:   Living Will: Yes        Copy on chart: Yes        Oral Declaration: No    LaPOST: Yes    Do Not Resuscitate Status: Yes    Medical Power of : Yes        Oral Declaration: No    Agent's Name:  Sabi Avila   Agent's Contact Number:  [phone]    Decision Making:  Patient unable to communicate due to disease severity/cognitive impairment       Significant Labs: All pertinent labs within the past 24 hours have been reviewed.  CBC:   Recent Labs   Lab 06/21/22 0623   WBC 13.94*   HGB 9.7*   HCT 31.7*   MCV 89   *     BMP:  Recent Labs   Lab 06/21/22 0623   *   *  149*   K 4.0   *   CO2 21*   BUN 32*   CREATININE 0.7   CALCIUM 9.1   MG 1.8     LFT:  Lab Results   Component Value Date    AST 15 06/21/2022    ALKPHOS 153 (H) 06/21/2022    BILITOT 0.2 06/21/2022     Albumin:   Albumin   Date Value Ref Range Status   06/21/2022 1.5 (L) 3.5 - 5.2 g/dL Final     Protein:   Total Protein   Date Value Ref Range Status   06/21/2022 5.9 (L) 6.0 - 8.4 g/dL Final     Lactic acid:   Lab Results   Component Value Date    LACTATE 1.0 06/18/2022    LACTATE 1.1 10/23/2021       Significant Imaging: I have reviewed all pertinent imaging results/findings within the past 24 hours.      - additional 20 mins spent in advanced care planning .    Berta Sun, CNS  Palliative Medicine  [address] - )

## 2022-06-21 NOTE — CONSULTS
[facility] - [facility] ([place] [address])  Neurology  Consult Note    Patient Name: Darlene Avila  MRN: [id number]  Admission Date: 6/17/2022  Hospital Length of Stay: 2 days  Code Status: DNR   Attending Provider: Howie Grey MD   Consulting Provider: Maddi Whelan MD  Primary Care Physician: Kirill Cabrera Iii, MD  Principal Problem:Hypernatremia    Inpatient consult to Neurology  Consult performed by: Maddi Whelan MD  Consult ordered by: Dawood Self MD  Reason for consult: AED dosing         Subjective:     Chief Complaint:  Drowsiness     HPI:   Darlene Avila is a 71 year old woman with history of anoxic brain injury s/p seizures (?status epilepticus) in 4/2022 and trach/PEG in 5/2022, remote stroke who was admitted to [facility] 6/18 for nursing home placement. Neurology consulted 6/21 regarding AED dosing. Per chart review, patient was initially admitted to a hospital in [place], [place] in 4/2022 and was started on multiple AEDs due to continued seizures on EEG. At one point was receiving keppra, vimpat, and phenytoin. Was intubated then had trach/PEG placement. Eventually discharged to inpatient hospice on 6/15. Patient's sister visited her there and decided to have patient transferred to [place] as this is home for them. She reports that patient is able to intermittently interact and mouth words in response to questions. Per patient's sister, patient was weaned off phenytoin prior to move to hospice and was only on 2 AEDs. During this admission phenytoin was initially given then stopped on 6/19. EEG negative for seizure so far. At baseline prior to 4/2022 admit, ambulated with a walker although often refused to use it with resultant falls.       Past Medical History:   Diagnosis Date    Arthritis     Cancer of breast     s/p mastectomy (on anastrozole)     Cataract     Diabetes mellitus     c/b Diabetic retinopathy    Hypertension     resolved    Hypoxia 4/15/2021    Memory loss     due to strokes     Orthostatic hypotension 12/27/2020    frequent falls, on midodrine.  4/2021 seen by both cards and palliative care    TIA (transient ischemic attack) [id number]    CTH with remote infarcts    Vitamin D deficiency 10/14/2021       Past Surgical History:   Procedure Laterality Date    CAPSULOTOMY  6/26/13    yag left eye    CATARACT EXTRACTION  6/3/2013    right eye    CHOLECYSTECTOMY      HYSTERECTOMY      MASTECTOMY Right 9/28/2020    Procedure: MASTECTOMY-Right;  Surgeon: Krysta Clark MD;  Location: [facility] OR;  Service: General;  Laterality: Right;    SENTINEL LYMPH NODE BIOPSY Right 9/28/2020    Procedure: BIOPSY, LYMPH NODE, SENTINEL-Right;  Surgeon: Krysta Clark MD;  Location: [facility] OR;  Service: General;  Laterality: Right;    TOTAL ABDOMINAL HYSTERECTOMY W/ BILATERAL SALPINGOOPHORECTOMY         Review of patient's allergies indicates:   Allergen Reactions    Iodinated contrast media Hives       Current Neurological Medications: Keppra 500 mg BID, Vimpat 150 mg BID    No current facility-administered medications on file prior to encounter.     Current Outpatient Medications on File Prior to Encounter   Medication Sig    bisacodyL (DULCOLAX) 10 mg Supp Place 10 mg rectally 2 (two) times daily as needed (CONSTIPATION).    hyoscyamine (LEVSIN/SL) 0.125 mg Subl Place 0.125 mg under the tongue every 2 (two) hours as needed (EXCESS SECRETIONS).    lorazepam (ATIVAN) 2 mg/mL Conc TAKE 0.25-1ML BY MOUTH EVERY 2 HOURS AS NEEDED FOR RESTLESSNESS OR AGITATION    morphine 100 mg/5 mL (20 mg/mL) concentrated solution TAKE 0.25-1 ML BY MOUTH EVERY 2 HOURS AS NEEDED FOR PAIN OR SHORTNESS OF BREATH     Family History       Problem Relation (Age of Onset)    Breast cancer Sister (65)    Cancer Mother, Sister    Cataracts Mother    Colon cancer Mother (82)    Diabetes Mother, Father, Sister, Maternal Uncle, Paternal Uncle, Maternal Grandmother, Maternal Grandfather    Glaucoma Mother    Hypertension Sister,  Maternal Grandmother          Tobacco Use    Smoking status: Current Every Day Smoker     Packs/day: 1.50     Types: Cigarettes    Smokeless tobacco: Never Used   Substance and Sexual Activity    Alcohol use: No    Drug use: No    Sexual activity: Not Currently     Review of Systems   Unable to perform ROS: Mental status change   Objective:     Vital Signs (Most Recent):  Temp: 98.3 °F (36.8 °C) (22 1145)  Pulse: 91 (22 1145)  Resp: 16 (22 1145)  BP: 121/60 (22 1145)  SpO2: 97 % (22 1145) Vital Signs (24h Range):  Temp:  [97.4 °F (36.3 °C)-98.3 °F (36.8 °C)] 98.3 °F (36.8 °C)  Pulse:  [77-91] 91  Resp:  [14-20] 16  SpO2:  [92 %-100 %] 97 %  BP: ()/(53-60) 121/60     Weight: 50.2 kg (110 lb 10.7 oz)  Body mass index is 22.35 kg/m².    Physical Exam  Eyes:      Extraocular Movements: EOM normal.      Pupils: Pupils are equal, round, and reactive to light.   Neurological:      Deep Tendon Reflexes:      Reflex Scores:       Bicep reflexes are 2+ on the right side and 2+ on the left side.       Brachioradialis reflexes are 2+ on the right side and 2+ on the left side.       Patellar reflexes are 2+ on the right side and 2+ on the left side.      NEUROLOGICAL EXAMINATION:     MENTAL STATUS   Attention: decreased. Concentration: decreased.   Level of consciousness: drowsy    CRANIAL NERVES     CN III, IV, VI   Pupils are equal, round, and reactive to light.  Extraocular motions are normal.     CN VII   Facial expression full, symmetric.     CN VIII   CN VIII normal.     MOTOR EXAM   Right arm tone: increased  Left arm tone: increased    Strength   Right deltoid: 0/5  Left deltoid: 0/5  Right biceps: 0/5  Left biceps: 0/5  Right triceps: 0/5  Left triceps: 0/5  Right interossei: 1/5  Left interossei: 1/5  Right iliopsoas: 0/5  Left iliopsoas: 0/5  Right quadriceps: 0/5  Left quadriceps: 0/5  Right hamstrin/5  Left hamstrin/5  Right glutei: 0/5  Left glutei: 0/5  Right  anterior tibial: 1/5  Left anterior tibial: 1/5  Right posterior tibial: 1/5  Left posterior tibial: 1/5  Right peroneal: 1/5  Left peroneal: 1/5       Follows commands in all extremities.     REFLEXES     Reflexes   Right brachioradialis: 2+  Left brachioradialis: 2+  Right biceps: 2+  Left biceps: 2+  Right patellar: 2+  Left patellar: 2+    Significant Labs: All pertinent lab results from the past 24 hours have been reviewed.    Significant Imaging: I have reviewed all pertinent imaging results/findings within the past 24 hours.    Assessment and Plan:     Seizures  71 year old woman with T2DM, remote history of stroke without residual deficit, reported seizures at OSH in 4/2022 including status epilepticus with resultant anoxic brain injury requiring trach/PEG. Admitted 6/18, Neurology consulted 6/21 regarding AED dosing. Per outside records was at one point on Keppra, vimpat, and phenytoin. Per patient's sister, phenytoin was stopped at prior hospital at some point prior to transfer. Phenytoin was stopped 6/19 AM. On exam patient intermittently interactive vs drowsy, per sister at bedside consistent with mental status when on phenytoin in the past.      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 I will follow-up with patient. Please contact us if you have any additional questions.    Maddi Whelan MD  Neurology  [facility] - Intensive Care ([place])

## 2022-06-21 NOTE — ASSESSMENT & PLAN NOTE
S/p recent debridement at OSH 06/08. OSH Wcx with enterococcus faecalis, bacteriodes fragilus.    - Wound care consulted  - General surgery consulted for I&D  - Consideration for wound vac given depth of injury  - Turn q2h

## 2022-06-21 NOTE — ASSESSMENT & PLAN NOTE
Patient previously started on Lacosamide and Keppra at OSH during initial episode of status epilepticus. Phenytoin added as patient continued to have breakthrough seizures.    - Discontinue phenytoin  - Continue Lacosamide and Keppra via G-tube  - cEEG; appreciate EP's assistance in medication management  - Neurology consulted for further assistance in antiepileptic regimen

## 2022-06-21 NOTE — HPI
Darlene Avila is a 71 year old woman with history of anoxic brain injury s/p seizures (?status epilepticus) in 4/2022 and trach/PEG in 5/2022, remote stroke who was admitted to [facility] 6/18 for nursing home placement. Neurology consulted 6/21 regarding AED dosing. Per chart review, patient was initially admitted to a hospital in [place], [place] in 4/2022 and was started on multiple AEDs due to continued seizures on EEG. At one point was receiving keppra, vimpat, and phenytoin. Was intubated then had trach/PEG placement. Eventually discharged to inpatient hospice on 6/15. Patient's sister visited her there and decided to have patient transferred to [place] as this is home for them. She reports that patient is able to intermittently interact and mouth words in response to questions. Per patient's sister, patient was weaned off phenytoin prior to move to hospice and was only on 2 AEDs. During this admission phenytoin was initially given then stopped on 6/19. EEG negative for seizure so far. At baseline prior to 4/2022 admit, ambulated with a walker although often refused to use it with resultant falls.

## 2022-06-21 NOTE — SUBJECTIVE & OBJECTIVE
Past Medical History:   Diagnosis Date    Arthritis     Cancer of breast     s/p mastectomy (on anastrozole)     Cataract     Diabetes mellitus     c/b Diabetic retinopathy    Hypertension     resolved    Hypoxia 4/15/2021    Memory loss     due to strokes    Orthostatic hypotension 12/27/2020    frequent falls, on midodrine.  4/2021 seen by both cards and palliative care    TIA (transient ischemic attack) 5355-0046    CTH with remote infarcts    Vitamin D deficiency 10/14/2021       Past Surgical History:   Procedure Laterality Date    CAPSULOTOMY  6/26/13    yag left eye    CATARACT EXTRACTION  6/3/2013    right eye    CHOLECYSTECTOMY      HYSTERECTOMY      MASTECTOMY Right 9/28/2020    Procedure: MASTECTOMY-Right;  Surgeon: Krysta Clark MD;  Location: [facility] OR;  Service: General;  Laterality: Right;    SENTINEL LYMPH NODE BIOPSY Right 9/28/2020    Procedure: BIOPSY, LYMPH NODE, SENTINEL-Right;  Surgeon: Krysta Clark MD;  Location: [facility] OR;  Service: General;  Laterality: Right;    TOTAL ABDOMINAL HYSTERECTOMY W/ BILATERAL SALPINGOOPHORECTOMY         Review of patient's allergies indicates:   Allergen Reactions    Iodinated contrast media Hives       Current Neurological Medications: Keppra 500 mg BID, Vimpat 150 mg BID    No current facility-administered medications on file prior to encounter.     Current Outpatient Medications on File Prior to Encounter   Medication Sig    bisacodyL (DULCOLAX) 10 mg Supp Place 10 mg rectally 2 (two) times daily as needed (CONSTIPATION).    hyoscyamine (LEVSIN/SL) 0.125 mg Subl Place 0.125 mg under the tongue every 2 (two) hours as needed (EXCESS SECRETIONS).    lorazepam (ATIVAN) 2 mg/mL Conc TAKE 0.25-1ML BY MOUTH EVERY 2 HOURS AS NEEDED FOR RESTLESSNESS OR AGITATION    morphine 100 mg/5 mL (20 mg/mL) concentrated solution TAKE 0.25-1 ML BY MOUTH EVERY 2 HOURS AS NEEDED FOR PAIN OR SHORTNESS OF BREATH     Family History       Problem Relation (Age of Onset)    Breast cancer  Sister (65)    Cancer Mother, Sister    Cataracts Mother    Colon cancer Mother (82)    Diabetes Mother, Father, Sister, Maternal Uncle, Paternal Uncle, Maternal Grandmother, Maternal Grandfather    Glaucoma Mother    Hypertension Sister, Maternal Grandmother          Tobacco Use    Smoking status: Current Every Day Smoker     Packs/day: 1.50     Types: Cigarettes    Smokeless tobacco: Never Used   Substance and Sexual Activity    Alcohol use: No    Drug use: No    Sexual activity: Not Currently     Review of Systems   Unable to perform ROS: Mental status change   Objective:     Vital Signs (Most Recent):  Temp: 98.3 °F (36.8 °C) (06/21/22 1145)  Pulse: 91 (06/21/22 1145)  Resp: 16 (06/21/22 1145)  BP: 121/60 (06/21/22 1145)  SpO2: 97 % (06/21/22 1145) Vital Signs (24h Range):  Temp:  [97.4 °F (36.3 °C)-98.3 °F (36.8 °C)] 98.3 °F (36.8 °C)  Pulse:  [77-91] 91  Resp:  [14-20] 16  SpO2:  [92 %-100 %] 97 %  BP: ()/(53-60) 121/60     Weight: 50.2 kg (110 lb 10.7 oz)  Body mass index is 22.35 kg/m².    Physical Exam  Eyes:      Extraocular Movements: EOM normal.      Pupils: Pupils are equal, round, and reactive to light.   Neurological:      Deep Tendon Reflexes:      Reflex Scores:       Bicep reflexes are 2+ on the right side and 2+ on the left side.       Brachioradialis reflexes are 2+ on the right side and 2+ on the left side.       Patellar reflexes are 2+ on the right side and 2+ on the left side.      NEUROLOGICAL EXAMINATION:     MENTAL STATUS   Attention: decreased. Concentration: decreased.   Level of consciousness: drowsy    CRANIAL NERVES     CN III, IV, VI   Pupils are equal, round, and reactive to light.  Extraocular motions are normal.     CN VII   Facial expression full, symmetric.     CN VIII   CN VIII normal.     MOTOR EXAM   Right arm tone: increased  Left arm tone: increased    Strength   Right deltoid: 0/5  Left deltoid: 0/5  Right biceps: 0/5  Left biceps: 0/5  Right triceps: 0/5  Left  triceps: 0/5  Right interossei: 1/5  Left interossei: 1/5  Right iliopsoas: 0/5  Left iliopsoas: 0/5  Right quadriceps: 0/5  Left quadriceps: 0/5  Right hamstrin/5  Left hamstrin/5  Right glutei: 0/5  Left glutei: 0/5  Right anterior tibial: 1/5  Left anterior tibial: 1/5  Right posterior tibial: 1/5  Left posterior tibial: 1/5  Right peroneal: 1/5  Left peroneal: 1/5       Follows commands in all extremities.     REFLEXES     Reflexes   Right brachioradialis: 2+  Left brachioradialis: 2+  Right biceps: 2+  Left biceps: 2+  Right patellar: 2+  Left patellar: 2+    Significant Labs: All pertinent lab results from the past 24 hours have been reviewed.    Significant Imaging: I have reviewed all pertinent imaging results/findings within the past 24 hours.

## 2022-06-21 NOTE — PROCEDURES
Date of service  6/19/2022-6/20/2022    Introduction  Electroencephalographic (EEG) is recorded with electrodes placed according to the International 10-20 placement system.  Thirty two (32) channels  of digital signal (sampling rate of 512/sec), including T1 and T2, were simultaneously recorded from the scalp and may include EKG, EMG, and/or eye monitors.  Recording band pass was 0.1 to 512 Hz.  Digital video recording of the patient is simultaneously recorded with the EEG.  The patient is instructed to report clinical symptoms which may occur during the recording session.  EEG and video recording are stored and archived in digital format.  Activation procedures, which include photic stimulation, hyperventilation and instructing patients to perform simple tasks, are done in selected patients.    The EEG is displayed on a monitor screen and can be reviewed using different montages.  Computer-assisted analysis is employed to detect spike and electrographic seizure activity.   The entire record is submitted for computer analysis.  The entire recording is visually reviewed, and the times identified by computer analysis as being spikes or seizures are reviewed again.      Compressed spectral analysis (CSA) is also performed on the activity recorded from each individual channel.  This is displayed as a power display of frequencies from 0 to 30 Hz over time.   The CSA is reviewed looking for asymmetries in power between homologous areas of the scalp, then compared with the original EEG recording.      [employer] software was also utilized in the review of this study. This software suite analyzes the EEG recording in multiple domains. Coherence and rhythmicity are computed to identify EEG sections which may contain organized seizures. Each channel undergoes analysis to detect the presence of spike and sharp waves which have special and morphological characteristics of epileptic activity. The routine EEG recording is converted  from special into frequency domain. This is then displayed comparing homologous areas to identify areas of significant asymmetry. Algorithm to identify non-cortically generated artifact is used to separate artifact from the EEG.    Recording Times  Start on 6/19/22 at 16:07:17  Stop on 6/20/22 at 07:00:04  A total of 15 hours of EEG/video telemetry was recorded.    Findings  The patient's background consists of diffuse slowing, with predominantly theta to delta range frequencies appreciated.  There is no focality to the slowing.  There are no focal, lateralized, or epileptiform transients.  No electrographic seizures are seen.    Normal sleep architecture is not noted.    Hyperventilation and photic stimulation were not performed.     EKG demonstrates sinus rhythm.    Interpretation  This is an abnormal LTM-EEG due to the presence of diffuse slowing as described above.  These findings are indicative of a moderate to severe encephalopathy, though they are not specific for a particular underlying etiology.

## 2022-06-21 NOTE — RESPIRATORY THERAPY
RAPID RESPONSE RESPIRATORY THERAPY PROACTIVE NOTE           Time of visit: 713      Code Status: DNR   : 1950  Bed: 59962/08122 A:   MRN: [id number]  Time spent at the bedside: 15 -30 min    SITUATION    Evaluated patient for: LDA Check     BACKGROUND    Patient has a past medical history of Arthritis, Cancer of breast, Cataract, Diabetes mellitus, Hypertension, Hypoxia, Memory loss, Orthostatic hypotension, TIA (transient ischemic attack), and Vitamin D deficiency.  Clinically Significant Surgical Hx: tracheostomy    24 Hours Vitals Range:  Temp:  [97.6 °F (36.4 °C)-98.3 °F (36.8 °C)]   Pulse:  [77-84]   Resp:  [14-16]   BP: (109-142)/(53-63)   SpO2:  [92 %-100 %]     Labs:    Recent Labs     22  1107 22  1546 22  0750 22  1221 22  2130 22  2346 22  0437 22  1232 22  1723 22  0001   *  155*  156*   < > 151*  151*  150*   < > 155*   < > 151* 149* 150* 149*   K 4.4   < > 4.2  --  4.1  --  4.0  --   --   --    *   < > 119*  --  122*  --  121*  --   --   --    CO2 22*   < > 22*  --  24  --  22*  --   --   --    CREATININE 0.9   < > 0.8  --  0.8  --  0.6  --   --   --    *   < > 269*  --  84  --  90  --   --   --    PHOS 4.0  --  3.2  --   --   --  2.7  --   --   --    MG 2.1  --  2.0  --   --   --  1.7  --   --   --     < > = values in this interval not displayed.        No results for input(s): PH, PCO2, PO2, HCO3, POCSATURATED, BE in the last 72 hours.    ASSESSMENT/INTERVENTIONS    Upon arrival in room pt resting.  All trach supplies are at bedside.  Called the RT  to have her bring the correct extra trachs at bedside.  I changed her humidity on her trach collar.  Extra trachs at bedside are 6.0 shiley cuffed and 4.0 shiley cuffed    Last VS   Temp: 97.8 °F (36.6 °C) (439)  Pulse: 83 (439)  Resp: 14 (439)  BP: 109/53 (439)  SpO2: 94 % (439)    Level of Consciousness: Level of  Consciousness (AVPU): responds to voice  Respiratory Effort: Respiratory Effort: Normal Expansion/Accessory Muscle Usage: Expansion/Accessory Muscles/Retractions: accessory muscle use  All Lung Field Breath Sounds: All Lung Fields Breath Sounds: Anterior:, diminished  O2 Device/Concentration: Flow (L/min): 5, Oxygen Concentration (%): 28, O2 Device (Oxygen Therapy): Trach Collar  Surgical airway: Yes, Type: Shiley Size: 6, uncuffed  Ambu at bedside: Ambu bag with the patient?: Yes, Adult Ambu     Active Orders   Respiratory Care    Oxygen Continuous     Frequency: Continuous     Number of Occurrences: Until Specified     Order Questions:      Device type: Low flow      Device: Trach Collar      FiO2%: 28      Titrate O2 per Oxygen Titration Protocol: Yes      To maintain SpO2 goal of: >= 90%      Notify MD of: Inability to achieve desired SpO2; Sudden change in patient status and requires 20% increase in FiO2; Patient requires >60% FiO2    RESPIRATORY COMMUNICATION     Frequency: Daily     Number of Occurrences: Until Specified     Order Comments: Trach care      Routine tracheostomy care     Frequency: BID     Number of Occurrences: Until Specified       RECOMMENDATIONS    We recommend: RRT Recs: Continue POC per primary team.    ESCALATION        FOLLOW-UP    Please call back the Rapid Response RT, Chetna Puckett RRT at x [phone] for any questions or concerns.

## 2022-06-21 NOTE — CONSULTS
Palliative medicine consult received for goals of care and advanced care planning.  Ms Avila is unable to participate in conversation.  Family unavailable.  Appointment scheduled to follow up with sister and HPOA at 12 noon 6/21    Consult pending

## 2022-06-21 NOTE — HPI
HPI obtained from chart review:   Per EMS Pt.'s family revoked hospice and wants more aggressive treatment for Pt. Pt is on hospice for anoxic brain injury. Pt is unresponsive at baseline. Pt is on 8 L nonrebreather per trach collar.             HPI:  Mrs. Avila is a 70 yo lady with PMH of:  diabetes type 2, seizure c/b anoxic brain injury s/p trach, PEG, bed bound status, Stage IV sacral ulcer, ischemic CVA, IBS, history of breast cancer s/p right mastectomy and failure to thrive. She presented [place] [facility]  [place] from [facility]  as family is seeking higher level of care.  History obtained by her sister Sabi Avila.      Patient's sister was contacted who stated that the patient was admitted to [facility] in [facility] for recurrent seizures/status epilepticus which resulted in an anoxic brain injury and respiratory failure.  Patient was intubated followed by tracheostomy and PEG placement .  Patient was discharged to nursing home and subsequently discharged to hospice care at the request of patient's sister. However a few days ago, she visited the patient at [facility] recovery facility and noticed they weren't feeding or taking care of the patient due to her 'hospice status'. Per sister today, she would like to place the patient in a skilled nursing facility and would like to rescind her hospice care at this time.  She is no longer interested in hospice placement. She states the patient at baseline is only able to follow some simple commands. She is unsure how much O2 level is her baseline via trach collar.   Of note she has a Stage IV sacral wound that is s/p debridement on 06/09 by Gen surgery in [facility].      Upon arrival to the ED, the patient was hemodynamically stable. Labs revealed Na 157, WBC 14. Hgb 9.4 (baseline 7-9). The patient was given 1L of IV NS and was admitted to hospital medicine for further management.     Palliative medicine consulted for goals of care and advanced care  planning

## 2022-06-21 NOTE — PLAN OF CARE
Problem: Adult Inpatient Plan of Care  Goal: Plan of Care Review  Outcome: Ongoing, Progressing     Problem: Diabetes Comorbidity  Goal: Blood Glucose Level Within Targeted Range  Outcome: Ongoing, Progressing     Problem: Impaired Wound Healing  Goal: Optimal Wound Healing  Outcome: Ongoing, Progressing     Problem: Infection  Goal: Absence of Infection Signs and Symptoms  Outcome: Ongoing, Progressing     Problem: Fall Injury Risk  Goal: Absence of Fall and Fall-Related Injury  Outcome: Ongoing, Progressing    Pt disoriented x4. No acute events noted during shift. Vitals remained stable. No signs or c/o pain or discomfort, scheduled meds given per MD orders.  Q1-2hr safety checks and turns completed. Bed remained low, locked, with all personal items in reach.

## 2022-06-21 NOTE — PLAN OF CARE
Problem: Impaired Wound Healing  Goal: Optimal Wound Healing  Outcome: Ongoing, Progressing     Problem: Infection  Goal: Absence of Infection Signs and Symptoms  Outcome: Ongoing, Progressing   Wound care performed. Received D5W and antibiotic.    Bed locked and in lowest position. Call light in reach. Tube feeding running at 30mL/hr.

## 2022-06-21 NOTE — ASSESSMENT & PLAN NOTE
Palliative medicine consulted for goals of care and advanced care planning for Ms. Avila a 72 yo lady with history of anoxic brain injury. S/P trach and PEG. Presented [place] [facility] [place] nursing facility after family has revoked hospice and seeking higher level of care. Chart reviewed and patient discussed with primary team Dr. Self.  Pal jose alejandro consulted as per Gen Sx recommendation. Primary team has had initial goals of care conversation with the patient's sister and HPOA.     At time of this encounter Ms Avila is awake, alert, oriented to self only.  Able to follow simple commands and not able to participate in conversation. She appears comfortable with no facial grimacing, moaning, supplemental oxygen via trach collar.  Oxygen saturation is 92% to 100%. No acute distress    No family in person to have goals of care conversation.      Advance Care Planning     - ACP documents - HPOA, LW and LaPOST received  - HPOA is sister Sabi Avila [phone]  LaPOST indicates DNR - natural death, no intubation - noninvasive CPAP/BiPAP and trial of artificial nutrition by tube   - DNR per primary team     Goals of Care     - met with patient's sister and HPOA Sabi Avila.  Palliative medicine introduced.   - appears patient family has limited understanding of current clinical condition and role of the HPOA  - patient sister struggling to make right decisions as she does not want to give up.    - encouraged family see situation as honoring the patient's wishes.  Sabi states she is not able to do this.    [facility]  I engaged the family in a conversation about advance care planning and we specifically addressed what the goals of care would be moving forward, in light of the patient's change in clinical status, specifically anoxic brain injury, stage IV sacral decubitus, debility  And poor prognosis - had been receiving inpatient  hospice services.   .  We explored the patient's values and preferences for future care. Reviewed  the patients advanced care planning documents indicating the patient's wish was not having prolonged artifical nutrition and or life support.    The family and healthcare power of   endorses that what is most important right now is to focus on extending life as long as possible, even it it means sacrificing quality and curative/life-prolongation (regardless of treatment burdens)    Accordingly, we have decided that the best plan to meet the patient's goals includes continuing with treatment  Lengthy discussion regarding the appropriateness of hospice care at this stage of the patient's illness, including its ability to help the patient live with the best quality of life possible.  Despite this information and the patient's advanced directives [facility] is not accepting of the hospice philosophy and we We will not be making a hospice referral. In addition the family is seeking to continue with higher level of care including surgical debridement of the sacral ulcer.  It is the family's goal to continue care in alf nursing home setting after discharge.      I spent a total of 25 minutes engaging the patient in this advance care planning discussion.    Anoxic brain injury/ Sacral Decubitus stage IV/ hx of  CVA/ Seizures   - managed per primary team and specialty consultants   - serious illness in setting of comorbid conditions appropriate for transition to hospice care   - pal med following for goals of care - [place] [facility]      Plan/Recommendations  - Family is not amenable to hospice care. Believes this is not what her sister wanted when completing ACP documents. .  - family amenable to surgical debridement - surgery team will schedule     - Discussed challenges of providing post acute care in home setting.  Family amenable to nursing home placement.   - Patient and family would benefit from continued information and educations especially in relationship to future expectations   - Family will need assistance  making transition to nursing home placement.  Primary team  aware.  - emotional support provided     Primary team and surgical team notified per secure chat message.  Thank you of consult and opportunity to participate in Ms Avila's care.

## 2022-06-21 NOTE — ASSESSMENT & PLAN NOTE
71 year old woman with T2DM, remote history of stroke without residual deficit, reported seizures at OSH in 4/2022 including status epilepticus with resultant anoxic brain injury requiring trach/PEG. Admitted 6/18, Neurology consulted 6/21 regarding AED dosing. Per outside records was at one point on Keppra, vimpat, and phenytoin. Per patient's sister, phenytoin was stopped at prior hospital at some point prior to transfer. Phenytoin was stopped 6/19 AM. On exam patient intermittently interactive vs drowsy, per sister at bedside consistent with mental status when on phenytoin in the past.      Recommendations:  - increase Keppra to 750 mg BID  - discontinue Vimpat  - continue EEG overnight

## 2022-06-21 NOTE — PLAN OF CARE
JAZMIN called sister Sabi regarding d/c planning for pt.  SW stated that referrals were sent via [facility] for pt and there has not been any accepting facilties.  SW asked if she could send a referral to [facility] and sister Sabi agreed.      JAZMIN sent referral to [facility] at [facility]    SW met with sister Sabi in pt room and provided sister with referral list from [facility].  SW explained the responses from the facilities to the sister and informed sister that a referral was sent via email to [facility].  Sister Sabi stated that pt had stayed at [facility] previously in a SNF placement and was agreeable to the facility.    Yocasta Urbina CD, MSW, LMSW, RSW   Case Management  [facility]  Email: [email or web address]

## 2022-06-21 NOTE — PROGRESS NOTES
[facility] - Intensive Care ([place] [address])  [facility] Medicine  Progress Note    Patient Name: Darlene Avila  MRN: [id number]  Patient Class: IP- Inpatient   Admission Date: 6/17/2022  Length of Stay: 2 days  Attending Physician: Howie Grey MD  Primary Care Provider: Kirill Cabrera Iii, MD        Subjective:     Principal Problem:Hypernatremia        HPI:  Darlene Avila is a 71 year old F with history of diabetes type 2, seizure c/b anoxic brain injury s/p trach, PEG, bed bound status, Stage IV sacral ulcer, ischemic CVA, IBS, history of breast cancer s/p right mastectomy and failure to thrive who was brought in by her HPOA (sister) for nursing home placement. Patient is unable to provide history.     Patient's sister was contacted who stated that the patient was admitted to [facility] in [facility] for recurrent seizures/status epilepticus which resulted in an anoxic brain injury and respiratory failure.  Patient was intubated followed by tracheostomy and PEG placement .  Patient was discharged to nursing home and subsequently discharged to hospice care at the request of patient's sister. However a few days ago, she visited the patient at [facility] recovery facility and noticed they weren't feeding or taking care of the patient due to her 'hospice status'. Per sister today, she would like to place the patient in a skilled nursing facility and would like to rescind her hospice care at this time.  She is no longer interested in hospice placement. She states the patient at baseline is only able to follow some simple commands. She is unsure how much O2 level is her baseline via trach collar.   Of note she has a Stage IV sacral wound that is s/p debridement on 06/09 by Gen surgery in [facility].     Upon arrival to the ED, the patient was hemodynamically stable. Labs revealed Na 157, WBC 14. Hgb 9.4 (baseline 7-9). The patient was given 1L of IV NS and was admitted to hospital medicine for further management.        Overview/Hospital Course:  Patient admitted to hospital medicine with pre-existing anoxic brain injury, hypernatremia, sacral decubitus wound s/p debridement 06/08 in [place], [place]. Patient initially admitted to hospice following discharge from [facility] ICU, but discharged from hospice as patient family believed she was not getting appropriate care. Na 157, corrected with IVF (NS and hypotonic solutions) and enteral FWF down to 148. Trach collar exchanged by ENT due to lack of supplies at facility. Wound care consulted for sacral decubitus, recommended General Surgery consult for possible debridement as wound appears purulent. Persistent leukocytosis, started Zosyn per previous I&D wound cultures growing bacteriodes/enterococcus and new wound findings. cEEG started and Epilepsy consulted for assistance with antiepileptic medications; conflicting reports if patient was on only Keppra/Lacosamide vs Keppra/Lacosamide/Phenytoin. General surgery consulted for debridement of sacral decubitus ulcer. Palliative Care consulted for assistance in GOC; decision to pursue medical/surgical care decided.    CM/SW to assist with dispo.      Interval History: AF HDS NAEON. Patient alert as per yesterday's exam. Responsive to sound and tracking with eyes. Attempting to speak, but no words. Follows simple directions.    Review of Systems   Unable to perform ROS: Mental status change   Objective:     Vital Signs (Most Recent):  Temp: 98.3 °F (36.8 °C) (06/21/22 1145)  Pulse: 91 (06/21/22 1145)  Resp: 16 (06/21/22 1145)  BP: 121/60 (06/21/22 1145)  SpO2: 97 % (06/21/22 1145) Vital Signs (24h Range):  Temp:  [97.4 °F (36.3 °C)-98.3 °F (36.8 °C)] 98.3 °F (36.8 °C)  Pulse:  [77-91] 91  Resp:  [14-20] 16  SpO2:  [92 %-100 %] 97 %  BP: ()/(53-60) 121/60     Weight: 50.2 kg (110 lb 10.7 oz)  Body mass index is 22.35 kg/m².    Intake/Output Summary (Last 24 hours) at 6/21/2022 1451  Last data filed at 6/21/2022 1314  Gross per 24  hour   Intake 1290 ml   Output --   Net 1290 ml      Physical Exam  Vitals and nursing note reviewed.   Constitutional:       General: She is not in acute distress.     Appearance: She is not ill-appearing, toxic-appearing or diaphoretic.   HENT:      Head: Normocephalic.      Mouth/Throat:      Mouth: Mucous membranes are dry.      Comments: Dried oral secretions adhered to tongue  Cardiovascular:      Rate and Rhythm: Normal rate and regular rhythm.      Pulses: Normal pulses.      Heart sounds: Normal heart sounds. No murmur heard.  Pulmonary:      Effort: Pulmonary effort is normal. No respiratory distress.      Breath sounds: Normal breath sounds. No wheezing or rales.   Abdominal:      General: Abdomen is flat. Bowel sounds are normal. There is no distension.      Palpations: Abdomen is soft.      Tenderness: There is no abdominal tenderness.   Musculoskeletal:         General: No swelling or tenderness. Normal range of motion.      Cervical back: Normal range of motion.   Skin:     General: Skin is warm and dry.      Coloration: Skin is not jaundiced or pale.      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    Neurological:      Mental Status: She is alert. She is disoriented.      Comments: Minimally alert, opens eyes to sound/pain, tracks to sound, attempts to speak, but does not form words/sounds   Psychiatric:      Comments: Unable to assess       Significant Labs: All pertinent labs within the past 24 hours have been reviewed.    Significant Imaging: I have reviewed all pertinent imaging results/findings within the past 24 hours.      Assessment/Plan:      * Hypernatremia  Na 157. Suspect from dehydration while at her recovery facility.   - FWD 2.7 on admission.   S/p 1L of NS in the ED.     - Following cessation of IVF, Na increased; restarted D5 at 100/hr  - Increased to 500cc QID free water boluses  - Do not overcorrect by >6-8mEq within 24 hours; goal 152 > 144.  - Na  q6h    Discharge planning issues  Darlene Avila is a 71 year old F with history of diabetes type 2, seizure, ischemic CVA, IBS, history of breast cancer s/p right mastectomy and failure to thrive who was brought in by her HPOA (sister) for skilled nursing home placement due to concern for neglect at recent hospice facility. Patient follows very simple commands and doubt she can engage in skilled therapy. halfway nursing facility likely more of an option.     - Appreciate CM/SW assistance with placement.   - Appreciate pharmacy's assistance with med rec.     Sacral decubitus ulcer, stage IV  S/p recent debridement at OSH 06/08. OSH Wcx with enterococcus faecalis, bacteriodes fragilus.    - Wound care consulted  - General surgery consulted for I&D  - Consideration for wound vac given depth of injury  - Turn q2h    Leukocytosis  Patient admitted with elevated WBC to 14s. Previous episodes of aspiration PNA at OSH, indwelling cabrera given acuity of condition, sacral decubitus ulcer stave IV s/p debridement with enterococcus faecalis / bacteriodes fragilis in cx. Patient afebrile with stable hemodynamics on admission at this time. CXR without acute abnormality. UA with yeast growing, chronic per previous OSH review.    Leukocytosis may be explained by soft tissue infection vs hemoconcentration given extensive dehydration and hypernatremia from lack of enteral fluids or IVF at outside hospice facility prior to transfer to [facility].     - Zosyn given previous cultures and persistent leukocytosis  - Etiology may be soft tissue infection; see Sacral Decubitus Ulcer A/P  - Continue monitoring CBC  - Wound care per Sacral Decubitus Ulcer A/P  - Aspiration precautions  - Trend fever curve  - Trend BCx    Tracheostomy in place  On 5L O2 via trach collar.     - ENT consulted for trach exchange given lack of compatible equipment; appreciate assistance  - Respiratory therapy to assist with trach care.     Seizures  Patient previously  started on Lacosamide and Keppra at OSH during initial episode of status epilepticus. Phenytoin added as patient continued to have breakthrough seizures.    - Discontinue phenytoin  - Continue Lacosamide and Keppra via G-tube  - cEEG; appreciate EP's assistance in medication management  - Neurology consulted for further assistance in antiepileptic regimen    Palliative care encounter  Per Primary Medicine team and Palliative Care encounters with patient family (separate encounters, see dated notes in chart), patient family wishes full medical care and does not desire comfort/hospice measures. DNR remains. See Pall Care note for further detail.    G tube feedings  Tube feed dependent via G tube  - Nutrition to assist with TF recs; see recommendations in note; appreciate assistance      ACP (advance care planning)  Patient is DNR. LaPOST on file.       History of CVA (cerebrovascular accident)  Unclear of patient's current medications. Per med review on care-everywhere, the patient is on statin but not on antiplatelet therapy.     - Appreciate pharmacy's assistance with medication reconciliation with facility- start antiplatelet therapy if no contraindications.   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 A1c repeated 6.9.    - LDSSI   - POCT glucose q6hrs  - Maintain -180  - Titrate basal/bolus regimen to goal as above.      VTE Risk Mitigation (From admission, onward)         Ordered     heparin (porcine) injection 5,000 Units  Every 12 hours         06/18/22 0355     IP VTE HIGH RISK PATIENT  Once         06/18/22 0355     Place sequential compression device  Until discontinued         06/18/22 0355                Discharge Planning   JUDIT: 6/24/2022     Code Status: DNR   Is the patient medically ready for discharge?:      Reason for patient still in hospital (select all that apply): Treatment  Discharge Plan A: [facility] Nursing Home placement [facility]   Discharge Delays: None known at this time              Dawood Self MD  Department of Hospital Medicine   [facility] - Intensive Care ([place]

## 2022-06-21 NOTE — PLAN OF CARE
06/21/22 0843   Post-Acute Status   Post-Acute Authorization Placement   Post-Acute Placement Status Referrals Sent   Discharge Delays None known at this time   Discharge Plan   Discharge Plan A New Nursing Home placement - [facility] care facility   Discharge Plan B Skilled Nursing Facility       SW sent referrals for SNF and NH placement via [facility].       Yocasta Urbina CD, MSW, LMSW, RSW   Case Management  [facility]  Email: [email or web address]

## 2022-06-21 NOTE — ASSESSMENT & PLAN NOTE
Per Primary Medicine team and Palliative Care encounters with patient family (separate encounters, see dated notes in chart), patient family wishes full medical care and does not desire comfort/hospice measures. DNR remains. See Pall Care note for further detail.

## 2022-06-21 NOTE — ASSESSMENT & PLAN NOTE
Na 157. Suspect from dehydration while at her recovery facility.   - FWD 2.7 on admission.   S/p 1L of NS in the ED.     - Following cessation of IVF, Na increased; restarted D5 at 100/hr  - Increased to 500cc QID free water boluses  - Do not overcorrect by >6-8mEq within 24 hours; goal 152 > 144.  - Na q6h

## 2022-06-21 NOTE — ASSESSMENT & PLAN NOTE
Darlene Avila is a 71 year old F with history of diabetes type 2, seizure, ischemic CVA, IBS, history of breast cancer s/p right mastectomy and failure to thrive who was brought in by her HPOA (sister) for skilled nursing home placement due to concern for neglect at recent hospice facility. Patient follows very simple commands and doubt she can engage in skilled therapy. [facility] nursing facility likely more of an option.     - Appreciate CM/SW assistance with placement.   - Appreciate pharmacy's assistance with med rec.

## 2022-06-22 LAB
ALBUMIN SERPL BCP-MCNC: 1.4 G/DL (ref 3.5–5.2)
ALP SERPL-CCNC: 120 U/L (ref 55–135)
ALT SERPL W/O P-5'-P-CCNC: 14 U/L (ref 10–44)
ANION GAP SERPL CALC-SCNC: 7 MMOL/L (ref 8–16)
AST SERPL-CCNC: 13 U/L (ref 10–40)
BASOPHILS # BLD AUTO: 0.03 K/UL (ref 0–0.2)
BASOPHILS NFR BLD: 0.2 % (ref 0–1.9)
BILIRUB SERPL-MCNC: 0.1 MG/DL (ref 0.1–1)
BUN SERPL-MCNC: 26 MG/DL (ref 8–23)
CALCIUM SERPL-MCNC: 9.1 MG/DL (ref 8.7–10.5)
CHLORIDE SERPL-SCNC: 120 MMOL/L (ref 95–110)
CO2 SERPL-SCNC: 18 MMOL/L (ref 23–29)
CREAT SERPL-MCNC: 0.7 MG/DL (ref 0.5–1.4)
DIFFERENTIAL METHOD: ABNORMAL
EOSINOPHIL # BLD AUTO: 0.1 K/UL (ref 0–0.5)
EOSINOPHIL NFR BLD: 0.9 % (ref 0–8)
ERYTHROCYTE [DISTWIDTH] IN BLOOD BY AUTOMATED COUNT: 18.2 % (ref 11.5–14.5)
EST. GFR  (AFRICAN AMERICAN): >60 ML/MIN/1.73 M^2
EST. GFR  (NON AFRICAN AMERICAN): >60 ML/MIN/1.73 M^2
GLUCOSE SERPL-MCNC: 154 MG/DL (ref 70–110)
HCT VFR BLD AUTO: 29.5 % (ref 37–48.5)
HGB BLD-MCNC: 8.9 G/DL (ref 12–16)
IMM GRANULOCYTES # BLD AUTO: 0.04 K/UL (ref 0–0.04)
IMM GRANULOCYTES NFR BLD AUTO: 0.3 % (ref 0–0.5)
LYMPHOCYTES # BLD AUTO: 2.5 K/UL (ref 1–4.8)
LYMPHOCYTES NFR BLD: 18.8 % (ref 18–48)
MAGNESIUM SERPL-MCNC: 1.7 MG/DL (ref 1.6–2.6)
MCH RBC QN AUTO: 27.2 PG (ref 27–31)
MCHC RBC AUTO-ENTMCNC: 30.2 G/DL (ref 32–36)
MCV RBC AUTO: 90 FL (ref 82–98)
MONOCYTES # BLD AUTO: 0.4 K/UL (ref 0.3–1)
MONOCYTES NFR BLD: 3.1 % (ref 4–15)
NEUTROPHILS # BLD AUTO: 10 K/UL (ref 1.8–7.7)
NEUTROPHILS NFR BLD: 76.7 % (ref 38–73)
NRBC BLD-RTO: 0 /100 WBC
PHOSPHATE SERPL-MCNC: 2.2 MG/DL (ref 2.7–4.5)
PLATELET # BLD AUTO: 463 K/UL (ref 150–450)
PMV BLD AUTO: 9.4 FL (ref 9.2–12.9)
POCT GLUCOSE: 179 MG/DL (ref 70–110)
POCT GLUCOSE: 231 MG/DL (ref 70–110)
POTASSIUM SERPL-SCNC: 3.8 MMOL/L (ref 3.5–5.1)
PROT SERPL-MCNC: 6 G/DL (ref 6–8.4)
RBC # BLD AUTO: 3.27 M/UL (ref 4–5.4)
SODIUM SERPL-SCNC: 145 MMOL/L (ref 136–145)
SODIUM SERPL-SCNC: 147 MMOL/L (ref 136–145)
WBC # BLD AUTO: 13.06 K/UL (ref 3.9–12.7)

## 2022-06-22 PROCEDURE — 83735 ASSAY OF MAGNESIUM: CPT

## 2022-06-22 PROCEDURE — 25000003 PHARM REV CODE 250

## 2022-06-22 PROCEDURE — 84100 ASSAY OF PHOSPHORUS: CPT

## 2022-06-22 PROCEDURE — 95714 VEEG EA 12-26 HR UNMNTR: CPT

## 2022-06-22 PROCEDURE — 27201112

## 2022-06-22 PROCEDURE — 27200966 HC CLOSED SUCTION SYSTEM

## 2022-06-22 PROCEDURE — 95720 EEG PHY/QHP EA INCR W/VEEG: CPT | Mod: ,,, | Performed by: PSYCHIATRY & NEUROLOGY

## 2022-06-22 PROCEDURE — 63600175 PHARM REV CODE 636 W HCPCS

## 2022-06-22 PROCEDURE — 36415 COLL VENOUS BLD VENIPUNCTURE: CPT

## 2022-06-22 PROCEDURE — 84295 ASSAY OF SERUM SODIUM: CPT

## 2022-06-22 PROCEDURE — 99900026 HC AIRWAY MAINTENANCE (STAT)

## 2022-06-22 PROCEDURE — 99233 SBSQ HOSP IP/OBS HIGH 50: CPT | Mod: ,,, | Performed by: STUDENT IN AN ORGANIZED HEALTH CARE EDUCATION/TRAINING PROGRAM

## 2022-06-22 PROCEDURE — 99233 PR SUBSEQUENT HOSPITAL CARE,LEVL III: ICD-10-PCS | Mod: ,,, | Performed by: STUDENT IN AN ORGANIZED HEALTH CARE EDUCATION/TRAINING PROGRAM

## 2022-06-22 PROCEDURE — 12000002 HC ACUTE/MED SURGE SEMI-PRIVATE ROOM

## 2022-06-22 PROCEDURE — 25000003 PHARM REV CODE 250: Performed by: STUDENT IN AN ORGANIZED HEALTH CARE EDUCATION/TRAINING PROGRAM

## 2022-06-22 PROCEDURE — 84295 ASSAY OF SERUM SODIUM: CPT | Mod: 91

## 2022-06-22 PROCEDURE — 63600175 PHARM REV CODE 636 W HCPCS: Performed by: STUDENT IN AN ORGANIZED HEALTH CARE EDUCATION/TRAINING PROGRAM

## 2022-06-22 PROCEDURE — 95720 PR EEG, W/VIDEO, CONT RECORD, I&R, >12<26 HRS: ICD-10-PCS | Mod: ,,, | Performed by: PSYCHIATRY & NEUROLOGY

## 2022-06-22 PROCEDURE — 94761 N-INVAS EAR/PLS OXIMETRY MLT: CPT

## 2022-06-22 PROCEDURE — 85025 COMPLETE CBC W/AUTO DIFF WBC: CPT

## 2022-06-22 PROCEDURE — 99900035 HC TECH TIME PER 15 MIN (STAT)

## 2022-06-22 PROCEDURE — 80053 COMPREHEN METABOLIC PANEL: CPT

## 2022-06-22 PROCEDURE — 27000221 HC OXYGEN, UP TO 24 HOURS

## 2022-06-22 RX ADMIN — INSULIN ASPART 5 UNITS: 100 INJECTION, SOLUTION INTRAVENOUS; SUBCUTANEOUS at 01:06

## 2022-06-22 RX ADMIN — INSULIN ASPART 2 UNITS: 100 INJECTION, SOLUTION INTRAVENOUS; SUBCUTANEOUS at 01:06

## 2022-06-22 RX ADMIN — MUPIROCIN: 20 OINTMENT TOPICAL at 09:06

## 2022-06-22 RX ADMIN — ATORVASTATIN CALCIUM 80 MG: 20 TABLET, FILM COATED ORAL at 09:06

## 2022-06-22 RX ADMIN — PIPERACILLIN SODIUM AND TAZOBACTAM SODIUM 4.5 G: 4; .5 INJECTION, POWDER, LYOPHILIZED, FOR SOLUTION INTRAVENOUS at 10:06

## 2022-06-22 RX ADMIN — INSULIN DETEMIR 10 UNITS: 100 INJECTION, SOLUTION SUBCUTANEOUS at 10:06

## 2022-06-22 RX ADMIN — PIPERACILLIN SODIUM AND TAZOBACTAM SODIUM 4.5 G: 4; .5 INJECTION, POWDER, LYOPHILIZED, FOR SOLUTION INTRAVENOUS at 05:06

## 2022-06-22 RX ADMIN — HEPARIN SODIUM 5000 UNITS: 5000 INJECTION INTRAVENOUS; SUBCUTANEOUS at 09:06

## 2022-06-22 RX ADMIN — CHOLESTYRAMINE 8 G: 4 POWDER, FOR SUSPENSION ORAL at 09:06

## 2022-06-22 RX ADMIN — LEVETIRACETAM 750 MG: 100 SOLUTION ORAL at 09:06

## 2022-06-22 RX ADMIN — INSULIN ASPART 5 UNITS: 100 INJECTION, SOLUTION INTRAVENOUS; SUBCUTANEOUS at 05:06

## 2022-06-22 RX ADMIN — PIPERACILLIN SODIUM AND TAZOBACTAM SODIUM 4.5 G: 4; .5 INJECTION, POWDER, LYOPHILIZED, FOR SOLUTION INTRAVENOUS at 02:06

## 2022-06-22 RX ADMIN — MUPIROCIN: 20 OINTMENT TOPICAL at 10:06

## 2022-06-22 RX ADMIN — INSULIN ASPART 5 UNITS: 100 INJECTION, SOLUTION INTRAVENOUS; SUBCUTANEOUS at 10:06

## 2022-06-22 NOTE — PROGRESS NOTES
[facility] - Intensive Care ([place] [address])  [facility] Medicine  Progress Note    Patient Name: Darlene Avila  MRN: [id number]  Patient Class: IP- Inpatient   Admission Date: 6/17/2022  Length of Stay: 3 days  Attending Physician: Howie Grey MD  Primary Care Provider: Kirill Cabrera Iii, MD        Subjective:     Principal Problem:Hypernatremia        HPI:  Darlene Avila is a 71 year old F with history of diabetes type 2, seizure c/b anoxic brain injury s/p trach, PEG, bed bound status, Stage IV sacral ulcer, ischemic CVA, IBS, history of breast cancer s/p right mastectomy and failure to thrive who was brought in by her HPOA (sister) for nursing home placement. Patient is unable to provide history.     Patient's sister was contacted who stated that the patient was admitted to [facility] in [facility] for recurrent seizures/status epilepticus which resulted in an anoxic brain injury and respiratory failure.  Patient was intubated followed by tracheostomy and PEG placement .  Patient was discharged to nursing home and subsequently discharged to hospice care at the request of patient's sister. However a few days ago, she visited the patient at [facility] recovery facility and noticed they weren't feeding or taking care of the patient due to her 'hospice status'. Per sister today, she would like to place the patient in a skilled nursing facility and would like to rescind her hospice care at this time.  She is no longer interested in hospice placement. She states the patient at baseline is only able to follow some simple commands. She is unsure how much O2 level is her baseline via trach collar.   Of note she has a Stage IV sacral wound that is s/p debridement on 06/09 by Gen surgery in [facility].     Upon arrival to the ED, the patient was hemodynamically stable. Labs revealed Na 157, WBC 14. Hgb 9.4 (baseline 7-9). The patient was given 1L of IV NS and was admitted to hospital medicine for further management.  "      Overview/Hospital Course:  Patient admitted to hospital medicine with pre-existing anoxic brain injury, hypernatremia, sacral decubitus wound s/p debridement 06/08 in [place], [place]. Patient initially admitted to hospice following discharge from [facility] ICU, but discharged from hospice as patient family believed she was not getting appropriate care. Na 157, corrected with IVF (NS and hypotonic solutions) and enteral FWF down to 148. Trach collar exchanged by ENT due to lack of supplies at facility. Wound care consulted for sacral decubitus, recommended General Surgery consult for possible debridement as wound appears purulent. Persistent leukocytosis, started Zosyn per previous I&D wound cultures growing bacteriodes/enterococcus and new wound findings. cEEG started and Epilepsy consulted for assistance with antiepileptic medications; conflicting reports if patient was on only Keppra/Lacosamide vs Keppra/Lacosamide/Phenytoin. General surgery consulted for debridement of sacral decubitus ulcer. Palliative Care consulted for assistance in GOC; decision to pursue medical/surgical care decided.    CM/SW to assist with dispo.      Interval History: AF HDS NAEON. Patient more alert and responsive, able to have simple conversations and answer yes/no questions. Attempts to speak, (ex "how long have I been here?", "can I have food?"). Limited ROS due to dysarthria and mental status.    Review of Systems   Unable to perform ROS: Mental status change   Respiratory:  Negative for chest tightness and shortness of breath.    Cardiovascular:  Negative for chest pain.   Gastrointestinal:  Negative for abdominal pain.   Objective:     Vital Signs (Most Recent):  Temp: 97.5 °F (36.4 °C) (06/22/22 1605)  Pulse: 83 (06/22/22 1605)  Resp: 18 (06/22/22 1605)  BP: 126/60 (06/22/22 0415)  SpO2: 100 % (06/22/22 1605) Vital Signs (24h Range):  Temp:  [97.5 °F (36.4 °C)-98.4 °F (36.9 °C)] 97.5 °F (36.4 °C)  Pulse:  [81-86] 83  Resp:  " [14-18] 18  SpO2:  [98 %-100 %] 100 %  BP: (121-133)/(60-65) 126/60     Weight: 50.2 kg (110 lb 10.7 oz)  Body mass index is 22.35 kg/m².    Intake/Output Summary (Last 24 hours) at 6/22/2022 1608  Last data filed at 6/22/2022 1555  Gross per 24 hour   Intake 3252.58 ml   Output 750 ml   Net 2502.58 ml      Physical Exam  Vitals and nursing note reviewed.   Constitutional:       General: She is not in acute distress.     Appearance: She is not ill-appearing, toxic-appearing or diaphoretic.   HENT:      Head: Normocephalic.      Mouth/Throat:      Mouth: Mucous membranes are dry.      Comments: Dried oral secretions adhered to tongue  Cardiovascular:      Rate and Rhythm: Normal rate and regular rhythm.      Pulses: Normal pulses.      Heart sounds: Normal heart sounds. No murmur heard.  Pulmonary:      Effort: Pulmonary effort is normal. No respiratory distress.      Breath sounds: Normal breath sounds. No wheezing or rales.   Abdominal:      General: Abdomen is flat. Bowel sounds are normal. There is no distension.      Palpations: Abdomen is soft.      Tenderness: There is no abdominal tenderness.   Musculoskeletal:         General: No swelling or tenderness. Normal range of motion.      Cervical back: Normal range of motion.   Skin:     General: Skin is warm and dry.      Coloration: Skin is not jaundiced or pale.      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    Neurological:      Mental Status: She is alert. She is disoriented.      Comments: Minimally alert, opens eyes to sound/pain, tracks to sound, attempts to speak,, short sentences limited by dysarthria / trach.   Psychiatric:      Comments: Unable to assess       Significant Labs: All pertinent labs within the past 24 hours have been reviewed.    Significant Imaging: I have reviewed all pertinent imaging results/findings within the past 24 hours.      Assessment/Plan:      * Hypernatremia  Na 157. Suspect from dehydration while  at her recovery facility.   - FWD 2.7 on admission.   S/p 1L of NS in the ED.     - D/C hypotonic fluids  - Increased to 500cc QID free water boluses  - Do not overcorrect by >6-8mEq within 24 hours; goal 152 > 144.  - Na q6h    Discharge planning issues  Darlene Avila is a 71 year old F with history of diabetes type 2, seizure, ischemic CVA, IBS, history of breast cancer s/p right mastectomy and failure to thrive who was brought in by her HPOA (sister) for skilled nursing home placement due to concern for neglect at recent hospice facility. Patient follows very simple commands and doubt she can engage in skilled therapy. intermediate nursing facility likely more of an option.     - Appreciate CM/SW assistance with placement.   - Appreciate pharmacy's assistance with med rec.     Sacral decubitus ulcer, stage IV  S/p recent debridement at OSH 06/08. OSH Wcx with enterococcus faecalis, bacteriodes fragilus.    - Wound care consulted  - General surgery consulted for I&D; plan for I&D, f/u Gen Surg  - Consideration for wound vac given depth of injury  - Turn q2h    Leukocytosis  Patient admitted with elevated WBC to 14s. Previous episodes of aspiration PNA at OSH, indwelling cabrera given acuity of condition, sacral decubitus ulcer stave IV s/p debridement with enterococcus faecalis / bacteriodes fragilis in cx. Patient afebrile with stable hemodynamics on admission at this time. CXR without acute abnormality. UA with yeast growing, chronic per previous OSH review.    Leukocytosis may be explained by soft tissue infection vs hemoconcentration given extensive dehydration and hypernatremia from lack of enteral fluids or IVF at outside hospice facility prior to transfer to [facility].     - Zosyn given previous cultures and persistent leukocytosis  - Etiology may be soft tissue infection; see Sacral Decubitus Ulcer A/P  - Continue monitoring CBC  - Wound care per Sacral Decubitus Ulcer A/P  - Aspiration precautions  - Trend fever  curve  - Trend BCx    Tracheostomy in place  On 5L O2 via trach collar.     - ENT consulted for trach exchange given lack of compatible equipment; appreciate assistance  - Respiratory therapy to assist with trach care.     Seizures  Patient previously started on Lacosamide and Keppra at OSH during initial episode of status epilepticus. Phenytoin added as patient continued to have breakthrough seizures.    - Keppra monotherapy 750 bid per neuro recs  - cEEG; appreciate neuro assistance in medication management  - Neurology consulted for further assistance in antiepileptic regimen    Palliative care encounter  Per Primary Medicine team and Palliative Care encounters with patient family (separate encounters, see dated notes in chart), patient family wishes full medical care and does not desire comfort/hospice measures. DNR remains. See Pall Care note for further detail.    G tube feedings  Tube feed dependent via G tube  - Nutrition to assist with TF recs; see recommendations in note; appreciate assistance      ACP (advance care planning)  Patient is DNR. LaPOST on file.       History of CVA (cerebrovascular accident)  Unclear of patient's current medications. Per med review on care-everywhere, the patient is on statin but not on antiplatelet therapy.     - Appreciate pharmacy's assistance with medication reconciliation with facility- start antiplatelet therapy if no contraindications.   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 A1c repeated 6.9.    - LDSSI   - POCT glucose q6hrs  - Maintain -180  - Titrate basal/bolus regimen to goal as above.    VTE Risk Mitigation (From admission, onward)         Ordered     heparin (porcine) injection 5,000 Units  Every 12 hours         06/18/22 0355     IP VTE HIGH RISK PATIENT  Once          06/18/22 0355     Place sequential compression device  Until discontinued         06/18/22 0355                Discharge Planning   JUDIT: 6/24/2022     Code Status: DNR   Is the patient medically ready for discharge?:     Reason for patient still in hospital (select all that apply): Treatment and Consult recommendations  Discharge Plan A: New Nursing Home placement - detention care facility   Discharge Delays: None known at this time              Dawood Self MD  Department of Hospital Medicine   [facility] - Intensive Care ([place]-[facility])

## 2022-06-22 NOTE — ASSESSMENT & PLAN NOTE
Na 157. Suspect from dehydration while at her recovery facility.   - FWD 2.7 on admission.   S/p 1L of NS in the ED.     - D/C hypotonic fluids  - Increased to 500cc QID free water boluses  - Do not overcorrect by >6-8mEq within 24 hours; goal 152 > 144.  - Na q6h

## 2022-06-22 NOTE — PROCEDURES
[facility] EEG/VIDEO MONITORING REPORT  Darlene Avila  [id number]  1950    DATE OF SERVICE:  06/21/2022  DATE OF ADMISSION: 6/17/2022 11:34 PM    ADMITTING/REQUESTING PROVIDER: Howie Grey MD    REASON FOR CONSULT:  71-year-old woman with previous anoxic brain injury with decreased responsiveness.  Evaluate for evidence of epileptiform activity.    METHODOLOGY   Electroencephalographic (EEG) recording is with electrodes placed according to the International 10-20 placement system.  Thirty two (32) channels of digital signal (sampling rate of 512/sec) including T1 and T2 was simultaneously recorded from the scalp and may include  EKG, EMG, and/or eye monitors.  Recording band pass was 0.1 to 512 hz.  Digital video recording of the patient is simultaneously recorded with the EEG.  The patient is instructed report clinical symptoms which may occur during the recording session.  EEG and video recording is stored and archived in digital format.  Activation procedures which include photic stimulation, hyperventilation and instructing patients to perform simple task are done in selected patients.   The EEG is displayed on a monitor screen and can be reviewed using different montages.  Computer assisted analysis is employed to detect spike and electrographic seizure activity.   The entire record is submitted for computer analysis.  The entire recording is visually reviewed and the times identified by computer analysis as being spikes or seizures are reviewed again.  Compresses spectral analysis (CSA) is also performed on the activity recorded from each individual channel.  This is displayed as a power display of frequencies from 0 to 30 Hz over time.   The CSA is reviewed looking for asymmetries in power between homologous areas of the scalp and then compared with the original EEG recording.     [employer] software is also utilized in the review of this study.  This software suite analyzes the EEG recording in multiple  domains.  Coherence and rhythmicity is computed to identify EEG sections which may contain organized seizures.  Each channel undergoes analysis to detect presence of spike and sharp waves which have special and morphological characteristic of epileptic activity.  The routine EEG recording is converted from spacial into frequency domain.  This is then displayed comparing homologous areas to identify areas of significant asymmetry.  Algorithm to identify non-cortically generated artifact is used to separate eye movement, EMG and other artifact from the EEG.      RECORDING TIMES  Start on 06/21/2022 at 07:01 a.m.  Stop on 06/22/2022 at 07:00 a.m.  A total of 23 hours and 57 minutes of EEG recording is obtained.    EEG FINDINGS  Background activity:   The background is moderate voltage predominantly theta activity.  There is a 7-8 hz posterior dominant rhythm.    Sleep:  There is evidence of state transitions with the appearance of sleep architecture.    Activation procedures:   Hyperventilation is not performed  Photic stimulation is not performed    Cardiac Monitor:   Heart rate appears generally regular on a single lead EKG.    Impression:   This is an abnormal continuous EEG monitoring study because of a slow and disorganized background consistent with diffuse cortical dysfunction and a moderate encephalopathy.  This finding is nonspecific with regards to etiology but can be seen in the setting of toxic/metabolic derangements, anoxia, infection, and as a medication effect.  There are no prominent focal findings however encephalopathy obscures focal findings.  There are no pushbutton activations, no epileptiform discharges, and no electrographic seizures.    Keke Zelaya MD PhD  Neurology-Epilepsy  [facility].

## 2022-06-22 NOTE — SUBJECTIVE & OBJECTIVE
"  Subjective:     Interval History: No acute events overnight. Patient this morning states/mouths that she is "doing all right." Denies having pain.    Current Neurological Medications: Keppra 750 mg BID    Current Facility-Administered Medications   Medication Dose Route Frequency Provider Last Rate Last Admin    atorvastatin tablet 80 mg  80 mg Per G Tube Daily Johnny Reilly MD   80 mg at 06/21/22 0934    cholestyramine 4 gram packet 8 g  2 packet Per G Tube BID Johnny Reilly MD   8 g at 06/21/22 2100    dextrose 10% bolus 125 mL  12.5 g Intravenous PRN Ez Dickens MD        dextrose 10% bolus 250 mL  25 g Intravenous PRN Ez Dickens MD        glucagon (human recombinant) injection 1 mg  1 mg Intramuscular PRN Ez Dickens MD        glucose chewable tablet 16 g  16 g Oral PRN Johnny Reilly MD        glucose chewable tablet 24 g  24 g Oral PRN Johnny Reilly MD        heparin (porcine) injection 5,000 Units  5,000 Units Subcutaneous Q12H Johnny Reilly MD   5,000 Units at 06/21/22 2100    insulin aspart U-100 pen 0-5 Units  0-5 Units Subcutaneous Q6H PRN Ez Dickens MD   3 Units at 06/21/22 1753    insulin aspart U-100 pen 5 Units  5 Units Subcutaneous TIDWM Dawood Self MD   5 Units at 06/21/22 1754    insulin detemir U-100 pen 10 Units  10 Units Subcutaneous Daily Dawood Self MD   10 Units at 06/21/22 0940    levetiracetam 500 mg/5 mL (5 mL) liquid Soln 750 mg  750 mg Per G Tube BID Maddi Whelan MD   750 mg at 06/21/22 2100    mupirocin 2 % ointment   Nasal BID Howie Grey MD   Given at 06/21/22 2100    naloxone 0.4 mg/mL injection 0.02 mg  0.02 mg Intravenous PRN Johnny Reilly MD        piperacillin-tazobactam 4.5 g in sodium chloride 0.9% 100 mL IVPB (ready to mix system)  4.5 g Intravenous Q8H Dawood Self MD   Stopped at 06/22/22 0612    sodium chloride 0.9% flush 10 mL  10 mL Intravenous Q12H PRN Johnny Reilly MD           Review of Systems   Unable to perform ROS: Mental status change "   Cardiovascular:  Negative for chest pain.   Gastrointestinal:  Negative for abdominal pain.   Musculoskeletal:  Negative for back pain and neck pain.   Objective:     Vital Signs (Most Recent):  Temp: 98.4 °F (36.9 °C) (06/22/22 0415)  Pulse: 81 (06/22/22 0415)  Resp: 16 (06/22/22 0415)  BP: 126/60 (06/22/22 0415)  SpO2: 98 % (06/22/22 0415) Vital Signs (24h Range):  Temp:  [97.5 °F (36.4 °C)-98.4 °F (36.9 °C)] 98.4 °F (36.9 °C)  Pulse:  [81-91] 81  Resp:  [14-18] 16  SpO2:  [97 %-100 %] 98 %  BP: (121-133)/(60-65) 126/60     Weight: 50.2 kg (110 lb 10.7 oz)  Body mass index is 22.35 kg/m².    Physical Exam  Vitals and nursing note reviewed.   Constitutional:       General: She is not in acute distress.     Appearance: She is ill-appearing.   HENT:      Head: Normocephalic and atraumatic.      Comments: EEG leads in place     Nose: Nose normal. No rhinorrhea.   Eyes:      Extraocular Movements: EOM normal.      Pupils: Pupils are equal, round, and reactive to light.   Neck:      Comments: Trach collar in place  Cardiovascular:      Rate and Rhythm: Normal rate and regular rhythm.   Pulmonary:      Effort: Pulmonary effort is normal. No respiratory distress.   Abdominal:      General: There is no distension.      Palpations: Abdomen is soft.   Musculoskeletal:      Right lower leg: No edema.      Left lower leg: No edema.   Skin:     General: Skin is warm and dry.      Capillary Refill: Capillary refill takes less than 2 seconds.   Neurological:      Mental Status: She is disoriented.      Deep Tendon Reflexes:      Reflex Scores:       Bicep reflexes are 2+ on the right side and 2+ on the left side.       Brachioradialis reflexes are 2+ on the right side and 2+ on the left side.       Patellar reflexes are 2+ on the right side and 2+ on the left side.      NEUROLOGICAL EXAMINATION:     MENTAL STATUS   Oriented to person.   Disoriented to place.   Disoriented to year. (States year is 2021)  Attention: normal.  Concentration: decreased.   Level of consciousness: alert    CRANIAL NERVES     CN III, IV, VI   Pupils are equal, round, and reactive to light.  Extraocular motions are normal.     CN VII   Facial expression full, symmetric.     CN VIII   CN VIII normal.     CN IX, X   CN IX normal.   CN X normal.     CN XI   CN XI normal.     CN XII   CN XII normal.     MOTOR EXAM   Right arm tone: increased  Left arm tone: increased    Strength   Right deltoid: 3/5  Left deltoid: 3/5  Right biceps: 3/5  Left biceps: 3/5  Right triceps: 3/5  Left triceps: 3/5  Right iliopsoas: 0/5  Left iliopsoas: 0/5  Right quadriceps: 0/5  Left quadriceps: 0/5  Right hamstrin/5  Left hamstrin/5  Right glutei: 0/5  Left glutei: 0/5  Right anterior tibial: 1/5  Left anterior tibial: 1/5  Right posterior tibial: 1/5  Left posterior tibial: 1/5  Right peroneal: 1/5  Left peroneal: 1/5       Follows commands centrally and in all extremities.     REFLEXES     Reflexes   Right brachioradialis: 2+  Left brachioradialis: 2+  Right biceps: 2+  Left biceps: 2+  Right patellar: 2+  Left patellar: 2+    Significant Labs: All pertinent lab results from the past 24 hours have been reviewed.    Significant Imaging: I have reviewed all pertinent imaging results/findings within the past 24 hours.

## 2022-06-22 NOTE — RESPIRATORY THERAPY
RAPID RESPONSE RESPIRATORY THERAPY PROACTIVE NOTE           Time of visit: 940     Code Status: DNR   : 1950  Bed: 08194/86673 A:   MRN: [id number]  Time spent at the bedside: < 15 min    SITUATION    Evaluated patient for: LDA Check     BACKGROUND    Patient has a past medical history of Arthritis, Cancer of breast, Cataract, Diabetes mellitus, Hypertension, Hypoxia, Memory loss, Orthostatic hypotension, TIA (transient ischemic attack), and Vitamin D deficiency.  Clinically Significant Surgical Hx: tracheostomy    24 Hours Vitals Range:  Temp:  [97.5 °F (36.4 °C)-98.4 °F (36.9 °C)]   Pulse:  [81-91]   Resp:  [14-18]   BP: (121-133)/(60-65)   SpO2:  [97 %-100 %]     Labs:    Recent Labs     22  0437 22  1232 22  0623 22  1220 22  1851 22  0034 22  0621   *   < > 149*  149*   < > 146* 147* 145  145   K 4.0  --  4.0  --   --   --  3.8   *  --  118*  --   --   --  120*   CO2 22*  --  21*  --   --   --  18*   CREATININE 0.6  --  0.7  --   --   --  0.7   GLU 90  --  125*  --   --   --  154*   PHOS 2.7  --  2.6*  --   --   --  2.2*   MG 1.7  --  1.8  --   --   --  1.7    < > = values in this interval not displayed.        No results for input(s): PH, PCO2, PO2, HCO3, POCSATURATED, BE in the last 72 hours.    ASSESSMENT/INTERVENTIONS  Upon arrival in room pt resting.  All trach supplies are at bedside.   Extra trachs at bedside are 6.0 shiley cuffed and 4.0 shiley cuffed    Last VS   Temp: 98.4 °F (36.9 °C) (415)  Pulse: 81 (415)  Resp: 16 (415)  BP: 126/60 (415)  SpO2: 98 % (415)    Level of Consciousness: Level of Consciousness (AVPU): responds to voice  Respiratory Effort: Respiratory Effort: Unlabored Expansion/Accessory Muscle Usage: Expansion/Accessory Muscles/Retractions: no retractions, no use of accessory muscles  All Lung Field Breath Sounds: All Lung Fields Breath Sounds: coarse  O2 Device/Concentration: Flow  (L/min): 5, Oxygen Concentration (%): 28, O2 Device (Oxygen Therapy): Trach Collar  Surgical airway: Yes, Type: Shiley Size: 6, uncuffed  Ambu at bedside: Ambu bag with the patient?: Yes, Adult Ambu     Active Orders   Respiratory Care    Oxygen Continuous     Frequency: Continuous     Number of Occurrences: Until Specified     Order Questions:      Device type: Low flow      Device: Trach Collar      FiO2%: 28      Titrate O2 per Oxygen Titration Protocol: Yes      To maintain SpO2 goal of: >= 90%      Notify MD of: Inability to achieve desired SpO2; Sudden change in patient status and requires 20% increase in FiO2; Patient requires >60% FiO2    RESPIRATORY COMMUNICATION     Frequency: Daily     Number of Occurrences: Until Specified     Order Comments: Trach care      Routine tracheostomy care     Frequency: BID     Number of Occurrences: Until Specified       RECOMMENDATIONS    We recommend: RRT Recs: Continue POC per primary team.    ESCALATION        FOLLOW-UP    Please call back the Rapid [facility] RRT at x [phone] for any questions or concerns.

## 2022-06-22 NOTE — ASSESSMENT & PLAN NOTE
Patient previously started on Lacosamide and Keppra at OSH during initial episode of status epilepticus. Phenytoin added as patient continued to have breakthrough seizures.    - Keppra monotherapy 750 bid per neuro recs  - cEEG; appreciate neuro assistance in medication management  - Neurology consulted for further assistance in antiepileptic regimen

## 2022-06-22 NOTE — SUBJECTIVE & OBJECTIVE
"Interval History: AF HDS NAEON. Patient more alert and responsive, able to have simple conversations and answer yes/no questions. Attempts to speak, (ex "how long have I been here?", "can I have food?"). Limited ROS due to dysarthria and mental status.    Review of Systems   Unable to perform ROS: Mental status change   Respiratory:  Negative for chest tightness and shortness of breath.    Cardiovascular:  Negative for chest pain.   Gastrointestinal:  Negative for abdominal pain.   Objective:     Vital Signs (Most Recent):  Temp: 97.5 °F (36.4 °C) (06/22/22 1605)  Pulse: 83 (06/22/22 1605)  Resp: 18 (06/22/22 1605)  BP: 126/60 (06/22/22 0415)  SpO2: 100 % (06/22/22 1605) Vital Signs (24h Range):  Temp:  [97.5 °F (36.4 °C)-98.4 °F (36.9 °C)] 97.5 °F (36.4 °C)  Pulse:  [81-86] 83  Resp:  [14-18] 18  SpO2:  [98 %-100 %] 100 %  BP: (121-133)/(60-65) 126/60     Weight: 50.2 kg (110 lb 10.7 oz)  Body mass index is 22.35 kg/m².    Intake/Output Summary (Last 24 hours) at 6/22/2022 1608  Last data filed at 6/22/2022 1555  Gross per 24 hour   Intake 3252.58 ml   Output 750 ml   Net 2502.58 ml      Physical Exam  Vitals and nursing note reviewed.   Constitutional:       General: She is not in acute distress.     Appearance: She is not ill-appearing, toxic-appearing or diaphoretic.   HENT:      Head: Normocephalic.      Mouth/Throat:      Mouth: Mucous membranes are dry.      Comments: Dried oral secretions adhered to tongue  Cardiovascular:      Rate and Rhythm: Normal rate and regular rhythm.      Pulses: Normal pulses.      Heart sounds: Normal heart sounds. No murmur heard.  Pulmonary:      Effort: Pulmonary effort is normal. No respiratory distress.      Breath sounds: Normal breath sounds. No wheezing or rales.   Abdominal:      General: Abdomen is flat. Bowel sounds are normal. There is no distension.      Palpations: Abdomen is soft.      Tenderness: There is no abdominal tenderness.   Musculoskeletal:         General: " No swelling or tenderness. Normal range of motion.      Cervical back: Normal range of motion.   Skin:     General: Skin is warm and dry.      Coloration: Skin is not jaundiced or pale.      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    Neurological:      Mental Status: She is alert. She is disoriented.      Comments: Minimally alert, opens eyes to sound/pain, tracks to sound, attempts to speak,, short sentences limited by dysarthria / trach.   Psychiatric:      Comments: Unable to assess       Significant Labs: All pertinent labs within the past 24 hours have been reviewed.    Significant Imaging: I have reviewed all pertinent imaging results/findings within the past 24 hours.

## 2022-06-22 NOTE — ASSESSMENT & PLAN NOTE
S/p recent debridement at OSH 06/08. OSH Wcx with enterococcus faecalis, bacteriodes fragilus.    - Wound care consulted  - General surgery consulted for I&D; plan for I&D, f/u Gen Surg  - Consideration for wound vac given depth of injury  - Turn q2h

## 2022-06-23 ENCOUNTER — ANESTHESIA EVENT (OUTPATIENT)
Dept: SURGERY | Facility: HOSPITAL | Age: 72
DRG: 622 | End: 2022-06-23
Payer: COMMERCIAL

## 2022-06-23 LAB
ALBUMIN SERPL BCP-MCNC: 1.4 G/DL (ref 3.5–5.2)
ALP SERPL-CCNC: 108 U/L (ref 55–135)
ALT SERPL W/O P-5'-P-CCNC: 14 U/L (ref 10–44)
ANION GAP SERPL CALC-SCNC: 7 MMOL/L (ref 8–16)
AST SERPL-CCNC: 11 U/L (ref 10–40)
BACTERIA BLD CULT: NORMAL
BASOPHILS # BLD AUTO: 0.02 K/UL (ref 0–0.2)
BASOPHILS NFR BLD: 0.2 % (ref 0–1.9)
BILIRUB SERPL-MCNC: 0.1 MG/DL (ref 0.1–1)
BUN SERPL-MCNC: 22 MG/DL (ref 8–23)
CALCIUM SERPL-MCNC: 9.2 MG/DL (ref 8.7–10.5)
CHLORIDE SERPL-SCNC: 120 MMOL/L (ref 95–110)
CO2 SERPL-SCNC: 18 MMOL/L (ref 23–29)
CREAT SERPL-MCNC: 0.7 MG/DL (ref 0.5–1.4)
DIFFERENTIAL METHOD: ABNORMAL
EOSINOPHIL # BLD AUTO: 0.2 K/UL (ref 0–0.5)
EOSINOPHIL NFR BLD: 1.2 % (ref 0–8)
ERYTHROCYTE [DISTWIDTH] IN BLOOD BY AUTOMATED COUNT: 18.2 % (ref 11.5–14.5)
EST. GFR  (AFRICAN AMERICAN): >60 ML/MIN/1.73 M^2
EST. GFR  (NON AFRICAN AMERICAN): >60 ML/MIN/1.73 M^2
GLUCOSE SERPL-MCNC: 161 MG/DL (ref 70–110)
HCT VFR BLD AUTO: 28.2 % (ref 37–48.5)
HGB BLD-MCNC: 8.7 G/DL (ref 12–16)
IMM GRANULOCYTES # BLD AUTO: 0.05 K/UL (ref 0–0.04)
IMM GRANULOCYTES NFR BLD AUTO: 0.4 % (ref 0–0.5)
LYMPHOCYTES # BLD AUTO: 2.1 K/UL (ref 1–4.8)
LYMPHOCYTES NFR BLD: 16.8 % (ref 18–48)
MAGNESIUM SERPL-MCNC: 1.8 MG/DL (ref 1.6–2.6)
MCH RBC QN AUTO: 27.5 PG (ref 27–31)
MCHC RBC AUTO-ENTMCNC: 30.9 G/DL (ref 32–36)
MCV RBC AUTO: 89 FL (ref 82–98)
MONOCYTES # BLD AUTO: 0.5 K/UL (ref 0.3–1)
MONOCYTES NFR BLD: 3.9 % (ref 4–15)
NEUTROPHILS # BLD AUTO: 9.8 K/UL (ref 1.8–7.7)
NEUTROPHILS NFR BLD: 77.5 % (ref 38–73)
NRBC BLD-RTO: 0 /100 WBC
PHOSPHATE SERPL-MCNC: 1.9 MG/DL (ref 2.7–4.5)
PLATELET # BLD AUTO: 474 K/UL (ref 150–450)
PMV BLD AUTO: 9.3 FL (ref 9.2–12.9)
POCT GLUCOSE: 181 MG/DL (ref 70–110)
POTASSIUM SERPL-SCNC: 3.9 MMOL/L (ref 3.5–5.1)
PROT SERPL-MCNC: 6.2 G/DL (ref 6–8.4)
RBC # BLD AUTO: 3.16 M/UL (ref 4–5.4)
SODIUM SERPL-SCNC: 144 MMOL/L (ref 136–145)
SODIUM SERPL-SCNC: 145 MMOL/L (ref 136–145)
SODIUM SERPL-SCNC: 146 MMOL/L (ref 136–145)
SODIUM SERPL-SCNC: 146 MMOL/L (ref 136–145)
WBC # BLD AUTO: 12.57 K/UL (ref 3.9–12.7)

## 2022-06-23 PROCEDURE — 95813 PR EEG, EXTENDED, 61-119 MINS: ICD-10-PCS | Mod: 26,,, | Performed by: PSYCHIATRY & NEUROLOGY

## 2022-06-23 PROCEDURE — 84100 ASSAY OF PHOSPHORUS: CPT

## 2022-06-23 PROCEDURE — 27200966 HC CLOSED SUCTION SYSTEM

## 2022-06-23 PROCEDURE — 99233 PR SUBSEQUENT HOSPITAL CARE,LEVL III: ICD-10-PCS | Mod: GT,,, | Performed by: CLINICAL NURSE SPECIALIST

## 2022-06-23 PROCEDURE — 80053 COMPREHEN METABOLIC PANEL: CPT

## 2022-06-23 PROCEDURE — 99233 SBSQ HOSP IP/OBS HIGH 50: CPT | Mod: GT,,, | Performed by: CLINICAL NURSE SPECIALIST

## 2022-06-23 PROCEDURE — 94761 N-INVAS EAR/PLS OXIMETRY MLT: CPT

## 2022-06-23 PROCEDURE — 99900035 HC TECH TIME PER 15 MIN (STAT)

## 2022-06-23 PROCEDURE — 84295 ASSAY OF SERUM SODIUM: CPT

## 2022-06-23 PROCEDURE — 99233 PR SUBSEQUENT HOSPITAL CARE,LEVL III: ICD-10-PCS | Mod: ,,, | Performed by: HOSPITALIST

## 2022-06-23 PROCEDURE — 36415 COLL VENOUS BLD VENIPUNCTURE: CPT

## 2022-06-23 PROCEDURE — 83735 ASSAY OF MAGNESIUM: CPT

## 2022-06-23 PROCEDURE — 99233 SBSQ HOSP IP/OBS HIGH 50: CPT | Mod: ,,, | Performed by: HOSPITALIST

## 2022-06-23 PROCEDURE — 95813 EEG EXTND MNTR 61-119 MIN: CPT | Mod: 26,,, | Performed by: PSYCHIATRY & NEUROLOGY

## 2022-06-23 PROCEDURE — 25000003 PHARM REV CODE 250

## 2022-06-23 PROCEDURE — 25000003 PHARM REV CODE 250: Performed by: STUDENT IN AN ORGANIZED HEALTH CARE EDUCATION/TRAINING PROGRAM

## 2022-06-23 PROCEDURE — 85025 COMPLETE CBC W/AUTO DIFF WBC: CPT

## 2022-06-23 PROCEDURE — 12000002 HC ACUTE/MED SURGE SEMI-PRIVATE ROOM

## 2022-06-23 PROCEDURE — 63600175 PHARM REV CODE 636 W HCPCS

## 2022-06-23 PROCEDURE — 63600175 PHARM REV CODE 636 W HCPCS: Performed by: STUDENT IN AN ORGANIZED HEALTH CARE EDUCATION/TRAINING PROGRAM

## 2022-06-23 PROCEDURE — 27000221 HC OXYGEN, UP TO 24 HOURS

## 2022-06-23 RX ADMIN — HEPARIN SODIUM 5000 UNITS: 5000 INJECTION INTRAVENOUS; SUBCUTANEOUS at 10:06

## 2022-06-23 RX ADMIN — CHOLESTYRAMINE 8 G: 4 POWDER, FOR SUSPENSION ORAL at 10:06

## 2022-06-23 RX ADMIN — INSULIN DETEMIR 10 UNITS: 100 INJECTION, SOLUTION SUBCUTANEOUS at 10:06

## 2022-06-23 RX ADMIN — LEVETIRACETAM 750 MG: 100 SOLUTION ORAL at 10:06

## 2022-06-23 RX ADMIN — INSULIN ASPART 5 UNITS: 100 INJECTION, SOLUTION INTRAVENOUS; SUBCUTANEOUS at 06:06

## 2022-06-23 RX ADMIN — PIPERACILLIN SODIUM AND TAZOBACTAM SODIUM 4.5 G: 4; .5 INJECTION, POWDER, LYOPHILIZED, FOR SOLUTION INTRAVENOUS at 10:06

## 2022-06-23 RX ADMIN — PIPERACILLIN SODIUM AND TAZOBACTAM SODIUM 4.5 G: 4; .5 INJECTION, POWDER, LYOPHILIZED, FOR SOLUTION INTRAVENOUS at 01:06

## 2022-06-23 RX ADMIN — INSULIN ASPART 5 UNITS: 100 INJECTION, SOLUTION INTRAVENOUS; SUBCUTANEOUS at 01:06

## 2022-06-23 RX ADMIN — ATORVASTATIN CALCIUM 80 MG: 20 TABLET, FILM COATED ORAL at 10:06

## 2022-06-23 RX ADMIN — INSULIN ASPART 5 UNITS: 100 INJECTION, SOLUTION INTRAVENOUS; SUBCUTANEOUS at 08:06

## 2022-06-23 RX ADMIN — MUPIROCIN: 20 OINTMENT TOPICAL at 10:06

## 2022-06-23 NOTE — PROGRESS NOTES
[facility] - Intensive Care ([place] [address])  [facility] Medicine  Progress Note    Patient Name: Darlene Avila  MRN: [id number]  Patient Class: IP- Inpatient   Admission Date: 6/17/2022  Length of Stay: 4 days  Attending Physician: Stacey Ugalde MD  Primary Care Provider: Kirill Cabrera Iii, MD        Subjective:     Principal Problem:Hypernatremia        HPI:  Darlene Avila is a 71 year old F with history of diabetes type 2, seizure c/b anoxic brain injury s/p trach, PEG, bed bound status, Stage IV sacral ulcer, ischemic CVA, IBS, history of breast cancer s/p right mastectomy and failure to thrive who was brought in by her HPOA (sister) for nursing home placement. Patient is unable to provide history.     Patient's sister was contacted who stated that the patient was admitted to [facility] in [facility] for recurrent seizures/status epilepticus which resulted in an anoxic brain injury and respiratory failure.  Patient was intubated followed by tracheostomy and PEG placement .  Patient was discharged to nursing home and subsequently discharged to hospice care at the request of patient's sister. However a few days ago, she visited the patient at [facility] recovery facility and noticed they weren't feeding or taking care of the patient due to her 'hospice status'. Per sister today, she would like to place the patient in a skilled nursing facility and would like to rescind her hospice care at this time.  She is no longer interested in hospice placement. She states the patient at baseline is only able to follow some simple commands. She is unsure how much O2 level is her baseline via trach collar.   Of note she has a Stage IV sacral wound that is s/p debridement on 06/09 by Gen surgery in [facility].     Upon arrival to the ED, the patient was hemodynamically stable. Labs revealed Na 157, WBC 14. Hgb 9.4 (baseline 7-9). The patient was given 1L of IV NS and was admitted to hospital medicine for further management.        Overview/Hospital Course:  Patient admitted to hospital medicine with pre-existing anoxic brain injury, hypernatremia, sacral decubitus wound s/p debridement 06/08 in [place], [place]. Patient initially admitted to hospice following discharge from [facility] ICU, but discharged from hospice as patient family believed she was not getting appropriate care. Na 157, corrected with IVF (NS and hypotonic solutions) and enteral FWF down to 148. Trach collar exchanged by ENT due to lack of supplies at facility. Wound care consulted for sacral decubitus, recommended General Surgery consult for possible debridement as wound appears purulent. Persistent leukocytosis, started Zosyn per previous I&D wound cultures growing bacteriodes/enterococcus and new wound findings. cEEG started and Epilepsy consulted for assistance with antiepileptic medications; conflicting reports if patient was on only Keppra/Lacosamide vs Keppra/Lacosamide/Phenytoin. General surgery consulted for debridement of sacral decubitus ulcer. Palliative Care consulted for assistance in GOC; decision to pursue medical/surgical care decided.    CM/SW to assist with dispo.      Interval History: NAEON. Na remains stable with FWF. No seizures seen on EEG so the monitoring has been stopped. Plan for surgical debridement of sacral wound tomorrow.     Review of Systems   Unable to perform ROS: Mental status change   Respiratory:  Negative for chest tightness and shortness of breath.    Cardiovascular:  Negative for chest pain.   Gastrointestinal:  Negative for abdominal pain.   Objective:     Vital Signs (Most Recent):  Temp: 96.9 °F (36.1 °C) (06/23/22 1132)  Pulse: 89 (06/23/22 1132)  Resp: 18 (06/23/22 1132)  BP: (!) 129/59 (06/23/22 1132)  SpO2: 100 % (06/23/22 1132)   Vital Signs (24h Range):  Temp:  [96.9 °F (36.1 °C)-98.9 °F (37.2 °C)] 96.9 °F (36.1 °C)  Pulse:  [83-91] 89  Resp:  [16-20] 18  SpO2:  [100 %] 100 %  BP: (117-129)/(57-59) 129/59     Weight: 50.2 kg  (110 lb 10.7 oz)  Body mass index is 22.35 kg/m².    Intake/Output Summary (Last 24 hours) at 6/23/2022 1533  Last data filed at 6/23/2022 0549  Gross per 24 hour   Intake 8379.96 ml   Output 400 ml   Net 7979.96 ml      Physical Exam  Vitals and nursing note reviewed.   Constitutional:       General: She is not in acute distress.     Appearance: She is not ill-appearing, toxic-appearing or diaphoretic.   HENT:      Head: Normocephalic.      Mouth/Throat:      Mouth: Mucous membranes are dry.      Comments: Dried oral secretions adhered to tongue  Cardiovascular:      Rate and Rhythm: Normal rate and regular rhythm.      Pulses: Normal pulses.      Heart sounds: Normal heart sounds. No murmur heard.  Pulmonary:      Effort: Pulmonary effort is normal. No respiratory distress.      Breath sounds: Normal breath sounds. No wheezing or rales.   Abdominal:      General: Abdomen is flat. Bowel sounds are normal. There is no distension.      Palpations: Abdomen is soft.      Tenderness: There is no abdominal tenderness.   Musculoskeletal:         General: No swelling or tenderness. Normal range of motion.      Cervical back: Normal range of motion.   Skin:     General: Skin is warm and dry.      Coloration: Skin is not jaundiced or pale.      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    Neurological:      Mental Status: She is alert. She is disoriented.      Comments: Minimally alert, opens eyes to sound/pain, tracks to sound, attempts to speak,, short sentences limited by dysarthria / trach.   Psychiatric:      Comments: Unable to assess       Significant Labs: All pertinent labs within the past 24 hours have been reviewed.    Significant Imaging: I have reviewed all pertinent imaging results/findings within the past 24 hours.      Assessment/Plan:      * Hypernatremia  Na 157. Suspect from dehydration while at her recovery facility.   - FWD 2.7 on admission.   S/p 1L of NS in the ED.     - D/C  hypotonic fluids  - Increased to 500cc QID free water boluses  - Do not overcorrect by >6-8mEq within 24 hours; goal 152 > 144.  - Na q6h    Palliative care encounter  Per Primary Medicine team and Palliative Care encounters with patient family (separate encounters, see dated notes in chart), patient family wishes full medical care and does not desire comfort/hospice measures. DNR remains. See Pall Care note for further detail.    Seizures  Patient previously started on Lacosamide and Keppra at OSH during initial episode of status epilepticus. Phenytoin added as patient continued to have breakthrough seizures.    - Keppra monotherapy 750 bid per neuro recs  - No seizures seen on EEG, monitoring has stopped   - Neurology consulted for further assistance in antiepileptic regimen    G tube feedings  Tube feed dependent via G tube  - Nutrition to assist with TF recs; see recommendations in note; appreciate assistance      Tracheostomy in place  On 5L O2 via trach collar.     - ENT consulted for trach exchange given lack of compatible equipment; appreciate assistance  - Respiratory therapy to assist with trach care.     Sacral decubitus ulcer, stage IV  S/p recent debridement at OSH 06/08. OSH Wcx with enterococcus faecalis, bacteriodes fragilus.    - Wound care consulted  - General surgery consulted for I&D; plan for debridement, f/u Gen Surg  - Consideration for wound vac given depth of injury  - Turn q2h    Leukocytosis  Patient admitted with elevated WBC to 14s. Previous episodes of aspiration PNA at OSH, indwelling cabrera given acuity of condition, sacral decubitus ulcer stave IV s/p debridement with enterococcus faecalis / bacteriodes fragilis in cx. Patient afebrile with stable hemodynamics on admission at this time. CXR without acute abnormality. UA with yeast growing, chronic per previous OSH review.    Leukocytosis may be explained by soft tissue infection vs hemoconcentration given extensive dehydration and  hypernatremia from lack of enteral fluids or IVF at outside hospice facility prior to transfer to [facility].     - Zosyn given previous cultures and persistent leukocytosis  - Etiology may be soft tissue infection; see Sacral Decubitus Ulcer A/P  - Continue monitoring CBC  - Wound care per Sacral Decubitus Ulcer A/P  - Aspiration precautions  - Trend fever curve  - Trend BCx    Discharge planning issues  Darlene Avila is a 71 year old F with history of diabetes type 2, seizure, ischemic CVA, IBS, history of breast cancer s/p right mastectomy and failure to thrive who was brought in by her HPOA (sister) for skilled nursing home placement due to concern for neglect at recent hospice facility. Patient follows very simple commands and doubt she can engage in skilled therapy. [facility] nursing facility likely more of an option.     - Appreciate CM/SW assistance with placement.   - Appreciate pharmacy's assistance with med rec.     ACP (advance care planning)  Patient is DNR. LaPOST on file.       History of CVA (cerebrovascular accident)  Unclear of patient's current medications. Per med review on care-everywhere, the patient is on statin but not on antiplatelet therapy.     - Appreciate pharmacy's assistance with medication reconciliation with facility- start antiplatelet therapy if no contraindications.   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 A1c repeated 6.9.    - LDSSI   - POCT glucose q6hrs  - Maintain -180  - Titrate basal/bolus regimen to goal as above.      VTE Risk Mitigation (From admission, onward)         Ordered     heparin (porcine) injection 5,000 Units  Every 12 hours         06/18/22 0355     IP VTE HIGH RISK PATIENT  Once         06/18/22 0355     Place sequential compression device  Until discontinued          06/18/22 0355                Discharge Planning   JUDIT: 6/27/2022     Code Status: DNR   Is the patient medically ready for discharge?: No    Reason for patient still in hospital (select all that apply): Patient trending condition  Discharge Plan A: New Nursing Home placement - [facility]   Discharge Delays: None known at this time              Bruno De Jesus MD  Department of Hospital Medicine   [facility] - Intensive Care ([place])

## 2022-06-23 NOTE — PROGRESS NOTES
EEG currently in progress. Patient in bed, eyes closed, quiet.      06/22/22 1907   HEENT   HEENT WDL ex   Head Symptoms other (see comments)  (unable to assess, ongoing EEG in progress; scalp obscured)   Throat Signs/Symptoms other (see comments)  (tracheostomy)     Signed by: Micky Prakash, Registered Nurse at 9:40 PM on 06/22/2022

## 2022-06-23 NOTE — PROGRESS NOTES
[facility] - Intensive Care ([place] [address])  Palliative Medicine  Progress Note    Patient Name: Darlene Avila  MRN: [id number]  Admission Date: 6/17/2022  Hospital Length of Stay: 4 days  Code Status: DNR   Attending Provider: Stacey Ugalde MD  Consulting Provider: BEN Sears  Primary Care Physician: Kirill Cabrera Iii, MD  Principal Problem:Hypernatremia    Patient information was obtained from relative(s) and primary team.      Assessment/Plan:     Palliative care encounter  Palliative medicine follow up to goals of care and advanced care planning for Ms. Avila a 70 yo lady with history of anoxic brain injury. S/P trach and PEG. Presented to [facility] from nursing facility after family has revoked hospice and seeking higher level of care. Chart reviewed and patient discussed with primary team Dr. Self.  Pal med consulted as per Gen Sx recommendation. Primary team has had initial goals of care conversation with the patient's sister and HPOA.     At time of this encounter Ms Avila is awake, alert, oriented to self only.  Able to follow simple commands and not able to participate in conversation. She appears comfortable with no facial grimacing, moaning, supplemental oxygen via trach collar.  Oxygen saturation is 92% to 100%. No acute distress        Advance Care Planning     - ACP documents - HPOA, LW and LaPOST received  - HPOA is sister Sabi Avila [phone]  LaPOST indicates DNR - natural death, no intubation - noninvasive CPAP/BiPAP and trial of artificial nutrition by tube   - DNR per primary team     Goals of Care     - Follow up conversation with HPOA Sabi Avila to clarify philosophy, services and appropriateness of hospice care.    - As per Ms Celestin she equates life as the patient's ability to breath on her own regardless of quality of life.    - Ms. Celestin appears to lack awareness of patient's poor prognosis.   - pal med expressed concern the stage IV sacral wound will not be cured  with surgical debridement and this may provide some comfort. Also expressed concern for a non-healing wound with increased risk for infection in setting of debility and poor nutritional status will contribute to cause of death. Explained this is what makes the clinical condition appropriate for hospice care.   - patient sister struggling to make right decisions as she does not want to give up.  Encouraged family to keep the patient at the center of decisions and see this as honoring the patient's wishes.  Patient listed in her living will and LaPOST that she was not amenable to long term artifical nutrition.   - Ms. Celestin states there are social challenges - financial associated with nursing home placement.  Ms. Celestin expressed wish for the patient to return to her home with home health.  -   Palliative med expressed concern family would not be able to provide needed level of care.  Pal med has recommended group home nursing home with hospice.   - Primary team / completing referrals. Ms. Celestin reports some facilities have declined.   - Ms Celestin reports she is struggling and will discuss further with her family. Ms. Celestin's wish is to continue current plan of care.  .     Anoxic brain injury/ Sacral Decubitus stage IV/ hx of  CVA/ Seizures   - managed per primary team and specialty consultants   - serious illness in setting of comorbid conditions appropriate for transition to hospice care   - pal med following for goals of care - see above GOC      Plan/Recommendations  - Family is not amenable to hospice care. Believes this is not what her sister wanted when completing ACP documents. .  - family amenable to surgical debridement - scheduled for 6/24/22  - Patient and family would benefit from continued information and educations especially in relationship to future expectations   - emotional support provided     Primary team  notified per secure chat message.  Thank you of consult  and opportunity to participate in Ms Avila's care.                I will follow-up with patient. Please contact us if you have any additional questions.    Subjective:     Chief Complaint:   Chief Complaint   Patient presents with    Other Misc     Per EMS Pt.'s family revoked hospice and wants more aggressive treatment for Pt. Pt is on hospice for anoxic brain injury. Pt is unresponsive at baseline. Pt is on 8 L nonrebreather per trach collar.          HPI:   HPI obtained from chart review:   Per EMS Pt.'s family revoked hospice and wants more aggressive treatment for Pt. Pt is on hospice for anoxic brain injury. Pt is unresponsive at baseline. Pt is on 8 L nonrebreather per trach collar.             HPI:  Mrs. Avila is a 72 yo lady with PMH of:  diabetes type 2, seizure c/b anoxic brain injury s/p trach, PEG, bed bound status, Stage IV sacral ulcer, ischemic CVA, IBS, history of breast cancer s/p right mastectomy and failure to thrive. She presented to [facility]  via EMS from [facility]  as family is seeking higher level of care.  History obtained by her sister Sabi Avila.      Patient's sister was contacted who stated that the patient was admitted to [facility] in [facility] for recurrent seizures/status epilepticus which resulted in an anoxic brain injury and respiratory failure.  Patient was intubated followed by tracheostomy and PEG placement .  Patient was discharged to nursing home and subsequently discharged to hospice care at the request of patient's sister. However a few days ago, she visited the patient at [facility] recovery facility and noticed they weren't feeding or taking care of the patient due to her 'hospice status'. Per sister today, she would like to place the patient in a skilled nursing facility and would like to rescind her hospice care at this time.  She is no longer interested in hospice placement. She states the patient at baseline is only able to follow some simple  commands. She is unsure how much O2 level is her baseline via trach collar.   Of note she has a Stage IV sacral wound that is s/p debridement on 06/09 by Gen surgery in [facility].      Upon arrival to the ED, the patient was hemodynamically stable. Labs revealed Na 157, WBC 14. Hgb 9.4 (baseline 7-9). The patient was given 1L of IV NS and was admitted to hospital medicine for further management.     Palliative medicine consulted for goals of care and advanced care planning               Hospital Course:  No notes on file    Interval History: no adverse events over night, surgery scheduled for 6/24/22, nursing home referrals sent with several declining, family struggling to make decisions and not amenable to hospice today.     Past Medical History:   Diagnosis Date    Arthritis     Cancer of breast     s/p mastectomy (on anastrozole)     Cataract     Diabetes mellitus     c/b Diabetic retinopathy    Hypertension     resolved    Hypoxia 4/15/2021    Memory loss     due to strokes    Orthostatic hypotension 12/27/2020    frequent falls, on midodrine.  4/2021 seen by both cards and palliative care    TIA (transient ischemic attack) 8794-7522    [facility] with remote infarcts    Vitamin D deficiency 10/14/2021       Past Surgical History:   Procedure Laterality Date    CAPSULOTOMY  6/26/13    yag left eye    CATARACT EXTRACTION  6/3/2013    right eye    CHOLECYSTECTOMY      HYSTERECTOMY      MASTECTOMY Right 9/28/2020    Procedure: MASTECTOMY-Right;  Surgeon: Krysta Clark MD;  Location: [facility] OR;  Service: General;  Laterality: Right;    SENTINEL LYMPH NODE BIOPSY Right 9/28/2020    Procedure: BIOPSY, LYMPH NODE, SENTINEL-Right;  Surgeon: Krysta Clark MD;  Location: [facility] OR;  Service: General;  Laterality: Right;    TOTAL ABDOMINAL HYSTERECTOMY W/ BILATERAL SALPINGOOPHORECTOMY         Review of patient's allergies indicates:   Allergen Reactions    Iodinated contrast media Hives        Medications:  Continuous Infusions:      Scheduled Meds:   atorvastatin  80 mg Per G Tube Daily    cholestyramine  2 packet Per G Tube BID    heparin (porcine)  5,000 Units Subcutaneous Q12H    insulin aspart U-100  5 Units Subcutaneous TIDWM    insulin detemir U-100  10 Units Subcutaneous Daily    levetiracetam  750 mg Per G Tube BID    mupirocin   Nasal BID    piperacillin-tazobactam (ZOSYN) IVPB  4.5 g Intravenous Q8H     PRN Meds:dextrose 10%, dextrose 10%, glucagon (human recombinant), glucose, glucose, insulin aspart U-100, naloxone, sodium chloride 0.9%    Family History       Problem Relation (Age of Onset)    Breast cancer Sister (65)    Cancer Mother, Sister    Cataracts Mother    Colon cancer Mother (82)    Diabetes Mother, Father, Sister, Maternal Uncle, Paternal Uncle, Maternal Grandmother, Maternal Grandfather    Glaucoma Mother    Hypertension Sister, Maternal Grandmother          Tobacco Use    Smoking status: Current Every Day Smoker     Packs/day: 1.50     Types: Cigarettes    Smokeless tobacco: Never Used   Substance and Sexual Activity    Alcohol use: No    Drug use: No    Sexual activity: Not Currently       Review of Systems   Unable to perform ROS: Other (patient only able to follow simple commands and appears oriented to self only)   Objective:     Vital Signs (Most Recent):  Temp: 97.6 °F (36.4 °C) (06/23/22 0429)  Pulse: 86 (06/23/22 0429)  Resp: 16 (06/23/22 0429)  BP: (!) 124/57 (06/23/22 0429)  SpO2: 100 % (06/23/22 0429)   Vital Signs (24h Range):  Temp:  [97.5 °F (36.4 °C)-97.7 °F (36.5 °C)] 97.6 °F (36.4 °C)  Pulse:  [83-90] 86  Resp:  [16-18] 16  SpO2:  [100 %] 100 %  BP: (117-125)/(57-59) 124/57     Weight: 50.2 kg (110 lb 10.7 oz)  Body mass index is 22.35 kg/m².    Physical Exam  Vitals and nursing note reviewed.   Constitutional:       Appearance: She is ill-appearing.   HENT:      Head: Normocephalic.      Comments: Dressing intact, cont eeg in progress      Right Ear: External ear normal.      Left Ear: External ear normal.      Nose: Nose normal. No congestion.      Mouth/Throat:      Mouth: Mucous membranes are dry.      Comments: Dry film on tongue and lips   Eyes:      Conjunctiva/sclera: Conjunctivae normal.      Pupils: Pupils are equal, round, and reactive to light.   Cardiovascular:      Rate and Rhythm: Normal rate and regular rhythm.   Pulmonary:      Comments: Supplemental oxygen 8 L by trach collar   Abdominal:      Comments: PEG intact, tolerating tube feeding   Genitourinary:     Comments: Urethral catheter, clear yellow urine   Musculoskeletal:         General: Normal range of motion.      Cervical back: Normal range of motion and neck supple.      Right lower leg: No edema.      Left lower leg: No edema.      Comments: Bedbound,    Skin:     General: Skin is dry.      Comments: Stage IV sacral wound   Neurological:      Mental Status: She is alert. She is disoriented.      Comments: Alert, arouses to sound, oriented to self, able to track, follow simple commands - blink eyes, squeeze hand.  Able to mouth words, not able to speak   Psychiatric:         Mood and Affect: Mood normal.         Behavior: Behavior normal.      Comments: Anoxic brain injury- unable to fully assess judgement, thought content        Review of Symptoms      Symptom Assessment (ESAS 0-10 Scale)  Pain:  0  Dyspnea:  0  Anxiety:  0  Nausea:  0  Depression:  0  Anorexia:  0  Fatigue:  0  Insomnia:  0  Restlessness:  0  Agitation:  0 due to Acuity of condition       Pain Assessment:  OME in 24 hours:  0  Location(s):      Pain Assessment in Advanced Demential Scale (PAINAD)   Breathing - Independent of vocalization:  0  Negative vocalization:  0  Facial expression:  0  Body language:  0  Consolability:  0  Total:  0    Living Arrangements:  Lives in nursing home    Advance Care Planning   Advance Directives:   Living Will: Yes        Copy on chart: Yes        Oral Declaration: No     LaPOST: Yes    Do Not Resuscitate Status: Yes    Medical Power of : Yes        Oral Declaration: No    Agent's Name:  Sabi Avila   Agent's Contact Number:  [phone]    Decision Making:  Patient unable to communicate due to disease severity/cognitive impairment       Significant Labs: All pertinent labs within the past 24 hours have been reviewed.  CBC:   Recent Labs   Lab 06/23/22 0519   WBC 12.57   HGB 8.7*   HCT 28.2*   MCV 89   *       BMP:  Recent Labs   Lab 06/23/22 0519   *      K 3.9   *   CO2 18*   BUN 22   CREATININE 0.7   CALCIUM 9.2   MG 1.8       LFT:  Lab Results   Component Value Date    AST 11 06/23/2022    ALKPHOS 108 06/23/2022    BILITOT 0.1 06/23/2022     Albumin:   Albumin   Date Value Ref Range Status   06/23/2022 1.4 (L) 3.5 - 5.2 g/dL Final     Protein:   Total Protein   Date Value Ref Range Status   06/23/2022 6.2 6.0 - 8.4 g/dL Final     Lactic acid:   Lab Results   Component Value Date    LACTATE 1.0 06/18/2022    LACTATE 1.1 10/23/2021       Significant Imaging: I have reviewed all pertinent imaging results/findings within the past 24 hours.        Berta Sun, CNS  Palliative Medicine  [facility] - Int[facility] Care ([place] [address])

## 2022-06-23 NOTE — PLAN OF CARE
JAZMIN followed up with Royce Albrecht referral and re-sent via [facility]    SW called [facility] re: sending referral and left message for admission at 1552hrs    Yocasta Urbina CD, MSW, LMSW, RSW   Case Management  [facility]  Email: [email or web address]

## 2022-06-23 NOTE — SUBJECTIVE & OBJECTIVE
Interval History: NAEON. Na remains stable with FWF. No seizures seen on EEG so the monitoring has been stopped. Plan for surgical debridement of sacral wound tomorrow.     Review of Systems   Unable to perform ROS: Mental status change   Respiratory:  Negative for chest tightness and shortness of breath.    Cardiovascular:  Negative for chest pain.   Gastrointestinal:  Negative for abdominal pain.   Objective:     Vital Signs (Most Recent):  Temp: 96.9 °F (36.1 °C) (06/23/22 1132)  Pulse: 89 (06/23/22 1132)  Resp: 18 (06/23/22 1132)  BP: (!) 129/59 (06/23/22 1132)  SpO2: 100 % (06/23/22 1132)   Vital Signs (24h Range):  Temp:  [96.9 °F (36.1 °C)-98.9 °F (37.2 °C)] 96.9 °F (36.1 °C)  Pulse:  [83-91] 89  Resp:  [16-20] 18  SpO2:  [100 %] 100 %  BP: (117-129)/(57-59) 129/59     Weight: 50.2 kg (110 lb 10.7 oz)  Body mass index is 22.35 kg/m².    Intake/Output Summary (Last 24 hours) at 6/23/2022 1533  Last data filed at 6/23/2022 0549  Gross per 24 hour   Intake 8379.96 ml   Output 400 ml   Net 7979.96 ml      Physical Exam  Vitals and nursing note reviewed.   Constitutional:       General: She is not in acute distress.     Appearance: She is not ill-appearing, toxic-appearing or diaphoretic.   HENT:      Head: Normocephalic.      Mouth/Throat:      Mouth: Mucous membranes are dry.      Comments: Dried oral secretions adhered to tongue  Cardiovascular:      Rate and Rhythm: Normal rate and regular rhythm.      Pulses: Normal pulses.      Heart sounds: Normal heart sounds. No murmur heard.  Pulmonary:      Effort: Pulmonary effort is normal. No respiratory distress.      Breath sounds: Normal breath sounds. No wheezing or rales.   Abdominal:      General: Abdomen is flat. Bowel sounds are normal. There is no distension.      Palpations: Abdomen is soft.      Tenderness: There is no abdominal tenderness.   Musculoskeletal:         General: No swelling or tenderness. Normal range of motion.      Cervical back: Normal  range of motion.   Skin:     General: Skin is warm and dry.      Coloration: Skin is not jaundiced or pale.      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    Neurological:      Mental Status: She is alert. She is disoriented.      Comments: Minimally alert, opens eyes to sound/pain, tracks to sound, attempts to speak,, short sentences limited by dysarthria / trach.   Psychiatric:      Comments: Unable to assess       Significant Labs: All pertinent labs within the past 24 hours have been reviewed.    Significant Imaging: I have reviewed all pertinent imaging results/findings within the past 24 hours.

## 2022-06-23 NOTE — TREATMENT PLAN
GENERAL SURGERY    Plan for debridement of sacral ulcer in the OR 6/24  Will obtain consent today  Please hold tube feed at midnight    Johanna Payton MD  General Surgery PGY3

## 2022-06-23 NOTE — ASSESSMENT & PLAN NOTE
Patient previously started on Lacosamide and Keppra at OSH during initial episode of status epilepticus. Phenytoin added as patient continued to have breakthrough seizures.    - Keppra monotherapy 750 bid per neuro recs  - No seizures seen on EEG, monitoring has stopped   - Neurology consulted for further assistance in antiepileptic regimen

## 2022-06-23 NOTE — PLAN OF CARE
JAZMIN completed the LOCET via phone. SW faxed PAS to obtain the 142 for NH admission.      SW uploaded PASSR to [facility].    SW received and uploaded 142 to [facility]      Yocasta Urbina CD, MSW, LMSW, RSW   Case Management  [facility]  Email: [email or web address]

## 2022-06-23 NOTE — ASSESSMENT & PLAN NOTE
S/p recent debridement at OSH 06/08. OSH Wcx with enterococcus faecalis, bacteriodes fragilus.    - Wound care consulted  - General surgery consulted for I&D; plan for debridement, f/u Gen Surg  - Consideration for wound vac given depth of injury  - Turn q2h

## 2022-06-23 NOTE — PLAN OF CARE
Shift Summary  Patient continues EEG evaluation. No significant events occurred during this shift. Will continue to assess.  Signed by: Micky Prakash Registered Nurse at 5:53 AM on 06/23/2022         Problem: Adult Inpatient Plan of Care  Goal: Plan of Care Review  Outcome: Ongoing, Progressing

## 2022-06-23 NOTE — ANESTHESIA PREPROCEDURE EVALUATION
[facility]  Anesthesia Pre-Operative Evaluation         Patient Name/: Darlene Avila, 1950  MRN: [id number]    SUBJECTIVE:     Pre-operative evaluation for Procedure(s) (LRB):  DEBRIDEMENT, WOUND, sacral ulcer (N/A)     2022    Darlene Avila is a 71 y.o. female w/ a significant PMHx of T2DM, seizure c/b anoxic brain injury s/p trach/PEG, chronically bed bound, stage IV sacral ulver, ischemic CVA, IBS, hx of breast cancer s/p mastectomy, and FTT who was admitted for placement. Palliative following. To undergo sacral wound debridement with gen surgery.     Patient now presents for the above procedure(s).    ________________________________________  Results for orders placed during the hospital encounter of 21    Echo Color Flow Doppler? Yes    Interpretation Summary  · Concentric remodeling and normal systolic function. The estimated ejection fraction is 73%  · Normal central venous pressure (3 mmHg).  · The estimated PA systolic pressure is 36 mmHg.  · Grade III left ventricular diastolic dysfunction.  · Normal right ventricular size with normal right ventricular systolic function.    ________________________________________    Prev airway: 2020  Intubation    Mask Difficulty: easy mask   Airway Type: oral endotracheal tube   ETT Size(mm): 7.0   Attempts: 1         LDA:        Peripheral IV - Single Lumen 22 1722 20 G;1 3/4 in Left;Anterior Forearm (Active)   Site Assessment Clean;Dry;Intact;No redness;No swelling;No warmth;No drainage 22 0400   Extremity Assessment Distal to IV No abnormal discoloration;No redness;No swelling;No warmth 22   Line Status Infusing 22   Dressing Status Clean;Dry;Intact 22   Dressing Intervention Integrity maintained 22   Dressing Change Due 22   Site Change Due 22   Reason Not Rotated Not due 22   Number of days: 2             "Gastrostomy/Enterostomy 06/18/22 0400 LUQ (Active)   Securement secured to abdomen w/ adhesive device 06/22/22 1907   Interventions Prior to Feeding patency checked 06/22/22 1907   Clamp Status/Tolerance unclamped;no abdominal discomfort;no abdominal distention;no emesis;no nausea;no restlessness 06/22/22 1907   Feeding Action feeding continued 06/22/22 1907   Insertion Site no redness;no warmth;no drainage;no tenderness;no swelling 06/22/22 1907   Site Care device rotatated 06/21/22 0220   Flush/Irrigation flushed w/;water 06/22/22 0800   Current Rate (mL/hr) 35 mL/hr 06/22/22 1907   Goal Rate (mL/hr) 35 mL/hr 06/22/22 1907   Intake (mL) 709 mL 06/23/22 0549   Water Bolus (mL) 500 mL 06/23/22 0549   Residual Amount (ml) 10 ml 06/18/22 2100   Number of days: 5            Urethral Catheter 06/18/22 0600 Non-latex 16 Fr. (Active)   Site Assessment Clean;Intact 06/22/22 2129   Collection Container Urimeter 06/22/22 2129   Securement Method secured to top of thigh w/ adhesive device 06/22/22 2129   Catheter Care Performed yes 06/22/22 2129   Reason for Continuing Urinary Catheterization Chronic Indwelling Urinary Catheter on Admission 06/22/22 2129   CAUTI Prevention Bundle Securement Device in place with 1" slack;Intact seal between catheter & drainage tubing;Drainage bag/urimeter off the floor;Sheeting clip in use;No dependent loops or kinks;Drainage bag/urimeter not overfilled (<2/3 full);Drainage bag/urimeter below bladder 06/22/22 2129   Output (mL) 400 mL 06/22/22 2129   Number of days: 5       Drips: None documented.      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, with long-term current use of insulin    Vitreous hemorrhage of left eye    Posterior vitreous detachment of left eye    Screening for glaucoma    Chronic diarrhea    Pseudophakia of both eyes    Malignant neoplasm of upper-outer quadrant " of right breast in female, estrogen receptor positive    Use of aromatase inhibitors    Orthostatic syncope    Hypoglycemia    Essential hypertension    History of CVA (cerebrovascular accident)    Orthostasis    Tobacco abuse    Encounter for palliative care    Chronic anemia    Osteopenia    Orthostatic hypotension    ACP (advance care planning)    Falls frequently    Noncompliance with medications    Chronic heart failure with preserved ejection fraction    Hypophosphatemia    Vitamin D deficiency    Memory loss    Depression    Severe malnutrition    Transaminitis    Discharge planning issues    Hypernatremia    Leukocytosis    Sacral decubitus ulcer, stage IV    Tracheostomy in place    G tube feedings    Seizures    Palliative care encounter    Goals of care, counseling/discussion    Encephalopathy       Review of patient's allergies indicates:   Allergen Reactions    Iodinated contrast media Hives       Current Inpatient Medications:    atorvastatin  80 mg Per G Tube Daily    cholestyramine  2 packet Per G Tube BID    heparin (porcine)  5,000 Units Subcutaneous Q12H    insulin aspart U-100  5 Units Subcutaneous TIDWM    insulin detemir U-100  10 Units Subcutaneous Daily    levetiracetam  750 mg Per G Tube BID    mupirocin   Nasal BID    piperacillin-tazobactam (ZOSYN) IVPB  4.5 g Intravenous Q8H       No current facility-administered medications on file prior to encounter.     Current Outpatient Medications on File Prior to Encounter   Medication Sig Dispense Refill    bisacodyL (DULCOLAX) 10 mg Supp Place 10 mg rectally 2 (two) times daily as needed (CONSTIPATION).      hyoscyamine (LEVSIN/SL) 0.125 mg Subl Place 0.125 mg under the tongue every 2 (two) hours as needed (EXCESS SECRETIONS).      lorazepam (ATIVAN) 2 mg/mL Conc TAKE 0.25-1ML BY MOUTH EVERY 2 HOURS AS NEEDED FOR RESTLESSNESS OR AGITATION      morphine 100 mg/5 mL (20 mg/mL) concentrated solution TAKE  0.25-1 ML BY MOUTH EVERY 2 HOURS AS NEEDED FOR PAIN OR SHORTNESS OF BREATH         Past Surgical History:   Procedure Laterality Date    CAPSULOTOMY  6/26/13    yag left eye    CATARACT EXTRACTION  6/3/2013    right eye    CHOLECYSTECTOMY      HYSTERECTOMY      MASTECTOMY Right 9/28/2020    Procedure: MASTECTOMY-Right;  Surgeon: Krysta Clark MD;  Location: [facility];  Service: General;  Laterality: Right;    SENTINEL LYMPH NODE BIOPSY Right 9/28/2020    Procedure: BIOPSY, LYMPH NODE, SENTINEL-Right;  Surgeon: Krysta lCark MD;  Location: [facility];  Service: General;  Laterality: Right;    TOTAL ABDOMINAL HYSTERECTOMY W/ BILATERAL SALPINGOOPHORECTOMY         Social History:  Tobacco Use: High Risk    Smoking Tobacco Use: Current Every Day Smoker    Smokeless Tobacco Use: Never Used       Alcohol Use: Unknown    Frequency of Alcohol Consumption: Patient refused    Average Number of Drinks: Patient refused    Frequency of Binge Drinking: Patient refused       OBJECTIVE:     Vital Signs Range:  BMI Readings from Last 1 Encounters:   06/18/22 22.35 kg/m²       Temp:  [36.4 °C (97.6 °F)-37.2 °C (98.9 °F)]   Pulse:  [86-91]   Resp:  [16-20]   BP: (117-125)/(57-59)   SpO2:  [100 %]        Significant Labs:        Component Value Date/Time    WBC 12.57 06/23/2022 0519    HGB 8.7 (L) 06/23/2022 0519    HCT 28.2 (L) 06/23/2022 0519    HCT 45 10/12/2021 1505     (H) 06/23/2022 0519     06/23/2022 0519    K 3.9 06/23/2022 0519     (H) 06/23/2022 0519    CO2 18 (L) 06/23/2022 0519     (H) 06/23/2022 0519    BUN 22 06/23/2022 0519    CREATININE 0.7 06/23/2022 0519    MG 1.8 06/23/2022 0519    PHOS 1.9 (L) 06/23/2022 0519    CALCIUM 9.2 06/23/2022 0519    ALBUMIN 1.4 (L) 06/23/2022 0519    PROT 6.2 06/23/2022 0519    ALKPHOS 108 06/23/2022 0519    BILITOT 0.1 06/23/2022 0519    AST 11 06/23/2022 0519    ALT 14 06/23/2022 0519    INR 0.9 07/22/2020 1609    HGBA1C 6.9 (H) 06/18/2022 0215         Please see Results Review for additional labs.     Diagnostic Studies: No relevant studies.    EKG:   Results for orders placed or performed during the hospital encounter of 10/23/21   EKG 12-lead    Collection Time: 10/26/21 12:54 PM    Narrative    Test Reason : R00.0,    Vent. Rate : 097 BPM     Atrial Rate : 097 BPM     P-R Int : 156 ms          QRS Dur : 096 ms      QT Int : 352 ms       P-R-T Axes : 092 -89 074 degrees     QTc Int : 447 ms    Normal sinus rhythm  Possible Left atrial enlargement  Left axis deviation  Probable  Anterior infarct (cited on or before 26-OCT-2021)vs low anterior  forces and R wave progression  Abnormal ECG  When compared with ECG of 25-OCT-2021 06:09,  T wave inversion now evident in Anterior leads  Confirmed by Derek PIERRE MD ([id number]) on 10/27/2021 10:47:38 AM    Referred By: [email or web address]   SELF           Confirmed By:Derek PIERRE MD       ECHO:  See subjective, if available.      ASSESSMENT/PLAN:            Pre-op Assessment    I have reviewed the Patient Summary Reports.     I have reviewed the Nursing Notes. I have reviewed the NPO Status.   I have reviewed the Medications.     Review of Systems  Anesthesia Hx:  No problems with previous Anesthesia  History of prior surgery of interest to airway management or planning: Denies Family Hx of Anesthesia complications.   Denies Personal Hx of Anesthesia complications.   Social:  Smoker, No Alcohol Use    Hematology/Oncology:         -- Anemia: Current/Recent Cancer. Breast right   EENT/Dental:EENT/Dental Normal   Cardiovascular:   Exercise tolerance: good Hypertension, well controlled    Pulmonary:  Pulmonary Normal  Denies COPD.  Denies Shortness of breath.    Renal/:  Renal/ Normal     Hepatic/GI:  Hepatic/GI Normal    Musculoskeletal:   Arthritis     Neurological:   TIA, Neuromuscular Disease, Seizures TRACH/PEG  Chronically bed bound   Endocrine:   Diabetes, well controlled, type 2    Psych:   Psychiatric History depression              Anesthesia Plan  Type of Anesthesia, risks & benefits discussed:    Anesthesia Type: Gen ETT  Intra-op Monitoring Plan: Standard ASA Monitors and Art Line  Post Op Pain Control Plan: multimodal analgesia and IV/PO Opioids PRN  Induction:  IV  Airway Plan: Direct, Post-Induction  Informed Consent: Informed consent signed with the Patient representative and all parties understand the risks and agree with anesthesia plan.  All questions answered.   ASA Score: 3  Day of Surgery Review of History & Physical: H&P Update referred to the surgeon/provider.    Ready For Surgery From Anesthesia Perspective.     .

## 2022-06-23 NOTE — PROGRESS NOTES
Patient Peripheral IV documented and cleared for accuracy. Amount below infused prior to assumption of care by this RN.     06/22/22 1907   Intake (mL)   I.V. 3547.93 mL     Signed by: Micky Prakash, Registered Nurse at 9:42 PM on 06/22/2022

## 2022-06-23 NOTE — PLAN OF CARE
General Neurology Consults - Plan of Care Note    Reviewed patient's EEG read following AED adjustment. No epileptiform discharges or electrographic seizures. Recommend DC EEG, continuing Keppra 750 mg BID, and follow up in Neurology clinic (referral placed,  contacted). Will sign off at this time. Please call with questions.    Maddi Whelan MD PGY-2  Neurology

## 2022-06-23 NOTE — SUBJECTIVE & OBJECTIVE
Interval History:     Past Medical History:   Diagnosis Date    Arthritis     Cancer of breast     s/p mastectomy (on anastrozole)     Cataract     Diabetes mellitus     c/b Diabetic retinopathy    Hypertension     resolved    Hypoxia 4/15/2021    Memory loss     due to strokes    Orthostatic hypotension 12/27/2020    frequent falls, on midodrine.  4/2021 seen by both cards and palliative care    TIA (transient ischemic attack) 7370-2120    CTH with remote infarcts    Vitamin D deficiency 10/14/2021       Past Surgical History:   Procedure Laterality Date    CAPSULOTOMY  6/26/13    yag left eye    CATARACT EXTRACTION  6/3/2013    right eye    CHOLECYSTECTOMY      HYSTERECTOMY      MASTECTOMY Right 9/28/2020    Procedure: MASTECTOMY-Right;  Surgeon: Krysta Clark MD;  Location: [facility] OR;  Service: General;  Laterality: Right;    SENTINEL LYMPH NODE BIOPSY Right 9/28/2020    Procedure: BIOPSY, LYMPH NODE, SENTINEL-Right;  Surgeon: Krysta Clark MD;  Location: [facility] OR;  Service: General;  Laterality: Right;    TOTAL ABDOMINAL HYSTERECTOMY W/ BILATERAL SALPINGOOPHORECTOMY         Review of patient's allergies indicates:   Allergen Reactions    Iodinated contrast media Hives       Medications:  Continuous Infusions:      Scheduled Meds:   atorvastatin  80 mg Per G Tube Daily    cholestyramine  2 packet Per G Tube BID    heparin (porcine)  5,000 Units Subcutaneous Q12H    insulin aspart U-100  5 Units Subcutaneous TIDWM    insulin detemir U-100  10 Units Subcutaneous Daily    levetiracetam  750 mg Per G Tube BID    mupirocin   Nasal BID    piperacillin-tazobactam (ZOSYN) IVPB  4.5 g Intravenous Q8H     PRN Meds:dextrose 10%, dextrose 10%, glucagon (human recombinant), glucose, glucose, insulin aspart U-100, naloxone, sodium chloride 0.9%    Family History       Problem Relation (Age of Onset)    Breast cancer Sister (65)    Cancer Mother, Sister    Cataracts Mother    Colon cancer Mother (82)     Diabetes Mother, Father, Sister, Maternal Uncle, Paternal Uncle, Maternal Grandmother, Maternal Grandfather    Glaucoma Mother    Hypertension Sister, Maternal Grandmother          Tobacco Use    Smoking status: Current Every Day Smoker     Packs/day: 1.50     Types: Cigarettes    Smokeless tobacco: Never Used   Substance and Sexual Activity    Alcohol use: No    Drug use: No    Sexual activity: Not Currently       Review of Systems   Unable to perform ROS: Other (patient only able to follow simple commands and appears oriented to self only)   Objective:     Vital Signs (Most Recent):  Temp: 97.6 °F (36.4 °C) (06/23/22 0429)  Pulse: 86 (06/23/22 0429)  Resp: 16 (06/23/22 0429)  BP: (!) 124/57 (06/23/22 0429)  SpO2: 100 % (06/23/22 0429)   Vital Signs (24h Range):  Temp:  [97.5 °F (36.4 °C)-97.7 °F (36.5 °C)] 97.6 °F (36.4 °C)  Pulse:  [83-90] 86  Resp:  [16-18] 16  SpO2:  [100 %] 100 %  BP: (117-125)/(57-59) 124/57     Weight: 50.2 kg (110 lb 10.7 oz)  Body mass index is 22.35 kg/m².    Physical Exam  Vitals and nursing note reviewed.   Constitutional:       Appearance: She is ill-appearing.   HENT:      Head: Normocephalic.      Comments: Dressing intact, cont eeg in progress     Right Ear: External ear normal.      Left Ear: External ear normal.      Nose: Nose normal. No congestion.      Mouth/Throat:      Mouth: Mucous membranes are dry.      Comments: Dry film on tongue and lips   Eyes:      Conjunctiva/sclera: Conjunctivae normal.      Pupils: Pupils are equal, round, and reactive to light.   Cardiovascular:      Rate and Rhythm: Normal rate and regular rhythm.   Pulmonary:      Comments: Supplemental oxygen 8 L by trach collar   Abdominal:      Comments: PEG intact, tolerating tube feeding   Genitourinary:     Comments: Urethral catheter, clear yellow urine   Musculoskeletal:         General: Normal range of motion.      Cervical back: Normal range of motion and neck supple.      Right lower leg: No  edema.      Left lower leg: No edema.      Comments: Bedbound,    Skin:     General: Skin is dry.      Comments: Stage IV sacral wound   Neurological:      Mental Status: She is alert. She is disoriented.      Comments: Alert, arouses to sound, oriented to self, able to track, follow simple commands - blink eyes, squeeze hand.  Able to mouth words, not able to speak   Psychiatric:         Mood and Affect: Mood normal.         Behavior: Behavior normal.      Comments: Anoxic brain injury- unable to fully assess judgement, thought content        Review of Symptoms      Symptom Assessment (ESAS 0-10 Scale)  Pain:  0  Dyspnea:  0  Anxiety:  0  Nausea:  0  Depression:  0  Anorexia:  0  Fatigue:  0  Insomnia:  0  Restlessness:  0  Agitation:  0 due to Acuity of condition       Pain Assessment:  OME in 24 hours:  0  Location(s):      Pain Assessment in Advanced Demential Scale (PAINAD)   Breathing - Independent of vocalization:  0  Negative vocalization:  0  Facial expression:  0  Body language:  0  Consolability:  0  Total:  0    Living Arrangements:  Lives in nursing home    Advance Care Planning   Advance Directives:   Living Will: Yes        Copy on chart: Yes        Oral Declaration: No    LaPOST: Yes    Do Not Resuscitate Status: Yes    Medical Power of : Yes        Oral Declaration: No    Agent's Name:  Sabi Avila   Agent's Contact Number:  [phone]    Decision Making:  Patient unable to communicate due to disease severity/cognitive impairment       Significant Labs: All pertinent labs within the past 24 hours have been reviewed.  CBC:   Recent Labs   Lab 06/23/22  0519   WBC 12.57   HGB 8.7*   HCT 28.2*   MCV 89   *       BMP:  Recent Labs   Lab 06/23/22  0519   *      K 3.9   *   CO2 18*   BUN 22   CREATININE 0.7   CALCIUM 9.2   MG 1.8       LFT:  Lab Results   Component Value Date    AST 11 06/23/2022    ALKPHOS 108 06/23/2022    BILITOT 0.1 06/23/2022     Albumin:    Albumin   Date Value Ref Range Status   06/23/2022 1.4 (L) 3.5 - 5.2 g/dL Final     Protein:   Total Protein   Date Value Ref Range Status   06/23/2022 6.2 6.0 - 8.4 g/dL Final     Lactic acid:   Lab Results   Component Value Date    LACTATE 1.0 06/18/2022    LACTATE 1.1 10/23/2021       Significant Imaging: I have reviewed all pertinent imaging results/findings within the past 24 hours.

## 2022-06-23 NOTE — PROCEDURES
[facility] EEG/VIDEO MONITORING REPORT  Darlene Avila  [id number]  1950    DATE OF SERVICE:  06/22/2022-06/23/2022  DATE OF ADMISSION: 6/17/2022 11:34 PM    ADMITTING/REQUESTING PROVIDER: Howie Grey MD    REASON FOR CONSULT:  71-year-old woman with previous anoxic brain injury with decreased responsiveness.  Evaluate for evidence of epileptiform activity.    METHODOLOGY   Electroencephalographic (EEG) recording is with electrodes placed according to the International 10-20 placement system.  Thirty two (32) channels of digital signal (sampling rate of 512/sec) including T1 and T2 was simultaneously recorded from the scalp and may include  EKG, EMG, and/or eye monitors.  Recording band pass was 0.1 to 512 hz.  Digital video recording of the patient is simultaneously recorded with the EEG.  The patient is instructed report clinical symptoms which may occur during the recording session.  EEG and video recording is stored and archived in digital format.  Activation procedures which include photic stimulation, hyperventilation and instructing patients to perform simple task are done in selected patients.   The EEG is displayed on a monitor screen and can be reviewed using different montages.  Computer assisted analysis is employed to detect spike and electrographic seizure activity.   The entire record is submitted for computer analysis.  The entire recording is visually reviewed and the times identified by computer analysis as being spikes or seizures are reviewed again.  Compresses spectral analysis (CSA) is also performed on the activity recorded from each individual channel.  This is displayed as a power display of frequencies from 0 to 30 Hz over time.   The CSA is reviewed looking for asymmetries in power between homologous areas of the scalp and then compared with the original EEG recording.     [employer] software is also utilized in the review of this study.  This software suite analyzes the EEG recording in  multiple domains.  Coherence and rhythmicity is computed to identify EEG sections which may contain organized seizures.  Each channel undergoes analysis to detect presence of spike and sharp waves which have special and morphological characteristic of epileptic activity.  The routine EEG recording is converted from spacial into frequency domain.  This is then displayed comparing homologous areas to identify areas of significant asymmetry.  Algorithm to identify non-cortically generated artifact is used to separate eye movement, EMG and other artifact from the EEG.      RECORDING TIMES  Start on 06/22/2022 at 07:01 a.m.  Stop on 06/23/2022 at 10:07 a.m.-> End of the Recording Session  A total of 27 hours and 03 minutes of EEG recording is obtained.    EEG FINDINGS  Background activity:   The background is moderate voltage predominantly theta activity.  There is a 7-8 hz posterior dominant rhythm seen bilaterally.    There are no pushbutton activations.    Sleep:  There is evidence of state transitions with the appearance of sleep architecture.    Activation procedures:   Hyperventilation is not performed  Photic stimulation is not performed    Cardiac Monitor:   Heart rate appears generally regular on a single lead EKG.    Impression:   This is an abnormal continuous EEG monitoring study because of a slow and disorganized background consistent with diffuse cortical dysfunction and a moderate encephalopathy.  This finding is nonspecific with regards to etiology but can be seen in the setting of toxic/metabolic derangements, anoxia, infection, and as a medication effect.  There are no prominent focal findings however encephalopathy obscures focal findings.  There are no pushbutton activations, no epileptiform discharges, and no electrographic seizures.  Compared to the previous day's recording, there are no significant changes.    Keke Zelaya MD PhD  Neurology-Epilepsy  [facility].

## 2022-06-23 NOTE — ASSESSMENT & PLAN NOTE
Palliative medicine follow up to goals of care and advanced care planning for Ms. Avila a 72 yo lady with history of anoxic brain injury. S/P trach and PEG. Presented to [facility] from nursing facility after family has revoked hospice and seeking higher level of care. Chart reviewed and patient discussed with primary team Dr. Self.  Marshal blount consulted as per Gen Sx recommendation. Primary team has had initial goals of care conversation with the patient's sister and HPOA.     At time of this encounter Ms Avila is awake, alert, oriented to self only.  Able to follow simple commands and not able to participate in conversation. She appears comfortable with no facial grimacing, moaning, supplemental oxygen via trach collar.  Oxygen saturation is 92% to 100%. No acute distress        Advance Care Planning     - ACP documents - HPOA, LW and LaPOST received  - HPOA is sister Sabi Avila [phone]  LaPOST indicates DNR - natural death, no intubation - noninvasive CPAP/BiPAP and trial of artificial nutrition by tube   - DNR per primary team     Goals of Care     - Follow up conversation with HPOA Sabi Avila to clarify philosophy, services and appropriateness of hospice care.    - As per Ms Celestin she equates life as the patient's ability to breath on her own regardless of quality of life.    - Ms. Celestin appears to lack awareness of patient's poor prognosis.   - pal med expressed concern the stage IV sacral wound will not be cured with surgical debridement and this may provide some comfort. Also expressed concern for a non-healing wound with increased risk for infection in setting of debility and poor nutritional status will contribute to cause of death. Explained this is what makes the clinical condition appropriate for hospice care.   - patient sister struggling to make right decisions as she does not want to give up.  Encouraged family to keep the patient at the center of decisions and see this as honoring the  patient's wishes.  Patient listed in her living will and LaPOST that she was not amenable to long term artifical nutrition.   - Ms. Ceelstin states there are social challenges - financial associated with nursing home placement.  Ms. Celestin expressed wish for the patient to return to her home with home health.  -   Palliative med expressed concern family would not be able to provide needed level of care.  Pal med has recommended half-way nursing home with hospice.   - Primary team / completing referrals. Ms. Celestin reports some facilities have declined.   - Ms Celestin reports she is struggling and will discuss further with her family. Ms. Celestin's wish is to continue current plan of care.  .     Anoxic brain injury/ Sacral Decubitus stage IV/ hx of  CVA/ Seizures   - managed per primary team and specialty consultants   - serious illness in setting of comorbid conditions appropriate for transition to hospice care   - pal med following for goals of care - see above GOC      Plan/Recommendations  - Family is not amenable to hospice care. Believes this is not what her sister wanted when completing ACP documents. .  - family amenable to surgical debridement - scheduled for 6/24/22  - Patient and family would benefit from continued information and educations especially in relationship to future expectations   - emotional support provided     Primary team  notified per secure chat message.  Thank you of consult and opportunity to participate in Ms Avila's care.

## 2022-06-23 NOTE — RESPIRATORY THERAPY
RAPID RESPONSE RESPIRATORY THERAPY PROACTIVE NOTE           Time of visit: 1026     Code Status: DNR   : 1950  Bed: 59905/90487 A:   MRN: [id number]  Time spent at the bedside: < 15 min    SITUATION    Evaluated patient for: LDA Check     BACKGROUND    Patient has a past medical history of Arthritis, Cancer of breast, Cataract, Diabetes mellitus, Hypertension, Hypoxia, Memory loss, Orthostatic hypotension, TIA (transient ischemic attack), and Vitamin D deficiency.  Clinically Significant Surgical Hx: tracheostomy    24 Hours Vitals Range:  Temp:  [96.9 °F (36.1 °C)-98.9 °F (37.2 °C)]   Pulse:  [83-91]   Resp:  [16-20]   BP: (117-129)/(57-59)   SpO2:  [100 %]     Labs:    Recent Labs     22  0623 22  1220 22  0621 22  1214 22  1750 22  2348 22  0519   *  149*   < > 145  145   < > 145 146* 145   K 4.0  --  3.8  --   --   --  3.9   *  --  120*  --   --   --  120*   CO2 21*  --  18*  --   --   --  18*   CREATININE 0.7  --  0.7  --   --   --  0.7   *  --  154*  --   --   --  161*   PHOS 2.6*  --  2.2*  --   --   --  1.9*   MG 1.8  --  1.7  --   --   --  1.8    < > = values in this interval not displayed.        No results for input(s): PH, PCO2, PO2, HCO3, POCSATURATED, BE in the last 72 hours.    ASSESSMENT/INTERVENTIONS    Upon arrival in room all supplies at bedside. 4&6 cuffed shiley at bedside.     Last VS   Temp: 96.9 °F (36.1 °C) (1132)  Pulse: 89 (1132)  Resp: 18 (1132)  BP: 129/59 (1132)  SpO2: 100 % (1132)    Level of Consciousness: Level of Consciousness (AVPU): responds to voice  Respiratory Effort: Respiratory Effort: Unlabored Expansion/Accessory Muscle Usage: Expansion/Accessory Muscles/Retractions: no use of accessory muscles, no retractions, expansion symmetric  All Lung Field Breath Sounds: All Lung Fields Breath Sounds: clear  O2 Device/Concentration: Flow (L/min): 5, Oxygen Concentration (%): 28, O2  Device (Oxygen Therapy): Trach Collar  Surgical airway: Yes, Type: Shiley Size: 6, uncuffed  Ambu at bedside: Ambu bag with the patient?: Yes, Adult Ambu     Active Orders   Respiratory Care    Oxygen Continuous     Frequency: Continuous     Number of Occurrences: Until Specified     Order Questions:      Device type: Low flow      Device: Trach Collar      FiO2%: 28      Titrate O2 per Oxygen Titration Protocol: Yes      To maintain SpO2 goal of: >= 90%      Notify MD of: Inability to achieve desired SpO2; Sudden change in patient status and requires 20% increase in FiO2; Patient requires >60% FiO2    RESPIRATORY COMMUNICATION     Frequency: Daily     Number of Occurrences: Until Specified     Order Comments: Trach care      Routine tracheostomy care     Frequency: BID     Number of Occurrences: Until Specified       RECOMMENDATIONS    We recommend: RRT Recs: Continue POC per primary team.    ESCALATION    .    FOLLOW-UP    Please call back the Rapid Response RT, Simona Guaman, RRT at x [phone] for any questions or concerns.

## 2022-06-24 ENCOUNTER — ANESTHESIA (OUTPATIENT)
Dept: SURGERY | Facility: HOSPITAL | Age: 72
DRG: 622 | End: 2022-06-24
Payer: COMMERCIAL

## 2022-06-24 LAB
ALBUMIN SERPL BCP-MCNC: 1.5 G/DL (ref 3.5–5.2)
ALP SERPL-CCNC: 108 U/L (ref 55–135)
ALT SERPL W/O P-5'-P-CCNC: 13 U/L (ref 10–44)
ANION GAP SERPL CALC-SCNC: 8 MMOL/L (ref 8–16)
AST SERPL-CCNC: 15 U/L (ref 10–40)
BASOPHILS # BLD AUTO: 0.04 K/UL (ref 0–0.2)
BASOPHILS NFR BLD: 0.4 % (ref 0–1.9)
BILIRUB SERPL-MCNC: 0.1 MG/DL (ref 0.1–1)
BUN SERPL-MCNC: 19 MG/DL (ref 8–23)
CALCIUM SERPL-MCNC: 9.4 MG/DL (ref 8.7–10.5)
CHLORIDE SERPL-SCNC: 122 MMOL/L (ref 95–110)
CO2 SERPL-SCNC: 18 MMOL/L (ref 23–29)
CREAT SERPL-MCNC: 0.7 MG/DL (ref 0.5–1.4)
DIFFERENTIAL METHOD: ABNORMAL
EOSINOPHIL # BLD AUTO: 0.2 K/UL (ref 0–0.5)
EOSINOPHIL NFR BLD: 1.5 % (ref 0–8)
ERYTHROCYTE [DISTWIDTH] IN BLOOD BY AUTOMATED COUNT: 18.5 % (ref 11.5–14.5)
EST. GFR  (AFRICAN AMERICAN): >60 ML/MIN/1.73 M^2
EST. GFR  (NON AFRICAN AMERICAN): >60 ML/MIN/1.73 M^2
GLUCOSE SERPL-MCNC: 88 MG/DL (ref 70–110)
HCT VFR BLD AUTO: 28.6 % (ref 37–48.5)
HGB BLD-MCNC: 8.8 G/DL (ref 12–16)
IMM GRANULOCYTES # BLD AUTO: 0.02 K/UL (ref 0–0.04)
IMM GRANULOCYTES NFR BLD AUTO: 0.2 % (ref 0–0.5)
LYMPHOCYTES # BLD AUTO: 3.4 K/UL (ref 1–4.8)
LYMPHOCYTES NFR BLD: 30.5 % (ref 18–48)
MAGNESIUM SERPL-MCNC: 1.8 MG/DL (ref 1.6–2.6)
MCH RBC QN AUTO: 27.2 PG (ref 27–31)
MCHC RBC AUTO-ENTMCNC: 30.8 G/DL (ref 32–36)
MCV RBC AUTO: 88 FL (ref 82–98)
MONOCYTES # BLD AUTO: 0.5 K/UL (ref 0.3–1)
MONOCYTES NFR BLD: 4.6 % (ref 4–15)
NEUTROPHILS # BLD AUTO: 7 K/UL (ref 1.8–7.7)
NEUTROPHILS NFR BLD: 62.8 % (ref 38–73)
NRBC BLD-RTO: 0 /100 WBC
PHOSPHATE SERPL-MCNC: 2 MG/DL (ref 2.7–4.5)
PLATELET # BLD AUTO: 489 K/UL (ref 150–450)
PMV BLD AUTO: 9.3 FL (ref 9.2–12.9)
POCT GLUCOSE: 139 MG/DL (ref 70–110)
POCT GLUCOSE: 209 MG/DL (ref 70–110)
POCT GLUCOSE: 294 MG/DL (ref 70–110)
POTASSIUM SERPL-SCNC: 3.6 MMOL/L (ref 3.5–5.1)
PROT SERPL-MCNC: 6.7 G/DL (ref 6–8.4)
RBC # BLD AUTO: 3.24 M/UL (ref 4–5.4)
SODIUM SERPL-SCNC: 145 MMOL/L (ref 136–145)
SODIUM SERPL-SCNC: 145 MMOL/L (ref 136–145)
SODIUM SERPL-SCNC: 148 MMOL/L (ref 136–145)
SODIUM SERPL-SCNC: 148 MMOL/L (ref 136–145)
WBC # BLD AUTO: 11.17 K/UL (ref 3.9–12.7)

## 2022-06-24 PROCEDURE — 25000003 PHARM REV CODE 250: Performed by: STUDENT IN AN ORGANIZED HEALTH CARE EDUCATION/TRAINING PROGRAM

## 2022-06-24 PROCEDURE — 36000707: Performed by: STUDENT IN AN ORGANIZED HEALTH CARE EDUCATION/TRAINING PROGRAM

## 2022-06-24 PROCEDURE — 71000033 HC RECOVERY, INTIAL HOUR: Performed by: STUDENT IN AN ORGANIZED HEALTH CARE EDUCATION/TRAINING PROGRAM

## 2022-06-24 PROCEDURE — 99233 PR SUBSEQUENT HOSPITAL CARE,LEVL III: ICD-10-PCS | Mod: ,,, | Performed by: HOSPITALIST

## 2022-06-24 PROCEDURE — 25000003 PHARM REV CODE 250

## 2022-06-24 PROCEDURE — 99233 SBSQ HOSP IP/OBS HIGH 50: CPT | Mod: ,,, | Performed by: HOSPITALIST

## 2022-06-24 PROCEDURE — 25000003 PHARM REV CODE 250: Performed by: HOSPITALIST

## 2022-06-24 PROCEDURE — 12000002 HC ACUTE/MED SURGE SEMI-PRIVATE ROOM

## 2022-06-24 PROCEDURE — 84100 ASSAY OF PHOSPHORUS: CPT

## 2022-06-24 PROCEDURE — 37000008 HC ANESTHESIA 1ST 15 MINUTES: Performed by: STUDENT IN AN ORGANIZED HEALTH CARE EDUCATION/TRAINING PROGRAM

## 2022-06-24 PROCEDURE — 80053 COMPREHEN METABOLIC PANEL: CPT

## 2022-06-24 PROCEDURE — 71000015 HC POSTOP RECOV 1ST HR: Performed by: STUDENT IN AN ORGANIZED HEALTH CARE EDUCATION/TRAINING PROGRAM

## 2022-06-24 PROCEDURE — D9220A PRA ANESTHESIA: ICD-10-PCS | Mod: CRNA,,, | Performed by: NURSE ANESTHETIST, CERTIFIED REGISTERED

## 2022-06-24 PROCEDURE — 25000003 PHARM REV CODE 250: Performed by: NURSE ANESTHETIST, CERTIFIED REGISTERED

## 2022-06-24 PROCEDURE — 99900035 HC TECH TIME PER 15 MIN (STAT)

## 2022-06-24 PROCEDURE — 63600175 PHARM REV CODE 636 W HCPCS: Performed by: NURSE ANESTHETIST, CERTIFIED REGISTERED

## 2022-06-24 PROCEDURE — 94761 N-INVAS EAR/PLS OXIMETRY MLT: CPT

## 2022-06-24 PROCEDURE — 63600175 PHARM REV CODE 636 W HCPCS

## 2022-06-24 PROCEDURE — D9220A PRA ANESTHESIA: Mod: CRNA,,, | Performed by: NURSE ANESTHETIST, CERTIFIED REGISTERED

## 2022-06-24 PROCEDURE — 36000706: Performed by: STUDENT IN AN ORGANIZED HEALTH CARE EDUCATION/TRAINING PROGRAM

## 2022-06-24 PROCEDURE — 37000009 HC ANESTHESIA EA ADD 15 MINS: Performed by: STUDENT IN AN ORGANIZED HEALTH CARE EDUCATION/TRAINING PROGRAM

## 2022-06-24 PROCEDURE — 11043 DBRDMT MUSC&/FSCA 1ST 20/<: CPT | Mod: ,,, | Performed by: STUDENT IN AN ORGANIZED HEALTH CARE EDUCATION/TRAINING PROGRAM

## 2022-06-24 PROCEDURE — 82962 GLUCOSE BLOOD TEST: CPT | Performed by: STUDENT IN AN ORGANIZED HEALTH CARE EDUCATION/TRAINING PROGRAM

## 2022-06-24 PROCEDURE — 83735 ASSAY OF MAGNESIUM: CPT

## 2022-06-24 PROCEDURE — 84295 ASSAY OF SERUM SODIUM: CPT | Mod: 91

## 2022-06-24 PROCEDURE — 84295 ASSAY OF SERUM SODIUM: CPT

## 2022-06-24 PROCEDURE — 63600175 PHARM REV CODE 636 W HCPCS: Performed by: STUDENT IN AN ORGANIZED HEALTH CARE EDUCATION/TRAINING PROGRAM

## 2022-06-24 PROCEDURE — 11046 PR DEB MUSC/FASCIA ADD-ON: ICD-10-PCS | Mod: ,,, | Performed by: STUDENT IN AN ORGANIZED HEALTH CARE EDUCATION/TRAINING PROGRAM

## 2022-06-24 PROCEDURE — 85025 COMPLETE CBC W/AUTO DIFF WBC: CPT

## 2022-06-24 PROCEDURE — 99900026 HC AIRWAY MAINTENANCE (STAT)

## 2022-06-24 PROCEDURE — 11046 DBRDMT MUSC&/FSCA EA ADDL: CPT | Mod: ,,, | Performed by: STUDENT IN AN ORGANIZED HEALTH CARE EDUCATION/TRAINING PROGRAM

## 2022-06-24 PROCEDURE — 36415 COLL VENOUS BLD VENIPUNCTURE: CPT

## 2022-06-24 PROCEDURE — 11043 PR DEBRIDEMENT, SKIN, SUB-Q TISSUE,MUSCLE,=<20 SQ CM: ICD-10-PCS | Mod: ,,, | Performed by: STUDENT IN AN ORGANIZED HEALTH CARE EDUCATION/TRAINING PROGRAM

## 2022-06-24 PROCEDURE — D9220A PRA ANESTHESIA: Mod: ANES,,, | Performed by: ANESTHESIOLOGY

## 2022-06-24 PROCEDURE — D9220A PRA ANESTHESIA: ICD-10-PCS | Mod: ANES,,, | Performed by: ANESTHESIOLOGY

## 2022-06-24 PROCEDURE — 27000221 HC OXYGEN, UP TO 24 HOURS

## 2022-06-24 RX ORDER — PIPERACILLIN SODIUM, TAZOBACTAM SODIUM 4; .5 G/20ML; G/20ML
INJECTION, POWDER, LYOPHILIZED, FOR SOLUTION INTRAVENOUS
Status: DISCONTINUED | OUTPATIENT
Start: 2022-06-24 | End: 2022-06-24

## 2022-06-24 RX ORDER — PROPOFOL 10 MG/ML
VIAL (ML) INTRAVENOUS
Status: DISCONTINUED | OUTPATIENT
Start: 2022-06-24 | End: 2022-06-24

## 2022-06-24 RX ORDER — DEXAMETHASONE SODIUM PHOSPHATE 4 MG/ML
INJECTION, SOLUTION INTRA-ARTICULAR; INTRALESIONAL; INTRAMUSCULAR; INTRAVENOUS; SOFT TISSUE
Status: DISCONTINUED | OUTPATIENT
Start: 2022-06-24 | End: 2022-06-24

## 2022-06-24 RX ORDER — SODIUM,POTASSIUM PHOSPHATES 280-250MG
2 POWDER IN PACKET (EA) ORAL EVERY 4 HOURS
Status: COMPLETED | OUTPATIENT
Start: 2022-06-24 | End: 2022-06-25

## 2022-06-24 RX ORDER — ROCURONIUM BROMIDE 10 MG/ML
INJECTION, SOLUTION INTRAVENOUS
Status: DISCONTINUED | OUTPATIENT
Start: 2022-06-24 | End: 2022-06-24

## 2022-06-24 RX ORDER — EPHEDRINE SULFATE 50 MG/ML
INJECTION, SOLUTION INTRAVENOUS
Status: DISCONTINUED | OUTPATIENT
Start: 2022-06-24 | End: 2022-06-24

## 2022-06-24 RX ORDER — FENTANYL CITRATE 50 UG/ML
INJECTION, SOLUTION INTRAMUSCULAR; INTRAVENOUS
Status: DISCONTINUED | OUTPATIENT
Start: 2022-06-24 | End: 2022-06-24

## 2022-06-24 RX ORDER — SODIUM CHLORIDE 0.9 % (FLUSH) 0.9 %
10 SYRINGE (ML) INJECTION
Status: DISCONTINUED | OUTPATIENT
Start: 2022-06-24 | End: 2022-06-27

## 2022-06-24 RX ORDER — LIDOCAINE HYDROCHLORIDE 20 MG/ML
INJECTION, SOLUTION EPIDURAL; INFILTRATION; INTRACAUDAL; PERINEURAL
Status: DISCONTINUED | OUTPATIENT
Start: 2022-06-24 | End: 2022-06-24

## 2022-06-24 RX ORDER — PHENYLEPHRINE HYDROCHLORIDE 10 MG/ML
INJECTION INTRAVENOUS
Status: DISCONTINUED | OUTPATIENT
Start: 2022-06-24 | End: 2022-06-24

## 2022-06-24 RX ORDER — FENTANYL CITRATE 50 UG/ML
25 INJECTION, SOLUTION INTRAMUSCULAR; INTRAVENOUS EVERY 5 MIN PRN
Status: DISCONTINUED | OUTPATIENT
Start: 2022-06-24 | End: 2022-06-27

## 2022-06-24 RX ORDER — DEXTROSE MONOHYDRATE 50 MG/ML
INJECTION, SOLUTION INTRAVENOUS CONTINUOUS
Status: DISCONTINUED | OUTPATIENT
Start: 2022-06-24 | End: 2022-06-25

## 2022-06-24 RX ORDER — ONDANSETRON 2 MG/ML
INJECTION INTRAMUSCULAR; INTRAVENOUS
Status: DISCONTINUED | OUTPATIENT
Start: 2022-06-24 | End: 2022-06-24

## 2022-06-24 RX ADMIN — DEXTROSE: 5 SOLUTION INTRAVENOUS at 03:06

## 2022-06-24 RX ADMIN — EPHEDRINE SULFATE 10 MG: 50 INJECTION INTRAVENOUS at 01:06

## 2022-06-24 RX ADMIN — FENTANYL CITRATE 50 MCG: 50 INJECTION, SOLUTION INTRAMUSCULAR; INTRAVENOUS at 01:06

## 2022-06-24 RX ADMIN — ROCURONIUM BROMIDE 40 MG: 10 INJECTION, SOLUTION INTRAVENOUS at 01:06

## 2022-06-24 RX ADMIN — DEXTROSE: 5 SOLUTION INTRAVENOUS at 10:06

## 2022-06-24 RX ADMIN — SUGAMMADEX 100 MG: 100 INJECTION, SOLUTION INTRAVENOUS at 01:06

## 2022-06-24 RX ADMIN — INSULIN ASPART 5 UNITS: 100 INJECTION, SOLUTION INTRAVENOUS; SUBCUTANEOUS at 05:06

## 2022-06-24 RX ADMIN — PIPERACILLIN SODIUM AND TAZOBACTAM SODIUM 4.5 G: 4; .5 INJECTION, POWDER, LYOPHILIZED, FOR SOLUTION INTRAVENOUS at 11:06

## 2022-06-24 RX ADMIN — PROPOFOL 80 MG: 10 INJECTION, EMULSION INTRAVENOUS at 01:06

## 2022-06-24 RX ADMIN — POTASSIUM & SODIUM PHOSPHATES POWDER PACK 280-160-250 MG 2 PACKET: 280-160-250 PACK at 08:06

## 2022-06-24 RX ADMIN — DEXAMETHASONE SODIUM PHOSPHATE 4 MG: 4 INJECTION INTRA-ARTICULAR; INTRALESIONAL; INTRAMUSCULAR; INTRAVENOUS; SOFT TISSUE at 01:06

## 2022-06-24 RX ADMIN — PIPERACILLIN SODIUM AND TAZOBACTAM SODIUM 4.5 G: 4; .5 INJECTION, POWDER, LYOPHILIZED, FOR SOLUTION INTRAVENOUS at 01:06

## 2022-06-24 RX ADMIN — LEVETIRACETAM 750 MG: 100 SOLUTION ORAL at 09:06

## 2022-06-24 RX ADMIN — CHOLESTYRAMINE 8 G: 4 POWDER, FOR SUSPENSION ORAL at 09:06

## 2022-06-24 RX ADMIN — LIDOCAINE HYDROCHLORIDE 60 MG: 20 INJECTION, SOLUTION EPIDURAL; INFILTRATION; INTRACAUDAL; PERINEURAL at 01:06

## 2022-06-24 RX ADMIN — LEVETIRACETAM 750 MG: 100 SOLUTION ORAL at 08:06

## 2022-06-24 RX ADMIN — ONDANSETRON 4 MG: 2 INJECTION INTRAMUSCULAR; INTRAVENOUS at 01:06

## 2022-06-24 RX ADMIN — GLYCOPYRROLATE 0.2 MG: 0.2 INJECTION, SOLUTION INTRAMUSCULAR; INTRAVITREAL at 01:06

## 2022-06-24 RX ADMIN — PIPERACILLIN SODIUM AND TAZOBACTAM SODIUM 4.5 G: 4; .5 INJECTION, POWDER, LYOPHILIZED, FOR SOLUTION INTRAVENOUS at 05:06

## 2022-06-24 RX ADMIN — PHENYLEPHRINE HYDROCHLORIDE 200 MCG: 10 INJECTION INTRAVENOUS at 01:06

## 2022-06-24 RX ADMIN — HEPARIN SODIUM 5000 UNITS: 5000 INJECTION INTRAVENOUS; SUBCUTANEOUS at 08:06

## 2022-06-24 RX ADMIN — POTASSIUM & SODIUM PHOSPHATES POWDER PACK 280-160-250 MG 2 PACKET: 280-160-250 PACK at 10:06

## 2022-06-24 RX ADMIN — CHOLESTYRAMINE 8 G: 4 POWDER, FOR SUSPENSION ORAL at 08:06

## 2022-06-24 RX ADMIN — MUPIROCIN: 20 OINTMENT TOPICAL at 09:06

## 2022-06-24 RX ADMIN — POTASSIUM & SODIUM PHOSPHATES POWDER PACK 280-160-250 MG 2 PACKET: 280-160-250 PACK at 05:06

## 2022-06-24 RX ADMIN — ATORVASTATIN CALCIUM 80 MG: 20 TABLET, FILM COATED ORAL at 09:06

## 2022-06-24 RX ADMIN — SODIUM CHLORIDE: 0.9 INJECTION, SOLUTION INTRAVENOUS at 12:06

## 2022-06-24 NOTE — PT/OT/SLP PROGRESS
Speech Language Pathology      Darlenethomas Avila  MRN: [id number]    Patient not seen today secondary to transport at bedside to take pt to OR. Will follow-up.

## 2022-06-24 NOTE — OP NOTE
[facility]  General Surgery  Operative Note    DATE: 6/24/2022    PREOPERATIVE DIAGNOSIS: Sacral decubitus ulcer, stage IV [L89.154]     POSTOPERATIVE DIAGNOSIS: Sacral decubitus ulcer, stage IV [L89.154]     Procedure(s):  DEBRIDEMENT, WOUND, sacral ulcer   Excisional debridement of skin, subcutaneous tissue, fascia, and muscle measuring 30cm x 18cm    Surgeon(s) and Role:     * Cullen Martin MD - Primary     * Howie Garcia MD - Resident - Assisting    ANESTHESIA: General endotracheal anesthesia    FINDINGS: Stage IV pressure ulcer with debridement of nonviable tissue to healthy tissue.    INDICATION: Ms. Avila is a 71 y.o. female with anoxic brain injury s/p trach/PEG, bed bound status, ischemic CVA, IBS, history of breast cancer s/p right mastectomy and failure to thrive who presented to [facility] after her PoA revoked her hospice status to seek better care. She has a Stage IV sacral ulcer which she last underwent excisional debridement by general surgery at her prior facility on 6/8/22. She is going back to OR today for additional debridement of noviable tissue of the sacral area.     PROCEDURE IN DETAIL: Patient was brought to the operating room where she was placed in supine position on the operating room table and underwent general anesthesia with endotracheal intubation without complication. She was then flipped over to supine position. The field was sterilely prepped out and draped in standard fashion, and a timeout identifying the correct patient, placement, procedure, and preoperative antibiotics was called with everyone in agreement.  #10 blade scalpel, curved zambrano, and electrocautery were used to sharply excise and debride skin, subcutaneous tissue, fascia, and muscle until viable and healthy tissue reached. Total wound measurement: 30cm x 18cm in size. Area then packed with saline soaked kerlix and dressed with an ABD pad and mesh panties.  This completed the proposed operation.  All instruments, needles, and sponge counts were reported as correct x2 by the nursing staff. Patient was extubated and awakened from general anesthesia without complication. She was sent to the recovery unit in stable condition.     EBL: <10mL.    COMPLICATIONS: none apparent.    SPECIMEN: none    DRAINS: none    DISPOSITION: MICU.    ATTESTATION:  I was present and scrubbed for the entire procedure including all critical portions of the procedure.    Cullen Martin MD  Acute Care Surgery and Surgical Critical Care  [facility]  6/24/2022

## 2022-06-24 NOTE — ANESTHESIA PROCEDURE NOTES
Intubation    Date/Time: 6/24/2022 1:14 PM  Performed by: Zulma Valle  Authorized by: Yunior Barry Jr., MD     Intubation:     Induction:  Intravenous    Intubated:  Postinduction    Attempts:  1    Attempted By:  Student    Method of Intubation:  ETT into pre-existing tracheostomy    Difficult Airway Encountered?: No      Complications:  None    Airway Device:  Oral endotracheal tube    Airway Device Size:  6.0    Style/Cuff Inflation:  Cuffed    Inflation Amount (mL):  5    Tube secured:  12    Secured at:  The skin level of trach    Placement Verified By:  Capnometry    Complicating Factors:  None    Findings Post-Intubation:  BS equal bilateral

## 2022-06-24 NOTE — PROGRESS NOTES
[facility] - Intensive [facility] ([place] [address])  [facility] Medicine  Progress Note    Patient Name: Darlene Avila  MRN: [id number]  Patient Class: IP- Inpatient   Admission Date: 6/17/2022  Length of Stay: 5 days  Attending Physician: Stacey Ugalde MD  Primary Care Provider: Kirill Cabrera Iii, MD        Subjective:     Principal Problem:Hypernatremia        HPI:  Darlene Avila is a 71 year old F with history of diabetes type 2, seizure c/b anoxic brain injury s/p trach, PEG, bed bound status, Stage IV sacral ulcer, ischemic CVA, IBS, history of breast cancer s/p right mastectomy and failure to thrive who was brought in by her HPOA (sister) for nursing home placement. Patient is unable to provide history.     Patient's sister was contacted who stated that the patient was admitted to [facility] in [facility] for recurrent seizures/status epilepticus which resulted in an anoxic brain injury and respiratory failure.  Patient was intubated followed by tracheostomy and PEG placement .  Patient was discharged to nursing home and subsequently discharged to hospice care at the request of patient's sister. However a few days ago, she visited the patient at [facility] recovery facility and noticed they weren't feeding or taking care of the patient due to her 'hospice status'. Per sister today, she would like to place the patient in a skilled nursing facility and would like to rescind her hospice care at this time.  She is no longer interested in hospice placement. She states the patient at baseline is only able to follow some simple commands. She is unsure how much O2 level is her baseline via trach collar.   Of note she has a Stage IV sacral wound that is s/p debridement on 06/09 by Gen surgery in [facility].     Upon arrival to the ED, the patient was hemodynamically stable. Labs revealed Na 157, WBC 14. Hgb 9.4 (baseline 7-9). The patient was given 1L of IV NS and was admitted to hospital medicine for further management.        Overview/Hospital Course:  Patient admitted to hospital medicine with pre-existing anoxic brain injury, hypernatremia, sacral decubitus wound s/p debridement 06/08 in [place], [place]. Patient initially admitted to hospice following discharge from [facility] ICU, but discharged from hospice as patient family believed she was not getting appropriate care. Na 157, corrected with IVF (NS and hypotonic solutions) and enteral FWF down to 148. Trach collar exchanged by ENT due to lack of supplies at facility. Wound care consulted for sacral decubitus, recommended General Surgery consult for possible debridement as wound appears purulent. Persistent leukocytosis, started Zosyn per previous I&D wound cultures growing bacteriodes/enterococcus and new wound findings. cEEG started and Epilepsy consulted for assistance with antiepileptic medications; conflicting reports if patient was on only Keppra/Lacosamide vs Keppra/Lacosamide/Phenytoin. General surgery consulted for debridement of sacral decubitus ulcer. Palliative Care consulted for assistance in GOC; decision to pursue medical/surgical care decided.    CM/SW to assist with dispo.      Interval History: NAEON AF HDS. Na slight uptick due to holding FWF overnight s/o surgery plan. Started hypotonics. Otherwise, no complaints. Patient continues to be alert and attempting to speak. Nursing to bring marker board / writing pad to assist.    Review of Systems   Unable to perform ROS: Mental status change   Respiratory:  Negative for chest tightness and shortness of breath.    Cardiovascular:  Negative for chest pain.   Gastrointestinal:  Negative for abdominal pain.   Objective:     Vital Signs (Most Recent):  Temp: 97.2 °F (36.2 °C) (06/24/22 1143)  Pulse: 84 (06/24/22 1143)  Resp: 14 (06/24/22 1143)  BP: (!) 156/74 (06/24/22 1143)  SpO2: 100 % (06/24/22 1143)   Vital Signs (24h Range):  Temp:  [97.2 °F (36.2 °C)-98.1 °F (36.7 °C)] 97.2 °F (36.2 °C)  Pulse:  [75-84] 84  Resp:   [14-19] 14  SpO2:  [100 %] 100 %  BP: (113-156)/(53-74) 156/74     Weight: 50.2 kg (110 lb 10.7 oz)  Body mass index is 22.35 kg/m².    Intake/Output Summary (Last 24 hours) at 6/24/2022 1156  Last data filed at 6/24/2022 0529  Gross per 24 hour   Intake 298.95 ml   Output 650 ml   Net -351.05 ml      Physical Exam  Vitals and nursing note reviewed.   Constitutional:       General: She is not in acute distress.     Appearance: She is not ill-appearing, toxic-appearing or diaphoretic.   HENT:      Head: Normocephalic.      Mouth/Throat:      Mouth: Mucous membranes are dry.      Comments: Dried oral secretions adhered to tongue  Cardiovascular:      Rate and Rhythm: Normal rate and regular rhythm.      Pulses: Normal pulses.      Heart sounds: Normal heart sounds. No murmur heard.  Pulmonary:      Effort: Pulmonary effort is normal. No respiratory distress.      Breath sounds: Normal breath sounds. No wheezing or rales.   Abdominal:      General: Abdomen is flat. Bowel sounds are normal. There is no distension.      Palpations: Abdomen is soft.      Tenderness: There is no abdominal tenderness.   Musculoskeletal:         General: No swelling or tenderness. Normal range of motion.      Cervical back: Normal range of motion.   Skin:     General: Skin is warm and dry.      Coloration: Skin is not jaundiced or pale.      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    Neurological:      Mental Status: She is alert.      Comments: Minimally alert, opens eyes to sound/pain, tracks to sound, attempts to speak,, short sentences limited by dysarthria / trach.   Psychiatric:      Comments: Unable to assess       Significant Labs: All pertinent labs within the past 24 hours have been reviewed.    Significant Imaging: I have reviewed all pertinent imaging results/findings within the past 24 hours.      Assessment/Plan:      * Hypernatremia  Na 157. Suspect from dehydration while at her recovery facility.    - FWD 2.7 on admission.   S/p 1L of NS in the ED.     - PRN hypotonic fluids  - Increased to 500cc QID free water boluses  - Do not overcorrect by >6-8mEq within 24 hours; goal 152 > 144.  - Na q6h    Discharge planning issues  Darlene Avila is a 71 year old F with history of diabetes type 2, seizure, ischemic CVA, IBS, history of breast cancer s/p right mastectomy and failure to thrive who was brought in by her HPOA (sister) for skilled nursing home placement due to concern for neglect at recent hospice facility. Patient follows very simple commands and doubt she can engage in skilled therapy. detention nursing facility likely more of an option.     - Appreciate CM/SW assistance with placement.   - Appreciate pharmacy's assistance with med rec.     Sacral decubitus ulcer, stage IV  S/p recent debridement at OSH 06/08. OSH Wcx with enterococcus faecalis, bacteriodes fragilus.    - Wound care consulted  - General surgery consulted for I&D; plan for debridement 06/24  - Continue Abx therapy  - Consideration for wound vac given depth of injury  - Turn q2h    Leukocytosis  Patient admitted with elevated WBC to 14s. Previous episodes of aspiration PNA at OSH, indwelling cabrera given acuity of condition, sacral decubitus ulcer stave IV s/p debridement with enterococcus faecalis / bacteriodes fragilis in cx. Patient afebrile with stable hemodynamics on admission at this time. CXR without acute abnormality. UA with yeast growing, chronic per previous OSH review.    Leukocytosis may be explained by soft tissue infection vs hemoconcentration given extensive dehydration and hypernatremia from lack of enteral fluids or IVF at outside hospice facility prior to transfer to [facility].     - Zosyn given previous cultures and persistent leukocytosis  - Etiology may be soft tissue infection; see Sacral Decubitus Ulcer A/P  - Continue monitoring CBC  - Wound care per Sacral Decubitus Ulcer A/P  - Aspiration precautions  - Trend fever  curve  - Trend BCx    Tracheostomy in place  Dysarthria  Dysphagia    On 5L O2 via trach collar.     - ENT consulted for trach exchange given lack of compatible equipment; appreciate assistance  - Respiratory therapy to assist with trach care.   - SLP for swallow evaluation and speech therapy    Seizures  Patient previously started on Lacosamide and Keppra at OSH during initial episode of status epilepticus. Phenytoin added as patient continued to have breakthrough seizures.    - Keppra monotherapy 750 bid per neuro recs  - No seizures seen on EEG, monitoring has stopped   - Neurology consulted for further assistance in antiepileptic regimen    Palliative care encounter  Per Primary Medicine team and Palliative Care encounters with patient family (separate encounters, see dated notes in chart), patient family wishes full medical care and does not desire comfort/hospice measures. DNR remains. See Pall Care note for further detail.    G tube feedings  Tube feed dependent via G tube  - Nutrition to assist with TF recs; see recommendations in note; appreciate assistance      ACP (advance care planning)  Patient is DNR. LaPOST on file.       History of CVA (cerebrovascular accident)  Unclear of patient's current medications. Per med review on care-everywhere, the patient is on statin but not on antiplatelet therapy.     - Appreciate pharmacy's assistance with medication reconciliation with facility- start antiplatelet therapy if no contraindications.   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 A1c repeated 6.9.    - LDSSI   - POCT glucose q6hrs  - Maintain -180  - Titrate basal/bolus regimen to goal as above.      VTE Risk Mitigation (From admission, onward)         Ordered     heparin (porcine) injection 5,000 Units  Every 12  hours         06/18/22 0355     IP VTE HIGH RISK PATIENT  Once         06/18/22 0355     Place sequential compression device  Until discontinued         06/18/22 0355                Discharge Planning   JUDIT: 6/27/2022     Code Status: DNR   Is the patient medically ready for discharge?: No    Reason for patient still in hospital (select all that apply): Treatment  Discharge Plan A: New Nursing Home placement - [facility] care facility   Discharge Delays: None known at this time              Dawood Self MD  Department of Hospital Medicine   [facility] - Intensive Care ([place])

## 2022-06-24 NOTE — PLAN OF CARE
JAZMIN spoke with Nola at [facility] admission regarding pt referral.  Nola stated pt care exceeded their capacity.    JAZMIN spoke followed up with [facility] and spoke with Skye [phone] and was provided with a  Fax number [phone] and asked to fax referral attn: Skye.  JAZMIN faxed referral.    SW to continue to f/u with: [facility]      SW f/u:  - [facility] [phone] - no beds, and wait list.  Do not take trach pts  - [facility] [phone] (Norma) will look in [facility] for referral and f/u  - [facility] [phone] ex [phone] (moises) fax is down use alternate fax [phone] for referral - JAZMIN faxed referral  - [facility] [phone] SW left msge for admitting at 1145hrs  - [facility] [phone] (Tiffani) did not receive referral through [facility] email: [email or web address] or fax [phone] - Do not take trach pts.  JAZMIN did not resend referral  - [facility] [phone] pt2011 SW left msg at 1109hrs    Yocasta Urbina CD, MSW, LMSW, RSW   Case Management  [facility]  Email: [email or web address]

## 2022-06-24 NOTE — TRANSFER OF CARE
"Anesthesia Transfer of Care Note    Patient: Darleen Avila    Procedure(s) Performed: Procedure(s) (LRB):  DEBRIDEMENT, WOUND, sacral ulcer (N/A)    Patient location: PACU    Anesthesia Type: general    Transport from OR: Transported from OR on 6-10 L/min O2 by face mask with adequate spontaneous ventilation    Post pain: adequate analgesia    Post assessment: no apparent anesthetic complications and tolerated procedure well    Post vital signs: stable    Level of consciousness: responds to stimulation    Nausea/Vomiting: no nausea/vomiting    Complications: none    Transfer of care protocol was followed      Last vitals: 6/24/22 1410  Visit Vitals  BP (!) 95/54 (BP Location: Left arm, Patient Position: Lying)   Pulse 99   Temp 36.3 °C (97.3 °F) (Temporal)   Resp 10   Ht 4' 11" (1.499 m)   Wt 50.2 kg (110 lb 10.7 oz)   SpO2 100%   Breastfeeding No   BMI 22.35 kg/m²     "

## 2022-06-24 NOTE — RESPIRATORY THERAPY
RAPID RESPONSE RESPIRATORY THERAPY PROACTIVE NOTE           Time of visit:      Code Status: DNR   : 1950  Bed: 34310/30807 A:   MRN: [id number]  Time spent at the bedside: < 15 min    SITUATION    Evaluated patient for: LDA Check     BACKGROUND    Patient has a past medical history of Arthritis, Cancer of breast, Cataract, Diabetes mellitus, Hypertension, Hypoxia, Memory loss, Orthostatic hypotension, TIA (transient ischemic attack), and Vitamin D deficiency.  Clinically Significant Surgical Hx: tracheostomy    24 Hours Vitals Range:  Temp:  [97.2 °F (36.2 °C)-98.1 °F (36.7 °C)]   Pulse:  [75-99]   Resp:  [10-19]   BP: ()/(52-74)   SpO2:  [100 %]     Labs:    Recent Labs     22  0621 22  1214 22  0519 22  1242 22  1817 22  0043 22  0750     145   < > 145   < > 146* 148* 148*  145   K 3.8  --  3.9  --   --   --  3.6   *  --  120*  --   --   --  122*   CO2 18*  --  18*  --   --   --  18*   CREATININE 0.7  --  0.7  --   --   --  0.7   *  --  161*  --   --   --  88   PHOS 2.2*  --  1.9*  --   --   --  2.0*   MG 1.7  --  1.8  --   --   --  1.8    < > = values in this interval not displayed.        No results for input(s): PH, PCO2, PO2, HCO3, POCSATURATED, BE in the last 72 hours.    ASSESSMENT/INTERVENTIONS    Upon arrival in room patient sleeping, on 5L 28% trach collar. All supplies in room. Extra cuffed Shiley trachs at bedside sizes 4 & 6.    Last VS   Temp: 97.7 °F (36.5 °C) (1724)  Pulse: 88 (1724)  Resp: 16 (1724)  BP: 111/54 (1724)  SpO2: 100 % (06/24 1724)    Level of Consciousness: Level of Consciousness (AVPU): alert  Respiratory Effort: Respiratory Effort: Normal, Unlabored Expansion/Accessory Muscle Usage: Expansion/Accessory Muscles/Retractions: expansion symmetric, no retractions, no use of accessory muscles  All Lung Field Breath Sounds: All Lung Fields Breath Sounds: Anterior:, clear, diminished  O2  Device/Concentration: Flow (L/min): 5, Oxygen Concentration (%): 28, O2 Device (Oxygen Therapy): Trach Collar  Surgical airway: Yes, Type: Shiley Size: 6, uncuffed  Ambu at bedside: Ambu bag with the patient?: Yes, Adult Ambu     Active Orders   Respiratory Care    Oxygen Continuous     Frequency: Continuous     Number of Occurrences: Until Specified     Order Questions:      Device type: Low flow      Device: Trach Collar      FiO2%: 28      Titrate O2 per Oxygen Titration Protocol: Yes      To maintain SpO2 goal of: >= 90%      Notify MD of: Inability to achieve desired SpO2; Sudden change in patient status and requires 20% increase in FiO2; Patient requires >60% FiO2    RESPIRATORY COMMUNICATION     Frequency: Daily     Number of Occurrences: Until Specified     Order Comments: Trach care      Routine tracheostomy care     Frequency: BID     Number of Occurrences: Until Specified       RECOMMENDATIONS    We recommend: RRT Recs: Continue POC per primary team.    ESCALATION        FOLLOW-UP    Please call back the Rapid Response RTDennis RRT at x [phone] for any questions or concerns.

## 2022-06-24 NOTE — TREATMENT PLAN
General Surgery Plan    Wound debrided and packed with one Kerlix covered with an abdominal pad.    -Recommend continued local wound care, wound care consult, pressure offloading  -Nutritional optimization  -Recommend changing wet-to-dry dressing BID. Can use Dakins if concern for infection--no evidence of this being infected intraoperatively.    General Surgery will sign off. Please call with questions or concerns.    Howie Garcia MD  General Surgery PGY-2  06/24/2022     Explanation of exam/test

## 2022-06-24 NOTE — ASSESSMENT & PLAN NOTE
Dysarthria  Dysphagia    On 5L O2 via trach collar.     - ENT consulted for trach exchange given lack of compatible equipment; appreciate assistance  - Respiratory therapy to assist with trach care.   - SLP for swallow evaluation and speech therapy

## 2022-06-24 NOTE — PLAN OF CARE
Shift Summary  Patient continues EEG evaluation. No significant events occurred during this shift. Will continue to assess.  Signed by: Chico Iniguez Nurse at 5:53 AM on 06/23/2022      Interval Summary  Patient continues to progress in course of treatment. No significant events occurred during this shift. Will continue to assess.  Signed by: Chico Iniguez Nurse at 5:08 AM on 06/24/2022          Problem: Adult Inpatient Plan of Care  Goal: Plan of Care Review  Outcome: Ongoing, Progressing

## 2022-06-24 NOTE — PLAN OF CARE
Problem: Adult Inpatient Plan of Care  Goal: Plan of Care Review  Outcome: Ongoing, Progressing  Goal: Patient-Specific Goal (Individualized)  Outcome: Ongoing, Progressing  Goal: Absence of Hospital-Acquired Illness or Injury  Outcome: Ongoing, Progressing  Goal: Optimal Comfort and Wellbeing  Outcome: Ongoing, Progressing  Goal: Readiness for Transition of Care  Outcome: Ongoing, Progressing     Problem: Diabetes Comorbidity  Goal: Blood Glucose Level Within Targeted Range  Outcome: Ongoing, Progressing     Problem: Impaired Wound Healing  Goal: Optimal Wound Healing  Outcome: Ongoing, Progressing     Problem: Infection  Goal: Absence of Infection Signs and Symptoms  Outcome: Ongoing, Progressing     Problem: Skin Injury Risk Increased  Goal: Skin Health and Integrity  Outcome: Ongoing, Progressing     Problem: Fall Injury Risk  Goal: Absence of Fall and Fall-Related Injury  Outcome: Ongoing, Progressing     Problem: Coping Ineffective  Goal: Effective Coping  Outcome: Ongoing, Progressing       Patient alert this am. Patient taken to OR for I and D of coccyx. Patient returned approx 3pm. Patient was give fentynal per pacu nurse. Patient has been resting comfortably since her return. Iv fluids restarted as well as glucerna. Patient positioned off wound

## 2022-06-24 NOTE — ASSESSMENT & PLAN NOTE
Patient admitted with elevated WBC to 14s. Previous episodes of aspiration PNA at OSH, indwelling cabrera given acuity of condition, sacral decubitus ulcer stave IV s/p debridement with enterococcus faecalis / bacteriodes fragilis in cx. Patient afebrile with stable hemodynamics on admission at this time. CXR without acute abnormality. UA with yeast growing, chronic per previous OSH review.    Leukocytosis may be explained by soft tissue infection vs hemoconcentration given extensive dehydration and hypernatremia from lack of enteral fluids or IVF at outside hospice facility prior to transfer to [facility].     - Zosyn given previous cultures and persistent leukocytosis  - Etiology may be soft tissue infection; see Sacral Decubitus Ulcer A/P  - Continue monitoring CBC  - Wound care per Sacral Decubitus Ulcer A/P  - Aspiration precautions  - Trend fever curve  - Trend BCx

## 2022-06-24 NOTE — ASSESSMENT & PLAN NOTE
Na 157. Suspect from dehydration while at her recovery facility.   - FWD 2.7 on admission.   S/p 1L of NS in the ED.     - PRN hypotonic fluids  - Increased to 500cc QID free water boluses  - Do not overcorrect by >6-8mEq within 24 hours; goal 152 > 144.  - Na q6h

## 2022-06-24 NOTE — SUBJECTIVE & OBJECTIVE
Interval History: NAEON AF HDS. Na slight uptick due to holding FWF overnight s/o surgery plan. Started hypotonics. Otherwise, no complaints. Patient continues to be alert and attempting to speak. Nursing to bring marker board / writing pad to assist.    Review of Systems   Unable to perform ROS: Mental status change   Respiratory:  Negative for chest tightness and shortness of breath.    Cardiovascular:  Negative for chest pain.   Gastrointestinal:  Negative for abdominal pain.   Objective:     Vital Signs (Most Recent):  Temp: 97.2 °F (36.2 °C) (06/24/22 1143)  Pulse: 84 (06/24/22 1143)  Resp: 14 (06/24/22 1143)  BP: (!) 156/74 (06/24/22 1143)  SpO2: 100 % (06/24/22 1143)   Vital Signs (24h Range):  Temp:  [97.2 °F (36.2 °C)-98.1 °F (36.7 °C)] 97.2 °F (36.2 °C)  Pulse:  [75-84] 84  Resp:  [14-19] 14  SpO2:  [100 %] 100 %  BP: (113-156)/(53-74) 156/74     Weight: 50.2 kg (110 lb 10.7 oz)  Body mass index is 22.35 kg/m².    Intake/Output Summary (Last 24 hours) at 6/24/2022 1156  Last data filed at 6/24/2022 0529  Gross per 24 hour   Intake 298.95 ml   Output 650 ml   Net -351.05 ml      Physical Exam  Vitals and nursing note reviewed.   Constitutional:       General: She is not in acute distress.     Appearance: She is not ill-appearing, toxic-appearing or diaphoretic.   HENT:      Head: Normocephalic.      Mouth/Throat:      Mouth: Mucous membranes are dry.      Comments: Dried oral secretions adhered to tongue  Cardiovascular:      Rate and Rhythm: Normal rate and regular rhythm.      Pulses: Normal pulses.      Heart sounds: Normal heart sounds. No murmur heard.  Pulmonary:      Effort: Pulmonary effort is normal. No respiratory distress.      Breath sounds: Normal breath sounds. No wheezing or rales.   Abdominal:      General: Abdomen is flat. Bowel sounds are normal. There is no distension.      Palpations: Abdomen is soft.      Tenderness: There is no abdominal tenderness.   Musculoskeletal:         General:  No swelling or tenderness. Normal range of motion.      Cervical back: Normal range of motion.   Skin:     General: Skin is warm and dry.      Coloration: Skin is not jaundiced or pale.      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    Neurological:      Mental Status: She is alert.      Comments: Minimally alert, opens eyes to sound/pain, tracks to sound, attempts to speak,, short sentences limited by dysarthria / trach.   Psychiatric:      Comments: Unable to assess       Significant Labs: All pertinent labs within the past 24 hours have been reviewed.    Significant Imaging: I have reviewed all pertinent imaging results/findings within the past 24 hours.

## 2022-06-24 NOTE — BRIEF OP NOTE
[facility] - Intensive Care ([place] [id number])  Brief Operative Note    SUMMARY     Surgery Date: 6/24/2022     Surgeon(s) and Role:     * Cullen Martin MD - Primary     * Howie Garcia MD - Resident - Assisting        Pre-op Diagnosis:  Sacral decubitus ulcer, stage IV [L89.154]    Post-op Diagnosis:  Post-Op Diagnosis Codes:     * Sacral decubitus ulcer, stage IV [L89.154]    Procedure(s) (LRB):  DEBRIDEMENT, WOUND, sacral ulcer (N/A)    Anesthesia: General    Operative Findings: Stage IV pressure ulcer with debridement of nonviable tissue to healthy tissue    Estimated Blood Loss: * No values recorded between 6/24/2022  1:27 PM and 6/24/2022  1:46 PM *    Estimated Blood Loss has been documented.         Specimens:   Specimen (24h ago, onward)            None        Cullen Martin MD  Acute Care Surgery and Surgical Critical Care  [facility]  6/24/2022

## 2022-06-24 NOTE — NURSING TRANSFER
Nursing Transfer Note      6/24/2022     Reason patient is being transferred: to room post recovery    Transfer to [facility]    Transfer via bed    Transfer with O2 @ 5lpm per trach collar    Transported by RADHA Rolon & teresa Rosenthal    Medicines sent: N/A    Any special needs or follow-up needed: N/A    Chart send with patient: Yes    Notified: family member/friend    Patient reassessed at: 6/24/22 @ 1500    Upon arrival to floor: RADHA Beckman on 16WT

## 2022-06-24 NOTE — ASSESSMENT & PLAN NOTE
S/p recent debridement at OSH 06/08. OSH Wcx with enterococcus faecalis, bacteriodes fragilus.    - Wound care consulted  - General surgery consulted for I&D; plan for debridement 06/24  - Continue Abx therapy  - Consideration for wound vac given depth of injury  - Turn q2h

## 2022-06-24 NOTE — PROGRESS NOTES
Arrived to pre-op area ([facility]) per bed with RN and transport. Awake, able to move lips but not verbalized due to trach. Questions reviewed with yes or no questions for safety check. Diaper appeared wet with staining on the gown and underpad. Found to be soiled with thick loose stool. Pericare completed with cabrera care done. Foam dressing to sacrum appeared intact. Heel boots in place bilaterally.  Repositioned and supported. Noted wet cough, clear sputum, trach suctioned with clearing of secretions. O2 sat  Remained 100% with trach collar intact.  Tolerared well. 1142 Called sister, Sabi to verify her signing of consents and awareness of pending surgery. Message left to return call. 1150 Return call received with confirmation of consent signatures. Apears comfortable, call bell within reach. No apparent distress at this time.

## 2022-06-24 NOTE — TREATMENT PLAN
Plan for OR today, 6/24/22, for debridement  Continue to hold tube feeds  Remainder of care per primary team  Please contact general surgery with any questions, concerns, or clinical status changes        Galo Pollard PA-C  General Surgery   [facility]

## 2022-06-25 LAB
ALBUMIN SERPL BCP-MCNC: 1.5 G/DL (ref 3.5–5.2)
ALP SERPL-CCNC: 98 U/L (ref 55–135)
ALT SERPL W/O P-5'-P-CCNC: 10 U/L (ref 10–44)
ANION GAP SERPL CALC-SCNC: 7 MMOL/L (ref 8–16)
AST SERPL-CCNC: 10 U/L (ref 10–40)
BACTERIA BLD CULT: NORMAL
BACTERIA BLD CULT: NORMAL
BASOPHILS # BLD AUTO: 0.02 K/UL (ref 0–0.2)
BASOPHILS NFR BLD: 0.2 % (ref 0–1.9)
BILIRUB SERPL-MCNC: 0.1 MG/DL (ref 0.1–1)
BUN SERPL-MCNC: 18 MG/DL (ref 8–23)
CALCIUM SERPL-MCNC: 8.6 MG/DL (ref 8.7–10.5)
CHLORIDE SERPL-SCNC: 120 MMOL/L (ref 95–110)
CO2 SERPL-SCNC: 17 MMOL/L (ref 23–29)
CREAT SERPL-MCNC: 0.6 MG/DL (ref 0.5–1.4)
DIFFERENTIAL METHOD: ABNORMAL
EOSINOPHIL # BLD AUTO: 0 K/UL (ref 0–0.5)
EOSINOPHIL NFR BLD: 0.1 % (ref 0–8)
ERYTHROCYTE [DISTWIDTH] IN BLOOD BY AUTOMATED COUNT: 18.6 % (ref 11.5–14.5)
EST. GFR  (AFRICAN AMERICAN): >60 ML/MIN/1.73 M^2
EST. GFR  (NON AFRICAN AMERICAN): >60 ML/MIN/1.73 M^2
GLUCOSE SERPL-MCNC: 308 MG/DL (ref 70–110)
HCT VFR BLD AUTO: 24.2 % (ref 37–48.5)
HGB BLD-MCNC: 7.5 G/DL (ref 12–16)
IMM GRANULOCYTES # BLD AUTO: 0.03 K/UL (ref 0–0.04)
IMM GRANULOCYTES NFR BLD AUTO: 0.3 % (ref 0–0.5)
LYMPHOCYTES # BLD AUTO: 2.3 K/UL (ref 1–4.8)
LYMPHOCYTES NFR BLD: 23.5 % (ref 18–48)
MAGNESIUM SERPL-MCNC: 1.6 MG/DL (ref 1.6–2.6)
MCH RBC QN AUTO: 27.3 PG (ref 27–31)
MCHC RBC AUTO-ENTMCNC: 31 G/DL (ref 32–36)
MCV RBC AUTO: 88 FL (ref 82–98)
MONOCYTES # BLD AUTO: 0.4 K/UL (ref 0.3–1)
MONOCYTES NFR BLD: 4.4 % (ref 4–15)
NEUTROPHILS # BLD AUTO: 6.9 K/UL (ref 1.8–7.7)
NEUTROPHILS NFR BLD: 71.5 % (ref 38–73)
NRBC BLD-RTO: 0 /100 WBC
PHOSPHATE SERPL-MCNC: 3.5 MG/DL (ref 2.7–4.5)
PLATELET # BLD AUTO: 453 K/UL (ref 150–450)
PMV BLD AUTO: 9.4 FL (ref 9.2–12.9)
POCT GLUCOSE: 102 MG/DL (ref 70–110)
POCT GLUCOSE: 143 MG/DL (ref 70–110)
POCT GLUCOSE: 230 MG/DL (ref 70–110)
POCT GLUCOSE: 336 MG/DL (ref 70–110)
POCT GLUCOSE: 96 MG/DL (ref 70–110)
POTASSIUM SERPL-SCNC: 4.1 MMOL/L (ref 3.5–5.1)
PROT SERPL-MCNC: 5.4 G/DL (ref 6–8.4)
RBC # BLD AUTO: 2.75 M/UL (ref 4–5.4)
SODIUM SERPL-SCNC: 142 MMOL/L (ref 136–145)
SODIUM SERPL-SCNC: 142 MMOL/L (ref 136–145)
SODIUM SERPL-SCNC: 143 MMOL/L (ref 136–145)
SODIUM SERPL-SCNC: 144 MMOL/L (ref 136–145)
WBC # BLD AUTO: 9.62 K/UL (ref 3.9–12.7)

## 2022-06-25 PROCEDURE — 12000002 HC ACUTE/MED SURGE SEMI-PRIVATE ROOM

## 2022-06-25 PROCEDURE — 85025 COMPLETE CBC W/AUTO DIFF WBC: CPT

## 2022-06-25 PROCEDURE — C9399 UNCLASSIFIED DRUGS OR BIOLOG: HCPCS

## 2022-06-25 PROCEDURE — 99232 PR SUBSEQUENT HOSPITAL CARE,LEVL II: ICD-10-PCS | Mod: ,,, | Performed by: HOSPITALIST

## 2022-06-25 PROCEDURE — 84295 ASSAY OF SERUM SODIUM: CPT

## 2022-06-25 PROCEDURE — 84295 ASSAY OF SERUM SODIUM: CPT | Mod: 91 | Performed by: HOSPITALIST

## 2022-06-25 PROCEDURE — 27201112

## 2022-06-25 PROCEDURE — 83735 ASSAY OF MAGNESIUM: CPT

## 2022-06-25 PROCEDURE — 99232 SBSQ HOSP IP/OBS MODERATE 35: CPT | Mod: ,,, | Performed by: HOSPITALIST

## 2022-06-25 PROCEDURE — 99900035 HC TECH TIME PER 15 MIN (STAT)

## 2022-06-25 PROCEDURE — 80053 COMPREHEN METABOLIC PANEL: CPT

## 2022-06-25 PROCEDURE — 36415 COLL VENOUS BLD VENIPUNCTURE: CPT | Performed by: HOSPITALIST

## 2022-06-25 PROCEDURE — 94761 N-INVAS EAR/PLS OXIMETRY MLT: CPT

## 2022-06-25 PROCEDURE — 25000003 PHARM REV CODE 250: Performed by: HOSPITALIST

## 2022-06-25 PROCEDURE — 84100 ASSAY OF PHOSPHORUS: CPT

## 2022-06-25 PROCEDURE — 99900026 HC AIRWAY MAINTENANCE (STAT)

## 2022-06-25 PROCEDURE — 25000003 PHARM REV CODE 250: Performed by: STUDENT IN AN ORGANIZED HEALTH CARE EDUCATION/TRAINING PROGRAM

## 2022-06-25 PROCEDURE — 27000221 HC OXYGEN, UP TO 24 HOURS

## 2022-06-25 PROCEDURE — 25000003 PHARM REV CODE 250

## 2022-06-25 PROCEDURE — 63600175 PHARM REV CODE 636 W HCPCS: Performed by: STUDENT IN AN ORGANIZED HEALTH CARE EDUCATION/TRAINING PROGRAM

## 2022-06-25 PROCEDURE — 63600175 PHARM REV CODE 636 W HCPCS

## 2022-06-25 RX ORDER — DEXTROSE MONOHYDRATE 100 MG/ML
INJECTION, SOLUTION INTRAVENOUS CONTINUOUS PRN
Status: DISCONTINUED | OUTPATIENT
Start: 2022-06-25 | End: 2022-07-17 | Stop reason: HOSPADM

## 2022-06-25 RX ORDER — GLUCAGON 1 MG
1 KIT INJECTION
Status: DISCONTINUED | OUTPATIENT
Start: 2022-06-25 | End: 2022-07-17 | Stop reason: HOSPADM

## 2022-06-25 RX ORDER — INSULIN ASPART 100 [IU]/ML
1-10 INJECTION, SOLUTION INTRAVENOUS; SUBCUTANEOUS EVERY 4 HOURS PRN
Status: DISCONTINUED | OUTPATIENT
Start: 2022-06-25 | End: 2022-07-17 | Stop reason: HOSPADM

## 2022-06-25 RX ORDER — MAGNESIUM SULFATE HEPTAHYDRATE 40 MG/ML
2 INJECTION, SOLUTION INTRAVENOUS ONCE
Status: COMPLETED | OUTPATIENT
Start: 2022-06-25 | End: 2022-06-25

## 2022-06-25 RX ADMIN — CHOLESTYRAMINE 8 G: 4 POWDER, FOR SUSPENSION ORAL at 08:06

## 2022-06-25 RX ADMIN — HEPARIN SODIUM 5000 UNITS: 5000 INJECTION INTRAVENOUS; SUBCUTANEOUS at 08:06

## 2022-06-25 RX ADMIN — INSULIN DETEMIR 10 UNITS: 100 INJECTION, SOLUTION SUBCUTANEOUS at 12:06

## 2022-06-25 RX ADMIN — PIPERACILLIN SODIUM AND TAZOBACTAM SODIUM 4.5 G: 4; .5 INJECTION, POWDER, LYOPHILIZED, FOR SOLUTION INTRAVENOUS at 04:06

## 2022-06-25 RX ADMIN — ATORVASTATIN CALCIUM 80 MG: 20 TABLET, FILM COATED ORAL at 08:06

## 2022-06-25 RX ADMIN — INSULIN ASPART 5 UNITS: 100 INJECTION, SOLUTION INTRAVENOUS; SUBCUTANEOUS at 06:06

## 2022-06-25 RX ADMIN — DEXTROSE: 5 SOLUTION INTRAVENOUS at 03:06

## 2022-06-25 RX ADMIN — LEVETIRACETAM 750 MG: 100 SOLUTION ORAL at 08:06

## 2022-06-25 RX ADMIN — PIPERACILLIN SODIUM AND TAZOBACTAM SODIUM 4.5 G: 4; .5 INJECTION, POWDER, LYOPHILIZED, FOR SOLUTION INTRAVENOUS at 09:06

## 2022-06-25 RX ADMIN — POTASSIUM & SODIUM PHOSPHATES POWDER PACK 280-160-250 MG 2 PACKET: 280-160-250 PACK at 02:06

## 2022-06-25 RX ADMIN — MAGNESIUM SULFATE 2 G: 2 INJECTION INTRAVENOUS at 10:06

## 2022-06-25 RX ADMIN — MUPIROCIN: 20 OINTMENT TOPICAL at 09:06

## 2022-06-25 RX ADMIN — INSULIN ASPART 5 UNITS: 100 INJECTION, SOLUTION INTRAVENOUS; SUBCUTANEOUS at 12:06

## 2022-06-25 RX ADMIN — INSULIN ASPART 5 UNITS: 100 INJECTION, SOLUTION INTRAVENOUS; SUBCUTANEOUS at 08:06

## 2022-06-25 RX ADMIN — INSULIN ASPART 4 UNITS: 100 INJECTION, SOLUTION INTRAVENOUS; SUBCUTANEOUS at 12:06

## 2022-06-25 NOTE — PT/OT/SLP PROGRESS
Occupational Therapy      Patient Name:  Darlene Avila   MRN:  [id number]    Patient not seen today secondary to soiled and unable to return. Will follow-up.    6/25/2022

## 2022-06-25 NOTE — CONSULTS
[facility] ([place] [address])  Infectious Disease  Consult Note    Patient Name: Darlene Avila  MRN: [id number]  Admission Date: 6/17/2022  Hospital Length of Stay: 6 days  Attending Physician: Stacey Ugalde MD  Primary Care Provider: Kirill Cabrera Iii, MD       Inpatient consult to Infectious Diseases  Consult performed by: JEREMIAH Antoine Jr.  Consult ordered by: Dawood Self MD      Consult received.  Full consult to follow.  Chart and prior cultures reviewed.  Continue Zosyn for now.  Will see in am.    JEREMIAH Chandra  Infectious Disease  [facility] ([place] [address])

## 2022-06-25 NOTE — ASSESSMENT & PLAN NOTE
IMPROVING    Patient admitted with elevated WBC to 14s. Previous episodes of aspiration PNA at OSH, indwelling cabrera given acuity of condition, sacral decubitus ulcer stave IV s/p debridement with enterococcus faecalis / bacteriodes fragilis in cx. Patient afebrile with stable hemodynamics on admission at this time. CXR without acute abnormality. UA with yeast growing, chronic per previous OSH review.    Leukocytosis may be explained by soft tissue infection vs hemoconcentration given extensive dehydration and hypernatremia from lack of enteral fluids or IVF at outside hospice facility prior to [place] [facility]     - Zosyn given previous cultures and persistent leukocytosis  - Etiology may be soft tissue infection; see Sacral Decubitus Ulcer A/P  - Continue monitoring CBC  - Wound care per Sacral Decubitus Ulcer A/P  - Aspiration precautions  - Trend fever curve  - Trend BCx

## 2022-06-25 NOTE — PROGRESS NOTES
[facility] ([place] [address])  [facility] Medicine  Progress Note    Patient Name: Darlene Avila  MRN: [id number]  Patient Class: IP- Inpatient   Admission Date: 6/17/2022  Length of Stay: 6 days  Attending Physician: Stacey Ugalde MD  Primary Care Provider: Kirill Cabrera Iii, MD        Subjective:     Principal Problem:Hypernatremia        HPI:  Darlene Avila is a 71 year old F with history of diabetes type 2, seizure c/b anoxic brain injury s/p trach, PEG, bed bound status, Stage IV sacral ulcer, ischemic CVA, IBS, history of breast cancer s/p right mastectomy and failure to thrive who was brought in by her HPOA (sister) for nursing home placement. Patient is unable to provide history.     Patient's sister was contacted who stated that the patient was admitted to [facility] in [facility] for recurrent seizures/status epilepticus which resulted in an anoxic brain injury and respiratory failure.  Patient was intubated followed by tracheostomy and PEG placement .  Patient was discharged to nursing home and subsequently discharged to hospice care at the request of patient's sister. However a few days ago, she visited the patient at [facility] recovery facility and noticed they weren't feeding or taking care of the patient due to her 'hospice status'. Per sister today, she would like to place the patient in a skilled nursing facility and would like to rescind her hospice care at this time.  She is no longer interested in hospice placement. She states the patient at baseline is only able to follow some simple commands. She is unsure how much O2 level is her baseline via trach collar.   Of note she has a Stage IV sacral wound that is s/p debridement on 06/09 by Gen surgery in [facility].     Upon arrival to the ED, the patient was hemodynamically stable. Labs revealed Na 157, WBC 14. Hgb 9.4 (baseline 7-9). The patient was given 1L of IV NS and was admitted to hospital medicine for further management.        Overview/Hospital Course:  Patient admitted to hospital medicine with pre-existing anoxic brain injury, hypernatremia, sacral decubitus wound s/p debridement 06/08 in [place], [place]. Patient initially admitted to hospice following discharge from [facility] ICU, but discharged from hospice as patient family believed she was not getting appropriate care. Na 157, corrected with IVF (NS and hypotonic solutions) and enteral FWF down to 148. Trach collar exchanged by ENT due to lack of supplies at facility. Wound care consulted for sacral decubitus, recommended General Surgery consult for possible debridement as wound appears purulent. Persistent leukocytosis, started Zosyn per previous I&D wound cultures growing bacteriodes/enterococcus and new wound findings. cEEG started and Epilepsy consulted for assistance with antiepileptic medications; conflicting reports if patient was on only Keppra/Lacosamide vs Keppra/Lacosamide/Phenytoin. General surgery consulted for debridement of sacral decubitus ulcer, s/p I&D 06/24. Palliative Care consulted for assistance in GOC; decision to pursue medical/surgical care decided.    CM/SW to assist with dispo, family desire for nursing home nursing care.      Interval History: AF HDS NAEON. Tolerated I&D well. No complaints at this time. Still motioning that she wants water.    Review of Systems   Unable to perform ROS: Patient nonverbal   Respiratory:  Negative for chest tightness and shortness of breath.    Cardiovascular:  Negative for chest pain.   Gastrointestinal:  Negative for abdominal pain.   Objective:     Vital Signs (Most Recent):  Temp: 97.5 °F (36.4 °C) (06/25/22 0414)  Pulse: 90 (06/25/22 0414)  Resp: 16 (06/25/22 0516)  BP: 134/66 (06/25/22 0414)  SpO2: 100 % (06/25/22 0414) Vital Signs (24h Range):  Temp:  [97.2 °F (36.2 °C)-98.5 °F (36.9 °C)] 97.5 °F (36.4 °C)  Pulse:  [83-99] 90  Resp:  [10-18] 16  SpO2:  [98 %-100 %] 100 %  BP: ()/(52-74) 134/66     Weight: 50.2 kg  (110 lb 10.7 oz)  Body mass index is 22.35 kg/m².    Intake/Output Summary (Last 24 hours) at 6/25/2022 0855  Last data filed at 6/25/2022 0500  Gross per 24 hour   Intake 500 ml   Output 1140 ml   Net -640 ml      Physical Exam  Vitals and nursing note reviewed.   Constitutional:       General: She is not in acute distress.     Appearance: She is not ill-appearing, toxic-appearing or diaphoretic.   HENT:      Head: Normocephalic.      Mouth/Throat:      Mouth: Mucous membranes are dry.      Comments: Dried oral secretions adhered to tongue  Cardiovascular:      Rate and Rhythm: Normal rate and regular rhythm.      Pulses: Normal pulses.      Heart sounds: Normal heart sounds. No murmur heard.  Pulmonary:      Effort: Pulmonary effort is normal. No respiratory distress.      Breath sounds: Normal breath sounds. No wheezing or rales.   Abdominal:      General: Abdomen is flat. Bowel sounds are normal. There is no distension.      Palpations: Abdomen is soft.      Tenderness: There is no abdominal tenderness.   Musculoskeletal:         General: No swelling or tenderness. Normal range of motion.      Cervical back: Normal range of motion.   Skin:     General: Skin is warm and dry.      Coloration: Skin is not jaundiced or pale.      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    Neurological:      Mental Status: She is alert.      Comments: Minimally alert, opens eyes to sound/pain, tracks to sound, attempts to speak,, short sentences limited by dysarthria / trach.   Psychiatric:      Comments: Unable to assess       Significant Labs: All pertinent labs within the past 24 hours have been reviewed.    Significant Imaging: I have reviewed all pertinent imaging results/findings within the past 24 hours.      Assessment/Plan:      * Hypernatremia  Na 157. Suspect from dehydration while at her recovery facility.   - FWD 2.7 on admission.   S/p 1L of NS in the ED.     - PRN hypotonic fluids  -  Increased to 500cc QID free water boluses; unclear per chart if patient is receiving all scheduled flushes  - Do not overcorrect by >6-8mEq within 24 hours; goal 152 > 144.  - Na q6h    Discharge planning issues  Darlene Avila is a 71 year old F with history of diabetes type 2, seizure, ischemic CVA, IBS, history of breast cancer s/p right mastectomy and failure to thrive who was brought in by her HPOA (sister) for skilled nursing home placement due to concern for neglect at recent hospice facility. Patient follows very simple commands and doubt she can engage in skilled therapy. alf nursing facility likely more of an option.     - Appreciate CM/SW assistance with placement.   - Appreciate pharmacy's assistance with med rec.     Sacral decubitus ulcer, stage IV  S/p recent debridement at OSH 06/08. OSH Wcx with enterococcus faecalis, bacteriodes fragilus.    - Wound care consulted; appreciate assistance  - Deep wound cx  - General surgery consulted s/p debridement 06/24, no note of bone involvement per OP note, no cultures sent  - Continue Abx therapy per Leukocytosis A/P  - Consideration for wound vac given depth of injury  - Turn q2h    Leukocytosis  IMPROVING    Patient admitted with elevated WBC to 14s. Previous episodes of aspiration PNA at OSH, indwelling cabrera given acuity of condition, sacral decubitus ulcer stave IV s/p debridement with enterococcus faecalis / bacteriodes fragilis in cx. Patient afebrile with stable hemodynamics on admission at this time. CXR without acute abnormality. UA with yeast growing, chronic per previous OSH review.    Leukocytosis may be explained by soft tissue infection vs hemoconcentration given extensive dehydration and hypernatremia from lack of enteral fluids or IVF at outside hospice facility prior to transfer to [facility].     - Zosyn given previous cultures and persistent leukocytosis  - Etiology may be soft tissue infection; see Sacral Decubitus Ulcer A/P  - Continue  monitoring CBC  - Wound care per Sacral Decubitus Ulcer A/P  - Aspiration precautions  - Trend fever curve  - Trend BCx    Tracheostomy in place  Dysarthria  Dysphagia    On 5L O2 via trach collar.     - ENT consulted for trach exchange given lack of compatible equipment; appreciate assistance  - Respiratory therapy to assist with trach care.   - SLP for swallow evaluation and speech therapy    Seizures  Patient previously started on Lacosamide and Keppra at OSH during initial episode of status epilepticus. Phenytoin added as patient continued to have breakthrough seizures.    - Keppra monotherapy 750 bid per neuro recs  - No seizures seen on EEG, monitoring has stopped   - Neurology consulted for further assistance in antiepileptic regimen    Palliative care encounter  Per Primary Medicine team and Palliative Care encounters with patient family (separate encounters, see dated notes in chart), patient family wishes full medical care and does not desire comfort/hospice measures. DNR remains. See Pall Care note for further detail.    G tube feedings  Tube feed dependent via G tube  - Nutrition to assist with TF recs; see recommendations in note; appreciate assistance      ACP (advance care planning)  Patient is DNR. LaPOST on file.       History of CVA (cerebrovascular accident)  Unclear of patient's current medications. Per med review on care-everywhere, the patient is on statin but not on antiplatelet therapy.     - Appreciate pharmacy's assistance with medication reconciliation with facility- start antiplatelet therapy if no contraindications.   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 A1c repeated 6.9.    - LDSSI   - POCT glucose q6hrs  - Maintain -180  - Titrate basal/bolus regimen to goal as above.      VTE Risk  Mitigation (From admission, onward)         Ordered     heparin (porcine) injection 5,000 Units  Every 12 hours         06/18/22 0355     IP VTE HIGH RISK PATIENT  Once         06/18/22 0355     Place sequential compression device  Until discontinued         06/18/22 0355                Discharge Planning   JUDIT: 6/27/2022     Code Status: DNR   Is the patient medically ready for discharge?: No    Reason for patient still in hospital (select all that apply): Treatment and Pending disposition  Discharge Plan A: New Nursing Home placement - correction care facility   Discharge Delays: None known at this time              Dawood Self MD  Department of Hospital Medicine   [facility] - Intensive Care ([place]-16)

## 2022-06-25 NOTE — RESPIRATORY THERAPY
RAPID RESPONSE RESPIRATORY THERAPY PROACTIVE NOTE           Time of visit: 824     Code Status: DNR   : 1950  Bed: 40161/71665 A:   MRN: [id number]  Time spent at the bedside: 15 -30 min    SITUATION    Evaluated patient for: LDA Check     BACKGROUND    Patient has a past medical history of Arthritis, Cancer of breast, Cataract, Diabetes mellitus, Hypertension, Hypoxia, Memory loss, Orthostatic hypotension, TIA (transient ischemic attack), and Vitamin D deficiency.  Clinically Significant Surgical Hx: tracheostomy    24 Hours Vitals Range:  Temp:  [97.5 °F (36.4 °C)-98.5 °F (36.9 °C)]   Pulse:  [82-91]   Resp:  [16-19]   BP: (111-135)/(54-66)   SpO2:  [98 %-100 %]     Labs:    Recent Labs     22  0519 22  1242 22  0750 22  1902 22  0643 22  0645 22  1128      < > 148*  145   < > 144 143 142   K 3.9  --  3.6  --  4.1  --   --    *  --  122*  --  120*  --   --    CO2 18*  --  18*  --  17*  --   --    CREATININE 0.7  --  0.7  --  0.6  --   --    *  --  88  --  308*  --   --    PHOS 1.9*  --  2.0*  --  3.5  --   --    MG 1.8  --  1.8  --  1.6  --   --     < > = values in this interval not displayed.        No results for input(s): PH, PCO2, PO2, HCO3, POCSATURATED, BE in the last 72 hours.    ASSESSMENT/INTERVENTIONS    Upon arrival in room Pt resting comfortably in bed. All extra supplies and Shiley trachs sizes 4.0 and 6.0 cuffed at bedside.    Last VS   Temp: 97.6 °F (36.4 °C) ( 1205)  Pulse: 84 ( 1205)  Resp: 19 ( 1205)  BP: 135/63 ( 1205)  SpO2: 100 % ( 1205)    Level of Consciousness: Level of Consciousness (AVPU): alert  Respiratory Effort: Respiratory Effort: Normal, Unlabored Expansion/Accessory Muscle Usage: Expansion/Accessory Muscles/Retractions: expansion symmetric, no retractions, no use of accessory muscles  All Lung Field Breath Sounds: All Lung Fields Breath Sounds: Anterior:, Lateral:, coarse, crackles  O2  Device/Concentration: Flow (L/min): 5, Oxygen Concentration (%): 28, O2 Device (Oxygen Therapy): tracheostomy collar  Surgical airway: Yes, Type: Shiley Size: 6, uncuffed  Ambu at bedside: Ambu bag with the patient?: Yes, Adult Ambu     Active Orders   Respiratory Care    Oxygen Continuous     Frequency: Continuous     Number of Occurrences: Until Specified     Order Questions:      Device type: Low flow      Device: Trach Collar      FiO2%: 28      Titrate O2 per Oxygen Titration Protocol: Yes      To maintain SpO2 goal of: >= 90%      Notify MD of: Inability to achieve desired SpO2; Sudden change in patient status and requires 20% increase in FiO2; Patient requires >60% FiO2    RESPIRATORY COMMUNICATION     Frequency: Daily     Number of Occurrences: Until Specified     Order Comments: Trach care      Routine tracheostomy care     Frequency: BID     Number of Occurrences: Until Specified       RECOMMENDATIONS    We recommend: RRT Recs: Continue POC per primary team.      FOLLOW-UP    Please call back the Rapid Response RT, Radha Brennan, RRT at x [phone] for any questions or concerns.

## 2022-06-25 NOTE — SUBJECTIVE & OBJECTIVE
Interval History: AF HDS HELEN. Tolerated I&D well. No complaints at this time. Still motioning that she wants water.    Review of Systems   Unable to perform ROS: Patient nonverbal   Respiratory:  Negative for chest tightness and shortness of breath.    Cardiovascular:  Negative for chest pain.   Gastrointestinal:  Negative for abdominal pain.   Objective:     Vital Signs (Most Recent):  Temp: 97.5 °F (36.4 °C) (06/25/22 0414)  Pulse: 90 (06/25/22 0414)  Resp: 16 (06/25/22 0516)  BP: 134/66 (06/25/22 0414)  SpO2: 100 % (06/25/22 0414) Vital Signs (24h Range):  Temp:  [97.2 °F (36.2 °C)-98.5 °F (36.9 °C)] 97.5 °F (36.4 °C)  Pulse:  [83-99] 90  Resp:  [10-18] 16  SpO2:  [98 %-100 %] 100 %  BP: ()/(52-74) 134/66     Weight: 50.2 kg (110 lb 10.7 oz)  Body mass index is 22.35 kg/m².    Intake/Output Summary (Last 24 hours) at 6/25/2022 0855  Last data filed at 6/25/2022 0500  Gross per 24 hour   Intake 500 ml   Output 1140 ml   Net -640 ml      Physical Exam  Vitals and nursing note reviewed.   Constitutional:       General: She is not in acute distress.     Appearance: She is not ill-appearing, toxic-appearing or diaphoretic.   HENT:      Head: Normocephalic.      Mouth/Throat:      Mouth: Mucous membranes are dry.      Comments: Dried oral secretions adhered to tongue  Cardiovascular:      Rate and Rhythm: Normal rate and regular rhythm.      Pulses: Normal pulses.      Heart sounds: Normal heart sounds. No murmur heard.  Pulmonary:      Effort: Pulmonary effort is normal. No respiratory distress.      Breath sounds: Normal breath sounds. No wheezing or rales.   Abdominal:      General: Abdomen is flat. Bowel sounds are normal. There is no distension.      Palpations: Abdomen is soft.      Tenderness: There is no abdominal tenderness.   Musculoskeletal:         General: No swelling or tenderness. Normal range of motion.      Cervical back: Normal range of motion.   Skin:     General: Skin is warm and dry.       Coloration: Skin is not jaundiced or pale.      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    Neurological:      Mental Status: She is alert.      Comments: Minimally alert, opens eyes to sound/pain, tracks to sound, attempts to speak,, short sentences limited by dysarthria / trach.   Psychiatric:      Comments: Unable to assess       Significant Labs: All pertinent labs within the past 24 hours have been reviewed.    Significant Imaging: I have reviewed all pertinent imaging results/findings within the past 24 hours.

## 2022-06-25 NOTE — ASSESSMENT & PLAN NOTE
S/p recent debridement at OSH 06/08. OSH Wcx with enterococcus faecalis, bacteriodes fragilus.    - Wound care consulted; appreciate assistance  - Deep wound cx  - General surgery consulted s/p debridement 06/24, no note of bone involvement per OP note, no cultures sent  - Continue Abx therapy per Leukocytosis A/P  - Consideration for wound vac given depth of injury  - Turn q2h

## 2022-06-25 NOTE — ASSESSMENT & PLAN NOTE
Na 157. Suspect from dehydration while at her recovery facility.   - FWD 2.7 on admission.   S/p 1L of NS in the ED.     - PRN hypotonic fluids  - Increased to 500cc QID free water boluses; unclear per chart if patient is receiving all scheduled flushes  - Do not overcorrect by >6-8mEq within 24 hours; goal 152 > 144.  - Na q6h

## 2022-06-25 NOTE — PLAN OF CARE
Problem: Adult Inpatient Plan of Care  Goal: Plan of Care Review  Outcome: Ongoing, Progressing  Goal: Patient-Specific Goal (Individualized)  Outcome: Ongoing, Progressing  Goal: Absence of Hospital-Acquired Illness or Injury  Outcome: Ongoing, Progressing  Goal: Optimal Comfort and Wellbeing  Outcome: Ongoing, Progressing  Goal: Readiness for Transition of Care  Outcome: Ongoing, Progressing     Problem: Diabetes Comorbidity  Goal: Blood Glucose Level Within Targeted Range  Outcome: Ongoing, Progressing     Problem: Impaired Wound Healing  Goal: Optimal Wound Healing  Outcome: Ongoing, Progressing     Problem: Infection  Goal: Absence of Infection Signs and Symptoms  Outcome: Ongoing, Progressing     Problem: Skin Injury Risk Increased  Goal: Skin Health and Integrity  Outcome: Ongoing, Progressing     Problem: Fall Injury Risk  Goal: Absence of Fall and Fall-Related Injury  Outcome: Ongoing, Progressing     Problem: Coping Ineffective  Goal: Effective Coping  Outcome: Ongoing, Progressing    Patient alert and cooperative with care. Repositioned q2 hours and as needed. Suctioned per respiratory. Patient continues on glucerna 1.5 @35/hr. Patient with family in room at this time.

## 2022-06-25 NOTE — PLAN OF CARE
Problem: Adult Inpatient Plan of Care  Goal: Patient-Specific Goal (Individualized)  Outcome: Ongoing, Progressing     Problem: Adult Inpatient Plan of Care  Goal: Absence of Hospital-Acquired Illness or Injury  Outcome: Ongoing, Progressing     Problem: Adult Inpatient Plan of Care  Goal: Optimal Comfort and Wellbeing  Outcome: Ongoing, Progressing

## 2022-06-26 LAB
ALBUMIN SERPL BCP-MCNC: 1.4 G/DL (ref 3.5–5.2)
ALP SERPL-CCNC: 92 U/L (ref 55–135)
ALT SERPL W/O P-5'-P-CCNC: 13 U/L (ref 10–44)
ANION GAP SERPL CALC-SCNC: 7 MMOL/L (ref 8–16)
AST SERPL-CCNC: 14 U/L (ref 10–40)
BASOPHILS # BLD AUTO: 0.01 K/UL (ref 0–0.2)
BASOPHILS NFR BLD: 0.1 % (ref 0–1.9)
BILIRUB SERPL-MCNC: 0.1 MG/DL (ref 0.1–1)
BUN SERPL-MCNC: 13 MG/DL (ref 8–23)
CALCIUM SERPL-MCNC: 8.6 MG/DL (ref 8.7–10.5)
CHLORIDE SERPL-SCNC: 121 MMOL/L (ref 95–110)
CO2 SERPL-SCNC: 15 MMOL/L (ref 23–29)
CREAT SERPL-MCNC: 0.6 MG/DL (ref 0.5–1.4)
DIFFERENTIAL METHOD: ABNORMAL
EOSINOPHIL # BLD AUTO: 0.1 K/UL (ref 0–0.5)
EOSINOPHIL NFR BLD: 0.9 % (ref 0–8)
ERYTHROCYTE [DISTWIDTH] IN BLOOD BY AUTOMATED COUNT: 18.5 % (ref 11.5–14.5)
EST. GFR  (AFRICAN AMERICAN): >60 ML/MIN/1.73 M^2
EST. GFR  (NON AFRICAN AMERICAN): >60 ML/MIN/1.73 M^2
GLUCOSE SERPL-MCNC: 50 MG/DL (ref 70–110)
HCT VFR BLD AUTO: 25.5 % (ref 37–48.5)
HGB BLD-MCNC: 7.9 G/DL (ref 12–16)
IMM GRANULOCYTES # BLD AUTO: 0.03 K/UL (ref 0–0.04)
IMM GRANULOCYTES NFR BLD AUTO: 0.3 % (ref 0–0.5)
LYMPHOCYTES # BLD AUTO: 2.6 K/UL (ref 1–4.8)
LYMPHOCYTES NFR BLD: 28.4 % (ref 18–48)
MAGNESIUM SERPL-MCNC: 1.8 MG/DL (ref 1.6–2.6)
MCH RBC QN AUTO: 26.8 PG (ref 27–31)
MCHC RBC AUTO-ENTMCNC: 31 G/DL (ref 32–36)
MCV RBC AUTO: 86 FL (ref 82–98)
MONOCYTES # BLD AUTO: 0.6 K/UL (ref 0.3–1)
MONOCYTES NFR BLD: 5.9 % (ref 4–15)
NEUTROPHILS # BLD AUTO: 6 K/UL (ref 1.8–7.7)
NEUTROPHILS NFR BLD: 64.4 % (ref 38–73)
NRBC BLD-RTO: 0 /100 WBC
PHOSPHATE SERPL-MCNC: 2.2 MG/DL (ref 2.7–4.5)
PLATELET # BLD AUTO: 480 K/UL (ref 150–450)
PMV BLD AUTO: 9.6 FL (ref 9.2–12.9)
POCT GLUCOSE: 106 MG/DL (ref 70–110)
POCT GLUCOSE: 59 MG/DL (ref 70–110)
POCT GLUCOSE: 90 MG/DL (ref 70–110)
POTASSIUM SERPL-SCNC: 3.7 MMOL/L (ref 3.5–5.1)
PROT SERPL-MCNC: 5.9 G/DL (ref 6–8.4)
RBC # BLD AUTO: 2.95 M/UL (ref 4–5.4)
SODIUM SERPL-SCNC: 143 MMOL/L (ref 136–145)
WBC # BLD AUTO: 9.27 K/UL (ref 3.9–12.7)

## 2022-06-26 PROCEDURE — 63600175 PHARM REV CODE 636 W HCPCS

## 2022-06-26 PROCEDURE — 99223 1ST HOSP IP/OBS HIGH 75: CPT | Mod: ,,, | Performed by: PHYSICIAN ASSISTANT

## 2022-06-26 PROCEDURE — 63600175 PHARM REV CODE 636 W HCPCS: Performed by: STUDENT IN AN ORGANIZED HEALTH CARE EDUCATION/TRAINING PROGRAM

## 2022-06-26 PROCEDURE — 92610 EVALUATE SWALLOWING FUNCTION: CPT

## 2022-06-26 PROCEDURE — 94761 N-INVAS EAR/PLS OXIMETRY MLT: CPT

## 2022-06-26 PROCEDURE — 12000002 HC ACUTE/MED SURGE SEMI-PRIVATE ROOM

## 2022-06-26 PROCEDURE — 97535 SELF CARE MNGMENT TRAINING: CPT

## 2022-06-26 PROCEDURE — 36415 COLL VENOUS BLD VENIPUNCTURE: CPT

## 2022-06-26 PROCEDURE — 25000003 PHARM REV CODE 250: Performed by: STUDENT IN AN ORGANIZED HEALTH CARE EDUCATION/TRAINING PROGRAM

## 2022-06-26 PROCEDURE — 92597 ORAL SPEECH DEVICE EVAL: CPT

## 2022-06-26 PROCEDURE — 99900026 HC AIRWAY MAINTENANCE (STAT)

## 2022-06-26 PROCEDURE — 99223 PR INITIAL HOSPITAL CARE,LEVL III: ICD-10-PCS | Mod: ,,, | Performed by: PHYSICIAN ASSISTANT

## 2022-06-26 PROCEDURE — 97162 PT EVAL MOD COMPLEX 30 MIN: CPT

## 2022-06-26 PROCEDURE — 99233 SBSQ HOSP IP/OBS HIGH 50: CPT | Mod: ,,, | Performed by: HOSPITALIST

## 2022-06-26 PROCEDURE — 80053 COMPREHEN METABOLIC PANEL: CPT

## 2022-06-26 PROCEDURE — 99900022

## 2022-06-26 PROCEDURE — 25000003 PHARM REV CODE 250

## 2022-06-26 PROCEDURE — 83735 ASSAY OF MAGNESIUM: CPT

## 2022-06-26 PROCEDURE — 99900031 HC PATIENT EDUCATION (STAT)

## 2022-06-26 PROCEDURE — 97165 OT EVAL LOW COMPLEX 30 MIN: CPT

## 2022-06-26 PROCEDURE — 27201112

## 2022-06-26 PROCEDURE — 85025 COMPLETE CBC W/AUTO DIFF WBC: CPT

## 2022-06-26 PROCEDURE — 99900035 HC TECH TIME PER 15 MIN (STAT)

## 2022-06-26 PROCEDURE — 99233 PR SUBSEQUENT HOSPITAL CARE,LEVL III: ICD-10-PCS | Mod: ,,, | Performed by: HOSPITALIST

## 2022-06-26 PROCEDURE — 27000221 HC OXYGEN, UP TO 24 HOURS

## 2022-06-26 PROCEDURE — 84100 ASSAY OF PHOSPHORUS: CPT

## 2022-06-26 RX ORDER — ACETAMINOPHEN 325 MG/1
650 TABLET ORAL EVERY 6 HOURS PRN
Status: DISCONTINUED | OUTPATIENT
Start: 2022-06-26 | End: 2022-07-17 | Stop reason: HOSPADM

## 2022-06-26 RX ORDER — SODIUM,POTASSIUM PHOSPHATES 280-250MG
2 POWDER IN PACKET (EA) ORAL
Status: COMPLETED | OUTPATIENT
Start: 2022-06-26 | End: 2022-06-26

## 2022-06-26 RX ORDER — INSULIN ASPART 100 [IU]/ML
3 INJECTION, SOLUTION INTRAVENOUS; SUBCUTANEOUS
Status: DISCONTINUED | OUTPATIENT
Start: 2022-06-26 | End: 2022-06-26

## 2022-06-26 RX ORDER — SODIUM,POTASSIUM PHOSPHATES 280-250MG
1 POWDER IN PACKET (EA) ORAL ONCE
Status: COMPLETED | OUTPATIENT
Start: 2022-06-26 | End: 2022-06-26

## 2022-06-26 RX ADMIN — PIPERACILLIN SODIUM AND TAZOBACTAM SODIUM 4.5 G: 4; .5 INJECTION, POWDER, LYOPHILIZED, FOR SOLUTION INTRAVENOUS at 12:06

## 2022-06-26 RX ADMIN — MUPIROCIN: 20 OINTMENT TOPICAL at 08:06

## 2022-06-26 RX ADMIN — ATORVASTATIN CALCIUM 80 MG: 20 TABLET, FILM COATED ORAL at 08:06

## 2022-06-26 RX ADMIN — POTASSIUM BICARBONATE 40 MEQ: 391 TABLET, EFFERVESCENT ORAL at 08:06

## 2022-06-26 RX ADMIN — POTASSIUM & SODIUM PHOSPHATES POWDER PACK 280-160-250 MG 2 PACKET: 280-160-250 PACK at 04:06

## 2022-06-26 RX ADMIN — CHOLESTYRAMINE 8 G: 4 POWDER, FOR SUSPENSION ORAL at 08:06

## 2022-06-26 RX ADMIN — LEVETIRACETAM 750 MG: 100 SOLUTION ORAL at 08:06

## 2022-06-26 RX ADMIN — POTASSIUM & SODIUM PHOSPHATES POWDER PACK 280-160-250 MG 2 PACKET: 280-160-250 PACK at 08:06

## 2022-06-26 RX ADMIN — PIPERACILLIN SODIUM AND TAZOBACTAM SODIUM 4.5 G: 4; .5 INJECTION, POWDER, LYOPHILIZED, FOR SOLUTION INTRAVENOUS at 05:06

## 2022-06-26 RX ADMIN — INSULIN ASPART 5 UNITS: 100 INJECTION, SOLUTION INTRAVENOUS; SUBCUTANEOUS at 08:06

## 2022-06-26 RX ADMIN — POTASSIUM & SODIUM PHOSPHATES POWDER PACK 280-160-250 MG 2 PACKET: 280-160-250 PACK at 11:06

## 2022-06-26 RX ADMIN — HEPARIN SODIUM 5000 UNITS: 5000 INJECTION INTRAVENOUS; SUBCUTANEOUS at 08:06

## 2022-06-26 RX ADMIN — POTASSIUM & SODIUM PHOSPHATES POWDER PACK 280-160-250 MG 1 PACKET: 280-160-250 PACK at 08:06

## 2022-06-26 RX ADMIN — PIPERACILLIN SODIUM AND TAZOBACTAM SODIUM 4.5 G: 4; .5 INJECTION, POWDER, LYOPHILIZED, FOR SOLUTION INTRAVENOUS at 08:06

## 2022-06-26 NOTE — PT/OT/SLP EVAL
Physical Therapy Co-Evaluation with Occupational Therapy     Patient Name:  Darlene Avila   MRN:  [id number]  Admit Date: 6/17/2022  Admitting Diagnosis:  Hypernatremia  Recent Surgery: Procedure(s) (LRB):  DEBRIDEMENT, WOUND, sacral ulcer (N/A) 2 Days Post-Op  Length of Stay: 7 days    Recommendations:     Discharge Recommendations:   SNF at NH   Discharge Equipment Recommendations: hospital bed, lift device, oxygen, nutrition supplies, suction machine, wound care supplies (TBD)   Barriers to discharge: patient requires 24/7 nursing care for wound and respiratory management    Assessment:     Darlene Avila is a 71 y.o. female admitted to [facility] on 6/17/2022 with a medical diagnosis of Hypernatremia.     Per chart review, patient was bed-bound with trach and PEG at hospice facility. Patient's sister discharged patient from hospice and requests patient go to a skilled nursing facility.     Today, patient was oriented to self and followed 100% simple commands. Patient's communication was limited due to tracheostomy. PT had difficulty reading patient's lips.     Patient presented with 2-/5 bilateral hip flexor strength, 3/5 bilateral quad strength, 2+/5 bilateral hamstring strength, and 3/5 plantarflexor and dorsiflexor strength.     She required maximal assistance to roll to left. Her bed mobility evaluation was limited by patient actively having a watery bowel movement.      She presents with the following impairments/functional limitations: weakness, impaired endurance, impaired sensation, impaired self care skills, decreased upper extremity function, impaired functional mobilty, decreased lower extremity function, impaired skin, decreased safety awareness, pain, impaired cardiopulmonary response to activity.     Patient has potential to benefit from skilled acute PT services. PT will attempt to see patient 3 times per week on a trial basis.    Rehab Prognosis: Fair     Plan:     During this hospitalization, patient to  "be seen 3 x/week (trial for 2 weeks to assess patient's improvement) to address the identified rehab impairments via therapeutic activities, therapeutic exercises and progress towards the established goals.    · Plan of Care Expires:  07/26/22    Social History   PT unable to read patient's lips. Patient did not have family or friends present. Per H&P, "Patient's sister was contacted who stated that the patient was admitted to [facility] in [facility] for recurrent seizures/status epilepticus which resulted in an anoxic brain injury and respiratory failure.  Patient was intubated followed by tracheostomy and PEG placement .  Patient was discharged to nursing home and subsequently discharged to hospice care at the request of patient's sister. However a few days ago, she visited the patient at [facility] recovery facility and noticed they weren't feeding or taking care of the patient due to her 'hospice status'. Per sister today, she would like to place the patient in a skilled nursing facility and would like to rescind her hospice care at this time.  She is no longer interested in hospice placement. She states the patient at baseline is only able to follow some simple commands."    Subjective   Communicated with RN prior to session.  Patient found HOB elevated with  blood pressure cuff, cabrera catheter, oxygen, Tracheostomy, PEG Tube, pressure relief boots, peripheral IV upon PT entry to room.  Patient is alone during session.  Pt is agreeable to evaluation.     Additional staff present:RN and OT  OT for co-evaluation due to patient's medical complexities requiring two skilled therapists in order to appropriately assess patient's functional deficits as well as ensure patient safety, accommodate for limited activity tolerance, and provide appropriate, skilled assistance to maximize functional potential during evaluation.    Pt's subjective: Patient mouths, "hi"     Pain/Comfort:  · Pain Rating 1: 0/10  · Location 1: " "sacral spine (with hip flexion)  · Pain Rating Post-Intervention 1: 0/10  · Pain Addressed 2: Reposition, Cessation of Activity  · Pain Rating Post-Intervention 2: 0/10      Objective:   General Precautions: Standard, aspiration, fall, respiratory, NPO, seizure   Orthopedic Precautions:N/A   Braces: N/A   Oxygen Device: Trach Collar 5L  Vitals: /63   Pulse 89   Temp 97.1 °F (36.2 °C) (Axillary)   Resp 18   Ht 4' 11" (1.499 m)   Wt 50.2 kg (110 lb 10.7 oz)   SpO2 100%   Breastfeeding No   BMI 22.35 kg/m²     Exams:  · Cognition:   · Alert and Cooperative  · AxOx1 (difficult to truly assess due difficulty understanding patient)  · Command following: Follows one-step commands  · Skin Integrity: stage 4 sacral ulcer  · Edema: None noted  · Sensation: difficult to assess   · Range of Motion:  · RLE: painful hip flexion; knee flexion/extension PROM WFL; ankle dorsiflexion to neutral   · LLE: painful hip flexion; knee flexion/extension PROM WFL; ankle dorsiflexion to neutral   · Strength Exam:  · RLE Strength:   · Hip Flexion: 1/5  · Hip Abduction: 2-/5  · Hip Adduction: 2-/5  · Knee Flexion: 3-/5  · Knee Extension: 3/5  · Ankle Dorsiflexion: 3-/5 to neutral   · Ankle Plantarflexion: 3/5  · LLE Strength:   · Hip Flexion: 1/5  · Hip Abduction: 2-/5  · Hip Adduction: 2-/5  · Knee Flexion: 3-/5  · Knee Extension: 3/5  · Ankle Dorsiflexion: 3-/5 to neutral   · Ankle Plantarflexion: 3/5    Outcome Measures:   AM-PAC 6 CLICK MOBILITY:    Turning over in bed (including adjusting bedclothes, sheets and blankets)?: 2   Sitting down on and standing up from a chair with arms (e.g., wheelchair, bedside commode, etc.): 1   Moving from lying on back to sitting on the side of the bed?: 1   Moving to and from a bed to a chair (including a wheelchair)?: 1   Need to walk in hospital room?: 1   Climbing 3-5 steps with a railing?: 1   Basic Mobility Total Score: 7     Functional Mobility:    Bed Mobility:     Rolling " Left:  maximal assistance and of 2 persons    Patient's bed mobility limited due to patient actively having watery bowel movement when rolling. RN present.    Patient Education:   Patient is appropriate to transfer via gabriella lift with RN/PCT   White board updated to include patient's safest level of mobility with staff assistance   Patient educated on plan of care by explanation.  Patient was receptive to education and nods head in agreement.      Patient left supine with all lines intact and RN present.      GOALS:   Multidisciplinary Problems     Physical Therapy Goals        Problem: Physical Therapy    Goal Priority Disciplines Outcome Goal Variances Interventions   Physical Therapy Goal     PT, PT/OT Ongoing, Progressing     Description: Goals to be met by: 7/10/2022     Patient will increase functional independence with mobility by performin. Rolling to Left with Moderate Assistance.  2. Lower extremity exercise program x 10 reps per handout, with assistance as needed                     History:     Past Medical History:   Diagnosis Date    Arthritis     Cancer of breast     s/p mastectomy (on anastrozole)     Cataract     Diabetes mellitus     c/b Diabetic retinopathy    Hypertension     resolved    Hypoxia 4/15/2021    Memory loss     due to strokes    Orthostatic hypotension 2020    frequent falls, on midodrine.  2021 seen by both cards and palliative care    TIA (transient ischemic attack) 5116-5096    [facility] with remote infarcts    Vitamin D deficiency 10/14/2021       Past Surgical History:   Procedure Laterality Date    CAPSULOTOMY  13    yag left eye    CATARACT EXTRACTION  6/3/2013    right eye    CHOLECYSTECTOMY      HYSTERECTOMY      MASTECTOMY Right 2020    Procedure: MASTECTOMY-Right;  Surgeon: Krysta Clark MD;  Location: [facility] OR;  Service: General;  Laterality: Right;    SENTINEL LYMPH NODE BIOPSY Right 2020    Procedure: BIOPSY, LYMPH NODE,  SENTINEL-Right;  Surgeon: Krysta Clark MD;  Location: [facility];  Service: General;  Laterality: Right;    TOTAL ABDOMINAL HYSTERECTOMY W/ BILATERAL SALPINGOOPHORECTOMY         Time Tracking:     PT Received On: 06/26/22  PT Start Time: 1303     PT Stop Time: 1320  PT Total Time (min): 17 min     Billable Minutes: Evaluation 1 procedure

## 2022-06-26 NOTE — ASSESSMENT & PLAN NOTE
STABLE, WNL    Na 157. Suspect from dehydration while at her recovery facility.   - FWD 2.7 on admission.   S/p 1L of NS in the ED.     - Continue 500cc QID free water boluses  - Na q6h

## 2022-06-26 NOTE — PLAN OF CARE
Problem: Physical Therapy  Goal: Physical Therapy Goal  Description: Goals to be met by: 7/10/2022     Patient will increase functional independence with mobility by performin. Rolling to Left with Moderate Assistance.  2. Lower extremity exercise program x 10 reps per handout, with assistance as needed    Outcome: Ongoing, Progressing     PT evaluated patient and established goals for patient today.

## 2022-06-26 NOTE — PROGRESS NOTES
[facility] - Intensive Care ([place] [id number])  [facility] Medicine  Progress Note    Patient Name: Darlene Avila  MRN: [id number]  Patient Class: IP- Inpatient   Admission Date: 6/17/2022  Length of Stay: 7 days  Attending Physician: Stacey Ugalde MD  Primary Care Provider: Kirill Cabrera Iii, MD        Subjective:     Principal Problem:Hypernatremia        HPI:  Darlene Avila is a 71 year old F with history of diabetes type 2, seizure c/b anoxic brain injury s/p trach, PEG, bed bound status, Stage IV sacral ulcer, ischemic CVA, IBS, history of breast cancer s/p right mastectomy and failure to thrive who was brought in by her HPOA (sister) for nursing home placement. Patient is unable to provide history.     Patient's sister was contacted who stated that the patient was admitted to [facility] in [facility] for recurrent seizures/status epilepticus which resulted in an anoxic brain injury and respiratory failure.  Patient was intubated followed by tracheostomy and PEG placement .  Patient was discharged to nursing home and subsequently discharged to hospice care at the request of patient's sister. However a few days ago, she visited the patient at [facility] recovery facility and noticed they weren't feeding or taking care of the patient due to her 'hospice status'. Per sister today, she would like to place the patient in a skilled nursing facility and would like to rescind her hospice care at this time.  She is no longer interested in hospice placement. She states the patient at baseline is only able to follow some simple commands. She is unsure how much O2 level is her baseline via trach collar.   Of note she has a Stage IV sacral wound that is s/p debridement on 06/09 by Gen surgery in [facility].     Upon arrival to the ED, the patient was hemodynamically stable. Labs revealed Na 157, WBC 14. Hgb 9.4 (baseline 7-9). The patient was given 1L of IV NS and was admitted to hospital medicine for further management.        Overview/Hospital Course:  Patient admitted to hospital medicine with pre-existing anoxic brain injury, hypernatremia, sacral decubitus wound s/p debridement 06/08 in [place], [place]. Patient initially admitted to hospice following discharge from [facility] ICU, but discharged from hospice as patient family believed she was not getting appropriate care. Na 157, corrected with IVF (NS and hypotonic solutions) and enteral FWF down to 148. Trach collar exchanged by ENT due to lack of supplies at facility. Wound care consulted for sacral decubitus, recommended General Surgery consult for possible debridement as wound appears purulent. Persistent leukocytosis, started Zosyn per previous I&D wound cultures growing bacteriodes/enterococcus and new wound findings. cEEG started and Epilepsy consulted for assistance with antiepileptic medications; conflicting reports if patient was on only Keppra/Lacosamide vs Keppra/Lacosamide/Phenytoin. General surgery consulted for debridement of sacral decubitus ulcer, s/p I&D 06/24. Palliative Care consulted for assistance in GOC; decision to pursue medical/surgical care decided.    CM/SW to assist with dispo, family desire for [facility] nursing care.      Interval History: AF HDS NAEON. Patient mentation at baseline, continues to attempt to speak with intermittent success depending on trach. Reports understanding of plan. Wants water.    Review of Systems   Unable to perform ROS: Patient nonverbal   Respiratory:  Negative for chest tightness and shortness of breath.    Cardiovascular:  Negative for chest pain.   Gastrointestinal:  Negative for abdominal pain.   Objective:     Vital Signs (Most Recent):  Temp: 97.1 °F (36.2 °C) (06/26/22 0825)  Pulse: 89 (06/26/22 0832)  Resp: 18 (06/26/22 1105)  BP: 134/63 (06/26/22 0825)  SpO2: 100 % (06/26/22 0832) Vital Signs (24h Range):  Temp:  [96.5 °F (35.8 °C)-98 °F (36.7 °C)] 97.1 °F (36.2 °C)  Pulse:  [86-90] 89  Resp:  [16-18] 18  SpO2:  [89  %-100 %] 100 %  BP: (131-169)/(63-67) 134/63     Weight: 50.2 kg (110 lb 10.7 oz)  Body mass index is 22.35 kg/m².    Intake/Output Summary (Last 24 hours) at 6/26/2022 1328  Last data filed at 6/26/2022 0825  Gross per 24 hour   Intake 2000 ml   Output 1950 ml   Net 50 ml      Physical Exam  Vitals and nursing note reviewed.   Constitutional:       General: She is not in acute distress.     Appearance: She is not ill-appearing, toxic-appearing or diaphoretic.   HENT:      Head: Normocephalic.      Mouth/Throat:      Mouth: Mucous membranes are dry.      Comments: Dried oral secretions adhered to tongue  Cardiovascular:      Rate and Rhythm: Normal rate and regular rhythm.      Pulses: Normal pulses.      Heart sounds: Normal heart sounds. No murmur heard.  Pulmonary:      Effort: Pulmonary effort is normal. No respiratory distress.      Breath sounds: Normal breath sounds. No wheezing or rales.   Abdominal:      General: Abdomen is flat. Bowel sounds are normal. There is no distension.      Palpations: Abdomen is soft.      Tenderness: There is no abdominal tenderness.   Musculoskeletal:         General: No swelling or tenderness. Normal range of motion.      Cervical back: Normal range of motion.   Skin:     General: Skin is warm and dry.      Coloration: Skin is not jaundiced or pale.      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    Neurological:      Mental Status: She is alert.      Comments: Minimally alert, opens eyes to sound/pain, tracks to sound, attempts to speak,, short sentences limited by dysarthria / trach.   Psychiatric:      Comments: Unable to assess       Significant Labs: All pertinent labs within the past 24 hours have been reviewed.    Significant Imaging: I have reviewed all pertinent imaging results/findings within the past 24 hours.      Assessment/Plan:      * Hypernatremia  STABLE, WNL    Na 157. Suspect from dehydration while at her recovery facility.   - FWD  2.7 on admission.   S/p 1L of NS in the ED.     - Continue 500cc QID free water boluses  - Na q6h    Discharge planning issues  Darlene Avila is a 71 year old F with history of diabetes type 2, seizure, ischemic CVA, IBS, history of breast cancer s/p right mastectomy and failure to thrive who was brought in by her HPOA (sister) for skilled nursing home placement due to concern for neglect at recent hospice facility. Patient follows very simple commands and doubt she can engage in skilled therapy. residential nursing facility likely more of an option.     - Appreciate CM/SW assistance with placement.   - Appreciate pharmacy's assistance with med rec.     Sacral decubitus ulcer, stage IV  S/p recent debridement at OSH 06/08. OSH Wcx with enterococcus faecalis, bacteriodes fragilus.    - Wound care consulted; appreciate assistance  - Deep wound cx  - General surgery consulted s/p debridement 06/24, no note of bone involvement per OP note, no cultures sent  - Infectious Disease consulted, appreciate assistance  - Continue Abx therapy per Leukocytosis A/P  - Consideration for wound vac given depth of injury  - Turn q2h    Leukocytosis  IMPROVING    Patient admitted with elevated WBC to 14s. Previous episodes of aspiration PNA at OSH, indwelling cabrera given acuity of condition, sacral decubitus ulcer stave IV s/p debridement with enterococcus faecalis / bacteriodes fragilis in cx. Patient afebrile with stable hemodynamics on admission at this time. CXR without acute abnormality. UA with yeast growing, chronic per previous OSH review.    Leukocytosis may be explained by soft tissue infection vs hemoconcentration given extensive dehydration and hypernatremia from lack of enteral fluids or IVF at outside hospice facility prior to transfer to [facility].     - Zosyn given previous cultures and persistent leukocytosis  - Etiology may be soft tissue infection; see Sacral Decubitus Ulcer A/P  - Continue monitoring CBC  - Wound care per  Sacral Decubitus Ulcer A/P  - Aspiration precautions  - Trend fever curve  - Trend BCx    Tracheostomy in place  Dysarthria  Dysphagia    On 5L O2 via trach collar.     - ENT consulted for trach exchange given lack of compatible equipment; appreciate assistance  - Respiratory therapy to assist with trach care.   - SLP for swallow evaluation and speech therapy; recommending NPO, re-evaluation, PMSV under direct supervision  - F/U ENT for criteria for trach removal    Seizures  Patient previously started on Lacosamide and Keppra at OSH during initial episode of status epilepticus. Phenytoin added as patient continued to have breakthrough seizures.    - Keppra monotherapy 750 bid per neuro recs  - No seizures seen on EEG, monitoring has stopped   - Neurology consulted for further assistance in antiepileptic regimen    Palliative care encounter  Per Primary Medicine team and Palliative Care encounters with patient family (separate encounters, see dated notes in chart), patient family wishes full medical care and does not desire comfort/hospice measures. DNR remains. See Pall Care note for further detail.    G tube feedings  Tube feed dependent via G tube  - Nutrition to assist with TF recs; see recommendations in note; appreciate assistance      ACP (advance care planning)  Patient is DNR. [place]POST on file.       History of CVA (cerebrovascular accident)  Unclear of patient's current medications. Per med review on care-everywhere, the patient is on statin but not on antiplatelet therapy.     - Appreciate pharmacy's assistance with medication reconciliation with facility- start antiplatelet therapy if no contraindications.   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 A1c repeated 6.9.    - LDSSI   - POCT glucose q6hrs  - Maintain BG  140-180  - Titrate basal/bolus regimen to goal as above.    VTE Risk Mitigation (From admission, onward)         Ordered     heparin (porcine) injection 5,000 Units  Every 12 hours         06/18/22 0355     IP VTE HIGH RISK PATIENT  Once         06/18/22 0355     Place sequential compression device  Until discontinued         06/18/22 0355                Discharge Planning   JUDIT: 6/27/2022     Code Status: DNR   Is the patient medically ready for discharge?: No    Reason for patient still in hospital (select all that apply): Treatment, Consult recommendations and Pending disposition  Discharge Plan A: New Nursing Home placement - [facility]   Discharge Delays: None known at this time              Dawood Self MD  Department of Hospital Medicine   [facility] - Intensive Care ([place])

## 2022-06-26 NOTE — SUBJECTIVE & OBJECTIVE
Interval History: AF HDS NAEON. Patient mentation at baseline, continues to attempt to speak with intermittent success depending on trach. Reports understanding of plan. Wants water.    Review of Systems   Unable to perform ROS: Patient nonverbal   Respiratory:  Negative for chest tightness and shortness of breath.    Cardiovascular:  Negative for chest pain.   Gastrointestinal:  Negative for abdominal pain.   Objective:     Vital Signs (Most Recent):  Temp: 97.1 °F (36.2 °C) (06/26/22 0825)  Pulse: 89 (06/26/22 0832)  Resp: 18 (06/26/22 1105)  BP: 134/63 (06/26/22 0825)  SpO2: 100 % (06/26/22 0832) Vital Signs (24h Range):  Temp:  [96.5 °F (35.8 °C)-98 °F (36.7 °C)] 97.1 °F (36.2 °C)  Pulse:  [86-90] 89  Resp:  [16-18] 18  SpO2:  [89 %-100 %] 100 %  BP: (131-169)/(63-67) 134/63     Weight: 50.2 kg (110 lb 10.7 oz)  Body mass index is 22.35 kg/m².    Intake/Output Summary (Last 24 hours) at 6/26/2022 1328  Last data filed at 6/26/2022 0825  Gross per 24 hour   Intake 2000 ml   Output 1950 ml   Net 50 ml      Physical Exam  Vitals and nursing note reviewed.   Constitutional:       General: She is not in acute distress.     Appearance: She is not ill-appearing, toxic-appearing or diaphoretic.   HENT:      Head: Normocephalic.      Mouth/Throat:      Mouth: Mucous membranes are dry.      Comments: Dried oral secretions adhered to tongue  Cardiovascular:      Rate and Rhythm: Normal rate and regular rhythm.      Pulses: Normal pulses.      Heart sounds: Normal heart sounds. No murmur heard.  Pulmonary:      Effort: Pulmonary effort is normal. No respiratory distress.      Breath sounds: Normal breath sounds. No wheezing or rales.   Abdominal:      General: Abdomen is flat. Bowel sounds are normal. There is no distension.      Palpations: Abdomen is soft.      Tenderness: There is no abdominal tenderness.   Musculoskeletal:         General: No swelling or tenderness. Normal range of motion.      Cervical back: Normal  range of motion.   Skin:     General: Skin is warm and dry.      Coloration: Skin is not jaundiced or pale.      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    Neurological:      Mental Status: She is alert.      Comments: Minimally alert, opens eyes to sound/pain, tracks to sound, attempts to speak,, short sentences limited by dysarthria / trach.   Psychiatric:      Comments: Unable to assess       Significant Labs: All pertinent labs within the past 24 hours have been reviewed.    Significant Imaging: I have reviewed all pertinent imaging results/findings within the past 24 hours.

## 2022-06-26 NOTE — PT/OT/SLP EVAL
Speech Language Pathology Evaluation  Bedside Swallow  Passy Chad Speaking Valve Evaluation    Patient Name:  Darlene Avila   MRN:  [id number]   [id number] A    Admitting Diagnosis: Hypernatremia    Recommendations:                 General Recommendations:  Dysphagia therapy and One Way Speaking Valve  Diet recommendations:  NPO, NPO   Aspiration Precautions: Alternate means of nutrition/hydration, Frequent oral care and Strict aspiration precautions   General Precautions: Standard, aspiration, respiratory, fall, NPO  Communication strategies:  One way speaking valve     Speaking Valve precautions:   · Placement only when cuff deflated and VSS under direct supervision,   · only when awake and alert, remove with any S/S respiratory distress,   · remove when sleeping  · To rinse valve:   · 1. Swish valve in pure soap and warm water,   · 2. Rinse valve thoroughly in warm running water,   · 3. Allow valve to air dry thoroughly before placing it in storage container.   · 4. DO NOT use hot water, peroxide, bleach, vinegar, alcohol, brushes, or cotton swabs to clean valve.       History:     Past Medical History:   Diagnosis Date    Arthritis     Cancer of breast     s/p mastectomy (on anastrozole)     Cataract     Diabetes mellitus     c/b Diabetic retinopathy    Hypertension     resolved    Hypoxia 4/15/2021    Memory loss     due to strokes    Orthostatic hypotension 12/27/2020    frequent falls, on midodrine.  4/2021 seen by both cards and palliative care    TIA (transient ischemic attack) [id number]    [facility] with remote infarcts    Vitamin D deficiency 10/14/2021       Past Surgical History:   Procedure Laterality Date    CAPSULOTOMY  6/26/13    yag left eye    CATARACT EXTRACTION  6/3/2013    right eye    CHOLECYSTECTOMY      HYSTERECTOMY      MASTECTOMY Right 9/28/2020    Procedure: MASTECTOMY-Right;  Surgeon: Krysta Clark MD;  Location: [facility];  Service: General;  Laterality: Right;    SENTINEL LYMPH  NODE BIOPSY Right 9/28/2020    Procedure: BIOPSY, LYMPH NODE, SENTINEL-Right;  Surgeon: Krysta Clark MD;  Location: [facility] OR;  Service: General;  Laterality: Right;    TOTAL ABDOMINAL HYSTERECTOMY W/ BILATERAL SALPINGOOPHORECTOMY       MD note 6/25: HPI: Darlene Avila is a 71 year old F with history of diabetes type 2, seizure c/b anoxic brain injury s/p trach, PEG, bed bound status, Stage IV sacral ulcer, ischemic CVA, IBS, history of breast cancer s/p right mastectomy and failure to thrive who was brought in by her HPOA (sister) for nursing home placement. Patient is unable to provide history.   Patient's sister was contacted who stated that the patient was admitted to [facility] in [facility] for recurrent seizures/status epilepticus which resulted in an anoxic brain injury and respiratory failure.  Patient was intubated followed by tracheostomy and PEG placement .  Patient was discharged to nursing home and subsequently discharged to hospice care at the request of patient's sister. However a few days ago, she visited the patient at [facility] recovery facility and noticed they weren't feeding or taking care of the patient due to her 'hospice status'. Per sister today, she would like to place the patient in a skilled nursing facility and would like to rescind her hospice care at this time.  She is no longer interested in hospice placement. She states the patient at baseline is only able to follow some simple commands. She is unsure how much O2 level is her baseline via trach collar.   Of note she has a Stage IV sacral wound that is s/p debridement on 06/09 by Gen surgery in [facility].   Upon arrival to the ED, the patient was hemodynamically stable. Labs revealed Na 157, WBC 14. Hgb 9.4 (baseline 7-9). The patient was given 1L of IV NS and was admitted to hospital medicine for further management.   Overview/Hospital Course:  Patient admitted to hospital medicine with pre-existing anoxic brain injury,  hypernatremia, sacral decubitus wound s/p debridement 06/08 in [place], [place]. Patient initially admitted to hospice following discharge from [facility] ICU, but discharged from hospice as patient family believed she was not getting appropriate care. Na 157, corrected with IVF (NS and hypotonic solutions) and enteral FWF down to 148. Trach collar exchanged by ENT due to lack of supplies at facility. Wound care consulted for sacral decubitus, recommended General Surgery consult for possible debridement as wound appears purulent. Persistent leukocytosis, started Zosyn per previous I&D wound cultures growing bacteriodes/enterococcus and new wound findings. cEEG started and Epilepsy consulted for assistance with antiepileptic medications; conflicting reports if patient was on only Keppra/Lacosamide vs Keppra/Lacosamide/Phenytoin. General surgery consulted for debridement of sacral decubitus ulcer, s/p I&D 06/24. Palliative Care consulted for assistance in GOC; decision to pursue medical/surgical care decided.  CM/SW to assist with dispo, family desire for jail nursing care.  Interval History: AF HDS NAEON. Tolerated I&D well. No complaints at this time. Still motioning that she wants water    Chest X-Rays 6/18: Interval development of small left pleural effusion with left basilar consolidation or atelectasis.  No pneumothorax.  Cardiac silhouette is prominent, stable.  Tracheostomy and gastrostomy catheters noted.    Prior diet: unknown if patient was eating by mouth prior to current hospitalization; patient with PEG    Subjective     Patient awake and cooperative. Confusion evident.  SLP communicated with RN prior to entry.   Patient goals: none stated     Pain/Comfort:  ·  no donna reported or indicated    Respiratory Status: supplemental O2 via trach collar    Objective:     Passy [place] Speaking Valve  Trach size/type: Shiley cuffless  Placement Date: unknown; trach changed by ENT during current admission 2/2 lack of  supplies for prior trach  Able to phonate around trach: yes  O2 sats at rest: 100  O2 sats with PMSV in place: 100  Vocal quality with valve in place: improved vocal quality and intelligibility with valve in place  Back pressure upon removal: none  Minutes PMSV worn: whole session; valve removed 2/2 patient with cognitive impairments, wet vocal quality, and no family/staff present in room upon SLP exit  Education topics addressed: SLP provided education on PMSV benefits, use, and precautions, SLP role, SLP recommendations, and SLP POC. Patient did not demonstrate understanding. SLP communicated with RN recommendations for PMSV use when under direct supervision (staff or family). White board updated.     Oral Musculature Evaluation  · Oral Musculature: unable to assess due to poor participation/comprehension, general weakness  · Volitional Cough: weak, nonproductive  · Voice Prior to PO Intake: clear    Bedside Swallow Eval:   Consistencies Assessed:  · Thin liquids ice chip, teaspoon sip thin     Oral Phase:   · Anterior loss  · Decreased closure around utensil  · Poor oral acceptance    Pharyngeal Phase:   · delayed swallow initation  · wet vocal quality after swallow   · Patient with severely delayed pharyngeal swallow requiring max cues to swallow. Wet vocal quality appreciated. She required max cues to elicit a cough. Wet vocal quality not cleared with volitional cough. No further trials given 2/2 high aspiration risk and overt s/s of aspiration with min trials.     SLP communicated NPO recommendations with RN.     Assessment:     Darlene Avila is a 71 y.o. female with an SLP diagnosis of Dysphagia, S/P Trach and One Way Speaking Valve Training.  ST will continue to follow.     Goals:   Multidisciplinary Problems     SLP Goals        Problem: SLP    Goal Priority Disciplines Outcome   SLP Goal     SLP Ongoing, Progressing   Description: Speech Therapy Short Term Goals  Goal expected to be met by 7/10  1. Pt  will participate in an ongoing assessment to determine the least restrictive and safest diet with possible updated goals to follow pending results.  2. Pt will tolerate PMSV during all waking hours with no s/s of respiratory distress.                    Plan:     · Patient to be seen:  3 x/week   · Plan of Care expires:     · Plan of Care reviewed with:  patient   · SLP Follow-Up:  Yes       Discharge recommendations:  nursing facility, basic   Barriers to Discharge:  None    Time Tracking:     SLP Treatment Date:   06/26/22  Speech Start Time:  0934  Speech Stop Time:  0950     Speech Total Time (min):  16 min    Billable Minutes: Eval 8  and Eval Swallow and Oral Function 8    06/26/2022

## 2022-06-26 NOTE — ASSESSMENT & PLAN NOTE
S/p recent debridement at OSH 06/08. OSH Wcx with enterococcus faecalis, bacteriodes fragilus.    - Wound care consulted; appreciate assistance  - Deep wound cx  - General surgery consulted s/p debridement 06/24, no note of bone involvement per OP note, no cultures sent  - Infectious Disease consulted, appreciate assistance  - Continue Abx therapy per Leukocytosis A/P  - Consideration for wound vac given depth of injury  - Turn q2h

## 2022-06-26 NOTE — PLAN OF CARE
Problem: Adult Inpatient Plan of Care  Goal: Patient-Specific Goal (Individualized)  Outcome: Ongoing, Progressing     Problem: Adult Inpatient Plan of Care  Goal: Plan of Care Review  Outcome: Ongoing, Progressing     Problem: Adult Inpatient Plan of Care  Goal: Absence of Hospital-Acquired Illness or Injury  Outcome: Ongoing, Progressing     Problem: Adult Inpatient Plan of Care  Goal: Optimal Comfort and Wellbeing  Outcome: Ongoing, Progressing     Problem: Adult Inpatient Plan of Care  Goal: Readiness for Transition of Care  Outcome: Ongoing, Progressing

## 2022-06-26 NOTE — PT/OT/SLP EVAL
Occupational Therapy  Co- Evaluation    Name: Darlene Avila  MRN: [id number]  Admitting Diagnosis:  Hypernatremia  Recent Surgery: Procedure(s) (LRB):  DEBRIDEMENT, WOUND, sacral ulcer (N/A) 2 Days Post-Op    Recommendations:     Discharge Recommendations: nursing facility, skilled  Discharge Equipment Recommendations:  hospital bed, lift device, nutrition supplies, feeding device, suction machine, wound care supplies  Barriers to discharge:  Other (Comment) (unknown)    Assessment:     Darlene Avila is a 71 y.o. female with a medical diagnosis of Hypernatremia.  She presents with frailty, leaning to the rigth in poor supine posture HOB elevated.Pt mouthing communication but difficult to understand.  Performance deficits affecting function: weakness, impaired endurance, impaired self care skills, impaired functional mobilty, gait instability, impaired balance, decreased coordination, decreased upper extremity function, decreased lower extremity function, pain, decreased ROM, impaired skin, impaired joint extensibility, impaired muscle length. Evaluation limited to UE strength and rolling bed mobility secondary to pts decreased strength and AROM.Recommending  SNF @ [facility] home   once medically appropriate for discharge to increase maximal independence, reduce burden of care, and ensure safety. Patient would benefit from continued skilled acute OT trial 3x/wk to improve functional mobility, increase independence with ADLs, and address established goals.    Rehab Prognosis: Poor; patient would benefit from acute skilled OT services to address these deficits and reach maximum level of function.       Plan:     Patient to be seen 3 x/week to address the above listed problems via self-care/home management, therapeutic activities, therapeutic exercises  · Plan of Care Expires: 07/24/22  · Plan of Care Reviewed with: patient    Subjective   Pt mouthed answers to eval questions, but difficult to understand.   Chief Complaint: Pt  did not express complaint that was understood, except for pain upon B LE ROM.  Patient/Family Comments/goals: Pts chart stated that her sister was not happy with previous hospice care facility.    Occupational Profile:  Living Environment[place]  Previous level of function: bed bound (unchanged)  Roles and Routines: unknown  Equipment Used at Home:  none  Assistance upon Discharge: Pt will need an increased level of care (max to dependent)    Pain/Comfort:  Pain Rating 1:  (pt mouthed she experienced pain with hip flexion, not rated)  Location - Orientation 1: generalized  Location 1: hip  Pain Addressed 1: Cessation of Activity  Pain Rating Post-Intervention 1: 0/10    Patients cultural, spiritual, Tenriism conflicts given the current situation: no    Objective:     Communicated with: nurse prior to session.  Patient found HOB elevated with peripheral IV, PEG Tube, Tracheostomy, oxygen, cabrera catheter upon OT entry to room.    General Precautions: Standard, aspiration, respiratory, fall, NPO   Orthopedic Precautions:N/A   Braces: N/A  Respiratory Status: Room air    Occupational Performance:    Bed Mobility:    · Patient completed Rolling/Turning to Left with  maximal assistance  · Patient completed Rolling/Turning to Right with maximal assistance    Functional Mobility/Transfers:Activities of Daily Living:  Not assessed at this time.    · Lower Body Dressing: dependence to doff/don socks supine  · Toileting: dependence to change brief supine     Cognitive/Visual Perceptual:  Cognitive/Psychosocial Skills:     -       Follows Commands/attention:Follows multistep  commands  Visual/Perceptual:      -appears intact     Physical Exam:  Neurological: -       able to answr questions asked upon eval.    AMPAC 6 Click ADL:  AMPAC Total Score: 6    Treatment & Education:  Role of OT and POC  Safety  ADL retraining  Functional mobility training  Discharge planning  Importance of EOB/OOB  activity  Pt evaluated with PT. Assisted nurse with diaper change after BM.  A/PROM BUE's all available planes.  Education:    Patient left HOB elevated with all lines intact, call button in reach, nurse notified and nurse,PT present    GOALS:   Multidisciplinary Problems     Occupational Therapy Goals        Problem: Occupational Therapy    Goal Priority Disciplines Outcome Interventions   Occupational Therapy Goal     OT, PT/OT Ongoing, Progressing    Description: Goals to be met by: 7/10/22    Patient will increase functional independence with ADLs by performing:    Rolling to Bilateral with Moderate Assistance  P/AAROM to maintain UE ROM/Strength.                      History:     Past Medical History:   Diagnosis Date    Arthritis     Cancer of breast     s/p mastectomy (on anastrozole)     Cataract     Diabetes mellitus     c/b Diabetic retinopathy    Hypertension     resolved    Hypoxia 4/15/2021    Memory loss     due to strokes    Orthostatic hypotension 12/27/2020    frequent falls, on midodrine.  4/2021 seen by both cards and palliative care    TIA (transient ischemic attack) [id number]    [facility] with remote infarcts    Vitamin D deficiency 10/14/2021         Past Surgical History:   Procedure Laterality Date    CAPSULOTOMY  6/26/13    yag left eye    CATARACT EXTRACTION  6/3/2013    right eye    CHOLECYSTECTOMY      HYSTERECTOMY      MASTECTOMY Right 9/28/2020    Procedure: MASTECTOMY-Right;  Surgeon: Krysta Clark MD;  Location: [facility];  Service: General;  Laterality: Right;    SENTINEL LYMPH NODE BIOPSY Right 9/28/2020    Procedure: BIOPSY, LYMPH NODE, SENTINEL-Right;  Surgeon: Krysta Clark MD;  Location: [facility];  Service: General;  Laterality: Right;    TOTAL ABDOMINAL HYSTERECTOMY W/ BILATERAL SALPINGOOPHORECTOMY         Time Tracking:     OT Date of Treatment: 06/26/22  OT Start Time: 1304  OT Stop Time: 1320  OT Total Time (min): 16 min    Billable Minutes:Evaluation  16    6/26/2022

## 2022-06-26 NOTE — ANESTHESIA POSTPROCEDURE EVALUATION
Anesthesia Post Evaluation    Patient: Darlene Avila    Procedure(s) Performed: Procedure(s) (LRB):  DEBRIDEMENT, WOUND, sacral ulcer (N/A)    Final Anesthesia Type: general      Patient location during evaluation: PACU  Patient participation: Yes- Able to Participate  Level of consciousness: awake and alert and oriented  Post-procedure vital signs: reviewed and stable  Pain management: adequate  Airway patency: patent    PONV status at discharge: No PONV  Anesthetic complications: no      Cardiovascular status: blood pressure returned to baseline, hemodynamically stable and stable  Respiratory status: unassisted, room air and spontaneous ventilation  Hydration status: euvolemic  Follow-up not needed.          Vitals Value Taken Time   /66 06/25/22 1936   Temp 36.4 °C (97.5 °F) 06/25/22 1936   Pulse 86 06/25/22 2118   Resp 16 06/25/22 1936   SpO2 100 % 06/25/22 2118   Vitals shown include unvalidated device data.      Event Time   Out of Recovery 06/24/2022 14:45:00         Pain/Shira Score: Shira Score: 9 (6/24/2022  2:45 PM)

## 2022-06-26 NOTE — PLAN OF CARE
Problem: Adult Inpatient Plan of Care  Goal: Plan of Care Review  Outcome: Ongoing, Progressing  Goal: Patient-Specific Goal (Individualized)  Outcome: Ongoing, Progressing  Goal: Absence of Hospital-Acquired Illness or Injury  Outcome: Ongoing, Progressing  Goal: Optimal Comfort and Wellbeing  Outcome: Ongoing, Progressing  Goal: Readiness for Transition of Care  Outcome: Ongoing, Progressing     Problem: Diabetes Comorbidity  Goal: Blood Glucose Level Within Targeted Range  Outcome: Ongoing, Progressing     Problem: Impaired Wound Healing  Goal: Optimal Wound Healing  Outcome: Ongoing, Progressing     Problem: Infection  Goal: Absence of Infection Signs and Symptoms  Outcome: Ongoing, Progressing     Problem: Skin Injury Risk Increased  Goal: Skin Health and Integrity  Outcome: Ongoing, Progressing     Problem: Fall Injury Risk  Goal: Absence of Fall and Fall-Related Injury  Outcome: Ongoing, Progressing     Problem: Coping Ineffective  Goal: Effective Coping  Outcome: Ongoing, Progressing    Patient alert and cooperative with care. Medicated as ordered. Assisted with weight shifting. Dressing to coccyx changed this shift. Continues on iv abx. Tolerated glucerna at 35/hr. Patient is resting comfortably at this time

## 2022-06-26 NOTE — RESPIRATORY THERAPY
RAPID RESPONSE RESPIRATORY THERAPY PROACTIVE NOTE           Time of visit: 832     Code Status: DNR   : 1950  Bed: 37837/91877 A:   MRN: [id number]  Time spent at the bedside: < 15 min    SITUATION    Evaluated patient for: LDA Check     BACKGROUND    Patient has a past medical history of Arthritis, Cancer of breast, Cataract, Diabetes mellitus, Hypertension, Hypoxia, Memory loss, Orthostatic hypotension, TIA (transient ischemic attack), and Vitamin D deficiency.  Clinically Significant Surgical Hx: tracheostomy    24 Hours Vitals Range:  Temp:  [96.5 °F (35.8 °C)-98 °F (36.7 °C)]   Pulse:  [84-89]   Resp:  [16-19]   BP: (131-169)/(63-67)   SpO2:  [89 %-100 %]     Labs:    Recent Labs     22  0750 22  1902 22  0643 22  0645 22  1128 22  0426   *  145   < > 144 143 142 143   K 3.6  --  4.1  --   --  3.7   *  --  120*  --   --  121*   CO2 18*  --  17*  --   --  15*   CREATININE 0.7  --  0.6  --   --  0.6   GLU 88  --  308*  --   --  50*   PHOS 2.0*  --  3.5  --   --  2.2*   MG 1.8  --  1.6  --   --  1.8    < > = values in this interval not displayed.        No results for input(s): PH, PCO2, PO2, HCO3, POCSATURATED, BE in the last 72 hours.    ASSESSMENT/INTERVENTIONS    Upon arrival in room Pt resting in bed. All supplies and extra Shiley trachs (4.0 and 6.0 cuffed) at bedside.    Last VS   Temp: 98 °F (36.7 °C) (413)  Pulse: 89 (832)  Resp: 17 (832)  BP: 131/63 (413)  SpO2: 100 % (832)    Level of Consciousness: Level of Consciousness (AVPU): alert  Respiratory Effort: Respiratory Effort: Normal, Unlabored Expansion/Accessory Muscle Usage: Expansion/Accessory Muscles/Retractions: expansion symmetric, no retractions, no use of accessory muscles  All Lung Field Breath Sounds: All Lung Fields Breath Sounds: Anterior:, Lateral:, coarse  O2 Device/Concentration: Flow (L/min): 5, Oxygen Concentration (%): 28, O2 Device (Oxygen  Therapy): Trach Collar  Surgical airway: Yes, Type: Shiley Size: 6, uncuffed  Ambu at bedside: Ambu bag with the patient?: Yes, Adult Ambu     Active Orders   Respiratory Care    Oxygen Continuous     Frequency: Continuous     Number of Occurrences: Until Specified     Order Questions:      Device type: Low flow      Device: Trach Collar      FiO2%: 28      Titrate O2 per Oxygen Titration Protocol: Yes      To maintain SpO2 goal of: >= 90%      Notify MD of: Inability to achieve desired SpO2; Sudden change in patient status and requires 20% increase in FiO2; Patient requires >60% FiO2    RESPIRATORY COMMUNICATION     Frequency: Daily     Number of Occurrences: Until Specified     Order Comments: Trach care      Routine tracheostomy care     Frequency: BID     Number of Occurrences: Until Specified       RECOMMENDATIONS    We recommend: RRT Recs: Continue POC per primary team.      FOLLOW-UP    Please call back the Rapid Response RT, Radha Brennan, RRT at x [phone] for any questions or concerns.

## 2022-06-26 NOTE — PLAN OF CARE
Problem: SLP  Goal: SLP Goal  Description: Speech Therapy Short Term Goals  Goal expected to be met by 7/10  1. Pt will participate in an ongoing assessment to determine the least restrictive and safest diet with possible updated goals to follow pending results.  2. Pt will tolerate PMSV during all waking hours with no s/s of respiratory distress.   Outcome: Ongoing, Progressing  Passy [place] Speaking Valve and Bedside Swallow Study completed. Recommend: NPO, frequent oral care, continue alternative means of nutrition/hydration/medication. Okay for PMSV under direct supervision during all waking hours. ST will continue to follow.

## 2022-06-26 NOTE — ASSESSMENT & PLAN NOTE
Dysarthria  Dysphagia    On 5L O2 via trach collar.     - ENT consulted for trach exchange given lack of compatible equipment; appreciate assistance  - Respiratory therapy to assist with trach care.   - SLP for swallow evaluation and speech therapy; recommending NPO, re-evaluation, PMSV under direct supervision  - F/U ENT for criteria for trach removal

## 2022-06-26 NOTE — PLAN OF CARE
Problem: Occupational Therapy  Goal: Occupational Therapy Goal  Description: Goals to be met by: 7/10/22    Patient will increase functional independence with ADLs by performing:    Rolling to Bilateral with Moderate Assistance  P/AAROM to maintain UE ROM/Strength.     Outcome: Ongoing, Progressing   Pts goals are set.

## 2022-06-27 LAB
ALBUMIN SERPL BCP-MCNC: 1.5 G/DL (ref 3.5–5.2)
ALLENS TEST: ABNORMAL
ALP SERPL-CCNC: 93 U/L (ref 55–135)
ALT SERPL W/O P-5'-P-CCNC: 14 U/L (ref 10–44)
ANION GAP SERPL CALC-SCNC: 7 MMOL/L (ref 8–16)
ANISOCYTOSIS BLD QL SMEAR: SLIGHT
AST SERPL-CCNC: 16 U/L (ref 10–40)
BASOPHILS # BLD AUTO: 0.05 K/UL (ref 0–0.2)
BASOPHILS NFR BLD: 0.6 % (ref 0–1.9)
BILIRUB SERPL-MCNC: 0.1 MG/DL (ref 0.1–1)
BUN SERPL-MCNC: 11 MG/DL (ref 8–23)
CALCIUM SERPL-MCNC: 8.8 MG/DL (ref 8.7–10.5)
CHLORIDE SERPL-SCNC: 119 MMOL/L (ref 95–110)
CO2 SERPL-SCNC: 14 MMOL/L (ref 23–29)
CREAT SERPL-MCNC: 0.6 MG/DL (ref 0.5–1.4)
DELSYS: ABNORMAL
DIFFERENTIAL METHOD: ABNORMAL
EOSINOPHIL # BLD AUTO: 0.1 K/UL (ref 0–0.5)
EOSINOPHIL NFR BLD: 1.5 % (ref 0–8)
ERYTHROCYTE [DISTWIDTH] IN BLOOD BY AUTOMATED COUNT: 18.2 % (ref 11.5–14.5)
EST. GFR  (AFRICAN AMERICAN): >60 ML/MIN/1.73 M^2
EST. GFR  (NON AFRICAN AMERICAN): >60 ML/MIN/1.73 M^2
FIO2: 21
GLUCOSE SERPL-MCNC: 103 MG/DL (ref 70–110)
HCO3 UR-SCNC: 15.2 MMOL/L (ref 24–28)
HCT VFR BLD AUTO: 27.8 % (ref 37–48.5)
HGB BLD-MCNC: 9 G/DL (ref 12–16)
IMM GRANULOCYTES # BLD AUTO: 0.27 K/UL (ref 0–0.04)
IMM GRANULOCYTES NFR BLD AUTO: 3.1 % (ref 0–0.5)
LACTATE SERPL-SCNC: 0.8 MMOL/L (ref 0.5–2.2)
LYMPHOCYTES # BLD AUTO: 2.4 K/UL (ref 1–4.8)
LYMPHOCYTES NFR BLD: 27.6 % (ref 18–48)
MAGNESIUM SERPL-MCNC: 1.8 MG/DL (ref 1.6–2.6)
MCH RBC QN AUTO: 27.1 PG (ref 27–31)
MCHC RBC AUTO-ENTMCNC: 32.4 G/DL (ref 32–36)
MCV RBC AUTO: 84 FL (ref 82–98)
MODE: ABNORMAL
MONOCYTES # BLD AUTO: 0.7 K/UL (ref 0.3–1)
MONOCYTES NFR BLD: 7.8 % (ref 4–15)
NEUTROPHILS # BLD AUTO: 5.2 K/UL (ref 1.8–7.7)
NEUTROPHILS NFR BLD: 59.4 % (ref 38–73)
NRBC BLD-RTO: 1 /100 WBC
PCO2 BLDA: 27.8 MMHG (ref 35–45)
PH SMN: 7.34 [PH] (ref 7.35–7.45)
PHOSPHATE SERPL-MCNC: 2.5 MG/DL (ref 2.7–4.5)
PLATELET # BLD AUTO: 371 K/UL (ref 150–450)
PLATELET BLD QL SMEAR: ABNORMAL
PMV BLD AUTO: 9.9 FL (ref 9.2–12.9)
PO2 BLDA: 82 MMHG (ref 80–100)
POC BE: -11 MMOL/L
POC SATURATED O2: 96 % (ref 95–100)
POC TCO2: 16 MMOL/L (ref 23–27)
POCT GLUCOSE: 122 MG/DL (ref 70–110)
POCT GLUCOSE: 154 MG/DL (ref 70–110)
POCT GLUCOSE: 172 MG/DL (ref 70–110)
POCT GLUCOSE: 206 MG/DL (ref 70–110)
POIKILOCYTOSIS BLD QL SMEAR: SLIGHT
POTASSIUM SERPL-SCNC: 5.2 MMOL/L (ref 3.5–5.1)
PROT SERPL-MCNC: 6.3 G/DL (ref 6–8.4)
RBC # BLD AUTO: 3.32 M/UL (ref 4–5.4)
SAMPLE: ABNORMAL
SITE: ABNORMAL
SODIUM SERPL-SCNC: 140 MMOL/L (ref 136–145)
SP02: 99
WBC # BLD AUTO: 8.77 K/UL (ref 3.9–12.7)

## 2022-06-27 PROCEDURE — 92526 ORAL FUNCTION THERAPY: CPT

## 2022-06-27 PROCEDURE — 36415 COLL VENOUS BLD VENIPUNCTURE: CPT

## 2022-06-27 PROCEDURE — 25000003 PHARM REV CODE 250: Performed by: STUDENT IN AN ORGANIZED HEALTH CARE EDUCATION/TRAINING PROGRAM

## 2022-06-27 PROCEDURE — 94761 N-INVAS EAR/PLS OXIMETRY MLT: CPT

## 2022-06-27 PROCEDURE — 99233 SBSQ HOSP IP/OBS HIGH 50: CPT | Mod: ,,, | Performed by: PHYSICIAN ASSISTANT

## 2022-06-27 PROCEDURE — 84100 ASSAY OF PHOSPHORUS: CPT

## 2022-06-27 PROCEDURE — 63600175 PHARM REV CODE 636 W HCPCS

## 2022-06-27 PROCEDURE — 99900035 HC TECH TIME PER 15 MIN (STAT)

## 2022-06-27 PROCEDURE — 83605 ASSAY OF LACTIC ACID: CPT

## 2022-06-27 PROCEDURE — 27200966 HC CLOSED SUCTION SYSTEM

## 2022-06-27 PROCEDURE — 99233 SBSQ HOSP IP/OBS HIGH 50: CPT | Mod: ,,, | Performed by: HOSPITALIST

## 2022-06-27 PROCEDURE — 25000003 PHARM REV CODE 250

## 2022-06-27 PROCEDURE — 36600 WITHDRAWAL OF ARTERIAL BLOOD: CPT

## 2022-06-27 PROCEDURE — 99900022

## 2022-06-27 PROCEDURE — 99233 PR SUBSEQUENT HOSPITAL CARE,LEVL III: ICD-10-PCS | Mod: ,,, | Performed by: HOSPITALIST

## 2022-06-27 PROCEDURE — 83735 ASSAY OF MAGNESIUM: CPT

## 2022-06-27 PROCEDURE — 99233 PR SUBSEQUENT HOSPITAL CARE,LEVL III: ICD-10-PCS | Mod: ,,, | Performed by: PHYSICIAN ASSISTANT

## 2022-06-27 PROCEDURE — 63600175 PHARM REV CODE 636 W HCPCS: Performed by: STUDENT IN AN ORGANIZED HEALTH CARE EDUCATION/TRAINING PROGRAM

## 2022-06-27 PROCEDURE — 12000002 HC ACUTE/MED SURGE SEMI-PRIVATE ROOM

## 2022-06-27 PROCEDURE — 99900031 HC PATIENT EDUCATION (STAT)

## 2022-06-27 PROCEDURE — 82803 BLOOD GASES ANY COMBINATION: CPT

## 2022-06-27 PROCEDURE — 85025 COMPLETE CBC W/AUTO DIFF WBC: CPT

## 2022-06-27 PROCEDURE — 99900026 HC AIRWAY MAINTENANCE (STAT)

## 2022-06-27 PROCEDURE — 80053 COMPREHEN METABOLIC PANEL: CPT

## 2022-06-27 RX ADMIN — INSULIN ASPART 2 UNITS: 100 INJECTION, SOLUTION INTRAVENOUS; SUBCUTANEOUS at 10:06

## 2022-06-27 RX ADMIN — CHOLESTYRAMINE 8 G: 4 POWDER, FOR SUSPENSION ORAL at 09:06

## 2022-06-27 RX ADMIN — PIPERACILLIN SODIUM AND TAZOBACTAM SODIUM 4.5 G: 4; .5 INJECTION, POWDER, LYOPHILIZED, FOR SOLUTION INTRAVENOUS at 11:06

## 2022-06-27 RX ADMIN — PIPERACILLIN SODIUM AND TAZOBACTAM SODIUM 4.5 G: 4; .5 INJECTION, POWDER, LYOPHILIZED, FOR SOLUTION INTRAVENOUS at 04:06

## 2022-06-27 RX ADMIN — PIPERACILLIN SODIUM AND TAZOBACTAM SODIUM 4.5 G: 4; .5 INJECTION, POWDER, LYOPHILIZED, FOR SOLUTION INTRAVENOUS at 12:06

## 2022-06-27 RX ADMIN — HEPARIN SODIUM 5000 UNITS: 5000 INJECTION INTRAVENOUS; SUBCUTANEOUS at 10:06

## 2022-06-27 RX ADMIN — ATORVASTATIN CALCIUM 80 MG: 20 TABLET, FILM COATED ORAL at 09:06

## 2022-06-27 RX ADMIN — PIPERACILLIN SODIUM AND TAZOBACTAM SODIUM 4.5 G: 4; .5 INJECTION, POWDER, LYOPHILIZED, FOR SOLUTION INTRAVENOUS at 09:06

## 2022-06-27 RX ADMIN — CHOLESTYRAMINE 8 G: 4 POWDER, FOR SUSPENSION ORAL at 10:06

## 2022-06-27 RX ADMIN — HEPARIN SODIUM 5000 UNITS: 5000 INJECTION INTRAVENOUS; SUBCUTANEOUS at 09:06

## 2022-06-27 RX ADMIN — LEVETIRACETAM 750 MG: 100 SOLUTION ORAL at 10:06

## 2022-06-27 RX ADMIN — LEVETIRACETAM 750 MG: 100 SOLUTION ORAL at 09:06

## 2022-06-27 NOTE — SUBJECTIVE & OBJECTIVE
Interval History: ENT re-consulted regarding tracheostomy tube decannulation. History obtained with sister, Sabi. Darlene is currently DNR. Sabi does not feel like Darlene would have liked to be intubated for a long time like she was in [place]. Darlene has been capped since the morning and not having any respiratory distress. Sabi says Darlene requires 24 hour care and unable to do any tasks on her own. She requires suctioning as well.     Medications:  Continuous Infusions:   dextrose 10 % in water (D10W)       Scheduled Meds:   atorvastatin  80 mg Per G Tube Daily    cholestyramine  2 packet Per G Tube BID    heparin (porcine)  5,000 Units Subcutaneous Q12H    insulin detemir U-100  8 Units Subcutaneous QHS    levetiracetam  750 mg Per G Tube BID    piperacillin-tazobactam (ZOSYN) IVPB  4.5 g Intravenous Q8H     PRN Meds:acetaminophen, dextrose 10 % in water (D10W), glucagon (human recombinant), glucose, glucose, insulin aspart U-100, naloxone, sodium chloride 0.9%     Review of patient's allergies indicates:   Allergen Reactions    Iodinated contrast media Hives     Objective:     Vital Signs (24h Range):  Temp:  [9.7 °F (-12.4 °C)-98.6 °F (37 °C)] 97.6 °F (36.4 °C)  Pulse:  [] 95  Resp:  [16-24] 24  SpO2:  [98 %-100 %] 98 %  BP: (117-148)/(56-77) 138/77     Date 06/27/22 0700 - 06/28/22 0659   Shift 5013-0335 1465-5155 6400-9623 24 Hour Total   INTAKE   NG/   605   Shift Total(mL/kg) 605(12.1)   605(12.1)   OUTPUT   Urine(mL/kg/hr) 480(1.2)   480   Shift Total(mL/kg) 480(9.6)   480(9.6)   Weight (kg) 50.2 50.2 50.2 50.2     Lines/Drains/Airways       Drain  Duration                  Gastrostomy/Enterostomy 06/18/22 0400 LUQ 9 days         Urethral Catheter 06/18/22 0600 Non-latex 16 Fr. 9 days              Airway  Duration                  Surgical Airway Shiley Uncuffed -- days              Peripheral Intravenous Line  Duration                  Peripheral IV - Single Lumen 06/20/22 1722 20 G;1 3/4  in Left;Anterior Forearm 7 days                  Physical Exam  Resting comfortably on bed  Unable to phonate, no eye contact  6-0 Shiley cuffless in good position, neck is soft and supple    Flexible Laryngoscopy     Nasal Cavity/Nasopharynx: Normal mucosa, inferior turbinates and septum. No evidence of septal deviation or perforation. There are no visible lesions. Elma within normal limits.  Oropharynx: Pharyngeal wall without edema, lesions, or masses. Bilateral palatine tonsils within normal limits. Base of tongue symmetric without masses or lesions. Lingual tonsil within normal limits.  Hypopharynx: No lesions or masses noted within the piriform sinuses or post cricoid area. No pooling of secretions.  Supraglottis: There is no edema of the arytenoids, interarytenoid space or epiglottis. Epiglottis is crisp. There are no masses or lesions noted. False vocal folds without masses or lesions.  Glottis: True vocal folds without masses or lesions. Normal mobility and airway patency.    Significant Labs:  BMP:   Recent Labs   Lab 06/27/22  0250      *   CO2 14*   BUN 11   CREATININE 0.6   CALCIUM 8.8   MG 1.8     CBC:   Recent Labs   Lab 06/27/22  0250   WBC 8.77   RBC 3.32*   HGB 9.0*   HCT 27.8*      MCV 84   MCH 27.1   MCHC 32.4     Significant Diagnostics:  I have reviewed all pertinent imaging results/findings within the past 24 hours.

## 2022-06-27 NOTE — PROGRESS NOTES
[facility] ([place] [address])  Otorhinolaryngology-Head & Neck Surgery  Progress Note    Subjective:     Post-Op Info:  Procedure(s) (LRB):  DEBRIDEMENT, WOUND, sacral ulcer (N/A)   3 Days Post-Op  Hospital Day: 11     Interval History: ENT re-consulted regarding tracheostomy tube decannulation. History obtained with sister, Sabi. Darlene is currently DNR. Sabi does not feel like Darlnee would have liked to be intubated for a long time like she was in [place]. Darlene has been capped since the morning and not having any respiratory distress. Sabi says Darlene requires 24 hour care and unable to do any tasks on her own. She requires suctioning as well.     Medications:  Continuous Infusions:   dextrose 10 % in water (D10W)       Scheduled Meds:   atorvastatin  80 mg Per G Tube Daily    cholestyramine  2 packet Per G Tube BID    heparin (porcine)  5,000 Units Subcutaneous Q12H    insulin detemir U-100  8 Units Subcutaneous QHS    levetiracetam  750 mg Per G Tube BID    piperacillin-tazobactam (ZOSYN) IVPB  4.5 g Intravenous Q8H     PRN Meds:acetaminophen, dextrose 10 % in water (D10W), glucagon (human recombinant), glucose, glucose, insulin aspart U-100, naloxone, sodium chloride 0.9%     Review of patient's allergies indicates:   Allergen Reactions    Iodinated contrast media Hives     Objective:     Vital Signs (24h Range):  Temp:  [9.7 °F (-12.4 °C)-98.6 °F (37 °C)] 97.6 °F (36.4 °C)  Pulse:  [] 95  Resp:  [16-24] 24  SpO2:  [98 %-100 %] 98 %  BP: (117-148)/(56-77) 138/77     Date 06/27/22 0700 - 06/28/22 0659   Shift 9597-1782 6152-8558 0087-3057 24 Hour Total   INTAKE   NG/   605   Shift Total(mL/kg) 605(12.1)   605(12.1)   OUTPUT   Urine(mL/kg/hr) 480(1.2)   480   Shift Total(mL/kg) 480(9.6)   480(9.6)   Weight (kg) 50.2 50.2 50.2 50.2     Lines/Drains/Airways       Drain  Duration                  Gastrostomy/Enterostomy 06/18/22 0400 LUQ 9 days         Urethral Catheter 06/18/22  0600 Non-latex 16 Fr. 9 days              Airway  Duration                  Surgical Airway Shiley Uncuffed -- days              Peripheral Intravenous Line  Duration                  Peripheral IV - Single Lumen 06/20/22 1722 20 G;1 3/4 in Left;Anterior Forearm 7 days                  Physical Exam  Resting comfortably on bed  Unable to phonate, no eye contact  6-0 Shiley cuffless in good position, neck is soft and supple    Flexible Laryngoscopy     Nasal Cavity/Nasopharynx: Normal mucosa, inferior turbinates and septum. No evidence of septal deviation or perforation. There are no visible lesions. Elma within normal limits.  Oropharynx: Pharyngeal wall without edema, lesions, or masses. Bilateral palatine tonsils within normal limits. Base of tongue symmetric without masses or lesions. Lingual tonsil within normal limits.  Hypopharynx: No lesions or masses noted within the piriform sinuses or post cricoid area. No pooling of secretions.  Supraglottis: There is no edema of the arytenoids, interarytenoid space or epiglottis. Epiglottis is crisp. There are no masses or lesions noted. False vocal folds without masses or lesions.  Glottis: True vocal folds without masses or lesions. Normal mobility and airway patency.    Significant Labs:  BMP:   Recent Labs   Lab 06/27/22  0250      *   CO2 14*   BUN 11   CREATININE 0.6   CALCIUM 8.8   MG 1.8     CBC:   Recent Labs   Lab 06/27/22  0250   WBC 8.77   RBC 3.32*   HGB 9.0*   HCT 27.8*      MCV 84   MCH 27.1   MCHC 32.4     Significant Diagnostics:  I have reviewed all pertinent imaging results/findings within the past 24 hours.    Assessment/Plan:     Tracheostomy in place  72 y/o F with tracheostomy dependence due to anoxic brain injury. Trach placed on 5/10/22 at OSH. Successfully exchanged to 6-0 Shiley cuffless on 6/19. ENT re-consulted regarding tracheostomy decannulation. Discussed at length with sister that because of Darlene's condition, she is  at higher risk for aspiration and pneumonia. She is tolerating capping trials now but there is no guarantee that she will have decompensated respiratory failure in the future from silent aspirations. Given Darlene's status as DNR, there is a possibility that the patient may  from airway compromise in the future. I discussed today with Sabi that options include proceeding with capping trials and decannulation, knowing very well that if Darlene has respiratory compromise she would likely pass away unless her DNR status was changed. The other option would be to maintain tracheostomy tube for easier pulmonary toiletry and airway protection and prolong life, hopefully transitioning to a SNF or other long term facilities.    -- Recommend continued goals of care discussion with Sabi and other involved family members  -- ENT's official recommendation is to maintain tracheostomy tube for airway protection, but if family understands risk of decannulation and willing to proceed knowing her DNR status and risk of death, will defer to primary team to proceed with decannulation   -- ENT's decannulation protocol is as follows:   -- Tolerate  for 12 hours during the day   -- Tolerate  for 24 hours   -- Okay to decannulate, cover tracheal stoma with xeroform, 4x4, tegaderm, or other forms of occlusive dressing  -- Patient discussed with staff, Dr. Wolf Lu  -- Please Secure Chat me for any questions, page ENT on-call if unresponsive or urgent          Emmett Guidry MD  Otorhinolaryngology-Head & Neck Surgery  [facility] ([place])

## 2022-06-27 NOTE — CONSULTS
[facility] ([place] [address])  Infectious Disease  Consult Note    Patient Name: Darlene Avila  MRN: [id number]  Admission Date: 6/17/2022  Hospital Length of Stay: 7 days  Attending Physician: Stacey Ugalde MD  Primary Care Provider: Kirill Cabrera Iii, MD     Isolation Status: No active isolations    Patient information was obtained from patient and ER records.      Consults  Assessment/Plan:     Sacral decubitus ulcer, stage IV  70 yo debilited female with stage IV sacral ulcer s/p debridement x 2 - 1st with cultres showin Enterococcus/bacteroides and C albicans.  On zosyn.  Repeat debridement recently with no cultures.  Zosyn restarted.  Stable non septic.    Plan:  · Continue zosyn  · Plan for 6 weeks  · Wound care  · Offloading   · Ntrn consult  · discussed with ID staff  · Needs GOC discussion if not yet done  · Will follow.        Thank you for your consult. I will follow-up with patient. Please contact us if you have any additional questions.    JEREMIAH Chandra  Infectious Disease  [facility] ([place] [address])    Subjective:     Principal Problem: Hypernatremia    HPI: Taken from chart as patient unable to provide:  Darlene Avila is a 71 year old F with history of diabetes type 2, seizure c/b anoxic brain injury s/p trach, PEG, bed bound status, Stage IV sacral ulcer, ischemic CVA, IBS, history of breast cancer s/p right mastectomy and failure to thrive who was brought in by her HPOA (sister) for nursing home placement. Patient is unable to provide history.      Patient's sister was contacted who stated that the patient was admitted to [facility] in [facility] for recurrent seizures/status epilepticus which resulted in an anoxic brain injury and respiratory failure.  Patient was intubated followed by tracheostomy and PEG placement .  Patient was discharged to nursing home and subsequently discharged to hospice care at the request of patient's sister. However a few days ago, she  visited the patient at [facility] recovery facility and noticed they weren't feeding or taking care of the patient due to her 'hospice status'. Per sister today, she would like to place the patient in a skilled nursing facility and would like to rescind her hospice care at this time.  She is no longer interested in hospice placement. She states the patient at baseline is only able to follow some simple commands. She is unsure how much O2 level is her baseline via trach collar.   Of note she has a Stage IV sacral wound that is s/p debridement on 06/09 by Gen surgery in [facility]. Cultures from that grew Enterococcus Faecalis/Avium/bacteroides and C albicans    [place] [facility]  Family elected for her to undergo redebridement of sacral ulcer.  Per Gen Sx note prior to debribement, wound probed to bone.  Patient was on Unasyn at OSH, suspected to be stopped briefly while on hospice but restarted recently.,  ID consulted for abx recs.  Paiten seen and denies fever, chills or sweats with limited questioning.      Past Medical History:   Diagnosis Date    Arthritis     Cancer of breast     s/p mastectomy (on anastrozole)     Cataract     Diabetes mellitus     c/b Diabetic retinopathy    Hypertension     resolved    Hypoxia 4/15/2021    Memory loss     due to strokes    Orthostatic hypotension 12/27/2020    frequent falls, on midodrine.  4/2021 seen by both cards and palliative care    TIA (transient ischemic attack) [id number]    [facility] with remote infarcts    Vitamin D deficiency 10/14/2021       Past Surgical History:   Procedure Laterality Date    CAPSULOTOMY  6/26/13    yag left eye    CATARACT EXTRACTION  6/3/2013    right eye    CHOLECYSTECTOMY      HYSTERECTOMY      MASTECTOMY Right 9/28/2020    Procedure: MASTECTOMY-Right;  Surgeon: Krysta Clark MD;  Location: [facility];  Service: General;  Laterality: Right;    SENTINEL LYMPH NODE BIOPSY Right 9/28/2020    Procedure: BIOPSY, LYMPH  NODE, SENTINEL-Right;  Surgeon: Krysta Clark MD;  Location[facility];  Service: General;  Laterality: Right;    TOTAL ABDOMINAL HYSTERECTOMY W/ BILATERAL SALPINGOOPHORECTOMY         Review of patient's allergies indicates:   Allergen Reactions    Iodinated contrast media Hives       Medications:  Medications Prior to Admission   Medication Sig    bisacodyL (DULCOLAX) 10 mg Supp Place 10 mg rectally 2 (two) times daily as needed (CONSTIPATION).    hyoscyamine (LEVSIN/SL) 0.125 mg Subl Place 0.125 mg under the tongue every 2 (two) hours as needed (EXCESS SECRETIONS).    lorazepam (ATIVAN) 2 mg/mL Conc TAKE 0.25-1ML BY MOUTH EVERY 2 HOURS AS NEEDED FOR RESTLESSNESS OR AGITATION    morphine 100 mg/5 mL (20 mg/mL) concentrated solution TAKE 0.25-1 ML BY MOUTH EVERY 2 HOURS AS NEEDED FOR PAIN OR SHORTNESS OF BREATH     Antibiotics (From admission, onward)                Start     Stop Route Frequency Ordered    06/20/22 0930  piperacillin-tazobactam 4.5 g in sodium chloride 0.9% 100 mL IVPB (ready to mix system)         -- IV Every 8 hours (non-standard times) 06/20/22 0827          Antifungals (From admission, onward)                None          Antivirals (From admission, onward)      None             Immunization History   Administered Date(s) Administered    PPD Test 04/17/2021, 10/25/2021    Tdap 12/19/2017       Family History       Problem Relation (Age of Onset)    Breast cancer Sister (65)    Cancer Mother, Sister    Cataracts Mother    Colon cancer Mother (82)    Diabetes Mother, Father, Sister, Maternal Uncle, Paternal Uncle, Maternal Grandmother, Maternal Grandfather    Glaucoma Mother    Hypertension Sister, Maternal Grandmother          Social History     Socioeconomic History    Marital status: Single   Tobacco Use    Smoking status: Current Every Day Smoker     Packs/day: 1.50     Types: Cigarettes    Smokeless tobacco: Never Used   Substance and Sexual Activity    Alcohol use: No    Drug  use: No    Sexual activity: Not Currently     Social Determinants of Health     Financial Resource Strain: Low Risk     Difficulty of Paying Living Expenses: Not very hard   Food Insecurity: No Food Insecurity    Worried About Running Out of Food in the Last Year: Never true    Ran Out of Food in the Last Year: Never true   Transportation Needs: No Transportation Needs    Lack of Transportation (Medical): No    Lack of Transportation (Non-Medical): No   Physical Activity: Inactive    Days of Exercise per Week: 0 days    Minutes of Exercise per Session: 0 min   Stress: No Stress Concern Present    Feeling of Stress : Only a little   Social Connections: Unknown    Frequency of Communication with Friends and Family: More than three times a week    Frequency of Social Gatherings with Friends and Family: More than three times a week    Attends Voodoo Services: Never    Active Member of Clubs or Organizations: No    Attends Club or Organization Meetings: Never   Housing Stability: Unknown    Unable to Pay for Housing in the Last Year: No    Unstable Housing in the Last Year: No     Review of Systems   Unable to perform ROS: Other   Objective:     Vital Signs (Most Recent):  Temp: 98 °F (36.7 °C) (06/26/22 1931)  Pulse: 98 (06/26/22 2000)  Resp: 16 (06/26/22 1931)  BP: (!) 117/56 (06/26/22 1931)  SpO2: 100 % (06/26/22 2000)   Vital Signs (24h Range):  Temp:  [97.1 °F (36.2 °C)-98 °F (36.7 °C)] 98 °F (36.7 °C)  Pulse:  [87-99] 98  Resp:  [16-18] 16  SpO2:  [99 %-100 %] 100 %  BP: (117-134)/(56-63) 117/56     Weight: 50.2 kg (110 lb 10.7 oz)  Body mass index is 22.35 kg/m².    Estimated Creatinine Clearance: 65.3 mL/min (based on SCr of 0.6 mg/dL).    Physical Exam  Constitutional:       General: She is not in acute distress.     Appearance: She is well-developed. She is not diaphoretic.      Comments: thin   HENT:      Head: Normocephalic and atraumatic.   Cardiovascular:      Rate and Rhythm: Normal rate  and regular rhythm.      Heart sounds: Normal heart sounds. No murmur heard.    No friction rub. No gallop.   Pulmonary:      Effort: Pulmonary effort is normal. No respiratory distress.      Breath sounds: Normal breath sounds. No wheezing or rales.      Comments: Trache with transmitted sounds  Abdominal:      General: Bowel sounds are normal. There is no distension.      Palpations: Abdomen is soft. There is no mass.      Tenderness: There is no abdominal tenderness. There is no guarding or rebound.   Skin:     General: Skin is warm and dry.   Neurological:      Mental Status: She is alert.   Psychiatric:         Behavior: Behavior normal.       Significant Labs: Blood Culture:   Recent Labs   Lab 06/18/22  1106 06/20/22  1530   LABBLOO No growth after 5 days. No growth after 5 days.  No growth after 5 days.     CBC:   Recent Labs   Lab 06/25/22  0645 06/26/22  0426   WBC 9.62 9.27   HGB 7.5* 7.9*   HCT 24.2* 25.5*   * 480*     CMP:   Recent Labs   Lab 06/25/22  0643 06/25/22  0645 06/25/22  1128 06/26/22  0426    143 142 143   K 4.1  --   --  3.7   *  --   --  121*   CO2 17*  --   --  15*   *  --   --  50*   BUN 18  --   --  13   CREATININE 0.6  --   --  0.6   CALCIUM 8.6*  --   --  8.6*   PROT 5.4*  --   --  5.9*   ALBUMIN 1.5*  --   --  1.4*   BILITOT 0.1  --   --  0.1   ALKPHOS 98  --   --  92   AST 10  --   --  14   ALT 10  --   --  13   ANIONGAP 7*  --   --  7*   EGFRNONAA >60.0  --   --  >60.0     Wound Culture: No results for input(s): LABAERO in the last 4320 hours.  All pertinent labs within the past 24 hours have been reviewed.    Significant Imaging: I have reviewed all pertinent imaging results/findings within the past 24 hours.Radiology Results (last 7 days)    ** No results found for the last 168 hours. **       Imaging History    2022  Date Procedure Name Study Review link PACS Link Status Accession Number Location   06/18/22 07:25 AM X-Ray Chest 1 View  Study Review   Images Final [id number] [facility]

## 2022-06-27 NOTE — PLAN OF CARE
Problem: Adult Inpatient Plan of Care  Goal: Patient-Specific Goal (Individualized)  Outcome: Ongoing, Progressing     Problem: Adult Inpatient Plan of Care  Goal: Absence of Hospital-Acquired Illness or Injury  Outcome: Ongoing, Progressing

## 2022-06-27 NOTE — SUBJECTIVE & OBJECTIVE
Interval History: NAEON. Afebrile. No leukocytosis. Resting comfortably and in no acute distress.    Review of Systems   Unable to perform ROS: Other   Objective:     Vital Signs (Most Recent):  Temp: 97.9 °F (36.6 °C) (06/27/22 1140)  Pulse: 100 (06/27/22 1140)  Resp: 16 (06/27/22 1140)  BP: (!) 148/67 (06/27/22 1140)  SpO2: 100 % (06/27/22 1140)   Vital Signs (24h Range):  Temp:  [9.7 °F (-12.4 °C)-98.6 °F (37 °C)] 97.9 °F (36.6 °C)  Pulse:  [] 100  Resp:  [16-18] 16  SpO2:  [98 %-100 %] 100 %  BP: (117-148)/(56-69) 148/67     Weight: 50.2 kg (110 lb 10.7 oz)  Body mass index is 22.35 kg/m².    Estimated Creatinine Clearance: 65.3 mL/min (based on SCr of 0.6 mg/dL).    Physical Exam  Vitals reviewed.   Constitutional:       General: She is not in acute distress.     Appearance: She is well-developed. She is not diaphoretic.      Comments: thin   HENT:      Head: Normocephalic and atraumatic.      Mouth/Throat:      Mouth: Mucous membranes are dry.   Cardiovascular:      Rate and Rhythm: Normal rate and regular rhythm.      Heart sounds: Normal heart sounds.   Pulmonary:      Effort: Pulmonary effort is normal. No respiratory distress.      Breath sounds: No wheezing.      Comments: Trache with transmitted sounds  Abdominal:      General: There is no distension.      Palpations: Abdomen is soft.      Tenderness: There is no abdominal tenderness. There is no guarding.   Skin:     General: Skin is warm and dry.      Comments: Sacral wound not examined   Neurological:      Mental Status: She is alert.   Psychiatric:         Mood and Affect: Mood normal.         Behavior: Behavior is slowed.       Significant Labs: CBC:   Recent Labs   Lab 06/26/22  0426 06/27/22  0250   WBC 9.27 8.77   HGB 7.9* 9.0*   HCT 25.5* 27.8*   * 371     CMP:   Recent Labs   Lab 06/26/22  0426 06/27/22  0250    140   K 3.7 5.2*   * 119*   CO2 15* 14*   GLU 50* 103   BUN 13 11   CREATININE 0.6 0.6   CALCIUM 8.6* 8.8    PROT 5.9* 6.3   ALBUMIN 1.4* 1.5*   BILITOT 0.1 0.1   ALKPHOS 92 93   AST 14 16   ALT 13 14   ANIONGAP 7* 7*   EGFRNONAA >60.0 >60.0     Microbiology Results (last 7 days)       Procedure Component Value Units Date/Time    Blood culture [[id number]] Collected: 06/20/22 1530    Order Status: Completed Specimen: Blood from Peripheral, Left Hand Updated: 06/25/22 1812     Blood Culture, Routine No growth after 5 days.    Blood culture [[id number]] Collected: 06/20/22 1530    Order Status: Completed Specimen: Blood from Peripheral, Left Wrist Updated: 06/25/22 1812     Blood Culture, Routine No growth after 5 days.    Aerobic culture [[id number]]     Order Status: No result Specimen: Decubitus     Gram stain [[id number]]     Order Status: No result Specimen: Decubitus     Culture, Anaerobe [[id number]]     Order Status: No result Specimen: Decubitus     Blood culture [[id number]] Collected: 06/18/22 1106    Order Status: Completed Specimen: Blood Updated: 06/23/22 1212     Blood Culture, Routine No growth after 5 days.    Narrative:      Collection has been rescheduled by PM at 06/18/2022 05:40 Reason:   Unable to collect nurse sila notify  Collection has been rescheduled by [facility] at 06/18/2022 05:40 Reason:   Unable to collect nurse sila notify          Recent Lab Results         06/27/22  1140   06/27/22  0741   06/27/22  0250   06/26/22  1929        Albumin     1.5         Alkaline Phosphatase     93         ALT     14         Anion Gap     7         Aniso     Slight         AST     16         Baso #     0.05         Basophil %     0.6         BILIRUBIN TOTAL     0.1  Comment: For infants and newborns, interpretation of results should be based  on gestational age, weight and in agreement with clinical  observations.    Premature Infant recommended reference ranges:  Up to 24 hours.............<8.0 mg/dL  Up to 48 hours............<12.0 mg/dL  3-5 days..................<15.0 mg/dL  6-29 days.................<15.0  mg/dL           BUN     11         Calcium     8.8         Chloride     119         CO2     14         Creatinine     0.6         Differential Method     Automated         eGFR if      >60.0         eGFR if non      >60.0  Comment: Calculation used to obtain the estimated glomerular filtration  rate (eGFR) is the CKD-EPI equation.            Eos #     0.1         Eosinophil %     1.5         Glucose     103         Gran # (ANC)     5.2         Gran %     59.4         Hematocrit     27.8         Hemoglobin     9.0         Immature Grans (Abs)     0.27  Comment: Mild elevation in immature granulocytes is non specific and   can be seen in a variety of conditions including stress response,   acute inflammation, trauma and pregnancy. Correlation with other   laboratory and clinical findings is essential.           Immature Granulocytes     3.1         Lymph #     2.4         Lymph %     27.6         Magnesium     1.8         MCH     27.1         MCHC     32.4         MCV     84         Mono #     0.7         Mono %     7.8         MPV     9.9         nRBC     1         Phosphorus     2.5         Platelet Estimate     Appears normal         Platelets     371         POCT Glucose 154   122     106       Poikilocytosis     Slight         Potassium     5.2         PROTEIN TOTAL     6.3         RBC     3.32         RDW     18.2         Sodium     140         WBC     8.77                 Significant Imaging:

## 2022-06-27 NOTE — RESPIRATORY THERAPY
RAPID RESPONSE RESPIRATORY THERAPY PROACTIVE NOTE           Time of visit: 830     Code Status: DNR   : 1950  Bed: 92737/99430 A:   MRN: [id number]  Time spent at the bedside: < 15 min    SITUATION    Evaluated patient for: LDA Check     BACKGROUND    Patient has a past medical history of Arthritis, Cancer of breast, Cataract, Diabetes mellitus, Hypertension, Hypoxia, Memory loss, Orthostatic hypotension, TIA (transient ischemic attack), and Vitamin D deficiency.  Clinically Significant Surgical Hx: tracheostomy    24 Hours Vitals Range:  Temp:  [9.7 °F (-12.4 °C)-98.6 °F (37 °C)]   Pulse:  []   Resp:  [16-18]   BP: (117-148)/(56-77)   SpO2:  [98 %-100 %]     Labs:    Recent Labs     22  0643 22  0645 22  1128 22  0426 22  0250      < > 142 143 140   K 4.1  --   --  3.7 5.2*   *  --   --  121* 119*   CO2 17*  --   --  15* 14*   CREATININE 0.6  --   --  0.6 0.6   *  --   --  50* 103   PHOS 3.5  --   --  2.2* 2.5*   MG 1.6  --   --  1.8 1.8    < > = values in this interval not displayed.        No results for input(s): PH, PCO2, PO2, HCO3, POCSATURATED, BE in the last 72 hours.    ASSESSMENT/INTERVENTIONS    Upon arrival in room all supplies are at the bedside  Extra trachs: 4.0 and 6.0, both cuffed.     Last VS   Temp: 97.6 °F (36.4 °C) (1614)  Pulse: 94 (1614)  Resp: 18 (1614)  BP: 138/77 (1614)  SpO2: 98 % (1614)    Level of Consciousness: Level of Consciousness (AVPU): alert  Respiratory Effort: Respiratory Effort: Normal, Unlabored Expansion/Accessory Muscle Usage: Expansion/Accessory Muscles/Retractions: no retractions, expansion symmetric  All Lung Field Breath Sounds: All Lung Fields Breath Sounds: coarse  O2 Device/Concentration: Flow (L/min): 10, Oxygen Concentration (%): 21, O2 Device (Oxygen Therapy): room air  Surgical airway: Yes, Type: Shiley Size: 6, uncuffed  Ambu at bedside: Ambu bag with the patient?: Yes,  Adult Ambu     Active Orders   Respiratory Care    Oxygen Continuous     Frequency: Continuous     Number of Occurrences: Until Specified     Order Questions:      Device type: Low flow      Device: Trach Collar      FiO2%: 28      Titrate O2 per Oxygen Titration Protocol: Yes      To maintain SpO2 goal of: >= 90%      Notify MD of: Inability to achieve desired SpO2; Sudden change in patient status and requires 20% increase in FiO2; Patient requires >60% FiO2    POCT ARTERIAL BLOOD GAS Blood Gas     Frequency: Once     Number of Occurrences: 1 Occurrences     Order Comments: Notify Physician if: see parameters below.       Order Questions:      Component: Blood Gas    Routine tracheostomy care     Frequency: BID     Number of Occurrences: Until Specified    SUCTION PRN     Frequency: PRN     Number of Occurrences: Until Specified       RECOMMENDATIONS    We recommend: RRT Recs: Continue POC per primary team.    ESCALATION    .    FOLLOW-UP    Please call back the Rapid Response RT, Simona Guaman, AMRITA at x [phone] for any questions or concerns.

## 2022-06-27 NOTE — CARE UPDATE
Palliative medicine initially consulted for goals of care and advanced care planning after family revoked hospice.   Despite lengthy conversations regarding hospice vs nursing home placement. Palliative medicne has recommended assisted nursing home with hospice care. Patient's sister and HPSALONI Celestin, is not amenable to continuing with hospice services.   She has expressed interest in nursing home placement and or transition to skilled nursing facility.   Primary team  continues to work diligently to meet patient and family needs.   Referrals have been sent.      Pal medicine will sign off.  Please re-consult as needed

## 2022-06-27 NOTE — ASSESSMENT & PLAN NOTE
71 year old debilited female with history of anoxic brain injury s/p trach/peg, stage IV sacral ulcer s/p debridement on 6/8 at OSH who presented to [facility] after her power of  revoked her hospice status to seek better care. See HPI for details. She was found to have infected sacral ulceration that tracks down to bone with purulence. Patient was taken to the OR on 6/24 for excisional debridement. No surgical cultures sent.  She is currently on zosyn. Afebrile. Stable non septic.    Recommendations  · Continue IV Zosyn. Would plan for at least two weeks of IV Zosyn for skin/soft tissue infection from day of last debridement. Could consider 6 week course of antibiotics for presumed osteomyelitis if consistent with goals of care. However, even with a 6 week course of antibiotics, she will still likely have exposed bone with large skin defect that will likely never close without muscle flap closure. As long as she has exposed bone, she will be at risk of reinfection, alejo since wound is located in area with fecal contamination. Would focus on nutrition, pressure offloading, aggressive wound care, and antibiotics for now. The process to healing this is long and can be difficult. I would suggest having an honest goals of care discussion with the family.      · If patient and family would like to pursue IV antibiotic treatment, see outpatient antibiotic recommendations below.     · Discussed antibiotic plan with ID staff. ID will sign off.          Outpatient Antibiotic Therapy Plan:    Please send referral to [facility].    1) Infection: infected sacral decubitus ulcer    2) Discharge Antibiotics:     Intravenous antibiotics:   Zosyn 4.5 g IV q 8 hours    3) Therapy Duration:  2-6 weeks pending [facility] discussions    Estimated end date of IV antibiotics: 7/8/22 - 8/5/22    4) Outpatient Weekly Labs:    Order the following labs to be drawn on Mondays:    CBC   CMP    ESR    CRP    5)  Fax Lab Results to Infectious Diseases Provider: Analisa Allan    [facility] Fax Number: [phone]    6) Outpatient Infectious Diseases Follow-up     Follow-up appointment will be arranged by the ID clinic and will be found in the patient's appointments tab. Prior to discharge, please ensure the patient's follow-up has been scheduled.       If there is still no follow-up scheduled prior to discharge, please send an EPIC message to Ragini Beal in Infectious Diseases.

## 2022-06-27 NOTE — ASSESSMENT & PLAN NOTE
70 yo debilited female with stage IV sacral ulcer s/p debridement x 2 - 1st with cultres showin Enterococcus/bacteroides and C albicans.  On zosyn.  Repeat debridement recently with no cultures.  Zosyn restarted.  Stable non septic.    Plan:  · Continue zosyn  · Plan for 6 weeks  · Wound care  · Offloading   · Ntrn consult  · discussed with ID staff  · Needs GOC discussion if not yet done  · Will follow.

## 2022-06-27 NOTE — PT/OT/SLP PROGRESS
Speech Language Pathology Treatment    Patient Name:  Darlene Avila   MRN:  [id number]   [id number] A    Admitting Diagnosis: Hypernatremia    Recommendations:                 General Recommendations:  Dysphagia therapy  Diet recommendations:  NPO, Liquid Diet Level: NPO   Aspiration Precautions: Alternate means of nutrition/hydration, Frequent oral care and Strict aspiration precautions   General Precautions: Standard, aspiration, fall, NPO, respiratory  Communication strategies:  none (capped trach)    Subjective     Patient lethargic. No vocalizations.   RN present in room at end of ST session.     Pain/Comfort:  · Pain Rating 1: 0/10    Respiratory Status: capped trach    Objective:     Has the patient been evaluated by SLP for swallowing?   Yes  Keep patient NPO? Yes     Patient seen for an ongoing swallowing assessment. She presents with a capped trach this date (previously tolerating PMSV under supervision). Patient with poor MELVINA despite lights turned on, sternal rub, and auditory stimulation. She did accept and orally manipulate an ice chip x1 when presented, however, did not accept any further trials despite max cues. Increased wet breathing sounds appreciated following single ice chip. Anterior loss of small sip of water via teaspoon. No further trials given 2/2 high aspiration risk. No family present to provide education.    Assessment:     Dralene Avila is a 71 y.o. female with an SLP diagnosis of Dysphagia.  She presents with poor MELVINA. ST will continue to follow.     Goals:   Multidisciplinary Problems     SLP Goals        Problem: SLP    Goal Priority Disciplines Outcome   SLP Goal     SLP Ongoing, Progressing   Description: Speech Therapy Short Term Goals  Goal expected to be met by 7/10  1. Pt will participate in an ongoing assessment to determine the least restrictive and safest diet with possible updated goals to follow pending results.  2. Pt will tolerate PMSV during all waking hours with no s/s  of respiratory distress.                    Plan:     · Patient to be seen:  3 x/week   · Plan of Care expires:     · Plan of Care reviewed with:  patient   · SLP Follow-Up:  Yes       Discharge recommendations:  nursing facility, basic       Time Tracking:     SLP Treatment Date:   06/27/22  Speech Start Time:  0747  Speech Stop Time:  0756     Speech Total Time (min):  9 min    Billable Minutes: Treatment Swallowing Dysfunction 9    06/27/2022

## 2022-06-27 NOTE — HPI
Taken from chart as patient unable to provide:  Darlene Avila is a 71 year old F with history of diabetes type 2, seizure c/b anoxic brain injury s/p trach, PEG, bed bound status, Stage IV sacral ulcer, ischemic CVA, IBS, history of breast cancer s/p right mastectomy and failure to thrive who was brought in by her HPOA (sister) for nursing home placement. Patient is unable to provide history.      Patient's sister was contacted who stated that the patient was admitted to BS in [facility] for recurrent seizures/status epilepticus which resulted in an anoxic brain injury and respiratory failure.  Patient was intubated followed by tracheostomy and PEG placement .  Patient was discharged to nursing home and subsequently discharged to hospice care at the request of patient's sister. However a few days ago, she visited the patient at [facility] recovery facility and noticed they weren't feeding or taking care of the patient due to her 'hospice status'. Per sister today, she would like to place the patient in a skilled nursing facility and would like to rescind her hospice care at this time.  She is no longer interested in hospice placement. She states the patient at baseline is only able to follow some simple commands. She is unsure how much O2 level is her baseline via trach collar.   Of note she has a Stage IV sacral wound that is s/p debridement on 06/09 by Gen surgery in [facility]. Cultures from that grew Enterococcus Faecalis/Avium/bacteroides and C albicans    Patient admitted at [facility].  Family elected for her to undergo redebridement of sacral ulcer.  Per Gen Sx note prior to debribement, wound probed to bone.  Patient was on Unasyn at OSH, suspected to be stopped briefly while on hospice but restarted recently.,  ID consulted for abx recs.  Anabella seen and denies fever, chills or sweats with limited questioning.

## 2022-06-27 NOTE — ASSESSMENT & PLAN NOTE
S/p recent debridement at OSH 06/08. OSH Wcx with enterococcus faecalis, bacteriodes fragilus.    - Wound care consulted; appreciate assistance  - Deep wound cx  - General surgery consulted s/p debridement 06/24, no note of bone involvement per OP note, no cultures sent  - Infectious Disease consulted, zosyn for 6-weeks  - Consideration for wound vac given depth of injury  - Turn q2h

## 2022-06-27 NOTE — PROGRESS NOTES
[facility] - Intensive Care ([place] [address])  [facility] Medicine  Progress Note    Patient Name: Darlene Avila  MRN: [id number]  Patient Class: IP- Inpatient   Admission Date: 6/17/2022  Length of Stay: 8 days  Attending Physician: Stacey Ugalde MD  Primary Care Provider: Kirill Cabrera Iii, MD        Subjective:     Principal Problem:Hypernatremia        HPI:  Darlene Avila is a 71 year old F with history of diabetes type 2, seizure c/b anoxic brain injury s/p trach, PEG, bed bound status, Stage IV sacral ulcer, ischemic CVA, IBS, history of breast cancer s/p right mastectomy and failure to thrive who was brought in by her HPOA (sister) for nursing home placement. Patient is unable to provide history.     Patient's sister was contacted who stated that the patient was admitted to [facility] in [facility] for recurrent seizures/status epilepticus which resulted in an anoxic brain injury and respiratory failure.  Patient was intubated followed by tracheostomy and PEG placement .  Patient was discharged to nursing home and subsequently discharged to hospice care at the request of patient's sister. However a few days ago, she visited the patient at [facility] recovery facility and noticed they weren't feeding or taking care of the patient due to her 'hospice status'. Per sister today, she would like to place the patient in a skilled nursing facility and would like to rescind her hospice care at this time.  She is no longer interested in hospice placement. She states the patient at baseline is only able to follow some simple commands. She is unsure how much O2 level is her baseline via trach collar.   Of note she has a Stage IV sacral wound that is s/p debridement on 06/09 by Gen surgery in [facility].     Upon arrival to the ED, the patient was hemodynamically stable. Labs revealed Na 157, WBC 14. Hgb 9.4 (baseline 7-9). The patient was given 1L of IV NS and was admitted to hospital medicine for further management.        Overview/Hospital Course:  Patient admitted to hospital medicine with pre-existing anoxic brain injury, hypernatremia, sacral decubitus wound s/p debridement 06/08 in [place], [place]. Patient initially admitted to hospice following discharge from [facility] ICU, but discharged from hospice as patient family believed she was not getting appropriate care. Na 157, corrected with IVF (NS and hypotonic solutions) and enteral FWF down to 148. Trach collar exchanged by ENT due to lack of supplies at facility. Wound care consulted for sacral decubitus, recommended General Surgery consult for possible debridement as wound appears purulent. Persistent leukocytosis, started Zosyn per previous I&D wound cultures growing bacteriodes/enterococcus and new wound findings. cEEG started and Epilepsy consulted for assistance with antiepileptic medications; conflicting reports if patient was on only Keppra/Lacosamide vs Keppra/Lacosamide/Phenytoin. General surgery consulted for debridement of sacral decubitus ulcer, s/p I&D 06/24. Palliative Care consulted for assistance in GOC; decision to pursue medical/surgical care decided. ENT consulted for possible trach decannulation. ID consulted, planning for 6-weeks course of zosyn.     CM/SW to assist with dispo, family desire for snf nursing care.      Interval History: NAEON. ENT consulted for possible decannulation. Trach was capped and her oxygen is in the high 90's on room air. K slightly elevated will continue to monitor.     Review of Systems   Unable to perform ROS: Patient nonverbal   Respiratory:  Negative for chest tightness and shortness of breath.    Cardiovascular:  Negative for chest pain.   Gastrointestinal:  Negative for abdominal pain.   Objective:     Vital Signs (Most Recent):  Temp: 97.9 °F (36.6 °C) (06/27/22 1140)  Pulse: 100 (06/27/22 1140)  Resp: 16 (06/27/22 1140)  BP: (!) 148/67 (06/27/22 1140)  SpO2: 100 % (06/27/22 1140)   Vital Signs (24h Range):  Temp:   [9.7 °F (-12.4 °C)-98.6 °F (37 °C)] 97.9 °F (36.6 °C)  Pulse:  [] 100  Resp:  [16-18] 16  SpO2:  [98 %-100 %] 100 %  BP: (117-148)/(56-69) 148/67     Weight: 50.2 kg (110 lb 10.7 oz)  Body mass index is 22.35 kg/m².    Intake/Output Summary (Last 24 hours) at 6/27/2022 1321  Last data filed at 6/27/2022 0600  Gross per 24 hour   Intake --   Output 3200 ml   Net -3200 ml      Physical Exam  Vitals and nursing note reviewed.   Constitutional:       General: She is not in acute distress.     Appearance: She is not ill-appearing, toxic-appearing or diaphoretic.   HENT:      Head: Normocephalic.      Mouth/Throat:      Mouth: Mucous membranes are dry.      Comments: Dried oral secretions adhered to tongue  Cardiovascular:      Rate and Rhythm: Normal rate and regular rhythm.      Pulses: Normal pulses.      Heart sounds: Normal heart sounds. No murmur heard.  Pulmonary:      Effort: Pulmonary effort is normal. No respiratory distress.      Breath sounds: Normal breath sounds. No wheezing or rales.   Abdominal:      General: Abdomen is flat. Bowel sounds are normal. There is no distension.      Palpations: Abdomen is soft.      Tenderness: There is no abdominal tenderness.   Musculoskeletal:         General: No swelling or tenderness. Normal range of motion.      Cervical back: Normal range of motion.   Skin:     General: Skin is warm and dry.      Coloration: Skin is not jaundiced or pale.      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    Neurological:      Mental Status: She is alert.      Comments: Alert and able to follow commands    Psychiatric:      Comments: Unable to assess       Significant Labs: All pertinent labs within the past 24 hours have been reviewed.    Significant Imaging: I have reviewed all pertinent imaging results/findings within the past 24 hours.      Assessment/Plan:      * Hypernatremia  STABLE, WNL    Na 157. Suspect from dehydration while at her recovery  facility.   - FWD 2.7 on admission.   S/p 1L of NS in the ED.     - Continue 500cc QID free water boluses  - Na q6h    Palliative care encounter  Per Primary Medicine team and Palliative Care encounters with patient family (separate encounters, see dated notes in chart), patient family wishes full medical care and does not desire comfort/hospice measures. DNR remains. See Pall Care note for further detail.    Seizures  Patient previously started on Lacosamide and Keppra at OSH during initial episode of status epilepticus. Phenytoin added as patient continued to have breakthrough seizures.    - Keppra monotherapy 750 bid per neuro recs  - No seizures seen on EEG, monitoring has stopped   - Neurology consulted for further assistance in antiepileptic regimen    G tube feedings  Tube feed dependent via G tube  - Nutrition to assist with TF recs; see recommendations in note; appreciate assistance      Tracheostomy in place  Dysarthria  Dysphagia    Trach capped, good O2 sats on RA     - Respiratory therapy to assist with trach care.   - SLP for swallow evaluation and speech therapy; recommending NPO, re-evaluation, PMSV under direct supervision  - F/U ENT for criteria for trach removal    Sacral decubitus ulcer, stage IV  S/p recent debridement at OSH 06/08. OSH Wcx with enterococcus faecalis, bacteriodes fragilus.    - Wound care consulted; appreciate assistance  - Deep wound cx  - General surgery consulted s/p debridement 06/24, no note of bone involvement per OP note, no cultures sent  - Infectious Disease consulted, zosyn for 6-weeks  - Consideration for wound vac given depth of injury  - Turn q2h    Leukocytosis  IMPROVING    Patient admitted with elevated WBC to 14s. Previous episodes of aspiration PNA at OSH, indwelling cabrera given acuity of condition, sacral decubitus ulcer stave IV s/p debridement with enterococcus faecalis / bacteriodes fragilis in cx. Patient afebrile with stable hemodynamics on admission at  this time. CXR without acute abnormality. UA with yeast growing, chronic per previous OSH review.    Leukocytosis may be explained by soft tissue infection vs hemoconcentration given extensive dehydration and hypernatremia from lack of enteral fluids or IVF at outside hospice facility prior to transfer to [facility].     - Zosyn given previous cultures and persistent leukocytosis  - Etiology may be soft tissue infection; see Sacral Decubitus Ulcer A/P  - Continue monitoring CBC  - Wound care per Sacral Decubitus Ulcer A/P  - Aspiration precautions  - Trend fever curve  - Trend BCx    Discharge planning issues  Darlene Avila is a 71 year old F with history of diabetes type 2, seizure, ischemic CVA, IBS, history of breast cancer s/p right mastectomy and failure to thrive who was brought in by her HPOA (sister) for skilled nursing home placement due to concern for neglect at recent hospice facility. Patient follows very simple commands and doubt she can engage in skilled therapy. [facility] nursing facility likely more of an option.     - Appreciate CM/SW assistance with placement.   - Appreciate pharmacy's assistance with med rec.     ACP (advance care planning)  Patient is DNR. LaPOST on file.       History of CVA (cerebrovascular accident)  Unclear of patient's current medications. Per med review on care-everywhere, the patient is on statin but not on antiplatelet therapy.     - Appreciate pharmacy's assistance with medication reconciliation with facility- start antiplatelet therapy if no contraindications.   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 A1c repeated 6.9.    - LDSSI   - POCT glucose q6hrs  - Maintain -180  - Titrate basal/bolus regimen to goal as above.    VTE Risk Mitigation (From admission, onward)         Ordered      heparin (porcine) injection 5,000 Units  Every 12 hours         06/18/22 0355     IP VTE HIGH RISK PATIENT  Once         06/18/22 0355     Place sequential compression device  Until discontinued         06/18/22 0355                Discharge Planning   JUDIT: 6/27/2022     Code Status: DNR   Is the patient medically ready for discharge?: No    Reason for patient still in hospital (select all that apply): Patient trending condition  Discharge Plan A: New Nursing Home placement - detention care facility   Discharge Delays: None known at this time              Bruno De Jesus MD  Department of Hospital Medicine   [facility] - Intensive Care ([place]-[facility])

## 2022-06-27 NOTE — PROGRESS NOTES
[facility] - Intensive Care ([place] [address])  Infectious Disease  Progress Note    Patient Name: Darlene Avila  MRN: [id number]  Admission Date: 6/17/2022  Length of Stay: 8 days  Attending Physician: Stacey Ugalde MD  Primary Care Provider: Kirill Cabrera Iii, MD    Isolation Status: No active isolations  Assessment/Plan:      Sacral decubitus ulcer, stage IV  71 year old debilited female with history of anoxic brain injury s/p trach/peg, stage IV sacral ulcer s/p debridement on 6/8 at OSH who presented to [facility] after her power of  revoked her hospice status to seek better care. See HPI for details. She was found to have infected sacral ulceration that tracks down to bone with purulence. Patient was taken to the OR on 6/24 for excisional debridement. No surgical cultures sent.  She is currently on zosyn. Afebrile. Stable non septic.    Recommendations  · Continue IV Zosyn. Would plan for at least two weeks of IV Zosyn for skin/soft tissue infection from day of last debridement. Could consider 6 week course of antibiotics for presumed osteomyelitis if consistent with goals of care. However, even with a 6 week course of antibiotics, she will still likely have exposed bone with large skin defect that will likely never close without muscle flap closure. As long as she has exposed bone, she will be at risk of reinfection, alejo since wound is located in area with fecal contamination. Would focus on nutrition, pressure offloading, aggressive wound care, and antibiotics for now. The process to healing this is long and can be difficult. I would suggest having an honest goals of care discussion with the family.      · If patient and family would like to pursue IV antibiotic treatment, see outpatient antibiotic recommendations below.     · Discussed antibiotic plan with ID staff. ID will sign off.          Outpatient Antibiotic Therapy Plan:    Please send referral to [facility].    1)  Infection: infected sacral decubitus ulcer    2) Discharge Antibiotics:     Intravenous antibiotics:   Zosyn 4.5 g IV q 8 hours    3) Therapy Duration:  2-6 weeks pending [facility] discussions    Estimated end date of IV antibiotics: 7/8/22 - 8/5/22    4) Outpatient Weekly Labs:    Order the following labs to be drawn on Mondays:    CBC   CMP    ESR    CRP    5) Fax Lab Results to Infectious Diseases Provider: Analisa Allan    [facility] Fax Number: [phone]    6) Outpatient Infectious Diseases Follow-up     Follow-up appointment will be arranged by the ID clinic and will be found in the patient's appointments tab. Prior to discharge, please ensure the patient's follow-up has been scheduled.       If there is still no follow-up scheduled prior to discharge, please send an EPIC message to Ragini Beal in Infectious Diseases.        Thank you for the consult. Please call for any questions.  Analisa Allan PA-C  ID JANET Spectra: [id number]    Subjective:     Principal Problem:Hypernatremia    HPI: Taken from chart as patient unable to provide:  Darlene Avila is a 71 year old F with history of diabetes type 2, seizure c/b anoxic brain injury s/p trach, PEG, bed bound status, Stage IV sacral ulcer, ischemic CVA, IBS, history of breast cancer s/p right mastectomy and failure to thrive who was brought in by her HPOA (sister) for nursing home placement. Patient is unable to provide history.      Patient's sister was contacted who stated that the patient was admitted to [facility] in [facility] for recurrent seizures/status epilepticus which resulted in an anoxic brain injury and respiratory failure.  Patient was intubated followed by tracheostomy and PEG placement .  Patient was discharged to nursing home and subsequently discharged to hospice care at the request of patient's sister. However a few days ago, she visited the patient at [facility] recovery facility and noticed they weren't feeding or taking care of  the patient due to her 'hospice status'. Per sister today, she would like to place the patient in a skilled nursing facility and would like to rescind her hospice care at this time.  She is no longer interested in hospice placement. She states the patient at baseline is only able to follow some simple commands. She is unsure how much O2 level is her baseline via trach collar.   Of note she has a Stage IV sacral wound that is s/p debridement on 06/09 by Gen surgery in [facility]. Cultures from that grew Enterococcus Faecalis/Avium/bacteroides and C albicans    Patient admitted at [facility].  Family elected for her to undergo redebridement of sacral ulcer.  Per Gen Sx note prior to debribement, wound probed to bone.  Patient was on Unasyn at OSH, suspected to be stopped briefly while on hospice but restarted recently.,  ID consulted for abx recs.  Paiten seen and denies fever, chills or sweats with limited questioning.    Interval History: NAEON. Afebrile. No leukocytosis. Resting comfortably and in no acute distress.    Review of Systems   Unable to perform ROS: Other   Objective:     Vital Signs (Most Recent):  Temp: 97.9 °F (36.6 °C) (06/27/22 1140)  Pulse: 100 (06/27/22 1140)  Resp: 16 (06/27/22 1140)  BP: (!) 148/67 (06/27/22 1140)  SpO2: 100 % (06/27/22 1140)   Vital Signs (24h Range):  Temp:  [9.7 °F (-12.4 °C)-98.6 °F (37 °C)] 97.9 °F (36.6 °C)  Pulse:  [] 100  Resp:  [16-18] 16  SpO2:  [98 %-100 %] 100 %  BP: (117-148)/(56-69) 148/67     Weight: 50.2 kg (110 lb 10.7 oz)  Body mass index is 22.35 kg/m².    Estimated Creatinine Clearance: 65.3 mL/min (based on SCr of 0.6 mg/dL).    Physical Exam  Vitals reviewed.   Constitutional:       General: She is not in acute distress.     Appearance: She is well-developed. She is not diaphoretic.      Comments: thin   HENT:      Head: Normocephalic and atraumatic.      Mouth/Throat:      Mouth: Mucous membranes are dry.   Cardiovascular:      Rate and Rhythm: Normal  rate and regular rhythm.      Heart sounds: Normal heart sounds.   Pulmonary:      Effort: Pulmonary effort is normal. No respiratory distress.      Breath sounds: No wheezing.      Comments: Trache with transmitted sounds  Abdominal:      General: There is no distension.      Palpations: Abdomen is soft.      Tenderness: There is no abdominal tenderness. There is no guarding.   Skin:     General: Skin is warm and dry.      Comments: Sacral wound not examined   Neurological:      Mental Status: She is alert.   Psychiatric:         Mood and Affect: Mood normal.         Behavior: Behavior is slowed.       Significant Labs: CBC:   Recent Labs   Lab 06/26/22  0426 06/27/22  0250   WBC 9.27 8.77   HGB 7.9* 9.0*   HCT 25.5* 27.8*   * 371     CMP:   Recent Labs   Lab 06/26/22 0426 06/27/22  0250    140   K 3.7 5.2*   * 119*   CO2 15* 14*   GLU 50* 103   BUN 13 11   CREATININE 0.6 0.6   CALCIUM 8.6* 8.8   PROT 5.9* 6.3   ALBUMIN 1.4* 1.5*   BILITOT 0.1 0.1   ALKPHOS 92 93   AST 14 16   ALT 13 14   ANIONGAP 7* 7*   EGFRNONAA >60.0 >60.0     Microbiology Results (last 7 days)       Procedure Component Value Units Date/Time    Blood culture [[id number]] Collected: 06/20/22 1530    Order Status: Completed Specimen: Blood from Peripheral, Left Hand Updated: 06/25/22 1812     Blood Culture, Routine No growth after 5 days.    Blood culture [[id number]] Collected: 06/20/22 1530    Order Status: Completed Specimen: Blood from Peripheral, Left Wrist Updated: 06/25/22 1812     Blood Culture, Routine No growth after 5 days.    Aerobic culture [[id number]]     Order Status: No result Specimen: Decubitus     Gram stain [[id number]]     Order Status: No result Specimen: Decubitus     Culture, Anaerobe [[id number]]     Order Status: No result Specimen: Decubitus     Blood culture [[id number]] Collected: 06/18/22 1106    Order Status: Completed Specimen: Blood Updated: 06/23/22 1212     Blood Culture, Routine No growth after 5  days.    Narrative:      Collection has been rescheduled by PMH1 at 06/18/2022 05:40 Reason:   Unable to collect nurse sila notify  Collection has been rescheduled by PMH1 at 06/18/2022 05:40 Reason:   Unable to collect nurse sila notify          Recent Lab Results         06/27/22  1140   06/27/22  0741   06/27/22  0250   06/26/22  1929        Albumin     1.5         Alkaline Phosphatase     93         ALT     14         Anion Gap     7         Aniso     Slight         AST     16         Baso #     0.05         Basophil %     0.6         BILIRUBIN TOTAL     0.1  Comment: For infants and newborns, interpretation of results should be based  on gestational age, weight and in agreement with clinical  observations.    Premature Infant recommended reference ranges:  Up to 24 hours.............<8.0 mg/dL  Up to 48 hours............<12.0 mg/dL  3-5 days..................<15.0 mg/dL  6-29 days.................<15.0 mg/dL           BUN     11         Calcium     8.8         Chloride     119         CO2     14         Creatinine     0.6         Differential Method     Automated         eGFR if      >60.0         eGFR if non      >60.0  Comment: Calculation used to obtain the estimated glomerular filtration  rate (eGFR) is the CKD-EPI equation.            Eos #     0.1         Eosinophil %     1.5         Glucose     103         Gran # (ANC)     5.2         Gran %     59.4         Hematocrit     27.8         Hemoglobin     9.0         Immature Grans (Abs)     0.27  Comment: Mild elevation in immature granulocytes is non specific and   can be seen in a variety of conditions including stress response,   acute inflammation, trauma and pregnancy. Correlation with other   laboratory and clinical findings is essential.           Immature Granulocytes     3.1         Lymph #     2.4         Lymph %     27.6         Magnesium     1.8         MCH     27.1         MCHC     32.4         MCV     84          Mono #     0.7         Mono %     7.8         MPV     9.9         nRBC     1         Phosphorus     2.5         Platelet Estimate     Appears normal         Platelets     371         POCT Glucose 154   122     106       Poikilocytosis     Slight         Potassium     5.2         PROTEIN TOTAL     6.3         RBC     3.32         RDW     18.2         Sodium     140         WBC     8.77                 Significant Imaging:

## 2022-06-27 NOTE — PLAN OF CARE
Problem: Adult Inpatient Plan of Care  Goal: Plan of Care Review  Outcome: Ongoing, Progressing  Flowsheets (Taken 6/27/2022 1842)  Plan of Care Reviewed With:   patient   sibling   family     Problem: Adult Inpatient Plan of Care  Goal: Absence of Hospital-Acquired Illness or Injury  Outcome: Ongoing, Progressing  Intervention: Identify and Manage Fall Risk  Flowsheets (Taken 6/27/2022 1842)  Safety Promotion/Fall Prevention:   assistive device/personal item within reach   Fall Risk reviewed with patient/family   room near unit station   pulse ox   family to remain at bedside   instructed to call staff for mobility  Intervention: Prevent Skin Injury  Flowsheets (Taken 6/27/2022 1842)  Skin Protection:   tubing/devices free from skin contact   incontinence pads utilized   Pt is alert,but had difficulty verbal communication due trach color. This afternoon pt became lethargic and responded only to sternal rub.  VS WNL,O2 100% on room air,. Med team 2 notified,ABG done . Pt turned,cleaned and repositioned through the shift. No new skin breakdown noted. Dressing changed and wound care performed to coccyx area. Donnelly catheter care performed. Pt pain and safety monitored q 1-2 hrs this shift . Blood glucose monitored through shift. Bed locked and in lowest position. Rails elevated x 3. Brakes on. Call light and personal belongings in reach. Will continue monitor. Pt family at bedside.

## 2022-06-27 NOTE — PLAN OF CARE
SW visited with pt this afternoon and spoke with pt about care plan.  SW stated to pt she would keep following up with sister Sabi.    Yocasta Urbina CD, MSW, LMSW, RSW   Case Management  [facility]  Email: [email or web address]

## 2022-06-27 NOTE — SUBJECTIVE & OBJECTIVE
Past Medical History:   Diagnosis Date    Arthritis     Cancer of breast     s/p mastectomy (on anastrozole)     Cataract     Diabetes mellitus     c/b Diabetic retinopathy    Hypertension     resolved    Hypoxia 4/15/2021    Memory loss     due to strokes    Orthostatic hypotension 12/27/2020    frequent falls, on midodrine.  4/2021 seen by both cards and palliative care    TIA (transient ischemic attack) [id number]    CT with remote infarcts    Vitamin D deficiency 10/14/2021       Past Surgical History:   Procedure Laterality Date    CAPSULOTOMY  6/26/13    yag left eye    CATARACT EXTRACTION  6/3/2013    right eye    CHOLECYSTECTOMY      HYSTERECTOMY      MASTECTOMY Right 9/28/2020    Procedure: MASTECTOMY-Right;  Surgeon: Krysta Clark MD;  Location: [facility] OR;  Service: General;  Laterality: Right;    SENTINEL LYMPH NODE BIOPSY Right 9/28/2020    Procedure: BIOPSY, LYMPH NODE, SENTINEL-Right;  Surgeon: Krysta Clark MD;  Location: [facility] OR;  Service: General;  Laterality: Right;    TOTAL ABDOMINAL HYSTERECTOMY W/ BILATERAL SALPINGOOPHORECTOMY         Review of patient's allergies indicates:   Allergen Reactions    Iodinated contrast media Hives       Medications:  Medications Prior to Admission   Medication Sig    bisacodyL (DULCOLAX) 10 mg Supp Place 10 mg rectally 2 (two) times daily as needed (CONSTIPATION).    hyoscyamine (LEVSIN/SL) 0.125 mg Subl Place 0.125 mg under the tongue every 2 (two) hours as needed (EXCESS SECRETIONS).    lorazepam (ATIVAN) 2 mg/mL Conc TAKE 0.25-1ML BY MOUTH EVERY 2 HOURS AS NEEDED FOR RESTLESSNESS OR AGITATION    morphine 100 mg/5 mL (20 mg/mL) concentrated solution TAKE 0.25-1 ML BY MOUTH EVERY 2 HOURS AS NEEDED FOR PAIN OR SHORTNESS OF BREATH     Antibiotics (From admission, onward)                Start     Stop Route Frequency Ordered    06/20/22 0930  piperacillin-tazobactam 4.5 g in sodium chloride 0.9% 100 mL IVPB (ready to mix system)         -- IV Every 8 hours  (non-standard times) 06/20/22 0827          Antifungals (From admission, onward)                None          Antivirals (From admission, onward)      None             Immunization History   Administered Date(s) Administered    PPD Test 04/17/2021, 10/25/2021    Tdap 12/19/2017       Family History       Problem Relation (Age of Onset)    Breast cancer Sister (65)    Cancer Mother, Sister    Cataracts Mother    Colon cancer Mother (82)    Diabetes Mother, Father, Sister, Maternal Uncle, Paternal Uncle, Maternal Grandmother, Maternal Grandfather    Glaucoma Mother    Hypertension Sister, Maternal Grandmother          Social History     Socioeconomic History    Marital status: Single   Tobacco Use    Smoking status: Current Every Day Smoker     Packs/day: 1.50     Types: Cigarettes    Smokeless tobacco: Never Used   Substance and Sexual Activity    Alcohol use: No    Drug use: No    Sexual activity: Not Currently     Social Determinants of Health     Financial Resource Strain: Low Risk     Difficulty of Paying Living Expenses: Not very hard   Food Insecurity: No Food Insecurity    Worried About Running Out of Food in the Last Year: Never true    Ran Out of Food in the Last Year: Never true   Transportation Needs: No Transportation Needs    Lack of Transportation (Medical): No    Lack of Transportation (Non-Medical): No   Physical Activity: Inactive    Days of Exercise per Week: 0 days    Minutes of Exercise per Session: 0 min   Stress: No Stress Concern Present    Feeling of Stress : Only a little   Social Connections: Unknown    Frequency of Communication with Friends and Family: More than three times a week    Frequency of Social Gatherings with Friends and Family: More than three times a week    Attends Jewish Services: Never    Active Member of Clubs or Organizations: No    Attends Club or Organization Meetings: Never   Housing Stability: Unknown    Unable to Pay for Housing in the Last Year: No    Unstable  Housing in the Last Year: No     Review of Systems   Unable to perform ROS: Other   Objective:     Vital Signs (Most Recent):  Temp: 98 °F (36.7 °C) (06/26/22 1931)  Pulse: 98 (06/26/22 2000)  Resp: 16 (06/26/22 1931)  BP: (!) 117/56 (06/26/22 1931)  SpO2: 100 % (06/26/22 2000)   Vital Signs (24h Range):  Temp:  [97.1 °F (36.2 °C)-98 °F (36.7 °C)] 98 °F (36.7 °C)  Pulse:  [87-99] 98  Resp:  [16-18] 16  SpO2:  [99 %-100 %] 100 %  BP: (117-134)/(56-63) 117/56     Weight: 50.2 kg (110 lb 10.7 oz)  Body mass index is 22.35 kg/m².    Estimated Creatinine Clearance: 65.3 mL/min (based on SCr of 0.6 mg/dL).    Physical Exam  Constitutional:       General: She is not in acute distress.     Appearance: She is well-developed. She is not diaphoretic.      Comments: thin   HENT:      Head: Normocephalic and atraumatic.   Cardiovascular:      Rate and Rhythm: Normal rate and regular rhythm.      Heart sounds: Normal heart sounds. No murmur heard.    No friction rub. No gallop.   Pulmonary:      Effort: Pulmonary effort is normal. No respiratory distress.      Breath sounds: Normal breath sounds. No wheezing or rales.      Comments: Trache with transmitted sounds  Abdominal:      General: Bowel sounds are normal. There is no distension.      Palpations: Abdomen is soft. There is no mass.      Tenderness: There is no abdominal tenderness. There is no guarding or rebound.   Skin:     General: Skin is warm and dry.   Neurological:      Mental Status: She is alert.   Psychiatric:         Behavior: Behavior normal.       Significant Labs: Blood Culture:   Recent Labs   Lab 06/18/22  1106 06/20/22  1530   LABBLOO No growth after 5 days. No growth after 5 days.  No growth after 5 days.     CBC:   Recent Labs   Lab 06/25/22  0645 06/26/22  0426   WBC 9.62 9.27   HGB 7.5* 7.9*   HCT 24.2* 25.5*   * 480*     CMP:   Recent Labs   Lab 06/25/22  0643 06/25/22  0645 06/25/22  1128 06/26/22  0426    143 142 143   K 4.1  --   --   3.7   *  --   --  121*   CO2 17*  --   --  15*   *  --   --  50*   BUN 18  --   --  13   CREATININE 0.6  --   --  0.6   CALCIUM 8.6*  --   --  8.6*   PROT 5.4*  --   --  5.9*   ALBUMIN 1.5*  --   --  1.4*   BILITOT 0.1  --   --  0.1   ALKPHOS 98  --   --  92   AST 10  --   --  14   ALT 10  --   --  13   ANIONGAP 7*  --   --  7*   EGFRNONAA >60.0  --   --  >60.0     Wound Culture: No results for input(s): LABAERO in the last 4320 hours.  All pertinent labs within the past 24 hours have been reviewed.    Significant Imaging: I have reviewed all pertinent imaging results/findings within the past 24 hours.Radiology Results (last 7 days)    ** No results found for the last 168 hours. **       Imaging History    2022  Date Procedure Name Study Review link PACS Link Status Accession Number Location   06/18/22 07:25 AM X-Ray Chest 1 View  Study Review  Images Final [id number] [facility]

## 2022-06-27 NOTE — PLAN OF CARE
SW called sister Sabi [phone] to discuss placement and care.      SW left message at 0911 for sister to call back.      Sister Sabi called SW back and spoke about the difference between SNF and group home NH.  SW stated that the medical will be working on trying to get pt off the trach, which would be helpful for placement to either SNF or group home NH.  Sister stated that she does not want hospice for pt.      Sister asked about care at home and SW stated that given pt needs may have to hire 24/7 sitter and have Home Health.     Sister and SW spoke about pt going to SNF facility and having the SNF facility evaluate and provide therapy for pt and then decide on group home placement or Home Health with sitter.  SW stated that a SNF facility they would be better and knowing pt needs after therapy interventions.      SW stated that she would on SNF referrals when/if pt can have trach removed.  Sister Sabi agreed.    Sister Sabi called SW and inquired about the bring pt home with home health and sitters as sister was concerned about the cost for SNF.  SW explained the difference between SNF and halfway NH regarding payment.  SW explained that SNF is paid for by insurance and group home is not.      SW asked if she could still go ahead with SNF placement when trach removed and sister agreed    Yocasta Urbina, JOANIE, MSW, LMSW, RSW   Case Management  [facility]  Email: [email or web address]

## 2022-06-27 NOTE — SUBJECTIVE & OBJECTIVE
Interval History: NAEON. ENT consulted for possible decannulation. Trach was capped and her oxygen is in the high 90's on room air. K slightly elevated will continue to monitor.     Review of Systems   Unable to perform ROS: Patient nonverbal   Respiratory:  Negative for chest tightness and shortness of breath.    Cardiovascular:  Negative for chest pain.   Gastrointestinal:  Negative for abdominal pain.   Objective:     Vital Signs (Most Recent):  Temp: 97.9 °F (36.6 °C) (06/27/22 1140)  Pulse: 100 (06/27/22 1140)  Resp: 16 (06/27/22 1140)  BP: (!) 148/67 (06/27/22 1140)  SpO2: 100 % (06/27/22 1140)   Vital Signs (24h Range):  Temp:  [9.7 °F (-12.4 °C)-98.6 °F (37 °C)] 97.9 °F (36.6 °C)  Pulse:  [] 100  Resp:  [16-18] 16  SpO2:  [98 %-100 %] 100 %  BP: (117-148)/(56-69) 148/67     Weight: 50.2 kg (110 lb 10.7 oz)  Body mass index is 22.35 kg/m².    Intake/Output Summary (Last 24 hours) at 6/27/2022 1321  Last data filed at 6/27/2022 0600  Gross per 24 hour   Intake --   Output 3200 ml   Net -3200 ml      Physical Exam  Vitals and nursing note reviewed.   Constitutional:       General: She is not in acute distress.     Appearance: She is not ill-appearing, toxic-appearing or diaphoretic.   HENT:      Head: Normocephalic.      Mouth/Throat:      Mouth: Mucous membranes are dry.      Comments: Dried oral secretions adhered to tongue  Cardiovascular:      Rate and Rhythm: Normal rate and regular rhythm.      Pulses: Normal pulses.      Heart sounds: Normal heart sounds. No murmur heard.  Pulmonary:      Effort: Pulmonary effort is normal. No respiratory distress.      Breath sounds: Normal breath sounds. No wheezing or rales.   Abdominal:      General: Abdomen is flat. Bowel sounds are normal. There is no distension.      Palpations: Abdomen is soft.      Tenderness: There is no abdominal tenderness.   Musculoskeletal:         General: No swelling or tenderness. Normal range of motion.      Cervical back: Normal  range of motion.   Skin:     General: Skin is warm and dry.      Coloration: Skin is not jaundiced or pale.      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    Neurological:      Mental Status: She is alert.      Comments: Alert and able to follow commands    Psychiatric:      Comments: Unable to assess       Significant Labs: All pertinent labs within the past 24 hours have been reviewed.    Significant Imaging: I have reviewed all pertinent imaging results/findings within the past 24 hours.

## 2022-06-27 NOTE — ASSESSMENT & PLAN NOTE
72 y/o F with tracheostomy dependence due to anoxic brain injury. Trach placed on 5/10/22 at OSH. Successfully exchanged to 6-0 Shiley cuffless on . ENT re-consulted regarding tracheostomy decannulation. Discussed at length with sister that because of Darlene's condition, she is at higher risk for aspiration and pneumonia. She is tolerating capping trials now but there is no guarantee that she will have decompensated respiratory failure in the future from silent aspirations. Given Darlene's status as DNR, there is a possibility that the patient may  from airway compromise in the future. I discussed today with Sabi that options include proceeding with capping trials and decannulation, knowing very well that if Darlene has respiratory compromise she would likely pass away unless her DNR status was changed. The other option would be to maintain tracheostomy tube for easier pulmonary toiletry and airway protection and prolong life, hopefully transitioning to a SNF or other long term facilities.    -- Recommend continued goals of care discussion with Sabi and other involved family members  -- ENT's official recommendation is to maintain tracheostomy tube for airway protection, but if family understands risk of decannulation and willing to proceed knowing her DNR status and risk of death, will defer to primary team to proceed with decannulation   -- ENT's decannulation protocol is as follows:   -- Tolerate  for 12 hours during the day   -- Tolerate  for 24 hours   -- Okay to decannulate, cover tracheal stoma with xeroform, 4x4, tegaderm, or other forms of occlusive dressing  -- Patient discussed with staff, Dr. Wolf Lu  -- Please Secure Chat me for any questions, page ENT on-call if unresponsive or urgent

## 2022-06-27 NOTE — ASSESSMENT & PLAN NOTE
Dysarthria  Dysphagia    Trach capped, good O2 sats on RA     - Respiratory therapy to assist with trach care.   - SLP for swallow evaluation and speech therapy; recommending NPO, re-evaluation, PMSV under direct supervision  - F/U ENT for criteria for trach removal

## 2022-06-28 LAB
ALBUMIN SERPL BCP-MCNC: 1.4 G/DL (ref 3.5–5.2)
ALLENS TEST: ABNORMAL
ALP SERPL-CCNC: 92 U/L (ref 55–135)
ALT SERPL W/O P-5'-P-CCNC: 12 U/L (ref 10–44)
ANION GAP SERPL CALC-SCNC: 5 MMOL/L (ref 8–16)
AST SERPL-CCNC: 12 U/L (ref 10–40)
BASOPHILS # BLD AUTO: 0.03 K/UL (ref 0–0.2)
BASOPHILS NFR BLD: 0.3 % (ref 0–1.9)
BILIRUB SERPL-MCNC: 0.1 MG/DL (ref 0.1–1)
BILIRUB UR QL STRIP: NEGATIVE
BUN SERPL-MCNC: 14 MG/DL (ref 8–23)
CALCIUM SERPL-MCNC: 8.7 MG/DL (ref 8.7–10.5)
CHLORIDE SERPL-SCNC: 120 MMOL/L (ref 95–110)
CLARITY UR REFRACT.AUTO: ABNORMAL
CO2 SERPL-SCNC: 17 MMOL/L (ref 23–29)
COLOR UR AUTO: YELLOW
CREAT SERPL-MCNC: 0.7 MG/DL (ref 0.5–1.4)
DELSYS: ABNORMAL
DIFFERENTIAL METHOD: ABNORMAL
EOSINOPHIL # BLD AUTO: 0.1 K/UL (ref 0–0.5)
EOSINOPHIL NFR BLD: 0.9 % (ref 0–8)
ERYTHROCYTE [DISTWIDTH] IN BLOOD BY AUTOMATED COUNT: 19.1 % (ref 11.5–14.5)
EST. GFR  (AFRICAN AMERICAN): >60 ML/MIN/1.73 M^2
EST. GFR  (NON AFRICAN AMERICAN): >60 ML/MIN/1.73 M^2
GLUCOSE SERPL-MCNC: 207 MG/DL (ref 70–110)
GLUCOSE UR QL STRIP: NEGATIVE
HCO3 UR-SCNC: 16 MMOL/L (ref 24–28)
HCT VFR BLD AUTO: 26.3 % (ref 37–48.5)
HGB BLD-MCNC: 8 G/DL (ref 12–16)
HGB UR QL STRIP: NEGATIVE
IMM GRANULOCYTES # BLD AUTO: 0.03 K/UL (ref 0–0.04)
IMM GRANULOCYTES NFR BLD AUTO: 0.3 % (ref 0–0.5)
KETONES UR QL STRIP: NEGATIVE
LEUKOCYTE ESTERASE UR QL STRIP: ABNORMAL
LYMPHOCYTES # BLD AUTO: 2.6 K/UL (ref 1–4.8)
LYMPHOCYTES NFR BLD: 30.3 % (ref 18–48)
MAGNESIUM SERPL-MCNC: 1.8 MG/DL (ref 1.6–2.6)
MCH RBC QN AUTO: 26.8 PG (ref 27–31)
MCHC RBC AUTO-ENTMCNC: 30.4 G/DL (ref 32–36)
MCV RBC AUTO: 88 FL (ref 82–98)
MICROSCOPIC COMMENT: ABNORMAL
MODE: ABNORMAL
MONOCYTES # BLD AUTO: 0.5 K/UL (ref 0.3–1)
MONOCYTES NFR BLD: 5.7 % (ref 4–15)
NEUTROPHILS # BLD AUTO: 5.4 K/UL (ref 1.8–7.7)
NEUTROPHILS NFR BLD: 62.5 % (ref 38–73)
NITRITE UR QL STRIP: NEGATIVE
NRBC BLD-RTO: 1 /100 WBC
PCO2 BLDA: 29.8 MMHG (ref 35–45)
PH SMN: 7.34 [PH] (ref 7.35–7.45)
PH UR STRIP: 5 [PH] (ref 5–8)
PHOSPHATE SERPL-MCNC: 2 MG/DL (ref 2.7–4.5)
PLATELET # BLD AUTO: 468 K/UL (ref 150–450)
PMV BLD AUTO: 9.4 FL (ref 9.2–12.9)
PO2 BLDA: 96 MMHG (ref 80–100)
POC BE: -10 MMOL/L
POC SATURATED O2: 97 % (ref 95–100)
POC TCO2: 17 MMOL/L (ref 23–27)
POCT GLUCOSE: 135 MG/DL (ref 70–110)
POCT GLUCOSE: 144 MG/DL (ref 70–110)
POCT GLUCOSE: 145 MG/DL (ref 70–110)
POCT GLUCOSE: 162 MG/DL (ref 70–110)
POTASSIUM SERPL-SCNC: 4.7 MMOL/L (ref 3.5–5.1)
PROT SERPL-MCNC: 6.1 G/DL (ref 6–8.4)
PROT UR QL STRIP: NEGATIVE
RBC # BLD AUTO: 2.99 M/UL (ref 4–5.4)
RBC #/AREA URNS AUTO: 29 /HPF (ref 0–4)
SAMPLE: ABNORMAL
SITE: ABNORMAL
SODIUM SERPL-SCNC: 142 MMOL/L (ref 136–145)
SP GR UR STRIP: 1.01 (ref 1–1.03)
SP02: 99
SQUAMOUS #/AREA URNS AUTO: 1 /HPF
URN SPEC COLLECT METH UR: ABNORMAL
WBC # BLD AUTO: 8.71 K/UL (ref 3.9–12.7)
WBC #/AREA URNS AUTO: 35 /HPF (ref 0–5)
YEAST UR QL AUTO: ABNORMAL

## 2022-06-28 PROCEDURE — 81001 URINALYSIS AUTO W/SCOPE: CPT

## 2022-06-28 PROCEDURE — 82803 BLOOD GASES ANY COMBINATION: CPT

## 2022-06-28 PROCEDURE — 12000002 HC ACUTE/MED SURGE SEMI-PRIVATE ROOM

## 2022-06-28 PROCEDURE — 63600175 PHARM REV CODE 636 W HCPCS: Performed by: STUDENT IN AN ORGANIZED HEALTH CARE EDUCATION/TRAINING PROGRAM

## 2022-06-28 PROCEDURE — 84100 ASSAY OF PHOSPHORUS: CPT

## 2022-06-28 PROCEDURE — 97110 THERAPEUTIC EXERCISES: CPT

## 2022-06-28 PROCEDURE — 83735 ASSAY OF MAGNESIUM: CPT

## 2022-06-28 PROCEDURE — 99233 SBSQ HOSP IP/OBS HIGH 50: CPT | Mod: ,,, | Performed by: HOSPITALIST

## 2022-06-28 PROCEDURE — 87088 URINE BACTERIA CULTURE: CPT

## 2022-06-28 PROCEDURE — 25000003 PHARM REV CODE 250

## 2022-06-28 PROCEDURE — 97530 THERAPEUTIC ACTIVITIES: CPT

## 2022-06-28 PROCEDURE — 27200966 HC CLOSED SUCTION SYSTEM

## 2022-06-28 PROCEDURE — 99900035 HC TECH TIME PER 15 MIN (STAT)

## 2022-06-28 PROCEDURE — 87106 FUNGI IDENTIFICATION YEAST: CPT

## 2022-06-28 PROCEDURE — 99233 PR SUBSEQUENT HOSPITAL CARE,LEVL III: ICD-10-PCS | Mod: ,,, | Performed by: HOSPITALIST

## 2022-06-28 PROCEDURE — 87086 URINE CULTURE/COLONY COUNT: CPT

## 2022-06-28 PROCEDURE — 25000003 PHARM REV CODE 250: Performed by: STUDENT IN AN ORGANIZED HEALTH CARE EDUCATION/TRAINING PROGRAM

## 2022-06-28 PROCEDURE — 80053 COMPREHEN METABOLIC PANEL: CPT

## 2022-06-28 PROCEDURE — 36415 COLL VENOUS BLD VENIPUNCTURE: CPT

## 2022-06-28 PROCEDURE — 63600175 PHARM REV CODE 636 W HCPCS

## 2022-06-28 PROCEDURE — 36600 WITHDRAWAL OF ARTERIAL BLOOD: CPT

## 2022-06-28 PROCEDURE — 85025 COMPLETE CBC W/AUTO DIFF WBC: CPT

## 2022-06-28 PROCEDURE — 94761 N-INVAS EAR/PLS OXIMETRY MLT: CPT

## 2022-06-28 PROCEDURE — 99900026 HC AIRWAY MAINTENANCE (STAT)

## 2022-06-28 RX ORDER — ATORVASTATIN CALCIUM 80 MG/1
80 TABLET, FILM COATED ORAL DAILY
Qty: 90 TABLET | Refills: 3 | Status: SHIPPED | OUTPATIENT
Start: 2022-06-29 | End: 2023-06-29

## 2022-06-28 RX ORDER — SODIUM,POTASSIUM PHOSPHATES 280-250MG
2 POWDER IN PACKET (EA) ORAL EVERY 4 HOURS
Status: COMPLETED | OUTPATIENT
Start: 2022-06-28 | End: 2022-06-28

## 2022-06-28 RX ORDER — SODIUM BICARBONATE 650 MG/1
650 TABLET ORAL 2 TIMES DAILY
Status: DISCONTINUED | OUTPATIENT
Start: 2022-06-28 | End: 2022-07-17 | Stop reason: HOSPADM

## 2022-06-28 RX ORDER — LEVETIRACETAM 100 MG/ML
750 SOLUTION ORAL 2 TIMES DAILY
Qty: 600 ML | Refills: 11 | Status: SHIPPED | OUTPATIENT
Start: 2022-06-28 | End: 2023-06-28

## 2022-06-28 RX ADMIN — ATORVASTATIN CALCIUM 80 MG: 20 TABLET, FILM COATED ORAL at 08:06

## 2022-06-28 RX ADMIN — HEPARIN SODIUM 5000 UNITS: 5000 INJECTION INTRAVENOUS; SUBCUTANEOUS at 09:06

## 2022-06-28 RX ADMIN — POTASSIUM & SODIUM PHOSPHATES POWDER PACK 280-160-250 MG 2 PACKET: 280-160-250 PACK at 05:06

## 2022-06-28 RX ADMIN — PIPERACILLIN SODIUM AND TAZOBACTAM SODIUM 4.5 G: 4; .5 INJECTION, POWDER, LYOPHILIZED, FOR SOLUTION INTRAVENOUS at 08:06

## 2022-06-28 RX ADMIN — SODIUM BICARBONATE 650 MG TABLET 650 MG: at 09:06

## 2022-06-28 RX ADMIN — PIPERACILLIN SODIUM AND TAZOBACTAM SODIUM 4.5 G: 4; .5 INJECTION, POWDER, LYOPHILIZED, FOR SOLUTION INTRAVENOUS at 11:06

## 2022-06-28 RX ADMIN — INSULIN ASPART 1 UNITS: 100 INJECTION, SOLUTION INTRAVENOUS; SUBCUTANEOUS at 09:06

## 2022-06-28 RX ADMIN — POTASSIUM & SODIUM PHOSPHATES POWDER PACK 280-160-250 MG 2 PACKET: 280-160-250 PACK at 02:06

## 2022-06-28 RX ADMIN — LEVETIRACETAM 750 MG: 100 SOLUTION ORAL at 09:06

## 2022-06-28 RX ADMIN — CHOLESTYRAMINE 8 G: 4 POWDER, FOR SUSPENSION ORAL at 08:06

## 2022-06-28 RX ADMIN — POTASSIUM & SODIUM PHOSPHATES POWDER PACK 280-160-250 MG 2 PACKET: 280-160-250 PACK at 10:06

## 2022-06-28 RX ADMIN — SODIUM BICARBONATE 650 MG TABLET 650 MG: at 03:06

## 2022-06-28 RX ADMIN — SODIUM BICARBONATE 650 MG TABLET 650 MG: at 08:06

## 2022-06-28 RX ADMIN — LEVETIRACETAM 750 MG: 100 SOLUTION ORAL at 08:06

## 2022-06-28 RX ADMIN — HEPARIN SODIUM 5000 UNITS: 5000 INJECTION INTRAVENOUS; SUBCUTANEOUS at 08:06

## 2022-06-28 RX ADMIN — CHOLESTYRAMINE 8 G: 4 POWDER, FOR SUSPENSION ORAL at 09:06

## 2022-06-28 RX ADMIN — PIPERACILLIN SODIUM AND TAZOBACTAM SODIUM 4.5 G: 4; .5 INJECTION, POWDER, LYOPHILIZED, FOR SOLUTION INTRAVENOUS at 04:06

## 2022-06-28 NOTE — PLAN OF CARE
Problem: Occupational Therapy  Goal: Occupational Therapy Goal  Description: Goals to be met by: 7/10/22    Goals to be met by: 7/10/22    Patient will increase functional independence with ADLs by performing:    Rolling to Bilateral with Moderate Assistance  P/AAROM to maintain UE ROM/Strength.   Supine<>sit with Max A  Grooming at EOB with Max A  Maintain balance at EOB with Max A    Outcome: Ongoing, Progressing   Goals updated

## 2022-06-28 NOTE — PT/OT/SLP PROGRESS
Occupational Therapy   Treatment    Name: Darlene Avila  MRN: [id number]  Admitting Diagnosis:  Hypernatremia  4 Days Post-Op    Recommendations:     Discharge Recommendations[place], [place] [facility] [place]charge Equipment Recommendations:   (TBD)  Barriers to discharge:  None    Assessment:     Darlene Avila is a 71 y.o. female with a medical diagnosis of Hypernatremia.  She presents with deficits impacting ADL performance. Pt difficult to alert but compliant with therapeutic exercises. Performance deficits affecting function are weakness, impaired endurance, impaired self care skills, impaired sensation, impaired balance, gait instability, impaired cognition, decreased coordination, impaired functional mobilty, impaired fine motor, impaired coordination, decreased upper extremity function, decreased lower extremity function, decreased ROM.     Rehab Prognosis:  Limited; patient would benefit from acute skilled OT services to address these deficits and reach maximum level of function.       Plan:     Patient to be seen 3 x/week to address the above listed problems via self-care/home management, therapeutic activities, therapeutic exercises  · Plan of Care Expires: 07/28/22  · Plan of Care Reviewed with: patient    Subjective   Pt with no attempts to communicate verbally or nonverbally.    Pain/Comfort:  Pain Rating 1:  (Pt did not report)    Objective:     Communicated with: nurse prior to session.  Patient found HOB elevated with Tracheostomy, pulse ox (continuous), telemetry, peripheral IV, pressure relief boots, PEG Tube upon OT entry to room. RN present. Pt with cap off of trachea. Contacted RT who instructed OT to replace cap on trach.    General Precautions: Standard, aspiration, fall, NPO, seizure (DNR)   Orthopedic Precautions:N/A   Braces: N/A  Respiratory Status: Room air     Occupational Performance:     Bed Mobility:    · Patient completed Rolling/Turning to Left with  total  assistance  · Patient completed Scooting/Bridging with dependent with draw sheet towards EOB due to sacral sore. Draw sheet performed towards HOB.  · Patient completed Supine to Sit with total assistance  · Patient completed Sit to Supine with total assistance     Activities of Daily Living:  · Grooming: total assistance to wipe face with washcloth while seated EOB. Pt required hand over hand but did not participate in motion      [facility] 6 Click ADL: 6    Treatment & Education:  Pt with poor EOB balance requiring Total assist. Pt unable to self correct posterior lean when support was decreased and total assistance was resumed.    Pt participated in BUE stretching exercises 5x repetitions with 10 second hold. Pt with one finger width of subluxation in BUE. Joint approximated for shoulder exercises. Exercises for LUE included: shoulder flexion, elbow flexion/extension, supination/pronation, wrist flexion/extension, and grasping/opening hand. Pt able to initiate some movement in left hand with command to close/open hand 1/2 attempts. Exercises for RUE included shoulder flexion, elbow flexion/extension, supination/pronation.    Provided education on importance of joint mobility to decrease muscle tightness and joint stiffness.    Patient left HOB elevated with all lines intact, call button in reach, RN notified and RT team presentEducation:      GOALS:   Multidisciplinary Problems     Occupational Therapy Goals        Problem: Occupational Therapy    Goal Priority Disciplines Outcome Interventions   Occupational Therapy Goal     OT, PT/OT Ongoing, Progressing    Description: Goals to be met by: 7/10/22    Patient will increase functional independence with ADLs by performing:    Rolling to Bilateral with Moderate Assistance  P/AAROM to maintain UE ROM/Strength.   Supine<>sit with Max A  Grooming at EOB with Max A  Maintain balance at EOB with Max A                     Time Tracking:     OT Date of Treatment: 06/28/22  OT  Start Time: 0858  OT Stop Time: 0929  OT Total Time (min): 31 min    Billable Minutes:Therapeutic Activity 15  Therapeutic Exercise 16    OT/GARFIELD: OT          6/28/2022

## 2022-06-28 NOTE — ASSESSMENT & PLAN NOTE
Dysarthria  Dysphagia    Trach capped, good O2 sats on RA     - Respiratory therapy to assist with trach care.   - SLP for swallow evaluation and speech therapy; recommending NPO, re-evaluation, PMSV under direct supervision  - F/U ENT advising to keep tracheostomy in place

## 2022-06-28 NOTE — PROGRESS NOTES
[facility] - Intensive Care ([place] [address])  [facility] Medicine  Progress Note    Patient Name: Darlene Avila  MRN: [id number]  Patient Class: IP- Inpatient   Admission Date: 6/17/2022  Length of Stay: 9 days  Attending Physician: Stacey Ugalde MD  Primary Care Provider: Kirill Cabrera Iii, MD        Subjective:     Principal Problem:Hypernatremia        HPI:  Darlene Avila is a 71 year old F with history of diabetes type 2, seizure c/b anoxic brain injury s/p trach, PEG, bed bound status, Stage IV sacral ulcer, ischemic CVA, IBS, history of breast cancer s/p right mastectomy and failure to thrive who was brought in by her HPOA (sister) for nursing home placement. Patient is unable to provide history.     Patient's sister was contacted who stated that the patient was admitted to [facility] in [facility] for recurrent seizures/status epilepticus which resulted in an anoxic brain injury and respiratory failure.  Patient was intubated followed by tracheostomy and PEG placement .  Patient was discharged to nursing home and subsequently discharged to hospice care at the request of patient's sister. However a few days ago, she visited the patient at [facility] recovery facility and noticed they weren't feeding or taking care of the patient due to her 'hospice status'. Per sister today, she would like to place the patient in a skilled nursing facility and would like to rescind her hospice care at this time.  She is no longer interested in hospice placement. She states the patient at baseline is only able to follow some simple commands. She is unsure how much O2 level is her baseline via trach collar.   Of note she has a Stage IV sacral wound that is s/p debridement on 06/09 by Gen surgery in [facility].     Upon arrival to the ED, the patient was hemodynamically stable. Labs revealed Na 157, WBC 14. Hgb 9.4 (baseline 7-9). The patient was given 1L of IV NS and was admitted to hospital medicine for further management.        Overview/Hospital Course:  Patient admitted to hospital medicine with pre-existing anoxic brain injury, hypernatremia, sacral decubitus wound s/p debridement 06/08 in [place], [place]. Patient initially admitted to hospice following discharge from [facility] ICU, but discharged from hospice as patient family believed she was not getting appropriate care. Na 157, corrected with IVF (NS and hypotonic solutions) and enteral FWF down to 148. Trach collar exchanged by ENT due to lack of supplies at facility. Wound care consulted for sacral decubitus, recommended General Surgery consult for possible debridement as wound appears purulent. Persistent leukocytosis, started Zosyn per previous I&D wound cultures growing bacteriodes/enterococcus and new wound findings. cEEG started and Epilepsy consulted for assistance with antiepileptic medications; conflicting reports if patient was on only Keppra/Lacosamide vs Keppra/Lacosamide/Phenytoin. General surgery consulted for debridement of sacral decubitus ulcer, s/p I&D 06/24. Palliative Care consulted for assistance in GOC; decision to pursue medical/surgical care decided. ENT consulted for possible trach decannulation. ID consulted, planning for 6-weeks course of zosyn.     CM/SW to assist with dispo, family desire for [facility] nursing care.      Interval History: AF HDS NAEON. Patient slightly less responsive, but appears to be responsive intermittently, specifically when family in room. Responding to simple commands. ENT recommending keeping trach in;  in place. ABG drawn yesterday afternoon and this AM at baseline.    Review of Systems   Unable to perform ROS: Patient nonverbal   Respiratory:  Negative for chest tightness and shortness of breath.    Cardiovascular:  Negative for chest pain.   Gastrointestinal:  Negative for abdominal pain.   Objective:     Vital Signs (Most Recent):  Temp: 97.9 °F (36.6 °C) (06/28/22 1219)  Pulse: 85 (06/28/22 1219)  Resp: 17 (06/28/22  1219)  BP: (!) 155/72 (06/28/22 1219)  SpO2: 100 % (06/28/22 1219)   Vital Signs (24h Range):  Temp:  [97.5 °F (36.4 °C)-98.6 °F (37 °C)] 97.9 °F (36.6 °C)  Pulse:  [] 85  Resp:  [16-24] 17  SpO2:  [93 %-100 %] 100 %  BP: (131-155)/(62-77) 155/72     Weight: 50.2 kg (110 lb 10.7 oz)  Body mass index is 22.35 kg/m².    Intake/Output Summary (Last 24 hours) at 6/28/2022 1457  Last data filed at 6/28/2022 0852  Gross per 24 hour   Intake 2280 ml   Output 1250 ml   Net 1030 ml      Physical Exam  Vitals and nursing note reviewed.   Constitutional:       General: She is not in acute distress.     Appearance: She is not ill-appearing, toxic-appearing or diaphoretic.   HENT:      Head: Normocephalic.      Mouth/Throat:      Mouth: Mucous membranes are dry.      Comments: Dried oral secretions adhered to tongue  Cardiovascular:      Rate and Rhythm: Normal rate and regular rhythm.      Pulses: Normal pulses.      Heart sounds: Normal heart sounds. No murmur heard.  Pulmonary:      Effort: Pulmonary effort is normal. No respiratory distress.      Breath sounds: Normal breath sounds. No wheezing or rales.   Abdominal:      General: Abdomen is flat. Bowel sounds are normal. There is no distension.      Palpations: Abdomen is soft.      Tenderness: There is no abdominal tenderness.   Musculoskeletal:         General: No swelling or tenderness. Normal range of motion.      Cervical back: Normal range of motion.   Skin:     General: Skin is warm and dry.      Coloration: Skin is not jaundiced or pale.      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    Neurological:      Mental Status: She is alert.      Comments: Alert and able to follow commands    Psychiatric:      Comments: Unable to assess       Significant Labs: All pertinent labs within the past 24 hours have been reviewed.    Significant Imaging: I have reviewed all pertinent imaging results/findings within the past 24  hours.      Assessment/Plan:      * Hypernatremia  STABLE, WNL    Na 157. Suspect from dehydration while at her recovery facility.   - FWD 2.7 on admission.   S/p 1L of NS in the ED.     - Continue 500cc QID free water boluses  - Na q6h    Discharge planning issues  Darlene Avila is a 71 year old F with history of diabetes type 2, seizure, ischemic CVA, IBS, history of breast cancer s/p right mastectomy and failure to thrive who was brought in by her HPOA (sister) for skilled nursing home placement due to concern for neglect at recent hospice facility. Patient follows very simple commands and doubt she can engage in skilled therapy. long term nursing facility likely more of an option.     - Appreciate CM/SW assistance with placement; placement difficult d/t trach; will require LTAC placement - referrals sent  - Appreciate pharmacy's assistance with med rec from previous admission    Sacral decubitus ulcer, stage IV  S/p recent debridement at OSH 06/08. OSH Wcx with enterococcus faecalis, bacteriodes fragilus.    - Wound care consulted; appreciate assistance  - Deep wound cx  - General surgery consulted s/p debridement 06/24, no note of bone involvement per OP note, no cultures sent  - Infectious Disease consulted, zosyn for 6-weeks  - Consideration for wound vac given depth of injury  - Turn q2h    Leukocytosis  RESOLVED    Patient admitted with elevated WBC to 14s. Previous episodes of aspiration PNA at OSH, indwelling cabrera given acuity of condition, sacral decubitus ulcer stave IV s/p debridement with enterococcus faecalis / bacteriodes fragilis in cx. Patient afebrile with stable hemodynamics on admission at this time. CXR without acute abnormality. UA with yeast growing, chronic per previous OSH review.    Leukocytosis may be explained by soft tissue infection vs hemoconcentration given extensive dehydration and hypernatremia from lack of enteral fluids or IVF at outside hospice facility prior to transfer to  [facility].     - Zosyn given previous cultures and persistent leukocytosis  - Etiology may be soft tissue infection; see Sacral Decubitus Ulcer A/P  - Continue monitoring CBC  - Wound care per Sacral Decubitus Ulcer A/P  - Aspiration precautions  - Trend fever curve  - Trend BCx    Tracheostomy in place  Dysarthria  Dysphagia    Trach capped, good O2 sats on RA     - Respiratory therapy to assist with trach care.   - SLP for swallow evaluation and speech therapy; recommending NPO, re-evaluation, PMSV under direct supervision  - F/U ENT advising to keep tracheostomy in place    Seizures  Patient previously started on Lacosamide and Keppra at OSH during initial episode of status epilepticus. Phenytoin added as patient continued to have breakthrough seizures.    - Keppra monotherapy 750 bid per neuro recs  - No seizures seen on EEG, monitoring has stopped   - Neurology consulted for further assistance in antiepileptic regimen    Palliative care encounter  Per Primary Medicine team and Palliative Care encounters with patient family (separate encounters, see dated notes in chart), patient family wishes full medical care and does not desire comfort/hospice measures. DNR remains. See Pall Care note for further detail.    G tube feedings  Tube feed dependent via G tube  - Nutrition to assist with TF recs; see recommendations in note; appreciate assistance      ACP (advance care planning)  Patient is DNR. LaPOST on file.       History of CVA (cerebrovascular accident)  Unclear of patient's current medications. Per med review on care-everywhere, the patient is on statin but not on antiplatelet therapy.     - Appreciate pharmacy's assistance with medication reconciliation with facility- start antiplatelet therapy if no contraindications.   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 A1c repeated 6.9.    - LDSSI   - POCT glucose q6hrs  - Maintain -180  - Titrate basal/bolus regimen to goal as above.      VTE Risk Mitigation (From admission, onward)         Ordered     heparin (porcine) injection 5,000 Units  Every 12 hours         06/18/22 0355     IP VTE HIGH RISK PATIENT  Once         06/18/22 0355     Place sequential compression device  Until discontinued         06/18/22 0355                Discharge Planning   JUDIT: 6/29/2022     Code Status: DNR   Is the patient medically ready for discharge?: No    Reason for patient still in hospital (select all that apply): Treatment and Pending disposition  Discharge Plan A: Long-term acute care facility (LTAC)   Discharge Delays: None known at this time              Dawood Self MD  Department of Hospital Medicine   [facility] - Intensive Care ([place]-16)

## 2022-06-28 NOTE — PLAN OF CARE
06/28/22 1249   Post-Acute Status   Post-Acute Authorization Placement   Post-Acute Placement Status Referrals Sent   Discharge Plan   Discharge Plan A Long-term acute care facility (LTAC)   Discharge Plan B Skilled Nursing Facility       Sw sent referrals via careport for LTAC to: [facility], [facility], [facility] and ARNULFO Urbina, JOANIE, MSW, LMSW, RSW   Case Management  [facility]  Email: [email or web address]

## 2022-06-28 NOTE — PLAN OF CARE
JAZMIN followed up with Keenan at [facility] regarding pt referral.      Keenan stated that pt was denied given acuity of care required.      SW to continue to follow with referrals for pt.    JAZMIN followed up with [facility] and spoke with Anali, they are unable to accept pt.      Yocasta Urbina CD, MSW, LMSW, RSW   Case Management  [facility]  Email: [email or web address]

## 2022-06-28 NOTE — PLAN OF CARE
[facility]    FACILITY TRANSFER ORDERS      Patient Name: Darlene Avila  YOB: 1950    PCP: Kirill Cabrera Iii, MD   PCP Address: [facility] [address] [address]  PCP Phone Number: [phone]  PCP Fax: [phone]    Encounter Date: 07/05/2022    Admit to: LTAC TBD    Vital Signs:  Routine    Diagnoses:   Active Hospital Problems    Diagnosis  POA    *Hypernatremia [E87.0]  Yes    Encephalopathy [G93.40]  Unknown    Palliative care encounter [Z51.5]  Not Applicable    Goals of care, counseling/discussion [Z71.89]  Not Applicable    Discharge planning issues [Z02.9]  Not Applicable    Leukocytosis [D72.829]  Yes    Sacral decubitus ulcer, stage IV [L89.154]  Yes    Tracheostomy in place [Z93.0]  Not Applicable    G tube feedings [Z93.1]  Not Applicable    Seizures [R56.9]  Yes    ACP (advance care planning) [Z71.89]  Not Applicable    History of CVA (cerebrovascular accident) [Z86.73]  Not Applicable     Chronic    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, with long-term current use of insulin [E11.3513, E11.65, Z79.4]  Yes     Chronic      Resolved Hospital Problems    Diagnosis Date Resolved POA    Yeast UTI [B37.49] 07/03/2022 Unknown       Allergies:  Review of patient's allergies indicates:   Allergen Reactions    Iodinated contrast media Hives       Diet: NPO, Feeds via G Tube as follows:     Glucerna 1.5, goal rate of 35ml/hr to provide 1260 kcal, 69g protein, and 640ml fluid  Free Water Flush bolus 500 mL QID    Activities: Activity as tolerated    Goals of Care Treatment Preferences:  Code Status: DNR    Health care agent: Sabi Avila  [facility] care agent number: [phone]    Living Will: Yes  LaPOST: Yes  What is most important right now is to focus on extending life as long as possible, even it it means sacrificing quality, curative/life-prolongation (regardless of  treatment burdens).  Accordingly, we have decided that the best plan to meet the patient's goals includes continuing with treatment.      Nursing:   Routine tracheostomy care, Aggressive wound care  Labs & Vitals per facility protocol       Labs: CBC and CMP Per Facility Protocol     CONSULTS:    Physical Therapy to evaluate and treat. , Occupational Therapy to evaluate and treat. and Speech Therapy to evaluate and treat for Language and Swallowing.    MISCELLANEOUS CARE:  PEG Care: Clean site every 24 hours.  and Routine Skin for Bedridden Patients: Apply moisture barrier cream to all skin folds and wet areas in perineal area daily and after baths and all bowel movements.    WOUND CARE ORDERS  Yes: Pressure Ulcer(s) Stage IV:  Location: Sacral Spine    Consult ET nurse        Apply the following to wound:   Other: See below: (frequency)    Medications: Review discharge medications with patient and family and provide education.      Current Discharge Medication List      START taking these medications    Details   atorvastatin (LIPITOR) 80 MG tablet 1 tablet (80 mg total) by Per G Tube route once daily.  Qty: 90 tablet, Refills: 3      fluconazole (DIFLUCAN) 200 MG Tab 1 tablet (200 mg total) by Per G Tube route once daily. for 13 days  Qty: 13 tablet, Refills: 0      insulin detemir U-100 (LEVEMIR FLEXTOUCH) 100 unit/mL (3 mL) SubQ InPn pen Inject 8 Units into the skin every evening.  Qty: 7.2 mL, Refills: 3      levetiracetam 500 mg/5 mL (5 mL) Soln 7.5 mLs (750 mg total) by Per G Tube route 2 (two) times daily.  Qty: 600 mL, Refills: 11      piperacillin sodium/tazobactam (PIPERACILLIN-TAZOBACTAM 4.5G/100ML SODIUM CHLORIDE 0.9%-READY TO MIX) Inject 100 mLs (4.5 g total) into the vein every 8 (eight) hours.  Qty: 22605 mL, Refills: 0         CONTINUE these medications which have NOT CHANGED    Details   bisacodyL (DULCOLAX) 10 mg Supp Place 10 mg rectally 2 (two) times daily as needed (CONSTIPATION).       hyoscyamine (LEVSIN/SL) 0.125 mg Subl Place 0.125 mg under the tongue every 2 (two) hours as needed (EXCESS SECRETIONS).      lorazepam (ATIVAN) 2 mg/mL Conc TAKE 0.25-1ML BY MOUTH EVERY 2 HOURS AS NEEDED FOR RESTLESSNESS OR AGITATION      morphine 100 mg/5 mL (20 mg/mL) concentrated solution TAKE 0.25-1 ML BY MOUTH EVERY 2 HOURS AS NEEDED FOR PAIN OR SHORTNESS OF BREATH         STOP taking these medications       cholestyramine (QUESTRAN) 4 gram packet Comments:   Reason for Stopping:                  Immunizations Administered as of 7/5/2022     No immunizations on file.          This patient has had both covid vaccinations    Some patients may experience side effects after vaccination.  These may include fever, headache, muscle or joint aches.  Most symptoms resolve with 24-48 hours and do not require urgent medical evaluation unless they persist for more than 72 hours or symptoms are concerning for an unrelated medical condition.          _________________________________  Maria L Mathew DO  07/05/2022

## 2022-06-28 NOTE — PLAN OF CARE
Problem: Adult Inpatient Plan of Care  Goal: Plan of Care Review  Outcome: Ongoing, Progressing     Problem: Impaired Wound Healing  Goal: Optimal Wound Healing  Outcome: Ongoing, Progressing     Problem: Infection  Goal: Absence of Infection Signs and Symptoms  Outcome: Ongoing, Progressing

## 2022-06-28 NOTE — PHYSICIAN QUERY
PT Name: Dralene Avila  MR #: [id number]    DOCUMENTATION CLARIFICATION     CDS/: Toño Arias RN, CCDS       Contact information:   [email or web address]      This form is a permanent document in the medical record.     Query Date: June 28, 2022    By submitting this query, we are merely seeking further clarification of documentation. Please utilize your independent clinical judgment when addressing the question(s) below.    The Medical Record contains the following:   Indicators   Supporting Clinical Findings Location in Medical Record   x AMS, Confusion,  LOC, etc.  6/18 H&P: HM: mental stat change; disoriented; Alert, able to follow some simple commands.   HyperNatremia      6/19 HM: Waxing and waning mental status, unclear baseline    6/21 Neuro: MENTAL STATUS ;  Attention: decreased. Concentration: decreased. Level of consciousness: drowsy    6/24 HM: Patient continues to be alert and attempting to speak   Hospital medicine (HM)        HM, PN      Neurology        HM, PN   x Acute/Chronic Illness HyperNatremia  ;  diabetes type 2, seizure c/b anoxic brain injury s/p trach, PEG, bed bound status, Stage IV sacral ulcer, ischemic CVA, IBS,....   6/18 H&P: HM    Radiology Findings     x Electrolyte Imbalance 6/18 Sodium: 157, 155,  148,    6/19  Sodium:  150,  151,  149,  153,  155, 152 Labs      Medication      Treatment             x Other 6/22 EEG: moderate encephalopathy;setting of toxic/metabolic derangements  6/23 EEG: no electrographic seizures Neurology      The noted clinical guidelines are only system guidelines and do not replace the providers clinical judgment.    The National [place] of Neurologic Disorders and Stroke (NINDS) of the NIH describes encephalopathy as any diffuse disease of the brain that alters brain function or structure.    Provider, please specify the mental status change/ disoriented diagnosis associated with above clinical findings.    [   ] Metabolic  Encephalopathy - Due to electrolyte imbalance, metabolic derangements, or infectious processes, includes Septic Encephalopathy, Uremic Encephalopathy     [   ] Encephalopathy, unspecified        [ x  ] Other Encephalopathy (please specify): Mixed metabolic and neurologic encephalopathy with component of polypharmacy____________________     [   ] Other neurological condition- Includes Post-ictal altered mental status (please specify condition): __________     [   ]  Clinically Undetermined     Please document in your progress notes daily for the duration of treatment until resolved, and include in your discharge summary.    References:  KRYSTA Adhikari RN, CCDS. (2018, June 9). Notes from the Instructor: Encephalopathy tips. Retrieved October 22, 2020, from [email or web address]    ICD-10-CM/PCS [employer] Integrated Codebook (Version V.20.8.10.0) [Computer software]. (2020). Retrieved October 21, 2020.    Form No. 28959

## 2022-06-28 NOTE — SUBJECTIVE & OBJECTIVE
Interval History: AF HDS NAEON. Patient slightly less responsive, but appears to be responsive intermittently, specifically when family in room. Responding to simple commands. ENT recommending keeping trach in;  in place. ABG drawn yesterday afternoon and this AM at baseline.    Review of Systems   Unable to perform ROS: Patient nonverbal   Respiratory:  Negative for chest tightness and shortness of breath.    Cardiovascular:  Negative for chest pain.   Gastrointestinal:  Negative for abdominal pain.   Objective:     Vital Signs (Most Recent):  Temp: 97.9 °F (36.6 °C) (06/28/22 1219)  Pulse: 85 (06/28/22 1219)  Resp: 17 (06/28/22 1219)  BP: (!) 155/72 (06/28/22 1219)  SpO2: 100 % (06/28/22 1219)   Vital Signs (24h Range):  Temp:  [97.5 °F (36.4 °C)-98.6 °F (37 °C)] 97.9 °F (36.6 °C)  Pulse:  [] 85  Resp:  [16-24] 17  SpO2:  [93 %-100 %] 100 %  BP: (131-155)/(62-77) 155/72     Weight: 50.2 kg (110 lb 10.7 oz)  Body mass index is 22.35 kg/m².    Intake/Output Summary (Last 24 hours) at 6/28/2022 8258  Last data filed at 6/28/2022 0852  Gross per 24 hour   Intake 2280 ml   Output 1250 ml   Net 1030 ml      Physical Exam  Vitals and nursing note reviewed.   Constitutional:       General: She is not in acute distress.     Appearance: She is not ill-appearing, toxic-appearing or diaphoretic.   HENT:      Head: Normocephalic.      Mouth/Throat:      Mouth: Mucous membranes are dry.      Comments: Dried oral secretions adhered to tongue  Cardiovascular:      Rate and Rhythm: Normal rate and regular rhythm.      Pulses: Normal pulses.      Heart sounds: Normal heart sounds. No murmur heard.  Pulmonary:      Effort: Pulmonary effort is normal. No respiratory distress.      Breath sounds: Normal breath sounds. No wheezing or rales.   Abdominal:      General: Abdomen is flat. Bowel sounds are normal. There is no distension.      Palpations: Abdomen is soft.      Tenderness: There is no abdominal tenderness.    Musculoskeletal:         General: No swelling or tenderness. Normal range of motion.      Cervical back: Normal range of motion.   Skin:     General: Skin is warm and dry.      Coloration: Skin is not jaundiced or pale.      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    Neurological:      Mental Status: She is alert.      Comments: Alert and able to follow commands    Psychiatric:      Comments: Unable to assess       Significant Labs: All pertinent labs within the past 24 hours have been reviewed.    Significant Imaging: I have reviewed all pertinent imaging results/findings within the past 24 hours.

## 2022-06-28 NOTE — RESPIRATORY THERAPY
RAPID RESPONSE RESPIRATORY THERAPY PROACTIVE NOTE           Time of visit: 715    Code Status: DNR   : 1950  Bed: 04234/96338 A:   MRN: [id number]  Time spent at the bedside: < 15 min    SITUATION    Evaluated patient for: LDA Check     BACKGROUND    Patient has a past medical history of Arthritis, Cancer of breast, Cataract, Diabetes mellitus, Hypertension, Hypoxia, Memory loss, Orthostatic hypotension, TIA (transient ischemic attack), and Vitamin D deficiency.  Clinically Significant Surgical Hx: tracheostomy    24 Hours Vitals Range:  Temp:  [97.5 °F (36.4 °C)-98.6 °F (37 °C)]   Pulse:  []   Resp:  [16-24]   BP: (124-150)/(57-77)   SpO2:  [93 %-100 %]     Labs:    Recent Labs     22  0426 22  0250 22  0402    140 142   K 3.7 5.2* 4.7   * 119* 120*   CO2 15* 14* 17*   CREATININE 0.6 0.6 0.7   GLU 50* 103 207*   PHOS 2.2* 2.5* 2.0*   MG 1.8 1.8 1.8        Recent Labs     22  1722   PH 7.344*   PCO2 27.8*   PO2 82   HCO3 15.2*   POCSATURATED 96   BE -11       ASSESSMENT/INTERVENTIONS    Upon arrival in room all trach supplies are at bedside.  There is a shiley 4.0 cuffed and a shiley 6.0 cuffed at bedside    Last VS   Temp: 97.6 °F (36.4 °C) (720)  Pulse: 85 (720)  Resp: 16 (720)  BP: 134/62 (720)  SpO2: 100 % (720)    Level of Consciousness: Level of Consciousness (AVPU):  (asleep)  Respiratory Effort: Respiratory Effort: Normal, Unlabored Expansion/Accessory Muscle Usage: Expansion/Accessory Muscles/Retractions: expansion symmetric, no retractions, no use of accessory muscles  All Lung Field Breath Sounds: All Lung Fields Breath Sounds: Anterior:, Lateral:, crackles, coarse  O2 Device/Concentration: Flow (L/min): 10, Oxygen Concentration (%): 21, O2 Device (Oxygen Therapy): room air  Surgical airway: Yes, Type: Shiley Size: 6, uncuffed  Ambu at bedside: Ambu bag with the patient?: Yes, Adult Ambu     Active Orders   Respiratory  Care    Oxygen Continuous     Frequency: Continuous     Number of Occurrences: Until Specified     Order Questions:      Device type: Low flow      Device: Trach Collar      FiO2%: 28      Titrate O2 per Oxygen Titration Protocol: Yes      To maintain SpO2 goal of: >= 90%      Notify MD of: Inability to achieve desired SpO2; Sudden change in patient status and requires 20% increase in FiO2; Patient requires >60% FiO2    Routine tracheostomy care     Frequency: BID     Number of Occurrences: Until Specified    SUCTION PRN     Frequency: PRN     Number of Occurrences: Until Specified       RECOMMENDATIONS    We recommend: RRT Recs: Continue POC per primary team.    ESCALATION        FOLLOW-UP    Please call back the Rapid Response RT, Chetna Puckett, RRT at x [phone] for any questions or concerns.

## 2022-06-28 NOTE — ASSESSMENT & PLAN NOTE
RESOLVED    Patient admitted with elevated WBC to 14s. Previous episodes of aspiration PNA at OSH, indwelling cabrera given acuity of condition, sacral decubitus ulcer stave IV s/p debridement with enterococcus faecalis / bacteriodes fragilis in cx. Patient afebrile with stable hemodynamics on admission at this time. CXR without acute abnormality. UA with yeast growing, chronic per previous OSH review.    Leukocytosis may be explained by soft tissue infection vs hemoconcentration given extensive dehydration and hypernatremia from lack of enteral fluids or IVF at outside hospice facility [place] [facility] [place]     - Zosyn given previous cultures and persistent leukocytosis  - Etiology may be soft tissue infection; see Sacral Decubitus Ulcer A/P  - Continue monitoring CBC  - Wound care per Sacral Decubitus Ulcer A/P  - Aspiration precautions  - Trend fever curve  - Trend BCx

## 2022-06-28 NOTE — PLAN OF CARE
Pt is lethargic, intermitern alert .Pt turned,cleaned and repositioned through the shift. No new skin breakdown noted.Wound care performed to coccyx area.  Pt pain and safety monitored q 1-2 hrs this shift . Blood glucose monitored through shift .Pt on cont PEG tube feeding Glucerna 1.5  @35 mL/h,water bolus 500 ml given q 6 h.  Bed locked and in lowest position. Rails elevated x 3. Brakes on. Call light and personal belongings in reach. Will continue monitor.Pt family at bedside

## 2022-06-28 NOTE — ASSESSMENT & PLAN NOTE
Darlene Avila is a 71 year old F with history of diabetes type 2, seizure, ischemic CVA, IBS, history of breast cancer s/p right mastectomy and failure to thrive who was brought in by her HPOA (sister) for skilled nursing home placement due to concern for neglect at recent hospice facility. Patient follows very simple commands and doubt she can engage in skilled therapy. nursing home nursing facility likely more of an option.     - Appreciate CM/SW assistance with placement; placement difficult d/t trach; will require LTAC placement - referrals sent  - Appreciate pharmacy's assistance with med rec from previous admission

## 2022-06-29 LAB
ALBUMIN SERPL BCP-MCNC: 1.5 G/DL (ref 3.5–5.2)
ALP SERPL-CCNC: 83 U/L (ref 55–135)
ALT SERPL W/O P-5'-P-CCNC: 12 U/L (ref 10–44)
ANION GAP SERPL CALC-SCNC: 9 MMOL/L (ref 8–16)
AST SERPL-CCNC: 10 U/L (ref 10–40)
BASOPHILS # BLD AUTO: 0.03 K/UL (ref 0–0.2)
BASOPHILS NFR BLD: 0.4 % (ref 0–1.9)
BILIRUB SERPL-MCNC: 0.1 MG/DL (ref 0.1–1)
BUN SERPL-MCNC: 13 MG/DL (ref 8–23)
CALCIUM SERPL-MCNC: 8.8 MG/DL (ref 8.7–10.5)
CHLORIDE SERPL-SCNC: 119 MMOL/L (ref 95–110)
CO2 SERPL-SCNC: 14 MMOL/L (ref 23–29)
CREAT SERPL-MCNC: 0.6 MG/DL (ref 0.5–1.4)
DIFFERENTIAL METHOD: ABNORMAL
EOSINOPHIL # BLD AUTO: 0.1 K/UL (ref 0–0.5)
EOSINOPHIL NFR BLD: 1.4 % (ref 0–8)
ERYTHROCYTE [DISTWIDTH] IN BLOOD BY AUTOMATED COUNT: 19.2 % (ref 11.5–14.5)
EST. GFR  (AFRICAN AMERICAN): >60 ML/MIN/1.73 M^2
EST. GFR  (NON AFRICAN AMERICAN): >60 ML/MIN/1.73 M^2
GLUCOSE SERPL-MCNC: 148 MG/DL (ref 70–110)
HCT VFR BLD AUTO: 25.1 % (ref 37–48.5)
HGB BLD-MCNC: 7.7 G/DL (ref 12–16)
IMM GRANULOCYTES # BLD AUTO: 0.03 K/UL (ref 0–0.04)
IMM GRANULOCYTES NFR BLD AUTO: 0.4 % (ref 0–0.5)
LYMPHOCYTES # BLD AUTO: 2.7 K/UL (ref 1–4.8)
LYMPHOCYTES NFR BLD: 33 % (ref 18–48)
MAGNESIUM SERPL-MCNC: 1.8 MG/DL (ref 1.6–2.6)
MCH RBC QN AUTO: 26.6 PG (ref 27–31)
MCHC RBC AUTO-ENTMCNC: 30.7 G/DL (ref 32–36)
MCV RBC AUTO: 87 FL (ref 82–98)
MONOCYTES # BLD AUTO: 0.4 K/UL (ref 0.3–1)
MONOCYTES NFR BLD: 4.9 % (ref 4–15)
NEUTROPHILS # BLD AUTO: 4.9 K/UL (ref 1.8–7.7)
NEUTROPHILS NFR BLD: 59.9 % (ref 38–73)
NRBC BLD-RTO: 0 /100 WBC
PHOSPHATE SERPL-MCNC: 2.4 MG/DL (ref 2.7–4.5)
PLATELET # BLD AUTO: 492 K/UL (ref 150–450)
PMV BLD AUTO: 9.7 FL (ref 9.2–12.9)
POCT GLUCOSE: 152 MG/DL (ref 70–110)
POCT GLUCOSE: 182 MG/DL (ref 70–110)
POCT GLUCOSE: 194 MG/DL (ref 70–110)
POCT GLUCOSE: 218 MG/DL (ref 70–110)
POTASSIUM SERPL-SCNC: 4.9 MMOL/L (ref 3.5–5.1)
PROT SERPL-MCNC: 6.2 G/DL (ref 6–8.4)
RBC # BLD AUTO: 2.9 M/UL (ref 4–5.4)
SODIUM SERPL-SCNC: 142 MMOL/L (ref 136–145)
WBC # BLD AUTO: 8.14 K/UL (ref 3.9–12.7)

## 2022-06-29 PROCEDURE — 85025 COMPLETE CBC W/AUTO DIFF WBC: CPT

## 2022-06-29 PROCEDURE — 80053 COMPREHEN METABOLIC PANEL: CPT

## 2022-06-29 PROCEDURE — 95813 EEG EXTND MNTR 61-119 MIN: CPT

## 2022-06-29 PROCEDURE — 27200966 HC CLOSED SUCTION SYSTEM

## 2022-06-29 PROCEDURE — 63600175 PHARM REV CODE 636 W HCPCS: Performed by: STUDENT IN AN ORGANIZED HEALTH CARE EDUCATION/TRAINING PROGRAM

## 2022-06-29 PROCEDURE — 36415 COLL VENOUS BLD VENIPUNCTURE: CPT

## 2022-06-29 PROCEDURE — 99233 PR SUBSEQUENT HOSPITAL CARE,LEVL III: ICD-10-PCS | Mod: ,,, | Performed by: HOSPITALIST

## 2022-06-29 PROCEDURE — 63600175 PHARM REV CODE 636 W HCPCS

## 2022-06-29 PROCEDURE — 99233 SBSQ HOSP IP/OBS HIGH 50: CPT | Mod: ,,, | Performed by: HOSPITALIST

## 2022-06-29 PROCEDURE — 25000003 PHARM REV CODE 250

## 2022-06-29 PROCEDURE — 99900035 HC TECH TIME PER 15 MIN (STAT)

## 2022-06-29 PROCEDURE — 83735 ASSAY OF MAGNESIUM: CPT

## 2022-06-29 PROCEDURE — 12000002 HC ACUTE/MED SURGE SEMI-PRIVATE ROOM

## 2022-06-29 PROCEDURE — 99900022

## 2022-06-29 PROCEDURE — 84100 ASSAY OF PHOSPHORUS: CPT

## 2022-06-29 PROCEDURE — 94761 N-INVAS EAR/PLS OXIMETRY MLT: CPT

## 2022-06-29 PROCEDURE — 25000003 PHARM REV CODE 250: Performed by: STUDENT IN AN ORGANIZED HEALTH CARE EDUCATION/TRAINING PROGRAM

## 2022-06-29 PROCEDURE — 95822 PR EEG,COMA/SLEEP RECORD ONLY: ICD-10-PCS | Mod: 26,,, | Performed by: PSYCHIATRY & NEUROLOGY

## 2022-06-29 PROCEDURE — 99900031 HC PATIENT EDUCATION (STAT)

## 2022-06-29 PROCEDURE — 95822 EEG COMA OR SLEEP ONLY: CPT | Mod: 26,,, | Performed by: PSYCHIATRY & NEUROLOGY

## 2022-06-29 RX ORDER — FLUCONAZOLE 200 MG/1
200 TABLET ORAL DAILY
Qty: 14 TABLET | Refills: 0 | Status: SHIPPED | OUTPATIENT
Start: 2022-06-29 | End: 2022-06-29 | Stop reason: HOSPADM

## 2022-06-29 RX ORDER — FLUCONAZOLE 200 MG/1
200 TABLET ORAL DAILY
Status: DISCONTINUED | OUTPATIENT
Start: 2022-06-29 | End: 2022-06-29

## 2022-06-29 RX ADMIN — HEPARIN SODIUM 5000 UNITS: 5000 INJECTION INTRAVENOUS; SUBCUTANEOUS at 10:06

## 2022-06-29 RX ADMIN — PIPERACILLIN SODIUM AND TAZOBACTAM SODIUM 4.5 G: 4; .5 INJECTION, POWDER, LYOPHILIZED, FOR SOLUTION INTRAVENOUS at 10:06

## 2022-06-29 RX ADMIN — CHOLESTYRAMINE 8 G: 4 POWDER, FOR SUSPENSION ORAL at 10:06

## 2022-06-29 RX ADMIN — INSULIN ASPART 2 UNITS: 100 INJECTION, SOLUTION INTRAVENOUS; SUBCUTANEOUS at 05:06

## 2022-06-29 RX ADMIN — ATORVASTATIN CALCIUM 80 MG: 20 TABLET, FILM COATED ORAL at 10:06

## 2022-06-29 RX ADMIN — LEVETIRACETAM 750 MG: 100 SOLUTION ORAL at 10:06

## 2022-06-29 RX ADMIN — SODIUM BICARBONATE 650 MG TABLET 650 MG: at 10:06

## 2022-06-29 RX ADMIN — INSULIN ASPART 2 UNITS: 100 INJECTION, SOLUTION INTRAVENOUS; SUBCUTANEOUS at 10:06

## 2022-06-29 RX ADMIN — PIPERACILLIN SODIUM AND TAZOBACTAM SODIUM 4.5 G: 4; .5 INJECTION, POWDER, LYOPHILIZED, FOR SOLUTION INTRAVENOUS at 05:06

## 2022-06-29 RX ADMIN — INSULIN ASPART 2 UNITS: 100 INJECTION, SOLUTION INTRAVENOUS; SUBCUTANEOUS at 01:06

## 2022-06-29 RX ADMIN — FLUCONAZOLE 200 MG: 200 TABLET ORAL at 10:06

## 2022-06-29 NOTE — ACP (ADVANCE CARE PLANNING)
GOALS OF CARE / ACP    Extensive goals of care conversation held with family (sister, Sabi, HCPOA) 06/29/2022 1330. We discussed the current condition of the patient and her limited prognosis, citing her underlying neurological status, her extensive sacral decubitus ulcer, and the presence of the trach/PEG. Sabi verbalized understanding of the situation, and I spoke to her about our limited options regarding continued medical care. Initially, the patient's family's goal was to continue medical care through SNF, however, multiple SNF facilities have declined their case, citing the trach as the primary barrier (ENT has stated that trach should remain in place given neuro status), which I relayed to the family. The family verbalized understanding of the limited options we had regarding placement, citing that she does not believe she could adequately care for her at home by herself as she is not trained, and she does not believe Home Health 2-3x/week would be sufficient for her. She also feels that [place] [facility] [place] go to [place]AC would have them in the same position that they are now: no accepting SNF d/t trach, and limited options to go home.    Our conversation then pivoted towards adopting a hospice/comfort care strategy. Initially, family did not want to pursue hospice/comfort care measures because of miscommunication from a previous facility about what hospice/comfort entailed as well as what they describe as negligence from the part of the hospice facility ([facility] the patient was transferred to (family referred to the lack of feedings, lack of fluid through G tube, lack of turning, simple hygiene, etc.). They had previously met with the [facility]iative [facility] team and expressed such sentiments and thoughts.     However, during the conversation and the extensive conversation of limited medical prognosis and limited disposition options, the family is now more amenable to the option of hospice/comfort care strategy.  "Sabi states to the effect, "my sister didn't want to be hooked up to tubes for the rest of her life", "we made her a DNR because she didn't want to be reliant on machines", "I wanted to prolong her life and help her medically, but I see now that she might never get better. The mountain is too steep."    Patient family, Sabi, said that she would think about it overnight and provide an answer in the next coming days regarding hospice/comfort care. She stated she would welcome discussing the option with Palliative Care. Of note, she does not desire home w/ hospice, as she does not feel like she and the family can handle the emotional burden of watching the patient worsen at home.    A/P:  - Spoke with CM/SW, Yocasta Urbina, to determine which facilities would be options for [facility] nursing facility and what obstacles may be in place (re: trach, wound, etc.). F/U status.  - Consult Palliative Care for further options and to assist in detailing strategy moving forward with family.  - Patient remains DNR    Robin-lona Self MD  Internal Medicine PGY-1  [facility]    "

## 2022-06-29 NOTE — RESPIRATORY THERAPY
RAPID RESPONSE RESPIRATORY THERAPY PROACTIVE NOTE           Time of visit: 1711     Code Status: DNR   : 1950  Bed: 88226/38692 A:   MRN: [id number]  Time spent at the bedside: < 15 min    SITUATION    Evaluated patient for: LDA Check     BACKGROUND    Patient has a past medical history of Arthritis, Cancer of breast, Cataract, Diabetes mellitus, Hypertension, Hypoxia, Memory loss, Orthostatic hypotension, TIA (transient ischemic attack), and Vitamin D deficiency.  Clinically Significant Surgical Hx: tracheostomy    24 Hours Vitals Range:  Temp:  [97.7 °F (36.5 °C)-98.5 °F (36.9 °C)]   Pulse:  [82-93]   Resp:  [16-20]   BP: (100-152)/(53-74)   SpO2:  [95 %-100 %]     Labs:    Recent Labs     22  0250 22  0402 22  0321    142 142   K 5.2* 4.7 4.9   * 120* 119*   CO2 14* 17* 14*   CREATININE 0.6 0.7 0.6    207* 148*   PHOS 2.5* 2.0* 2.4*   MG 1.8 1.8 1.8        Recent Labs     22  1722 22  0938   PH 7.344* 7.339*   PCO2 27.8* 29.8*   PO2 82 96   HCO3 15.2* 16.0*   POCSATURATED 96 97   BE -11 -10       ASSESSMENT/INTERVENTIONS    Upon arrival in room patient sleeping in bed on RA, sat 98%. Trach has been capped. All supplies in room. Extra cuffed Shiley trachs at bedside, sizes 4 & 6.    Last VS   Temp: 98.3 °F (36.8 °C) ( 1603)  Pulse: 87 ( 1603)  Resp: 18 ( 1603)  BP: 139/74 ( 1603)  SpO2: 100 % ( 1603)    Level of Consciousness: Level of Consciousness (AVPU): responds to pain  Respiratory Effort: Respiratory Effort: Normal, Unlabored Expansion/Accessory Muscle Usage: Expansion/Accessory Muscles/Retractions: expansion symmetric  All Lung Field Breath Sounds: All Lung Fields Breath Sounds: coarse, diminished  IGNACIO Breath Sounds: diminished  RUL Breath Sounds: diminished  RML Breath Sounds: diminished  RLL Breath Sounds: diminished  O2 Device/Concentration: Flow (L/min): 0, Oxygen Concentration (%): 0, O2 Device (Oxygen Therapy): room air  (trach tube capped)  Surgical airway: Yes, Type: Shiley Size: 6, uncuffed  Ambu at bedside: Ambu bag with the patient?: Yes, Adult Ambu     Active Orders   Respiratory Care    Oxygen Continuous     Frequency: Continuous     Number of Occurrences: Until Specified     Order Questions:      Device type: Low flow      Device: Trach Collar      FiO2%: 28      Titrate O2 per Oxygen Titration Protocol: Yes      To maintain SpO2 goal of: >= 90%      Notify MD of: Inability to achieve desired SpO2; Sudden change in patient status and requires 20% increase in FiO2; Patient requires >60% FiO2    Routine tracheostomy care     Frequency: BID     Number of Occurrences: Until Specified    SUCTION PRN     Frequency: PRN     Number of Occurrences: Until Specified       RECOMMENDATIONS    We recommend: RRT Recs: Continue POC per primary team.    ESCALATION        FOLLOW-UP    Please call back the Rapid Response RT, Dennis Bentley RRT at x [phone] for any questions or concerns.

## 2022-06-29 NOTE — ASSESSMENT & PLAN NOTE
Darlene Avila is a 71 year old F with history of diabetes type 2, seizure, ischemic CVA, IBS, history of breast cancer s/p right mastectomy and failure to thrive who was brought in by her HPOA (sister) for skilled nursing home placement due to concern for neglect at recent hospice facility. Patient follows very simple commands and doubt she can engage in skilled therapy. halfway nursing facility likely more of an option.     - Appreciate CM/SW assistance with placement; placement difficult d/t trach; will require LTAC placement - referrals sent  - Appreciate pharmacy's assistance with med rec from previous admission

## 2022-06-29 NOTE — ASSESSMENT & PLAN NOTE
Patient previously started on Lacosamide and Keppra at OSH during initial episode of status epilepticus. Phenytoin added as patient continued to have breakthrough seizures.    - Due to intermittent wax/wane alertness/responsiveness, EEG; neuro ep to follow  - Continue Keppra monotherapy 750 bid; titrate per neuro recs  - Neurology consulted for further assistance in antiepileptic regimen

## 2022-06-29 NOTE — PLAN OF CARE
JAZMIN received message from April at [facility] [phone] regarding patient acceptance to facility.      JAZMIN returned April's call and asked if SW could pass along April's information to the family after she gives them a call.  April agreed.    JAZMIN called sister Sabi [phone] and spoke with her about the LTAC and the difficulty finding a SNF placement due to the trach.  Sister Sabi had some questions that the SW could not answer and stated that it be best if sister asked those questions to the facility.  JAZMIN provided sister Sabi with April from [facility] contact information.    JAZMIN followed up with sister Sabi from the morning.  Sabi stated she did not understand why pt would move from one hospital to another.  Sabi stated that we ([facility]) could keep pt here at this hospital.  JAZMIN explained that with the LTAC the pt would receive more specialized care than she would here.  JAZMIN explained that if pt is medically ready for d/c they could not keep her in the hospital and that the family may have to pay for her stay.  Sister Sabi stated that pt's insurance would pay, SW stated that may not happen.  Sister stated she would contact the insurance company.    JAZMIN called April from [facility] about d/c pt.  April stated she could not submit for auth until she received a disposition (d/c summary) from pt's family.  April stated that sister Sabi said she would call April back later in the day or on 06/30 after speaking to other family members about pt disposition upon d/c form LTAC - correction NH or home.     Yocasta Urbina, JOANIE, MSW, LMSW, RSW   Case Management  [facility]  Email: [email or web address]

## 2022-06-29 NOTE — PLAN OF CARE
Problem: Adult Inpatient Plan of Care  Goal: Plan of Care Review  Outcome: Ongoing, Progressing  Goal: Patient-Specific Goal (Individualized)  Outcome: Ongoing, Progressing  Goal: Absence of Hospital-Acquired Illness or Injury  Outcome: Ongoing, Progressing     VSS and monitoring patient for pain throughout shift. TF continued at goal rate of 35cc/hr to peg & pt tolerating well.  Fall risk prevention in place, SR up x2, call bell within reach, bed in lowest/locked position, & skid proof socks on.  Care plan explained to sister/POA by MD at bedside. CM also came to speak with sister as well.  Wcm.

## 2022-06-29 NOTE — SUBJECTIVE & OBJECTIVE
Interval History: AF HDS NAEON. Patient intermittently alert and responsive, less than previous 48 hours, appears diaphoretic on exam though afebrile. UA grown yeast, cultures pending. Family at bedside yesterday evening, condition and plan explained to them; they continue to work with CM/SW for acceptable post-acute placement.    Review of Systems   Unable to perform ROS: Patient nonverbal   Respiratory:  Negative for chest tightness and shortness of breath.    Cardiovascular:  Negative for chest pain.   Gastrointestinal:  Negative for abdominal pain.   Objective:     Vital Signs (Most Recent):  Temp: 97.9 °F (36.6 °C) (06/29/22 0837)  Pulse: 93 (06/29/22 0837)  Resp: 18 (06/29/22 0837)  BP: (!) 112/53 (06/29/22 0851)  SpO2: 96 % (06/29/22 0837)   Vital Signs (24h Range):  Temp:  [97.7 °F (36.5 °C)-98.5 °F (36.9 °C)] 97.9 °F (36.6 °C)  Pulse:  [82-93] 93  Resp:  [16-20] 18  SpO2:  [96 %-100 %] 96 %  BP: (100-155)/(53-72) 112/53     Weight: 50.2 kg (110 lb 10.7 oz)  Body mass index is 22.35 kg/m².    Intake/Output Summary (Last 24 hours) at 6/29/2022 0914  Last data filed at 6/29/2022 0500  Gross per 24 hour   Intake 1500 ml   Output 1250 ml   Net 250 ml      Physical Exam  Vitals and nursing note reviewed.   Constitutional:       General: She is not in acute distress.     Appearance: She is diaphoretic. She is not ill-appearing or toxic-appearing.   HENT:      Head: Normocephalic.      Mouth/Throat:      Mouth: Mucous membranes are dry.      Comments: Dried oral secretions adhered to tongue  Cardiovascular:      Rate and Rhythm: Normal rate and regular rhythm.      Pulses: Normal pulses.      Heart sounds: Normal heart sounds. No murmur heard.  Pulmonary:      Effort: Pulmonary effort is normal. No respiratory distress.      Breath sounds: Normal breath sounds. No wheezing or rales.   Abdominal:      General: Abdomen is flat. Bowel sounds are normal. There is no distension.      Palpations: Abdomen is soft.       Tenderness: There is no abdominal tenderness.   Musculoskeletal:         General: No swelling or tenderness. Normal range of motion.      Cervical back: Normal range of motion.   Skin:     General: Skin is warm.      Coloration: Skin is not jaundiced or pale.      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    Neurological:      Mental Status: She is alert.      Comments: Alert and able to follow commands    Psychiatric:      Comments: Unable to assess       Significant Labs: All pertinent labs within the past 24 hours have been reviewed.    Significant Imaging: I have reviewed all pertinent imaging results/findings within the past 24 hours.

## 2022-06-29 NOTE — ASSESSMENT & PLAN NOTE
Dysarthria  Dysphagia    Trach capped, good O2 sats on RA     - Respiratory therapy to assist with trach care.   - SLP for swallow evaluation and speech therapy; recommending NPO, re-evaluation, PMSV under direct supervision  - ENT advising to keep tracheostomy in place due to intermittent responsiveness; amenable to decannulation if patient family moves towards hospice/comfort measures

## 2022-06-29 NOTE — PT/OT/SLP PROGRESS
Physical Therapy      Patient Name:  Darlene Avila   MRN:  [id number]    Patient not seen today secondary to pt in the care of nursing on 1st attempt. I was unable to return for a second attempt. Will follow-up per PT POC.

## 2022-06-29 NOTE — PLAN OF CARE
SW followed-up on potential half-way nursing to determine if the placement barriers would be the same as SNF placement barriers.  Both SNF and half-way nursing placement will be difficult due to pt continuing with trach as many facilities do not have raspatory therapy available.      Yocasta Urbina, JOANIE, MSW, LMSW, RSW   Case Management  [facility]  Email: [email or web address]

## 2022-06-29 NOTE — PROGRESS NOTES
[facility] - Intensive Care ([place] [address])  [facility] Medicine  Progress Note    Patient Name: Darlene Avila  MRN: [id number]  Patient Class: IP- Inpatient   Admission Date: 6/17/2022  Length of Stay: 10 days  Attending Physician: Stacey Ugalde MD  Primary Care Provider: Kirill Cabrera Iii, MD        Subjective:     Principal Problem:Hypernatremia    HPI:  Darlene Avila is a 71 year old F with history of diabetes type 2, seizure c/b anoxic brain injury s/p trach, PEG, bed bound status, Stage IV sacral ulcer, ischemic CVA, IBS, history of breast cancer s/p right mastectomy and failure to thrive who was brought in by her HPOA (sister) for nursing home placement. Patient is unable to provide history.     Patient's sister was contacted who stated that the patient was admitted to [facility] in [facility] for recurrent seizures/status epilepticus which resulted in an anoxic brain injury and respiratory failure.  Patient was intubated followed by tracheostomy and PEG placement .  Patient was discharged to nursing home and subsequently discharged to hospice care at the request of patient's sister. However a few days ago, she visited the patient at [facility] recovery facility and noticed they weren't feeding or taking care of the patient due to her 'hospice status'. Per sister today, she would like to place the patient in a skilled nursing facility and would like to rescind her hospice care at this time.  She is no longer interested in hospice placement. She states the patient at baseline is only able to follow some simple commands. She is unsure how much O2 level is her baseline via trach collar.   Of note she has a Stage IV sacral wound that is s/p debridement on 06/09 by Gen surgery in [facility].     Upon arrival to the ED, the patient was hemodynamically stable. Labs revealed Na 157, WBC 14. Hgb 9.4 (baseline 7-9). The patient was given 1L of IV NS and was admitted to hospital medicine for further management.        Overview/Hospital Course:  Patient admitted to hospital medicine with pre-existing anoxic brain injury, hypernatremia, sacral decubitus wound s/p debridement 06/08 in [place], [place]. Patient initially admitted to hospice following discharge from [facility] ICU, but discharged from hospice as patient family believed she was not getting appropriate care. Na 157, corrected with IVF (NS and hypotonic solutions) and enteral FWF down to 148. Trach collar exchanged by ENT due to lack of supplies at facility. Wound care consulted for sacral decubitus, recommended General Surgery consult for possible debridement as wound appears purulent. Persistent leukocytosis, started Zosyn per previous I&D wound cultures growing bacteriodes/enterococcus and new wound findings. cEEG started and Epilepsy consulted for assistance with antiepileptic medications; conflicting reports if patient was on only Keppra/Lacosamide vs Keppra/Lacosamide/Phenytoin. General surgery consulted for debridement of sacral decubitus ulcer, s/p I&D 06/24. Palliative Care consulted for assistance in GOC; decision to pursue medical/surgical care decided. ENT consulted for possible trach decannulation. ID consulted, planning for 6-weeks course of zosyn.     CM/SW to assist with dispo, family desire for continued nursing care. However, due to trach placement, SNF limited; LTAC alternative.      Interval History: AF HDS NAEON. Patient intermittently alert and responsive, less than previous 48 hours, appears diaphoretic on exam though afebrile. UA grown yeast, cultures pending. Family at bedside yesterday evening, condition and plan explained to them; they continue to work with CM/SW for acceptable post-acute placement.    Review of Systems   Unable to perform ROS: Patient nonverbal   Respiratory:  Negative for chest tightness and shortness of breath.    Cardiovascular:  Negative for chest pain.   Gastrointestinal:  Negative for abdominal pain.   Objective:      Vital Signs (Most Recent):  Temp: 97.9 °F (36.6 °C) (06/29/22 0837)  Pulse: 93 (06/29/22 0837)  Resp: 18 (06/29/22 0837)  BP: (!) 112/53 (06/29/22 0851)  SpO2: 96 % (06/29/22 0837)   Vital Signs (24h Range):  Temp:  [97.7 °F (36.5 °C)-98.5 °F (36.9 °C)] 97.9 °F (36.6 °C)  Pulse:  [82-93] 93  Resp:  [16-20] 18  SpO2:  [96 %-100 %] 96 %  BP: (100-155)/(53-72) 112/53     Weight: 50.2 kg (110 lb 10.7 oz)  Body mass index is 22.35 kg/m².    Intake/Output Summary (Last 24 hours) at 6/29/2022 0914  Last data filed at 6/29/2022 0500  Gross per 24 hour   Intake 1500 ml   Output 1250 ml   Net 250 ml      Physical Exam  Vitals and nursing note reviewed.   Constitutional:       General: She is not in acute distress.     Appearance: She is diaphoretic. She is not ill-appearing or toxic-appearing.   HENT:      Head: Normocephalic.      Mouth/Throat:      Mouth: Mucous membranes are dry.      Comments: Dried oral secretions adhered to tongue  Cardiovascular:      Rate and Rhythm: Normal rate and regular rhythm.      Pulses: Normal pulses.      Heart sounds: Normal heart sounds. No murmur heard.  Pulmonary:      Effort: Pulmonary effort is normal. No respiratory distress.      Breath sounds: Normal breath sounds. No wheezing or rales.   Abdominal:      General: Abdomen is flat. Bowel sounds are normal. There is no distension.      Palpations: Abdomen is soft.      Tenderness: There is no abdominal tenderness.   Musculoskeletal:         General: No swelling or tenderness. Normal range of motion.      Cervical back: Normal range of motion.   Skin:     General: Skin is warm.      Coloration: Skin is not jaundiced or pale.      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    Neurological:      Mental Status: She is alert.      Comments: Alert and able to follow commands    Psychiatric:      Comments: Unable to assess       Significant Labs: All pertinent labs within the past 24 hours have been  reviewed.    Significant Imaging: I have reviewed all pertinent imaging results/findings within the past 24 hours.      Assessment/Plan:      * Hypernatremia  STABLE, WNL    Na 157. Suspect from dehydration while at her recovery facility.   - FWD 2.7 on admission.   S/p 1L of NS in the ED.     - Continue 500cc QID free water boluses  - Na q6h    Discharge planning issues  Darlene Avila is a 71 year old F with history of diabetes type 2, seizure, ischemic CVA, IBS, history of breast cancer s/p right mastectomy and failure to thrive who was brought in by her HPOA (sister) for skilled nursing home placement due to concern for neglect at recent hospice facility. Patient follows very simple commands and doubt she can engage in skilled therapy. nursing home nursing facility likely more of an option.     - Appreciate CM/SW assistance with placement; placement difficult d/t trach; will require LTAC placement - referrals sent  - Appreciate pharmacy's assistance with med rec from previous admission    Sacral decubitus ulcer, stage IV  S/p recent debridement at OSH 06/08. OSH Wcx with enterococcus faecalis, bacteriodes fragilus.    - Wound care consulted; appreciate assistance  - Deep wound cx  - General surgery consulted s/p debridement 06/24, no note of bone involvement per OP note, no cultures sent  - Infectious Disease consulted, zosyn for 6-weeks  - Consideration for wound vac given depth of injury  - Turn q2h    Leukocytosis  RESOLVED    Patient admitted with elevated WBC to 14s. Previous episodes of aspiration PNA at OSH, indwelling cabrera given acuity of condition, sacral decubitus ulcer stave IV s/p debridement with enterococcus faecalis / bacteriodes fragilis in cx. Patient afebrile with stable hemodynamics on admission at this time. CXR without acute abnormality. UA with yeast growing, chronic per previous OSH review.    Leukocytosis may be explained by soft tissue infection vs hemoconcentration given extensive  dehydration and hypernatremia from lack of enteral fluids or IVF at outside hospice facility [place] [facility]     - Zosyn given previous cultures and persistent leukocytosis  - Etiology may be soft tissue infection; see Sacral Decubitus Ulcer A/P  - Continue monitoring CBC  - Wound care per Sacral Decubitus Ulcer A/P  - Aspiration precautions  - Trend fever curve  - Trend BCx    Tracheostomy in place  Dysarthria  Dysphagia    Trach capped, good O2 sats on RA     - Respiratory therapy to assist with trach care.   - SLP for swallow evaluation and speech therapy; recommending NPO, re-evaluation, PMSV under direct supervision  - ENT advising to keep tracheostomy in place due to intermittent responsiveness; amenable to decannulation if patient family moves towards hospice/comfort measures    Seizures  Patient previously started on Lacosamide and Keppra at OSH during initial episode of status epilepticus. Phenytoin added as patient continued to have breakthrough seizures.    - Due to intermittent wax/wane alertness/responsiveness, EEG; neuro ep to follow  - Continue Keppra monotherapy 750 bid; titrate per neuro recs  - Neurology consulted for further assistance in antiepileptic regimen    Encephalopathy        Palliative care encounter  Per Primary Medicine team and Palliative Care encounters with patient family (separate encounters, see dated notes in chart), patient family wishes full medical care and does not desire comfort/hospice measures. DNR remains. See Pall Care note for further detail.    G tube feedings  Tube feed dependent via G tube  - Nutrition to assist with TF recs; see recommendations in note; appreciate assistance      ACP (advance care planning)  Patient is DNR. LaPOST on file.     Of note, patient family does NOT desire hospice, and has repeatedly stated so.    History of CVA (cerebrovascular accident)  Unclear of patient's current medications. Per med review on care-everywhere, the patient is  on statin but not on antiplatelet therapy.     - Appreciate pharmacy's assistance with medication reconciliation with facility- start antiplatelet therapy if no contraindications.   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 A1c repeated 6.9.    - LDSSI   - POCT glucose q6hrs  - Maintain -180  - Titrate basal/bolus regimen to goal as above.      VTE Risk Mitigation (From admission, onward)         Ordered     heparin (porcine) injection 5,000 Units  Every 12 hours         06/18/22 0355     IP VTE HIGH RISK PATIENT  Once         06/18/22 0355     Place sequential compression device  Until discontinued         06/18/22 0355              Discharge Planning   JUDIT: 6/30/2022     Code Status: DNR   Is the patient medically ready for discharge?: Yes    Reason for patient still in hospital (select all that apply): Treatment and Pending disposition  Discharge Plan A: Long-term acute care facility (LTAC)   Discharge Delays: None known at this time        Dawood Self MD  Department of Hospital Medicine   [facility] - Intensive Care ([place])

## 2022-06-29 NOTE — CONSULTS
[facility] - Intensive Care ([place] [address])  Wound Care    Patient Name:  Darlene Avila   MRN:  [id number]  Date: 6/29/2022  Diagnosis: Hypernatremia    History:     Past Medical History:   Diagnosis Date    Arthritis     Cancer of breast     s/p mastectomy (on anastrozole)     Cataract     Diabetes mellitus     c/b Diabetic retinopathy    Hypertension     resolved    Hypoxia 4/15/2021    Memory loss     due to strokes    Orthostatic hypotension 12/27/2020    frequent falls, on midodrine.  4/2021 seen by both cards and palliative care    TIA (transient ischemic attack) 5304-0566    CTH with remote infarcts    Vitamin D deficiency 10/14/2021       Social History     Socioeconomic History    Marital status: Single   Tobacco Use    Smoking status: Current Every Day Smoker     Packs/day: 1.50     Types: Cigarettes    Smokeless tobacco: Never Used   Substance and Sexual Activity    Alcohol use: No    Drug use: No    Sexual activity: Not Currently     Social Determinants of Health     Financial Resource Strain: Low Risk     Difficulty of Paying Living Expenses: Not very hard   Food Insecurity: No Food Insecurity    Worried About Running Out of Food in the Last Year: Never true    Ran Out of Food in the Last Year: Never true   Transportation Needs: No Transportation Needs    Lack of Transportation (Medical): No    Lack of Transportation (Non-Medical): No   Physical Activity: Inactive    Days of Exercise per Week: 0 days    Minutes of Exercise per Session: 0 min   Stress: No Stress Concern Present    Feeling of Stress : Only a little   Social Connections: Unknown    Frequency of Communication with Friends and Family: More than three times a week    Frequency of Social Gatherings with Friends and Family: More than three times a week    Attends Mor[employer] Services: Never    Active Member of Clubs or Organizations: No    Attends Club or Organization Meetings: Never   Housing Stability: Unknown     Unable to Pay for Housing in the Last Year: No    Unstable Housing in the Last Year: No       Precautions:     Allergies as of 06/17/2022 - Reviewed 10/23/2021   Allergen Reaction Noted    Iodinated contrast media Hives 03/17/2013       [facility] Assessment Details/Treatment   Wound care consult for sacrum.  There is a full thickness skin loss to the sacrum that was debrided on 06/24 by general surgery. The wound is pink and moist with slough and fibrin covered areas. There is 1cm undermining around the bottom part of the wound. The wound was cleansed with NS and kerlix moistened with Vashe applied and covered with an abdominal pad.    Plan:   Sacrum - Nursing to continue to cleanse wound with NS or wound cleanser, pat dry and apply kerlix moistened with Dakins to entire wound bed BID and PRN (Dakins started on 6/20)  Waffle mattress overlay  Turning every 2 hours      06/29/22 1135        Incision/Site 06/24/22 1343 Sacral Spine   Date First Assessed/Time First Assessed: 06/24/22 1343   Location: Sacral Spine   Wound Image    Incision WDL ex   Dressing Appearance Intact;Moist drainage   Drainage Amount Moderate   Drainage Characteristics/Odor Serosanguineous   Appearance Red;Pink;Moist;Adipose;Yellow;Granulating;Fibrin;Slough;Tan   Periwound Area Intact;Pink;Moist   Wound Edges Defined;Open   Undermining (depth (cm)/location) 1   Care Cleansed with:;Wound cleanser   Dressing Removed;Applied;Other (comment)  (kerlix moistened with Vashe applied and covered with an abdominal pad)       Recommendations made to primary team for above plan via secure chat. Orders placed. Wound care to follow-up as needed.     06/29/2022

## 2022-06-29 NOTE — PROGRESS NOTES
"[facility] - Intensive Care ([facility]  Adult Nutrition  Progress Note    SUMMARY       Recommendations    1. Continue TF of Glucerna 1.5 @ 35ml/hr to provide 1260 kcal, 69g protein, and 640ml fluid. Additional FW per MD.      2. RD following    Goals: Pt to receive nutrition by RD follow up  Nutrition Goal Status: goal met  Communication of RD Recs:  (POC)    Assessment and Plan    Nutrition Problem  Inadequate energy intake      Related to (etiology):   Inability to consume sufficient energy      Signs and Symptoms (as evidenced by):   NPO, PEG dependent       Interventions (treatment strategy):  Collaboration of nutrition care with other providers  Enteral nutrition      Nutrition Diagnosis Status:   Continue    Reason for Assessment    Reason For Assessment: RD follow-up  Diagnosis: other (see comments) (discharge planning issues)  Relevant Medical History: T2DM, seizures c/b anoxic brain injury, s/p trach/PEG, bed bound, stage IV sacral ulcer, IBS  Interdisciplinary Rounds: did not attend    General Information Comments:  Pt with trach/PEG. Pt asleep during RD visits. TF running at 35ml/hr. Stage IV sacral decubitus ulcer. UBW per 8 months ago was #s. Visually Pt appears frail, but is improving per wt gain and seems to be at baseline. RD to continue to monitor/fu.    Nutrition Discharge Planning: TF of Glucerna 1.5 @ 35ml/hr     Nutrition Risk Screen    Nutrition Risk Screen: no indicators present    Nutrition/Diet History    Spiritual, Cultural Beliefs, Gnosticism Practices, Values that Affect Care: no  Food Allergies: NKFA  Factors Affecting Nutritional Intake: NPO    Anthropometrics    Temp: 97.9 °F (36.6 °C)  Height Method: Stated  Height: 4' 11" (149.9 cm)  Height (inches): 59 in  Weight Method: Bed Scale  Weight: 50.2 kg (110 lb 10.7 oz)  Weight (lb): 110.67 lb  Ideal Body Weight (IBW), Female: 95 lb  % Ideal Body Weight, Female (lb): 116.49 %  BMI (Calculated): 22.3   "   Lab/Procedures/Meds    Pertinent Labs Reviewed: reviewed  Pertinent Labs Comments: glucose 148  Pertinent Medications Reviewed: reviewed  Pertinent Medications Comments: Na bicarbonate, insulin detemir, heparin    Estimated/Assessed Needs    Weight Used For Calorie Calculations: 50.2 kg (110 lb 10.7 oz)  Energy Calorie Requirements (kcal): 1255-1506kcal  Energy Need Method: Kcal/kg (25-30kcal/kg)  Protein Requirements: 60-75g (1.2-1.5g/kg)  Weight Used For Protein Calculations: 50.2 kg (110 lb 10.7 oz)  Fluid Requirements (mL): 1ml/kcal or per MD  RDA Method (mL): 1255  CHO Requirement: 157g    Nutrition Prescription Ordered    Current Diet Order: NPO  Current Nutrition Support Formula Ordered: Glucerna 1.5  Current Nutrition Support Rate Ordered: 30 (ml)  Current Nutrition Support Frequency Ordered: ml/hr    Evaluation of Received Nutrient/Fluid Intake    Enteral Calories (kcal): 1260  Enteral Protein (gm): 69  Enteral (Free Water) Fluid (mL): 647  % Kcal Needs: 100%  % Protein Needs: 100%  I/O: +6 L since admit  Energy Calories Required: meeting needs  Protein Required: meeting needs  Comments: LBM 6/28  Tolerance: tolerating  % Intake of Estimated Energy Needs: 100%    Nutrition Risk    Level of Risk/Frequency of Follow-up: low     Monitor and Evaluation    Food and Nutrient Intake: enteral nutrition intake  Food and Nutrient Adminstration: enteral and parenteral nutrition administration  Knowledge/Beliefs/Attitudes: food and nutrition knowledge/skill, beliefs and attitudes  Physical Activity and Function: nutrition-related ADLs and IADLs  Anthropometric Measurements: weight, weight change, body mass index  Biochemical Data, Medical Tests and Procedures: electrolyte and renal panel, lipid profile, gastrointestinal profile, glucose/endocrine profile, inflammatory profile  Nutrition-Focused Physical Findings: overall appearance, extremities, muscles and bones   Nutrition Follow-Up    RD Follow-up?: Yes     Siau  Whit ALBERT-LANEN

## 2022-06-29 NOTE — ASSESSMENT & PLAN NOTE
Patient is DNR. LaPOST on file.     Of note, patient family does NOT desire hospice, and has repeatedly stated so.

## 2022-06-30 PROBLEM — B37.49 YEAST UTI: Status: ACTIVE | Noted: 2020-07-23

## 2022-06-30 LAB
ALBUMIN SERPL BCP-MCNC: 1.5 G/DL (ref 3.5–5.2)
ALP SERPL-CCNC: 90 U/L (ref 55–135)
ALT SERPL W/O P-5'-P-CCNC: 13 U/L (ref 10–44)
ANION GAP SERPL CALC-SCNC: 6 MMOL/L (ref 8–16)
AST SERPL-CCNC: 12 U/L (ref 10–40)
BACTERIA UR CULT: ABNORMAL
BASOPHILS # BLD AUTO: 0.03 K/UL (ref 0–0.2)
BASOPHILS NFR BLD: 0.4 % (ref 0–1.9)
BILIRUB SERPL-MCNC: 0.1 MG/DL (ref 0.1–1)
BUN SERPL-MCNC: 14 MG/DL (ref 8–23)
CALCIUM SERPL-MCNC: 9 MG/DL (ref 8.7–10.5)
CHLORIDE SERPL-SCNC: 119 MMOL/L (ref 95–110)
CO2 SERPL-SCNC: 17 MMOL/L (ref 23–29)
CREAT SERPL-MCNC: 0.6 MG/DL (ref 0.5–1.4)
DIFFERENTIAL METHOD: ABNORMAL
EOSINOPHIL # BLD AUTO: 0.1 K/UL (ref 0–0.5)
EOSINOPHIL NFR BLD: 1.2 % (ref 0–8)
ERYTHROCYTE [DISTWIDTH] IN BLOOD BY AUTOMATED COUNT: 19.2 % (ref 11.5–14.5)
EST. GFR  (AFRICAN AMERICAN): >60 ML/MIN/1.73 M^2
EST. GFR  (NON AFRICAN AMERICAN): >60 ML/MIN/1.73 M^2
GLUCOSE SERPL-MCNC: 177 MG/DL (ref 70–110)
HCT VFR BLD AUTO: 25.8 % (ref 37–48.5)
HGB BLD-MCNC: 7.8 G/DL (ref 12–16)
IMM GRANULOCYTES # BLD AUTO: 0.03 K/UL (ref 0–0.04)
IMM GRANULOCYTES NFR BLD AUTO: 0.4 % (ref 0–0.5)
LYMPHOCYTES # BLD AUTO: 2.9 K/UL (ref 1–4.8)
LYMPHOCYTES NFR BLD: 34.4 % (ref 18–48)
MAGNESIUM SERPL-MCNC: 1.8 MG/DL (ref 1.6–2.6)
MCH RBC QN AUTO: 26.6 PG (ref 27–31)
MCHC RBC AUTO-ENTMCNC: 30.2 G/DL (ref 32–36)
MCV RBC AUTO: 88 FL (ref 82–98)
MONOCYTES # BLD AUTO: 0.5 K/UL (ref 0.3–1)
MONOCYTES NFR BLD: 5.4 % (ref 4–15)
NEUTROPHILS # BLD AUTO: 4.9 K/UL (ref 1.8–7.7)
NEUTROPHILS NFR BLD: 58.2 % (ref 38–73)
NRBC BLD-RTO: 0 /100 WBC
PHOSPHATE SERPL-MCNC: 2.1 MG/DL (ref 2.7–4.5)
PLATELET # BLD AUTO: 533 K/UL (ref 150–450)
PMV BLD AUTO: 9.7 FL (ref 9.2–12.9)
POCT GLUCOSE: 167 MG/DL (ref 70–110)
POCT GLUCOSE: 168 MG/DL (ref 70–110)
POCT GLUCOSE: 198 MG/DL (ref 70–110)
POTASSIUM SERPL-SCNC: 4.8 MMOL/L (ref 3.5–5.1)
PROT SERPL-MCNC: 6.2 G/DL (ref 6–8.4)
RBC # BLD AUTO: 2.93 M/UL (ref 4–5.4)
SODIUM SERPL-SCNC: 142 MMOL/L (ref 136–145)
WBC # BLD AUTO: 8.46 K/UL (ref 3.9–12.7)

## 2022-06-30 PROCEDURE — 84100 ASSAY OF PHOSPHORUS: CPT

## 2022-06-30 PROCEDURE — 99232 SBSQ HOSP IP/OBS MODERATE 35: CPT | Mod: ,,, | Performed by: HOSPITALIST

## 2022-06-30 PROCEDURE — 99900035 HC TECH TIME PER 15 MIN (STAT)

## 2022-06-30 PROCEDURE — 85025 COMPLETE CBC W/AUTO DIFF WBC: CPT

## 2022-06-30 PROCEDURE — 63600175 PHARM REV CODE 636 W HCPCS

## 2022-06-30 PROCEDURE — 97530 THERAPEUTIC ACTIVITIES: CPT

## 2022-06-30 PROCEDURE — 99900022

## 2022-06-30 PROCEDURE — 25000003 PHARM REV CODE 250

## 2022-06-30 PROCEDURE — 63600175 PHARM REV CODE 636 W HCPCS: Performed by: STUDENT IN AN ORGANIZED HEALTH CARE EDUCATION/TRAINING PROGRAM

## 2022-06-30 PROCEDURE — 80053 COMPREHEN METABOLIC PANEL: CPT

## 2022-06-30 PROCEDURE — 27200966 HC CLOSED SUCTION SYSTEM

## 2022-06-30 PROCEDURE — 97110 THERAPEUTIC EXERCISES: CPT

## 2022-06-30 PROCEDURE — 83735 ASSAY OF MAGNESIUM: CPT

## 2022-06-30 PROCEDURE — 99900031 HC PATIENT EDUCATION (STAT)

## 2022-06-30 PROCEDURE — 25000003 PHARM REV CODE 250: Performed by: STUDENT IN AN ORGANIZED HEALTH CARE EDUCATION/TRAINING PROGRAM

## 2022-06-30 PROCEDURE — 12000002 HC ACUTE/MED SURGE SEMI-PRIVATE ROOM

## 2022-06-30 PROCEDURE — 36415 COLL VENOUS BLD VENIPUNCTURE: CPT

## 2022-06-30 PROCEDURE — 99232 PR SUBSEQUENT HOSPITAL CARE,LEVL II: ICD-10-PCS | Mod: ,,, | Performed by: HOSPITALIST

## 2022-06-30 PROCEDURE — 97112 NEUROMUSCULAR REEDUCATION: CPT

## 2022-06-30 PROCEDURE — 94761 N-INVAS EAR/PLS OXIMETRY MLT: CPT

## 2022-06-30 RX ORDER — FLUCONAZOLE 200 MG/1
200 TABLET ORAL DAILY
Qty: 13 TABLET | Refills: 0 | Status: SHIPPED | OUTPATIENT
Start: 2022-06-30 | End: 2022-07-05 | Stop reason: HOSPADM

## 2022-06-30 RX ORDER — FLUCONAZOLE 200 MG/1
200 TABLET ORAL DAILY
Status: DISCONTINUED | OUTPATIENT
Start: 2022-06-30 | End: 2022-07-02

## 2022-06-30 RX ADMIN — SODIUM CHLORIDE, SODIUM LACTATE, POTASSIUM CHLORIDE, AND CALCIUM CHLORIDE 500 ML: .6; .31; .03; .02 INJECTION, SOLUTION INTRAVENOUS at 10:06

## 2022-06-30 RX ADMIN — SODIUM BICARBONATE 650 MG TABLET 650 MG: at 09:06

## 2022-06-30 RX ADMIN — LEVETIRACETAM 750 MG: 100 SOLUTION ORAL at 10:06

## 2022-06-30 RX ADMIN — INSULIN ASPART 2 UNITS: 100 INJECTION, SOLUTION INTRAVENOUS; SUBCUTANEOUS at 06:06

## 2022-06-30 RX ADMIN — ATORVASTATIN CALCIUM 80 MG: 20 TABLET, FILM COATED ORAL at 10:06

## 2022-06-30 RX ADMIN — INSULIN ASPART 2 UNITS: 100 INJECTION, SOLUTION INTRAVENOUS; SUBCUTANEOUS at 09:06

## 2022-06-30 RX ADMIN — CHOLESTYRAMINE 8 G: 4 POWDER, FOR SUSPENSION ORAL at 09:06

## 2022-06-30 RX ADMIN — LEVETIRACETAM 750 MG: 100 SOLUTION ORAL at 09:06

## 2022-06-30 RX ADMIN — PIPERACILLIN SODIUM AND TAZOBACTAM SODIUM 4.5 G: 4; .5 INJECTION, POWDER, LYOPHILIZED, FOR SOLUTION INTRAVENOUS at 02:06

## 2022-06-30 RX ADMIN — FLUCONAZOLE 200 MG: 200 TABLET ORAL at 10:06

## 2022-06-30 RX ADMIN — PIPERACILLIN SODIUM AND TAZOBACTAM SODIUM 4.5 G: 4; .5 INJECTION, POWDER, LYOPHILIZED, FOR SOLUTION INTRAVENOUS at 06:06

## 2022-06-30 RX ADMIN — SODIUM BICARBONATE 650 MG TABLET 650 MG: at 10:06

## 2022-06-30 RX ADMIN — CHOLESTYRAMINE 8 G: 4 POWDER, FOR SUSPENSION ORAL at 10:06

## 2022-06-30 RX ADMIN — HEPARIN SODIUM 5000 UNITS: 5000 INJECTION INTRAVENOUS; SUBCUTANEOUS at 10:06

## 2022-06-30 RX ADMIN — PIPERACILLIN SODIUM AND TAZOBACTAM SODIUM 4.5 G: 4; .5 INJECTION, POWDER, LYOPHILIZED, FOR SOLUTION INTRAVENOUS at 01:06

## 2022-06-30 RX ADMIN — HEPARIN SODIUM 5000 UNITS: 5000 INJECTION INTRAVENOUS; SUBCUTANEOUS at 09:06

## 2022-06-30 NOTE — CONSULTS
Came to evaluate patient for PICC. Patient has mastectomy to R arm and no viable vein in L arm. Team notified of need for a tunneled line or other means of access.

## 2022-06-30 NOTE — ASSESSMENT & PLAN NOTE
Darlene Avila is a 71 year old F with history of diabetes type 2, seizure, ischemic CVA, IBS, history of breast cancer s/p right mastectomy and failure to thrive who was brought in by her HPOA (sister) for skilled nursing home placement due to concern for neglect at recent hospice facility. Patient follows very simple commands and doubt she can engage in skilled therapy. snf nursing facility likely more of an option.     - Appreciate CM/SW assistance with placement; placement difficult d/t trach; will require LTAC placement - referrals sent  - Appreciate pharmacy's assistance with med rec from previous admission

## 2022-06-30 NOTE — PLAN OF CARE
JAZMIN received call from sister and MAXWELL Celestin.  Sister stated that she would like pt to return home with Home Health.  Sister asked for SW to assist with application for medicare/medicaid for pt and would also like SW to inquire about a hospital bed for pt.    SW stated the would follow-up on all of the requests and get back to sister with results.    SW ordered hospital bed for pt and will follow up with sister regarding delivery to the pt home    JAZMIN emailed [facility] with pt information and contact information for sister Sabi to assist with Medicaid/Medicare insurance    JAZMIN called sister Sabi and left a message with the information about the insurance, home health and hospital bed.    JAZMIN spoke with Sabi at bedside about the insurance, home health and hospital bed.  Sister Sabi was concerned about raspatory therapy for pt.  JAZMIN assured Sabi that the Home Health agency won't accept unless they can provide the care required.  Sabi asked if there was any other help that could be provided and JAZMIN stated she would look into what it would require for a sitter to come to the home and help as well.      JAZMIN to follow-up    Yocasta Urbina CD, MSW, LMSW, RSW   Case Management  [facility]  Email: [email or web address]

## 2022-06-30 NOTE — H&P
Inpatient Radiology Pre-procedure Note    History of Present Illness:  Darlene Avila is a 71 y.o. female who presents for tunneled PICC for antibiotics. See IR Consult note for further details.     Admission H&P reviewed.  Past Medical History:   Diagnosis Date    Arthritis     Cancer of breast     s/p mastectomy (on anastrozole)     Cataract     Diabetes mellitus     c/b Diabetic retinopathy    Hypertension     resolved    Hypoxia 4/15/2021    Memory loss     due to strokes    Orthostatic hypotension 12/27/2020    frequent falls, on midodrine.  4/2021 seen by both cards and palliative care    TIA (transient ischemic attack) [id number]    CT with remote infarcts    Vitamin D deficiency 10/14/2021     Past Surgical History:   Procedure Laterality Date    CAPSULOTOMY  6/26/13    yag left eye    CATARACT EXTRACTION  6/3/2013    right eye    CHOLECYSTECTOMY      HYSTERECTOMY      MASTECTOMY Right 9/28/2020    Procedure: MASTECTOMY-Right;  Surgeon: Krysta Clark MD;  Location: [facility];  Service: General;  Laterality: Right;    SENTINEL LYMPH NODE BIOPSY Right 9/28/2020    Procedure: BIOPSY, LYMPH NODE, SENTINEL-Right;  Surgeon: Krysta Clark MD;  Location: [facility];  Service: General;  Laterality: Right;    TOTAL ABDOMINAL HYSTERECTOMY W/ BILATERAL SALPINGOOPHORECTOMY      WOUND DEBRIDEMENT N/A 6/24/2022    Procedure: DEBRIDEMENT, WOUND, sacral ulcer;  Surgeon: Cullen Martin MD;  Location: [address];  Service: General;  Laterality: N/A;       Review of Systems:   As documented in primary team H&P    Home Meds:   Prior to Admission medications    Medication Sig Start Date End Date Taking? Authorizing Provider   bisacodyL (DULCOLAX) 10 mg Supp Place 10 mg rectally 2 (two) times daily as needed (CONSTIPATION).   Yes Historical Provider   hyoscyamine (LEVSIN/SL) 0.125 mg Subl Place 0.125 mg under the tongue every 2 (two) hours as needed (EXCESS SECRETIONS).   Yes Historical Provider   lorazepam  (ATIVAN) 2 mg/mL Conc TAKE 0.25-1ML BY MOUTH EVERY 2 HOURS AS NEEDED FOR RESTLESSNESS OR AGITATION   Yes Historical Provider   morphine 100 mg/5 mL (20 mg/mL) concentrated solution TAKE 0.25-1 ML BY MOUTH EVERY 2 HOURS AS NEEDED FOR PAIN OR SHORTNESS OF BREATH   Yes Historical Provider   atorvastatin (LIPITOR) 80 MG tablet 1 tablet (80 mg total) by Per G Tube route once daily. 6/29/22 6/29/23  Dawood Self MD   fluconazole (DIFLUCAN) 200 MG Tab 1 tablet (200 mg total) by Per G Tube route once daily. for 13 days 6/30/22 7/13/22  Dawood Self MD   insulin detemir U-100 (LEVEMIR FLEXTOUCH) 100 unit/mL (3 mL) SubQ InPn pen Inject 8 Units into the skin every evening. 6/28/22 6/28/23  Dawood Self MD   levetiracetam 500 mg/5 mL (5 mL) Soln 7.5 mLs (750 mg total) by Per G Tube route 2 (two) times daily. 6/28/22 6/28/23  Dawood Self MD   piperacillin sodium/tazobactam (PIPERACILLIN-TAZOBACTAM 4.5G/100ML SODIUM CHLORIDE 0.9%-READY TO MIX) Inject 100 mLs (4.5 g total) into the vein every 8 (eight) hours. 6/28/22 8/5/22  Dawood Self MD     Scheduled Meds:    atorvastatin  80 mg Per G Tube Daily    cholestyramine  2 packet Per G Tube BID    fluconazole  200 mg Per G Tube Daily    heparin (porcine)  5,000 Units Subcutaneous Q12H    insulin detemir U-100  8 Units Subcutaneous QHS    levetiracetam  750 mg Per G Tube BID    piperacillin-tazobactam (ZOSYN) IVPB  4.5 g Intravenous Q8H    sodium bicarbonate  650 mg Per G Tube BID     Continuous Infusions:    dextrose 10 % in water (D10W)       PRN Meds:acetaminophen, dextrose 10 % in water (D10W), glucagon (human recombinant), glucose, glucose, insulin aspart U-100, naloxone, sodium chloride 0.9%  Anticoagulants/Antiplatelets: no anticoagulation    Allergies:   Review of patient's allergies indicates:   Allergen Reactions    Iodinated contrast media Hives     Sedation Hx: have not been any systemic reactions    Labs:  No results for input(s): INR in the last 168  hours.    Invalid input(s):  PT,  PTT    Recent Labs   Lab 06/30/22 0415   WBC 8.46   HGB 7.8*   HCT 25.8*   MCV 88   *      Recent Labs   Lab 06/30/22 0415   *      K 4.8   *   CO2 17*   BUN 14   CREATININE 0.6   CALCIUM 9.0   MG 1.8   ALT 13   AST 12   ALBUMIN 1.5*   BILITOT 0.1         Vitals:  Temp: 98.1 °F (36.7 °C) (06/30/22 0732)  Pulse: 87 (06/30/22 0732)  Resp: 18 (06/30/22 0732)  BP: (!) 140/65 (06/30/22 0732)  SpO2: 100 % (06/30/22 0732)     Physical Exam:  ASA: 3  Mallampati: 3, s/p trach    General: no acute distress  Mental Status: alert,limited communication  HEENT: normocephalic, s/p tracheostomy  Chest: unlabored breathing  Heart: regular heart rate  Abdomen: nondistended  Extremity: moves all extremities    Plan:  Tunneled PICC placement for IV antibiotics.   Please make NPO and hold anticoagulation midnight before procedure.    Sedation Plan: up to moderate     Concepcion Mathew MD  PGY-4  Pager: [phone]

## 2022-06-30 NOTE — ASSESSMENT & PLAN NOTE
Patient with chronic indwelling cabrera, with UCx with yeast. May be chronically colonized given extensive periods with indwelling cabrera catheters dating back to 04/2022.     - Given interval decrease in mentation from earlier this hospital course, will treat as symptomatic  - Fluconazole 200 mg qd per G tube

## 2022-06-30 NOTE — SUBJECTIVE & OBJECTIVE
Interval History: AF HDS NAEON. Patient minimally responsive, but slightly more than yesterday. Follows simple commands. Attempts to mouth one/two words.    Review of Systems   Unable to perform ROS: Patient nonverbal   Respiratory:  Negative for chest tightness and shortness of breath.    Cardiovascular:  Negative for chest pain.   Gastrointestinal:  Negative for abdominal pain.   Objective:     Vital Signs (Most Recent):  Temp: 98.1 °F (36.7 °C) (06/30/22 0732)  Pulse: 87 (06/30/22 0732)  Resp: 18 (06/30/22 0732)  BP: (!) 140/65 (06/30/22 0732)  SpO2: 100 % (06/30/22 0732)   Vital Signs (24h Range):  Temp:  [97.9 °F (36.6 °C)-98.6 °F (37 °C)] 98.1 °F (36.7 °C)  Pulse:  [87-93] 87  Resp:  [16-18] 18  SpO2:  [95 %-100 %] 100 %  BP: (100-152)/(53-74) 140/65     Weight: 50.2 kg (110 lb 10.7 oz)  Body mass index is 22.35 kg/m².    Intake/Output Summary (Last 24 hours) at 6/30/2022 0753  Last data filed at 6/30/2022 0629  Gross per 24 hour   Intake 1700 ml   Output 1350 ml   Net 350 ml      Physical Exam  Vitals and nursing note reviewed.   Constitutional:       General: She is not in acute distress.     Appearance: She is diaphoretic. She is not ill-appearing or toxic-appearing.   HENT:      Head: Normocephalic.      Mouth/Throat:      Mouth: Mucous membranes are dry.      Comments: Dried oral secretions adhered to tongue  Cardiovascular:      Rate and Rhythm: Normal rate and regular rhythm.      Pulses: Normal pulses.      Heart sounds: Normal heart sounds. No murmur heard.  Pulmonary:      Effort: Pulmonary effort is normal. No respiratory distress.      Breath sounds: Normal breath sounds. No wheezing or rales.   Abdominal:      General: Abdomen is flat. Bowel sounds are normal. There is no distension.      Palpations: Abdomen is soft.      Tenderness: There is no abdominal tenderness.   Musculoskeletal:         General: No swelling or tenderness. Normal range of motion.      Cervical back: Normal range of motion.    Skin:     General: Skin is warm.      Coloration: Skin is not jaundiced or pale.      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    Neurological:      Mental Status: She is alert.      Comments: Intermittently alert, minimally responsive   Psychiatric:      Comments: Unable to assess       Significant Labs: All pertinent labs within the past 24 hours have been reviewed.    Significant Imaging: I have reviewed all pertinent imaging results/findings within the past 24 hours.

## 2022-06-30 NOTE — PLAN OF CARE
06/30/22 1223   Post-Acute Status   Post-Acute Authorization Home Health   Home Health Status Referrals Sent   Discharge Plan   Discharge Plan A Home Health   Discharge Plan B Home Health       SW sent referrals via [facility] for Home Health for pt      Yocasta Urbina CD, MSW, LMSW, RSW   Case Management  [facility]  Email: [email or web address]

## 2022-06-30 NOTE — PLAN OF CARE
Problem: Adult Inpatient Plan of Care  Goal: Plan of Care Review  Outcome: Ongoing, Progressing  Goal: Patient-Specific Goal (Individualized)  Outcome: Ongoing, Progressing  Goal: Absence of Hospital-Acquired Illness or Injury  Outcome: Ongoing, Progressing  Goal: Optimal Comfort and Wellbeing  Outcome: Ongoing, Progressing  Goal: Readiness for Transition of Care  Outcome: Ongoing, Progressing     Problem: Diabetes Comorbidity  Goal: Blood Glucose Level Within Targeted Range  Outcome: Ongoing, Progressing     Problem: Impaired Wound Healing  Goal: Optimal Wound Healing  Outcome: Ongoing, Progressing     Problem: Infection  Goal: Absence of Infection Signs and Symptoms  Outcome: Ongoing, Progressing     Problem: Skin Injury Risk Increased  Goal: Skin Health and Integrity  Outcome: Ongoing, Progressing     Problem: Fall Injury Risk  Goal: Absence of Fall and Fall-Related Injury  Outcome: Ongoing, Progressing     Problem: Coping Ineffective  Goal: Effective Coping  Outcome: Ongoing, Progressing     No acute issues overnight. Meds given per MD order. WC performed. Safety checks performed. Remains on RA. Bed remains low, call light in reach.

## 2022-06-30 NOTE — ASSESSMENT & PLAN NOTE
Patient is DNR. LaPOST on file.     - Patient initially resistant to hospice d/t previous experience with [facility] inpatient hospice; after extensive conversation with MD, now amenable to exploring hospice (though does not desire home w/ hospice)  - CM/SW assisting; tracheostomy status complicating placement to skilled nursing w/ hospice.  - See Care Update note Zi-on MD Aramis on 06/29 for further detail.

## 2022-06-30 NOTE — PROGRESS NOTES
[facility] - Intensive Care ([place] [id number])  [facility] Medicine  Progress Note    Patient Name: Darlene Avila  MRN: [id number]  Patient Class: IP- Inpatient   Admission Date: 6/17/2022  Length of Stay: 11 days  Attending Physician: Yvonne Hills MD  Primary Care Provider: Kirill Cabrera Iii, MD        Subjective:     Principal Problem:Hypernatremia        HPI:  Darlene Avila is a 71 year old F with history of diabetes type 2, seizure c/b anoxic brain injury s/p trach, PEG, bed bound status, Stage IV sacral ulcer, ischemic CVA, IBS, history of breast cancer s/p right mastectomy and failure to thrive who was brought in by her HPOA (sister) for nursing home placement. Patient is unable to provide history.     Patient's sister was contacted who stated that the patient was admitted to [facility] in [facility] for recurrent seizures/status epilepticus which resulted in an anoxic brain injury and respiratory failure.  Patient was intubated followed by tracheostomy and PEG placement .  Patient was discharged to nursing home and subsequently discharged to hospice care at the request of patient's sister. However a few days ago, she visited the patient at [facility] recovery facility and noticed they weren't feeding or taking care of the patient due to her 'hospice status'. Per sister today, she would like to place the patient in a skilled nursing facility and would like to rescind her hospice care at this time.  She is no longer interested in hospice placement. She states the patient at baseline is only able to follow some simple commands. She is unsure how much O2 level is her baseline via trach collar.   Of note she has a Stage IV sacral wound that is s/p debridement on 06/09 by Gen surgery in [facility].     Upon arrival to the ED, the patient was hemodynamically stable. Labs revealed Na 157, WBC 14. Hgb 9.4 (baseline 7-9). The patient was given 1L of IV NS and was admitted to hospital medicine for further management.        Overview/Hospital Course:  Patient admitted to hospital medicine with pre-existing anoxic brain injury, hypernatremia, sacral decubitus wound s/p debridement 06/08 in [place], [place]. Patient initially admitted to hospice following discharge from [facility] ICU, but discharged from hospice as patient family believed she was not getting appropriate care. Na 157, corrected with IVF (NS and hypotonic solutions) and enteral FWF down to 148. Trach collar exchanged by ENT due to lack of supplies at facility. Wound care consulted for sacral decubitus, recommended General Surgery consult for possible debridement as wound appears purulent. Persistent leukocytosis, started Zosyn per previous I&D wound cultures growing bacteriodes/enterococcus and new wound findings. cEEG started and Epilepsy consulted for assistance with antiepileptic medications; conflicting reports if patient was on only Keppra/Lacosamide vs Keppra/Lacosamide/Phenytoin. General surgery consulted for debridement of sacral decubitus ulcer, s/p I&D 06/24. Palliative Care consulted for assistance in GOC; decision to pursue medical/surgical care decided. ENT consulted for possible trach decannulation. ID consulted, planning for 6-weeks course of zosyn.     CM/SW to assist with dispo. Patient now exploring hospice options. However, due to trach placement, long term placement for SNF limited; LTAC remains alternative.      Interval History: AF HDS NAEON. Patient minimally responsive, but slightly more than yesterday. Follows simple commands. Attempts to mouth one/two words.    Review of Systems   Unable to perform ROS: Patient nonverbal   Respiratory:  Negative for chest tightness and shortness of breath.    Cardiovascular:  Negative for chest pain.   Gastrointestinal:  Negative for abdominal pain.   Objective:     Vital Signs (Most Recent):  Temp: 98.1 °F (36.7 °C) (06/30/22 0732)  Pulse: 87 (06/30/22 0732)  Resp: 18 (06/30/22 0732)  BP: (!) 140/65 (06/30/22  0732)  SpO2: 100 % (06/30/22 0732)   Vital Signs (24h Range):  Temp:  [97.9 °F (36.6 °C)-98.6 °F (37 °C)] 98.1 °F (36.7 °C)  Pulse:  [87-93] 87  Resp:  [16-18] 18  SpO2:  [95 %-100 %] 100 %  BP: (100-152)/(53-74) 140/65     Weight: 50.2 kg (110 lb 10.7 oz)  Body mass index is 22.35 kg/m².    Intake/Output Summary (Last 24 hours) at 6/30/2022 0753  Last data filed at 6/30/2022 0629  Gross per 24 hour   Intake 1700 ml   Output 1350 ml   Net 350 ml      Physical Exam  Vitals and nursing note reviewed.   Constitutional:       General: She is not in acute distress.     Appearance: She is diaphoretic. She is not ill-appearing or toxic-appearing.   HENT:      Head: Normocephalic.      Mouth/Throat:      Mouth: Mucous membranes are dry.      Comments: Dried oral secretions adhered to tongue  Cardiovascular:      Rate and Rhythm: Normal rate and regular rhythm.      Pulses: Normal pulses.      Heart sounds: Normal heart sounds. No murmur heard.  Pulmonary:      Effort: Pulmonary effort is normal. No respiratory distress.      Breath sounds: Normal breath sounds. No wheezing or rales.   Abdominal:      General: Abdomen is flat. Bowel sounds are normal. There is no distension.      Palpations: Abdomen is soft.      Tenderness: There is no abdominal tenderness.   Musculoskeletal:         General: No swelling or tenderness. Normal range of motion.      Cervical back: Normal range of motion.   Skin:     General: Skin is warm.      Coloration: Skin is not jaundiced or pale.      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    Neurological:      Mental Status: She is alert.      Comments: Intermittently alert, minimally responsive   Psychiatric:      Comments: Unable to assess       Significant Labs: All pertinent labs within the past 24 hours have been reviewed.    Significant Imaging: I have reviewed all pertinent imaging results/findings within the past 24 hours.      Assessment/Plan:      *  Hypernatremia  STABLE, WNL    Na 157. Suspect from dehydration while at her recovery facility.   - FWD 2.7 on admission.   S/p 1L of NS in the ED.     - Continue 500cc QID free water boluses  - Na q6h    Discharge planning issues  Darlene Avila is a 71 year old F with history of diabetes type 2, seizure, ischemic CVA, IBS, history of breast cancer s/p right mastectomy and failure to thrive who was brought in by her HPOA (sister) for skilled nursing home placement due to concern for neglect at recent hospice facility. Patient follows very simple commands and doubt she can engage in skilled therapy. senior care nursing facility likely more of an option.     - Appreciate CM/SW assistance with placement; placement difficult d/t trach; will require LTAC placement - referrals sent  - Appreciate pharmacy's assistance with med rec from previous admission    Sacral decubitus ulcer, stage IV  S/p recent debridement at OSH 06/08. OSH Wcx with enterococcus faecalis, bacteriodes fragilus.    - Wound care consulted; appreciate assistance  - Deep wound cx  - General surgery consulted s/p debridement 06/24, no note of bone involvement per OP note, no cultures sent  - Infectious Disease consulted, zosyn for 6-weeks  - Consideration for wound vac given depth of injury  - Turn q2h    Leukocytosis  RESOLVED    Patient admitted with elevated WBC to 14s. Previous episodes of aspiration PNA at OSH, indwelling cabrera given acuity of condition, sacral decubitus ulcer stave IV s/p debridement with enterococcus faecalis / bacteriodes fragilis in cx. Patient afebrile with stable hemodynamics on admission at this time. CXR without acute abnormality. UA with yeast growing, chronic per previous OSH review.    Leukocytosis may be explained by soft tissue infection vs hemoconcentration given extensive dehydration and hypernatremia from lack of enteral fluids or IVF at outside hospice facility prior to [place] [facility]     - Zosyn given previous  cultures and persistent leukocytosis  - Etiology may be soft tissue infection; see Sacral Decubitus Ulcer A/P  - Continue monitoring CBC  - Wound care per Sacral Decubitus Ulcer A/P  - Aspiration precautions  - Trend fever curve  - Trend BCx    Tracheostomy in place  Dysarthria  Dysphagia    Trach capped, good O2 sats on RA     - Respiratory therapy to assist with trach care.   - SLP for swallow evaluation and speech therapy; recommending NPO, re-evaluation, PMSV under direct supervision  - ENT advising to keep tracheostomy in place due to intermittent responsiveness; amenable to decannulation if patient family moves towards hospice/comfort measures    Seizures  Patient previously started on Lacosamide and Keppra at OSH during initial episode of status epilepticus. Phenytoin added as patient continued to have breakthrough seizures.    - Repeat EEG ordered 2/2 interval decrease in alert/responsiveness; diffuse toxic metabolic encephalopathy, no new seizure per Neuro EP  - Continue Keppra monotherapy 750 bid; titrate per neuro recs  - Neurology consulted for further assistance in antiepileptic regimen    Encephalopathy        Palliative care encounter  Per Primary Medicine team and Palliative Care encounters with patient family (separate encounters, see dated notes in chart), patient family wishes full medical care and does not desire comfort/hospice measures. DNR remains. See Pall Care note for further detail.    G tube feedings  Tube feed dependent via G tube  - Nutrition to assist with TF recs; see recommendations in note; appreciate assistance      ACP (advance care planning)  Patient is DNR. LaPOST on file.     - Patient initially resistant to hospice d/t previous experience with [facility] inpatient hospice; after extensive conversation with MD, now amenable to exploring hospice (though does not desire home w/ hospice)  - CM/SW assisting; tracheostomy status complicating placement to [facility] w/ hospice.  - See  Care Update note Ro Self MD on 06/29 for further detail.    History of CVA (cerebrovascular accident)  Unclear of patient's current medications. Per med review on care-everywhere, the patient is on statin but not on antiplatelet therapy.     - Appreciate pharmacy's assistance with medication reconciliation with facility- start antiplatelet therapy if no contraindications.   - Continue statin via G-tube      Yeast UTI  Patient with chronic indwelling cabrera, with UCx with yeast. May be chronically colonized given extensive periods with indwelling cabrera catheters dating back to 04/2022.     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 A1c repeated 6.9.    - LDSSI   - POCT glucose q6hrs  - Maintain -180  - Titrate basal/bolus regimen to goal as above.      VTE Risk Mitigation (From admission, onward)         Ordered     heparin (porcine) injection 5,000 Units  Every 12 hours         06/18/22 0355     IP VTE HIGH RISK PATIENT  Once         06/18/22 0355     Place sequential compression device  Until discontinued         06/18/22 0355                Discharge Planning   JUDIT: 7/5/2022     Code Status: DNR   Is the patient medically ready for discharge?: Yes    Reason for patient still in hospital (select all that apply): Treatment and Pending disposition  Discharge Plan A: Long-term acute care facility (LTAC)   Discharge Delays: None known at this time              Ro Self MD  Department of Hospital Medicine   [facility] - Intensive Care ([place]-16)

## 2022-06-30 NOTE — PT/OT/SLP PROGRESS
Speech Language Pathology      Darlene Avila  MRN: [id number]  [id number]    Patient seen for an ongoing swallowing assessment. Patient without PMSV or trach capp and sleeping with open mouth posture. Patient did not rouse despite HOB elevation, sternal rub, cold towel to face, and auditory stimulation. ST will continue to follow as appropriate.

## 2022-06-30 NOTE — CONSULTS
Inpatient Radiology Consult Note    History of Present Illness:  Darlene Avila is a 71 y.o. female with Hx notable for breast ca, seizure c/b anoxic brain injury and ischemic CVA s/p trach, PEG, bed bound status, and Stage IV sacral ulcer. She presented from hospice with hypernatremia, UTI and stage IV sacral decubitus ulcer. Family elected to withdraw pt from hospice and discharge with home health. IR consulted for tunneled PICC for IV antibiotics. PICC team unable to place due to previous R mastectomy and no suitable vein in the left arm.     Plan:   1. Will attempt tunneled PICC in IR as soon as schedule permits.  2. NPO at midnight.  3. Hold anticoagulation.     Concepcion Mathew MD  Department of Radiology, PGY-4  Pager: [phone]

## 2022-06-30 NOTE — PLAN OF CARE
[facility] ([place] [address])      HOME HEALTH ORDERS  FACE TO FACE ENCOUNTER    Patient Name: Darlene Avila  YOB: 1950    PCP: Kirill Cabrera Iii, MD   PCP Address: [facility] Irineo Quinn [address] [address]  PCP Phone Number: [phone]  PCP Fax: [phone]    Encounter Date: 6/17/22    Admit to Home Health    Diagnoses:  Active Hospital Problems    Diagnosis  POA    *Hypernatremia [E87.0]  Yes    Encephalopathy [G93.40]  Unknown    Palliative care encounter [Z51.5]  Not Applicable    Goals of care, counseling/discussion [Z71.89]  Not Applicable    Discharge planning issues [Z02.9]  Not Applicable    Leukocytosis [D72.829]  Yes    Sacral decubitus ulcer, stage IV [L89.154]  Yes    Tracheostomy in place [Z93.0]  Not Applicable    G tube feedings [Z93.1]  Not Applicable    Seizures [R56.9]  Yes    ACP (advance care planning) [Z71.89]  Not Applicable    History of CVA (cerebrovascular accident) [Z86.73]  Not Applicable     Chronic    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    Current Facility-Administered Medications   Medication Dose Route Frequency Provider Last Rate Last Admin    acetaminophen tablet 650 mg  650 mg Per G Tube Q6H PRN Marge Brown MD        atorvastatin tablet 80 mg  80 mg Per G Tube Daily Johnny Reilly MD   80 mg at 07/04/22 0920     cholestyramine 4 gram packet 8 g  2 packet Per G Tube BID Johnny Reilly MD   8 g at 07/04/22 0952    dextrose 10 % infusion   Intravenous Continuous PRN Dawood Self MD        glucagon (human recombinant) injection 1 mg  1 mg Intramuscular PRN Dawood Self MD        glucose chewable tablet 16 g  16 g Oral PRN Johnny Reilly MD        glucose chewable tablet 24 g  24 g Oral PRN Johnny Reilly MD        heparin (porcine) injection 5,000 Units  5,000 Units Subcutaneous Q12H Johnny Reilly MD   5,000 Units at 07/04/22 2228    insulin aspart U-100 pen 1-10 Units  1-10 Units Subcutaneous Q4H PRN Dawood Self MD   4 Units at 07/04/22 1633    insulin detemir U-100 pen 10 Units  10 Units Subcutaneous QHS Marge Brown MD   10 Units at 07/04/22 2232    levetiracetam 500 mg/5 mL (5 mL) liquid Soln 750 mg  750 mg Per G Tube BID Maddi Whelan MD   750 mg at 07/04/22 2228    naloxone 0.4 mg/mL injection 0.02 mg  0.02 mg Intravenous PRN Johnny Reilly MD        nystatin ointment   Topical (Top) BID Maria L Mathwe DO   Given at 07/04/22 2231    piperacillin-tazobactam 4.5 g in sodium chloride 0.9% 100 mL IVPB (ready to mix system)  4.5 g Intravenous Q8H Dawood Self MD 25 mL/hr at 07/05/22 0413 Rate Verify at 07/05/22 0413    sodium bicarbonate tablet 650 mg  650 mg Per G Tube BID Anali Reyes MD   650 mg at 07/04/22 2100    sodium chloride 0.9% flush 10 mL  10 mL Intravenous Q12H PRSAVANNA Turner Obi, MD         Current Discharge Medication List      START taking these medications    Details   atorvastatin (LIPITOR) 80 MG tablet 1 tablet (80 mg total) by Per G Tube route once daily.  Qty: 90 tablet, Refills: 3      fluconazole (DIFLUCAN) 200 MG Tab 1 tablet (200 mg total) by Per G Tube route once daily. for 13 days  Qty: 13 tablet, Refills: 0      insulin detemir U-100 (LEVEMIR FLEXTOUCH) 100 unit/mL (3 mL) SubQ InPn pen Inject 8 Units into the skin every evening.  Qty: 7.2 mL, Refills: 3      levetiracetam 500 mg/5  mL (5 mL) Soln 7.5 mLs (750 mg total) by Per G Tube route 2 (two) times daily.  Qty: 600 mL, Refills: 11      piperacillin sodium/tazobactam (PIPERACILLIN-TAZOBACTAM 4.5G/100ML SODIUM CHLORIDE 0.9%-READY TO MIX) Inject 100 mLs (4.5 g total) into the vein every 8 (eight) hours.  Qty: 51984 mL, Refills: 0         CONTINUE these medications which have NOT CHANGED    Details   bisacodyL (DULCOLAX) 10 mg Supp Place 10 mg rectally 2 (two) times daily as needed (CONSTIPATION).      hyoscyamine (LEVSIN/SL) 0.125 mg Subl Place 0.125 mg under the tongue every 2 (two) hours as needed (EXCESS SECRETIONS).      lorazepam (ATIVAN) 2 mg/mL Conc TAKE 0.25-1ML BY MOUTH EVERY 2 HOURS AS NEEDED FOR RESTLESSNESS OR AGITATION      morphine 100 mg/5 mL (20 mg/mL) concentrated solution TAKE 0.25-1 ML BY MOUTH EVERY 2 HOURS AS NEEDED FOR PAIN OR SHORTNESS OF BREATH         STOP taking these medications       cholestyramine (QUESTRAN) 4 gram packet Comments:   Reason for Stopping:                 I have seen and examined this patient within the last 30 days. My clinical findings that support the need for the home health skilled services and home bound status are the following:no   Weakness/numbness causing balance and gait disturbance due to Anoxic Brain Injury making it taxing to leave home.  Requiring assistive device to leave home due to unsteady gait caused by  Anoxic Brain Injury.  Medical restrictions requiring assistance of another human to leave home due to  Anoxic Brain Injury.     Diet: NPO, Feeds via G Tube as follows:      Glucerna 1.5, goal rate of 35ml/hr to provide 1260 kcal, 69g protein, and 640ml fluid  Free Water Flush bolus 500 mL QID    Labs:  Report Lab results to PCP. and Per agency protocol    Referrals/ Consults  Physical Therapy to evaluate and treat. Evaluate for home safety and equipment needs; Establish/upgrade home exercise program. Perform / instruct on therapeutic exercises, gait training, transfer training,  and Range of Motion.  Occupational Therapy to evaluate and treat. Evaluate home environment for safety and equipment needs. Perform/Instruct on transfers, ADL training, ROM, and therapeutic exercises.  Speech Therapy  to evaluate and treat for  Language, Swallowing and Cognition.   to evaluate for community resources/long-range planning.  Aide to provide assistance with personal care, ADLs, and vital signs.    Activities:   activity as tolerated    Nursing:   Agency to admit patient within 24 hours of hospital discharge unless specified on physician order or at patient request    SN to complete comprehensive assessment including routine vital signs. Instruct on disease process and s/s of complications to report to MD. Review/verify medication list sent home with the patient at time of discharge  and instruct patient/caregiver as needed. Frequency may be adjusted depending on start of care date.     Skilled nurse to perform up to 3 visits PRN for symptoms related to diagnosis    Notify MD if SBP > 160 or < 90; DBP > 90 or < 50; HR > 120 or < 50; Temp > 101; O2 < 88%; Other:       Ok to schedule additional visits based on staff availability and patient request on consecutive days within the home health episode.    When multiple disciplines ordered:    Start of Care occurs on Sunday - Wednesday schedule remaining discipline evaluations as ordered on separate consecutive days following the start of care.    Thursday SOC -schedule subsequent evaluations Friday and Monday the following week.     Friday - Saturday SOC - schedule subsequent discipline evaluations on consecutive days starting Monday of the following week.    For all post-discharge communication and subsequent orders please contact patient's primary care physician. If unable to reach primary care physician or do not receive response within 30 minutes, please contact [facility] Medicine for clinical staff order clarification    Outpatient  Antibiotic Therapy Plan:     Please send referral to [facility].     1) Infection: infected sacral decubitus ulcer     2) Discharge Antibiotics:      Intravenous antibiotics:  · Zosyn 4.5 g IV q 8 hours     3) Therapy Duration:  2-6 weeks pending [facility] discussions     Estimated end date of IV antibiotics: 7/8/22 - 8/5/22     4) Outpatient Weekly Labs:     Order the following labs to be drawn on Mondays:   · CBC  · CMP   · ESR   · CRP     5) Fax Lab Results to Infectious Diseases Provider: Analisa Allan     [facility] Fax Number: [phone]     6) Outpatient Infectious Diseases Follow-up     · Follow-up appointment will be arranged by the ID clinic and will be found in the patient's appointments tab. Prior to discharge, please ensure the patient's follow-up has been scheduled.       · If there is still no follow-up scheduled prior to discharge, please send an EPIC message to Ragini Beal in Infectious Diseases.         Home Health Aide:  Nursing Three times weekly, Physical Therapy Three times weekly, Occupational Therapy Three times weekly, Speech Language Pathology Three times weekly, Medical Social Work Three times weekly, Respiratory Therapy Three times weekly and Home Health Aide Three times weekly    Wound Care Orders  Sacrum - Nursing to continue to cleanse wound with NS or wound cleanser, pat dry and apply kerlix moistened with Dakins to entire wound bed BID and PRN (Dakins started on 6/20)  Waffle mattress overlay  Turning every 2 hours        06/29/22 1135        Incision/Site 06/24/22 1343 Sacral Spine   Date First Assessed/Time First Assessed: 06/24/22 1343   Location: Sacral Spine   Wound Image    Incision WDL ex   Dressing Appearance Intact;Moist drainage   Drainage Amount Moderate   Drainage Characteristics/Odor Serosanguineous   Appearance Red;Pink;Moist;Adipose;Yellow;Granulating;Fibrin;Slough;Tan   Periwound Area Intact;Pink;Moist   Wound Edges Defined;Open    Undermining (depth (cm)/location) 1   Care Cleansed with:;Wound cleanser   Dressing Removed;Applied;Other (comment)  (kerlix moistened with Vashe applied and covered with an abdominal pad)        Durable Medical Equipment:  Patient will require a hospital bed on discharge.    I certify that this patient is confined to her home and needs intermittent skilled nursing care, physical therapy, speech therapy and occupational therapy.    Maria L Mathew,

## 2022-06-30 NOTE — PT/OT/SLP PROGRESS
Physical Therapy Treatment    Patient Name:  Darlene Avila   MRN:  [id number]  Admitting Diagnosis:  Hypernatremia   Recent Surgery: Procedure(s) (LRB):  DEBRIDEMENT, WOUND, sacral ulcer (N/A) 6 Days Post-Op  Admit Date: 6/17/2022  Length of Stay: 11 days    Recommendations:     Discharge Recommendations:  nursing facility, skilled   Discharge Equipment Recommendations:  (TBD at next level)   Barriers to discharge: Evolving Clinical Presentation    Assessment:     Darlene Avila is a 71 y.o. female admitted with a medical diagnosis of Hypernatremia.  She presents with the following impairments/functional limitations:  weakness, impaired endurance, impaired functional mobilty, impaired balance, decreased lower extremity function, impaired cognition, decreased safety awareness, pain, impaired skin.     Pt presents with family at bedside, agreeable to therapy, pt very pleasant. Pt sits EOB from supine with total assistance, can maintain sitting EOB with mod A, able to hold head up for short periods of time (<1 minute). Pt maintains sitting EOB for ~ 25 min. In sitting patient performs bilateral LAQs, marches, and ankle pumps. Balance training also performed with patient sitting unsupported and holding head up as straight as possible for short periods of time. Pt back into supine with total A and scooted to HOB with total A, treatment tolerated very well, continue to progress as able.     Rehab Prognosis: Fair; patient would benefit from acute skilled PT services to address these deficits and reach maximum level of function.    Recent Surgery: Procedure(s) (LRB):  DEBRIDEMENT, WOUND, sacral ulcer (N/A) 6 Days Post-Op    Highest level of mobility achieved this visit: sitting EOB ~25 min with mod A    Activity with RN/PCT: bed mobility only with nursing    Plan:     During this hospitalization, patient to be seen 3 x/week to address the identified rehab impairments via therapeutic activities, therapeutic exercises,  "neuromuscular re-education and progress toward the following goals:    · Plan of Care Expires:  07/26/22    Subjective     RN Rylee notified prior to session. Pt's family present upon PT entrance into room.    Chief Complaint: hungry  Patient/Family Comments/goals: "All I want is food or water"  Pain/Comfort:  · Pain Rating 1: 0/10      Objective:     Additional staff present: none    Patient found supine with: Tracheostomy, pulse ox (continuous), telemetry, peripheral IV, cabrera catheter, pressure relief boots, PEG Tube   Cognition:   · Cooperative and Flat affect  · Command following: Follows one-step verbal commands  · Fluency: hoarse and weak voice due to trach  General Precautions: Standard, Cardiac aspiration, fall, NPO, seizure   Orthopedic Precautions:N/A   Braces: N/A   Body mass index is 22.35 kg/m².  Oxygen Device: Room Air    Vitals: BP (!) 140/65 (BP Location: Right arm, Patient Position: Lying)   Pulse 94   Temp 98.1 °F (36.7 °C) (Axillary)   Resp 18   Ht 4' 11" (1.499 m)   Wt 50.2 kg (110 lb 10.7 oz)   SpO2 99%   Breastfeeding No   BMI 22.35 kg/m²     Outcome Measures:  AM-PAC 6 CLICK MOBILITY  Turning over in bed (including adjusting bedclothes, sheets and blankets)?: 2  Sitting down on and standing up from a chair with arms (e.g., wheelchair, bedside commode, etc.): 2  Moving from lying on back to sitting on the side of the bed?: 2  Moving to and from a bed to a chair (including a wheelchair)?: 1  Need to walk in hospital room?: 1  Climbing 3-5 steps with a railing?: 1  Basic Mobility Total Score: 9     Functional Mobility:    Bed Mobility:   · Rolling/Turning to Left: total assistance  · Rolling/Turning to Right: total assistance  · Scooting to HOB via supine bridge: total assistance  · Supine to Sit: total assistance; from L side of bed  · Scooting anteriorly to EOB to have both feet planted on floor: total assistance  · Sit to Supine: total assistance; to R side of bed    Sitting Balance " at Edge of Bed:   Static Sitting Balance: Poor : unable to maintain balance and requires moderate to maximal assistance from individual or chair   Dynamic Sitting Balance: Poor: able to sit unsupported with moderate assistance and reach ipsilaterally or anteriorly. Unable to cross midline.   Assistance Level Required: Moderate Assistance   Time: ~25 min   Postural deviations noted: slouched posture, rounded shoulders, forward head and increased cervical flexion   Encouraged: upright sitting with head up       Transfers:   · Sit <> Stand Transfer: maximal assistance with no assistive device   · Stand <> Sit Transfer: moderate assistance with no assistive device   · s2sfttum from EOB    Standing Balance:   Static Standing Balance: Poor-: requires maximal assistance and UE support to maintain standing balance without loss   Assistance Level Required: Maximum Assistance   Patient used: no assistive device    Time: 30 sec + 1 minute   Postural deviations noted: slouched posture   Encouraged: hip extension, knee extension      Therapeutic Exercises:    Seated AROM/AAROM: Pt performed marches, LAQs, ankle pumps x 10 repetitions with facilitation for correct performance and sequencing. Exercises performed to develop and maintain pt's  strength, endurance, ROM, balance, posture and core stabilization.     Education:   Time provided for education, counseling and discussion of health disposition in regards to patient's current status   All questions answered within PT scope of practice and to patient's satisfaction   PT role in POC to address current functional deficits   Pt educated on proper body mechanics, safety techniques, and energy conservation with PT facilitation and cueing throughout session    Patient left left sidelying with all lines intact, call button in reach and family present.    GOALS:   Multidisciplinary Problems     Physical Therapy Goals        Problem: Physical Therapy    Goal Priority  Disciplines Outcome Goal Variances Interventions   Physical Therapy Goal     PT, PT/OT Ongoing, Progressing     Description: Goals to be met by: 7/10/2022     Patient will increase functional independence with mobility by performin. Rolling to Left with Moderate Assistance.  2. Lower extremity exercise program x 10 reps per handout, with assistance as needed                   Time Tracking:     PT Received On: 22  PT Start Time: 1449     PT Stop Time: 1528  PT Total Time (min): 39 min       Billable Minutes:   · Therapeutic Activity 20 min, Therapeutic Exercise 10 min and Neuromuscular Re-education 9 min    Treatment Type: Treatment  PT/PTA: PT       Blaise Contreras, PT, DPT  2022

## 2022-06-30 NOTE — ASSESSMENT & PLAN NOTE
Patient previously started on Lacosamide and Keppra at OSH during initial episode of status epilepticus. Phenytoin added as patient continued to have breakthrough seizures.    - Repeat EEG ordered 2/2 interval decrease in alert/responsiveness; diffuse toxic metabolic encephalopathy, no new seizure per Neuro EP  - Continue Keppra monotherapy 750 bid; titrate per neuro recs  - Neurology consulted for further assistance in antiepileptic regimen

## 2022-06-30 NOTE — RESPIRATORY THERAPY
RAPID RESPONSE RESPIRATORY THERAPY PROACTIVE NOTE           Time of visit: 745     Code Status: DNR   : 1950  Bed: 23850/64991 A:   MRN: [id number]  Time spent at the bedside: < 15 min    SITUATION    Evaluated patient for: LDA Check     BACKGROUND    Patient has a past medical history of Arthritis, Cancer of breast, Cataract, Diabetes mellitus, Hypertension, Hypoxia, Memory loss, Orthostatic hypotension, TIA (transient ischemic attack), and Vitamin D deficiency.  Clinically Significant Surgical Hx: tracheostomy    24 Hours Vitals Range:  Temp:  [97.9 °F (36.6 °C)-98.6 °F (37 °C)]   Pulse:  [87-93]   Resp:  [16-18]   BP: (120-152)/(58-74)   SpO2:  [95 %-100 %]     Labs:    Recent Labs     22  0402 22  0321 22  0415    142 142   K 4.7 4.9 4.8   * 119* 119*   CO2 17* 14* 17*   CREATININE 0.7 0.6 0.6   * 148* 177*   PHOS 2.0* 2.4* 2.1*   MG 1.8 1.8 1.8        Recent Labs     22  1722 22  0938   PH 7.344* 7.339*   PCO2 27.8* 29.8*   PO2 82 96   HCO3 15.2* 16.0*   POCSATURATED 96 97   BE -11 -10       ASSESSMENT/INTERVENTIONS    Upon arrival in room all supplies at bedside, including extra trachs 4.0 & 6.0 shiley cuffed.    Last VS   Temp: 98.1 °F (36.7 °C) (732)  Pulse: 87 (732)  Resp: 18 (732)  BP: 140/65 (732)  SpO2: 100 % (732)    Level of Consciousness: Level of Consciousness (AVPU): alert  Respiratory Effort: Respiratory Effort: Unlabored Expansion/Accessory Muscle Usage: Expansion/Accessory Muscles/Retractions: expansion symmetric  All Lung Field Breath Sounds: All Lung Fields Breath Sounds: coarse, diminished  IGNACIO Breath Sounds: diminished  RUL Breath Sounds: diminished  RML Breath Sounds: diminished  RLL Breath Sounds: diminished  O2 Device/Concentration: Flow (L/min): 0, Oxygen Concentration (%): 0, O2 Device (Oxygen Therapy): room air  Surgical airway: Yes, Type: Shiley Size: 6, cuffed  Ambu at bedside: Ambu bag with the  patient?: Yes, Adult Ambu     Active Orders   Respiratory Care    Oxygen Continuous     Frequency: Continuous     Number of Occurrences: Until Specified     Order Questions:      Device type: Low flow      Device: Trach Collar      FiO2%: 28      Titrate O2 per Oxygen Titration Protocol: Yes      To maintain SpO2 goal of: >= 90%      Notify MD of: Inability to achieve desired SpO2; Sudden change in patient status and requires 20% increase in FiO2; Patient requires >60% FiO2    Routine tracheostomy care     Frequency: BID     Number of Occurrences: Until Specified    SUCTION PRN     Frequency: PRN     Number of Occurrences: Until Specified       RECOMMENDATIONS    We recommend: RRT Recs: Continue POC per primary team.    FOLLOW-UP    Please call back the Rapid Response RT, Chrissy Mayes, RRT at x [phone] for any questions or concerns.

## 2022-06-30 NOTE — PROCEDURES
DATE: 6/29/22    EEG NUMBER: FH     REFERRING PHYSICIAN:  Dr. Ugalde     This EEG was performed to assess for subclinical seizures      ELECTROENCEPHALOGRAM REPORT     METHODOLOGY:  Electroencephalographic (EEG) recording is with electrodes placed according to the International 10-20 placement system.  Thirty two (32) channels of digital signal are simultaneously recorded from the scalp and may include EKG, EMG, and/or eye monitors.   Recording band pass was 0.1 to 512 hz.  Digital video recording of the patient is simultaneously recorded with the EEG.  The nursing staff report clinical symptoms and may press an event button when the patient has symptoms of clinical interest to the treating physicians.  EEG and video recording is stored and archived in digital format.  The entire recording is visually reviewed, and the times identified by computer analysis as being spikes or seizures are reviewed again.  Activation procedures which include photic stimulation, hyperventilation and instructing patients to perform simple task are done in selected patients.   Compresses spectral analysis (CSA) is also performed on the activity recorded from each individual channel.  This is displayed as a power display of frequencies from 0 to 30 Hz over time.   The CSA analysis is done and displayed continuously.  This is reviewed for asymmetries in power between homologous areas of the scalp and for presence of changes in power which can be seen when seizures occur.  Sections of suspected abnormalities on the CSA is then compared with the original EEG recording.                Rich[employer] software was also utilized in the review of this study.  This software suite analyzes the EEG recording in multiple domains.  Coherence and rhythmicity is computed to identify EEG sections which may contain organized seizures.  Each channel undergoes analysis to detect presence of spike and sharp waves which have special and morphological  characteristic of epileptic activity.  The routine EEG recording is converted from spacial into frequency domain.  This is then displayed comparing homologous areas to identify areas of significant asymmetry.  Algorithm to identify non-cortically generated artifact is used to separate eye movement, EMG and other artifact from the EEG.     EEG FINDINGS:  The recording was obtained with a number of standard bipolar and referential montages during obtunded state.  Diffuse disorganized low amplitude mixed delta and occasional theta range slowing was noted which was symmetric.  A maximum posterior dominant rhythm is 6 hertz was noted which were symmetric.  The background was intermixed with symmetric spindles.  Frequent triphasic waves were seen. Variability and reactivity were noted.  There were no interictal epileptiform abnormalities and no clinical or electrographic seizures were recorded.    The EKG channel revealed a sinus rhythm.     IMPRESSION:  This is an abnormal EEG during obtunded state.  Diffuse disorganized low-amplitude slowing of the background was noted  Frequent triphasic waves were seen     CLINICAL CORRELATION:  The patient is a 71-year-old female with a history of hypernatremia who is being evaluated for altered sensorium.  The patient is currently maintained on Keppra.  This is an abnormal EEG during obtunded state.  The overall degree of disorganization and slowing for given age is suggestive of a moderate to severe encephalopathy, likely a toxic metabolic encephalopathy.  There is no evidence of an epileptic process on this recording.  No seizures were recorded during this study.

## 2022-07-01 LAB
ALBUMIN SERPL BCP-MCNC: 1.4 G/DL (ref 3.5–5.2)
ALBUMIN SERPL BCP-MCNC: 1.7 G/DL (ref 3.5–5.2)
ALP SERPL-CCNC: 79 U/L (ref 55–135)
ALT SERPL W/O P-5'-P-CCNC: 13 U/L (ref 10–44)
ANION GAP SERPL CALC-SCNC: 18 MMOL/L (ref 8–16)
ANION GAP SERPL CALC-SCNC: 7 MMOL/L (ref 8–16)
AST SERPL-CCNC: 15 U/L (ref 10–40)
BASOPHILS # BLD AUTO: 0.04 K/UL (ref 0–0.2)
BASOPHILS NFR BLD: 0.4 % (ref 0–1.9)
BILIRUB SERPL-MCNC: 0.1 MG/DL (ref 0.1–1)
BUN SERPL-MCNC: 10 MG/DL (ref 8–23)
BUN SERPL-MCNC: 9 MG/DL (ref 8–23)
CALCIUM SERPL-MCNC: 8.1 MG/DL (ref 8.7–10.5)
CALCIUM SERPL-MCNC: 9.6 MG/DL (ref 8.7–10.5)
CHLORIDE SERPL-SCNC: 110 MMOL/L (ref 95–110)
CHLORIDE SERPL-SCNC: 118 MMOL/L (ref 95–110)
CO2 SERPL-SCNC: 14 MMOL/L (ref 23–29)
CO2 SERPL-SCNC: 21 MMOL/L (ref 23–29)
CREAT SERPL-MCNC: 0.5 MG/DL (ref 0.5–1.4)
CREAT SERPL-MCNC: 0.6 MG/DL (ref 0.5–1.4)
DIFFERENTIAL METHOD: ABNORMAL
EOSINOPHIL # BLD AUTO: 0.1 K/UL (ref 0–0.5)
EOSINOPHIL NFR BLD: 1 % (ref 0–8)
ERYTHROCYTE [DISTWIDTH] IN BLOOD BY AUTOMATED COUNT: 19.2 % (ref 11.5–14.5)
EST. GFR  (AFRICAN AMERICAN): >60 ML/MIN/1.73 M^2
EST. GFR  (AFRICAN AMERICAN): >60 ML/MIN/1.73 M^2
EST. GFR  (NON AFRICAN AMERICAN): >60 ML/MIN/1.73 M^2
EST. GFR  (NON AFRICAN AMERICAN): >60 ML/MIN/1.73 M^2
GLUCOSE SERPL-MCNC: 112 MG/DL (ref 70–110)
GLUCOSE SERPL-MCNC: 124 MG/DL (ref 70–110)
HCT VFR BLD AUTO: 24.9 % (ref 37–48.5)
HGB BLD-MCNC: 7.8 G/DL (ref 12–16)
IMM GRANULOCYTES # BLD AUTO: 0.02 K/UL (ref 0–0.04)
IMM GRANULOCYTES NFR BLD AUTO: 0.2 % (ref 0–0.5)
INR PPP: 1.1 (ref 0.8–1.2)
LYMPHOCYTES # BLD AUTO: 2.7 K/UL (ref 1–4.8)
LYMPHOCYTES NFR BLD: 28.7 % (ref 18–48)
MAGNESIUM SERPL-MCNC: 1.4 MG/DL (ref 1.6–2.6)
MCH RBC QN AUTO: 26.9 PG (ref 27–31)
MCHC RBC AUTO-ENTMCNC: 31.3 G/DL (ref 32–36)
MCV RBC AUTO: 86 FL (ref 82–98)
MONOCYTES # BLD AUTO: 0.4 K/UL (ref 0.3–1)
MONOCYTES NFR BLD: 4.4 % (ref 4–15)
NEUTROPHILS # BLD AUTO: 6.2 K/UL (ref 1.8–7.7)
NEUTROPHILS NFR BLD: 65.3 % (ref 38–73)
NRBC BLD-RTO: 0 /100 WBC
PHOSPHATE SERPL-MCNC: 1.7 MG/DL (ref 2.7–4.5)
PHOSPHATE SERPL-MCNC: 2 MG/DL (ref 2.7–4.5)
PLATELET # BLD AUTO: 514 K/UL (ref 150–450)
PMV BLD AUTO: 9.4 FL (ref 9.2–12.9)
POCT GLUCOSE: 130 MG/DL (ref 70–110)
POCT GLUCOSE: 132 MG/DL (ref 70–110)
POCT GLUCOSE: 133 MG/DL (ref 70–110)
POCT GLUCOSE: 148 MG/DL (ref 70–110)
POTASSIUM SERPL-SCNC: 3.8 MMOL/L (ref 3.5–5.1)
POTASSIUM SERPL-SCNC: 3.9 MMOL/L (ref 3.5–5.1)
PROT SERPL-MCNC: 6.3 G/DL (ref 6–8.4)
PROTHROMBIN TIME: 11.6 SEC (ref 9–12.5)
RBC # BLD AUTO: 2.9 M/UL (ref 4–5.4)
SODIUM SERPL-SCNC: 126 MMOL/L (ref 136–145)
SODIUM SERPL-SCNC: 138 MMOL/L (ref 136–145)
SODIUM SERPL-SCNC: 140 MMOL/L (ref 136–145)
SODIUM SERPL-SCNC: 150 MMOL/L (ref 136–145)
WBC # BLD AUTO: 9.44 K/UL (ref 3.9–12.7)

## 2022-07-01 PROCEDURE — 85610 PROTHROMBIN TIME: CPT | Performed by: STUDENT IN AN ORGANIZED HEALTH CARE EDUCATION/TRAINING PROGRAM

## 2022-07-01 PROCEDURE — 36415 COLL VENOUS BLD VENIPUNCTURE: CPT

## 2022-07-01 PROCEDURE — 31720 CLEARANCE OF AIRWAYS: CPT

## 2022-07-01 PROCEDURE — 27000221 HC OXYGEN, UP TO 24 HOURS

## 2022-07-01 PROCEDURE — 36415 COLL VENOUS BLD VENIPUNCTURE: CPT | Performed by: STUDENT IN AN ORGANIZED HEALTH CARE EDUCATION/TRAINING PROGRAM

## 2022-07-01 PROCEDURE — 63600175 PHARM REV CODE 636 W HCPCS

## 2022-07-01 PROCEDURE — 99900035 HC TECH TIME PER 15 MIN (STAT)

## 2022-07-01 PROCEDURE — 80053 COMPREHEN METABOLIC PANEL: CPT

## 2022-07-01 PROCEDURE — 94761 N-INVAS EAR/PLS OXIMETRY MLT: CPT

## 2022-07-01 PROCEDURE — 27200966 HC CLOSED SUCTION SYSTEM

## 2022-07-01 PROCEDURE — 25000003 PHARM REV CODE 250: Performed by: STUDENT IN AN ORGANIZED HEALTH CARE EDUCATION/TRAINING PROGRAM

## 2022-07-01 PROCEDURE — 99900026 HC AIRWAY MAINTENANCE (STAT)

## 2022-07-01 PROCEDURE — 36415 COLL VENOUS BLD VENIPUNCTURE: CPT | Performed by: HOSPITALIST

## 2022-07-01 PROCEDURE — 83735 ASSAY OF MAGNESIUM: CPT

## 2022-07-01 PROCEDURE — 99232 PR SUBSEQUENT HOSPITAL CARE,LEVL II: ICD-10-PCS | Mod: ,,, | Performed by: HOSPITALIST

## 2022-07-01 PROCEDURE — 25000003 PHARM REV CODE 250

## 2022-07-01 PROCEDURE — 63600175 PHARM REV CODE 636 W HCPCS: Performed by: STUDENT IN AN ORGANIZED HEALTH CARE EDUCATION/TRAINING PROGRAM

## 2022-07-01 PROCEDURE — 99232 SBSQ HOSP IP/OBS MODERATE 35: CPT | Mod: ,,, | Performed by: HOSPITALIST

## 2022-07-01 PROCEDURE — 80069 RENAL FUNCTION PANEL: CPT | Performed by: HOSPITALIST

## 2022-07-01 PROCEDURE — 84100 ASSAY OF PHOSPHORUS: CPT

## 2022-07-01 PROCEDURE — 12000002 HC ACUTE/MED SURGE SEMI-PRIVATE ROOM

## 2022-07-01 PROCEDURE — 84295 ASSAY OF SERUM SODIUM: CPT

## 2022-07-01 PROCEDURE — 85025 COMPLETE CBC W/AUTO DIFF WBC: CPT

## 2022-07-01 PROCEDURE — 25000003 PHARM REV CODE 250: Performed by: RADIOLOGY

## 2022-07-01 RX ORDER — SODIUM,POTASSIUM PHOSPHATES 280-250MG
2 POWDER IN PACKET (EA) ORAL ONCE
Status: COMPLETED | OUTPATIENT
Start: 2022-07-01 | End: 2022-07-01

## 2022-07-01 RX ORDER — MAGNESIUM SULFATE HEPTAHYDRATE 40 MG/ML
2 INJECTION, SOLUTION INTRAVENOUS ONCE
Status: COMPLETED | OUTPATIENT
Start: 2022-07-01 | End: 2022-07-01

## 2022-07-01 RX ORDER — LIDOCAINE HYDROCHLORIDE 10 MG/ML
INJECTION INFILTRATION; PERINEURAL CODE/TRAUMA/SEDATION MEDICATION
Status: COMPLETED | OUTPATIENT
Start: 2022-07-01 | End: 2022-07-01

## 2022-07-01 RX ORDER — DEXTROSE MONOHYDRATE 50 MG/ML
INJECTION, SOLUTION INTRAVENOUS CONTINUOUS
Status: ACTIVE | OUTPATIENT
Start: 2022-07-01 | End: 2022-07-01

## 2022-07-01 RX ADMIN — CHOLESTYRAMINE 8 G: 4 POWDER, FOR SUSPENSION ORAL at 10:07

## 2022-07-01 RX ADMIN — LEVETIRACETAM 750 MG: 100 SOLUTION ORAL at 10:07

## 2022-07-01 RX ADMIN — HEPARIN SODIUM 5000 UNITS: 5000 INJECTION INTRAVENOUS; SUBCUTANEOUS at 09:07

## 2022-07-01 RX ADMIN — FLUCONAZOLE 200 MG: 200 TABLET ORAL at 10:07

## 2022-07-01 RX ADMIN — PIPERACILLIN SODIUM AND TAZOBACTAM SODIUM 4.5 G: 4; .5 INJECTION, POWDER, LYOPHILIZED, FOR SOLUTION INTRAVENOUS at 06:07

## 2022-07-01 RX ADMIN — LIDOCAINE HYDROCHLORIDE 5 ML: 10 INJECTION, SOLUTION INFILTRATION; PERINEURAL at 05:07

## 2022-07-01 RX ADMIN — SODIUM BICARBONATE 650 MG TABLET 650 MG: at 10:07

## 2022-07-01 RX ADMIN — LEVETIRACETAM 750 MG: 100 SOLUTION ORAL at 09:07

## 2022-07-01 RX ADMIN — CHOLESTYRAMINE 8 G: 4 POWDER, FOR SUSPENSION ORAL at 09:07

## 2022-07-01 RX ADMIN — DEXTROSE: 5 SOLUTION INTRAVENOUS at 08:07

## 2022-07-01 RX ADMIN — PIPERACILLIN SODIUM AND TAZOBACTAM SODIUM 4.5 G: 4; .5 INJECTION, POWDER, LYOPHILIZED, FOR SOLUTION INTRAVENOUS at 10:07

## 2022-07-01 RX ADMIN — POTASSIUM & SODIUM PHOSPHATES POWDER PACK 280-160-250 MG 2 PACKET: 280-160-250 PACK at 06:07

## 2022-07-01 RX ADMIN — PIPERACILLIN SODIUM AND TAZOBACTAM SODIUM 4.5 G: 4; .5 INJECTION, POWDER, LYOPHILIZED, FOR SOLUTION INTRAVENOUS at 02:07

## 2022-07-01 RX ADMIN — ATORVASTATIN CALCIUM 80 MG: 20 TABLET, FILM COATED ORAL at 10:07

## 2022-07-01 RX ADMIN — SODIUM BICARBONATE 650 MG TABLET 650 MG: at 09:07

## 2022-07-01 RX ADMIN — MAGNESIUM SULFATE HEPTAHYDRATE 2 G: 40 INJECTION, SOLUTION INTRAVENOUS at 06:07

## 2022-07-01 NOTE — PLAN OF CARE
07/01/22 1457   Post-Acute Status   Post-Acute Authorization IV Infusion   IV Infusion Status Post acute provider reviewing for acceptance   Discharge Delays None known at this time   Discharge Plan   Discharge Plan A Home Health   Discharge Plan B Home Health       [facility]      Yocasta Urbina, JOANIE, MSW, LMSW, RSW   Case Management  [facility]  Email: [email or web address]

## 2022-07-01 NOTE — PLAN OF CARE
Tunneled PICC placement complete. Pt tolerated well. VSS. No signs or symptoms of distress noted. Pt stable for transport back to room at this time. Report called to floor RN.

## 2022-07-01 NOTE — PLAN OF CARE
Jazmin printed off sitter list for pt sister Sabi.  JAZMIN left the list with a note, on the table in pt room.  JAZMIN followed-up with a phone call to Sabi and explained the note left with the list.  Sister Sabi said she would look at the list and follow-up with any questions.    Yocasta Urbina CD, MSW, LMSW, RSW   Case Management  [facility]  Email: [email or web address]

## 2022-07-01 NOTE — ASSESSMENT & PLAN NOTE
STABLE, WNL    Na 150 today likely due to not getting free water flushes.    - Increases frequency of free water flushes.  - D5 for five hours.     - Na q6h

## 2022-07-01 NOTE — PLAN OF CARE
Pt arrived to Interventional Radiology room 188 via stretcher for tunneled PICC placement.  Plan of care reviewed with patient, patient verbalized understanding.  Name verified using two identifiers.  Allergies verified.  Labs, orders and consent reviewed on chart.  Pt oriented to unit and staff.  See flow sheet for documentation, monitoring and medication administration. Will continue to monitor.

## 2022-07-01 NOTE — ASSESSMENT & PLAN NOTE
Patient with chronic indwelling cabrera, with UCx with yeast. May be chronically colonized given extensive periods with indwelling carbera catheters dating back to 04/2022.     - Given interval decrease in mentation from earlier this hospital course, will treat as symptomatic  - D/C Fluconazole 200 mg

## 2022-07-01 NOTE — PLAN OF CARE
SW went to visit pt today but she was sleeping      Yocasta Urbina, JOANIE, MSW, LMSW, RSW   Case Management  [facility]  Email: [email or web address]
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
## 2022-07-01 NOTE — RESPIRATORY THERAPY
RAPID RESPONSE RESPIRATORY THERAPY PROACTIVE NOTE           Time of visit: 819     Code Status: DNR   : 1950  Bed: 01358/62936 A:   MRN: [id number]  Time spent at the bedside: < 15 min    SITUATION    Evaluated patient for: LDA Check     BACKGROUND    Patient has a past medical history of Arthritis, Cancer of breast, Cataract, Diabetes mellitus, Hypertension, Hypoxia, Memory loss, Orthostatic hypotension, TIA (transient ischemic attack), and Vitamin D deficiency.  Clinically Significant Surgical Hx: tracheostomy    24 Hours Vitals Range:  Temp:  [97.5 °F (36.4 °C)-98.1 °F (36.7 °C)]   Pulse:  [89-94]   Resp:  [16-18]   BP: (115-141)/(69-81)   SpO2:  [97 %-100 %]     Labs:    Recent Labs     22  0321 22  0415 22  0409    142 150*   K 4.9 4.8 3.8   * 119* 118*   CO2 14* 17* 14*   CREATININE 0.6 0.6 0.5   * 177* 112*   PHOS 2.4* 2.1* 1.7*   MG 1.8 1.8 1.4*        Recent Labs     22  0938   PH 7.339*   PCO2 29.8*   PO2 96   HCO3 16.0*   POCSATURATED 97   BE -10       ASSESSMENT/INTERVENTIONS    Upon arrival in room all supplies at bedside, including extra trachs 4.0 & 6.0 shiley cuffed.    Last VS   Temp: 97.6 °F (36.4 °C) (103)  Pulse: 91 (433)  Resp: 17 (103)  BP: 132/69 (103)  SpO2: 100 % (433)    Level of Consciousness: Level of Consciousness (AVPU): alert  Respiratory Effort: Respiratory Effort: Normal, Unlabored Expansion/Accessory Muscle Usage: Expansion/Accessory Muscles/Retractions: expansion symmetric, no retractions, no use of accessory muscles  All Lung Field Breath Sounds: All Lung Fields Breath Sounds: Lateral:, Anterior:, coarse  IGNACIO Breath Sounds: diminished  RUL Breath Sounds: diminished  RML Breath Sounds: diminished  RLL Breath Sounds: diminished  O2 Device/Concentration: Flow (L/min): 0, Oxygen Concentration (%): 0, O2 Device (Oxygen Therapy): room air  Surgical airway: Yes, Type: Shiley Size: 6, uncuffed  Ambu at  bedside: Ambu bag with the patient?: Yes, Adult Ambu     Active Orders   Respiratory Care    Pulse Oximetry Q4H     Frequency: Q4H     Number of Occurrences: Until Specified    Routine tracheostomy care     Frequency: BID     Number of Occurrences: Until Specified    SUCTION PRN     Frequency: PRN     Number of Occurrences: Until Specified       RECOMMENDATIONS    We recommend: RRT Recs: Continue POC per primary team.    FOLLOW-UP    Please call back the Rapid Response RT, Chrissy Mayes, RRT at x [phone] for any questions or concerns.

## 2022-07-01 NOTE — ASSESSMENT & PLAN NOTE
Patient is DNR. LaPOST on file.     - Patient initially resistant to hospice d/t previous experience with [facility] inpatient [facility]ice; after extensive conversation with MD, now amenable to exploring hospice (though does not desire home w/ hospice)  - CM/SW assisting; tracheostomy status complicating placement to group home w/ hospice.  - See Care Update note Zi-on MD Aramis on 06/29 for further detail.

## 2022-07-01 NOTE — ASSESSMENT & PLAN NOTE
RESOLVED    Patient admitted with elevated WBC to 14s. Previous episodes of aspiration PNA at OSH, indwelling cabrera given acuity of condition, sacral decubitus ulcer stave IV s/p debridement with enterococcus faecalis / bacteriodes fragilis in cx. Patient afebrile with stable hemodynamics on admission at this time. CXR without acute abnormality. UA with yeast growing, chronic per previous OSH review.    Leukocytosis may be explained by soft tissue infection vs hemoconcentration given extensive dehydration and hypernatremia from lack of enteral fluids or IVF at outside hospice facility prior to transfer to [facility].     - Zosyn given previous cultures and persistent leukocytosis  - Etiology may be soft tissue infection; see Sacral Decubitus Ulcer A/P  - Continue monitoring CBC  - Wound care per Sacral Decubitus Ulcer A/P  - Aspiration precautions  - Trend fever curve  - Trend BCx

## 2022-07-01 NOTE — SUBJECTIVE & OBJECTIVE
Interval History: AF HDS NAEON. Patient's sodium increased from 142 to 150 today. Patient minimally responsive. Follows simple commands. Attempts to mouth one/two words. Patient with increased secretion. Trach is now hooked to humidifier.     Review of Systems   Unable to perform ROS: Patient nonverbal   Constitutional:  Positive for fatigue.   Respiratory:  Negative for chest tightness and shortness of breath.    Cardiovascular:  Negative for chest pain.   Gastrointestinal:  Negative for abdominal pain.   Objective:     Vital Signs (Most Recent):  Temp: 97.8 °F (36.6 °C) (07/01/22 0830)  Pulse: 82 (07/01/22 0900)  Resp: 18 (07/01/22 0830)  BP: (!) 144/67 (07/01/22 0830)  SpO2: 100 % (07/01/22 0900)   Vital Signs (24h Range):  Temp:  [97.5 °F (36.4 °C)-98.1 °F (36.7 °C)] 97.8 °F (36.6 °C)  Pulse:  [82-94] 82  Resp:  [16-18] 18  SpO2:  [97 %-100 %] 100 %  BP: (115-144)/(67-81) 144/67     Weight: 50.2 kg (110 lb 10.7 oz)  Body mass index is 22.35 kg/m².    Intake/Output Summary (Last 24 hours) at 7/1/2022 1247  Last data filed at 7/1/2022 1200  Gross per 24 hour   Intake 1550 ml   Output 850 ml   Net 700 ml      Physical Exam  Constitutional:       General: She is not in acute distress.     Appearance: She is diaphoretic. She is not ill-appearing or toxic-appearing.   HENT:      Head: Normocephalic.      Mouth/Throat:      Mouth: Mucous membranes are dry.      Comments: Dried oral secretions adhered to tongue  Neck:      Comments: Trach in place and secured  Cardiovascular:      Rate and Rhythm: Normal rate and regular rhythm.      Pulses: Normal pulses.      Heart sounds: Normal heart sounds. No murmur heard.  Pulmonary:      Effort: Pulmonary effort is normal. No respiratory distress.      Breath sounds: Normal breath sounds. No wheezing or rales.   Abdominal:      General: Abdomen is flat. Bowel sounds are normal. There is no distension.      Palpations: Abdomen is soft.      Tenderness: There is no abdominal  tenderness.   Musculoskeletal:         General: No swelling or tenderness. Normal range of motion.      Cervical back: Normal range of motion.   Skin:     General: Skin is warm.      Coloration: Skin is not jaundiced or pale.      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    Neurological:      Mental Status: She is alert.      Comments: Intermittently alert, minimally responsive   Psychiatric:      Comments: Unable to assess       Significant Labs: All pertinent labs within the past 24 hours have been reviewed.  CBC:   Recent Labs   Lab 06/30/22 0415 07/01/22  0409   WBC 8.46 9.44   HGB 7.8* 7.8*   HCT 25.8* 24.9*   * 514*     CMP:   Recent Labs   Lab 06/30/22 0415 07/01/22  0409    150*   K 4.8 3.8   * 118*   CO2 17* 14*   * 112*   BUN 14 10   CREATININE 0.6 0.5   CALCIUM 9.0 8.1*   PROT 6.2 6.3   ALBUMIN 1.5* 1.4*   BILITOT 0.1 0.1   ALKPHOS 90 79   AST 12 15   ALT 13 13   ANIONGAP 6* 18*   EGFRNONAA >60.0 >60.0       Significant Imaging: I have reviewed all pertinent imaging results/findings within the past 24 hours.

## 2022-07-01 NOTE — H&P
Inpatient Radiology Pre-procedure Note    History of Present Illness:  Darlene Avila is a 71 y.o. female who presents for tunneled PICC for antibiotics. See IR Consult note for further details.     Admission H&P reviewed.  Past Medical History:   Diagnosis Date    Arthritis     Cancer of breast     s/p mastectomy (on anastrozole)     Cataract     Diabetes mellitus     c/b Diabetic retinopathy    Hypertension     resolved    Hypoxia 4/15/2021    Memory loss     due to strokes    Orthostatic hypotension 12/27/2020    frequent falls, on midodrine.  4/2021 seen by both cards and palliative care    TIA (transient ischemic attack) 4025-9917    CT with remote infarcts    Vitamin D deficiency 10/14/2021     Past Surgical History:   Procedure Laterality Date    CAPSULOTOMY  6/26/13    yag left eye    CATARACT EXTRACTION  6/3/2013    right eye    CHOLECYSTECTOMY      HYSTERECTOMY      MASTECTOMY Right 9/28/2020    Procedure: MASTECTOMY-Right;  Surgeon: Krysta Clark MD;  Location: [facility];  Service: General;  Laterality: Right;    SENTINEL LYMPH NODE BIOPSY Right 9/28/2020    Procedure: BIOPSY, LYMPH NODE, SENTINEL-Right;  Surgeon: Krysta Clark MD;  Location: [facility];  Service: General;  Laterality: Right;    TOTAL ABDOMINAL HYSTERECTOMY W/ BILATERAL SALPINGOOPHORECTOMY      WOUND DEBRIDEMENT N/A 6/24/2022    Procedure: DEBRIDEMENT, WOUND, sacral ulcer;  Surgeon: Cullen Martin MD;  Location: [address];  Service: General;  Laterality: N/A;       Review of Systems:   As documented in primary team H&P    Home Meds:   Prior to Admission medications    Medication Sig Start Date End Date Taking? Authorizing Provider   bisacodyL (DULCOLAX) 10 mg Supp Place 10 mg rectally 2 (two) times daily as needed (CONSTIPATION).   Yes Historical Provider   hyoscyamine (LEVSIN/SL) 0.125 mg Subl Place 0.125 mg under the tongue every 2 (two) hours as needed (EXCESS SECRETIONS).   Yes Historical Provider   lorazepam  (ATIVAN) 2 mg/mL Conc TAKE 0.25-1ML BY MOUTH EVERY 2 HOURS AS NEEDED FOR RESTLESSNESS OR AGITATION   Yes Historical Provider   morphine 100 mg/5 mL (20 mg/mL) concentrated solution TAKE 0.25-1 ML BY MOUTH EVERY 2 HOURS AS NEEDED FOR PAIN OR SHORTNESS OF BREATH   Yes Historical Provider   atorvastatin (LIPITOR) 80 MG tablet 1 tablet (80 mg total) by Per G Tube route once daily. 6/29/22 6/29/23  Dawood Self MD   fluconazole (DIFLUCAN) 200 MG Tab 1 tablet (200 mg total) by Per G Tube route once daily. for 13 days 6/30/22 7/13/22  Dawood Self MD   insulin detemir U-100 (LEVEMIR FLEXTOUCH) 100 unit/mL (3 mL) SubQ InPn pen Inject 8 Units into the skin every evening. 6/28/22 6/28/23  Dawood Self MD   levetiracetam 500 mg/5 mL (5 mL) Soln 7.5 mLs (750 mg total) by Per G Tube route 2 (two) times daily. 6/28/22 6/28/23  Dawood Self MD   piperacillin sodium/tazobactam (PIPERACILLIN-TAZOBACTAM 4.5G/100ML SODIUM CHLORIDE 0.9%-READY TO MIX) Inject 100 mLs (4.5 g total) into the vein every 8 (eight) hours. 6/28/22 8/5/22  Dawood Self MD     Scheduled Meds:    atorvastatin  80 mg Per G Tube Daily    cholestyramine  2 packet Per G Tube BID    fluconazole  200 mg Per G Tube Daily    heparin (porcine)  5,000 Units Subcutaneous Q12H    insulin detemir U-100  8 Units Subcutaneous QHS    levetiracetam  750 mg Per G Tube BID    magnesium sulfate IVPB  2 g Intravenous Once    piperacillin-tazobactam (ZOSYN) IVPB  4.5 g Intravenous Q8H    potassium, sodium phosphates  2 packet Per G Tube Once    sodium bicarbonate  650 mg Per G Tube BID     Continuous Infusions:    dextrose 10 % in water (D10W)       PRN Meds:acetaminophen, dextrose 10 % in water (D10W), glucagon (human recombinant), glucose, glucose, insulin aspart U-100, naloxone, sodium chloride 0.9%  Anticoagulants/Antiplatelets: no anticoagulation    Allergies:   Review of patient's allergies indicates:   Allergen Reactions    Iodinated contrast media Hives      Sedation Hx: have not been any systemic reactions    Labs:  Recent Labs   Lab 07/01/22  0615   INR 1.1       Recent Labs   Lab 07/01/22  0409   WBC 9.44   HGB 7.8*   HCT 24.9*   MCV 86   *      Recent Labs   Lab 07/01/22  0409 07/01/22  1357   *  --    * 126*   K 3.8  --    *  --    CO2 14*  --    BUN 10  --    CREATININE 0.5  --    CALCIUM 8.1*  --    MG 1.4*  --    ALT 13  --    AST 15  --    ALBUMIN 1.4*  --    BILITOT 0.1  --          Vitals:  Temp: 97.8 °F (36.6 °C) (07/01/22 0830)  Pulse: 83 (07/01/22 1515)  Resp: 18 (07/01/22 0830)  BP: (!) 144/67 (07/01/22 0830)  SpO2: 100 % (07/01/22 1515)     Physical Exam:  ASA: 3  Mallampati: 3, s/p trach    General: no acute distress  Mental Status: alert,limited communication  HEENT: normocephalic, s/p tracheostomy  Chest: unlabored breathing  Heart: regular heart rate  Abdomen: nondistended  Extremity: moves all extremities    Plan:  Tunneled PICC placement for IV antibiotics.   Please make NPO and hold anticoagulation midnight before procedure.    Sedation Plan: up to moderate     Keenan arce  PGY-4  Pager: [phone]

## 2022-07-02 LAB
ANION GAP SERPL CALC-SCNC: 6 MMOL/L (ref 8–16)
BASOPHILS # BLD AUTO: 0.03 K/UL (ref 0–0.2)
BASOPHILS NFR BLD: 0.3 % (ref 0–1.9)
BUN SERPL-MCNC: 9 MG/DL (ref 8–23)
CALCIUM SERPL-MCNC: 8.8 MG/DL (ref 8.7–10.5)
CHLORIDE SERPL-SCNC: 113 MMOL/L (ref 95–110)
CO2 SERPL-SCNC: 19 MMOL/L (ref 23–29)
CREAT SERPL-MCNC: 0.5 MG/DL (ref 0.5–1.4)
DIFFERENTIAL METHOD: ABNORMAL
EOSINOPHIL # BLD AUTO: 0.1 K/UL (ref 0–0.5)
EOSINOPHIL NFR BLD: 0.7 % (ref 0–8)
ERYTHROCYTE [DISTWIDTH] IN BLOOD BY AUTOMATED COUNT: 19.3 % (ref 11.5–14.5)
EST. GFR  (AFRICAN AMERICAN): >60 ML/MIN/1.73 M^2
EST. GFR  (NON AFRICAN AMERICAN): >60 ML/MIN/1.73 M^2
GLUCOSE SERPL-MCNC: 141 MG/DL (ref 70–110)
HCT VFR BLD AUTO: 24.5 % (ref 37–48.5)
HGB BLD-MCNC: 7.5 G/DL (ref 12–16)
IMM GRANULOCYTES # BLD AUTO: 0.03 K/UL (ref 0–0.04)
IMM GRANULOCYTES NFR BLD AUTO: 0.3 % (ref 0–0.5)
LYMPHOCYTES # BLD AUTO: 2.7 K/UL (ref 1–4.8)
LYMPHOCYTES NFR BLD: 27.7 % (ref 18–48)
MAGNESIUM SERPL-MCNC: 1.8 MG/DL (ref 1.6–2.6)
MCH RBC QN AUTO: 26.9 PG (ref 27–31)
MCHC RBC AUTO-ENTMCNC: 30.6 G/DL (ref 32–36)
MCV RBC AUTO: 88 FL (ref 82–98)
MONOCYTES # BLD AUTO: 0.4 K/UL (ref 0.3–1)
MONOCYTES NFR BLD: 4.3 % (ref 4–15)
NEUTROPHILS # BLD AUTO: 6.5 K/UL (ref 1.8–7.7)
NEUTROPHILS NFR BLD: 66.7 % (ref 38–73)
NRBC BLD-RTO: 0 /100 WBC
PHOSPHATE SERPL-MCNC: 2.2 MG/DL (ref 2.7–4.5)
PLATELET # BLD AUTO: 521 K/UL (ref 150–450)
PMV BLD AUTO: 9.3 FL (ref 9.2–12.9)
POCT GLUCOSE: 130 MG/DL (ref 70–110)
POCT GLUCOSE: 169 MG/DL (ref 70–110)
POCT GLUCOSE: 196 MG/DL (ref 70–110)
POCT GLUCOSE: 206 MG/DL (ref 70–110)
POTASSIUM SERPL-SCNC: 4 MMOL/L (ref 3.5–5.1)
RBC # BLD AUTO: 2.79 M/UL (ref 4–5.4)
SODIUM SERPL-SCNC: 137 MMOL/L (ref 136–145)
SODIUM SERPL-SCNC: 138 MMOL/L (ref 136–145)
SODIUM SERPL-SCNC: 138 MMOL/L (ref 136–145)
WBC # BLD AUTO: 9.76 K/UL (ref 3.9–12.7)

## 2022-07-02 PROCEDURE — 36415 COLL VENOUS BLD VENIPUNCTURE: CPT

## 2022-07-02 PROCEDURE — 12000002 HC ACUTE/MED SURGE SEMI-PRIVATE ROOM

## 2022-07-02 PROCEDURE — 25000003 PHARM REV CODE 250: Performed by: STUDENT IN AN ORGANIZED HEALTH CARE EDUCATION/TRAINING PROGRAM

## 2022-07-02 PROCEDURE — 80048 BASIC METABOLIC PNL TOTAL CA: CPT | Performed by: HOSPITALIST

## 2022-07-02 PROCEDURE — 83735 ASSAY OF MAGNESIUM: CPT

## 2022-07-02 PROCEDURE — 99900031 HC PATIENT EDUCATION (STAT)

## 2022-07-02 PROCEDURE — 25000003 PHARM REV CODE 250

## 2022-07-02 PROCEDURE — 63600175 PHARM REV CODE 636 W HCPCS

## 2022-07-02 PROCEDURE — 27200966 HC CLOSED SUCTION SYSTEM

## 2022-07-02 PROCEDURE — 94761 N-INVAS EAR/PLS OXIMETRY MLT: CPT

## 2022-07-02 PROCEDURE — 99232 SBSQ HOSP IP/OBS MODERATE 35: CPT | Mod: ,,, | Performed by: HOSPITALIST

## 2022-07-02 PROCEDURE — 99900026 HC AIRWAY MAINTENANCE (STAT)

## 2022-07-02 PROCEDURE — 99900035 HC TECH TIME PER 15 MIN (STAT)

## 2022-07-02 PROCEDURE — 84295 ASSAY OF SERUM SODIUM: CPT | Mod: 91

## 2022-07-02 PROCEDURE — 99232 PR SUBSEQUENT HOSPITAL CARE,LEVL II: ICD-10-PCS | Mod: ,,, | Performed by: HOSPITALIST

## 2022-07-02 PROCEDURE — 63600175 PHARM REV CODE 636 W HCPCS: Performed by: STUDENT IN AN ORGANIZED HEALTH CARE EDUCATION/TRAINING PROGRAM

## 2022-07-02 PROCEDURE — 84100 ASSAY OF PHOSPHORUS: CPT

## 2022-07-02 PROCEDURE — 85025 COMPLETE CBC W/AUTO DIFF WBC: CPT

## 2022-07-02 RX ADMIN — INSULIN ASPART 2 UNITS: 100 INJECTION, SOLUTION INTRAVENOUS; SUBCUTANEOUS at 05:07

## 2022-07-02 RX ADMIN — LEVETIRACETAM 750 MG: 100 SOLUTION ORAL at 09:07

## 2022-07-02 RX ADMIN — PIPERACILLIN SODIUM AND TAZOBACTAM SODIUM 4.5 G: 4; .5 INJECTION, POWDER, LYOPHILIZED, FOR SOLUTION INTRAVENOUS at 11:07

## 2022-07-02 RX ADMIN — CHOLESTYRAMINE 8 G: 4 POWDER, FOR SUSPENSION ORAL at 10:07

## 2022-07-02 RX ADMIN — CHOLESTYRAMINE 8 G: 4 POWDER, FOR SUSPENSION ORAL at 09:07

## 2022-07-02 RX ADMIN — PIPERACILLIN SODIUM AND TAZOBACTAM SODIUM 4.5 G: 4; .5 INJECTION, POWDER, LYOPHILIZED, FOR SOLUTION INTRAVENOUS at 06:07

## 2022-07-02 RX ADMIN — FLUCONAZOLE 200 MG: 200 TABLET ORAL at 09:07

## 2022-07-02 RX ADMIN — LEVETIRACETAM 750 MG: 100 SOLUTION ORAL at 10:07

## 2022-07-02 RX ADMIN — ATORVASTATIN CALCIUM 80 MG: 20 TABLET, FILM COATED ORAL at 09:07

## 2022-07-02 RX ADMIN — SODIUM BICARBONATE 650 MG TABLET 650 MG: at 10:07

## 2022-07-02 RX ADMIN — SODIUM BICARBONATE 650 MG TABLET 650 MG: at 09:07

## 2022-07-02 RX ADMIN — PIPERACILLIN SODIUM AND TAZOBACTAM SODIUM 4.5 G: 4; .5 INJECTION, POWDER, LYOPHILIZED, FOR SOLUTION INTRAVENOUS at 02:07

## 2022-07-02 RX ADMIN — HEPARIN SODIUM 5000 UNITS: 5000 INJECTION INTRAVENOUS; SUBCUTANEOUS at 09:07

## 2022-07-02 RX ADMIN — HEPARIN SODIUM 5000 UNITS: 5000 INJECTION INTRAVENOUS; SUBCUTANEOUS at 10:07

## 2022-07-02 NOTE — PLAN OF CARE
Problem: Adult Inpatient Plan of Care  Goal: Plan of Care Review  Outcome: Ongoing, Progressing  Goal: Patient-Specific Goal (Individualized)  Outcome: Ongoing, Progressing  Goal: Absence of Hospital-Acquired Illness or Injury  Outcome: Ongoing, Progressing  Goal: Optimal Comfort and Wellbeing  Outcome: Ongoing, Progressing  Goal: Readiness for Transition of Care  Outcome: Ongoing, Progressing     Problem: Diabetes Comorbidity  Goal: Blood Glucose Level Within Targeted Range  Outcome: Ongoing, Progressing     Problem: Impaired Wound Healing  Goal: Optimal Wound Healing  Outcome: Ongoing, Progressing     AAOx1. Unable to assess other orientation questions; patient on trach. Blood sugar closely monitored. Remains on iv abx. No adverse reaction noted. WC completed. Turned and repositioned Q2H. Remains on PEG tube. Has continuous feeding going. Residual noted at AM. MD aware. Remains on trach collar. RT to deep suction trach as needed. Mouth suction at bedside. Remains on cabrera. Cabrera care performed. Instructed to call for assistance

## 2022-07-02 NOTE — SUBJECTIVE & OBJECTIVE
Interval History: No acute overnight events. HDS. Sodium is now stable. Will continue free water flushes. Yesterday IR tunneled catheter insert w/o port. Continue Zosyn and wound care for sacral ulcer.     Review of Systems   Unable to perform ROS: Patient nonverbal   Constitutional:  Positive for fatigue.   Respiratory:  Negative for chest tightness and shortness of breath.    Cardiovascular:  Negative for chest pain.   Gastrointestinal:  Negative for abdominal pain.   Objective:     Vital Signs (Most Recent):  Temp: 98 °F (36.7 °C) (07/02/22 1145)  Pulse: 84 (07/02/22 1145)  Resp: 16 (07/02/22 1145)  BP: 130/63 (07/02/22 1145)  SpO2: 100 % (07/02/22 1210) Vital Signs (24h Range):  Temp:  [97 °F (36.1 °C)-98 °F (36.7 °C)] 98 °F (36.7 °C)  Pulse:  [77-89] 84  Resp:  [16-18] 16  SpO2:  [98 %-100 %] 100 %  BP: (114-164)/(56-77) 130/63     Weight: 50.2 kg (110 lb 10.7 oz)  Body mass index is 22.35 kg/m².    Intake/Output Summary (Last 24 hours) at 7/2/2022 1515  Last data filed at 7/2/2022 0900  Gross per 24 hour   Intake 700 ml   Output 960 ml   Net -260 ml      Physical Exam  Vitals and nursing note reviewed.   Constitutional:       General: She is not in acute distress.     Appearance: She is not ill-appearing or toxic-appearing.   HENT:      Head: Normocephalic.      Mouth/Throat:      Mouth: Mucous membranes are dry.      Comments: Dried oral secretions adhered to tongue  Neck:      Comments: Trach in place and secured  Cardiovascular:      Rate and Rhythm: Normal rate and regular rhythm.      Pulses: Normal pulses.      Heart sounds: Normal heart sounds. No murmur heard.  Pulmonary:      Effort: Pulmonary effort is normal. No respiratory distress.      Breath sounds: Normal breath sounds. No wheezing or rales.   Abdominal:      General: Abdomen is flat. Bowel sounds are normal. There is no distension.      Palpations: Abdomen is soft.      Tenderness: There is no abdominal tenderness.   Musculoskeletal:          General: No swelling or tenderness. Normal range of motion.      Cervical back: Normal range of motion.   Skin:     General: Skin is warm.      Coloration: Skin is not jaundiced or pale.      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    Neurological:      Mental Status: She is alert.      Comments: Intermittently alert, minimally responsive   Psychiatric:      Comments: Unable to assess       Significant Labs: All pertinent labs within the past 24 hours have been reviewed.  CBC:   Recent Labs   Lab 07/01/22 0409 07/02/22 0424   WBC 9.44 9.76   HGB 7.8* 7.5*   HCT 24.9* 24.5*   * 521*     CMP:   Recent Labs   Lab 07/01/22 0409 07/01/22  1357 07/01/22 2019 07/02/22  0010 07/02/22  0424 07/02/22  1235   *   < > 138 137 138  137 138   K 3.8  --  3.9  --  4.0  --    *  --  110  --  113*  --    CO2 14*  --  21*  --  19*  --    *  --  124*  --  141*  --    BUN 10  --  9  --  9  --    CREATININE 0.5  --  0.6  --  0.5  --    CALCIUM 8.1*  --  9.6  --  8.8  --    PROT 6.3  --   --   --   --   --    ALBUMIN 1.4*  --  1.7*  --   --   --    BILITOT 0.1  --   --   --   --   --    ALKPHOS 79  --   --   --   --   --    AST 15  --   --   --   --   --    ALT 13  --   --   --   --   --    ANIONGAP 18*  --  7*  --  6*  --    EGFRNONAA >60.0  --  >60.0  --  >60.0  --     < > = values in this interval not displayed.       Significant Imaging: I have reviewed all pertinent imaging results/findings within the past 24 hours.

## 2022-07-02 NOTE — ASSESSMENT & PLAN NOTE
Patient with chronic indwelling cabrera, with UCx with yeast. May be chronically colonized given extensive periods with indwelling cabrera catheters dating back to 04/2022.     - Given interval decrease in mentation from earlier this hospital course, will treat as symptomatic  - D/C Fluconazole 200 mg

## 2022-07-02 NOTE — PROGRESS NOTES
[facility] - Intensive Care ([place] [id number])  [facility] Medicine  Progress Note    Patient Name: Darlene Avila  MRN: [id number]  Patient Class: IP- Inpatient   Admission Date: 6/17/2022  Length of Stay: 12 days  Attending Physician: Yvonne Hills MD  Primary Care Provider: Kirill Cabrera Iii, MD        Subjective:     Principal Problem:Hypernatremia        HPI:  Darlene Avila is a 71 year old F with history of diabetes type 2, seizure c/b anoxic brain injury s/p trach, PEG, bed bound status, Stage IV sacral ulcer, ischemic CVA, IBS, history of breast cancer s/p right mastectomy and failure to thrive who was brought in by her HPOA (sister) for nursing home placement. Patient is unable to provide history.     Patient's sister was contacted who stated that the patient was [facility] for recurrent seizures/status epilepticus which resulted in an anoxic brain injury and respiratory failure.  Patient was intubated followed by tracheostomy and PEG placement .  Patient was discharged to nursing home and subsequently discharged to hospice care at the request of patient's sister. However a few days ago, she visited the patient at [facility] recovery facility and noticed they weren't feeding or taking care of the patient due to her 'hospice status'. Per sister today, she would like to place the patient in a skilled nursing facility and would like to rescind her hospice care at this time.  She is no longer interested in hospice placement. She states the patient at baseline is only able to follow some simple commands. She is unsure how much O2 level is her baseline via trach collar.   Of note she has a Stage IV sacral wound that is s/p debridement on 06/09 by Gen surgery in [facility].     Upon arrival to the ED, the patient was hemodynamically stable. Labs revealed Na 157, WBC 14. Hgb 9.4 (baseline 7-9). The patient was given 1L of IV NS and was admitted to hospital medicine for further management.        Overview/Hospital Course:  Patient admitted to hospital medicine with pre-existing anoxic brain injury, hypernatremia, sacral decubitus wound s/p debridement 06/08 in [place], [place]. Patient initially admitted to hospice following discharge from [facility] ICU, but discharged from hospice as patient family believed she was not getting appropriate care. Na 157, corrected with IVF (NS and hypotonic solutions) and enteral FWF down to 148. Trach collar exchanged by ENT due to lack of supplies at facility. Wound care consulted for sacral decubitus, recommended General Surgery consult for possible debridement as wound appears purulent. Persistent leukocytosis, started Zosyn per previous I&D wound cultures growing bacteriodes/enterococcus and new wound findings. cEEG started and Epilepsy consulted for assistance with antiepileptic medications; conflicting reports if patient was on only Keppra/Lacosamide vs Keppra/Lacosamide/Phenytoin. General surgery consulted for debridement of sacral decubitus ulcer, s/p I&D 06/24. Palliative Care consulted for assistance in GOC; decision to pursue medical/surgical care decided. ENT consulted for possible trach decannulation. ID consulted, planning for 6-weeks course of zosyn. Follow up sodium 150 on 7/1. Giving D5 and will increase free water flushes.     CM/SW to assist with dispo. Patient now exploring hospice options. However, due to trach placement, half-way placement for SNF limited; LTAC remains alternative.      Interval History: AF HDS NAEON. Patient's sodium increased from 142 to 150 today. Patient minimally responsive. Follows simple commands. Attempts to mouth one/two words. Patient with increased secretion. Trach is now hooked to humidifier.     Review of Systems   Unable to perform ROS: Patient nonverbal   Constitutional:  Positive for fatigue.   Respiratory:  Negative for chest tightness and shortness of breath.    Cardiovascular:  Negative for chest pain.    Gastrointestinal:  Negative for abdominal pain.   Objective:     Vital Signs (Most Recent):  Temp: 97.8 °F (36.6 °C) (07/01/22 0830)  Pulse: 82 (07/01/22 0900)  Resp: 18 (07/01/22 0830)  BP: (!) 144/67 (07/01/22 0830)  SpO2: 100 % (07/01/22 0900)   Vital Signs (24h Range):  Temp:  [97.5 °F (36.4 °C)-98.1 °F (36.7 °C)] 97.8 °F (36.6 °C)  Pulse:  [82-94] 82  Resp:  [16-18] 18  SpO2:  [97 %-100 %] 100 %  BP: (115-144)/(67-81) 144/67     Weight: 50.2 kg (110 lb 10.7 oz)  Body mass index is 22.35 kg/m².    Intake/Output Summary (Last 24 hours) at 7/1/2022 1247  Last data filed at 7/1/2022 1200  Gross per 24 hour   Intake 1550 ml   Output 850 ml   Net 700 ml      Physical Exam  Constitutional:       General: She is not in acute distress.     Appearance: She is diaphoretic. She is not ill-appearing or toxic-appearing.   HENT:      Head: Normocephalic.      Mouth/Throat:      Mouth: Mucous membranes are dry.      Comments: Dried oral secretions adhered to tongue  Neck:      Comments: Trach in place and secured  Cardiovascular:      Rate and Rhythm: Normal rate and regular rhythm.      Pulses: Normal pulses.      Heart sounds: Normal heart sounds. No murmur heard.  Pulmonary:      Effort: Pulmonary effort is normal. No respiratory distress.      Breath sounds: Normal breath sounds. No wheezing or rales.   Abdominal:      General: Abdomen is flat. Bowel sounds are normal. There is no distension.      Palpations: Abdomen is soft.      Tenderness: There is no abdominal tenderness.   Musculoskeletal:         General: No swelling or tenderness. Normal range of motion.      Cervical back: Normal range of motion.   Skin:     General: Skin is warm.      Coloration: Skin is not jaundiced or pale.      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    Neurological:      Mental Status: She is alert.      Comments: Intermittently alert, minimally responsive   Psychiatric:      Comments: Unable to assess        Significant Labs: All pertinent labs within the past 24 hours have been reviewed.  CBC:   Recent Labs   Lab 06/30/22 0415 07/01/22  0409   WBC 8.46 9.44   HGB 7.8* 7.8*   HCT 25.8* 24.9*   * 514*     CMP:   Recent Labs   Lab 06/30/22 0415 07/01/22  0409    150*   K 4.8 3.8   * 118*   CO2 17* 14*   * 112*   BUN 14 10   CREATININE 0.6 0.5   CALCIUM 9.0 8.1*   PROT 6.2 6.3   ALBUMIN 1.5* 1.4*   BILITOT 0.1 0.1   ALKPHOS 90 79   AST 12 15   ALT 13 13   ANIONGAP 6* 18*   EGFRNONAA >60.0 >60.0       Significant Imaging: I have reviewed all pertinent imaging results/findings within the past 24 hours.      Assessment/Plan:      * Hypernatremia  STABLE, WNL    Na 150 today likely due to not getting free water flushes.    - Increases frequency of free water flushes.  - D5 for five hours.     - Na q6h    Encephalopathy  Patient is fatigue. Minimally responds to verbal and painful stimuli.       Palliative care encounter  Per Primary Medicine team and Palliative Care encounters with patient family (separate encounters, see dated notes in chart), patient family wishes full medical care and does not desire comfort/hospice measures. DNR remains. See Pall Care note for further detail.    Seizures  Patient previously started on Lacosamide and Keppra at OSH during initial episode of status epilepticus. Phenytoin added as patient continued to have breakthrough seizures.    - Repeat EEG ordered 2/2 interval decrease in alert/responsiveness; diffuse toxic metabolic encephalopathy, no new seizure per Neuro EP  - Continue Keppra monotherapy 750 bid; titrate per neuro recs  - Neurology consulted for further assistance in antiepileptic regimen    G tube feedings  Tube feed dependent via G tube  - Nutrition to assist with TF recs; see recommendations in note; appreciate assistance      Tracheostomy in place  Dysarthria  Dysphagia    Trach capped, good O2 sats on RA     - Respiratory therapy to assist with  trach care.   - SLP for swallow evaluation and speech therapy; recommending NPO, re-evaluation, PMSV under direct supervision  - ENT advising to keep tracheostomy in place due to intermittent responsiveness; amenable to decannulation if patient family moves towards hospice/comfort measures    Sacral decubitus ulcer, stage IV  S/p recent debridement at OSH 06/08. OSH Wcx with enterococcus faecalis, bacteriodes fragilus.    - Wound care consulted; appreciate assistance  - Deep wound cx  - General surgery consulted s/p debridement 06/24, no note of bone involvement per OP note, no cultures sent  - Infectious Disease consulted, zosyn for 6-weeks  - Consideration for wound vac given depth of injury  - Turn q2h    Leukocytosis  RESOLVED    Patient admitted with elevated WBC to 14s. Previous episodes of aspiration PNA at OSH, indwelling cabrera given acuity of condition, sacral decubitus ulcer stave IV s/p debridement with enterococcus faecalis / bacteriodes fragilis in cx. Patient afebrile with stable hemodynamics on admission at this time. CXR without acute abnormality. UA with yeast growing, chronic per previous OSH review.    Leukocytosis may be explained by soft tissue infection vs hemoconcentration given extensive dehydration and hypernatremia from lack of enteral fluids or IVF at outside hospice facility prior to transfer to [facility].     - Zosyn given previous cultures and persistent leukocytosis  - Etiology may be soft tissue infection; see Sacral Decubitus Ulcer A/P  - Continue monitoring CBC  - Wound care per Sacral Decubitus Ulcer A/P  - Aspiration precautions  - Trend fever curve  - Trend BCx    Discharge planning issues  Darlene Avila is a 71 year old F with history of diabetes type 2, seizure, ischemic CVA, IBS, history of breast cancer s/p right mastectomy and failure to thrive who was brought in by her HPOA (sister) for skilled nursing home placement due to concern for neglect at recent hospice facility. Patient  follows very simple commands and doubt she can engage in skilled therapy. jail nursing facility likely more of an option.     - Appreciate CM/SW assistance with placement; placement difficult d/t trach; will require LTAC placement - referrals sent  - Appreciate pharmacy's assistance with med rec from previous admission    ACP (advance care planning)  Patient is DNR. LaPOST on file.     - Patient initially resistant to hospice d/t previous experience with [facility]ice; after extensive conversation with MD, now amenable to exploring hospice (though does not desire home w/ hospice)  - CM/SW assisting; tracheostomy status complicating placement to half-way w/ hospice.  - See Care Update note Zi-on MD Aramis on 06/29 for further detail.    History of CVA (cerebrovascular accident)  Unclear of patient's current medications. Per med review on care-everywhere, the patient is on statin but not on antiplatelet therapy.     - Appreciate pharmacy's assistance with medication reconciliation with facility- start antiplatelet therapy if no contraindications.   - Continue statin via G-tube      Yeast UTI  Patient with chronic indwelling cabrera, with UCx with yeast. May be chronically colonized given extensive periods with indwelling cabrera catheters dating back to 04/2022.     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 A1c repeated 6.9.    - LDSSI   - POCT glucose q6hrs  - Maintain -180  - Titrate basal/bolus regimen to goal as above.      VTE Risk Mitigation (From admission, onward)         Ordered     heparin (porcine) injection 5,000 Units  Every 12 hours         06/18/22 0355     IP VTE HIGH RISK PATIENT  Once          06/18/22 0355     Place sequential compression device  Until discontinued         06/18/22 0355                Discharge Planning   JUDIT: 7/5/2022     Code Status: DNR   Is the patient medically ready for discharge?: Yes    Reason for patient still in hospital (select all that apply): Pending disposition  Discharge Plan A: Home Health   Discharge Delays: None known at this time              Yvonne Hills MD  Department of Hospital Medicine   [facility] - Intensive Care ([place]-16)

## 2022-07-02 NOTE — ASSESSMENT & PLAN NOTE
On insulin glargine and lispro (unclear doses). A1c repeated 6.9.    - LDSSI   - POCT glucose q6hrs  - Maintain -180  - Titrate basal/bolus regimen to goal as above.   29-Oct-2020

## 2022-07-02 NOTE — ASSESSMENT & PLAN NOTE
Darlene Avila is a 71 year old F with history of diabetes type 2, seizure, ischemic CVA, IBS, history of breast cancer s/p right mastectomy and failure to thrive who was brought in by her HPOA (sister) for skilled nursing home placement due to concern for neglect at recent hospice facility. Patient follows very simple commands and doubt she can engage in skilled therapy. alf nursing facility likely more of an option.     - Appreciate CM/SW assistance with placement; placement difficult d/t trach; will require LTAC placement - referrals sent  - Appreciate pharmacy's assistance with med rec from previous admission

## 2022-07-02 NOTE — ASSESSMENT & PLAN NOTE
Patient is DNR. LaPOST on file.     - Patient initially resistant to hospice d/t previous experience with [facility]; after extensive conversation with MD, now amenable to exploring hospice (though does not desire home w/ hospice)  - CM/SW assisting; tracheostomy status complicating placement to [facility] w/ hospice.  - See Care Update note Zi-on MD Aramis on 06/29 for further detail.

## 2022-07-02 NOTE — PROGRESS NOTES
[facility] - Intensive Care ([place] [id number])  [facility] Medicine  Progress Note    Patient Name: Darlene Avila  MRN: [id number]  Patient Class: IP- Inpatient   Admission Date: 6/17/2022  Length of Stay: 13 days  Attending Physician: Yvonne Hills MD  Primary Care Provider: Kirill Cabrera Iii, MD        Subjective:     Principal Problem:Hypernatremia        HPI:  Darlene Avila is a 71 year old F with history of diabetes type 2, seizure c/b anoxic brain injury s/p trach, PEG, bed bound status, Stage IV sacral ulcer, ischemic CVA, IBS, history of breast cancer s/p right mastectomy and failure to thrive who was brought in by her HPOA (sister) for nursing home placement. Patient is unable to provide history.     Patient's sister was contacted who stated that the patient was admitted to [facility] in [facility] for recurrent seizures/status epilepticus which resulted in an anoxic brain injury and respiratory failure.  Patient was intubated followed by tracheostomy and PEG placement .  Patient was discharged to nursing home and subsequently discharged to hospice care at the request of patient's sister. However a few days ago, she visited the patient at [facility] recovery facility and noticed they weren't feeding or taking care of the patient due to her 'hospice status'. Per sister today, she would like to place the patient in a skilled nursing facility and would like to rescind her hospice care at this time.  She is no longer interested in hospice placement. She states the patient at baseline is only able to follow some simple commands. She is unsure how much O2 level is her baseline via trach collar.   Of note she has a Stage IV sacral wound that is s/p debridement on 06/09 by Gen surgery in [facility].     Upon arrival to the ED, the patient was hemodynamically stable. Labs revealed Na 157, WBC 14. Hgb 9.4 (baseline 7-9). The patient was given 1L of IV NS and was admitted to hospital medicine for further management.        Overview/Hospital Course:  Patient admitted to hospital medicine with pre-existing anoxic brain injury, hypernatremia, sacral decubitus wound s/p debridement 06/08 in [place], [place]. Patient initially admitted to hospice following discharge from [facility] ICU, but discharged from hospice as patient family believed she was not getting appropriate care. Na 157, corrected with IVF (NS and hypotonic solutions) and enteral FWF down to 148. Trach collar exchanged by ENT due to lack of supplies at facility. Wound care consulted for sacral decubitus, recommended General Surgery consult for possible debridement as wound appears purulent. Persistent leukocytosis, started Zosyn per previous I&D wound cultures growing bacteriodes/enterococcus and new wound findings. cEEG started and Epilepsy consulted for assistance with antiepileptic medications; conflicting reports if patient was on only Keppra/Lacosamide vs Keppra/Lacosamide/Phenytoin. General surgery consulted for debridement of sacral decubitus ulcer, s/p I&D 06/24. Palliative Care consulted for assistance in GOC; decision to pursue medical/surgical care decided. ENT consulted for possible trach decannulation. ID consulted, planning for 6-weeks course of zosyn. Sodium has stabilized. Today it is 137. Will continue free water flushes but will decrease time interval from q4 to q6.     CM/SW to assist with dispo. Patient now exploring hospice options. However, due to trach placement, nursing home placement for SNF limited; LTAC remains alternative.      Interval History: No acute overnight events. HDS. Sodium is now stable. Will continue free water flushes. Yesterday IR tunneled catheter insert w/o port. Continue Zosyn and wound care for sacral ulcer.     Review of Systems   Unable to perform ROS: Patient nonverbal   Constitutional:  Positive for fatigue.   Respiratory:  Negative for chest tightness and shortness of breath.    Cardiovascular:  Negative for chest pain.    Gastrointestinal:  Negative for abdominal pain.   Objective:     Vital Signs (Most Recent):  Temp: 98 °F (36.7 °C) (07/02/22 1145)  Pulse: 84 (07/02/22 1145)  Resp: 16 (07/02/22 1145)  BP: 130/63 (07/02/22 1145)  SpO2: 100 % (07/02/22 1210) Vital Signs (24h Range):  Temp:  [97 °F (36.1 °C)-98 °F (36.7 °C)] 98 °F (36.7 °C)  Pulse:  [77-89] 84  Resp:  [16-18] 16  SpO2:  [98 %-100 %] 100 %  BP: (114-164)/(56-77) 130/63     Weight: 50.2 kg (110 lb 10.7 oz)  Body mass index is 22.35 kg/m².    Intake/Output Summary (Last 24 hours) at 7/2/2022 1515  Last data filed at 7/2/2022 0900  Gross per 24 hour   Intake 700 ml   Output 960 ml   Net -260 ml      Physical Exam  Vitals and nursing note reviewed.   Constitutional:       General: She is not in acute distress.     Appearance: She is not ill-appearing or toxic-appearing.   HENT:      Head: Normocephalic.      Mouth/Throat:      Mouth: Mucous membranes are dry.      Comments: Dried oral secretions adhered to tongue  Neck:      Comments: Trach in place and secured  Cardiovascular:      Rate and Rhythm: Normal rate and regular rhythm.      Pulses: Normal pulses.      Heart sounds: Normal heart sounds. No murmur heard.  Pulmonary:      Effort: Pulmonary effort is normal. No respiratory distress.      Breath sounds: Normal breath sounds. No wheezing or rales.   Abdominal:      General: Abdomen is flat. Bowel sounds are normal. There is no distension.      Palpations: Abdomen is soft.      Tenderness: There is no abdominal tenderness.   Musculoskeletal:         General: No swelling or tenderness. Normal range of motion.      Cervical back: Normal range of motion.   Skin:     General: Skin is warm.      Coloration: Skin is not jaundiced or pale.      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    Neurological:      Mental Status: She is alert.      Comments: Intermittently alert, minimally responsive   Psychiatric:      Comments: Unable to assess        Significant Labs: All pertinent labs within the past 24 hours have been reviewed.  CBC:   Recent Labs   Lab 07/01/22 0409 07/02/22  0424   WBC 9.44 9.76   HGB 7.8* 7.5*   HCT 24.9* 24.5*   * 521*     CMP:   Recent Labs   Lab 07/01/22 0409 07/01/22  1357 07/01/22 2019 07/02/22  0010 07/02/22  0424 07/02/22  1235   *   < > 138 137 138  137 138   K 3.8  --  3.9  --  4.0  --    *  --  110  --  113*  --    CO2 14*  --  21*  --  19*  --    *  --  124*  --  141*  --    BUN 10  --  9  --  9  --    CREATININE 0.5  --  0.6  --  0.5  --    CALCIUM 8.1*  --  9.6  --  8.8  --    PROT 6.3  --   --   --   --   --    ALBUMIN 1.4*  --  1.7*  --   --   --    BILITOT 0.1  --   --   --   --   --    ALKPHOS 79  --   --   --   --   --    AST 15  --   --   --   --   --    ALT 13  --   --   --   --   --    ANIONGAP 18*  --  7*  --  6*  --    EGFRNONAA >60.0  --  >60.0  --  >60.0  --     < > = values in this interval not displayed.       Significant Imaging: I have reviewed all pertinent imaging results/findings within the past 24 hours.      Assessment/Plan:      * Hypernatremia  STABLE, WNL    Na stable today.   - Continue free water flushes q6h      Encephalopathy  Patient is fatigue. Minimally responds to verbal and painful stimuli.       Goals of care, counseling/discussion  Refer to palliative care note.      Palliative care encounter  Per Primary Medicine team and Palliative Care encounters with patient family (separate encounters, see dated notes in chart), patient family wishes full medical care and does not desire comfort/hospice measures. DNR remains. See Pall Care note for further detail.    Seizures  Patient previously started on Lacosamide and Keppra at OSH during initial episode of status epilepticus. Phenytoin added as patient continued to have breakthrough seizures.    - Repeat EEG ordered 2/2 interval decrease in alert/responsiveness; diffuse toxic metabolic encephalopathy, no new  seizure per Neuro EP  - Continue Keppra monotherapy 750 bid; titrate per neuro recs  - Neurology consulted for further assistance in antiepileptic regimen    G tube feedings  Tube feed dependent via G tube  - Nutrition to assist with TF recs; see recommendations in note; appreciate assistance      Tracheostomy in place  Dysarthria  Dysphagia    Trach capped, good O2 sats on RA     - Respiratory therapy to assist with trach care.   - SLP for swallow evaluation and speech therapy; recommending NPO, re-evaluation, PMSV under direct supervision  - ENT advising to keep tracheostomy in place due to intermittent responsiveness; amenable to decannulation if patient family moves towards hospice/comfort measures    Sacral decubitus ulcer, stage IV  S/p recent debridement at OSH 06/08. OSH Wcx with enterococcus faecalis, bacteriodes fragilus.    - Wound care consulted; appreciate assistance  - Deep wound cx  - General surgery consulted s/p debridement 06/24, no note of bone involvement per OP note, no cultures sent  - Infectious Disease consulted, zosyn for 6-weeks  - Consideration for wound vac given depth of injury  - Turn q2h    Leukocytosis  RESOLVED    Patient admitted with elevated WBC to 14s. Previous episodes of aspiration PNA at OSH, indwelling cabrera given acuity of condition, sacral decubitus ulcer stave IV s/p debridement with enterococcus faecalis / bacteriodes fragilis in cx. Patient afebrile with stable hemodynamics on admission at this time. CXR without acute abnormality. UA with yeast growing, chronic per previous OSH review.    Leukocytosis may be explained by soft tissue infection vs hemoconcentration given extensive dehydration and hypernatremia from lack of enteral fluids or IVF at outside hospice facility prior to transfer to [facility].     - Zosyn given previous cultures and persistent leukocytosis  - Etiology may be soft tissue infection; see Sacral Decubitus Ulcer A/P  - Continue monitoring CBC  - Wound care  per Sacral Decubitus Ulcer A/P  - Aspiration precautions  - Trend fever curve  - Trend BCx    Discharge planning issues  Darlene Avila is a 71 year old F with history of diabetes type 2, seizure, ischemic CVA, IBS, history of breast cancer s/p right mastectomy and failure to thrive who was brought in by her HPOA (sister) for skilled nursing home placement due to concern for neglect at recent hospice facility. Patient follows very simple commands and doubt she can engage in skilled therapy. senior living nursing facility likely more of an option.     - Appreciate CM/SW assistance with placement; placement difficult d/t trach; will require LTAC placement - referrals sent  - Appreciate pharmacy's assistance with med rec from previous admission    ACP (advance care planning)  Patient is DNR. [place] on file.     - Patient initially resistant to hospice d/t previous experience with [facility] inpatient hospice; after extensive conversation with MD, now amenable to exploring hospice (though does not desire home w/ hospice)  - CM/SW assisting; tracheostomy status complicating placement to longterm w/ hospice.  - See Care Update note Zi-on MD Aramis on 06/29 for further detail.    History of CVA (cerebrovascular accident)  Unclear of patient's current medications. Per med review on care-everywhere, the patient is on statin but not on antiplatelet therapy.     - Appreciate pharmacy's assistance with medication reconciliation with facility- start antiplatelet therapy if no contraindications.   - Continue statin via G-tube      Yeast UTI  Patient with chronic indwelling cabrera, with UCx with yeast. May be chronically colonized given extensive periods with indwelling cabrera catheters dating back to 04/2022.     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 A1c repeated 6.9.    - LDSSI   - POCT glucose q6hrs  - Maintain -180  - Titrate basal/bolus regimen to goal as above.      VTE Risk Mitigation (From admission, onward)         Ordered     heparin (porcine) injection 5,000 Units  Every 12 hours         06/18/22 0355     IP VTE HIGH RISK PATIENT  Once         06/18/22 0355     Place sequential compression device  Until discontinued         06/18/22 0355                Discharge Planning   JUDIT: 7/5/2022     Code Status: DNR   Is the patient medically ready for discharge?: Yes    Reason for patient still in hospital (select all that apply): Patient trending condition  Discharge Plan A: Home Health   Discharge Delays: None known at this time              Maria L Mathew DO  Department of Hospital Medicine   [facility] - Intensive Care ([place]-16)

## 2022-07-02 NOTE — ASSESSMENT & PLAN NOTE
RESOLVED    Patient admitted with elevated WBC to 14s. Previous episodes of aspiration PNA at OSH, indwelling cabrera given acuity of condition, sacral decubitus ulcer stave IV s/p debridement with enterococcus faecalis / bacteriodes fragilis in cx. Patient afebrile with stable hemodynamics on admission at this time. CXR without acute abnormality. UA with yeast growing, chronic per previous OSH review.    Leukocytosis may be explained by soft tissue infection vs hemoconcentration given extensive dehydration and hypernatremia from lack of enteral fluids or IVF at outside hospice facility prior to [place] [facility].     - Zosyn given previous cultures and persistent leukocytosis  - Etiology may be soft tissue infection; see Sacral Decubitus Ulcer A/P  - Continue monitoring CBC  - Wound care per Sacral Decubitus Ulcer A/P  - Aspiration precautions  - Trend fever curve  - Trend BCx

## 2022-07-03 PROBLEM — B37.49 YEAST UTI: Status: RESOLVED | Noted: 2020-07-23 | Resolved: 2022-07-03

## 2022-07-03 LAB
POCT GLUCOSE: 179 MG/DL (ref 70–110)
POCT GLUCOSE: 201 MG/DL (ref 70–110)
POCT GLUCOSE: 209 MG/DL (ref 70–110)
POCT GLUCOSE: 232 MG/DL (ref 70–110)
SODIUM SERPL-SCNC: 135 MMOL/L (ref 136–145)
SODIUM SERPL-SCNC: 135 MMOL/L (ref 136–145)
SODIUM SERPL-SCNC: 137 MMOL/L (ref 136–145)

## 2022-07-03 PROCEDURE — 63600175 PHARM REV CODE 636 W HCPCS

## 2022-07-03 PROCEDURE — 84295 ASSAY OF SERUM SODIUM: CPT

## 2022-07-03 PROCEDURE — 99232 SBSQ HOSP IP/OBS MODERATE 35: CPT | Mod: ,,, | Performed by: HOSPITALIST

## 2022-07-03 PROCEDURE — 27000221 HC OXYGEN, UP TO 24 HOURS

## 2022-07-03 PROCEDURE — 25000003 PHARM REV CODE 250: Performed by: STUDENT IN AN ORGANIZED HEALTH CARE EDUCATION/TRAINING PROGRAM

## 2022-07-03 PROCEDURE — 27200966 HC CLOSED SUCTION SYSTEM

## 2022-07-03 PROCEDURE — 36415 COLL VENOUS BLD VENIPUNCTURE: CPT

## 2022-07-03 PROCEDURE — 99900035 HC TECH TIME PER 15 MIN (STAT)

## 2022-07-03 PROCEDURE — 12000002 HC ACUTE/MED SURGE SEMI-PRIVATE ROOM

## 2022-07-03 PROCEDURE — 94761 N-INVAS EAR/PLS OXIMETRY MLT: CPT

## 2022-07-03 PROCEDURE — 25000003 PHARM REV CODE 250

## 2022-07-03 PROCEDURE — 84295 ASSAY OF SERUM SODIUM: CPT | Mod: 91

## 2022-07-03 PROCEDURE — 63600175 PHARM REV CODE 636 W HCPCS: Performed by: STUDENT IN AN ORGANIZED HEALTH CARE EDUCATION/TRAINING PROGRAM

## 2022-07-03 PROCEDURE — 99232 PR SUBSEQUENT HOSPITAL CARE,LEVL II: ICD-10-PCS | Mod: ,,, | Performed by: HOSPITALIST

## 2022-07-03 RX ADMIN — LEVETIRACETAM 750 MG: 100 SOLUTION ORAL at 09:07

## 2022-07-03 RX ADMIN — PIPERACILLIN SODIUM AND TAZOBACTAM SODIUM 4.5 G: 4; .5 INJECTION, POWDER, LYOPHILIZED, FOR SOLUTION INTRAVENOUS at 03:07

## 2022-07-03 RX ADMIN — HEPARIN SODIUM 5000 UNITS: 5000 INJECTION INTRAVENOUS; SUBCUTANEOUS at 09:07

## 2022-07-03 RX ADMIN — PIPERACILLIN SODIUM AND TAZOBACTAM SODIUM 4.5 G: 4; .5 INJECTION, POWDER, LYOPHILIZED, FOR SOLUTION INTRAVENOUS at 07:07

## 2022-07-03 RX ADMIN — CHOLESTYRAMINE 8 G: 4 POWDER, FOR SUSPENSION ORAL at 09:07

## 2022-07-03 RX ADMIN — INSULIN ASPART 4 UNITS: 100 INJECTION, SOLUTION INTRAVENOUS; SUBCUTANEOUS at 09:07

## 2022-07-03 RX ADMIN — SODIUM BICARBONATE 650 MG TABLET 650 MG: at 09:07

## 2022-07-03 RX ADMIN — SODIUM BICARBONATE 650 MG TABLET 650 MG: at 10:07

## 2022-07-03 RX ADMIN — PIPERACILLIN SODIUM AND TAZOBACTAM SODIUM 4.5 G: 4; .5 INJECTION, POWDER, LYOPHILIZED, FOR SOLUTION INTRAVENOUS at 10:07

## 2022-07-03 RX ADMIN — ATORVASTATIN CALCIUM 80 MG: 20 TABLET, FILM COATED ORAL at 09:07

## 2022-07-03 RX ADMIN — HEPARIN SODIUM 5000 UNITS: 5000 INJECTION INTRAVENOUS; SUBCUTANEOUS at 10:07

## 2022-07-03 RX ADMIN — LEVETIRACETAM 750 MG: 100 SOLUTION ORAL at 10:07

## 2022-07-03 RX ADMIN — INSULIN ASPART 1 UNITS: 100 INJECTION, SOLUTION INTRAVENOUS; SUBCUTANEOUS at 11:07

## 2022-07-03 RX ADMIN — INSULIN ASPART 4 UNITS: 100 INJECTION, SOLUTION INTRAVENOUS; SUBCUTANEOUS at 12:07

## 2022-07-03 RX ADMIN — INSULIN ASPART 2 UNITS: 100 INJECTION, SOLUTION INTRAVENOUS; SUBCUTANEOUS at 06:07

## 2022-07-03 RX ADMIN — CHOLESTYRAMINE 8 G: 4 POWDER, FOR SUSPENSION ORAL at 10:07

## 2022-07-03 NOTE — PROGRESS NOTES
[facility] - Intensive Care ([place] [id number])  [facility]  Progress Note    Patient Name: Darlene Avila  MRN: [id number]  Patient Class: IP- Inpatient   Admission Date: 6/17/2022  Length of Stay: 14 days  Attending Physician: Yvonne Hills MD  Primary Care Provider: Kirill Cabrera Iii, MD        Subjective:     Principal Problem:Hypernatremia        HPI:  Darlene Avila is a 71 year old F with history of diabetes type 2, seizure c/b anoxic brain injury s/p trach, PEG, bed bound status, Stage IV sacral ulcer, ischemic CVA, IBS, history of breast cancer s/p right mastectomy and failure to thrive who was brought in by her HPOA (sister) for nursing home placement. Patient is unable to provide history.     Patient's sister was contacted who stated that the patient was admitted to [facility] in [facility] for recurrent seizures/status epilepticus which resulted in an anoxic brain injury and respiratory failure.  Patient was intubated followed by tracheostomy and PEG placement .  Patient was discharged to nursing home and subsequently discharged to hospice care at the request of patient's sister. However a few days ago, she visited the patient at [facility] recovery facility and noticed they weren't feeding or taking care of the patient due to her 'hospice status'. Per sister today, she would like to place the patient in a skilled nursing facility and would like to rescind her hospice care at this time.  She is no longer interested in hospice placement. She states the patient at baseline is only able to follow some simple commands. She is unsure how much O2 level is her baseline via trach collar.   Of note she has a Stage IV sacral wound that is s/p debridement on 06/09 by Gen surgery in [facility].     Upon arrival to the ED, the patient was hemodynamically stable. Labs revealed Na 157, WBC 14. Hgb 9.4 (baseline 7-9). The patient was given 1L of IV NS and was admitted to hospital medicine for further management.        Overview/Hospital Course:  Patient admitted to hospital medicine with pre-existing anoxic brain injury, hypernatremia, sacral decubitus wound s/p debridement 06/08 in [place], [place]. Patient initially admitted to hospice following discharge from [facility] ICU, but discharged from hospice as patient family believed she was not getting appropriate care. Na 157, corrected with IVF (NS and hypotonic solutions) and enteral FWF down to 148. Trach collar exchanged by ENT due to lack of supplies at facility. Wound care consulted for sacral decubitus, recommended General Surgery consult for possible debridement as wound appears purulent. Persistent leukocytosis, started Zosyn per previous I&D wound cultures growing bacteriodes/enterococcus and new wound findings. cEEG started and Epilepsy consulted for assistance with antiepileptic medications; conflicting reports if patient was on only Keppra/Lacosamide vs Keppra/Lacosamide/Phenytoin. General surgery consulted for debridement of sacral decubitus ulcer, s/p I&D 06/24. Palliative Care consulted for assistance in GOC; decision to pursue medical/surgical care decided. ENT consulted for possible trach decannulation. ID consulted, planning for 6-weeks course of zosyn.Sodium improved; will continue free water flushes.     CM/SW to assist with dispo. Patient now exploring hospice options. However, due to trach placement, USP placement for SNF limited; LTAC remains alternative.      Interval History: More alert this AM. Sodium stable. Free water flushes increased to Q4H    Review of Systems   Unable to perform ROS: Patient nonverbal   Constitutional:  Positive for fatigue.   Respiratory:  Negative for chest tightness and shortness of breath.    Cardiovascular:  Negative for chest pain.   Gastrointestinal:  Negative for abdominal pain.   Objective:     Vital Signs (Most Recent):  Temp: 98.9 °F (37.2 °C) (07/03/22 1230)  Pulse: 94 (07/03/22 1230)  Resp: 18 (07/03/22 1230)  BP:  138/71 (07/03/22 1230)  SpO2: 95 % (07/03/22 1338) Vital Signs (24h Range):  Temp:  [97.7 °F (36.5 °C)-98.9 °F (37.2 °C)] 98.9 °F (37.2 °C)  Pulse:  [82-94] 94  Resp:  [16-18] 18  SpO2:  [95 %-99 %] 95 %  BP: (118-146)/(56-71) 138/71     Weight: 50.2 kg (110 lb 10.7 oz)  Body mass index is 22.35 kg/m².    Intake/Output Summary (Last 24 hours) at 7/3/2022 1532  Last data filed at 7/3/2022 0800  Gross per 24 hour   Intake 3687.3 ml   Output 1402 ml   Net 2285.3 ml      Physical Exam  Vitals and nursing note reviewed.   Constitutional:       General: She is not in acute distress.     Appearance: She is not ill-appearing or toxic-appearing.   HENT:      Head: Normocephalic.      Mouth/Throat:      Mouth: Mucous membranes are dry.      Comments: Dried oral secretions adhered to tongue  Neck:      Comments: Trach in place and secured  Cardiovascular:      Rate and Rhythm: Normal rate and regular rhythm.      Pulses: Normal pulses.      Heart sounds: Normal heart sounds. No murmur heard.  Pulmonary:      Effort: Pulmonary effort is normal. No respiratory distress.      Breath sounds: Normal breath sounds. No wheezing or rales.   Abdominal:      General: Abdomen is flat. Bowel sounds are normal. There is no distension.      Palpations: Abdomen is soft.      Tenderness: There is no abdominal tenderness.   Musculoskeletal:         General: No swelling or tenderness. Normal range of motion.      Cervical back: Normal range of motion.   Skin:     General: Skin is warm.      Coloration: Skin is not jaundiced or pale.      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    Neurological:      Mental Status: She is alert.      Comments: Intermittently alert, minimally responsive   Psychiatric:      Comments: Unable to assess       Significant Labs: All pertinent labs within the past 24 hours have been reviewed.  CBC:   Recent Labs   Lab 07/02/22  0424   WBC 9.76   HGB 7.5*   HCT 24.5*   *     CMP:    Recent Labs   Lab 07/01/22 2019 07/02/22  0010 07/02/22  0424 07/02/22  1235 07/02/22  1745 07/03/22  0026 07/03/22  1405      < > 138  137   < > 137 135* 135*   K 3.9  --  4.0  --   --   --   --      --  113*  --   --   --   --    CO2 21*  --  19*  --   --   --   --    *  --  141*  --   --   --   --    BUN 9  --  9  --   --   --   --    CREATININE 0.6  --  0.5  --   --   --   --    CALCIUM 9.6  --  8.8  --   --   --   --    ALBUMIN 1.7*  --   --   --   --   --   --    ANIONGAP 7*  --  6*  --   --   --   --    EGFRNONAA >60.0  --  >60.0  --   --   --   --     < > = values in this interval not displayed.       Significant Imaging: I have reviewed all pertinent imaging results/findings within the past 24 hours.      Assessment/Plan:      * Hypernatremia  STABLE, WNL    Na stable today.   - Frree water flushes. 300 Ml Q4H      Encephalopathy  Patient is fatigue. Minimally responds to verbal and painful stimuli.       Goals of care, counseling/discussion  Refer to palliative care note.      Palliative care encounter  Per Primary Medicine team and Palliative Care encounters with patient family (separate encounters, see dated notes in chart), patient family wishes full medical care and does not desire comfort/hospice measures. DNR remains. See Pall Care note for further detail.    Seizures  Patient previously started on Lacosamide and Keppra at OSH during initial episode of status epilepticus. Phenytoin added as patient continued to have breakthrough seizures.    - Repeat EEG ordered 2/2 interval decrease in alert/responsiveness; diffuse toxic metabolic encephalopathy, no new seizure per Neuro EP  - Continue Keppra monotherapy 750 bid; titrate per neuro recs  - Neurology consulted for further assistance in antiepileptic regimen    G tube feedings  Tube feed dependent via G tube  - Nutrition to assist with TF recs; see recommendations in note; appreciate assistance      Tracheostomy in  place  Dysarthria  Dysphagia    Trach capped, good O2 sats on RA     - Respiratory therapy to assist with trach care.   - SLP for swallow evaluation and speech therapy; recommending NPO, re-evaluation, PMSV under direct supervision  - ENT advising to keep tracheostomy in place due to intermittent responsiveness; amenable to decannulation if patient family moves towards hospice/comfort measures    Sacral decubitus ulcer, stage IV  S/p recent debridement at OSH 06/08. OSH Wcx with enterococcus faecalis, bacteriodes fragilus.    - Wound care consulted; appreciate assistance  - Deep wound cx  - General surgery consulted s/p debridement 06/24, no note of bone involvement per OP note, no cultures sent  - Infectious Disease consulted, zosyn for 6-weeks (End date is 8/5)  - Consideration for wound vac given depth of injury  - Turn q2h    Leukocytosis  Patient admitted with elevated WBC to 14s. Previous episodes of aspiration PNA at OSH, indwelling cabrera given acuity of condition, sacral decubitus ulcer stave IV s/p debridement with enterococcus faecalis / bacteriodes fragilis in cx. Patient afebrile with stable hemodynamics on admission at this time. CXR without acute abnormality. UA with yeast growing, chronic per previous OSH review.    Leukocytosis may be explained by soft tissue infection vs hemoconcentration given extensive dehydration and hypernatremia from lack of enteral fluids or IVF at outside hospice facility prior to transfer to [facility].     - Zosyn given previous cultures and persistent leukocytosis  - Etiology may be soft tissue infection; see Sacral Decubitus Ulcer A/P  - Continue monitoring CBC  - Wound care per Sacral Decubitus Ulcer A/P  - Aspiration precautions  - Trend fever curve  - Trend BCx    Discharge planning issues  Darlene Avila is a 71 year old F with history of diabetes type 2, seizure, ischemic CVA, IBS, history of breast cancer s/p right mastectomy and failure to thrive who was brought in by her  HPOA (sister) for skilled nursing home placement due to concern for neglect at recent hospice facility. Patient follows very simple commands and doubt she can engage in skilled therapy. assisted nursing facility likely more of an option.     - Appreciate CM/SW assistance with placement; placement difficult d/t trach; will require LTAC placement - referrals sent  - Appreciate pharmacy's assistance with med rec from previous admission    ACP (advance care planning)  Patient is DNR. [place]POST on file.     - Patient initially resistant to hospice d/t previous experience with [facility] inpatient [facility]ice; after extensive conversation with MD, now amenable to exploring hospice (though does not desire home w/ hospice)  - CM/SW assisting; tracheostomy status complicating placement to halfway w/ hospice.  - See Care Update note Zi-on MD Aramis on 06/29 for further detail.    History of CVA (cerebrovascular accident)  Unclear of patient's current medications. Per med review on care-everywhere, the patient is on statin but not on antiplatelet therapy.     - Appreciate pharmacy's assistance with medication reconciliation with facility- start antiplatelet therapy if no contraindications.   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 A1c repeated 6.9.    - LDSSI   - POCT glucose q6hrs  - Maintain -180  - Titrate basal/bolus regimen to goal as above.      VTE Risk Mitigation (From admission, onward)         Ordered     heparin (porcine) injection 5,000 Units  Every 12 hours         06/18/22 0355     IP VTE HIGH RISK PATIENT  Once         06/18/22 0355     Place sequential compression device  Until discontinued         06/18/22 0355                Discharge Planning   JUDIT: 7/5/2022     Code Status: DNR   Is the patient medically ready for  discharge?: Yes    Reason for patient still in hospital (select all that apply): Patient trending condition  Discharge Plan A: Home Health   Discharge Delays: None known at this time              Marge Brown MD  Department of Hospital Medicine   [facility] - Intensive Care ([place]

## 2022-07-03 NOTE — ASSESSMENT & PLAN NOTE
S/p recent debridement at OSH 06/08. OSH Wcx with enterococcus faecalis, bacteriodes fragilus.    - Wound care consulted; appreciate assistance  - Deep wound cx  - General surgery consulted s/p debridement 06/24, no note of bone involvement per OP note, no cultures sent  - Infectious Disease consulted, zosyn for 6-weeks (End date is 8/5)  - Consideration for wound vac given depth of injury  - Turn q2h

## 2022-07-03 NOTE — PLAN OF CARE
Pt unable to express needs.  Trach collar in place.  Glucerna 1.5@35cc/hr.  Huge sacral non healing wound. Dressing changed this morning .   Safety maintained.  Bed in low position,  call  light in reach.

## 2022-07-03 NOTE — RESPIRATORY THERAPY
RAPID RESPONSE RESPIRATORY THERAPY PROACTIVE NOTE           Time of visit:      Code Status: DNR   : 1950  Bed: 00322/78098 A:   MRN: [id number]  Time spent at the bedside: < 15 min    SITUATION    Evaluated patient for: LDA Check     BACKGROUND    Patient has a past medical history of Arthritis, Cancer of breast, Cataract, Diabetes mellitus, Hypertension, Hypoxia, Memory loss, Orthostatic hypotension, TIA (transient ischemic attack), and Vitamin D deficiency.  Clinically Significant Surgical Hx: tracheostomy    24 Hours Vitals Range:  Temp:  [97 °F (36.1 °C)-98.9 °F (37.2 °C)]   Pulse:  [83-94]   Resp:  [17-18]   BP: (131-159)/(63-82)   SpO2:  [95 %-99 %]     Labs:    Recent Labs     22  0409 22  1357 22  0010 22  0424 22  1235 22  1745 22  0026 22  1405   *   < > 138   < > 138  137   < > 137 135* 135*   K 3.8  --  3.9  --  4.0  --   --   --   --    *  --  110  --  113*  --   --   --   --    CO2 14*  --  21*  --  19*  --   --   --   --    CREATININE 0.5  --  0.6  --  0.5  --   --   --   --    *  --  124*  --  141*  --   --   --   --    PHOS 1.7*  --  2.0*  --  2.2*  --   --   --   --    MG 1.4*  --   --   --  1.8  --   --   --   --     < > = values in this interval not displayed.        No results for input(s): PH, PCO2, PO2, HCO3, POCSATURATED, BE in the last 72 hours.    ASSESSMENT/INTERVENTIONS    Upon arrival in room patient in bed wearing 5L 21% trach collar. All supplies in room. Extra cuffed Shiley trachs at bedside, sizes 4 & 6.    Last VS   Temp: 97 °F (36.1 °C) (07/03 1615)  Pulse: 90 (1615)  Resp: 18 (1615)  BP: 159/82 (1615)  SpO2: 99 % ( 1704)    Level of Consciousness: Level of Consciousness (AVPU): alert  Respiratory Effort: Respiratory Effort: Unlabored Expansion/Accessory Muscle Usage: Expansion/Accessory Muscles/Retractions: no retractions, no use of accessory muscles  All Lung Field  Breath Sounds: All Lung Fields Breath Sounds: diminished  IGNACIO Breath Sounds: diminished  RUL Breath Sounds: diminished  RML Breath Sounds: diminished  RLL Breath Sounds: diminished  O2 Device/Concentration: Flow (L/min): 5, Oxygen Concentration (%): 21, O2 Device (Oxygen Therapy): room air  Surgical airway: Yes, Type: Shiley Size: 6, uncuffed  Ambu at bedside: Ambu bag with the patient?: Yes, Adult Ambu     Active Orders   Respiratory Care    Oxygen Continuous     Frequency: Continuous     Number of Occurrences: Until Specified     Order Questions:      Device type: Low flow      Device: Trach Collar      FiO2%: 21      Titrate O2 per Oxygen Titration Protocol: Yes      To maintain SpO2 goal of: >= 90%      Notify MD of: Inability to achieve desired SpO2; Sudden change in patient status and requires 20% increase in FiO2; Patient requires >60% FiO2    Pulse Oximetry Q4H     Frequency: Q4H     Number of Occurrences: Until Specified    Routine tracheostomy care     Frequency: BID     Number of Occurrences: Until Specified    SUCTION PRN     Frequency: PRN     Number of Occurrences: Until Specified       RECOMMENDATIONS    We recommend: RRT Recs: Continue POC per primary team.    ESCALATION        FOLLOW-UP    Please call back the Rapid Response RT, Dennis Bentley RRT at x [phone] for any questions or concerns.

## 2022-07-03 NOTE — SUBJECTIVE & OBJECTIVE
Interval History: More alert this AM. Sodium stable. Free water flushes increased to Q4H    Review of Systems   Unable to perform ROS: Patient nonverbal   Constitutional:  Positive for fatigue.   Respiratory:  Negative for chest tightness and shortness of breath.    Cardiovascular:  Negative for chest pain.   Gastrointestinal:  Negative for abdominal pain.   Objective:     Vital Signs (Most Recent):  Temp: 98.9 °F (37.2 °C) (07/03/22 1230)  Pulse: 94 (07/03/22 1230)  Resp: 18 (07/03/22 1230)  BP: 138/71 (07/03/22 1230)  SpO2: 95 % (07/03/22 1338) Vital Signs (24h Range):  Temp:  [97.7 °F (36.5 °C)-98.9 °F (37.2 °C)] 98.9 °F (37.2 °C)  Pulse:  [82-94] 94  Resp:  [16-18] 18  SpO2:  [95 %-99 %] 95 %  BP: (118-146)/(56-71) 138/71     Weight: 50.2 kg (110 lb 10.7 oz)  Body mass index is 22.35 kg/m².    Intake/Output Summary (Last 24 hours) at 7/3/2022 1532  Last data filed at 7/3/2022 0800  Gross per 24 hour   Intake 3687.3 ml   Output 1402 ml   Net 2285.3 ml      Physical Exam  Vitals and nursing note reviewed.   Constitutional:       General: She is not in acute distress.     Appearance: She is not ill-appearing or toxic-appearing.   HENT:      Head: Normocephalic.      Mouth/Throat:      Mouth: Mucous membranes are dry.      Comments: Dried oral secretions adhered to tongue  Neck:      Comments: Trach in place and secured  Cardiovascular:      Rate and Rhythm: Normal rate and regular rhythm.      Pulses: Normal pulses.      Heart sounds: Normal heart sounds. No murmur heard.  Pulmonary:      Effort: Pulmonary effort is normal. No respiratory distress.      Breath sounds: Normal breath sounds. No wheezing or rales.   Abdominal:      General: Abdomen is flat. Bowel sounds are normal. There is no distension.      Palpations: Abdomen is soft.      Tenderness: There is no abdominal tenderness.   Musculoskeletal:         General: No swelling or tenderness. Normal range of motion.      Cervical back: Normal range of motion.    Skin:     General: Skin is warm.      Coloration: Skin is not jaundiced or pale.      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    Neurological:      Mental Status: She is alert.      Comments: Intermittently alert, minimally responsive   Psychiatric:      Comments: Unable to assess       Significant Labs: All pertinent labs within the past 24 hours have been reviewed.  CBC:   Recent Labs   Lab 07/02/22 0424   WBC 9.76   HGB 7.5*   HCT 24.5*   *     CMP:   Recent Labs   Lab 07/01/22  2019 07/02/22  0010 07/02/22  0424 07/02/22  1235 07/02/22  1745 07/03/22  0026 07/03/22  1405      < > 138  137   < > 137 135* 135*   K 3.9  --  4.0  --   --   --   --      --  113*  --   --   --   --    CO2 21*  --  19*  --   --   --   --    *  --  141*  --   --   --   --    BUN 9  --  9  --   --   --   --    CREATININE 0.6  --  0.5  --   --   --   --    CALCIUM 9.6  --  8.8  --   --   --   --    ALBUMIN 1.7*  --   --   --   --   --   --    ANIONGAP 7*  --  6*  --   --   --   --    EGFRNONAA >60.0  --  >60.0  --   --   --   --     < > = values in this interval not displayed.       Significant Imaging: I have reviewed all pertinent imaging results/findings within the past 24 hours.

## 2022-07-03 NOTE — ASSESSMENT & PLAN NOTE
Patient admitted with elevated WBC to 14s. Previous episodes of aspiration PNA at OSH, indwelling cabrera given acuity of condition, sacral decubitus ulcer stave IV s/p debridement with enterococcus faecalis / bacteriodes fragilis in cx. Patient afebrile with stable hemodynamics on admission at this time. CXR without acute abnormality. UA with yeast growing, chronic per previous OSH review.    Leukocytosis may be explained by soft tissue infection vs hemoconcentration given extensive dehydration and hypernatremia from lack of enteral fluids or IVF at outside hospice facility prior to [place] [facility]     - Zosyn given previous cultures and persistent leukocytosis  - Etiology may be soft tissue infection; see Sacral Decubitus Ulcer A/P  - Continue monitoring CBC  - Wound care per Sacral Decubitus Ulcer A/P  - Aspiration precautions  - Trend fever curve  - Trend BCx

## 2022-07-04 LAB
ANION GAP SERPL CALC-SCNC: 9 MMOL/L (ref 8–16)
BASOPHILS # BLD AUTO: 0.03 K/UL (ref 0–0.2)
BASOPHILS NFR BLD: 0.3 % (ref 0–1.9)
BUN SERPL-MCNC: 9 MG/DL (ref 8–23)
CALCIUM SERPL-MCNC: 9.2 MG/DL (ref 8.7–10.5)
CHLORIDE SERPL-SCNC: 110 MMOL/L (ref 95–110)
CO2 SERPL-SCNC: 17 MMOL/L (ref 23–29)
CREAT SERPL-MCNC: 0.6 MG/DL (ref 0.5–1.4)
DIFFERENTIAL METHOD: ABNORMAL
EOSINOPHIL # BLD AUTO: 0 K/UL (ref 0–0.5)
EOSINOPHIL NFR BLD: 0.5 % (ref 0–8)
ERYTHROCYTE [DISTWIDTH] IN BLOOD BY AUTOMATED COUNT: 19.4 % (ref 11.5–14.5)
EST. GFR  (AFRICAN AMERICAN): >60 ML/MIN/1.73 M^2
EST. GFR  (NON AFRICAN AMERICAN): >60 ML/MIN/1.73 M^2
GLUCOSE SERPL-MCNC: 209 MG/DL (ref 70–110)
HCT VFR BLD AUTO: 28.2 % (ref 37–48.5)
HGB BLD-MCNC: 8.7 G/DL (ref 12–16)
IMM GRANULOCYTES # BLD AUTO: 0.03 K/UL (ref 0–0.04)
IMM GRANULOCYTES NFR BLD AUTO: 0.3 % (ref 0–0.5)
LYMPHOCYTES # BLD AUTO: 1.8 K/UL (ref 1–4.8)
LYMPHOCYTES NFR BLD: 20 % (ref 18–48)
MAGNESIUM SERPL-MCNC: 1.7 MG/DL (ref 1.6–2.6)
MCH RBC QN AUTO: 27.1 PG (ref 27–31)
MCHC RBC AUTO-ENTMCNC: 30.9 G/DL (ref 32–36)
MCV RBC AUTO: 88 FL (ref 82–98)
MONOCYTES # BLD AUTO: 0.3 K/UL (ref 0.3–1)
MONOCYTES NFR BLD: 3.6 % (ref 4–15)
NEUTROPHILS # BLD AUTO: 6.6 K/UL (ref 1.8–7.7)
NEUTROPHILS NFR BLD: 75.3 % (ref 38–73)
NRBC BLD-RTO: 0 /100 WBC
PHOSPHATE SERPL-MCNC: 2 MG/DL (ref 2.7–4.5)
PLATELET # BLD AUTO: 532 K/UL (ref 150–450)
PMV BLD AUTO: 8.9 FL (ref 9.2–12.9)
POCT GLUCOSE: 219 MG/DL (ref 70–110)
POCT GLUCOSE: 235 MG/DL (ref 70–110)
POCT GLUCOSE: 242 MG/DL (ref 70–110)
POTASSIUM SERPL-SCNC: 4.6 MMOL/L (ref 3.5–5.1)
RBC # BLD AUTO: 3.21 M/UL (ref 4–5.4)
SODIUM SERPL-SCNC: 136 MMOL/L (ref 136–145)
WBC # BLD AUTO: 8.82 K/UL (ref 3.9–12.7)

## 2022-07-04 PROCEDURE — 99232 SBSQ HOSP IP/OBS MODERATE 35: CPT | Mod: ,,, | Performed by: HOSPITALIST

## 2022-07-04 PROCEDURE — 25000003 PHARM REV CODE 250: Performed by: STUDENT IN AN ORGANIZED HEALTH CARE EDUCATION/TRAINING PROGRAM

## 2022-07-04 PROCEDURE — 27200966 HC CLOSED SUCTION SYSTEM

## 2022-07-04 PROCEDURE — 84100 ASSAY OF PHOSPHORUS: CPT

## 2022-07-04 PROCEDURE — 80048 BASIC METABOLIC PNL TOTAL CA: CPT | Performed by: HOSPITALIST

## 2022-07-04 PROCEDURE — 25000003 PHARM REV CODE 250

## 2022-07-04 PROCEDURE — 99232 PR SUBSEQUENT HOSPITAL CARE,LEVL II: ICD-10-PCS | Mod: ,,, | Performed by: HOSPITALIST

## 2022-07-04 PROCEDURE — 12000002 HC ACUTE/MED SURGE SEMI-PRIVATE ROOM

## 2022-07-04 PROCEDURE — 83735 ASSAY OF MAGNESIUM: CPT

## 2022-07-04 PROCEDURE — 99900035 HC TECH TIME PER 15 MIN (STAT)

## 2022-07-04 PROCEDURE — 94761 N-INVAS EAR/PLS OXIMETRY MLT: CPT

## 2022-07-04 PROCEDURE — 36415 COLL VENOUS BLD VENIPUNCTURE: CPT | Performed by: HOSPITALIST

## 2022-07-04 PROCEDURE — 85025 COMPLETE CBC W/AUTO DIFF WBC: CPT | Performed by: HOSPITALIST

## 2022-07-04 PROCEDURE — 63600175 PHARM REV CODE 636 W HCPCS

## 2022-07-04 PROCEDURE — 63600175 PHARM REV CODE 636 W HCPCS: Performed by: STUDENT IN AN ORGANIZED HEALTH CARE EDUCATION/TRAINING PROGRAM

## 2022-07-04 RX ORDER — NYSTATIN 100000 U/G
OINTMENT TOPICAL 2 TIMES DAILY
Status: DISCONTINUED | OUTPATIENT
Start: 2022-07-04 | End: 2022-07-17 | Stop reason: HOSPADM

## 2022-07-04 RX ADMIN — HEPARIN SODIUM 5000 UNITS: 5000 INJECTION INTRAVENOUS; SUBCUTANEOUS at 09:07

## 2022-07-04 RX ADMIN — LEVETIRACETAM 750 MG: 100 SOLUTION ORAL at 09:07

## 2022-07-04 RX ADMIN — ATORVASTATIN CALCIUM 80 MG: 20 TABLET, FILM COATED ORAL at 09:07

## 2022-07-04 RX ADMIN — NYSTATIN OINTMENT: 100000 OINTMENT TOPICAL at 10:07

## 2022-07-04 RX ADMIN — SODIUM BICARBONATE 650 MG TABLET 650 MG: at 09:07

## 2022-07-04 RX ADMIN — INSULIN ASPART 4 UNITS: 100 INJECTION, SOLUTION INTRAVENOUS; SUBCUTANEOUS at 04:07

## 2022-07-04 RX ADMIN — CHOLESTYRAMINE 8 G: 4 POWDER, FOR SUSPENSION ORAL at 09:07

## 2022-07-04 RX ADMIN — INSULIN DETEMIR 10 UNITS: 100 INJECTION, SOLUTION SUBCUTANEOUS at 10:07

## 2022-07-04 RX ADMIN — HEPARIN SODIUM 5000 UNITS: 5000 INJECTION INTRAVENOUS; SUBCUTANEOUS at 10:07

## 2022-07-04 RX ADMIN — PIPERACILLIN SODIUM AND TAZOBACTAM SODIUM 4.5 G: 4; .5 INJECTION, POWDER, LYOPHILIZED, FOR SOLUTION INTRAVENOUS at 09:07

## 2022-07-04 RX ADMIN — PIPERACILLIN SODIUM AND TAZOBACTAM SODIUM 4.5 G: 4; .5 INJECTION, POWDER, LYOPHILIZED, FOR SOLUTION INTRAVENOUS at 01:07

## 2022-07-04 RX ADMIN — LEVETIRACETAM 750 MG: 100 SOLUTION ORAL at 10:07

## 2022-07-04 RX ADMIN — INSULIN ASPART 4 UNITS: 100 INJECTION, SOLUTION INTRAVENOUS; SUBCUTANEOUS at 10:07

## 2022-07-04 RX ADMIN — PIPERACILLIN SODIUM AND TAZOBACTAM SODIUM 4.5 G: 4; .5 INJECTION, POWDER, LYOPHILIZED, FOR SOLUTION INTRAVENOUS at 06:07

## 2022-07-04 NOTE — PROGRESS NOTES
[facility] - Intensive Care ([place] [address])  [facility] Medicine  Progress Note    Patient Name: Darlene Avila  MRN: [id number]  Patient Class: IP- Inpatient   Admission Date: 6/17/2022  Length of Stay: 15 days  Attending Physician: Yvonne Hills MD  Primary Care Provider: Kirill Cabrera Iii, MD        Subjective:     Principal Problem:Hypernatremia        HPI:  Darlene Avila is a 71 year old F with history of diabetes type 2, seizure c/b anoxic brain injury s/p trach, PEG, bed bound status, Stage IV sacral ulcer, ischemic CVA, IBS, history of breast cancer s/p right mastectomy and failure to thrive who was brought in by her HPOA (sister) for nursing home placement. Patient is unable to provide history.     Patient's sister was contacted who stated that the patient was admitted to [facility] in [facility] for recurrent seizures/status epilepticus which resulted in an anoxic brain injury and respiratory failure.  Patient was intubated followed by tracheostomy and PEG placement .  Patient was discharged to nursing home and subsequently discharged to hospice care at the request of patient's sister. However a few days ago, she visited the patient at [facility] recovery facility and noticed they weren't feeding or taking care of the patient due to her 'hospice status'. Per sister today, she would like to place the patient in a skilled nursing facility and would like to rescind her hospice care at this time.  She is no longer interested in hospice placement. She states the patient at baseline is only able to follow some simple commands. She is unsure how much O2 level is her baseline via trach collar.   Of note she has a Stage IV sacral wound that is s/p debridement on 06/09 by Gen surgery in [facility].     Upon arrival to the ED, the patient was hemodynamically stable. Labs revealed Na 157, WBC 14. Hgb 9.4 (baseline 7-9). The patient was given 1L of IV NS and was admitted to hospital medicine for further management.        Overview/Hospital Course:  Patient admitted to hospital medicine with pre-existing anoxic brain injury, hypernatremia, sacral decubitus wound s/p debridement 06/08 in [place], [place]. Patient initially admitted to hospice following discharge from [facility] ICU, but discharged from hospice as patient family believed she was not getting appropriate care. Na 157, corrected with IVF (NS and hypotonic solutions) and enteral FWF down to 148. Trach collar exchanged by ENT due to lack of supplies at facility. Wound care consulted for sacral decubitus, recommended General Surgery consult for possible debridement as wound appears purulent. Persistent leukocytosis, started Zosyn per previous I&D wound cultures growing bacteriodes/enterococcus and new wound findings. cEEG started and Epilepsy consulted for assistance with antiepileptic medications; conflicting reports if patient was on only Keppra/Lacosamide vs Keppra/Lacosamide/Phenytoin. General surgery consulted for debridement of sacral decubitus ulcer, s/p I&D 06/24. Palliative Care consulted for assistance in GOC; decision to pursue medical/surgical care decided. ENT consulted for possible trach decannulation. ID consulted, planning for 6-weeks course of zosyn.Sodium improved; will continue free water flushes.     Patient remains with trach on humidifier. Mentation is improved today.    CM/SW to assist with dispo. Patient now exploring hospice options. However, due to trach placement, care home placement for SNF limited; LTAC remains alternative.      Interval History: NAEON. Mentation appears to be improved today. Patient is awake. She remains on trach with humidifier. Sodium levels have been stable. Will continue free water flushes. Nurses have taught family how to do the free water flushes. We have requested that family show us that they can perform the tasks of trach care, wound care, giving IV abx, and free water flushes before patient is discharge.    Review of  Systems   Unable to perform ROS: Patient nonverbal   Constitutional:  Positive for fatigue.   Respiratory:  Negative for chest tightness and shortness of breath.    Cardiovascular:  Negative for chest pain.   Gastrointestinal:  Negative for abdominal pain.   Objective:     Vital Signs (Most Recent):  Temp: 96.8 °F (36 °C) (07/04/22 0830)  Pulse: 86 (07/04/22 0830)  Resp: 16 (07/04/22 0830)  BP: (!) 152/66 (07/04/22 0830)  SpO2: 96 % (07/04/22 0830)   Vital Signs (24h Range):  Temp:  [96.8 °F (36 °C)-98.9 °F (37.2 °C)] 96.8 °F (36 °C)  Pulse:  [82-94] 86  Resp:  [16-18] 16  SpO2:  [95 %-99 %] 96 %  BP: (138-170)/(66-88) 152/66     Weight: 50.2 kg (110 lb 10.7 oz)  Body mass index is 22.35 kg/m².    Intake/Output Summary (Last 24 hours) at 7/4/2022 0848  Last data filed at 7/4/2022 0539  Gross per 24 hour   Intake 2404.68 ml   Output 1800 ml   Net 604.68 ml      Physical Exam  Vitals and nursing note reviewed.   Constitutional:       General: She is not in acute distress.     Appearance: She is not ill-appearing or toxic-appearing.   HENT:      Head: Normocephalic.      Mouth/Throat:      Mouth: Mucous membranes are dry.      Comments: Dried oral secretions adhered to tongue  Neck:      Comments: Trach in place and secured  Cardiovascular:      Rate and Rhythm: Normal rate and regular rhythm.      Pulses: Normal pulses.      Heart sounds: Normal heart sounds. No murmur heard.  Pulmonary:      Effort: Pulmonary effort is normal. No respiratory distress.      Breath sounds: Normal breath sounds. No wheezing or rales.   Abdominal:      General: Abdomen is flat. Bowel sounds are normal. There is no distension.      Palpations: Abdomen is soft.      Tenderness: There is no abdominal tenderness.   Musculoskeletal:         General: No swelling or tenderness. Normal range of motion.      Cervical back: Normal range of motion.   Skin:     General: Skin is warm.      Coloration: Skin is not jaundiced or pale.      Comments:  Stage IV sacral wound that is deep with dressing in place.    Neurological:      Mental Status: She is alert.      Comments: Intermittently alert, minimally responsive   Psychiatric:      Comments: Unable to assess       Significant Labs: All pertinent labs within the past 24 hours have been reviewed.  CBC:   Recent Labs   Lab 07/04/22  1142   WBC 8.82   HGB 8.7*   HCT 28.2*   *     CMP:   Recent Labs   Lab 07/03/22  1405 07/03/22  1833 07/04/22  0521   * 137 136   K  --   --  4.6   CL  --   --  110   CO2  --   --  17*   GLU  --   --  209*   BUN  --   --  9   CREATININE  --   --  0.6   CALCIUM  --   --  9.2   ANIONGAP  --   --  9   EGFRNONAA  --   --  >60.0       Significant Imaging: I have reviewed all pertinent imaging results/findings within the past 24 hours.      Assessment/Plan:      * Hypernatremia  STABLE, WNL    Na stable today.   - Free water flushes      Encephalopathy  Mentation is better today. Patient is awake and speaking.       Goals of care, counseling/discussion  Refer to palliative care note.      Palliative care encounter  Per Primary Medicine team and Palliative Care encounters with patient family (separate encounters, see dated notes in chart), patient family wishes full medical care and does not desire comfort/hospice measures. DNR remains. See Pall Care note for further detail.    Seizures  Patient previously started on Lacosamide and Keppra at OSH during initial episode of status epilepticus. Phenytoin added as patient continued to have breakthrough seizures.    - Repeat EEG ordered 2/2 interval decrease in alert/responsiveness; diffuse toxic metabolic encephalopathy, no new seizure per Neuro EP  - Continue Keppra monotherapy 750 bid; titrate per neuro recs  - Neurology consulted for further assistance in antiepileptic regimen    G tube feedings  Tube feed dependent via G tube  - Nutrition to assist with TF recs; see recommendations in note; appreciate  assistance      Tracheostomy in place  Dysarthria  Dysphagia    Trach capped, good O2 sats on RA     - Respiratory therapy to assist with trach care.   - SLP for swallow evaluation and speech therapy; recommending NPO, re-evaluation, PMSV under direct supervision  - ENT advising to keep tracheostomy in place due to intermittent responsiveness; amenable to decannulation if patient family moves towards hospice/comfort measures    Sacral decubitus ulcer, stage IV  S/p recent debridement at OSH 06/08. OSH Wcx with enterococcus faecalis, bacteriodes fragilus.    - Wound care consulted; appreciate assistance  - Deep wound cx  - General surgery consulted s/p debridement 06/24, no note of bone involvement per OP note, no cultures sent  - Infectious Disease consulted, zosyn for 6-weeks (End date is 8/5)  - Consideration for wound vac given depth of injury  - Turn q2h    Leukocytosis  Patient admitted with elevated WBC to 14s. Previous episodes of aspiration PNA at OSH, indwelling cabrera given acuity of condition, sacral decubitus ulcer stave IV s/p debridement with enterococcus faecalis / bacteriodes fragilis in cx. Patient afebrile with stable hemodynamics on admission at this time. CXR without acute abnormality. UA with yeast growing, chronic per previous OSH review.    Leukocytosis may be explained by soft tissue infection vs hemoconcentration given extensive dehydration and hypernatremia from lack of enteral fluids or IVF at outside hospice facility prior to transfer to [facility].     - Zosyn given previous cultures and persistent leukocytosis  - Etiology may be soft tissue infection; see Sacral Decubitus Ulcer A/P  - Continue monitoring CBC  - Wound care per Sacral Decubitus Ulcer A/P  - Aspiration precautions  - Trend fever curve  - Trend BCx    Discharge planning issues  Darlene Avila is a 71 year old F with history of diabetes type 2, seizure, ischemic CVA, IBS, history of breast cancer s/p right mastectomy and failure to  thrive who was brought in by her HPOA (sister) for skilled nursing home placement due to concern for neglect at recent hospice facility. Patient follows very simple commands and doubt she can engage in skilled therapy. [facility] nursing facility likely more of an option.     - Appreciate CM/SW assistance with placement; placement difficult d/t trach; will require LTAC placement - referrals sent  - Appreciate pharmacy's assistance with med rec from previous admission    ACP (advance care planning)  Patient is DNR. LaPOST on file.     - Patient initially resistant to hospice d/t previous experience with [facility] inpatient hospice; after extensive conversation with MD, now amenable to exploring hospice (though does not desire home w/ hospice)  - CM/SW assisting; tracheostomy status complicating placement to half-way w/ hospice.  - See Care Update note Zi-lona Self MD on 06/29 for further detail.    History of CVA (cerebrovascular accident)  Unclear of patient's current medications. Per med review on care-everywhere, the patient is on statin but not on antiplatelet therapy.     - Appreciate pharmacy's assistance with medication reconciliation with facility- start antiplatelet therapy if no contraindications.   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 A1c repeated 6.9.    - LDSSI   - POCT glucose q6hrs  - Maintain -180  - Titrate basal/bolus regimen to goal as above.      VTE Risk Mitigation (From admission, onward)         Ordered     heparin (porcine) injection 5,000 Units  Every 12 hours         06/18/22 0355     IP VTE HIGH RISK PATIENT  Once         06/18/22 0355     Place sequential compression device  Until discontinued         06/18/22 0355                Discharge Planning   JUDIT: 7/5/2022     Code Status: DNR   Is the  patient medically ready for discharge?: Yes    Reason for patient still in hospital (select all that apply): Patient trending condition  Discharge Plan A: Home Health   Discharge Delays: None known at this time              Maria L Mathew DO  Department of Hospital Medicine   [facility] - Intensive Care ([place])

## 2022-07-04 NOTE — SUBJECTIVE & OBJECTIVE
Interval History: NAEON. Mentation appears to be improved today. Patient is awake. She remains on trach with humidifier. Sodium levels have been stable. Will continue free water flushes. Nurses have taught family how to do the free water flushes. We have requested that family show us that they can perform the tasks of trach care, wound care, giving IV abx, and free water flushes before patient is discharge.    Review of Systems   Unable to perform ROS: Patient nonverbal   Constitutional:  Positive for fatigue.   Respiratory:  Negative for chest tightness and shortness of breath.    Cardiovascular:  Negative for chest pain.   Gastrointestinal:  Negative for abdominal pain.   Objective:     Vital Signs (Most Recent):  Temp: 96.8 °F (36 °C) (07/04/22 0830)  Pulse: 86 (07/04/22 0830)  Resp: 16 (07/04/22 0830)  BP: (!) 152/66 (07/04/22 0830)  SpO2: 96 % (07/04/22 0830)   Vital Signs (24h Range):  Temp:  [96.8 °F (36 °C)-98.9 °F (37.2 °C)] 96.8 °F (36 °C)  Pulse:  [82-94] 86  Resp:  [16-18] 16  SpO2:  [95 %-99 %] 96 %  BP: (138-170)/(66-88) 152/66     Weight: 50.2 kg (110 lb 10.7 oz)  Body mass index is 22.35 kg/m².    Intake/Output Summary (Last 24 hours) at 7/4/2022 0848  Last data filed at 7/4/2022 0539  Gross per 24 hour   Intake 2404.68 ml   Output 1800 ml   Net 604.68 ml      Physical Exam  Vitals and nursing note reviewed.   Constitutional:       General: She is not in acute distress.     Appearance: She is not ill-appearing or toxic-appearing.   HENT:      Head: Normocephalic.      Mouth/Throat:      Mouth: Mucous membranes are dry.      Comments: Dried oral secretions adhered to tongue  Neck:      Comments: Trach in place and secured  Cardiovascular:      Rate and Rhythm: Normal rate and regular rhythm.      Pulses: Normal pulses.      Heart sounds: Normal heart sounds. No murmur heard.  Pulmonary:      Effort: Pulmonary effort is normal. No respiratory distress.      Breath sounds: Normal breath sounds. No  wheezing or rales.   Abdominal:      General: Abdomen is flat. Bowel sounds are normal. There is no distension.      Palpations: Abdomen is soft.      Tenderness: There is no abdominal tenderness.   Musculoskeletal:         General: No swelling or tenderness. Normal range of motion.      Cervical back: Normal range of motion.   Skin:     General: Skin is warm.      Coloration: Skin is not jaundiced or pale.      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    Neurological:      Mental Status: She is alert.      Comments: Intermittently alert, minimally responsive   Psychiatric:      Comments: Unable to assess       Significant Labs: All pertinent labs within the past 24 hours have been reviewed.  CBC:   Recent Labs   Lab 07/04/22  1142   WBC 8.82   HGB 8.7*   HCT 28.2*   *     CMP:   Recent Labs   Lab 07/03/22  1405 07/03/22  1833 07/04/22  0521   * 137 136   K  --   --  4.6   CL  --   --  110   CO2  --   --  17*   GLU  --   --  209*   BUN  --   --  9   CREATININE  --   --  0.6   CALCIUM  --   --  9.2   ANIONGAP  --   --  9   EGFRNONAA  --   --  >60.0       Significant Imaging: I have reviewed all pertinent imaging results/findings within the past 24 hours.

## 2022-07-04 NOTE — RESPIRATORY THERAPY
RAPID RESPONSE RESPIRATORY THERAPY PROACTIVE NOTE           Time of visit: 1110     Code Status: DNR   : 1950  Bed: 06013/94164 A:   MRN: [id number]  Time spent at the bedside: < 15 min    SITUATION    Evaluated patient for: LDA Check     BACKGROUND    Patient has a past medical history of Arthritis, Cancer of breast, Cataract, Diabetes mellitus, Hypertension, Hypoxia, Memory loss, Orthostatic hypotension, TIA (transient ischemic attack), and Vitamin D deficiency.  Clinically Significant Surgical Hx: tracheostomy    24 Hours Vitals Range:  Temp:  [95.9 °F (35.5 °C)-97.9 °F (36.6 °C)]   Pulse:  [82-90]   Resp:  [16-19]   BP: (150-170)/(66-91)   SpO2:  [95 %-99 %]     Labs:    Recent Labs     22  0010 22  0424 22  1235 22  1405 22  1833 22  0521      < > 138  137   < > 135* 137 136   K 3.9  --  4.0  --   --   --  4.6     --  113*  --   --   --  110   CO2 21*  --  19*  --   --   --  17*   CREATININE 0.6  --  0.5  --   --   --  0.6   *  --  141*  --   --   --  209*   PHOS 2.0*  --  2.2*  --   --   --  2.0*   MG  --   --  1.8  --   --   --  1.7    < > = values in this interval not displayed.        No results for input(s): PH, PCO2, PO2, HCO3, POCSATURATED, BE in the last 72 hours.    ASSESSMENT/INTERVENTIONS    Upon arrival in room pt on trach collar. No respiratory concerns at this time. All supplies in room.  Extra trachs: 5 & 6 cuffed    Last VS   Temp: 95.9 °F (35.5 °C) (1231)  Pulse: 88 (1231)  Resp: 19 (1231)  BP: 150/91 (1231)  SpO2: 98 % (1231)    Level of Consciousness: Level of Consciousness (AVPU): alert  Respiratory Effort: Respiratory Effort: Normal Expansion/Accessory Muscle Usage: Expansion/Accessory Muscles/Retractions: expansion symmetric  All Lung Field Breath Sounds: All Lung Fields Breath Sounds: Anterior:, Lateral:  IGNACIO Breath Sounds: diminished  LLL Breath Sounds: diminished  RUL Breath  Sounds: diminished  RML Breath Sounds: diminished  RLL Breath Sounds: diminished  O2 Device/Concentration: Flow (L/min): 5, Oxygen Concentration (%): 21, O2 Device (Oxygen Therapy): room air  Surgical airway: Yes, Type: Portex Size: 7, cuffed  Ambu at bedside: Ambu bag with the patient?: Yes, Adult Ambu     Active Orders   Respiratory Care    Oxygen Continuous     Frequency: Continuous     Number of Occurrences: Until Specified     Order Questions:      Device type: Low flow      Device: Trach Collar      FiO2%: 21      Titrate O2 per Oxygen Titration Protocol: Yes      To maintain SpO2 goal of: >= 90%      Notify MD of: Inability to achieve desired SpO2; Sudden change in patient status and requires 20% increase in FiO2; Patient requires >60% FiO2    Pulse Oximetry Q4H     Frequency: Q4H     Number of Occurrences: Until Specified    Routine tracheostomy care     Frequency: BID     Number of Occurrences: Until Specified    SUCTION PRN     Frequency: PRN     Number of Occurrences: Until Specified       RECOMMENDATIONS    We recommend: RRT Recs: Continue POC per primary team.    ESCALATION    .    FOLLOW-UP    Please call back the Rapid Response RT, Simona Guaman, AMRITA at x [phone] for any questions or concerns.

## 2022-07-04 NOTE — ASSESSMENT & PLAN NOTE
Darlene Avila is a 71 year old F with history of diabetes type 2, seizure, ischemic CVA, IBS, history of breast cancer s/p right mastectomy and failure to thrive who was brought in by her HPOA (sister) for skilled nursing home placement due to concern for neglect at recent hospice facility. Patient follows very simple commands and doubt she can engage in skilled therapy. shelter nursing facility likely more of an option.     - Appreciate CM/SW assistance with placement; placement difficult d/t trach; will require LTAC placement - referrals sent  - Appreciate pharmacy's assistance with med rec from previous admission

## 2022-07-04 NOTE — PLAN OF CARE
Problem: Adult Inpatient Plan of Care  Goal: Plan of Care Review  Outcome: Ongoing, Progressing  Goal: Patient-Specific Goal (Individualized)  Outcome: Ongoing, Progressing  Goal: Absence of Hospital-Acquired Illness or Injury  Outcome: Ongoing, Progressing  Goal: Optimal Comfort and Wellbeing  Outcome: Ongoing, Progressing  Goal: Readiness for Transition of Care  Outcome: Ongoing, Progressing     Problem: Diabetes Comorbidity  Goal: Blood Glucose Level Within Targeted Range  Outcome: Ongoing, Progressing     Problem: Impaired Wound Healing  Goal: Optimal Wound Healing  Outcome: Ongoing, Progressing     AAOx1. Pt nonverbal. Remains with trach. Blood sugar closely monitored. Turned and repositioned Q2H. Had BM 2x today. Donnelly care performed. WC completed. Free water flush given and taught family. Instructed pt and family to call for assistance

## 2022-07-04 NOTE — NURSING
Taught family how to perform free water flushes on peg tube. RN demonstrated the teaching and RN observed family performing water flushes properly.

## 2022-07-04 NOTE — ASSESSMENT & PLAN NOTE
Patient admitted with elevated WBC to 14s. Previous episodes of aspiration PNA at OSH, indwelling cabrera given acuity of condition, sacral decubitus ulcer stave IV s/p debridement with enterococcus faecalis / bacteriodes fragilis in cx. Patient afebrile with stable hemodynamics on admission at this time. CXR without acute abnormality. UA with yeast growing, chronic per previous OSH review.    Leukocytosis may be explained by soft tissue infection vs hemoconcentration given extensive dehydration and hypernatremia from lack of enteral fluids or IVF at outside hospice facility prior to transfer to [facility].     - Zosyn given previous cultures and persistent leukocytosis  - Etiology may be soft tissue infection; see Sacral Decubitus Ulcer A/P  - Continue monitoring CBC  - Wound care per Sacral Decubitus Ulcer A/P  - Aspiration precautions  - Trend fever curve  - Trend BCx

## 2022-07-04 NOTE — PLAN OF CARE
Problem: Adult Inpatient Plan of Care  Goal: Patient-Specific Goal (Individualized)  Outcome: Ongoing, Progressing     Problem: Adult Inpatient Plan of Care  Goal: Absence of Hospital-Acquired Illness or Injury  Outcome: Ongoing, Progressing     Problem: Adult Inpatient Plan of Care  Goal: Optimal Comfort and Wellbeing  Outcome: Ongoing, Progressing     Problem: Adult Inpatient Plan of Care  Goal: Readiness for Transition of Care  Outcome: Ongoing, Progressing

## 2022-07-04 NOTE — PLAN OF CARE
Problem: Adult Inpatient Plan of Care  Goal: Plan of Care Review  Outcome: Ongoing, Progressing  Goal: Patient-Specific Goal (Individualized)  Outcome: Ongoing, Progressing  Goal: Absence of Hospital-Acquired Illness or Injury  Outcome: Ongoing, Progressing  Goal: Optimal Comfort and Wellbeing  Outcome: Ongoing, Progressing  Goal: Readiness for Transition of Care  Outcome: Ongoing, Progressing     Problem: Diabetes Comorbidity  Goal: Blood Glucose Level Within Targeted Range  Outcome: Ongoing, Progressing     Problem: Impaired Wound Healing  Goal: Optimal Wound Healing  Outcome: Ongoing, Progressing     AAOx1. Oriented to self only. Nonverbal d/t trach collar. Remains on strict blood sugar monitoring. Turned and repositioned Q2H. WC completed. Had large BM today. Remains on tube feeding. Free water flushes done. Frequent rounds. Taught family how to do water flushes. Sister was successfully able to demonstrate how to do water flushes

## 2022-07-04 NOTE — ASSESSMENT & PLAN NOTE
Patient is DNR. LaPOST on file.     - Patient initially resistant to hospice d/t previous experience with [facility] inpatient hospice; after extensive conversation with MD, now amenable to exploring hospice (though does not desire home w/ hospice)  - CM/SW assisting; tracheostomy status complicating placement to half-way w/ hospice.  - See Care Update note Zi-on MD Aramis on 06/29 for further detail.

## 2022-07-05 LAB
BASOPHILS # BLD AUTO: 0.04 K/UL (ref 0–0.2)
BASOPHILS NFR BLD: 0.6 % (ref 0–1.9)
DIFFERENTIAL METHOD: ABNORMAL
EOSINOPHIL # BLD AUTO: 0.1 K/UL (ref 0–0.5)
EOSINOPHIL NFR BLD: 0.7 % (ref 0–8)
ERYTHROCYTE [DISTWIDTH] IN BLOOD BY AUTOMATED COUNT: 19 % (ref 11.5–14.5)
HCT VFR BLD AUTO: 24.8 % (ref 37–48.5)
HGB BLD-MCNC: 7.7 G/DL (ref 12–16)
IMM GRANULOCYTES # BLD AUTO: 0.03 K/UL (ref 0–0.04)
IMM GRANULOCYTES NFR BLD AUTO: 0.4 % (ref 0–0.5)
LYMPHOCYTES # BLD AUTO: 2.3 K/UL (ref 1–4.8)
LYMPHOCYTES NFR BLD: 31.5 % (ref 18–48)
MAGNESIUM SERPL-MCNC: 1.8 MG/DL (ref 1.6–2.6)
MCH RBC QN AUTO: 27 PG (ref 27–31)
MCHC RBC AUTO-ENTMCNC: 31 G/DL (ref 32–36)
MCV RBC AUTO: 87 FL (ref 82–98)
MONOCYTES # BLD AUTO: 0.3 K/UL (ref 0.3–1)
MONOCYTES NFR BLD: 3.6 % (ref 4–15)
NEUTROPHILS # BLD AUTO: 4.6 K/UL (ref 1.8–7.7)
NEUTROPHILS NFR BLD: 63.2 % (ref 38–73)
NRBC BLD-RTO: 0 /100 WBC
PHOSPHATE SERPL-MCNC: 2.1 MG/DL (ref 2.7–4.5)
PLATELET # BLD AUTO: 537 K/UL (ref 150–450)
PMV BLD AUTO: 9.3 FL (ref 9.2–12.9)
POCT GLUCOSE: 148 MG/DL (ref 70–110)
POCT GLUCOSE: 160 MG/DL (ref 70–110)
POCT GLUCOSE: 202 MG/DL (ref 70–110)
RBC # BLD AUTO: 2.85 M/UL (ref 4–5.4)
WBC # BLD AUTO: 7.24 K/UL (ref 3.9–12.7)

## 2022-07-05 PROCEDURE — 83735 ASSAY OF MAGNESIUM: CPT

## 2022-07-05 PROCEDURE — 97110 THERAPEUTIC EXERCISES: CPT

## 2022-07-05 PROCEDURE — 84100 ASSAY OF PHOSPHORUS: CPT

## 2022-07-05 PROCEDURE — 25000003 PHARM REV CODE 250: Performed by: STUDENT IN AN ORGANIZED HEALTH CARE EDUCATION/TRAINING PROGRAM

## 2022-07-05 PROCEDURE — 97530 THERAPEUTIC ACTIVITIES: CPT

## 2022-07-05 PROCEDURE — 85025 COMPLETE CBC W/AUTO DIFF WBC: CPT

## 2022-07-05 PROCEDURE — 36415 COLL VENOUS BLD VENIPUNCTURE: CPT

## 2022-07-05 PROCEDURE — 25000003 PHARM REV CODE 250

## 2022-07-05 PROCEDURE — 99900035 HC TECH TIME PER 15 MIN (STAT)

## 2022-07-05 PROCEDURE — 94761 N-INVAS EAR/PLS OXIMETRY MLT: CPT

## 2022-07-05 PROCEDURE — 12000002 HC ACUTE/MED SURGE SEMI-PRIVATE ROOM

## 2022-07-05 PROCEDURE — 94760 N-INVAS EAR/PLS OXIMETRY 1: CPT

## 2022-07-05 PROCEDURE — 63600175 PHARM REV CODE 636 W HCPCS

## 2022-07-05 PROCEDURE — 99900026 HC AIRWAY MAINTENANCE (STAT)

## 2022-07-05 PROCEDURE — 63600175 PHARM REV CODE 636 W HCPCS: Performed by: STUDENT IN AN ORGANIZED HEALTH CARE EDUCATION/TRAINING PROGRAM

## 2022-07-05 PROCEDURE — 25000003 PHARM REV CODE 250: Performed by: HOSPITALIST

## 2022-07-05 PROCEDURE — 99232 SBSQ HOSP IP/OBS MODERATE 35: CPT | Mod: ,,, | Performed by: HOSPITALIST

## 2022-07-05 PROCEDURE — 97112 NEUROMUSCULAR REEDUCATION: CPT

## 2022-07-05 PROCEDURE — 99232 PR SUBSEQUENT HOSPITAL CARE,LEVL II: ICD-10-PCS | Mod: ,,, | Performed by: HOSPITALIST

## 2022-07-05 PROCEDURE — 27200966 HC CLOSED SUCTION SYSTEM

## 2022-07-05 PROCEDURE — 27000221 HC OXYGEN, UP TO 24 HOURS

## 2022-07-05 RX ORDER — HYDROMORPHONE HYDROCHLORIDE 1 MG/ML
0.5 INJECTION, SOLUTION INTRAMUSCULAR; INTRAVENOUS; SUBCUTANEOUS EVERY 6 HOURS PRN
Status: DISCONTINUED | OUTPATIENT
Start: 2022-07-05 | End: 2022-07-17 | Stop reason: HOSPADM

## 2022-07-05 RX ORDER — SODIUM CHLORIDE 9 MG/ML
INJECTION, SOLUTION INTRAVENOUS
Status: DISCONTINUED | OUTPATIENT
Start: 2022-07-05 | End: 2022-07-17 | Stop reason: HOSPADM

## 2022-07-05 RX ADMIN — PIPERACILLIN SODIUM AND TAZOBACTAM SODIUM 4.5 G: 4; .5 INJECTION, POWDER, LYOPHILIZED, FOR SOLUTION INTRAVENOUS at 02:07

## 2022-07-05 RX ADMIN — INSULIN DETEMIR 10 UNITS: 100 INJECTION, SOLUTION SUBCUTANEOUS at 09:07

## 2022-07-05 RX ADMIN — SODIUM BICARBONATE 650 MG TABLET 650 MG: at 08:07

## 2022-07-05 RX ADMIN — PIPERACILLIN SODIUM AND TAZOBACTAM SODIUM 4.5 G: 4; .5 INJECTION, POWDER, LYOPHILIZED, FOR SOLUTION INTRAVENOUS at 07:07

## 2022-07-05 RX ADMIN — HEPARIN SODIUM 5000 UNITS: 5000 INJECTION INTRAVENOUS; SUBCUTANEOUS at 10:07

## 2022-07-05 RX ADMIN — HYDROMORPHONE HYDROCHLORIDE 0.5 MG: 1 INJECTION, SOLUTION INTRAMUSCULAR; INTRAVENOUS; SUBCUTANEOUS at 02:07

## 2022-07-05 RX ADMIN — SODIUM BICARBONATE 650 MG TABLET 650 MG: at 10:07

## 2022-07-05 RX ADMIN — HEPARIN SODIUM 5000 UNITS: 5000 INJECTION INTRAVENOUS; SUBCUTANEOUS at 09:07

## 2022-07-05 RX ADMIN — SODIUM CHLORIDE: 0.9 INJECTION, SOLUTION INTRAVENOUS at 11:07

## 2022-07-05 RX ADMIN — NYSTATIN OINTMENT: 100000 OINTMENT TOPICAL at 09:07

## 2022-07-05 RX ADMIN — CHOLESTYRAMINE 8 G: 4 POWDER, FOR SUSPENSION ORAL at 09:07

## 2022-07-05 RX ADMIN — LEVETIRACETAM 750 MG: 100 SOLUTION ORAL at 08:07

## 2022-07-05 RX ADMIN — INSULIN ASPART 2 UNITS: 100 INJECTION, SOLUTION INTRAVENOUS; SUBCUTANEOUS at 09:07

## 2022-07-05 RX ADMIN — LEVETIRACETAM 750 MG: 100 SOLUTION ORAL at 10:07

## 2022-07-05 RX ADMIN — PIPERACILLIN SODIUM AND TAZOBACTAM SODIUM 4.5 G: 4; .5 INJECTION, POWDER, LYOPHILIZED, FOR SOLUTION INTRAVENOUS at 11:07

## 2022-07-05 RX ADMIN — CHOLESTYRAMINE 8 G: 4 POWDER, FOR SUSPENSION ORAL at 10:07

## 2022-07-05 RX ADMIN — ATORVASTATIN CALCIUM 80 MG: 20 TABLET, FILM COATED ORAL at 10:07

## 2022-07-05 RX ADMIN — NYSTATIN OINTMENT: 100000 OINTMENT TOPICAL at 10:07

## 2022-07-05 NOTE — PROGRESS NOTES
[facility] - Intensive Care ([place] [address])  [facility]  Progress Note    Patient Name: Darlene Avila  MRN: [id number]  Patient Class: IP- Inpatient   Admission Date: 6/17/2022  Length of Stay: 16 days  Attending Physician: Yvonne Hills MD  Primary Care Provider: Kirill Cabrera Iii, MD        Subjective:     Principal Problem:Hypernatremia        HPI:  Darlene Avila is a 71 year old F with history of diabetes type 2, seizure c/b anoxic brain injury s/p trach, PEG, bed bound status, Stage IV sacral ulcer, ischemic CVA, IBS, history of breast cancer s/p right mastectomy and failure to thrive who was brought in by her HPOA (sister) for nursing home placement. Patient is unable to provide history.     Patient's sister was contacted who stated that the patient was admitted to [facility] in [facility] for recurrent seizures/status epilepticus which resulted in an anoxic brain injury and respiratory failure.  Patient was intubated followed by tracheostomy and PEG placement .  Patient was discharged to nursing home and subsequently discharged to hospice care at the request of patient's sister. However a few days ago, she visited the patient at [facility] recovery facility and noticed they weren't feeding or taking care of the patient due to her 'hospice status'. Per sister today, she would like to place the patient in a skilled nursing facility and would like to rescind her hospice care at this time.  She is no longer interested in hospice placement. She states the patient at baseline is only able to follow some simple commands. She is unsure how much O2 level is her baseline via trach collar.   Of note she has a Stage IV sacral wound that is s/p debridement on 06/09 by Gen surgery in [facility].     Upon arrival to the ED, the patient was hemodynamically stable. Labs revealed Na 157, WBC 14. Hgb 9.4 (baseline 7-9). The patient was given 1L of IV NS and was admitted to hospital medicine for further management.        Overview/Hospital Course:  Patient admitted to hospital medicine with pre-existing anoxic brain injury, hypernatremia, sacral decubitus wound s/p debridement 06/08 in [place], [place]. Patient initially admitted to hospice following discharge from [facility] ICU, but discharged from hospice as patient family believed she was not getting appropriate care. Na 157, corrected with IVF (NS and hypotonic solutions) and enteral FWF down to 148. Trach collar exchanged by ENT due to lack of supplies at facility. Wound care consulted for sacral decubitus, recommended General Surgery consult for possible debridement as wound appears purulent. Persistent leukocytosis, started Zosyn per previous I&D wound cultures growing bacteriodes/enterococcus and new wound findings. cEEG started and Epilepsy consulted for assistance with antiepileptic medications; conflicting reports if patient was on only Keppra/Lacosamide vs Keppra/Lacosamide/Phenytoin. General surgery consulted for debridement of sacral decubitus ulcer, s/p I&D 06/24. Palliative Care consulted for assistance in GOC; decision to pursue medical/surgical care decided. ENT consulted for possible trach decannulation. ID consulted, planning for 6-weeks course of zosyn.Sodium improved; will continue free water flushes.     Patient remains with trach on RA. Mentation is good today. Patient is awake and attempts to communicate. Reached out to speech therapy about getting patient a communication board.     Long discussion with patient's sister about long term care options for patient. Sister states that she is unable to provide patient the care she needs at home. Will continue to work with patient's sister and SW on long term placement.    CM/SW to assist with dispo. Patient now exploring hospice options. However, due to trach placement, senior care placement for SNF limited; LTAC is also not an alternative.      Interval History: NAEON. AF. Patient's sodium remains stable. She is  awake and attempting to communicate. Will reach out to speech therapy about getting her a communication board. Awaiting decision for long-term care.     Review of Systems   Unable to perform ROS: Patient nonverbal   Constitutional:  Positive for fatigue.   Respiratory:  Negative for chest tightness and shortness of breath.    Cardiovascular:  Negative for chest pain.   Gastrointestinal:  Negative for abdominal pain.   Objective:     Vital Signs (Most Recent):  Temp: 98.4 °F (36.9 °C) (07/05/22 1722)  Pulse: 84 (07/05/22 1722)  Resp: 18 (07/05/22 1722)  BP: (!) 149/67 (07/05/22 1722)  SpO2: 99 % (07/05/22 1722)   Vital Signs (24h Range):  Temp:  [97.5 °F (36.4 °C)-98.6 °F (37 °C)] 98.4 °F (36.9 °C)  Pulse:  [77-94] 84  Resp:  [16-18] 18  SpO2:  [97 %-100 %] 99 %  BP: (119-179)/(58-78) 149/67     Weight: 50.2 kg (110 lb 10.7 oz)  Body mass index is 22.35 kg/m².    Intake/Output Summary (Last 24 hours) at 7/5/2022 1843  Last data filed at 7/5/2022 1737  Gross per 24 hour   Intake 2879.3 ml   Output 750 ml   Net 2129.3 ml      Physical Exam    Significant Labs: All pertinent labs within the past 24 hours have been reviewed.  CBC:   Recent Labs   Lab 07/04/22  1142 07/05/22  0442   WBC 8.82 7.24   HGB 8.7* 7.7*   HCT 28.2* 24.8*   * 537*       Significant Imaging: I have reviewed all pertinent imaging results/findings within the past 24 hours.      Assessment/Plan:      * Hypernatremia  STABLE, WNL    Na stable today.   - Free water flushes      Encephalopathy  Mentation is good today. Patient is awake and attempting to speak.     Goals of care, counseling/discussion  Refer to palliative care note.      Palliative care encounter  Per Primary Medicine team and Palliative Care encounters with patient family (separate encounters, see dated notes in chart), patient family wishes full medical care and does not desire comfort/hospice measures. DNR remains. See Pall Care note for further detail.    Seizures  Patient  previously started on Lacosamide and Keppra at OSH during initial episode of status epilepticus. Phenytoin added as patient continued to have breakthrough seizures.    - Repeat EEG ordered 2/2 interval decrease in alert/responsiveness; diffuse toxic metabolic encephalopathy, no new seizure per Neuro EP  - Continue Keppra monotherapy 750 bid; titrate per neuro recs  - Neurology consulted for further assistance in antiepileptic regimen    G tube feedings  Tube feed dependent via G tube  - Nutrition to assist with TF recs; see recommendations in note; appreciate assistance      Tracheostomy in place  Dysarthria  Dysphagia    Trach capped, good O2 sats on RA     - Respiratory therapy to assist with trach care.   - SLP for swallow evaluation and speech therapy; recommending NPO, re-evaluation, PMSV under direct supervision  - ENT advising to keep tracheostomy in place due to intermittent responsiveness; amenable to decannulation if patient family moves towards hospice/comfort measures    Sacral decubitus ulcer, stage IV  S/p recent debridement at OSH 06/08. OSH Wcx with enterococcus faecalis, bacteriodes fragilus.    - Wound care consulted; appreciate assistance  - Deep wound cx  - General surgery consulted s/p debridement 06/24, no note of bone involvement per OP note, no cultures sent  - Infectious Disease consulted, zosyn for 6-weeks (End date is 8/5)  - Consideration for wound vac given depth of injury  - Turn q2h    Leukocytosis  Patient admitted with elevated WBC to 14s. Previous episodes of aspiration PNA at OSH, indwelling cabrera given acuity of condition, sacral decubitus ulcer stave IV s/p debridement with enterococcus faecalis / bacteriodes fragilis in cx. Patient afebrile with stable hemodynamics on admission at this time. CXR without acute abnormality. UA with yeast growing, chronic per previous OSH review.    Leukocytosis may be explained by soft tissue infection vs hemoconcentration given extensive  dehydration and hypernatremia from lack of enteral fluids or IVF at outside hospice facility prior to transfer to [facility] [place] [place].     - Zosyn given previous cultures and persistent leukocytosis  - Etiology may be soft tissue infection; see Sacral Decubitus Ulcer A/P  - Continue monitoring CBC  - Wound care per Sacral Decubitus Ulcer A/P  - Aspiration precautions  - Trend fever curve  - Trend BCx    Discharge planning issues  Darlene Avila is a 71 year old F with history of diabetes type 2, seizure, ischemic CVA, IBS, history of breast cancer s/p right mastectomy and failure to thrive who was brought in by her HPOA (sister) for skilled nursing home placement due to concern for neglect at recent hospice facility. Patient follows very simple commands and doubt she can engage in skilled therapy. long-term nursing facility likely more of an option.     - Appreciate CM/SW assistance with placement; placement difficult d/t trach; LTAC placement not an option. Will continue to search for long-term care options for patient.   - Appreciate pharmacy's assistance with med rec from previous admission    ACP (advance care planning)  Patient is DNR. LaPOST on file.     - Patient initially resistant to hospice d/t previous experience with [facility] inpatient hospice; after extensive conversation with MD, now amenable to exploring hospice (though does not desire home w/ hospice)  - CM/SW assisting; tracheostomy status complicating placement to senior care w/ hospice.  - See Care Update note Zi-on MD Aramis on 06/29 for further detail.    History of CVA (cerebrovascular accident)  Unclear of patient's current medications. Per med review on care-everywhere, the patient is on statin but not on antiplatelet therapy.     - Appreciate pharmacy's assistance with medication reconciliation with facility- start antiplatelet therapy if no contraindications.   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 A1c repeated 6.9.    - LDSSI   - POCT glucose q6hrs  - Maintain -180  - Titrate basal/bolus regimen to goal as above.      VTE Risk Mitigation (From admission, onward)         Ordered     heparin (porcine) injection 5,000 Units  Every 12 hours         06/18/22 0355     IP VTE HIGH RISK PATIENT  Once         06/18/22 0355     Place sequential compression device  Until discontinued         06/18/22 0355                Discharge Planning   JUDIT: 7/8/2022     Code Status: DNR   Is the patient medically ready for discharge?: Yes    Reason for patient still in hospital (select all that apply): Patient trending condition  Discharge Plan A: Home Health   Discharge Delays: None known at this time              Maria L Mathew DO  Department of Hospital Medicine   [facility] - Intensive Care ([place]-16)

## 2022-07-05 NOTE — RESPIRATORY THERAPY
RAPID RESPONSE RESPIRATORY THERAPY PROACTIVE NOTE           Time of visit: 850     Code Status: DNR   : 1950  Bed: 53869/82419 A:   MRN: [id number]  Time spent at the bedside: < 15 min    SITUATION    Evaluated patient for: LDA Check     BACKGROUND    Patient has a past medical history of Arthritis, Cancer of breast, Cataract, Diabetes mellitus, Hypertension, Hypoxia, Memory loss, Orthostatic hypotension, TIA (transient ischemic attack), and Vitamin D deficiency.  Clinically Significant Surgical Hx: tracheostomy    24 Hours Vitals Range:  Temp:  [95.9 °F (35.5 °C)-98.6 °F (37 °C)]   Pulse:  [77-93]   Resp:  [16-19]   BP: (119-179)/(58-91)   SpO2:  [97 %-100 %]     Labs:    Recent Labs     22  1405 22  1833 22  0521 22  0442   * 137 136  --    K  --   --  4.6  --    CL  --   --  110  --    CO2  --   --  17*  --    CREATININE  --   --  0.6  --    GLU  --   --  209*  --    PHOS  --   --  2.0* 2.1*   MG  --   --  1.7 1.8        No results for input(s): PH, PCO2, PO2, HCO3, POCSATURATED, BE in the last 72 hours.    ASSESSMENT/INTERVENTIONS    Upon arrival in room all supplies are at bedside. Extra 5 & 6 cuffed shiley     Last VS   Temp: 97.6 °F (36.4 °C) (801)  Pulse: 93 (801)  Resp: 17 (801)  BP: 179/78 (801)  SpO2: 99 % (801)    Level of Consciousness: Level of Consciousness (AVPU): alert  Respiratory Effort: Respiratory Effort: Normal, Unlabored Expansion/Accessory Muscle Usage: Expansion/Accessory Muscles/Retractions: expansion symmetric, no retractions, no use of accessory muscles  All Lung Field Breath Sounds: All Lung Fields Breath Sounds: Anterior:, Lateral:, diminished, rhonchi  IGNACIO Breath Sounds: diminished  LLL Breath Sounds: diminished  RUL Breath Sounds: diminished  RML Breath Sounds: diminished  RLL Breath Sounds: diminished  O2 Device/Concentration: Flow (L/min): 5, Oxygen Concentration (%): 21, O2 Device (Oxygen Therapy): Trach  Collar  Surgical airway: Yes, Type: Shiley Size: 6, uncuffed  Ambu at bedside: Ambu bag with the patient?: Yes, Adult Ambu     Active Orders   Respiratory Care    Oxygen Continuous     Frequency: Continuous     Number of Occurrences: Until Specified     Order Questions:      Device type: Low flow      Device: Trach Collar      FiO2%: 21      Titrate O2 per Oxygen Titration Protocol: Yes      To maintain SpO2 goal of: >= 90%      Notify MD of: Inability to achieve desired SpO2; Sudden change in patient status and requires 20% increase in FiO2; Patient requires >60% FiO2    Pulse Oximetry Q4H     Frequency: Q4H     Number of Occurrences: Until Specified    Routine tracheostomy care     Frequency: BID     Number of Occurrences: Until Specified    SUCTION PRN     Frequency: PRN     Number of Occurrences: Until Specified       RECOMMENDATIONS    We recommend: RRT Recs: Continue POC per primary team.    ESCALATION    .    FOLLOW-UP    Please call back the Rapid Response RT, Simona Guaman, RRT at x [phone] for any questions or concerns.

## 2022-07-05 NOTE — PLAN OF CARE
Problem: Adult Inpatient Plan of Care  Goal: Plan of Care Review  Outcome: Ongoing, Progressing  Goal: Patient-Specific Goal (Individualized)  Outcome: Ongoing, Progressing  Goal: Absence of Hospital-Acquired Illness or Injury  Outcome: Ongoing, Progressing  Goal: Optimal Comfort and Wellbeing  Outcome: Ongoing, Progressing  Goal: Readiness for Transition of Care  Outcome: Ongoing, Progressing     Problem: Diabetes Comorbidity  Goal: Blood Glucose Level Within Targeted Range  Outcome: Ongoing, Progressing     Problem: Impaired Wound Healing  Goal: Optimal Wound Healing  Outcome: Ongoing, Progressing     Problem: Infection  Goal: Absence of Infection Signs and Symptoms  Outcome: Ongoing, Progressing     Problem: Skin Injury Risk Increased  Goal: Skin Health and Integrity  Outcome: Ongoing, Progressing     Problem: Fall Injury Risk  Goal: Absence of Fall and Fall-Related Injury  Outcome: Ongoing, Progressing     Problem: Coping Ineffective  Goal: Effective Coping  Outcome: Ongoing, Progressing

## 2022-07-05 NOTE — ASSESSMENT & PLAN NOTE
Patient admitted with elevated WBC to 14s. Previous episodes of aspiration PNA at OSH, indwelling cabrera given acuity of condition, sacral decubitus ulcer stave IV s/p debridement with enterococcus faecalis / bacteriodes fragilis in cx. Patient afebrile with stable hemodynamics on admission at this time. CXR without acute abnormality. UA with yeast growing, chronic per previous OSH review.    Leukocytosis may be explained by soft tissue infection vs hemoconcentration given extensive dehydration and hypernatremia from lack of enteral fluids or IVF at outside hospice facility prior to transfer to [facility] [place] [facility].     - Zosyn given previous cultures and persistent leukocytosis  - Etiology may be soft tissue infection; see Sacral Decubitus Ulcer A/P  - Continue monitoring CBC  - Wound care per Sacral Decubitus Ulcer A/P  - Aspiration precautions  - Trend fever curve  - Trend BCx

## 2022-07-05 NOTE — SUBJECTIVE & OBJECTIVE
Interval History: NAEON. AF. Patient's sodium remains stable. She is awake and attempting to communicate. Will reach out to speech therapy about getting her a communication board. Awaiting decision for long-term care.     Review of Systems   Unable to perform ROS: Patient nonverbal   Constitutional:  Positive for fatigue.   Respiratory:  Negative for chest tightness and shortness of breath.    Cardiovascular:  Negative for chest pain.   Gastrointestinal:  Negative for abdominal pain.   Objective:     Vital Signs (Most Recent):  Temp: 98.4 °F (36.9 °C) (07/05/22 1722)  Pulse: 84 (07/05/22 1722)  Resp: 18 (07/05/22 1722)  BP: (!) 149/67 (07/05/22 1722)  SpO2: 99 % (07/05/22 1722)   Vital Signs (24h Range):  Temp:  [97.5 °F (36.4 °C)-98.6 °F (37 °C)] 98.4 °F (36.9 °C)  Pulse:  [77-94] 84  Resp:  [16-18] 18  SpO2:  [97 %-100 %] 99 %  BP: (119-179)/(58-78) 149/67     Weight: 50.2 kg (110 lb 10.7 oz)  Body mass index is 22.35 kg/m².    Intake/Output Summary (Last 24 hours) at 7/5/2022 1843  Last data filed at 7/5/2022 1737  Gross per 24 hour   Intake 2879.3 ml   Output 750 ml   Net 2129.3 ml      Physical Exam    Significant Labs: All pertinent labs within the past 24 hours have been reviewed.  CBC:   Recent Labs   Lab 07/04/22  1142 07/05/22  0442   WBC 8.82 7.24   HGB 8.7* 7.7*   HCT 28.2* 24.8*   * 537*       Significant Imaging: I have reviewed all pertinent imaging results/findings within the past 24 hours.

## 2022-07-05 NOTE — PLAN OF CARE
Problem: Occupational Therapy  Goal: Occupational Therapy Goal  Description: Goals to be met by: 7/10/22    Patient will increase functional independence with ADLs by performing:    Rolling to Bilateral with Moderate Assistance  P/AAROM to maintain UE ROM/Strength.   Supine<>sit with Max A  Grooming at EOB with Max A  Maintain balance at EOB with Max A    Outcome: Ongoing, Progressing     Goals remain appropriate

## 2022-07-05 NOTE — PLAN OF CARE
Problem: Physical Therapy  Goal: Physical Therapy Goal  Description: Goals to be met by: 7/10/2022   Modified 22    Patient will increase functional independence with mobility by performin. Rolling to Left with Moderate Assistance.  2. Lower extremity exercise program x 10 reps per handout, with assistance as needed  3. Supine to sit with Maximum Assistance  4. Sit to supine with Maximum Assistance  5. Sitting x 10 minutes edge of bed with Maximum Assistance    Outcome: Ongoing, Progressing     Patient tolerated treatment well. Established POC and goals reviewed and updated to remain appropriate. Plan is to continue to improve patient's functional mobility capabilities.      Denise Darnell, MAMI  2022

## 2022-07-05 NOTE — PLAN OF CARE
"JAZMIN called sister Sabi [phone] and left a message at [phone] to discuss d/c pt home with [facility] and the delivery of the hospital bed.    SW went to pt room to see if sister Sabi was there.  Sister Sabi was there and SW spoke with her outside in the hallway.  Sister Sabi stated that everything was good for pt to return home and then she spoke with a doctor on the weekend who stated that pt could go to an LTAC.  SW reminded sister Sabi that pt could not go to an LTAC because of the trach and that herself and others had worked on finding a facility for awhile.  SW stated that a LTAC of SNF facility is only for a short time and pt would have to either go home after that or go to a retirement home.  SW stated that retirement care would be a cost for the family and any benefits pt receives would go to the facility.  Sabi stated that pt does not receive benefits and it would be hardship for the family to pay for care and they could not provide 24 hour care for pt at home.  JAZMIN asked if Sabi called any of the sitters on the list that was provided and Sabi stated she had not.  JAZMIN asked if she could check facilities further out of the community, "like [facility]" and sister Sabi stated she did not want pt to be that far away as she could not travel.      JAZMIN asked Sabi if she could contact someone who works in Hospice and ask them if they would provide 24 hour care at the home and what the criteria would be.  Sabi was agreeable.  JAZMIN stated she would contact someone in Hospice care and inquire about services and follow-up with .      JAZMIN called Hadley Brito with [facility] at [phone] and left a message at [phone] to inquire about care for pt's with home hospice.      Hadley Brito called SW back and stated that in order for pt to qualify for any hospice they would need a life expectancy of 6 months or less.  That home hospice is continuous " coverage for a pt and their family if the need arises but they do not have someone at the home 24/7; however staff can be available to help 24/7.        Yocasta Urbina CD, MSW, LMSW, RSW   Case Management  [facility]  Email: [email or web address]

## 2022-07-05 NOTE — ASSESSMENT & PLAN NOTE
Patient is DNR. LaPOST on file.     - Patient initially resistant to hospice d/t previous experience with [facility] inpatient hospice; after extensive conversation with MD, now amenable to exploring hospice (though does not desire home w/ hospice)  - CM/SW assisting; tracheostomy status complicating placement to [facility] w/ hospice.  - See Care Update note Zi-on MD Aramis on 06/29 for further detail.

## 2022-07-05 NOTE — PT/OT/SLP PROGRESS
"Physical Therapy Treatment    Patient Name:  Darlene Avila   MRN:  [id number]    Recommendations:   Discharge Recommendations:  nursing facility, skilled   Discharge Equipment Recommendations: none   Barriers to discharge: increased assist needed  Assessment:   Darlene Avila is a 71 y.o. female admitted with a medical diagnosis of Hypernatremia. She presents with the following impairments/functional limitations:  weakness, impaired endurance, impaired self care skills, impaired functional mobilty, gait instability, impaired balance, impaired cognition. Patient tolerated session fair. Patient distracted and fidgety with no command following. Patient is currently functioning below PLOF and would require increased assistance should they discharge home . Patient would continue to benefit from skilled PT services while in the hospital. At this time, upon d/c recommendation of SNF  in order for patient to progress towards an improved level of functional mobility independence.     Rehab Prognosis: Fair; patient would benefit from acute skilled PT services to address these deficits and reach maximum level of function.    Recent Surgery: Procedure(s) (LRB):  DEBRIDEMENT, WOUND, sacral ulcer (N/A) 11 Days Post-Op    Plan:     During this hospitalization, patient to be seen 2 x/week to address the identified rehab impairments via therapeutic activities, therapeutic exercises, neuromuscular re-education and progress toward the following goals:    · Plan of Care Expires:  07/26/22    Subjective     Chief Complaint: hungry  Patient/Family Comments/goals: "I need chicken thigh"  Pain/Comfort:  · Pain Rating 1: 0/10    Objective:   Communicated with RN prior to session.  Darlene Avila found HOB elevated with peripheral IV, central line, cabrera catheter, pulse ox (continuous), Tracheostomy, PEG Tube, oxygen (5L), pressure relief boots upon PT entry to room.     General Precautions: Standard, fall   Orthopedic Precautions:N/A "   Braces: N/A    Vitals:    22 1143   BP: 121/60   Pulse: 94   Resp: 18   Temp: 97.7 °F (36.5 °C)       Functional Mobility:  · Bed Mobility:    · Supine to Sit: total assistance of 2 persons  · Sit to Supine: total assistance of 2 persons  · Further mobility deferred due to poor sitting balance and no command following    Balance:    Level of assist Total Time  Comments   Static Sitting  MaxA Total EOB = 8 min    Dynamic Sitting  MaxA Total EOB = 8 min      AM-PAC 6 CLICK MOBILITY  Turning over in bed (including adjusting bedclothes, sheets and blankets)?: 2  Sitting down on and standing up from a chair with arms (e.g., wheelchair, bedside commode, etc.): 1  Moving from lying on back to sitting on the side of the bed?: 2  Moving to and from a bed to a chair (including a wheelchair)?: 1  Need to walk in hospital room?: 1  Climbing 3-5 steps with a railing?: 1  Basic Mobility Total Score: 8     Therapeutic Activities and Education:  -Patient educated on the continued role and goal of PT  -Questions and concerns answered within the the PT scope of practice.        - Patient encouraged to continue to complete mobility in safe range to prevent adverse risks associated with prolonged bed rest   -White board updated in patient room to reflect level of assistance needed with nursing.   -Patient is not clear to transfer with RN/PCT for mobility with RN    Darlene Avila left HOB elevated with all lines intact and call button in reach.    GOALS:   Multidisciplinary Problems     Physical Therapy Goals        Problem: Physical Therapy    Goal Priority Disciplines Outcome Goal Variances Interventions   Physical Therapy Goal     PT, PT/OT Ongoing, Progressing     Description: Goals to be met by: 7/10/2022   Modified 22    Patient will increase functional independence with mobility by performin. Rolling to Left with Moderate Assistance.  2. Lower extremity exercise program x 10 reps per handout, with assistance  as needed  3. Supine to sit with Maximum Assistance  4. Sit to supine with Maximum Assistance  5. Sitting x 10 minutes edge of bed with Maximum Assistance                     Time Tracking:     PT Received On: 07/05/22  PT Start Time: 0855     PT Stop Time: 0920  PT Total Time (min): 25 min     Billable Minutes: Therapeutic Activity 12 and Neuromuscular Re-education 13    Treatment Type: Treatment  PT/PTA: PT     PTA Visit Number: 0     Denise Darnell [facility]  07/05/2022

## 2022-07-05 NOTE — PROGRESS NOTES
Per medical team, it is not necessary to have patient on continuous pulse ox monitoring. Patient pulling at PEG tube, abdominal binder ordered. Ok'd with medical team.

## 2022-07-05 NOTE — ASSESSMENT & PLAN NOTE
Darlene Avila is a 71 year old F with history of diabetes type 2, seizure, ischemic CVA, IBS, history of breast cancer s/p right mastectomy and failure to thrive who was brought in by her HPOA (sister) for skilled nursing home placement due to concern for neglect at recent hospice facility. Patient follows very simple commands and doubt she can engage in skilled therapy. senior living nursing facility likely more of an option.     - Appreciate CM/SW assistance with placement; placement difficult d/t trach; LTAC placement not an option. Will continue to search for long-term care options for patient.   - Appreciate pharmacy's assistance with med rec from previous admission

## 2022-07-06 LAB
ANION GAP SERPL CALC-SCNC: 7 MMOL/L (ref 8–16)
BASOPHILS # BLD AUTO: 0.04 K/UL (ref 0–0.2)
BASOPHILS NFR BLD: 0.4 % (ref 0–1.9)
BUN SERPL-MCNC: 10 MG/DL (ref 8–23)
CALCIUM SERPL-MCNC: 8.6 MG/DL (ref 8.7–10.5)
CHLORIDE SERPL-SCNC: 108 MMOL/L (ref 95–110)
CO2 SERPL-SCNC: 21 MMOL/L (ref 23–29)
CREAT SERPL-MCNC: 0.6 MG/DL (ref 0.5–1.4)
DIFFERENTIAL METHOD: ABNORMAL
EOSINOPHIL # BLD AUTO: 0 K/UL (ref 0–0.5)
EOSINOPHIL NFR BLD: 0.4 % (ref 0–8)
ERYTHROCYTE [DISTWIDTH] IN BLOOD BY AUTOMATED COUNT: 19.5 % (ref 11.5–14.5)
EST. GFR  (AFRICAN AMERICAN): >60 ML/MIN/1.73 M^2
EST. GFR  (NON AFRICAN AMERICAN): >60 ML/MIN/1.73 M^2
GLUCOSE SERPL-MCNC: 80 MG/DL (ref 70–110)
HCT VFR BLD AUTO: 25 % (ref 37–48.5)
HGB BLD-MCNC: 7.9 G/DL (ref 12–16)
IMM GRANULOCYTES # BLD AUTO: 0.02 K/UL (ref 0–0.04)
IMM GRANULOCYTES NFR BLD AUTO: 0.2 % (ref 0–0.5)
LYMPHOCYTES # BLD AUTO: 2.9 K/UL (ref 1–4.8)
LYMPHOCYTES NFR BLD: 29.1 % (ref 18–48)
MAGNESIUM SERPL-MCNC: 1.7 MG/DL (ref 1.6–2.6)
MCH RBC QN AUTO: 27.4 PG (ref 27–31)
MCHC RBC AUTO-ENTMCNC: 31.6 G/DL (ref 32–36)
MCV RBC AUTO: 87 FL (ref 82–98)
MONOCYTES # BLD AUTO: 0.4 K/UL (ref 0.3–1)
MONOCYTES NFR BLD: 3.9 % (ref 4–15)
NEUTROPHILS # BLD AUTO: 6.6 K/UL (ref 1.8–7.7)
NEUTROPHILS NFR BLD: 66 % (ref 38–73)
NRBC BLD-RTO: 0 /100 WBC
PHOSPHATE SERPL-MCNC: 2.2 MG/DL (ref 2.7–4.5)
PLATELET # BLD AUTO: 534 K/UL (ref 150–450)
PMV BLD AUTO: 9.1 FL (ref 9.2–12.9)
POCT GLUCOSE: 110 MG/DL (ref 70–110)
POCT GLUCOSE: 120 MG/DL (ref 70–110)
POCT GLUCOSE: 176 MG/DL (ref 70–110)
POTASSIUM SERPL-SCNC: 4.6 MMOL/L (ref 3.5–5.1)
RBC # BLD AUTO: 2.88 M/UL (ref 4–5.4)
SODIUM SERPL-SCNC: 136 MMOL/L (ref 136–145)
WBC # BLD AUTO: 9.93 K/UL (ref 3.9–12.7)

## 2022-07-06 PROCEDURE — 84100 ASSAY OF PHOSPHORUS: CPT

## 2022-07-06 PROCEDURE — 80048 BASIC METABOLIC PNL TOTAL CA: CPT | Performed by: HOSPITALIST

## 2022-07-06 PROCEDURE — 25000003 PHARM REV CODE 250

## 2022-07-06 PROCEDURE — 99900035 HC TECH TIME PER 15 MIN (STAT)

## 2022-07-06 PROCEDURE — 25000003 PHARM REV CODE 250: Performed by: STUDENT IN AN ORGANIZED HEALTH CARE EDUCATION/TRAINING PROGRAM

## 2022-07-06 PROCEDURE — 12000002 HC ACUTE/MED SURGE SEMI-PRIVATE ROOM

## 2022-07-06 PROCEDURE — 99232 PR SUBSEQUENT HOSPITAL CARE,LEVL II: ICD-10-PCS | Mod: ,,, | Performed by: HOSPITALIST

## 2022-07-06 PROCEDURE — 27000221 HC OXYGEN, UP TO 24 HOURS

## 2022-07-06 PROCEDURE — 36415 COLL VENOUS BLD VENIPUNCTURE: CPT | Performed by: HOSPITALIST

## 2022-07-06 PROCEDURE — 63600175 PHARM REV CODE 636 W HCPCS: Performed by: STUDENT IN AN ORGANIZED HEALTH CARE EDUCATION/TRAINING PROGRAM

## 2022-07-06 PROCEDURE — 99900026 HC AIRWAY MAINTENANCE (STAT)

## 2022-07-06 PROCEDURE — 27200966 HC CLOSED SUCTION SYSTEM

## 2022-07-06 PROCEDURE — 99232 SBSQ HOSP IP/OBS MODERATE 35: CPT | Mod: ,,, | Performed by: HOSPITALIST

## 2022-07-06 PROCEDURE — 85025 COMPLETE CBC W/AUTO DIFF WBC: CPT

## 2022-07-06 PROCEDURE — 63600175 PHARM REV CODE 636 W HCPCS

## 2022-07-06 PROCEDURE — 83735 ASSAY OF MAGNESIUM: CPT

## 2022-07-06 PROCEDURE — 94761 N-INVAS EAR/PLS OXIMETRY MLT: CPT

## 2022-07-06 RX ORDER — PEN NEEDLE, DIABETIC 30 GX3/16"
NEEDLE, DISPOSABLE MISCELLANEOUS
Qty: 100 EACH | Refills: 2 | Status: SHIPPED | OUTPATIENT
Start: 2022-07-06

## 2022-07-06 RX ADMIN — ATORVASTATIN CALCIUM 80 MG: 20 TABLET, FILM COATED ORAL at 08:07

## 2022-07-06 RX ADMIN — INSULIN DETEMIR 10 UNITS: 100 INJECTION, SOLUTION SUBCUTANEOUS at 09:07

## 2022-07-06 RX ADMIN — LEVETIRACETAM 750 MG: 100 SOLUTION ORAL at 08:07

## 2022-07-06 RX ADMIN — NYSTATIN OINTMENT: 100000 OINTMENT TOPICAL at 08:07

## 2022-07-06 RX ADMIN — SODIUM BICARBONATE 650 MG TABLET 650 MG: at 08:07

## 2022-07-06 RX ADMIN — PIPERACILLIN SODIUM AND TAZOBACTAM SODIUM 4.5 G: 4; .5 INJECTION, POWDER, LYOPHILIZED, FOR SOLUTION INTRAVENOUS at 01:07

## 2022-07-06 RX ADMIN — CHOLESTYRAMINE 8 G: 4 POWDER, FOR SUSPENSION ORAL at 08:07

## 2022-07-06 RX ADMIN — PIPERACILLIN SODIUM AND TAZOBACTAM SODIUM 4.5 G: 4; .5 INJECTION, POWDER, LYOPHILIZED, FOR SOLUTION INTRAVENOUS at 05:07

## 2022-07-06 RX ADMIN — HEPARIN SODIUM 5000 UNITS: 5000 INJECTION INTRAVENOUS; SUBCUTANEOUS at 08:07

## 2022-07-06 RX ADMIN — NYSTATIN OINTMENT: 100000 OINTMENT TOPICAL at 09:07

## 2022-07-06 RX ADMIN — CHOLESTYRAMINE 8 G: 4 POWDER, FOR SUSPENSION ORAL at 10:07

## 2022-07-06 NOTE — ASSESSMENT & PLAN NOTE
Patient is DNR. LaPOST on file.     - CM/SW assisting; tracheostomy status complicating placement to jail [facility]/ [facility].  - See Care Update note Zi-on MD Aramis on 06/29 for further detail.

## 2022-07-06 NOTE — RESPIRATORY THERAPY
RAPID RESPONSE RESPIRATORY THERAPY PROACTIVE NOTE           Time of visit: 910    Code Status: DNR   : 1950  Bed: 93249/79280 A:   MRN: [id number]  Time spent at the bedside: < 15 min    SITUATION    Evaluated patient for: LDA Check     BACKGROUND    Patient has a past medical history of Arthritis, Cancer of breast, Cataract, Diabetes mellitus, Hypertension, Hypoxia, Memory loss, Orthostatic hypotension, TIA (transient ischemic attack), and Vitamin D deficiency.  Clinically Significant Surgical Hx: tracheostomy    24 Hours Vitals Range:  Temp:  [97.7 °F (36.5 °C)-98.8 °F (37.1 °C)]   Pulse:  [84-94]   Resp:  [14-18]   BP: (121-149)/(60-67)   SpO2:  [97 %-100 %]     Labs:    Recent Labs     22  1833 22  0521 22  0442 22  0426    136  --  136   K  --  4.6  --  4.6   CL  --  110  --  108   CO2  --  17*  --  21*   CREATININE  --  0.6  --  0.6   GLU  --  209*  --  80   PHOS  --  2.0* 2.1* 2.2*   MG  --  1.7 1.8 1.7        No results for input(s): PH, PCO2, PO2, HCO3, POCSATURATED, BE in the last 72 hours.    ASSESSMENT/INTERVENTIONS    Upon arrival in room all trach supplies are at bedside.  Extra trachs at bedside include 5.0 and 6.0 shiley both cuffed    Last VS   Temp: 98.8 °F (37.1 °C) (838)  Pulse: 91 (838)  Resp: 18 (838)  BP: 146/63 (838)  SpO2: 100 % (838)    Level of Consciousness: Level of Consciousness (AVPU): responds to voice  Respiratory Effort: Respiratory Effort: Unlabored Expansion/Accessory Muscle Usage: Expansion/Accessory Muscles/Retractions: no retractions  All Lung Field Breath Sounds: All Lung Fields Breath Sounds: diminished  IGNACIO Breath Sounds: diminished  LLL Breath Sounds: diminished  RUL Breath Sounds: diminished  RML Breath Sounds: diminished  RLL Breath Sounds: diminished  O2 Device/Concentration: Flow (L/min): 5, Oxygen Concentration (%): 21, O2 Device (Oxygen Therapy): tracheostomy collar  Surgical airway: Yes, Type:  Ric Size: 6, uncuffed  Ambu at bedside: Ambu bag with the patient?: Yes, Adult Ambu     Active Orders   Respiratory Care    Oxygen Continuous     Frequency: Continuous     Number of Occurrences: Until Specified     Order Questions:      Device type: Low flow      Device: Trach Collar      FiO2%: 21      Titrate O2 per Oxygen Titration Protocol: Yes      To maintain SpO2 goal of: >= 90%      Notify MD of: Inability to achieve desired SpO2; Sudden change in patient status and requires 20% increase in FiO2; Patient requires >60% FiO2    Pulse Oximetry Q4H     Frequency: Q4H     Number of Occurrences: Until Specified    Routine tracheostomy care     Frequency: BID     Number of Occurrences: Until Specified    SUCTION PRN     Frequency: PRN     Number of Occurrences: Until Specified       RECOMMENDATIONS    We recommend: RRT Recs: Continue POC per primary team.    ESCALATION        FOLLOW-UP    Please call back the Rapid Response RT, Chetna Puckett, RRT at x [phone] for any questions or concerns.

## 2022-07-06 NOTE — PLAN OF CARE
JAZMIN received call from sister Sabi this morning and sister was asking about shelter nursing home placement.  SW reminded sister that it would be out of pocket and that SNF or LTAC placement is only temporary and sister would have to have a plan for pt when after SNF or LTAC.      Sister Sabi asked about placement in [place], and SW reminded sister Sabi that she had tried that previously to no avail and could try again.  SW asked sister Sabi if she would consider placement further away and Sabi stated she would not.  JAZMIN stated that Sabi may only have two options: a placement further away or taking pt home.  Sabi stated she would not do either.  Sabi stated it was up to the SW to find a placement close to home for pt.  SW again reminded Sabi that she had tried previously and would try again but Sabi needed to be aware that it might not happen and she would have to consider the alternatives.      JAZMIN placed new shelter [facility] referrals via [facility] and will continue to follow-up with Sabi and pt d/c      Yocasta Urbina CD, MSW, LMSW, RSW   Case Management  [facility]  Email: [email or web address]

## 2022-07-06 NOTE — PLAN OF CARE
JAZMIN received a message from Augusta with [facility] regarding referral in [facility] for shelter placement of pt.      JAZMIN returned call to Augusta [phone].  Augusta stated that would look further at pt referral and asked if pt had second dose of COVID-19 vaccination. Pt does not have second does of vaccination.  Augusta stated that because she does not have second dose she would need to be in isolation and they do not have any isolation beds available.  JAZMIN asked if pt received the second dose today could they admit.  Augusta stated pt would have to isolate for 10 days after the second dose.  Augusta further stated that they have a waitlist and asked if SW wanted to place pt on waitlist.  JAZMIN affirmed to put pt on waitlist for shelter placement.  Augusta stated that pt would need to complete the medicaid application and she was not clear how long the waitlist would be for pt admittance.      JAZMIN followed-up with sister Sabi and asked if [place] would be ok for traveling.  JAZMIN stated that pt is on the waitlist for the facility and she would need her second dose of the COVID-19 vaccine.  JAZMIN asked Sabi if it would be ok if JAZMIN asked the attending if they could give the pt the second dose of the vaccine.  Sabi stated that would be fine.  JAZMIN stated that pt would be on the wait list for the facility.      JAZMIN followed up with the medicaid application process with Sabi.  Sabi stated that someone had come by pt room and asked some questions and told her that they would submit the information to medicaid.      JAZMIN followed up with medical team and advised the team of the potential placement in [place] and the conversation with sister Sabi about the second dose of the COVID vaccine.        Yocasta Urbina, JOANIE, MSW, LMSW, RSW   Case Management  [facility]  Email: [email or web address]

## 2022-07-06 NOTE — PROGRESS NOTES
[facility] - Intensive Care ([place] [id number])  [facility] Medicine  Progress Note    Patient Name: Darlene Avila  MRN: [id number]  Patient Class: IP- Inpatient   Admission Date: 6/17/2022  Length of Stay: 17 days  Attending Physician: Yvonne Hills MD  Primary Care Provider: Kirill Cabrera Iii, MD        Subjective:     Principal Problem:Hypernatremia        HPI:  Darlene Avila is a 71 year old F with history of diabetes type 2, seizure c/b anoxic brain injury s/p trach, PEG, bed bound status, Stage IV sacral ulcer, ischemic CVA, IBS, history of breast cancer s/p right mastectomy and failure to thrive who was brought in by her HPOA (sister) for nursing home placement. Patient is unable to provide history.     Patient's sister was contacted who stated that the patient was admitted to [facility] in [facility] for recurrent seizures/status epilepticus which resulted in an anoxic brain injury and respiratory failure.  Patient was intubated followed by tracheostomy and PEG placement .  Patient was discharged to nursing home and subsequently discharged to hospice care at the request of patient's sister. However a few days ago, she visited the patient at [facility] recovery facility and noticed they weren't feeding or taking care of the patient due to her 'hospice status'. Per sister today, she would like to place the patient in a skilled nursing facility and would like to rescind her hospice care at this time.  She is no longer interested in hospice placement. She states the patient at baseline is only able to follow some simple commands. She is unsure how much O2 level is her baseline via trach collar.   Of note she has a Stage IV sacral wound that is s/p debridement on 06/09 by Gen surgery in [facility].     Upon arrival to the ED, the patient was hemodynamically stable. Labs revealed Na 157, WBC 14. Hgb 9.4 (baseline 7-9). The patient was given 1L of IV NS and was admitted to hospital medicine for further management.        Overview/Hospital Course:  Patient admitted to hospital medicine with pre-existing anoxic brain injury, hypernatremia, sacral decubitus wound s/p debridement 06/08 in [place], [place]. Patient initially admitted to hospice following discharge from [facility] ICU, but discharged from hospice as patient family believed she was not getting appropriate care. Na 157, corrected with IVF (NS and hypotonic solutions) and enteral FWF down to 148. Trach collar exchanged by ENT due to lack of supplies at facility. Wound care consulted for sacral decubitus, recommended General Surgery consult for possible debridement as wound appears purulent. Persistent leukocytosis, started Zosyn per previous I&D wound cultures growing bacteriodes/enterococcus and new wound findings. cEEG started and Epilepsy consulted for assistance with antiepileptic medications; conflicting reports if patient was on only Keppra/Lacosamide vs Keppra/Lacosamide/Phenytoin. General surgery consulted for debridement of sacral decubitus ulcer, s/p I&D 06/24. Palliative Care consulted for assistance in GOC; decision to pursue medical/surgical care decided. ENT consulted for possible trach decannulation. ID consulted, planning for 6-weeks course of zosyn.Sodium improved; will continue free water flushes.     Patient remains with trach on RA. Mentation waxes and wanes. She is more drowsy today.    CM/SW to assist with dispo. Due to trach placement, residential placement for SNF limited; LTAC is also not an alternative. Long discussion with patient's sister about long term care options for patient. Sister states that she is unable to provide patient the care she needs at home. SW spoke to patient's sister and reports that she is agreeable to placing patient at [facility]. [facility] will put patient on the wait list.       Interval History: KATIEON. AF. Closely working with patient's sister and SW on long term placement for patient.     Review of  Systems   Unable to perform ROS: Patient nonverbal   Constitutional:  Positive for fatigue.   Respiratory:  Negative for chest tightness and shortness of breath.    Cardiovascular:  Negative for chest pain.   Gastrointestinal:  Negative for abdominal pain.   Objective:     Vital Signs (Most Recent):  Temp: 99.3 °F (37.4 °C) (07/06/22 1518)  Pulse: 92 (07/06/22 1518)  Resp: 17 (07/06/22 1518)  BP: (!) 145/61 (07/06/22 1518)  SpO2: 100 % (07/06/22 1518)   Vital Signs (24h Range):  Temp:  [98 °F (36.7 °C)-99.3 °F (37.4 °C)] 99.3 °F (37.4 °C)  Pulse:  [84-94] 92  Resp:  [14-18] 17  SpO2:  [97 %-100 %] 100 %  BP: (134-151)/(61-68) 145/61     Weight: 50.2 kg (110 lb 10.7 oz)  Body mass index is 22.35 kg/m².    Intake/Output Summary (Last 24 hours) at 7/6/2022 1556  Last data filed at 7/6/2022 1052  Gross per 24 hour   Intake 1864.29 ml   Output 2500 ml   Net -635.71 ml      Physical Exam  Vitals and nursing note reviewed.   Constitutional:       General: She is not in acute distress.     Appearance: She is not ill-appearing or toxic-appearing.   HENT:      Head: Normocephalic.      Mouth/Throat:      Mouth: Mucous membranes are dry.      Comments: Dried oral secretions adhered to tongue  Neck:      Comments: Trach in place and secured  Cardiovascular:      Rate and Rhythm: Normal rate and regular rhythm.      Pulses: Normal pulses.      Heart sounds: Normal heart sounds. No murmur heard.  Pulmonary:      Effort: Pulmonary effort is normal. No respiratory distress.      Breath sounds: Normal breath sounds. No wheezing or rales.   Abdominal:      General: Abdomen is flat. Bowel sounds are normal. There is no distension.      Palpations: Abdomen is soft.      Tenderness: There is no abdominal tenderness.   Musculoskeletal:         General: No swelling or tenderness. Normal range of motion.      Cervical back: Normal range of motion.   Skin:     General: Skin is warm.      Coloration: Skin is not jaundiced or pale.       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    Neurological:      Mental Status: She is alert.      Comments: Intermittently alert, minimally responsive   Psychiatric:      Comments: Unable to assess       Significant Labs: All pertinent labs within the past 24 hours have been reviewed.  CBC:   Recent Labs   Lab 07/05/22  0442 07/06/22  0426   WBC 7.24 9.93   HGB 7.7* 7.9*   HCT 24.8* 25.0*   * 534*     CMP:   Recent Labs   Lab 07/06/22 0426      K 4.6      CO2 21*   GLU 80   BUN 10   CREATININE 0.6   CALCIUM 8.6*   ANIONGAP 7*   EGFRNONAA >60.0       Significant Imaging: I have reviewed all pertinent imaging results/findings within the past 24 hours.      Assessment/Plan:      * Hypernatremia  STABLE, WNL    Na stable today.   - Free water flushes      Encephalopathy  Mentation waxes and wanes. She is more drowsy today.     Goals of care, counseling/discussion  Refer to palliative care note.      Palliative care encounter  Per Primary Medicine team and Palliative Care encounters with patient family (separate encounters, see dated notes in chart), patient family wishes full medical care and does not desire comfort/hospice measures. DNR remains. See Pall Care note for further detail.    Seizures  Patient previously started on Lacosamide and Keppra at OSH during initial episode of status epilepticus. Phenytoin added as patient continued to have breakthrough seizures.    - Repeat EEG ordered 2/2 interval decrease in alert/responsiveness; diffuse toxic metabolic encephalopathy, no new seizure per Neuro EP  - Continue Keppra monotherapy 750 bid; titrate per neuro recs  - Neurology consulted for further assistance in antiepileptic regimen    G tube feedings  Tube feed dependent via G tube  - Nutrition to assist with TF recs; see recommendations in note; appreciate assistance      Tracheostomy in place  Dysarthria  Dysphagia    Trach capped, good O2 sats on RA     - Respiratory  therapy to assist with trach care.   - SLP for swallow evaluation and speech therapy; recommending NPO, re-evaluation, PMSV under direct supervision  - ENT advising to keep tracheostomy in place due to intermittent responsiveness; amenable to decannulation if patient family moves towards hospice/comfort measures    Sacral decubitus ulcer, stage IV  S/p recent debridement at OSH 06/08. OSH Wcx with enterococcus faecalis, bacteriodes fragilus.    - Wound care consulted; appreciate assistance  - Deep wound cx  - General surgery consulted s/p debridement 06/24, no note of bone involvement per OP note, no cultures sent  - Infectious Disease consulted, zosyn for 6-weeks (End date is 8/5)  - Consideration for wound vac given depth of injury  - Turn q2h    Leukocytosis  Patient admitted with elevated WBC to 14s. Previous episodes of aspiration PNA at OSH, indwelling cabrera given acuity of condition, sacral decubitus ulcer stave IV s/p debridement with enterococcus faecalis / bacteriodes fragilis in cx. Patient afebrile with stable hemodynamics on admission at this time. CXR without acute abnormality. UA with yeast growing, chronic per previous OSH review.    Leukocytosis may be explained by soft tissue infection vs hemoconcentration given extensive dehydration and hypernatremia from lack of enteral fluids or IVF at outside hospice facility prior [place] [facility]     - Zosyn given previous cultures and persistent leukocytosis  - Etiology may be soft tissue infection; see Sacral Decubitus Ulcer A/P  - Continue monitoring CBC  - Wound care per Sacral Decubitus Ulcer A/P  - Aspiration precautions  - Trend fever curve  - Trend BCx    Discharge planning issues  Darlene Avila is a 71 year old F with history of diabetes type 2, seizure, ischemic CVA, IBS, history of breast cancer s/p right mastectomy and failure to thrive who was brought in by her OA (sister) for skilled nursing home placement due to concern for neglect at  recent hospice facility. Patient follows very simple commands and doubt she can engage in skilled therapy. skilled nursing nursing facility likely more of an option.     - Appreciate CM/SW assistance with placement; placement difficult d/t trach; LTAC placement not an option. Patient is now on the wait list for placement at [facility].   - Appreciate pharmacy's assistance with med rec from previous admission    ACP (advance care planning)  Patient is DNR. LaPOST on file.     - CM/SW assisting; tracheostomy status complicating placement to halfway w/ hospice.  - See Care Update note Zi-lona Self MD on 06/29 for further detail.    History of CVA (cerebrovascular accident)  Unclear of patient's current medications. Per med review on care-everywhere, the patient is on statin but not on antiplatelet therapy.     - Appreciate pharmacy's assistance with medication reconciliation with facility- start antiplatelet therapy if no contraindications.   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 A1c repeated 6.9.    - LDSSI   - POCT glucose q6hrs  - Maintain -180  - Titrate basal/bolus regimen to goal as above.      VTE Risk Mitigation (From admission, onward)         Ordered     heparin (porcine) injection 5,000 Units  Every 12 hours         06/18/22 0355     IP VTE HIGH RISK PATIENT  Once         06/18/22 0355     Place sequential compression device  Until discontinued         06/18/22 0355                Discharge Planning   JUDIT: 7/8/2022     Code Status: DNR   Is the patient medically ready for discharge?: Yes    Reason for patient still in hospital (select all that apply): Patient trending condition  Discharge Plan A: New Nursing Home placement - halfway care facility   Discharge Delays: (!) Patient and Family Barriers (pt  high level of needs and accepting facilities)              Maria L Mathew DO  Department of Hospital Medicine   [facility] - Intensive Care ([place])

## 2022-07-06 NOTE — PT/OT/SLP PROGRESS
Speech Language Pathology      Darlene Avila  MRN: [id number]    Patient not seen today secondary to poor levels of alertness. Pt unable to be roused from sleep despite ongoing MAX multi-modality stimulation and persistent sternal rubbing. Will follow-up per SLP POC.      7/6/2022

## 2022-07-06 NOTE — SUBJECTIVE & OBJECTIVE
Interval History: NAEON. AF. Closely working with patient's sister and SW on long term placement for patient.     Review of Systems   Unable to perform ROS: Patient nonverbal   Constitutional:  Positive for fatigue.   Respiratory:  Negative for chest tightness and shortness of breath.    Cardiovascular:  Negative for chest pain.   Gastrointestinal:  Negative for abdominal pain.   Objective:     Vital Signs (Most Recent):  Temp: 99.3 °F (37.4 °C) (07/06/22 1518)  Pulse: 92 (07/06/22 1518)  Resp: 17 (07/06/22 1518)  BP: (!) 145/61 (07/06/22 1518)  SpO2: 100 % (07/06/22 1518)   Vital Signs (24h Range):  Temp:  [98 °F (36.7 °C)-99.3 °F (37.4 °C)] 99.3 °F (37.4 °C)  Pulse:  [84-94] 92  Resp:  [14-18] 17  SpO2:  [97 %-100 %] 100 %  BP: (134-151)/(61-68) 145/61     Weight: 50.2 kg (110 lb 10.7 oz)  Body mass index is 22.35 kg/m².    Intake/Output Summary (Last 24 hours) at 7/6/2022 1556  Last data filed at 7/6/2022 1052  Gross per 24 hour   Intake 1864.29 ml   Output 2500 ml   Net -635.71 ml      Physical Exam  Vitals and nursing note reviewed.   Constitutional:       General: She is not in acute distress.     Appearance: She is not ill-appearing or toxic-appearing.   HENT:      Head: Normocephalic.      Mouth/Throat:      Mouth: Mucous membranes are dry.      Comments: Dried oral secretions adhered to tongue  Neck:      Comments: Trach in place and secured  Cardiovascular:      Rate and Rhythm: Normal rate and regular rhythm.      Pulses: Normal pulses.      Heart sounds: Normal heart sounds. No murmur heard.  Pulmonary:      Effort: Pulmonary effort is normal. No respiratory distress.      Breath sounds: Normal breath sounds. No wheezing or rales.   Abdominal:      General: Abdomen is flat. Bowel sounds are normal. There is no distension.      Palpations: Abdomen is soft.      Tenderness: There is no abdominal tenderness.   Musculoskeletal:         General: No swelling or tenderness. Normal range of motion.      Cervical  back: Normal range of motion.   Skin:     General: Skin is warm.      Coloration: Skin is not jaundiced or pale.      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    Neurological:      Mental Status: She is alert.      Comments: Intermittently alert, minimally responsive   Psychiatric:      Comments: Unable to assess       Significant Labs: All pertinent labs within the past 24 hours have been reviewed.  CBC:   Recent Labs   Lab 07/05/22  0442 07/06/22  0426   WBC 7.24 9.93   HGB 7.7* 7.9*   HCT 24.8* 25.0*   * 534*     CMP:   Recent Labs   Lab 07/06/22  0426      K 4.6      CO2 21*   GLU 80   BUN 10   CREATININE 0.6   CALCIUM 8.6*   ANIONGAP 7*   EGFRNONAA >60.0       Significant Imaging: I have reviewed all pertinent imaging results/findings within the past 24 hours.

## 2022-07-06 NOTE — ASSESSMENT & PLAN NOTE
Patient admitted with elevated WBC to 14s. Previous episodes of aspiration PNA at OSH, indwelling cabrera given acuity of condition, sacral decubitus ulcer stave IV s/p debridement with enterococcus faecalis / bacteriodes fragilis in cx. Patient afebrile with stable hemodynamics on admission at this time. CXR without acute abnormality. UA with yeast growing, chronic per previous OSH review.    Leukocytosis may be explained by soft tissue infection vs hemoconcentration given extensive dehydration and hypernatremia from lack of enteral fluids or IVF at outside hospice facility [place] [facility]     - Zosyn given previous cultures and persistent leukocytosis  - Etiology may be soft tissue infection; see Sacral Decubitus Ulcer A/P  - Continue monitoring CBC  - Wound care per Sacral Decubitus Ulcer A/P  - Aspiration precautions  - Trend fever curve  - Trend BCx

## 2022-07-06 NOTE — PLAN OF CARE
07/06/22 0856   Post-Acute Status   Post-Acute Authorization Placement   Post-Acute Placement Status Referrals Sent   Discharge Delays (!) Patient and Family Barriers  (pt high level of needs and accepting facilities)   Discharge Plan   Discharge Plan A New Nursing Home placement - group home care facility   Discharge Plan B New Nursing Home placement - group home care facility       JAZMIN sent referrals via careport for group home care    JAZMIN called [facility] [phone] and spoke with Mona.  JAZMIN asked [facility] how to send a referral to facility.  [facility] asked SW to fax referral to their SW Jenifer Robertson [phone].  JAZMIN faxed referral to Jenifer at [facility]      Yocasta Urbina, JOANIE, MSW, LMSW, RSW   Case Management  [facility]  Email: [email or web address]

## 2022-07-06 NOTE — PLAN OF CARE
Recommendations    1. Increase TF to Glucerna 1.5 @ 45ml/hr to provide 1620 kcal, 89g protein, and 820ml fluid. Additional FW per MD.      2. Add Frank BID for wound healing    - Mix Frank with 120ml of water until all particles dissolved and administer via PEG. Water flush before and after administer.     3. RD following    Goals: Pt to receive nutrition by RD follow up  Nutrition Goal Status: goal met  Communication of RD Recs:  (POC)
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
## 2022-07-06 NOTE — PROGRESS NOTES
[facility] - Intensive Care ([place] [id number])  Adult Nutrition  Progress Note    SUMMARY       Recommendations    1. Increase TF to Glucerna 1.5 @ 45ml/hr to provide 1620 kcal, 89g protein, and 820ml fluid. Additional FW per MD.      2. Add Frank BID for wound healing    - Mix Frank with 120ml of water until all particles dissolved and administer via PEG. Water flush before and after administer.     3. RD following    Goals: Pt to receive nutrition by RD follow up  Nutrition Goal Status: goal met  Communication of RD Recs:  (POC)    Assessment and Plan    Severe malnutrition  Nutrition Problem  Severe Protein-Calorie Malnutrition  Malnutrition in the context of Chronic Illness    Related to (etiology):   Inadequate energy intake    Signs and Symptoms (as evidenced by):   Energy Intake: < 75% of estimated energy requirement for >3 moths  Body Fat Depletion: Severe depletion of orbital, triceps   Muscle Mass Depletion: Severe depletion of temples, clavicle region, interosseous muscle    Interventions/Recommendations (treatment strategy):  Collaboration of nutrition care with other providers    Nutrition Diagnosis Status:   New    Malnutrition Assessment  Energy Intake (Malnutrition): less than 75% for greater than or equal to 3 months   Orbital Region (Subcutaneous Fat Loss): severe depletion  Upper Arm Region (Subcutaneous Fat Loss): severe depletion   Faith Region (Muscle Loss): severe depletion  Clavicle Bone Region (Muscle Loss): severe depletion  Clavicle and Acromion Bone Region (Muscle Loss): severe depletion  Dorsal Hand (Muscle Loss): severe depletion     Reason for Assessment    Reason For Assessment: RD follow-up  Diagnosis: other (see comments) (discharge planning issues)  Relevant Medical History: T2DM, seizures c/b anoxic brain injury, s/p trach/PEG, bed bound, stage IV sacral ulcer, IBS  Interdisciplinary Rounds: did not attend    General Information Comments:     Pt with trach/PEG. TF running at 35ml/hr.  "Pt with stage 4 sacral decubitus ulcer. Pt was asleep during RD visits. Spoken to family at bedside. Family stated Pt has been on TF since May, PO intake was poor before that due to swallowing difficulty. Family report pt with irreversible bowel syndrome, has been having diarrhea. Family complaint that no one came to turn pt for wound care. Unsure of UBW. Pt diagnosed with malnutrition 8 months ago, since then with 25# wt gain. UBW per 8 months ago was #s. NFPE completed today, noted severe fat and muscle wasting. Pt meet the criteria for severe malnutrition in the context of chronic illness.    Nutrition Discharge Planning: Pending clinical course    Nutrition Risk Screen    Nutrition Risk Screen: tube feeding or parenteral nutrition    Nutrition/Diet History    Spiritual, Cultural Beliefs, Christianity Practices, Values that Affect Care: no  Food Allergies: NKFA  Factors Affecting Nutritional Intake: NPO    Anthropometrics    Temp: 98.7 °F (37.1 °C)  Height Method: Stated  Height: 4' 11" (149.9 cm)  Height (inches): 59 in  Weight Method: Bed Scale  Weight: 50.2 kg (110 lb 10.7 oz)  Weight (lb): 110.67 lb  Ideal Body Weight (IBW), Female: 95 lb  % Ideal Body Weight, Female (lb): 116.49 %  BMI (Calculated): 22.3     Lab/Procedures/Meds    Pertinent Labs Reviewed: reviewed  Pertinent Labs Comments: -  Pertinent Medications Reviewed: reviewed  Pertinent Medications Comments: insulin detemir, Na bicarbonate, heparin    Estimated/Assessed Needs    Weight Used For Calorie Calculations: 50.2 kg (110 lb 10.7 oz)  Energy Calorie Requirements (kcal): 1506-1757kcal  Energy Need Method: Kcal/kg (30-35)  Protein Requirements: 60-75g (1.2-1.5g/kg)  Weight Used For Protein Calculations: 50.2 kg (110 lb 10.7 oz)  Fluid Requirements (mL): 1ml/kcal or per MD  RDA Method (mL): 1506  CHO Requirement: 157g    Nutrition Prescription Ordered    Current Diet Order: NPO  Current Nutrition Support Formula Ordered: Glucerna 1.5  Current " Nutrition Support Rate Ordered: 35 (ml)  Current Nutrition Support Frequency Ordered: ml/hr    Evaluation of Received Nutrient/Fluid Intake    Enteral Calories (kcal): 1260  Enteral Protein (gm): 69  Enteral (Free Water) Fluid (mL): 647  % Kcal Needs: 84%  % Protein Needs: 100%  I/O: +13L since 6/22  Energy Calories Required: meeting needs  Protein Required: meeting needs  Comments: LBM  7/6  Tolerance: tolerating    Nutrition Risk    Level of Risk/Frequency of Follow-up: low     Monitor and Evaluation    Food and Nutrient Intake: enteral nutrition intake  Food and Nutrient Adminstration: enteral and parenteral nutrition administration  Knowledge/Beliefs/Attitudes: food and nutrition knowledge/skill, beliefs and attitudes  Physical Activity and Function: nutrition-related ADLs and IADLs  Anthropometric Measurements: weight, weight change, body mass index  Biochemical Data, Medical Tests and Procedures: electrolyte and renal panel, lipid profile, gastrointestinal profile, glucose/endocrine profile, inflammatory profile  Nutrition-Focused Physical Findings: overall appearance, extremities, muscles and bones     Nutrition Follow-Up    RD Follow-up?: Yes    Janina ALBERT-OLE

## 2022-07-07 LAB
BASOPHILS # BLD AUTO: 0.05 K/UL (ref 0–0.2)
BASOPHILS NFR BLD: 0.4 % (ref 0–1.9)
DIFFERENTIAL METHOD: ABNORMAL
EOSINOPHIL # BLD AUTO: 0 K/UL (ref 0–0.5)
EOSINOPHIL NFR BLD: 0.4 % (ref 0–8)
ERYTHROCYTE [DISTWIDTH] IN BLOOD BY AUTOMATED COUNT: 19.8 % (ref 11.5–14.5)
HCT VFR BLD AUTO: 24.7 % (ref 37–48.5)
HGB BLD-MCNC: 7.9 G/DL (ref 12–16)
IMM GRANULOCYTES # BLD AUTO: 0.05 K/UL (ref 0–0.04)
IMM GRANULOCYTES NFR BLD AUTO: 0.4 % (ref 0–0.5)
LYMPHOCYTES # BLD AUTO: 3.3 K/UL (ref 1–4.8)
LYMPHOCYTES NFR BLD: 29.5 % (ref 18–48)
MAGNESIUM SERPL-MCNC: 1.7 MG/DL (ref 1.6–2.6)
MCH RBC QN AUTO: 27.9 PG (ref 27–31)
MCHC RBC AUTO-ENTMCNC: 32 G/DL (ref 32–36)
MCV RBC AUTO: 87 FL (ref 82–98)
MONOCYTES # BLD AUTO: 0.5 K/UL (ref 0.3–1)
MONOCYTES NFR BLD: 4.5 % (ref 4–15)
NEUTROPHILS # BLD AUTO: 7.3 K/UL (ref 1.8–7.7)
NEUTROPHILS NFR BLD: 64.8 % (ref 38–73)
NRBC BLD-RTO: 0 /100 WBC
PHOSPHATE SERPL-MCNC: 2.3 MG/DL (ref 2.7–4.5)
PLATELET # BLD AUTO: 526 K/UL (ref 150–450)
PMV BLD AUTO: 9.2 FL (ref 9.2–12.9)
POCT GLUCOSE: 205 MG/DL (ref 70–110)
POCT GLUCOSE: 206 MG/DL (ref 70–110)
RBC # BLD AUTO: 2.83 M/UL (ref 4–5.4)
WBC # BLD AUTO: 11.23 K/UL (ref 3.9–12.7)

## 2022-07-07 PROCEDURE — 63600175 PHARM REV CODE 636 W HCPCS

## 2022-07-07 PROCEDURE — 12000002 HC ACUTE/MED SURGE SEMI-PRIVATE ROOM

## 2022-07-07 PROCEDURE — 83735 ASSAY OF MAGNESIUM: CPT

## 2022-07-07 PROCEDURE — 99900035 HC TECH TIME PER 15 MIN (STAT)

## 2022-07-07 PROCEDURE — 25000003 PHARM REV CODE 250

## 2022-07-07 PROCEDURE — 99232 SBSQ HOSP IP/OBS MODERATE 35: CPT | Mod: ,,, | Performed by: STUDENT IN AN ORGANIZED HEALTH CARE EDUCATION/TRAINING PROGRAM

## 2022-07-07 PROCEDURE — 99232 PR SUBSEQUENT HOSPITAL CARE,LEVL II: ICD-10-PCS | Mod: ,,, | Performed by: STUDENT IN AN ORGANIZED HEALTH CARE EDUCATION/TRAINING PROGRAM

## 2022-07-07 PROCEDURE — 84100 ASSAY OF PHOSPHORUS: CPT

## 2022-07-07 PROCEDURE — 92526 ORAL FUNCTION THERAPY: CPT

## 2022-07-07 PROCEDURE — 25000003 PHARM REV CODE 250: Performed by: STUDENT IN AN ORGANIZED HEALTH CARE EDUCATION/TRAINING PROGRAM

## 2022-07-07 PROCEDURE — 94761 N-INVAS EAR/PLS OXIMETRY MLT: CPT

## 2022-07-07 PROCEDURE — 85025 COMPLETE CBC W/AUTO DIFF WBC: CPT

## 2022-07-07 PROCEDURE — 63600175 PHARM REV CODE 636 W HCPCS: Performed by: STUDENT IN AN ORGANIZED HEALTH CARE EDUCATION/TRAINING PROGRAM

## 2022-07-07 PROCEDURE — 99900026 HC AIRWAY MAINTENANCE (STAT)

## 2022-07-07 PROCEDURE — 27000221 HC OXYGEN, UP TO 24 HOURS

## 2022-07-07 PROCEDURE — 36415 COLL VENOUS BLD VENIPUNCTURE: CPT

## 2022-07-07 RX ADMIN — PIPERACILLIN SODIUM AND TAZOBACTAM SODIUM 4.5 G: 4; .5 INJECTION, POWDER, LYOPHILIZED, FOR SOLUTION INTRAVENOUS at 09:07

## 2022-07-07 RX ADMIN — CHOLESTYRAMINE 8 G: 4 POWDER, FOR SUSPENSION ORAL at 09:07

## 2022-07-07 RX ADMIN — PIPERACILLIN SODIUM AND TAZOBACTAM SODIUM 4.5 G: 4; .5 INJECTION, POWDER, LYOPHILIZED, FOR SOLUTION INTRAVENOUS at 12:07

## 2022-07-07 RX ADMIN — ATORVASTATIN CALCIUM 80 MG: 20 TABLET, FILM COATED ORAL at 08:07

## 2022-07-07 RX ADMIN — NYSTATIN OINTMENT: 100000 OINTMENT TOPICAL at 08:07

## 2022-07-07 RX ADMIN — INSULIN ASPART 4 UNITS: 100 INJECTION, SOLUTION INTRAVENOUS; SUBCUTANEOUS at 08:07

## 2022-07-07 RX ADMIN — PIPERACILLIN SODIUM AND TAZOBACTAM SODIUM 4.5 G: 4; .5 INJECTION, POWDER, LYOPHILIZED, FOR SOLUTION INTRAVENOUS at 06:07

## 2022-07-07 RX ADMIN — LEVETIRACETAM 750 MG: 100 SOLUTION ORAL at 08:07

## 2022-07-07 RX ADMIN — CHOLESTYRAMINE 8 G: 4 POWDER, FOR SUSPENSION ORAL at 08:07

## 2022-07-07 RX ADMIN — INSULIN ASPART 4 UNITS: 100 INJECTION, SOLUTION INTRAVENOUS; SUBCUTANEOUS at 11:07

## 2022-07-07 RX ADMIN — PIPERACILLIN SODIUM AND TAZOBACTAM SODIUM 4.5 G: 4; .5 INJECTION, POWDER, LYOPHILIZED, FOR SOLUTION INTRAVENOUS at 02:07

## 2022-07-07 RX ADMIN — HEPARIN SODIUM 5000 UNITS: 5000 INJECTION INTRAVENOUS; SUBCUTANEOUS at 08:07

## 2022-07-07 RX ADMIN — NYSTATIN OINTMENT: 100000 OINTMENT TOPICAL at 09:07

## 2022-07-07 RX ADMIN — LEVETIRACETAM 750 MG: 100 SOLUTION ORAL at 09:07

## 2022-07-07 RX ADMIN — HEPARIN SODIUM 5000 UNITS: 5000 INJECTION INTRAVENOUS; SUBCUTANEOUS at 09:07

## 2022-07-07 RX ADMIN — SODIUM BICARBONATE 650 MG TABLET 650 MG: at 09:07

## 2022-07-07 RX ADMIN — SODIUM BICARBONATE 650 MG TABLET 650 MG: at 08:07

## 2022-07-07 NOTE — ASSESSMENT & PLAN NOTE
Patient admitted with elevated WBC to 14s. Previous episodes of aspiration PNA at OSH, indwelling cabrera given acuity of condition, sacral decubitus ulcer stave IV s/p debridement with enterococcus faecalis / bacteriodes fragilis in cx. Patient afebrile with stable hemodynamics on admission at this time. CXR without acute abnormality. UA with yeast growing, chronic per previous OSH review.    Leukocytosis may be explained by soft tissue infection vs hemoconcentration given extensive dehydration and hypernatremia from lack of enteral fluids or IVF at outside hospice facility prior to transfer to [facility] [place].     - Zosyn given previous cultures and persistent leukocytosis  - Etiology may be soft tissue infection; see Sacral Decubitus Ulcer A/P  - Continue monitoring CBC  - Wound care per Sacral Decubitus Ulcer A/P  - Aspiration precautions  - Trend fever curve  - Trend BCx

## 2022-07-07 NOTE — PT/OT/SLP PROGRESS
Speech Language Pathology Treatment    Patient Name:  Darlene Avila   MRN:  [id number]   [id number] A    Admitting Diagnosis: Hypernatremia    Recommendations:                 General Recommendations:  Dysphagia therapy  Diet recommendations:  NPO, Liquid Diet Level: NPO   Aspiration Precautions: Alternate means of nutrition/hydration, Frequent oral care and Strict aspiration precautions   General Precautions: Standard, fall, NPO, aspiration  Communication strategies: PMSV under direct supervision only    Speaking Valve precautions:   · Placement only under supervision and when VSS,   · only when awake and alert, remove with any S/S respiratory distress,   · remove when sleeping.   · To rinse valve:   · 1. Swish valve in pure soap and warm water,   · 2. Rinse valve thoroughly in warm running water,   · 3. Allow valve to air dry thoroughly before placing it in storage container.   · 4. DO NOT use hot water, peroxide, bleach, vinegar, alcohol, brushes, or cotton swabs to clean valve.     Subjective     Patient lethargic, however, increased MELVINA from previous recent ST attempts.    Pain/Comfort:  Pain Rating 1: 0/10    Respiratory Status: supplemental O2 via trach collar    Objective:     Has the patient been evaluated by SLP for swallowing?   Yes  Keep patient NPO? Yes     Patient seen for an ongoing swallowing assessment. She presents with trach with supplemental O2 via trach collar, open mouth posture, and right side drooling. Patient roused with auditory stimulation, opened eyes, and vocalized. HOB elevated and PMSV donned on trach hub. Patient with wet breathing sounds and wet vocal quality, however, adequate tolerance of PMSV throughout ST session. O2 sats remained at 100 and no back pressure noted upon removal. White board current stating PMSV use okay when under direct supervision (staff or family/visitors). Patient stated name x1. Oral care provided with oral swabs and suctioning. She did not attempt to accept  po trials orally despite modality. Ice chips falling out of mouth anteriorly despite cues. Suctioning provided. SLP provided patient education on SLP role and POC. Patient did not demonstrate understanding.     Assessment:     Darlene Avila is a 71 y.o. female with an SLP diagnosis of Dysphagia.  She presents with poor MELVINA. ST will continue to follow 1-2x a week to monitor for appropriateness of pleasure feedings.     Goals:   Multidisciplinary Problems     SLP Goals        Problem: SLP    Goal Priority Disciplines Outcome   SLP Goal     SLP Ongoing, Progressing   Description: Speech Therapy Short Term Goals  Goal expected to be met by 7/10  1. Pt will participate in an ongoing assessment to determine the least restrictive and safest diet with possible updated goals to follow pending results.  2. Pt will tolerate PMSV during all waking hours with no s/s of respiratory distress.                    Plan:     · Patient to be seen:  2 x/week   · Plan of Care expires:     · Plan of Care reviewed with:  patient   · SLP Follow-Up:  Yes       Discharge recommendations:  nursing facility, skilled       Time Tracking:     SLP Treatment Date:   07/07/22  Speech Start Time:  0745  Speech Stop Time:  0755     Speech Total Time (min):  10 min    Billable Minutes: Treatment Swallowing Dysfunction 10    07/07/2022

## 2022-07-07 NOTE — PLAN OF CARE
Problem: Adult Inpatient Plan of Care  Goal: Plan of Care Review  Outcome: Ongoing, Progressing  Goal: Patient-Specific Goal (Individualized)  Outcome: Ongoing, Progressing  Goal: Absence of Hospital-Acquired Illness or Injury  Outcome: Ongoing, Progressing  Goal: Optimal Comfort and Wellbeing  Outcome: Ongoing, Progressing  Goal: Readiness for Transition of Care  Outcome: Ongoing, Progressing     Problem: Diabetes Comorbidity  Goal: Blood Glucose Level Within Targeted Range  Outcome: Ongoing, Progressing     Problem: Impaired Wound Healing  Goal: Optimal Wound Healing  Outcome: Ongoing, Progressing     Problem: Infection  Goal: Absence of Infection Signs and Symptoms  Outcome: Ongoing, Progressing     Problem: Fall Injury Risk  Goal: Absence of Fall and Fall-Related Injury  Outcome: Ongoing, Progressing     Problem: Coping Ineffective  Goal: Effective Coping  Outcome: Ongoing, Progressing

## 2022-07-07 NOTE — PLAN OF CARE
Augusta beck from [facility] contacted SW about pt admittance to NH.  Augusta asked if pt had second dose of COVID vaccination and according to pt records the second dose is still overdue.  Augusta stated that pt would need to be isolated and that they had an isolation room available for next week.  Augusta stated that sister Sabi would be touring the facility on 07/08 at 1330hrs.  Augusta further stated that this room is not promised to the pt as there are other pts at the top of the waiting list.      JAZMIN followed up with a phone call to pt sister Sabi and left a detailed message.    Sabi returned SW call and SW re-iterated to Sabi the conversation she had with Augusta from [place].        Yocasta Urbina, JOANIE, MSW, LMSW, RSW   Case Management  [facility]  Email: [email or web address]

## 2022-07-07 NOTE — SUBJECTIVE & OBJECTIVE
Interval History: NAEON. AF. Patient is on the wait list for [facility]. Awaiting on update from .      Review of Systems   Unable to perform ROS: Patient nonverbal   Constitutional:  Positive for fatigue.   Respiratory:  Negative for chest tightness and shortness of breath.    Cardiovascular:  Negative for chest pain.   Gastrointestinal:  Negative for abdominal pain.   Objective:     Vital Signs (Most Recent):  Temp: 98.3 °F (36.8 °C) (07/07/22 1143)  Pulse: 92 (07/07/22 1416)  Resp: 18 (07/07/22 1416)  BP: 137/63 (07/07/22 1143)  SpO2: 96 % (07/07/22 1416) Vital Signs (24h Range):  Temp:  [97.6 °F (36.4 °C)-99.3 °F (37.4 °C)] 98.3 °F (36.8 °C)  Pulse:  [88-94] 92  Resp:  [17-21] 18  SpO2:  [95 %-100 %] 96 %  BP: (137-162)/(61-73) 137/63     Weight: 50.2 kg (110 lb 10.7 oz)  Body mass index is 22.35 kg/m².    Intake/Output Summary (Last 24 hours) at 7/7/2022 1422  Last data filed at 7/7/2022 1100  Gross per 24 hour   Intake 3537.03 ml   Output 1800 ml   Net 1737.03 ml      Physical Exam  Vitals and nursing note reviewed.   Constitutional:       General: She is not in acute distress.     Appearance: She is not ill-appearing or toxic-appearing.   HENT:      Head: Normocephalic.      Mouth/Throat:      Mouth: Mucous membranes are dry.      Comments: Dried oral secretions adhered to tongue  Neck:      Comments: Trach in place and secured  Cardiovascular:      Rate and Rhythm: Normal rate and regular rhythm.      Pulses: Normal pulses.      Heart sounds: Normal heart sounds. No murmur heard.  Pulmonary:      Effort: Pulmonary effort is normal. No respiratory distress.      Breath sounds: Normal breath sounds. No wheezing or rales.   Abdominal:      General: Abdomen is flat. Bowel sounds are normal. There is no distension.      Palpations: Abdomen is soft.      Tenderness: There is no abdominal tenderness.   Musculoskeletal:         General: No swelling or tenderness. Normal range of motion.      Cervical  back: Normal range of motion.   Skin:     General: Skin is warm.      Coloration: Skin is not jaundiced or pale.      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    Neurological:      Mental Status: She is alert.      Comments: Intermittently alert, minimally responsive   Psychiatric:      Comments: Unable to assess       Significant Labs: All pertinent labs within the past 24 hours have been reviewed.  CBC:   Recent Labs   Lab 07/06/22  0426 07/07/22  0513   WBC 9.93 11.23   HGB 7.9* 7.9*   HCT 25.0* 24.7*   * 526*     CMP:   Recent Labs   Lab 07/06/22  0426      K 4.6      CO2 21*   GLU 80   BUN 10   CREATININE 0.6   CALCIUM 8.6*   ANIONGAP 7*   EGFRNONAA >60.0       Significant Imaging: I have reviewed all pertinent imaging results/findings within the past 24 hours.

## 2022-07-07 NOTE — RESPIRATORY THERAPY
RAPID RESPONSE RESPIRATORY THERAPY PROACTIVE NOTE           Time of visit: 0800    Code Status: DNR   : 1950  Bed: 70863/60534 A:   MRN: [id number]  Time spent at the bedside: < 15 min    SITUATION    Evaluated patient for: LDA Check     BACKGROUND    Patient has a past medical history of Arthritis, Cancer of breast, Cataract, Diabetes mellitus, Hypertension, Hypoxia, Memory loss, Orthostatic hypotension, TIA (transient ischemic attack), and Vitamin D deficiency.  Clinically Significant Surgical Hx: tracheostomy    24 Hours Vitals Range:  Temp:  [97.6 °F (36.4 °C)-99.3 °F (37.4 °C)]   Pulse:  [88-94]   Resp:  [17-21]   BP: (137-162)/(61-73)   SpO2:  [100 %]     Labs:    Recent Labs     22  0442 22  0426 22  0513   NA  --  136  --    K  --  4.6  --    CL  --  108  --    CO2  --  21*  --    CREATININE  --  0.6  --    GLU  --  80  --    PHOS 2.1* 2.2* 2.3*   MG 1.8 1.7 1.7        No results for input(s): PH, PCO2, PO2, HCO3, POCSATURATED, BE in the last 72 hours.    ASSESSMENT/INTERVENTIONS   all trach supplies are at bedside.  Extra trachs at bedside include 5.0 and 6.0 shiley both cuffed       Last VS   Temp: 97.6 °F (36.4 °C) (739)  Pulse: 90 (739)  Resp: 19 (739)  BP: 137/62 (739)  SpO2: 100 % (739)    Level of Consciousness: Level of Consciousness (AVPU): alert  Respiratory Effort: Respiratory Effort: Normal, Unlabored Expansion/Accessory Muscle Usage: Expansion/Accessory Muscles/Retractions: no use of accessory muscles  All Lung Field Breath Sounds: All Lung Fields Breath Sounds: Anterior:, Lateral:, diminished  IGNACIO Breath Sounds: diminished  LLL Breath Sounds: diminished  RUL Breath Sounds: diminished  RML Breath Sounds: diminished  RLL Breath Sounds: diminished  O2 Device/Concentration: Flow (L/min): 5, Oxygen Concentration (%): 21, O2 Device (Oxygen Therapy): tracheostomy collar  Surgical airway: Yes, Type: Shiley Size: 6, cuffed  Ambu at bedside: Ambu  bag with the patient?: Yes, Adult Ambu     Active Orders   Respiratory Care    Oxygen Continuous     Frequency: Continuous     Number of Occurrences: Until Specified     Order Questions:      Device type: Low flow      Device: Trach Collar      FiO2%: 21      Titrate O2 per Oxygen Titration Protocol: Yes      To maintain SpO2 goal of: >= 90%      Notify MD of: Inability to achieve desired SpO2; Sudden change in patient status and requires 20% increase in FiO2; Patient requires >60% FiO2    Pulse Oximetry Q4H     Frequency: Q4H     Number of Occurrences: Until Specified    Routine tracheostomy care     Frequency: BID     Number of Occurrences: Until Specified    SUCTION PRN     Frequency: PRN     Number of Occurrences: Until Specified       RECOMMENDATIONS    We recommend: RRT Recs: Continue POC per primary team.    ESCALATION        Please call back the Rapid Response RT, Chetna Puckett, RRT at x [phone] for any questions or concerns.
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
## 2022-07-07 NOTE — PROGRESS NOTES
Seth Strange - Intensive Care ([place] [id number])  [facility] Medicine  Progress Note    Patient Name: Darlene Avila  MRN: [id number]  Patient Class: IP- Inpatient   Admission Date: 6/17/2022  Length of Stay: 18 days  Attending Physician: Trey Mike MD  Primary Care Provider: Kirill Cabrera Iii, MD        Subjective:     Principal Problem:Hypernatremia        HPI:  Darlene Avila is a 71 year old F with history of diabetes type 2, seizure c/b anoxic brain injury s/p trach, PEG, bed bound status, Stage IV sacral ulcer, ischemic CVA, IBS, history of breast cancer s/p right mastectomy and failure to thrive who was brought in by her HPOA (sister) for nursing home placement. Patient is unable to provide history.     Patient's sister was contacted who stated that the patient was admitted to [facility] in [facility] for recurrent seizures/status epilepticus which resulted in an anoxic brain injury and respiratory failure.  Patient was intubated followed by tracheostomy and PEG placement .  Patient was discharged to nursing home and subsequently discharged to hospice care at the request of patient's sister. However a few days ago, she visited the patient at [facility] recovery facility and noticed they weren't feeding or taking care of the patient due to her 'hospice status'. Per sister today, she would like to place the patient in a skilled nursing facility and would like to rescind her hospice care at this time.  She is no longer interested in hospice placement. She states the patient at baseline is only able to follow some simple commands. She is unsure how much O2 level is her baseline via trach collar.   Of note she has a Stage IV sacral wound that is s/p debridement on 06/09 by Gen surgery in [facility].     Upon arrival to the ED, the patient was hemodynamically stable. Labs revealed Na 157, WBC 14. Hgb 9.4 (baseline 7-9). The patient was given 1L of IV NS and was admitted to hospital medicine for further management.        Overview/Hospital Course:  Patient admitted to hospital medicine with pre-existing anoxic brain injury, hypernatremia, sacral decubitus wound s/p debridement 06/08 in [place], [place]. Patient initially admitted to hospice following discharge from [facility] ICU, but discharged from hospice as patient family believed she was not getting appropriate care. Na 157, corrected with IVF (NS and hypotonic solutions) and enteral FWF down to 148. Trach collar exchanged by ENT due to lack of supplies at facility. Wound care consulted for sacral decubitus, recommended General Surgery consult for possible debridement as wound appears purulent. Persistent leukocytosis, started Zosyn per previous I&D wound cultures growing bacteriodes/enterococcus and new wound findings. cEEG started and Epilepsy consulted for assistance with antiepileptic medications; conflicting reports if patient was on only Keppra/Lacosamide vs Keppra/Lacosamide/Phenytoin. General surgery consulted for debridement of sacral decubitus ulcer, s/p I&D 06/24. Palliative Care consulted for assistance in GOC; decision to pursue medical/surgical care decided. ENT consulted for possible trach decannulation. ID consulted, planning for 6-weeks course of zosyn.Sodium improved; will continue free water flushes.     Patient remains with trach on humidifier. Mentation waxes and wanes. She is drowsy today but responds to sternal rub.    CM/SW to assist with dispo. Due to trach placement, half-way placement for SNF limited; LTAC is also not an alternative. Long discussion with patient's sister about long term care options for patient. Sister states that she is unable to provide patient the care she needs at home. SW spoke to patient's sister and reports that she is agreeable to placing patient at [facility]. [facility] will put patient on the wait list.       Interval History: NAEON. AF. Patient is on the wait list for [facility]. Awaiting on  update from SW.      Review of Systems   Unable to perform ROS: Patient nonverbal   Constitutional:  Positive for fatigue.   Respiratory:  Negative for chest tightness and shortness of breath.    Cardiovascular:  Negative for chest pain.   Gastrointestinal:  Negative for abdominal pain.   Objective:     Vital Signs (Most Recent):  Temp: 98.3 °F (36.8 °C) (07/07/22 1143)  Pulse: 92 (07/07/22 1416)  Resp: 18 (07/07/22 1416)  BP: 137/63 (07/07/22 1143)  SpO2: 96 % (07/07/22 1416) Vital Signs (24h Range):  Temp:  [97.6 °F (36.4 °C)-99.3 °F (37.4 °C)] 98.3 °F (36.8 °C)  Pulse:  [88-94] 92  Resp:  [17-21] 18  SpO2:  [95 %-100 %] 96 %  BP: (137-162)/(61-73) 137/63     Weight: 50.2 kg (110 lb 10.7 oz)  Body mass index is 22.35 kg/m².    Intake/Output Summary (Last 24 hours) at 7/7/2022 1422  Last data filed at 7/7/2022 1100  Gross per 24 hour   Intake 3537.03 ml   Output 1800 ml   Net 1737.03 ml      Physical Exam  Vitals and nursing note reviewed.   Constitutional:       General: She is not in acute distress.     Appearance: She is not ill-appearing or toxic-appearing.   HENT:      Head: Normocephalic.      Mouth/Throat:      Mouth: Mucous membranes are dry.      Comments: Dried oral secretions adhered to tongue  Neck:      Comments: Trach in place and secured  Cardiovascular:      Rate and Rhythm: Normal rate and regular rhythm.      Pulses: Normal pulses.      Heart sounds: Normal heart sounds. No murmur heard.  Pulmonary:      Effort: Pulmonary effort is normal. No respiratory distress.      Breath sounds: Normal breath sounds. No wheezing or rales.   Abdominal:      General: Abdomen is flat. Bowel sounds are normal. There is no distension.      Palpations: Abdomen is soft.      Tenderness: There is no abdominal tenderness.   Musculoskeletal:         General: No swelling or tenderness. Normal range of motion.      Cervical back: Normal range of motion.   Skin:     General: Skin is warm.      Coloration: Skin is not  jaundiced or pale.      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    Neurological:      Mental Status: She is alert.      Comments: Intermittently alert, minimally responsive   Psychiatric:      Comments: Unable to assess       Significant Labs: All pertinent labs within the past 24 hours have been reviewed.  CBC:   Recent Labs   Lab 07/06/22 0426 07/07/22  0513   WBC 9.93 11.23   HGB 7.9* 7.9*   HCT 25.0* 24.7*   * 526*     CMP:   Recent Labs   Lab 07/06/22 0426      K 4.6      CO2 21*   GLU 80   BUN 10   CREATININE 0.6   CALCIUM 8.6*   ANIONGAP 7*   EGFRNONAA >60.0       Significant Imaging: I have reviewed all pertinent imaging results/findings within the past 24 hours.      Assessment/Plan:      * Hypernatremia  STABLE, WNL    Na stable today.   - Free water flushes      Encephalopathy  Mentation waxes and wanes. She is drowsy today but responds to sternal rub.    Goals of care, counseling/discussion  Refer to palliative care note.      Palliative care encounter  Per Primary Medicine team and Palliative Care encounters with patient family (separate encounters, see dated notes in chart), patient family wishes full medical care and does not desire comfort/hospice measures. DNR remains. See Pall Care note for further detail.    Seizures  Patient previously started on Lacosamide and Keppra at OSH during initial episode of status epilepticus. Phenytoin added as patient continued to have breakthrough seizures.    - Repeat EEG ordered 2/2 interval decrease in alert/responsiveness; diffuse toxic metabolic encephalopathy, no new seizure per Neuro EP  - Continue Keppra monotherapy 750 bid; titrate per neuro recs  - Neurology consulted for further assistance in antiepileptic regimen    G tube feedings  Tube feed dependent via G tube  - Nutrition to assist with TF recs; see recommendations in note; appreciate assistance      Tracheostomy in  place  Dysarthria  Dysphagia    Trach capped, good O2 sats on RA     - Respiratory therapy to assist with trach care.   - SLP for swallow evaluation and speech therapy; recommending NPO, re-evaluation, PMSV under direct supervision  - ENT advising to keep tracheostomy in place due to intermittent responsiveness; amenable to decannulation if patient family moves towards hospice/comfort measures    Sacral decubitus ulcer, stage IV  S/p recent debridement at OSH 06/08. OSH Wcx with enterococcus faecalis, bacteriodes fragilus.    - Wound care consulted; appreciate assistance  - Deep wound cx  - General surgery consulted s/p debridement 06/24, no note of bone involvement per OP note, no cultures sent  - Infectious Disease consulted, zosyn for 6-weeks (End date is 8/5)  - Consideration for wound vac given depth of injury  - Turn q2h    Leukocytosis  Patient admitted with elevated WBC to 14s. Previous episodes of aspiration PNA at OSH, indwelling cabrera given acuity of condition, sacral decubitus ulcer stave IV s/p debridement with enterococcus faecalis / bacteriodes fragilis in cx. Patient afebrile with stable hemodynamics on admission at this time. CXR without acute abnormality. UA with yeast growing, chronic per previous OSH review.    Leukocytosis may be explained by soft tissue infection vs hemoconcentration given extensive dehydration and hypernatremia from lack of enteral fluids or IVF at outside hospice facility prior to transfer to [facility].     - Zosyn given previous cultures and persistent leukocytosis  - Etiology may be soft tissue infection; see Sacral Decubitus Ulcer A/P  - Continue monitoring CBC  - Wound care per Sacral Decubitus Ulcer A/P  - Aspiration precautions  - Trend fever curve  - Trend BCx    Discharge planning issues  Darlene Avila is a 71 year old F with history of diabetes type 2, seizure, ischemic CVA, IBS, history of breast cancer s/p right mastectomy and failure to thrive who was brought in by her  HPOA (sister) for skilled nursing home placement due to concern for neglect at recent hospice facility. Patient follows very simple commands and doubt she can engage in skilled therapy. [facility] facility likely more of an option.     - Appreciate CM/SW assistance with placement; placement difficult d/t trach; LTAC placement not an option. Patient is now on the wait list for placement at [facility].   - Appreciate pharmacy's assistance with med rec from previous admission    ACP (advance care planning)  Patient is DNR. LaPOST on file.     - CM/SW assisting; tracheostomy status complicating placement to [facility] w/ hospice.  - See Care Update note Zi-on MD Aramis on 06/29 for further detail.    History of CVA (cerebrovascular accident)  Unclear of patient's current medications. Per med review on care-everywhere, the patient is on statin but not on antiplatelet therapy.     - Appreciate pharmacy's assistance with medication reconciliation with facility- start antiplatelet therapy if no contraindications.   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 A1c repeated 6.9.    - LDSSI   - POCT glucose q6hrs  - Maintain -180  - Titrate basal/bolus regimen to goal as above.      VTE Risk Mitigation (From admission, onward)         Ordered     heparin (porcine) injection 5,000 Units  Every 12 hours         06/18/22 0355     IP VTE HIGH RISK PATIENT  Once         06/18/22 0355     Place sequential compression device  Until discontinued         06/18/22 0355                Discharge Planning   JUDIT:      Code Status: DNR   Is the patient medically ready for discharge?: Yes    Reason for patient still in hospital (select all that apply): Patient trending condition  Discharge Plan A: New Nursing Home placement - [facility]  care facility   Discharge Delays: (!) Patient and Family Barriers (pt high level of needs and accepting facilities)              Maria L Mathew DO  Department of Hospital Medicine   [facility] - Intensive Care ([place]-16)

## 2022-07-08 LAB
POCT GLUCOSE: 192 MG/DL (ref 70–110)
POCT GLUCOSE: 245 MG/DL (ref 70–110)
POCT GLUCOSE: 251 MG/DL (ref 70–110)

## 2022-07-08 PROCEDURE — 25000003 PHARM REV CODE 250

## 2022-07-08 PROCEDURE — 27000221 HC OXYGEN, UP TO 24 HOURS

## 2022-07-08 PROCEDURE — 99232 PR SUBSEQUENT HOSPITAL CARE,LEVL II: ICD-10-PCS | Mod: ,,, | Performed by: STUDENT IN AN ORGANIZED HEALTH CARE EDUCATION/TRAINING PROGRAM

## 2022-07-08 PROCEDURE — 94761 N-INVAS EAR/PLS OXIMETRY MLT: CPT

## 2022-07-08 PROCEDURE — 99232 SBSQ HOSP IP/OBS MODERATE 35: CPT | Mod: ,,, | Performed by: STUDENT IN AN ORGANIZED HEALTH CARE EDUCATION/TRAINING PROGRAM

## 2022-07-08 PROCEDURE — 99900026 HC AIRWAY MAINTENANCE (STAT)

## 2022-07-08 PROCEDURE — 25000003 PHARM REV CODE 250: Performed by: STUDENT IN AN ORGANIZED HEALTH CARE EDUCATION/TRAINING PROGRAM

## 2022-07-08 PROCEDURE — 99900035 HC TECH TIME PER 15 MIN (STAT)

## 2022-07-08 PROCEDURE — C9399 UNCLASSIFIED DRUGS OR BIOLOG: HCPCS

## 2022-07-08 PROCEDURE — 63600175 PHARM REV CODE 636 W HCPCS: Performed by: STUDENT IN AN ORGANIZED HEALTH CARE EDUCATION/TRAINING PROGRAM

## 2022-07-08 PROCEDURE — 63600175 PHARM REV CODE 636 W HCPCS

## 2022-07-08 PROCEDURE — 12000002 HC ACUTE/MED SURGE SEMI-PRIVATE ROOM

## 2022-07-08 RX ADMIN — NYSTATIN OINTMENT: 100000 OINTMENT TOPICAL at 09:07

## 2022-07-08 RX ADMIN — SODIUM BICARBONATE 650 MG TABLET 650 MG: at 10:07

## 2022-07-08 RX ADMIN — CHOLESTYRAMINE 8 G: 4 POWDER, FOR SUSPENSION ORAL at 09:07

## 2022-07-08 RX ADMIN — HEPARIN SODIUM 5000 UNITS: 5000 INJECTION INTRAVENOUS; SUBCUTANEOUS at 10:07

## 2022-07-08 RX ADMIN — PIPERACILLIN SODIUM AND TAZOBACTAM SODIUM 4.5 G: 4; .5 INJECTION, POWDER, LYOPHILIZED, FOR SOLUTION INTRAVENOUS at 09:07

## 2022-07-08 RX ADMIN — PIPERACILLIN SODIUM AND TAZOBACTAM SODIUM 4.5 G: 4; .5 INJECTION, POWDER, LYOPHILIZED, FOR SOLUTION INTRAVENOUS at 06:07

## 2022-07-08 RX ADMIN — LEVETIRACETAM 750 MG: 100 SOLUTION ORAL at 09:07

## 2022-07-08 RX ADMIN — PIPERACILLIN SODIUM AND TAZOBACTAM SODIUM 4.5 G: 4; .5 INJECTION, POWDER, LYOPHILIZED, FOR SOLUTION INTRAVENOUS at 04:07

## 2022-07-08 RX ADMIN — ATORVASTATIN CALCIUM 80 MG: 20 TABLET, FILM COATED ORAL at 10:07

## 2022-07-08 RX ADMIN — INSULIN ASPART 2 UNITS: 100 INJECTION, SOLUTION INTRAVENOUS; SUBCUTANEOUS at 12:07

## 2022-07-08 RX ADMIN — INSULIN DETEMIR 10 UNITS: 100 INJECTION, SOLUTION SUBCUTANEOUS at 12:07

## 2022-07-08 RX ADMIN — SODIUM BICARBONATE 650 MG TABLET 650 MG: at 09:07

## 2022-07-08 RX ADMIN — LEVETIRACETAM 750 MG: 100 SOLUTION ORAL at 10:07

## 2022-07-08 RX ADMIN — NYSTATIN OINTMENT: 100000 OINTMENT TOPICAL at 10:07

## 2022-07-08 RX ADMIN — INSULIN DETEMIR 10 UNITS: 100 INJECTION, SOLUTION SUBCUTANEOUS at 10:07

## 2022-07-08 RX ADMIN — CHOLESTYRAMINE 8 G: 4 POWDER, FOR SUSPENSION ORAL at 10:07

## 2022-07-08 RX ADMIN — HEPARIN SODIUM 5000 UNITS: 5000 INJECTION INTRAVENOUS; SUBCUTANEOUS at 09:07

## 2022-07-08 RX ADMIN — INSULIN ASPART 3 UNITS: 100 INJECTION, SOLUTION INTRAVENOUS; SUBCUTANEOUS at 10:07

## 2022-07-08 NOTE — PLAN OF CARE
JAZMIN met with Supervisor Omayra Allan and Dr. Trey Mike, pt sister Sabi and pt antonetteece Chuyita for a family meeting to discuss pt care and options.    halfway  Nursing,Home Health, Sitter services and Home with Hospice were all discussed with family.      Pt medical status and the comfort care was also discussed with pt family.      Family expressed understanding of options with pt, that pt will need to either go to an excepting [facility] NH or family will need to take pt home with care (hospice or home health).      Medicaid and medicare applications were also discussed.  Medicaid application has been submitted on behalf of pt and family.    JAZMIN sent email for SSI Referral to assist with medicare application for pt.      JAZMIN stated she would not submit anymore applications for [facility] NH, and would ask someone she knew with Home Health and someone with Hospice to speak with the family so they could get an understanding of the services and make an informed decision.    Sylvia with [facility] contacted JAZMIN and updated JAZMIN on the conversation with sister Sabi.  Sylvia spoke with Sabi about the limitations of home health and a better level of care for pt would be hospice.    Keyanna from [facility] called and spoke with JAZMIN.  JAZMIN provided Keyanna with some background information about pt d/c plan and contact information for sister Sabi.  Keyanna stated she would call sister Sabi about Hospice care        Yocasta Urbina CD, MSW, LMSW, RSW   Case Management  [facility]  Email: [email or web address]

## 2022-07-08 NOTE — PLAN OF CARE
Problem: Diabetes Comorbidity  Goal: Blood Glucose Level Within Targeted Range  Intervention: Monitor and Manage Glycemia  Flowsheets (Taken 7/8/2022 1045)  Glycemic Management:   blood glucose monitored   supplemental insulin given     Problem: Impaired Wound Healing  Goal: Optimal Wound Healing  Intervention: Promote Wound Healing  Flowsheets (Taken 7/8/2022 1045)  Oral Nutrition Promotion: calorie-dense liquids provided  Sleep/Rest Enhancement: consistent schedule promoted     Problem: Infection  Goal: Absence of Infection Signs and Symptoms  Intervention: Prevent or Manage Infection  Flowsheets (Taken 7/8/2022 1045)  Infection Management: aseptic technique maintained     Problem: Skin Injury Risk Increased  Goal: Skin Health and Integrity  Intervention: Optimize Skin Protection  Flowsheets (Taken 7/8/2022 1045)  Skin Protection: incontinence pads utilized  Head of Bed (HOB) Positioning: HOB at 30 degrees  Intervention: Promote and Optimize Oral Intake  Flowsheets (Taken 7/8/2022 1045)  Oral Nutrition Promotion: calorie-dense liquids provided     Problem: Coping Ineffective  Goal: Effective Coping  Intervention: Support and Enhance Coping Strategies  Flowsheets (Taken 7/8/2022 1045)  Family/Support System Care: caregiver stress acknowledged  Environmental Support: calm environment promoted

## 2022-07-08 NOTE — RESPIRATORY THERAPY
RAPID RESPONSE RESPIRATORY THERAPY PROACTIVE NOTE           Time of visit:      Code Status: DNR   : 1950  Bed: 01076/39214 A:   MRN: [id number]  Time spent at the bedside: < 15 min    SITUATION    Evaluated patient for: LDA Check     BACKGROUND    Patient has a past medical history of Arthritis, Cancer of breast, Cataract, Diabetes mellitus, Hypertension, Hypoxia, Memory loss, Orthostatic hypotension, TIA (transient ischemic attack), and Vitamin D deficiency.  Clinically Significant Surgical Hx: tracheostomy    24 Hours Vitals Range:  Temp:  [98.3 °F (36.8 °C)-99.3 °F (37.4 °C)]   Pulse:  [82-96]   Resp:  [19-22]   BP: (141-166)/(65-71)   SpO2:  [93 %-95 %]     Labs:    Recent Labs     22  0426 22  0513     --    K 4.6  --      --    CO2 21*  --    CREATININE 0.6  --    GLU 80  --    PHOS 2.2* 2.3*   MG 1.7 1.7        No results for input(s): PH, PCO2, PO2, HCO3, POCSATURATED, BE in the last 72 hours.    ASSESSMENT/INTERVENTIONS    Upon arrival in room patient sleeping, on 5L 21% trach collar. All supplies in room. Extra cuffed Shiley trachs at bedside, sizes 4 & 6.     Last VS   Temp: 99.3 °F (37.4 °C) (1249)  Pulse: 83 (1249)  Resp: 20 (1249)  BP: 141/65 (1249)  SpO2: 93 % (1249)    Level of Consciousness: Level of Consciousness (AVPU): responds to pain  Respiratory Effort: Respiratory Effort: Normal, Unlabored Expansion/Accessory Muscle Usage: Expansion/Accessory Muscles/Retractions: no retractions, no use of accessory muscles  All Lung Field Breath Sounds: All Lung Fields Breath Sounds: coarse  IGNACIO Breath Sounds: diminished  LLL Breath Sounds: diminished  RUL Breath Sounds: diminished  RML Breath Sounds: diminished  RLL Breath Sounds: diminished  O2 Device/Concentration: Flow (L/min): 5, Oxygen Concentration (%): 21, O2 Device (Oxygen Therapy): tracheostomy collar  Surgical airway: Yes, Type: Shiley Size: 6, uncuffed  Ambu at bedside: Ambu bag with  the patient?: Yes, Adult Ambu     Active Orders   Respiratory Care    Pulse Oximetry Q4H     Frequency: Q4H     Number of Occurrences: Until Specified    Routine tracheostomy care     Frequency: BID     Number of Occurrences: Until Specified    SUCTION PRN     Frequency: PRN     Number of Occurrences: Until Specified       RECOMMENDATIONS    We recommend: RRT Recs: Continue POC per primary team.    ESCALATION        FOLLOW-UP    Please call back the Rapid Response RT, Dennis Bentley, RRT at x [phone] for any questions or concerns.     \

## 2022-07-08 NOTE — PROGRESS NOTES
[facility] - Intensive Care ([place] [address])  [facility] Medicine  Progress Note    Patient Name: Darlene Avila  MRN: [id number]  Patient Class: IP- Inpatient   Admission Date: 6/17/2022  Length of Stay: 19 days  Attending Physician: Trey Mike MD  Primary Care Provider: Kirill Cabrera Iii, MD        Subjective:     Principal Problem:Hypernatremia        HPI:  Darlene Avila is a 71 year old F with history of diabetes type 2, seizure c/b anoxic brain injury s/p trach, PEG, bed bound status, Stage IV sacral ulcer, ischemic CVA, IBS, history of breast cancer s/p right mastectomy and failure to thrive who was brought in by her HPOA (sister) for nursing home placement. Patient is unable to provide history.     Patient's sister was contacted who stated that the patient was admitted to [facility] in [facility] for recurrent seizures/status epilepticus which resulted in an anoxic brain injury and respiratory failure.  Patient was intubated followed by tracheostomy and PEG placement .  Patient was discharged to nursing home and subsequently discharged to hospice care at the request of patient's sister. However a few days ago, she visited the patient at [facility] recovery facility and noticed they weren't feeding or taking care of the patient due to her 'hospice status'. Per sister today, she would like to place the patient in a skilled nursing facility and would like to rescind her hospice care at this time.  She is no longer interested in hospice placement. She states the patient at baseline is only able to follow some simple commands. She is unsure how much O2 level is her baseline via trach collar.   Of note she has a Stage IV sacral wound that is s/p debridement on 06/09 by Gen surgery in [facility].     Upon arrival to the ED, the patient was hemodynamically stable. Labs revealed Na 157, WBC 14. Hgb 9.4 (baseline 7-9). The patient was given 1L of IV NS and was admitted to hospital medicine for further management.        Overview/Hospital Course:  Patient admitted to hospital medicine with pre-existing anoxic brain injury, hypernatremia, sacral decubitus wound s/p debridement 06/08 in [place], [place]. Patient initially admitted to hospice following discharge from [facility] ICU, but discharged from hospice as patient family believed she was not getting appropriate care. Na 157, corrected with IVF (NS and hypotonic solutions) and enteral FWF down to 148. Trach collar exchanged by ENT due to lack of supplies at facility. Wound care consulted for sacral decubitus, recommended General Surgery consult for possible debridement as wound appears purulent. Persistent leukocytosis, started Zosyn per previous I&D wound cultures growing bacteriodes/enterococcus and new wound findings. cEEG started and Epilepsy consulted for assistance with antiepileptic medications; conflicting reports if patient was on only Keppra/Lacosamide vs Keppra/Lacosamide/Phenytoin. General surgery consulted for debridement of sacral decubitus ulcer, s/p I&D 06/24. Palliative Care consulted for assistance in GOC; decision to pursue medical/surgical care decided. ENT consulted for possible trach decannulation. ID consulted, planning for 6-weeks course of zosyn.Sodium improved; will continue free water flushes.Patient remains with trach on humidifier. Mentation waxes and wanes.     CM/SW to assist with dispo. Due to trach placement, penitentiary placement for SNF limited; LTAC is also not an alternative. Long discussion with patient's sister about long term care options for patient. Sister states that she is unable to provide patient the care she needs at home. SW spoke to patient's sister and reports that she is agreeable to placing patient at [facility]. [facility] will put patient on the wait list.       Interval History: Family meeting today with patient's sister present. Discharge options were discussed- appears that the sister will look further into  Home w/hospice as an option. Sister would like more training.     Review of Systems   Unable to perform ROS: Patient nonverbal   Constitutional:  Positive for fatigue.   Respiratory:  Negative for chest tightness and shortness of breath.    Cardiovascular:  Negative for chest pain.   Gastrointestinal:  Negative for abdominal pain.   Objective:     Vital Signs (Most Recent):  Temp: 99.3 °F (37.4 °C) (07/08/22 1249)  Pulse: 83 (07/08/22 1249)  Resp: 20 (07/08/22 1249)  BP: (!) 141/65 (07/08/22 1249)  SpO2: (!) 93 % (07/08/22 1249)   Vital Signs (24h Range):  Temp:  [98.3 °F (36.8 °C)-99.3 °F (37.4 °C)] 99.3 °F (37.4 °C)  Pulse:  [82-96] 83  Resp:  [19-22] 20  SpO2:  [93 %-95 %] 93 %  BP: (141-166)/(65-71) 141/65     Weight: 50.2 kg (110 lb 10.7 oz)  Body mass index is 22.35 kg/m².    Intake/Output Summary (Last 24 hours) at 7/8/2022 1746  Last data filed at 7/8/2022 1400  Gross per 24 hour   Intake 553.55 ml   Output 1400 ml   Net -846.45 ml      Physical Exam  Vitals and nursing note reviewed.   Constitutional:       General: She is not in acute distress.     Appearance: She is not ill-appearing or toxic-appearing.   HENT:      Head: Normocephalic.      Mouth/Throat:      Mouth: Mucous membranes are dry.      Comments: Dried oral secretions adhered to tongue  Neck:      Comments: Trach in place and secured  Cardiovascular:      Rate and Rhythm: Normal rate and regular rhythm.      Pulses: Normal pulses.      Heart sounds: Normal heart sounds. No murmur heard.  Pulmonary:      Effort: Pulmonary effort is normal. No respiratory distress.      Breath sounds: Normal breath sounds. No wheezing or rales.   Abdominal:      General: Abdomen is flat. Bowel sounds are normal. There is no distension.      Palpations: Abdomen is soft.      Tenderness: There is no abdominal tenderness.   Musculoskeletal:         General: No swelling or tenderness. Normal range of motion.      Cervical back: Normal range of motion.   Skin:      General: Skin is warm.      Coloration: Skin is not jaundiced or pale.      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    Neurological:      Mental Status: She is alert.      Comments: Intermittently alert, minimally responsive   Psychiatric:      Comments: Unable to assess       Significant Labs: All pertinent labs within the past 24 hours have been reviewed.    Significant Imaging: I have reviewed all pertinent imaging results/findings within the past 24 hours.      Assessment/Plan:      * Hypernatremia  STABLE, WNL    Na stable today.   - Free water flushes      Encephalopathy  Mentation waxes and wanes. She is drowsy today but responds to sternal rub.    Goals of care, counseling/discussion  Refer to palliative care note.      Palliative care encounter  Per Primary Medicine team and Palliative Care encounters with patient family (separate encounters, see dated notes in chart), patient family wishes full medical care and does not desire comfort/hospice measures. DNR remains. See Pall Care note for further detail.    Seizures  Patient previously started on Lacosamide and Keppra at OSH during initial episode of status epilepticus. Phenytoin added as patient continued to have breakthrough seizures.    - Repeat EEG ordered 2/2 interval decrease in alert/responsiveness; diffuse toxic metabolic encephalopathy, no new seizure per Neuro EP  - Continue Keppra monotherapy 750 bid; titrate per neuro recs  - Neurology consulted for further assistance in antiepileptic regimen    G tube feedings  Tube feed dependent via G tube  - Nutrition to assist with TF recs; see recommendations in note; appreciate assistance      Tracheostomy in place  Dysarthria  Dysphagia    Trach capped, good O2 sats on RA     - Respiratory therapy to assist with trach care.   - SLP for swallow evaluation and speech therapy; recommending NPO, re-evaluation, PMSV under direct supervision  - ENT advising to keep tracheostomy in  place due to intermittent responsiveness; amenable to decannulation if patient family moves towards hospice/comfort measures    Sacral decubitus ulcer, stage IV  S/p recent debridement at OSH 06/08. OSH Wcx with enterococcus faecalis, bacteriodes fragilus.    - Wound care consulted; appreciate assistance  - Deep wound cx  - General surgery consulted s/p debridement 06/24, no note of bone involvement per OP note, no cultures sent  - Infectious Disease consulted, zosyn for 6-weeks (End date is 8/5)  - Consideration for wound vac given depth of injury  - Turn q2h    Leukocytosis  Patient admitted with elevated WBC to 14s. Previous episodes of aspiration PNA at OSH, indwelling cabrera given acuity of condition, sacral decubitus ulcer stave IV s/p debridement with enterococcus faecalis / bacteriodes fragilis in cx. Patient afebrile with stable hemodynamics on admission at this time. CXR without acute abnormality. UA with yeast growing, chronic per previous OSH review.    Leukocytosis may be explained by soft tissue infection vs hemoconcentration given extensive dehydration and hypernatremia from lack of enteral fluids or IVF at outside hospice facility prior to transfer to [facility].     - Zosyn given previous cultures and persistent leukocytosis  - Etiology may be soft tissue infection; see Sacral Decubitus Ulcer A/P  - Continue monitoring CBC  - Wound care per Sacral Decubitus Ulcer A/P  - Aspiration precautions  - Trend fever curve  - Trend BCx    Discharge planning issues  Darlene Avila is a 71 year old F with history of diabetes type 2, seizure, ischemic CVA, IBS, history of breast cancer s/p right mastectomy and failure to thrive who was brought in by her HPOA (sister) for skilled nursing home placement due to concern for neglect at recent hospice facility. Patient follows very simple commands and doubt she can engage in skilled therapy. longterm nursing facility likely more of an option.     - Appreciate CM/SW assistance  with placement; placement difficult d/t trach; LTAC placement not an option. Patient is now on the wait list for placement at [facility].   - Appreciate pharmacy's assistance with med rec from previous admission    ACP (advance care planning)  Patient is DNR. [place]POST on file.     - CM/SW assisting; tracheostomy status complicating placement to long term w/ hospice.  - See Care Update note Zi-on MD Aramis on 06/29 for further detail.    History of CVA (cerebrovascular accident)  Unclear of patient's current medications. Per med review on care-everywhere, the patient is on statin but not on antiplatelet therapy.     - Appreciate pharmacy's assistance with medication reconciliation with facility- start antiplatelet therapy if no contraindications.   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 A1c repeated 6.9.    - LDSSI   - POCT glucose q6hrs  - Maintain -180  - Titrate basal/bolus regimen to goal as above.      VTE Risk Mitigation (From admission, onward)         Ordered     heparin (porcine) injection 5,000 Units  Every 12 hours         06/18/22 0355     IP VTE HIGH RISK PATIENT  Once         06/18/22 0355     Place sequential compression device  Until discontinued         06/18/22 0355                Discharge Planning   JUDIT:      Code Status: DNR   Is the patient medically ready for discharge?: Yes    Reason for patient still in hospital (select all that apply): Pending disposition  Discharge Plan A: New Nursing Home placement - long term care facility   Discharge Delays: (!) Patient and Family Barriers (pt high level of needs and accepting facilities)              Marge Brown MD  Department of Hospital Medicine   [facility] - Intensive Care ([place]-16)

## 2022-07-08 NOTE — SUBJECTIVE & OBJECTIVE
Interval History: Family meeting today with patient's sister present. Discharge options were discussed- appears that the sister will look further into Home w/hospice as an option. Sister would like more training.     Review of Systems   Unable to perform ROS: Patient nonverbal   Constitutional:  Positive for fatigue.   Respiratory:  Negative for chest tightness and shortness of breath.    Cardiovascular:  Negative for chest pain.   Gastrointestinal:  Negative for abdominal pain.   Objective:     Vital Signs (Most Recent):  Temp: 99.3 °F (37.4 °C) (07/08/22 1249)  Pulse: 83 (07/08/22 1249)  Resp: 20 (07/08/22 1249)  BP: (!) 141/65 (07/08/22 1249)  SpO2: (!) 93 % (07/08/22 1249)   Vital Signs (24h Range):  Temp:  [98.3 °F (36.8 °C)-99.3 °F (37.4 °C)] 99.3 °F (37.4 °C)  Pulse:  [82-96] 83  Resp:  [19-22] 20  SpO2:  [93 %-95 %] 93 %  BP: (141-166)/(65-71) 141/65     Weight: 50.2 kg (110 lb 10.7 oz)  Body mass index is 22.35 kg/m².    Intake/Output Summary (Last 24 hours) at 7/8/2022 1746  Last data filed at 7/8/2022 1400  Gross per 24 hour   Intake 553.55 ml   Output 1400 ml   Net -846.45 ml      Physical Exam  Vitals and nursing note reviewed.   Constitutional:       General: She is not in acute distress.     Appearance: She is not ill-appearing or toxic-appearing.   HENT:      Head: Normocephalic.      Mouth/Throat:      Mouth: Mucous membranes are dry.      Comments: Dried oral secretions adhered to tongue  Neck:      Comments: Trach in place and secured  Cardiovascular:      Rate and Rhythm: Normal rate and regular rhythm.      Pulses: Normal pulses.      Heart sounds: Normal heart sounds. No murmur heard.  Pulmonary:      Effort: Pulmonary effort is normal. No respiratory distress.      Breath sounds: Normal breath sounds. No wheezing or rales.   Abdominal:      General: Abdomen is flat. Bowel sounds are normal. There is no distension.      Palpations: Abdomen is soft.      Tenderness: There is no abdominal  tenderness.   Musculoskeletal:         General: No swelling or tenderness. Normal range of motion.      Cervical back: Normal range of motion.   Skin:     General: Skin is warm.      Coloration: Skin is not jaundiced or pale.      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    Neurological:      Mental Status: She is alert.      Comments: Intermittently alert, minimally responsive   Psychiatric:      Comments: Unable to assess       Significant Labs: All pertinent labs within the past 24 hours have been reviewed.    Significant Imaging: I have reviewed all pertinent imaging results/findings within the past 24 hours.

## 2022-07-09 LAB
POCT GLUCOSE: 200 MG/DL (ref 70–110)
POCT GLUCOSE: 227 MG/DL (ref 70–110)
POCT GLUCOSE: 229 MG/DL (ref 70–110)
POCT GLUCOSE: 275 MG/DL (ref 70–110)

## 2022-07-09 PROCEDURE — 99900035 HC TECH TIME PER 15 MIN (STAT)

## 2022-07-09 PROCEDURE — 25000003 PHARM REV CODE 250

## 2022-07-09 PROCEDURE — 99232 PR SUBSEQUENT HOSPITAL CARE,LEVL II: ICD-10-PCS | Mod: ,,, | Performed by: STUDENT IN AN ORGANIZED HEALTH CARE EDUCATION/TRAINING PROGRAM

## 2022-07-09 PROCEDURE — 63600175 PHARM REV CODE 636 W HCPCS: Performed by: STUDENT IN AN ORGANIZED HEALTH CARE EDUCATION/TRAINING PROGRAM

## 2022-07-09 PROCEDURE — 63600175 PHARM REV CODE 636 W HCPCS

## 2022-07-09 PROCEDURE — 99232 SBSQ HOSP IP/OBS MODERATE 35: CPT | Mod: ,,, | Performed by: STUDENT IN AN ORGANIZED HEALTH CARE EDUCATION/TRAINING PROGRAM

## 2022-07-09 PROCEDURE — 94761 N-INVAS EAR/PLS OXIMETRY MLT: CPT

## 2022-07-09 PROCEDURE — 25000003 PHARM REV CODE 250: Performed by: STUDENT IN AN ORGANIZED HEALTH CARE EDUCATION/TRAINING PROGRAM

## 2022-07-09 PROCEDURE — 99900026 HC AIRWAY MAINTENANCE (STAT)

## 2022-07-09 PROCEDURE — 27000221 HC OXYGEN, UP TO 24 HOURS

## 2022-07-09 PROCEDURE — 12000002 HC ACUTE/MED SURGE SEMI-PRIVATE ROOM

## 2022-07-09 RX ORDER — HYDRALAZINE HYDROCHLORIDE 20 MG/ML
5 INJECTION INTRAMUSCULAR; INTRAVENOUS EVERY 6 HOURS PRN
Status: DISCONTINUED | OUTPATIENT
Start: 2022-07-09 | End: 2022-07-17 | Stop reason: HOSPADM

## 2022-07-09 RX ADMIN — INSULIN ASPART 4 UNITS: 100 INJECTION, SOLUTION INTRAVENOUS; SUBCUTANEOUS at 08:07

## 2022-07-09 RX ADMIN — LEVETIRACETAM 750 MG: 100 SOLUTION ORAL at 08:07

## 2022-07-09 RX ADMIN — PIPERACILLIN SODIUM AND TAZOBACTAM SODIUM 4.5 G: 4; .5 INJECTION, POWDER, LYOPHILIZED, FOR SOLUTION INTRAVENOUS at 02:07

## 2022-07-09 RX ADMIN — ATORVASTATIN CALCIUM 80 MG: 20 TABLET, FILM COATED ORAL at 08:07

## 2022-07-09 RX ADMIN — SODIUM BICARBONATE 650 MG TABLET 650 MG: at 09:07

## 2022-07-09 RX ADMIN — LEVETIRACETAM 750 MG: 100 SOLUTION ORAL at 09:07

## 2022-07-09 RX ADMIN — PIPERACILLIN SODIUM AND TAZOBACTAM SODIUM 4.5 G: 4; .5 INJECTION, POWDER, LYOPHILIZED, FOR SOLUTION INTRAVENOUS at 06:07

## 2022-07-09 RX ADMIN — HEPARIN SODIUM 5000 UNITS: 5000 INJECTION INTRAVENOUS; SUBCUTANEOUS at 09:07

## 2022-07-09 RX ADMIN — SODIUM BICARBONATE 650 MG TABLET 650 MG: at 08:07

## 2022-07-09 RX ADMIN — PIPERACILLIN SODIUM AND TAZOBACTAM SODIUM 4.5 G: 4; .5 INJECTION, POWDER, LYOPHILIZED, FOR SOLUTION INTRAVENOUS at 09:07

## 2022-07-09 RX ADMIN — INSULIN ASPART 2 UNITS: 100 INJECTION, SOLUTION INTRAVENOUS; SUBCUTANEOUS at 09:07

## 2022-07-09 RX ADMIN — CHOLESTYRAMINE 8 G: 4 POWDER, FOR SUSPENSION ORAL at 09:07

## 2022-07-09 RX ADMIN — NYSTATIN OINTMENT: 100000 OINTMENT TOPICAL at 08:07

## 2022-07-09 RX ADMIN — CHOLESTYRAMINE 8 G: 4 POWDER, FOR SUSPENSION ORAL at 08:07

## 2022-07-09 RX ADMIN — NYSTATIN OINTMENT: 100000 OINTMENT TOPICAL at 10:07

## 2022-07-09 RX ADMIN — HEPARIN SODIUM 5000 UNITS: 5000 INJECTION INTRAVENOUS; SUBCUTANEOUS at 08:07

## 2022-07-09 NOTE — PROGRESS NOTES
[facility] - Intensive Care ([place] [id number])  [facility] Medicine  Progress Note    Patient Name: Darlene Avila  MRN: [id number]  Patient Class: IP- Inpatient   Admission Date: 6/17/2022  Length of Stay: 20 days  Attending Physician: rTey Mike MD  Primary Care Provider: Kirill Cabrera Iii, MD        Subjective:     Principal Problem:Hypernatremia        HPI:  Darlene Avila is a 71 year old F with history of diabetes type 2, seizure c/b anoxic brain injury s/p trach, PEG, bed bound status, Stage IV sacral ulcer, ischemic CVA, IBS, history of breast cancer s/p right mastectomy and failure to thrive who was brought in by her HPOA (sister) for nursing home placement. Patient is unable to provide history.     Patient's sister was contacted who stated that the patient was admitted to [facility] in [facility] for recurrent seizures/status epilepticus which resulted in an anoxic brain injury and respiratory failure.  Patient was intubated followed by tracheostomy and PEG placement .  Patient was discharged to nursing home and subsequently discharged to hospice care at the request of patient's sister. However a few days ago, she visited the patient at [facility] recovery facility and noticed they weren't feeding or taking care of the patient due to her 'hospice status'. Per sister today, she would like to place the patient in a skilled nursing facility and would like to rescind her hospice care at this time.  She is no longer interested in hospice placement. She states the patient at baseline is only able to follow some simple commands. She is unsure how much O2 level is her baseline via trach collar.   Of note she has a Stage IV sacral wound that is s/p debridement on 06/09 by Gen surgery in [facility].     Upon arrival to the ED, the patient was hemodynamically stable. Labs revealed Na 157, WBC 14. Hgb 9.4 (baseline 7-9). The patient was given 1L of IV NS and was admitted to hospital medicine for further management.        Overview/Hospital Course:  Patient admitted to hospital medicine with pre-existing anoxic brain injury, hypernatremia, sacral decubitus wound s/p debridement 06/08 in [place], [place]. Patient initially admitted to hospice following discharge from [facility] ICU, but discharged from hospice as patient family believed she was not getting appropriate care. Na 157, corrected with IVF (NS and hypotonic solutions) and enteral FWF down to 148. Trach collar exchanged by ENT due to lack of supplies at facility. Wound care consulted for sacral decubitus, recommended General Surgery consult for possible debridement as wound appears purulent. Persistent leukocytosis, started Zosyn per previous I&D wound cultures growing bacteriodes/enterococcus and new wound findings. cEEG started and Epilepsy consulted for assistance with antiepileptic medications; conflicting reports if patient was on only Keppra/Lacosamide vs Keppra/Lacosamide/Phenytoin. General surgery consulted for debridement of sacral decubitus ulcer, s/p I&D 06/24. Palliative Care consulted for assistance in GOC; decision to pursue medical/surgical care decided. ENT consulted for possible trach decannulation. ID consulted, planning for 6-weeks course of zosyn.Sodium improved; will continue free water flushes.Patient remains with trach on humidifier. Mentation waxes and wanes.     CM/SW to assist with dispo. Due to trach placement, prison placement for SNF limited; LTAC is also not an alternative. Long discussion with patient's sister about long term care options for patient. Sister states that she is unable to provide patient the care she needs at home. SW spoke to patient's sister and reports that she is agreeable to placing patient at [facility]. [facility] will put patient on the wait list.  JAZMIN sent e-mail for SSI referral to assist with medicare. Discuss with patient's sister, Sabi, about hospice.       Interval History: NAEON. AF HDS.  Continue to work closely with patient's sister on long term placement. Patient is hypertensive today and started on hydralazine PRN for systolic >170.     Review of Systems   Unable to perform ROS: Patient nonverbal   Constitutional:  Positive for fatigue.   Respiratory:  Negative for chest tightness and shortness of breath.    Cardiovascular:  Negative for chest pain.   Gastrointestinal:  Negative for abdominal pain.   Objective:     Vital Signs (Most Recent):  Temp: 97.1 °F (36.2 °C) (07/09/22 0725)  Pulse: 83 (07/09/22 1100)  Resp: 16 (07/09/22 1100)  BP: (!) 173/73 (07/09/22 0725)  SpO2: 97 % (07/09/22 1100)   Vital Signs (24h Range):  Temp:  [97.1 °F (36.2 °C)-98.1 °F (36.7 °C)] 97.1 °F (36.2 °C)  Pulse:  [81-97] 83  Resp:  [16-22] 16  SpO2:  [92 %-97 %] 97 %  BP: (139-173)/(63-78) 173/73     Weight: 50.2 kg (110 lb 10.7 oz)  Body mass index is 22.35 kg/m².  No intake or output data in the 24 hours ending 07/09/22 1410   Physical Exam    Significant Labs: All pertinent labs within the past 24 hours have been reviewed.  None    Significant Imaging: I have reviewed all pertinent imaging results/findings within the past 24 hours.      Assessment/Plan:      * Hypernatremia  STABLE, WNL    Na stable today.   - Free water flushes      Encephalopathy  Mentation waxes and wanes. She is drowsy today but responds to sternal rub.    Goals of care, counseling/discussion  Refer to palliative care note.      Palliative care encounter  Per Primary Medicine team and Palliative Care encounters with patient family (separate encounters, see dated notes in chart), patient family wishes full medical care and does not desire comfort/hospice measures. DNR remains. See Pall Care note for further detail.    Seizures  Patient previously started on Lacosamide and Keppra at OSH during initial episode of status epilepticus. Phenytoin added as patient continued to have breakthrough seizures.    - Repeat EEG ordered 2/2 interval decrease in  alert/responsiveness; diffuse toxic metabolic encephalopathy, no new seizure per Neuro EP  - Continue Keppra monotherapy 750 bid; titrate per neuro recs  - Neurology consulted for further assistance in antiepileptic regimen    G tube feedings  Tube feed dependent via G tube  - Nutrition to assist with TF recs; see recommendations in note; appreciate assistance      Tracheostomy in place  Dysarthria  Dysphagia    Trach capped, good O2 sats on RA     - Respiratory therapy to assist with trach care.   - SLP for swallow evaluation and speech therapy; recommending NPO, re-evaluation, PMSV under direct supervision  - ENT advising to keep tracheostomy in place due to intermittent responsiveness; amenable to decannulation if patient family moves towards hospice/comfort measures    Sacral decubitus ulcer, stage IV  S/p recent debridement at OSH 06/08. OSH Wcx with enterococcus faecalis, bacteriodes fragilus.    - Wound care consulted; appreciate assistance  - Deep wound cx  - General surgery consulted s/p debridement 06/24, no note of bone involvement per OP note, no cultures sent  - Infectious Disease consulted, zosyn for 6-weeks (End date is 8/5)  - Consideration for wound vac given depth of injury  - Turn q2h    Leukocytosis  Patient admitted with elevated WBC to 14s. Previous episodes of aspiration PNA at OSH, indwelling cabrera given acuity of condition, sacral decubitus ulcer stave IV s/p debridement with enterococcus faecalis / bacteriodes fragilis in cx. Patient afebrile with stable hemodynamics on admission at this time. CXR without acute abnormality. UA with yeast growing, chronic per previous OSH review.    Leukocytosis may be explained by soft tissue infection vs hemoconcentration given extensive dehydration and hypernatremia from lack of enteral fluids or IVF at outside hospice facility prior to transfer to [facility].     - Zosyn given previous cultures and persistent leukocytosis  - Etiology may be soft tissue  infection; see Sacral Decubitus Ulcer A/P  - Continue monitoring CBC  - Wound care per Sacral Decubitus Ulcer A/P  - Aspiration precautions  - Trend fever curve  - Trend BCx    Discharge planning issues  Darlene Avila is a 71 year old F with history of diabetes type 2, seizure, ischemic CVA, IBS, history of breast cancer s/p right mastectomy and failure to thrive who was brought in by her HPOA (sister) for skilled nursing home placement due to concern for neglect at recent hospice facility. Patient follows very simple commands and doubt she can engage in skilled therapy. [facility] nursing facility likely more of an option.     - Appreciate CM/SW assistance with placement; placement difficult d/t trach; LTAC placement not an option. Patient is now on the wait list for [facility] at [facility].   - Appreciate pharmacy's assistance with med rec from previous admission    ACP (advance care planning)  Patient is DNR. LaPOST on file.     - CM/SW assisting; tracheostomy status complicating placement to [facility] w/ hospice.  - See Care Update note Zi-lona Self MD on 06/29 for further detail.    History of CVA (cerebrovascular accident)  Unclear of patient's current medications. Per med review on care-everywhere, the patient is on statin but not on antiplatelet therapy.     - Appreciate pharmacy's assistance with medication reconciliation with facility- start antiplatelet therapy if no contraindications.   - Continue statin via G-tube      Essential hypertension  Blood pressure elevated this morning at 173/73.     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 A1c repeated 6.9.    - LDSSI   - POCT glucose q6hrs  - Maintain -180  - Titrate basal/bolus regimen to goal as above.      VTE Risk Mitigation (From admission,  onward)         Ordered     heparin (porcine) injection 5,000 Units  Every 12 hours         06/18/22 0355     IP VTE HIGH RISK PATIENT  Once         06/18/22 0355     Place sequential compression device  Until discontinued         06/18/22 0355                Discharge Planning   JUDIT: 7/12/2022     Code Status: DNR   Is the patient medically ready for discharge?: Yes    Reason for patient still in hospital (select all that apply): Patient trending condition  Discharge Plan A: New Nursing Home placement - [facility]   Discharge Delays: (!) Patient and Family Barriers (pt high level of needs and accepting facilities)              Maria L Mathew DO  Department of Hospital Medicine   [facility] - Intensive Care ([place])

## 2022-07-09 NOTE — SUBJECTIVE & OBJECTIVE
Interval History: NAEON. AF HDS. Continue to work closely with patient's sister on long term placement. Patient is hypertensive today and started on hydralazine PRN for systolic >170.     Review of Systems   Unable to perform ROS: Patient nonverbal   Constitutional:  Positive for fatigue.   Respiratory:  Negative for chest tightness and shortness of breath.    Cardiovascular:  Negative for chest pain.   Gastrointestinal:  Negative for abdominal pain.   Objective:     Vital Signs (Most Recent):  Temp: 97.1 °F (36.2 °C) (07/09/22 0725)  Pulse: 83 (07/09/22 1100)  Resp: 16 (07/09/22 1100)  BP: (!) 173/73 (07/09/22 0725)  SpO2: 97 % (07/09/22 1100)   Vital Signs (24h Range):  Temp:  [97.1 °F (36.2 °C)-98.1 °F (36.7 °C)] 97.1 °F (36.2 °C)  Pulse:  [81-97] 83  Resp:  [16-22] 16  SpO2:  [92 %-97 %] 97 %  BP: (139-173)/(63-78) 173/73     Weight: 50.2 kg (110 lb 10.7 oz)  Body mass index is 22.35 kg/m².  No intake or output data in the 24 hours ending 07/09/22 1410   Physical Exam    Significant Labs: All pertinent labs within the past 24 hours have been reviewed.  None    Significant Imaging: I have reviewed all pertinent imaging results/findings within the past 24 hours.

## 2022-07-09 NOTE — ASSESSMENT & PLAN NOTE
Patient admitted with elevated WBC to 14s. Previous episodes of aspiration PNA at OSH, indwelling cabrera given acuity of condition, sacral decubitus ulcer stave IV s/p debridement with enterococcus faecalis / bacteriodes fragilis in cx. Patient afebrile with stable hemodynamics on admission at this time. CXR without acute abnormality. UA with yeast growing, chronic per previous OSH review.    Leukocytosis may be explained by soft tissue infection vs hemoconcentration given extensive dehydration and hypernatremia from lack of enteral fluids or IVF at outside hospice facility prior to transfer to [facility].     - Zosyn given previous cultures and persistent leukocytosis  - Etiology may be soft tissue infection; see Sacral Decubitus Ulcer A/P  - Continue monitoring CBC  - Wound care per Sacral Decubitus Ulcer A/P  - Aspiration precautions  - Trend fever curve  - Trend BCx

## 2022-07-09 NOTE — PLAN OF CARE
Problem: Adult Inpatient Plan of Care  Goal: Plan of Care Review  Outcome: Ongoing, Progressing  Flowsheets (Taken 7/9/2022 1843)  Plan of Care Reviewed With:   patient   sibling  Goal: Patient-Specific Goal (Individualized)  Outcome: Ongoing, Progressing  Goal: Absence of Hospital-Acquired Illness or Injury  Outcome: Ongoing, Progressing  Goal: Optimal Comfort and Wellbeing  Outcome: Ongoing, Progressing  Goal: Readiness for Transition of Care  Outcome: Ongoing, Progressing     Problem: Diabetes Comorbidity  Goal: Blood Glucose Level Within Targeted Range  Outcome: Ongoing, Progressing  Intervention: Monitor and Manage Glycemia  Flowsheets (Taken 7/9/2022 0854)  Glycemic Management:   blood glucose monitored   supplemental insulin given     Problem: Impaired Wound Healing  Goal: Optimal Wound Healing  Outcome: Ongoing, Progressing  Intervention: Promote Wound Healing  Flowsheets (Taken 7/9/2022 0854)  Oral Nutrition Promotion: calorie-dense liquids provided  Sleep/Rest Enhancement:   consistent schedule promoted   family presence promoted     Problem: Infection  Goal: Absence of Infection Signs and Symptoms  Outcome: Ongoing, Progressing     Problem: Skin Injury Risk Increased  Goal: Skin Health and Integrity  Outcome: Ongoing, Progressing  Intervention: Optimize Skin Protection  Flowsheets (Taken 7/9/2022 0854)  Pressure Reduction Techniques:   heels elevated off bed   positioned off wounds   pressure points protected  Pressure Reduction Devices:   heel offloading device utilized   pressure-redistributing mattress utilized   positioning supports utilized  Head of Bed (HOB) Positioning: HOB at 30 degrees  Intervention: Promote and Optimize Oral Intake  Flowsheets (Taken 7/9/2022 0854)  Oral Nutrition Promotion: calorie-dense liquids provided     Problem: Fall Injury Risk  Goal: Absence of Fall and Fall-Related Injury  Outcome: Ongoing, Progressing     Problem: Coping Ineffective  Goal: Effective Coping  Outcome:  Ongoing, Progressing  Intervention: Support and Enhance Coping Strategies  Flowsheets (Taken 7/9/2022 0754)  Family/Support System Care:   caregiver stress acknowledged   involvement promoted  Environmental Support:   calm environment promoted   environmental consistency promoted

## 2022-07-09 NOTE — ASSESSMENT & PLAN NOTE
Patient is DNR. LaPOST on file.     - CM/SW assisting; tracheostomy status complicating placement to residential w/ hospice.  - See Care Update note Zi-on MD Aramis on 06/29 for further detail.

## 2022-07-09 NOTE — ASSESSMENT & PLAN NOTE
Darlene Avila is a 71 year old F with history of diabetes type 2, seizure, ischemic CVA, IBS, history of breast cancer s/p right mastectomy and failure to thrive who was brought in by her HPOA (sister) for skilled nursing home placement due to concern for neglect at recent hospice facility. Patient follows very simple commands and doubt she can engage in skilled therapy. [facility] nursing facility likely more of an option.     - Appreciate CM/SW assistance with placement; placement difficult d/t trach; LTAC placement not an option. Patient is now on the wait list for placement at [facility].   - Appreciate pharmacy's assistance with med rec from previous admission

## 2022-07-09 NOTE — RESPIRATORY THERAPY
RAPID RESPONSE RESPIRATORY THERAPY PROACTIVE NOTE           Time of visit: 810    Code Status: DNR   : 1950  Bed: 34892/75007 A:   MRN: [id number]  Time spent at the bedside: < 15 min    SITUATION    Evaluated patient for: LDA Check     BACKGROUND    Patient has a past medical history of Arthritis, Cancer of breast, Cataract, Diabetes mellitus, Hypertension, Hypoxia, Memory loss, Orthostatic hypotension, TIA (transient ischemic attack), and Vitamin D deficiency.  Clinically Significant Surgical Hx: tracheostomy    24 Hours Vitals Range:  Temp:  [97.1 °F (36.2 °C)-99.3 °F (37.4 °C)]   Pulse:  [81-97]   Resp:  [16-]   BP: (139-173)/(63-78)   SpO2:  [92 %-97 %]     Labs:    Recent Labs     22  0513   PHOS 2.3*   MG 1.7        No results for input(s): PH, PCO2, PO2, HCO3, POCSATURATED, BE in the last 72 hours.    ASSESSMENT/INTERVENTIONS    Upon arrival in room all trach supplies at bedside.  Extra trachs at bedside include 4.0 and 6.0 shiley both cuffed    Last VS   Temp: 97.1 °F (36.2 °C) (725)  Pulse: 81 (725)  Resp: 16 (725)  BP: 173/73 (725)  SpO2: 97 % (725)    Level of Consciousness: Level of Consciousness (AVPU): responds to pain  Respiratory Effort: Respiratory Effort: Normal, Unlabored Expansion/Accessory Muscle Usage: Expansion/Accessory Muscles/Retractions: expansion symmetric, no retractions, no use of accessory muscles  All Lung Field Breath Sounds: All Lung Fields Breath Sounds: Anterior:, Lateral:, coarse, diminished  IGNACIO Breath Sounds: diminished  LLL Breath Sounds: diminished  RUL Breath Sounds: diminished  RML Breath Sounds: diminished  RLL Breath Sounds: diminished  O2 Device/Concentration: Flow (L/min): 5, Oxygen Concentration (%): 21, O2 Device (Oxygen Therapy): Trach Collar  Surgical airway: Yes, Type: Shiley Size: 6, uncuffed  Ambu at bedside: Ambu bag with the patient?: Yes, Adult Ambu     Active Orders   Respiratory Care    Pulse Oximetry Q4H      Frequency: Q4H     Number of Occurrences: Until Specified    Routine tracheostomy care     Frequency: BID     Number of Occurrences: Until Specified    SUCTION PRN     Frequency: PRN     Number of Occurrences: Until Specified       RECOMMENDATIONS    We recommend: RRT Recs: Continue POC per primary team.    ESCALATION        FOLLOW-UP    Please call back the Rapid Response RT, Chetna Puckett, RRT at x [phone] for any questions or concerns.

## 2022-07-09 NOTE — CARE UPDATE
"RAPID RESPONSE NURSE CHART REVIEW        Chart Reviewed: 07/08/2022, 11:27 PM    MRN: [id number]  Bed: 82407/07880 A    Dx: Hypernatremia    Darlene Avila has a past medical history of Arthritis, Cancer of breast, Cataract, Diabetes mellitus, Hypertension, Hypoxia, Memory loss, Orthostatic hypotension, TIA (transient ischemic attack), and Vitamin D deficiency.    Last VS: /63 (BP Location: Left arm)   Pulse 96   Temp 97.8 °F (36.6 °C)   Resp (!) 22   Ht 4' 11" (1.499 m)   Wt 50.2 kg (110 lb 10.7 oz)   SpO2 (!) 94%   Breastfeeding No   BMI 22.35 kg/m²     24H Vital Sign Range:  Temp:  [97.8 °F (36.6 °C)-99.3 °F (37.4 °C)]   Pulse:  [82-97]   Resp:  [19-22]   BP: (139-166)/(63-78)   SpO2:  [92 %-94 %]     Level of Consciousness (AVPU): responds to pain    Recent Labs     07/06/22  0426 07/07/22  0513   WBC 9.93 11.23   HGB 7.9* 7.9*   HCT 25.0* 24.7*   * 526*       Recent Labs     07/06/22  0426 07/07/22  0513     --    K 4.6  --      --    CO2 21*  --    CREATININE 0.6  --    GLU 80  --    PHOS 2.2* 2.3*   MG 1.7 1.7        No results for input(s): PH, PCO2, PO2, HCO3, POCSATURATED, BE in the last 72 hours.     OXYGEN:  Flow (L/min): 5  Oxygen Concentration (%): 21  O2 Device (Oxygen Therapy): Trach Collar    MEWS score: 5    Charge Evin GARCIA contacted. No concerns verbalized at this time. Instructed to call [phone] for further concerns or assistance.    Nataly Win RN      "

## 2022-07-10 LAB
ANION GAP SERPL CALC-SCNC: 7 MMOL/L (ref 8–16)
BASOPHILS # BLD AUTO: 0.04 K/UL (ref 0–0.2)
BASOPHILS NFR BLD: 0.4 % (ref 0–1.9)
BUN SERPL-MCNC: 14 MG/DL (ref 8–23)
CALCIUM SERPL-MCNC: 9.2 MG/DL (ref 8.7–10.5)
CHLORIDE SERPL-SCNC: 106 MMOL/L (ref 95–110)
CO2 SERPL-SCNC: 25 MMOL/L (ref 23–29)
CREAT SERPL-MCNC: 0.6 MG/DL (ref 0.5–1.4)
DIFFERENTIAL METHOD: ABNORMAL
EOSINOPHIL # BLD AUTO: 0.1 K/UL (ref 0–0.5)
EOSINOPHIL NFR BLD: 0.6 % (ref 0–8)
ERYTHROCYTE [DISTWIDTH] IN BLOOD BY AUTOMATED COUNT: 19.3 % (ref 11.5–14.5)
EST. GFR  (AFRICAN AMERICAN): >60 ML/MIN/1.73 M^2
EST. GFR  (NON AFRICAN AMERICAN): >60 ML/MIN/1.73 M^2
GLUCOSE SERPL-MCNC: 251 MG/DL (ref 70–110)
HCT VFR BLD AUTO: 27.4 % (ref 37–48.5)
HGB BLD-MCNC: 8.4 G/DL (ref 12–16)
IMM GRANULOCYTES # BLD AUTO: 0.02 K/UL (ref 0–0.04)
IMM GRANULOCYTES NFR BLD AUTO: 0.2 % (ref 0–0.5)
LYMPHOCYTES # BLD AUTO: 2.8 K/UL (ref 1–4.8)
LYMPHOCYTES NFR BLD: 31.5 % (ref 18–48)
MAGNESIUM SERPL-MCNC: 1.8 MG/DL (ref 1.6–2.6)
MCH RBC QN AUTO: 26.8 PG (ref 27–31)
MCHC RBC AUTO-ENTMCNC: 30.7 G/DL (ref 32–36)
MCV RBC AUTO: 87 FL (ref 82–98)
MONOCYTES # BLD AUTO: 0.4 K/UL (ref 0.3–1)
MONOCYTES NFR BLD: 4.5 % (ref 4–15)
NEUTROPHILS # BLD AUTO: 5.6 K/UL (ref 1.8–7.7)
NEUTROPHILS NFR BLD: 62.8 % (ref 38–73)
NRBC BLD-RTO: 0 /100 WBC
PHOSPHATE SERPL-MCNC: 2.6 MG/DL (ref 2.7–4.5)
PLATELET # BLD AUTO: 590 K/UL (ref 150–450)
PMV BLD AUTO: 9.1 FL (ref 9.2–12.9)
POCT GLUCOSE: 233 MG/DL (ref 70–110)
POCT GLUCOSE: 299 MG/DL (ref 70–110)
POTASSIUM SERPL-SCNC: 4.8 MMOL/L (ref 3.5–5.1)
RBC # BLD AUTO: 3.14 M/UL (ref 4–5.4)
SODIUM SERPL-SCNC: 138 MMOL/L (ref 136–145)
WBC # BLD AUTO: 8.96 K/UL (ref 3.9–12.7)

## 2022-07-10 PROCEDURE — 85025 COMPLETE CBC W/AUTO DIFF WBC: CPT

## 2022-07-10 PROCEDURE — 12000002 HC ACUTE/MED SURGE SEMI-PRIVATE ROOM

## 2022-07-10 PROCEDURE — 80048 BASIC METABOLIC PNL TOTAL CA: CPT

## 2022-07-10 PROCEDURE — 99232 SBSQ HOSP IP/OBS MODERATE 35: CPT | Mod: ,,, | Performed by: STUDENT IN AN ORGANIZED HEALTH CARE EDUCATION/TRAINING PROGRAM

## 2022-07-10 PROCEDURE — 25000003 PHARM REV CODE 250: Performed by: STUDENT IN AN ORGANIZED HEALTH CARE EDUCATION/TRAINING PROGRAM

## 2022-07-10 PROCEDURE — 36415 COLL VENOUS BLD VENIPUNCTURE: CPT

## 2022-07-10 PROCEDURE — 63600175 PHARM REV CODE 636 W HCPCS: Performed by: STUDENT IN AN ORGANIZED HEALTH CARE EDUCATION/TRAINING PROGRAM

## 2022-07-10 PROCEDURE — 84100 ASSAY OF PHOSPHORUS: CPT

## 2022-07-10 PROCEDURE — 25000003 PHARM REV CODE 250

## 2022-07-10 PROCEDURE — 94761 N-INVAS EAR/PLS OXIMETRY MLT: CPT

## 2022-07-10 PROCEDURE — 99900035 HC TECH TIME PER 15 MIN (STAT)

## 2022-07-10 PROCEDURE — 27000221 HC OXYGEN, UP TO 24 HOURS

## 2022-07-10 PROCEDURE — 83735 ASSAY OF MAGNESIUM: CPT

## 2022-07-10 PROCEDURE — 99232 PR SUBSEQUENT HOSPITAL CARE,LEVL II: ICD-10-PCS | Mod: ,,, | Performed by: STUDENT IN AN ORGANIZED HEALTH CARE EDUCATION/TRAINING PROGRAM

## 2022-07-10 PROCEDURE — 63600175 PHARM REV CODE 636 W HCPCS

## 2022-07-10 PROCEDURE — 99900026 HC AIRWAY MAINTENANCE (STAT)

## 2022-07-10 PROCEDURE — 91305 PHARM REV CODE 636 W HCPCS: CPT | Performed by: STUDENT IN AN ORGANIZED HEALTH CARE EDUCATION/TRAINING PROGRAM

## 2022-07-10 PROCEDURE — 0052A HC IMMUNIZ ADMIN, SARS-COV-2 COVID-19 VACC, TRI SUCROSE, 30MCG/0.3ML, 2ND DOSE: CPT | Performed by: STUDENT IN AN ORGANIZED HEALTH CARE EDUCATION/TRAINING PROGRAM

## 2022-07-10 RX ADMIN — PIPERACILLIN SODIUM AND TAZOBACTAM SODIUM 4.5 G: 4; .5 INJECTION, POWDER, LYOPHILIZED, FOR SOLUTION INTRAVENOUS at 03:07

## 2022-07-10 RX ADMIN — LEVETIRACETAM 750 MG: 100 SOLUTION ORAL at 09:07

## 2022-07-10 RX ADMIN — NYSTATIN OINTMENT: 100000 OINTMENT TOPICAL at 08:07

## 2022-07-10 RX ADMIN — ATORVASTATIN CALCIUM 80 MG: 20 TABLET, FILM COATED ORAL at 09:07

## 2022-07-10 RX ADMIN — SODIUM BICARBONATE 650 MG TABLET 650 MG: at 08:07

## 2022-07-10 RX ADMIN — INSULIN ASPART 6 UNITS: 100 INJECTION, SOLUTION INTRAVENOUS; SUBCUTANEOUS at 05:07

## 2022-07-10 RX ADMIN — NYSTATIN OINTMENT: 100000 OINTMENT TOPICAL at 09:07

## 2022-07-10 RX ADMIN — CHOLESTYRAMINE 8 G: 4 POWDER, FOR SUSPENSION ORAL at 08:07

## 2022-07-10 RX ADMIN — CHOLESTYRAMINE 8 G: 4 POWDER, FOR SUSPENSION ORAL at 09:07

## 2022-07-10 RX ADMIN — SODIUM BICARBONATE 650 MG TABLET 650 MG: at 09:07

## 2022-07-10 RX ADMIN — PIPERACILLIN SODIUM AND TAZOBACTAM SODIUM 4.5 G: 4; .5 INJECTION, POWDER, LYOPHILIZED, FOR SOLUTION INTRAVENOUS at 08:07

## 2022-07-10 RX ADMIN — HEPARIN SODIUM 5000 UNITS: 5000 INJECTION INTRAVENOUS; SUBCUTANEOUS at 09:07

## 2022-07-10 RX ADMIN — HEPARIN SODIUM 5000 UNITS: 5000 INJECTION INTRAVENOUS; SUBCUTANEOUS at 08:07

## 2022-07-10 RX ADMIN — PIPERACILLIN SODIUM AND TAZOBACTAM SODIUM 4.5 G: 4; .5 INJECTION, POWDER, LYOPHILIZED, FOR SOLUTION INTRAVENOUS at 05:07

## 2022-07-10 RX ADMIN — LEVETIRACETAM 750 MG: 100 SOLUTION ORAL at 08:07

## 2022-07-10 RX ADMIN — BNT162B2 0.3 ML: 0.23 INJECTION, SUSPENSION INTRAMUSCULAR at 05:07

## 2022-07-10 NOTE — ASSESSMENT & PLAN NOTE
Patient is DNR. LaPOST on file.     - CM/SW assisting; tracheostomy status complicating placement to snf w/ hospice.  - See Care Update note Zi-on MD Aramis on 06/29 for further detail.

## 2022-07-10 NOTE — ASSESSMENT & PLAN NOTE
Dysarthria  Dysphagia    Trach connected to humidifier, good O2 sats on RA     - Respiratory therapy to assist with trach care.   - SLP for swallow evaluation and speech therapy; recommending NPO, re-evaluation, PMSV under direct supervision  - ENT advising to keep tracheostomy in place due to intermittent responsiveness; amenable to decannulation if patient family moves towards hospice/comfort measures

## 2022-07-10 NOTE — PROGRESS NOTES
[facility] - Intensive Care ([place] [id number])  [facility] Medicine  Progress Note    Patient Name: Darlene Avila  MRN: [id number]  Patient Class: IP- Inpatient   Admission Date: 6/17/2022  Length of Stay: 21 days  Attending Physician: Trey Mike MD  Primary Care Provider: Kirill Cabrera Iii, MD        Subjective:     Principal Problem:Hypernatremia        HPI:  Darlene Avila is a 71 year old F with history of diabetes type 2, seizure c/b anoxic brain injury s/p trach, PEG, bed bound status, Stage IV sacral ulcer, ischemic CVA, IBS, history of breast cancer s/p right mastectomy and failure to thrive who was brought in by her HPOA (sister) for nursing home placement. Patient is unable to provide history.     Patient's sister was contacted who stated that the patient was admitted to [facility] in [facility] for recurrent seizures/status epilepticus which resulted in an anoxic brain injury and respiratory failure.  Patient was intubated followed by tracheostomy and PEG placement .  Patient was discharged to nursing home and subsequently discharged to hospice care at the request of patient's sister. However a few days ago, she visited the patient at [facility] recovery facility and noticed they weren't feeding or taking care of the patient due to her 'hospice status'. Per sister today, she would like to place the patient in a skilled nursing facility and would like to rescind her hospice care at this time.  She is no longer interested in hospice placement. She states the patient at baseline is only able to follow some simple commands. She is unsure how much O2 level is her baseline via trach collar.   Of note she has a Stage IV sacral wound that is s/p debridement on 06/09 by Gen surgery in [facility].     Upon arrival to the ED, the patient was hemodynamically stable. Labs revealed Na 157, WBC 14. Hgb 9.4 (baseline 7-9). The patient was given 1L of IV NS and was admitted to hospital medicine for further management.        Overview/Hospital Course:  Patient admitted to hospital medicine with pre-existing anoxic brain injury, hypernatremia, sacral decubitus wound s/p debridement 06/08 in [place], [place]. Patient initially admitted to hospice following discharge from [facility] ICU, but discharged from hospice as patient family believed she was not getting appropriate care. Na 157, corrected with IVF (NS and hypotonic solutions) and enteral FWF down to 148. Trach collar exchanged by ENT due to lack of supplies at facility. Wound care consulted for sacral decubitus, recommended General Surgery consult for possible debridement as wound appears purulent. Persistent leukocytosis, started Zosyn per previous I&D wound cultures growing bacteriodes/enterococcus and new wound findings. cEEG started and Epilepsy consulted for assistance with antiepileptic medications; conflicting reports if patient was on only Keppra/Lacosamide vs Keppra/Lacosamide/Phenytoin. General surgery consulted for debridement of sacral decubitus ulcer, s/p I&D 06/24. Palliative Care consulted for assistance in GOC; decision to pursue medical/surgical care decided. ENT consulted for possible trach decannulation. ID consulted, planning for 6-weeks course of zosyn.Sodium improved; will continue free water flushes.Patient remains with trach on humidifier. Mentation waxes and wanes.     CM/SW to assist with dispo. Due to trach placement, prison placement for SNF limited; LTAC is also not an alternative. Long discussion with patient's sister about long term care options for patient. Sister states that she is unable to provide patient the care she needs at home. SW spoke to patient's sister and reports that she is agreeable to placing patient at [facility]. [facility] will put patient on the wait list.  JAZMIN sent e-mail for SSI referral to assist with medicare. Discuss with patient's sister, Sabi, about hospice.       Interval History: NAEON. AF HDS. She  is awake and answering questions today. Adjusted her Detemir dosage for better glycemic control. Awaiting update about long term care from .    Review of Systems   Unable to perform ROS: Patient nonverbal   Constitutional:  Positive for fatigue.   Respiratory:  Negative for chest tightness and shortness of breath.    Cardiovascular:  Negative for chest pain.   Gastrointestinal:  Negative for abdominal pain.   Objective:     Vital Signs (Most Recent):  Temp: 98.5 °F (36.9 °C) (07/10/22 1155)  Pulse: 86 (07/10/22 1155)  Resp: 18 (07/10/22 1155)  BP: (!) 141/65 (07/10/22 1155)  SpO2: 96 % (07/10/22 1155)   Vital Signs (24h Range):  Temp:  [98.1 °F (36.7 °C)-98.5 °F (36.9 °C)] 98.5 °F (36.9 °C)  Pulse:  [76-91] 86  Resp:  [17-20] 18  SpO2:  [90 %-98 %] 96 %  BP: (125-141)/(59-65) 141/65     Weight: 50.2 kg (110 lb 10.7 oz)  Body mass index is 22.35 kg/m².    Intake/Output Summary (Last 24 hours) at 7/10/2022 1430  Last data filed at 7/10/2022 0527  Gross per 24 hour   Intake 498.33 ml   Output 700 ml   Net -201.67 ml      Physical Exam  Vitals and nursing note reviewed.   Constitutional:       General: She is not in acute distress.     Appearance: She is not ill-appearing or toxic-appearing.   HENT:      Head: Normocephalic.      Mouth/Throat:      Mouth: Mucous membranes are dry.      Comments: Dried oral secretions adhered to tongue  Neck:      Comments: Trach in place and secured  Cardiovascular:      Rate and Rhythm: Normal rate and regular rhythm.      Pulses: Normal pulses.      Heart sounds: Normal heart sounds. No murmur heard.  Pulmonary:      Effort: Pulmonary effort is normal. No respiratory distress.      Breath sounds: Normal breath sounds. No wheezing or rales.   Abdominal:      General: Abdomen is flat. Bowel sounds are normal. There is no distension.      Palpations: Abdomen is soft.      Tenderness: There is no abdominal tenderness.   Musculoskeletal:         General: No swelling or tenderness. Normal  range of motion.      Cervical back: Normal range of motion.   Skin:     General: Skin is warm.      Coloration: Skin is not jaundiced or pale.      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    Neurological:      Mental Status: She is alert.      Comments: Intermittently alert, minimally responsive   Psychiatric:      Comments: Unable to assess       Significant Labs: All pertinent labs within the past 24 hours have been reviewed.  CBC:   Recent Labs   Lab 07/10/22  0600   WBC 8.96   HGB 8.4*   HCT 27.4*   *     CMP:   Recent Labs   Lab 07/10/22  0600      K 4.8      CO2 25   *   BUN 14   CREATININE 0.6   CALCIUM 9.2   ANIONGAP 7*   EGFRNONAA >60.0       Significant Imaging: I have reviewed all pertinent imaging results/findings within the past 24 hours.      Assessment/Plan:      * Hypernatremia  STABLE, WNL    Na stable today.   - Free water flushes      Encephalopathy  Mentation waxes and wanes. Mentation is good today. She is awake and answering questions.     Goals of care, counseling/discussion  Refer to palliative care note.      Palliative care encounter  Per Primary Medicine team and Palliative Care encounters with patient family (separate encounters, see dated notes in chart), patient family wishes full medical care and does not desire comfort/hospice measures. DNR remains. See Pall Care note for further detail.    Seizures  Patient previously started on Lacosamide and Keppra at OSH during initial episode of status epilepticus. Phenytoin added as patient continued to have breakthrough seizures.    - Repeat EEG ordered 2/2 interval decrease in alert/responsiveness; diffuse toxic metabolic encephalopathy, no new seizure per Neuro EP  - Continue Keppra monotherapy 750 bid; titrate per neuro recs  - Neurology consulted for further assistance in antiepileptic regimen    G tube feedings  Tube feed dependent via G tube  - Nutrition to assist with TF recs; see  recommendations in note; appreciate assistance      Tracheostomy in place  Dysarthria  Dysphagia    Trach connected to humidifier, good O2 sats on RA     - Respiratory therapy to assist with trach care.   - SLP for swallow evaluation and speech therapy; recommending NPO, re-evaluation, PMSV under direct supervision  - ENT advising to keep tracheostomy in place due to intermittent responsiveness; amenable to decannulation if patient family moves towards hospice/comfort measures    Sacral decubitus ulcer, stage IV  S/p recent debridement at OSH 06/08. OSH Wcx with enterococcus faecalis, bacteriodes fragilus.    - Wound care consulted; appreciate assistance  - Deep wound cx  - General surgery consulted s/p debridement 06/24, no note of bone involvement per OP note, no cultures sent  - Infectious Disease consulted, zosyn for 6-weeks (End date is 8/5)  - Consideration for wound vac given depth of injury  - Turn q2h    Leukocytosis  Patient admitted with elevated WBC to 14s. Previous episodes of aspiration PNA at OSH, indwelling cabrera given acuity of condition, sacral decubitus ulcer stave IV s/p debridement with enterococcus faecalis / bacteriodes fragilis in cx. Patient afebrile with stable hemodynamics on admission at this time. CXR without acute abnormality. UA with yeast growing, chronic per previous OSH review.    Leukocytosis may be explained by soft tissue infection vs hemoconcentration given extensive dehydration and hypernatremia from lack of enteral fluids or IVF at outside hospice facility prior to transfer to [facility].     Currently STABLE    - Zosyn given previous cultures and persistent leukocytosis  - Etiology may be soft tissue infection; see Sacral Decubitus Ulcer A/P  - Continue monitoring CBC  - Wound care per Sacral Decubitus Ulcer A/P  - Aspiration precautions  - Trend fever curve  - Trend BCx    Discharge planning issues  Darlene Avila is a 71 year old F with history of diabetes type 2, seizure, ischemic  CVA, IBS, history of breast cancer s/p right mastectomy and failure to thrive who was brought in by her HPOA (sister) for skilled nursing home placement due to concern for neglect at recent hospice facility. Patient follows very simple commands and doubt she can engage in skilled therapy. halfway nursing facility likely more of an option.     - Appreciate CM/SW assistance with placement; placement difficult d/t trach; LTAC placement not an option. Patient is now on the wait list for placement at [facility].   - Appreciate pharmacy's assistance with med rec from previous admission    ACP (advance care planning)  Patient is DNR. LaPOST on file.     - CM/SW assisting; tracheostomy status complicating placement to [facility] w/ hospice.  - See Care Update note Zi-lona Self MD on 06/29 for further detail.    History of CVA (cerebrovascular accident)  Unclear of patient's current medications. Per med review on care-everywhere, the patient is on statin but not on antiplatelet therapy.     - Appreciate pharmacy's assistance with medication reconciliation with facility- start antiplatelet therapy if no contraindications.   - Continue statin via G-tube      Essential hypertension  Blood pressure stable.      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 A1c repeated 6.9.    - LDSSI   - POCT glucose q6hrs  - Maintain -180  - Titrate basal/bolus regimen to goal as above.      VTE Risk Mitigation (From admission, onward)         Ordered     heparin (porcine) injection 5,000 Units  Every 12 hours         06/18/22 0355     IP VTE HIGH RISK PATIENT  Once         06/18/22 0355     Place sequential compression device  Until discontinued         06/18/22 0355                Discharge Planning   JUDIT: 7/12/2022     Code Status: DNR    Is the patient medically ready for discharge?: Yes    Reason for patient still in hospital (select all that apply): Patient trending condition  Discharge Plan A: New Nursing Home placement - detention care facility   Discharge Delays: (!) Patient and Family Barriers (pt high level of needs and accepting facilities)              Maria L Mathew DO  Department of Hospital Medicine   [facility] - Intensive Care ([place]

## 2022-07-10 NOTE — ASSESSMENT & PLAN NOTE
Darlene Avila is a 71 year old F with history of diabetes type 2, seizure, ischemic CVA, IBS, history of breast cancer s/p right mastectomy and failure to thrive who was brought in by her HPOA (sister) for skilled nursing home placement due to concern for neglect at recent hospice facility. Patient follows very simple commands and doubt she can engage in skilled therapy. retirement nursing facility likely more of an option.     - Appreciate CM/SW assistance with placement; placement difficult d/t trach; LTAC placement not an option. Patient is now on the wait list for placement at [facility].   - Appreciate pharmacy's assistance with med rec from previous admission

## 2022-07-10 NOTE — ASSESSMENT & PLAN NOTE
Patient admitted with elevated WBC to 14s. Previous episodes of aspiration PNA at OSH, indwelling cabrera given acuity of condition, sacral decubitus ulcer stave IV s/p debridement with enterococcus faecalis / bacteriodes fragilis in cx. Patient afebrile with stable hemodynamics on admission at this time. CXR without acute abnormality. UA with yeast growing, chronic per previous OSH review.    Leukocytosis may be explained by soft tissue infection vs hemoconcentration given extensive dehydration and hypernatremia from lack of enteral fluids or IVF at outside hospice facility prior [place] [facility]     Currently STABLE    - Zosyn given previous cultures and persistent leukocytosis  - Etiology may be soft tissue infection; see Sacral Decubitus Ulcer A/P  - Continue monitoring CBC  - Wound care per Sacral Decubitus Ulcer A/P  - Aspiration precautions  - Trend fever curve  - Trend BCx

## 2022-07-10 NOTE — RESPIRATORY THERAPY
RAPID RESPONSE RESPIRATORY THERAPY PROACTIVE NOTE           Time of visit: 705     Code Status: DNR   : 1950  Bed: 42730/75451 A:   MRN: [id number]  Time spent at the bedside: < 15 min    SITUATION    Evaluated patient for: LDA Check     BACKGROUND    Patient has a past medical history of Arthritis, Cancer of breast, Cataract, Diabetes mellitus, Hypertension, Hypoxia, Memory loss, Orthostatic hypotension, TIA (transient ischemic attack), and Vitamin D deficiency.  Clinically Significant Surgical Hx: tracheostomy    24 Hours Vitals Range:  Temp:  [98.1 °F (36.7 °C)-98.5 °F (36.9 °C)]   Pulse:  [76-91]   Resp:  [16-20]   BP: (121-140)/(59-65)   SpO2:  [90 %-98 %]     Labs:    Recent Labs     07/10/22  0600      K 4.8      CO2 25   CREATININE 0.6   *   PHOS 2.6*   MG 1.8        No results for input(s): PH, PCO2, PO2, HCO3, POCSATURATED, BE in the last 72 hours.    ASSESSMENT/INTERVENTIONS    Upon arrival in room patient resting in bed.  All trach supplies at bedside.  Extra trachs at bedside include 4.0 and 6.0 shiley both cuffed.    Last VS   Temp: 98.1 °F (36.7 °C) (07/10 0734)  Pulse: 90 (07/10 0734)  Resp: 20 (07/10 0734)  BP: 134/63 (07/10 0734)  SpO2: 90 % (07/10 0734)    Level of Consciousness: Level of Consciousness (AVPU): alert  Respiratory Effort: Respiratory Effort: Unlabored Expansion/Accessory Muscle Usage: Expansion/Accessory Muscles/Retractions: expansion symmetric, no retractions, no use of accessory muscles  All Lung Field Breath Sounds: All Lung Fields Breath Sounds: Anterior:, Lateral:, diminished  IGNACIO Breath Sounds: diminished  LLL Breath Sounds: diminished  RUL Breath Sounds: diminished  RML Breath Sounds: diminished  RLL Breath Sounds: diminished  O2 Device/Concentration: Flow (L/min): 5, Oxygen Concentration (%): 21, O2 Device (Oxygen Therapy): Trach Collar  Surgical airway: Yes, Type: Shiley Size: 6, uncuffed  Ambu at bedside: Ambu bag with the patient?: Yes, Adult  Ambu     Active Orders   Respiratory Care    Pulse Oximetry Q4H     Frequency: Q4H     Number of Occurrences: Until Specified    Routine tracheostomy care     Frequency: BID     Number of Occurrences: Until Specified    SUCTION PRN     Frequency: PRN     Number of Occurrences: Until Specified       RECOMMENDATIONS    We recommend: RRT Recs: Continue POC per primary team.      FOLLOW-UP    Please call back the Rapid Response RT, Chrissy Mayes, RRT at x [phone] for any questions or concerns.

## 2022-07-10 NOTE — SUBJECTIVE & OBJECTIVE
Interval History: NAEON. AF HDS. She is awake and answering questions today. Adjusted her Detemir dosage for better glycemic control. Awaiting update about long term care from .    Review of Systems   Unable to perform ROS: Patient nonverbal   Constitutional:  Positive for fatigue.   Respiratory:  Negative for chest tightness and shortness of breath.    Cardiovascular:  Negative for chest pain.   Gastrointestinal:  Negative for abdominal pain.   Objective:     Vital Signs (Most Recent):  Temp: 98.5 °F (36.9 °C) (07/10/22 1155)  Pulse: 86 (07/10/22 1155)  Resp: 18 (07/10/22 1155)  BP: (!) 141/65 (07/10/22 1155)  SpO2: 96 % (07/10/22 1155)   Vital Signs (24h Range):  Temp:  [98.1 °F (36.7 °C)-98.5 °F (36.9 °C)] 98.5 °F (36.9 °C)  Pulse:  [76-91] 86  Resp:  [17-20] 18  SpO2:  [90 %-98 %] 96 %  BP: (125-141)/(59-65) 141/65     Weight: 50.2 kg (110 lb 10.7 oz)  Body mass index is 22.35 kg/m².    Intake/Output Summary (Last 24 hours) at 7/10/2022 1430  Last data filed at 7/10/2022 0527  Gross per 24 hour   Intake 498.33 ml   Output 700 ml   Net -201.67 ml      Physical Exam  Vitals and nursing note reviewed.   Constitutional:       General: She is not in acute distress.     Appearance: She is not ill-appearing or toxic-appearing.   HENT:      Head: Normocephalic.      Mouth/Throat:      Mouth: Mucous membranes are dry.      Comments: Dried oral secretions adhered to tongue  Neck:      Comments: Trach in place and secured  Cardiovascular:      Rate and Rhythm: Normal rate and regular rhythm.      Pulses: Normal pulses.      Heart sounds: Normal heart sounds. No murmur heard.  Pulmonary:      Effort: Pulmonary effort is normal. No respiratory distress.      Breath sounds: Normal breath sounds. No wheezing or rales.   Abdominal:      General: Abdomen is flat. Bowel sounds are normal. There is no distension.      Palpations: Abdomen is soft.      Tenderness: There is no abdominal tenderness.   Musculoskeletal:          General: No swelling or tenderness. Normal range of motion.      Cervical back: Normal range of motion.   Skin:     General: Skin is warm.      Coloration: Skin is not jaundiced or pale.      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    Neurological:      Mental Status: She is alert.      Comments: Intermittently alert, minimally responsive   Psychiatric:      Comments: Unable to assess       Significant Labs: All pertinent labs within the past 24 hours have been reviewed.  CBC:   Recent Labs   Lab 07/10/22  0600   WBC 8.96   HGB 8.4*   HCT 27.4*   *     CMP:   Recent Labs   Lab 07/10/22  0600      K 4.8      CO2 25   *   BUN 14   CREATININE 0.6   CALCIUM 9.2   ANIONGAP 7*   EGFRNONAA >60.0       Significant Imaging: I have reviewed all pertinent imaging results/findings within the past 24 hours.

## 2022-07-11 LAB
ANION GAP SERPL CALC-SCNC: 7 MMOL/L (ref 8–16)
BASOPHILS # BLD AUTO: 0.05 K/UL (ref 0–0.2)
BASOPHILS NFR BLD: 0.5 % (ref 0–1.9)
BUN SERPL-MCNC: 13 MG/DL (ref 8–23)
CALCIUM SERPL-MCNC: 9.4 MG/DL (ref 8.7–10.5)
CHLORIDE SERPL-SCNC: 108 MMOL/L (ref 95–110)
CO2 SERPL-SCNC: 24 MMOL/L (ref 23–29)
CREAT SERPL-MCNC: 0.5 MG/DL (ref 0.5–1.4)
DIFFERENTIAL METHOD: ABNORMAL
EOSINOPHIL # BLD AUTO: 0.1 K/UL (ref 0–0.5)
EOSINOPHIL NFR BLD: 0.6 % (ref 0–8)
ERYTHROCYTE [DISTWIDTH] IN BLOOD BY AUTOMATED COUNT: 18.3 % (ref 11.5–14.5)
EST. GFR  (AFRICAN AMERICAN): >60 ML/MIN/1.73 M^2
EST. GFR  (NON AFRICAN AMERICAN): >60 ML/MIN/1.73 M^2
GLUCOSE SERPL-MCNC: 179 MG/DL (ref 70–110)
HCT VFR BLD AUTO: 27 % (ref 37–48.5)
HGB BLD-MCNC: 8.4 G/DL (ref 12–16)
IMM GRANULOCYTES # BLD AUTO: 0.01 K/UL (ref 0–0.04)
IMM GRANULOCYTES NFR BLD AUTO: 0.1 % (ref 0–0.5)
LYMPHOCYTES # BLD AUTO: 2.9 K/UL (ref 1–4.8)
LYMPHOCYTES NFR BLD: 30.5 % (ref 18–48)
MAGNESIUM SERPL-MCNC: 1.8 MG/DL (ref 1.6–2.6)
MCH RBC QN AUTO: 26.7 PG (ref 27–31)
MCHC RBC AUTO-ENTMCNC: 31.1 G/DL (ref 32–36)
MCV RBC AUTO: 86 FL (ref 82–98)
MONOCYTES # BLD AUTO: 0.4 K/UL (ref 0.3–1)
MONOCYTES NFR BLD: 4.6 % (ref 4–15)
NEUTROPHILS # BLD AUTO: 6 K/UL (ref 1.8–7.7)
NEUTROPHILS NFR BLD: 63.7 % (ref 38–73)
NRBC BLD-RTO: 0 /100 WBC
PHOSPHATE SERPL-MCNC: 2.2 MG/DL (ref 2.7–4.5)
PLATELET # BLD AUTO: 551 K/UL (ref 150–450)
PMV BLD AUTO: 9.1 FL (ref 9.2–12.9)
POCT GLUCOSE: 129 MG/DL (ref 70–110)
POCT GLUCOSE: 137 MG/DL (ref 70–110)
POCT GLUCOSE: 146 MG/DL (ref 70–110)
POCT GLUCOSE: 171 MG/DL (ref 70–110)
POTASSIUM SERPL-SCNC: 4.4 MMOL/L (ref 3.5–5.1)
RBC # BLD AUTO: 3.15 M/UL (ref 4–5.4)
SODIUM SERPL-SCNC: 139 MMOL/L (ref 136–145)
WBC # BLD AUTO: 9.47 K/UL (ref 3.9–12.7)

## 2022-07-11 PROCEDURE — 99232 SBSQ HOSP IP/OBS MODERATE 35: CPT | Mod: ,,, | Performed by: STUDENT IN AN ORGANIZED HEALTH CARE EDUCATION/TRAINING PROGRAM

## 2022-07-11 PROCEDURE — 27000221 HC OXYGEN, UP TO 24 HOURS

## 2022-07-11 PROCEDURE — C1751 CATH, INF, PER/CENT/MIDLINE: HCPCS

## 2022-07-11 PROCEDURE — 36415 COLL VENOUS BLD VENIPUNCTURE: CPT

## 2022-07-11 PROCEDURE — 84100 ASSAY OF PHOSPHORUS: CPT

## 2022-07-11 PROCEDURE — 25000003 PHARM REV CODE 250

## 2022-07-11 PROCEDURE — 99900035 HC TECH TIME PER 15 MIN (STAT)

## 2022-07-11 PROCEDURE — 97110 THERAPEUTIC EXERCISES: CPT

## 2022-07-11 PROCEDURE — 94761 N-INVAS EAR/PLS OXIMETRY MLT: CPT

## 2022-07-11 PROCEDURE — 63600175 PHARM REV CODE 636 W HCPCS: Performed by: STUDENT IN AN ORGANIZED HEALTH CARE EDUCATION/TRAINING PROGRAM

## 2022-07-11 PROCEDURE — 83735 ASSAY OF MAGNESIUM: CPT

## 2022-07-11 PROCEDURE — 25000003 PHARM REV CODE 250: Performed by: STUDENT IN AN ORGANIZED HEALTH CARE EDUCATION/TRAINING PROGRAM

## 2022-07-11 PROCEDURE — 85025 COMPLETE CBC W/AUTO DIFF WBC: CPT

## 2022-07-11 PROCEDURE — 12000002 HC ACUTE/MED SURGE SEMI-PRIVATE ROOM

## 2022-07-11 PROCEDURE — 99900026 HC AIRWAY MAINTENANCE (STAT)

## 2022-07-11 PROCEDURE — 31603 EMER TRACHEOSTOMY TTRACH: CPT

## 2022-07-11 PROCEDURE — 99232 PR SUBSEQUENT HOSPITAL CARE,LEVL II: ICD-10-PCS | Mod: ,,, | Performed by: STUDENT IN AN ORGANIZED HEALTH CARE EDUCATION/TRAINING PROGRAM

## 2022-07-11 PROCEDURE — 97530 THERAPEUTIC ACTIVITIES: CPT

## 2022-07-11 PROCEDURE — 92526 ORAL FUNCTION THERAPY: CPT

## 2022-07-11 PROCEDURE — 92507 TX SP LANG VOICE COMM INDIV: CPT

## 2022-07-11 PROCEDURE — 25000003 PHARM REV CODE 250: Performed by: HOSPITALIST

## 2022-07-11 PROCEDURE — 80048 BASIC METABOLIC PNL TOTAL CA: CPT

## 2022-07-11 PROCEDURE — 63600175 PHARM REV CODE 636 W HCPCS

## 2022-07-11 RX ADMIN — ATORVASTATIN CALCIUM 80 MG: 20 TABLET, FILM COATED ORAL at 10:07

## 2022-07-11 RX ADMIN — SODIUM BICARBONATE 650 MG TABLET 650 MG: at 09:07

## 2022-07-11 RX ADMIN — SODIUM BICARBONATE 650 MG TABLET 650 MG: at 10:07

## 2022-07-11 RX ADMIN — CHOLESTYRAMINE 8 G: 4 POWDER, FOR SUSPENSION ORAL at 10:07

## 2022-07-11 RX ADMIN — HEPARIN SODIUM 5000 UNITS: 5000 INJECTION INTRAVENOUS; SUBCUTANEOUS at 11:07

## 2022-07-11 RX ADMIN — HEPARIN SODIUM 5000 UNITS: 5000 INJECTION INTRAVENOUS; SUBCUTANEOUS at 09:07

## 2022-07-11 RX ADMIN — SODIUM CHLORIDE: 0.9 INJECTION, SOLUTION INTRAVENOUS at 04:07

## 2022-07-11 RX ADMIN — PIPERACILLIN SODIUM AND TAZOBACTAM SODIUM 4.5 G: 4; .5 INJECTION, POWDER, LYOPHILIZED, FOR SOLUTION INTRAVENOUS at 06:07

## 2022-07-11 RX ADMIN — LEVETIRACETAM 750 MG: 100 SOLUTION ORAL at 10:07

## 2022-07-11 RX ADMIN — LEVETIRACETAM 750 MG: 100 SOLUTION ORAL at 09:07

## 2022-07-11 RX ADMIN — PIPERACILLIN SODIUM AND TAZOBACTAM SODIUM 4.5 G: 4; .5 INJECTION, POWDER, LYOPHILIZED, FOR SOLUTION INTRAVENOUS at 04:07

## 2022-07-11 RX ADMIN — NYSTATIN OINTMENT: 100000 OINTMENT TOPICAL at 11:07

## 2022-07-11 NOTE — ASSESSMENT & PLAN NOTE
Patient is DNR. LaPOST on file.     - CM/SW assisting; tracheostomy status complicating placement to shelter w/ hospice.  - See Care Update note Zi-on MD Aramis on 06/29 for further detail.

## 2022-07-11 NOTE — RESPIRATORY THERAPY
RAPID RESPONSE RESPIRATORY THERAPY PROACTIVE NOTE           Time of visit: 820     Code Status: DNR   : 1950  Bed: 41456/60999 A:   MRN: [id number]  Time spent at the bedside: < 15 min    SITUATION    Evaluated patient for: LDA Check     BACKGROUND    Patient has a past medical history of Arthritis, Cancer of breast, Cataract, Diabetes mellitus, Hypertension, Hypoxia, Memory loss, Orthostatic hypotension, TIA (transient ischemic attack), and Vitamin D deficiency.  Clinically Significant Surgical Hx: tracheostomy    24 Hours Vitals Range:  Temp:  [98.1 °F (36.7 °C)-98.6 °F (37 °C)]   Pulse:  [86-93]   Resp:  [16-22]   BP: (133-145)/(65-66)   SpO2:  [93 %-96 %]     Labs:    Recent Labs     07/10/22  0600 22  0336    139   K 4.8 4.4    108   CO2 25 24   CREATININE 0.6 0.5   * 179*   PHOS 2.6* 2.2*   MG 1.8 1.8        No results for input(s): PH, PCO2, PO2, HCO3, POCSATURATED, BE in the last 72 hours.    ASSESSMENT/INTERVENTIONS    Upon arrival in room patient resting in bed.  All trach supplies at bedside.  Extra trachs at bedside include 4.0 and 6.0 shiley both cuffed.    Last VS   Temp: 98.1 °F (36.7 °C) (857)  Pulse: 92 (857)  Resp: 22 (07/10 2136)  BP: 133/65 (857)  SpO2: 96 % (857)    Level of Consciousness: Level of Consciousness (AVPU): alert  Respiratory Effort: Respiratory Effort: (P) Unlabored, Normal Expansion/Accessory Muscle Usage: Expansion/Accessory Muscles/Retractions: (P) no use of accessory muscles  All Lung Field Breath Sounds: All Lung Fields Breath Sounds: (P) coarse  IGNACIO Breath Sounds: diminished  LLL Breath Sounds: diminished  RUL Breath Sounds: diminished  RML Breath Sounds: diminished  RLL Breath Sounds: diminished  O2 Device/Concentration: Flow (L/min): 5, Oxygen Concentration (%): 21, O2 Device (Oxygen Therapy): Trach Collar  Surgical airway: Yes, Type: Shiley Size: 6, uncuffed  Ambu at bedside: Ambu bag with the patient?: Yes, Adult  Ambu     Active Orders   Respiratory Care    Pulse Oximetry Q4H     Frequency: Q4H     Number of Occurrences: Until Specified    Routine tracheostomy care     Frequency: BID     Number of Occurrences: Until Specified    SUCTION PRN     Frequency: PRN     Number of Occurrences: Until Specified       RECOMMENDATIONS    We recommend: RRT Recs: Continue POC per primary team.    FOLLOW-UP    Please call back the Rapid Response RT, Chrissy Mayes, RRT at x [phone] for any questions or concerns.

## 2022-07-11 NOTE — ASSESSMENT & PLAN NOTE
Darlene Avila is a 71 year old F with history of diabetes type 2, seizure, ischemic CVA, IBS, history of breast cancer s/p right mastectomy and failure to thrive who was brought in by her HPOA (sister) for skilled nursing home placement due to concern for neglect at recent hospice facility. Patient follows very simple commands and doubt she can engage in skilled therapy. snf nursing facility likely more of an option.     - Appreciate CM/SW assistance with placement; placement difficult d/t trach; LTAC placement not an option. Patient is now on the wait list for placement at [facility]. Her sister is going to meet with [facility] (7/11) to discuss long term placement.   - Appreciate pharmacy's assistance with med rec from previous admission

## 2022-07-11 NOTE — PROGRESS NOTES
[facility] - Intensive Care ([place] [address])  [facility] Medicine  Progress Note    Patient Name: Darlene Avila  MRN: [id number]  Patient Class: IP- Inpatient   Admission Date: 6/17/2022  Length of Stay: 22 days  Attending Physician: Trey Mike MD  Primary Care Provider: Kirill Cabrera Iii, MD        Subjective:     Principal Problem:Hypernatremia        HPI:  Darlene Avila is a 71 year old F with history of diabetes type 2, seizure c/b anoxic brain injury s/p trach, PEG, bed bound status, Stage IV sacral ulcer, ischemic CVA, IBS, history of breast cancer s/p right mastectomy and failure to thrive who was brought in by her HPOA (sister) for nursing home placement. Patient is unable to provide history.     Patient's sister was contacted who stated that the patient was admitted to [facility] in [facility] for recurrent seizures/status epilepticus which resulted in an anoxic brain injury and respiratory failure.  Patient was intubated followed by tracheostomy and PEG placement .  Patient was discharged to nursing home and subsequently discharged to hospice care at the request of patient's sister. However a few days ago, she visited the patient at [facility] recovery facility and noticed they weren't feeding or taking care of the patient due to her 'hospice status'. Per sister today, she would like to place the patient in a skilled nursing facility and would like to rescind her hospice care at this time.  She is no longer interested in hospice placement. She states the patient at baseline is only able to follow some simple commands. She is unsure how much O2 level is her baseline via trach collar.   Of note she has a Stage IV sacral wound that is s/p debridement on 06/09 by Gen surgery in [facility].     Upon arrival to the ED, the patient was hemodynamically stable. Labs revealed Na 157, WBC 14. Hgb 9.4 (baseline 7-9). The patient was given 1L of IV NS and was admitted to hospital medicine for further management.        Overview/Hospital Course:  Patient admitted to hospital medicine with pre-existing anoxic brain injury, hypernatremia, sacral decubitus wound s/p debridement 06/08 in [place], [place]. Patient initially admitted to hospice following discharge from [facility] ICU, but discharged from hospice as patient family believed she was not getting appropriate care. Na 157, corrected with IVF (NS and hypotonic solutions) and enteral FWF down to 148. Trach collar exchanged by ENT due to lack of supplies at facility. Wound care consulted for sacral decubitus, recommended General Surgery consult for possible debridement as wound appears purulent. Persistent leukocytosis, started Zosyn per previous I&D wound cultures growing bacteriodes/enterococcus and new wound findings. cEEG started and Epilepsy consulted for assistance with antiepileptic medications; conflicting reports if patient was on only Keppra/Lacosamide vs Keppra/Lacosamide/Phenytoin. General surgery consulted for debridement of sacral decubitus ulcer, s/p I&D 06/24. Palliative Care consulted for assistance in GOC; decision to pursue medical/surgical care decided. ENT consulted for possible trach decannulation. ID consulted, planning for 6-weeks course of zosyn.Sodium improved; will continue free water flushes.Patient remains with trach on humidifier. Mentation waxes and wanes.     CM/SW to assist with dispo. Due to trach placement, [facility] placement for SNF limited; LTAC is also not an alternative. Long discussion with patient's sister about long term care options for patient. Sister states that she is unable to provide patient the care she needs at home. SW spoke to patient's sister and reports that she is agreeable to placing patient at [facility]. [facility] will put patient on the wait list.  JAZMIN sent e-mail for SSI referral to assist with medicare. Discuss with patient's sister, Sabi, about hospice. [facility] will meet with pt's sister  (7/11).      Interval History: NAEON. HDS AF. Patient is more drowsy today but reactive to sternal rub.     Review of Systems   Unable to perform ROS: Patient nonverbal   Constitutional:  Positive for fatigue.   Respiratory:  Negative for chest tightness and shortness of breath.    Cardiovascular:  Negative for chest pain.   Gastrointestinal:  Negative for abdominal pain.   Objective:     Vital Signs (Most Recent):  Temp: 98.1 °F (36.7 °C) (07/11/22 0857)  Pulse: 92 (07/11/22 0857)  Resp: (!) 22 (07/10/22 2136)  BP: 133/65 (07/11/22 0857)  SpO2: 96 % (07/11/22 0857)   Vital Signs (24h Range):  Temp:  [98.1 °F (36.7 °C)-98.6 °F (37 °C)] 98.1 °F (36.7 °C)  Pulse:  [86-93] 92  Resp:  [16-22] 22  SpO2:  [93 %-96 %] 96 %  BP: (133-145)/(65-66) 133/65     Weight: 50.2 kg (110 lb 10.7 oz)  Body mass index is 22.35 kg/m².    Intake/Output Summary (Last 24 hours) at 7/11/2022 1052  Last data filed at 7/11/2022 0605  Gross per 24 hour   Intake 348.07 ml   Output 1300 ml   Net -951.93 ml      Physical Exam  Vitals and nursing note reviewed.   Constitutional:       General: She is not in acute distress.     Appearance: She is not ill-appearing or toxic-appearing.   HENT:      Head: Normocephalic.      Mouth/Throat:      Mouth: Mucous membranes are dry.      Comments: Dried oral secretions adhered to tongue  Neck:      Comments: Trach in place and secured  Cardiovascular:      Rate and Rhythm: Normal rate and regular rhythm.      Pulses: Normal pulses.      Heart sounds: Normal heart sounds. No murmur heard.  Pulmonary:      Effort: Pulmonary effort is normal. No respiratory distress.      Breath sounds: Normal breath sounds. No wheezing or rales.   Abdominal:      General: Abdomen is flat. Bowel sounds are normal. There is no distension.      Palpations: Abdomen is soft.      Tenderness: There is no abdominal tenderness.   Musculoskeletal:         General: No swelling or tenderness. Normal range of motion.      Cervical back:  Normal range of motion.   Skin:     General: Skin is warm.      Coloration: Skin is not jaundiced or pale.      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    Neurological:      Mental Status: She is alert.      Comments: Intermittently alert, minimally responsive   Psychiatric:      Comments: Unable to assess       Significant Labs: All pertinent labs within the past 24 hours have been reviewed.  CBC:   Recent Labs   Lab 07/10/22  0600 07/11/22  0336   WBC 8.96 9.47   HGB 8.4* 8.4*   HCT 27.4* 27.0*   * 551*     CMP:   Recent Labs   Lab 07/10/22  0600 07/11/22  0336    139   K 4.8 4.4    108   CO2 25 24   * 179*   BUN 14 13   CREATININE 0.6 0.5   CALCIUM 9.2 9.4   ANIONGAP 7* 7*   EGFRNONAA >60.0 >60.0       Significant Imaging: I have reviewed all pertinent imaging results/findings within the past 24 hours.      Assessment/Plan:      * Hypernatremia  STABLE, WNL    Na stable today.   - Free water flushes      Encephalopathy  Mentation waxes and wanes. Mentation is drowsy today. She wakes up with sternal rub.    Goals of care, counseling/discussion  Refer to palliative care note.      Palliative care encounter  Per Primary Medicine team and Palliative Care encounters with patient family (separate encounters, see dated notes in chart), patient family wishes full medical care and does not desire comfort/hospice measures. DNR remains. See Pall Care note for further detail.    Seizures  Patient previously started on Lacosamide and Keppra at OSH during initial episode of status epilepticus. Phenytoin added as patient continued to have breakthrough seizures.    - Repeat EEG ordered 2/2 interval decrease in alert/responsiveness; diffuse toxic metabolic encephalopathy, no new seizure per Neuro EP  - Continue Keppra monotherapy 750 bid; titrate per neuro recs  - Neurology consulted for further assistance in antiepileptic regimen    G tube feedings  Tube feed dependent via G  tube  - Nutrition to assist with TF recs; see recommendations in note; appreciate assistance      Tracheostomy in place  Dysarthria  Dysphagia    Trach connected to humidifier, good O2 sats on RA     - Respiratory therapy to assist with trach care.   - SLP for swallow evaluation and speech therapy; recommending NPO, re-evaluation, PMSV under direct supervision  - ENT advising to keep tracheostomy in place due to intermittent responsiveness; amenable to decannulation if patient family moves towards hospice/comfort measures    Sacral decubitus ulcer, stage IV  S/p recent debridement at OSH 06/08. OSH Wcx with enterococcus faecalis, bacteriodes fragilus.    - Wound care consulted; appreciate assistance  - Deep wound cx  - General surgery consulted s/p debridement 06/24, no note of bone involvement per OP note, no cultures sent  - Infectious Disease consulted, zosyn for 6-weeks (End date is 8/5)  - Consideration for wound vac given depth of injury  - Turn q2h    Leukocytosis  Patient admitted with elevated WBC to 14s. Previous episodes of aspiration PNA at OSH, indwelling cabrera given acuity of condition, sacral decubitus ulcer stave IV s/p debridement with enterococcus faecalis / bacteriodes fragilis in cx. Patient afebrile with stable hemodynamics on admission at this time. CXR without acute abnormality. UA with yeast growing, chronic per previous OSH review.    Leukocytosis may be explained by soft tissue infection vs hemoconcentration given extensive dehydration and hypernatremia from lack of enteral fluids or IVF at outside hospice facility prior to [facility].     Currently STABLE    - Zosyn given previous cultures and persistent leukocytosis  - Etiology may be soft tissue infection; see Sacral Decubitus Ulcer A/P  - Continue monitoring CBC  - Wound care per Sacral Decubitus Ulcer A/P  - Aspiration precautions  - Trend fever curve  - Trend BCx    Discharge planning issues  Darlene Avila is a 71 year old F with  history of diabetes type 2, seizure, ischemic CVA, IBS, history of breast cancer s/p right mastectomy and failure to thrive who was brought in by her HPOA (sister) for skilled nursing home placement due to concern for neglect at recent hospice facility. Patient follows very simple commands and doubt she can engage in skilled therapy. [facility] nursing facility likely more of an option.     - Appreciate CM/SW assistance with placement; placement difficult d/t trach; LTAC placement not an option. Patient is now on the wait list for placement at [facility]. Her sister is going to meet with [facility] (7/11) to discuss long term placement.   - Appreciate pharmacy's assistance with med rec from previous admission    ACP (advance care planning)  Patient is DNR. [place]POST on file.     - CM/SW assisting; tracheostomy status complicating placement to [facility] w/ hospice.  - See Care Update note Zi-on MD Aramis on 06/29 for further detail.    History of CVA (cerebrovascular accident)  Unclear of patient's current medications. Per med review on care-everywhere, the patient is on statin but not on antiplatelet therapy.     - Appreciate pharmacy's assistance with medication reconciliation with facility- start antiplatelet therapy if no contraindications.   - Continue statin via G-tube      Essential hypertension  Blood pressure stable.      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 A1c repeated 6.9.    - LDSSI   - POCT glucose q6hrs  - Maintain -180  - Titrate basal/bolus regimen to goal as above.      VTE Risk Mitigation (From admission, onward)         Ordered     heparin (porcine) injection 5,000 Units  Every 12 hours         06/18/22 0355     IP VTE HIGH RISK PATIENT  Once         06/18/22 0355     Place  sequential compression device  Until discontinued         06/18/22 0355                Discharge Planning   JUDIT: 7/12/2022     Code Status: DNR   Is the patient medically ready for discharge?: Yes    Reason for patient still in hospital (select all that apply): Patient trending condition  Discharge Plan A: New Nursing Home placement - retirement care facility   Discharge Delays: (!) Patient and Family Barriers (pt high level of needs and accepting facilities)              Maria L Mathew DO  Department of Hospital Medicine   [facility] - Intensive Care ([place]-16)

## 2022-07-11 NOTE — PLAN OF CARE
Problem: Adult Inpatient Plan of Care  Goal: Optimal Comfort and Wellbeing  Outcome: Ongoing, Progressing     Problem: Adult Inpatient Plan of Care  Goal: Patient-Specific Goal (Individualized)  Outcome: Ongoing, Progressing     Problem: Diabetes Comorbidity  Goal: Blood Glucose Level Within Targeted Range  Outcome: Ongoing, Progressing     Problem: Infection  Goal: Absence of Infection Signs and Symptoms  Outcome: Ongoing, Progressing     Problem: Skin Injury Risk Increased  Goal: Skin Health and Integrity  Outcome: Ongoing, Progressing  Patient has been somnolent throughout shift.trach collar  intact and to medical air. Pt is oriented to person.BP WNL at present time. Respite consult completed and sister at bedside and aware.

## 2022-07-11 NOTE — PT/OT/SLP PROGRESS
Physical Therapy      Patient Name:  Darlene Avila   MRN:  [id number]    Patient not seen today secondary to pt being seen by OT first attempt, and in care of SLP second attempt. Will follow-up per PT POC.

## 2022-07-11 NOTE — SUBJECTIVE & OBJECTIVE
Interval History: NAEON. HDS AF. Patient is more drowsy today but reactive to sternal rub.     Review of Systems   Unable to perform ROS: Patient nonverbal   Constitutional:  Positive for fatigue.   Respiratory:  Negative for chest tightness and shortness of breath.    Cardiovascular:  Negative for chest pain.   Gastrointestinal:  Negative for abdominal pain.   Objective:     Vital Signs (Most Recent):  Temp: 98.1 °F (36.7 °C) (07/11/22 0857)  Pulse: 92 (07/11/22 0857)  Resp: (!) 22 (07/10/22 2136)  BP: 133/65 (07/11/22 0857)  SpO2: 96 % (07/11/22 0857)   Vital Signs (24h Range):  Temp:  [98.1 °F (36.7 °C)-98.6 °F (37 °C)] 98.1 °F (36.7 °C)  Pulse:  [86-93] 92  Resp:  [16-22] 22  SpO2:  [93 %-96 %] 96 %  BP: (133-145)/(65-66) 133/65     Weight: 50.2 kg (110 lb 10.7 oz)  Body mass index is 22.35 kg/m².    Intake/Output Summary (Last 24 hours) at 7/11/2022 1052  Last data filed at 7/11/2022 0605  Gross per 24 hour   Intake 348.07 ml   Output 1300 ml   Net -951.93 ml      Physical Exam  Vitals and nursing note reviewed.   Constitutional:       General: She is not in acute distress.     Appearance: She is not ill-appearing or toxic-appearing.   HENT:      Head: Normocephalic.      Mouth/Throat:      Mouth: Mucous membranes are dry.      Comments: Dried oral secretions adhered to tongue  Neck:      Comments: Trach in place and secured  Cardiovascular:      Rate and Rhythm: Normal rate and regular rhythm.      Pulses: Normal pulses.      Heart sounds: Normal heart sounds. No murmur heard.  Pulmonary:      Effort: Pulmonary effort is normal. No respiratory distress.      Breath sounds: Normal breath sounds. No wheezing or rales.   Abdominal:      General: Abdomen is flat. Bowel sounds are normal. There is no distension.      Palpations: Abdomen is soft.      Tenderness: There is no abdominal tenderness.   Musculoskeletal:         General: No swelling or tenderness. Normal range of motion.      Cervical back: Normal range  of motion.   Skin:     General: Skin is warm.      Coloration: Skin is not jaundiced or pale.      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    Neurological:      Mental Status: She is alert.      Comments: Intermittently alert, minimally responsive   Psychiatric:      Comments: Unable to assess       Significant Labs: All pertinent labs within the past 24 hours have been reviewed.  CBC:   Recent Labs   Lab 07/10/22  0600 07/11/22  0336   WBC 8.96 9.47   HGB 8.4* 8.4*   HCT 27.4* 27.0*   * 551*     CMP:   Recent Labs   Lab 07/10/22  0600 07/11/22  0336    139   K 4.8 4.4    108   CO2 25 24   * 179*   BUN 14 13   CREATININE 0.6 0.5   CALCIUM 9.2 9.4   ANIONGAP 7* 7*   EGFRNONAA >60.0 >60.0       Significant Imaging: I have reviewed all pertinent imaging results/findings within the past 24 hours.

## 2022-07-11 NOTE — PT/OT/SLP PROGRESS
Speech Language Pathology Treatment    Patient Name:  Darlene Avila   MRN:  [id number]   [id number]    Admitting Diagnosis: Hypernatremia    Recommendations:                 General Recommendations:  Dysphagia therapy  Diet recommendations:  NPO, NPO   Aspiration Precautions: Alternate means of nutrition/hydration, Frequent oral care and Strict aspiration precautions   General Precautions: Standard, NPO, fall, aspiration  Communication strategies: PMSV under direct supervision only    Speaking Valve precautions:   · Placement only under supervision and when VSS,   · only when awake and alert, remove with any S/S respiratory distress,   · remove when sleeping.   · To rinse valve:   · 1. Swish valve in pure soap and warm water,   · 2. Rinse valve thoroughly in warm running water,   · 3. Allow valve to air dry thoroughly before placing it in storage container.   · 4. DO NOT use hot water, peroxide, bleach, vinegar, alcohol, brushes, or cotton swabs to clean valve.     Subjective     Pt found resting in bed upon SLP entry into room. Ongoing lethargy though pt able to maintain adequate levels of alertness for session this date.     Pain/Comfort:  Pain Rating 1: 0/10    Respiratory Status: supplemental O2 via trach collar    Objective:     Has the patient been evaluated by SLP for swallowing?   Yes  Keep patient NPO? Yes     Passy Chad Speaking Valve  Trach size/type: Shiley 6.0 uncuffed  Able to phonate around trach: intermittnetly  Vocal quality with digital finger occlusion: n/a  O2 sats at rest: stable  O2 sats with PMSV in place: stable  Vocal quality with valve in place: consistent wet breath sounds and wet vocalizations; poor intelligibility in unknown contexts and fair intelligibility in known contexts at the phrase level  Back pressure upon removal: none  Minutes PMSV worn: 15 minutes  Education topics addressed: one-way technology, anatomy of trach, and removal of valve prior to sleeping      Pt seen for  ongoing assessment of swallow function following donning of PMSV. O2 via trach collar in place. Continued left sided facial drop exhibited. Pt with adequate levels of alertness this date sustained with intermittent MOD verbal and tactile stimulation from clinician. Ice chip rubbed across pt's lips without voluntary attempts to accept trials. Small ice chip x1 placed into oral cavity but no attempts to achieve labial or lingual motion causing anterior spillage. Clinician ultimately removed trial from oral cavity and PO attempts terminated. x3 instances of verbal communication made though pt required significant multi-modality prompting to produce phrases. Education regarding ongoing SLP POC reviewed but pt without evidence of understanding this date. Whiteboard remains updated with current recommendations of ongoing NPO status and use of PMSV under direct supervision only.     Assessment:     Darlene VILLEGAS Avila is a 71 y.o. female with an SLP diagnosis of Dysphagia, S/P Trach and One Way Speaking Valve Training. SLP will continue to follow 1-2x a week to monitor for appropriateness of pleasure feedings.     Goals:   Multidisciplinary Problems     SLP Goals        Problem: SLP    Goal Priority Disciplines Outcome   SLP Goal     SLP Ongoing, Progressing   Description: Speech Therapy Short Term Goals  Goal expected to be met by 7/25  1. Pt will participate in an ongoing assessment to determine the least restrictive and safest diet with possible updated goals to follow pending results.  2. Pt will tolerate PMSV during all waking hours with no s/s of respiratory distress.       Speech Therapy Short Term Goals  Goal expected to be met by 7/10  1. Pt will participate in an ongoing assessment to determine the least restrictive and safest diet with possible updated goals to follow pending results.  2. Pt will tolerate PMSV during all waking hours with no s/s of respiratory distress.                    Plan:     · Patient to be  seen:  2 x/week   · Plan of Care expires:     · Plan of Care reviewed with:  patient   · SLP Follow-Up:  Yes       Discharge recommendations:  nursing facility, skilled       Time Tracking:     SLP Treatment Date:   07/11/22  Speech Start Time:  1018  Speech Stop Time:  1034     Speech Total Time (min):  16 min    Billable Minutes: Treatment Swallowing Dysfunction 5, Speech Therapy Individual 11      07/11/2022

## 2022-07-11 NOTE — PT/OT/SLP PROGRESS
"Occupational Therapy   Treatment    Name: Darlene Avila  MRN: [id number]  Admitting Diagnosis:  Hypernatremia  17 Days Post-Op    Recommendations:     Discharge Recommendations: nursing facility, skilled  Discharge Equipment Recommendations:   (TBD)  Barriers to discharge:  Decreased caregiver support    Assessment:     Darlene Avila is a 71 y.o. female with a medical diagnosis of Hypernatremia.  She presents with increased alertness in sitting and tightness throughout BUE/BLE, limiting ADL performance. Pt required increased encouragement for therapy and participated well. Performance deficits affecting function are weakness, impaired endurance, impaired functional mobilty, gait instability, impaired balance, impaired cognition, impaired self care skills, decreased safety awareness, decreased coordination, decreased upper extremity function, decreased ROM, decreased lower extremity function.     Rehab Prognosis:  Fair; patient would benefit from acute skilled OT services to address these deficits and reach maximum level of function.       Plan:     Patient to be seen 2 x/week to address the above listed problems via self-care/home management, therapeutic exercises  · Plan of Care Expires: 08/11/22  · Plan of Care Reviewed with: patient    Subjective   Pt mouthed "Good morning"    Pain/Comfort:  Pain Rating 1:  (Pt did not report)    Objective:     Communicated with: nurse prior to session.  Patient found HOB elevated with telemetry, pulse ox (continuous), Tracheostomy, G/J tube, oxygen, pressure relief boots, bed alarm upon OT entry to room. RN present upon entry to take pt vitals.    General Precautions: Standard, NPO, aspiration, fall (DNR)   Orthopedic Precautions:N/A   Braces: N/A  Respiratory Status: O2 via trach collar     Occupational Performance:     Bed Mobility:    · Patient completed Scooting/Bridging with total assistance toward EOB  · Draw sheet transfer toward HOB  · Patient completed Supine to Sit with " total assistance  · Patient completed Sit to Supine with total assistance     [facility] 6 Click ADL: 6    Treatment & Education:  Pt maintained dynamic sitting balance at EOB with total assist.    Pt participated in passive lateral trunk flexion while seated EOB to assist pt in stretching postural muscles to prepare for future upright sitting during ADLs. Stretches were completed 3x, 10 seconds each.    Pt participated in passive cervical extension stretches to facilitate upright sitting posture while seated EOB. Stretches were completed 3x, 10 seconds each.    Pt participated in passive BUE/BLE stretches while supine (elbow extension, wrist flexion/extension; dorsiflexion/plantarflexion, hip internal/external rotation, hip abduction/adduction) to decrease muscle tightness for future functional performance of ADLs. Stretches were completed 3x, 10 seconds each.    Patient left supine with all lines intact, call button in reach, bed alarm on, nurse notified and nurse and RT presentEducation:      GOALS:   Multidisciplinary Problems     Occupational Therapy Goals        Problem: Occupational Therapy    Goal Priority Disciplines Outcome Interventions   Occupational Therapy Goal     OT, PT/OT Ongoing, Progressing    Description: Goals to be met by: 7/25/22    Patient will increase functional independence with ADLs by performing:    Rolling to Bilateral with Moderate Assistance  P/AAROM to maintain UE ROM/Strength.   Supine<>sit with Max A  Grooming at EOB with Max A  Maintain balance at EOB with Max A                     Time Tracking:     OT Date of Treatment: 07/11/22  OT Start Time: 0848  OT Stop Time: 0926  OT Total Time (min): 38 min    Billable Minutes:Therapeutic Activity 23  Therapeutic Exercise 15    OT/GARFIELD: OT          7/11/2022

## 2022-07-11 NOTE — PLAN OF CARE
Problem: Occupational Therapy  Goal: Occupational Therapy Goal  Description: Goals to be met by: 7/25/22    Patient will increase functional independence with ADLs by performing:    Rolling to Bilateral with Moderate Assistance  P/AAROM to maintain UE ROM/Strength.   Supine<>sit with Max A  Grooming at EOB with Max A  Maintain balance at EOB with Max A    Outcome: Ongoing, Progressing   Goals remain appropriate

## 2022-07-11 NOTE — CARE UPDATE
RAPID RESPONSE NURSE ROUND       Rounding completed with charge RN, Rajeev. No concerns verbalized at this time. Instructed to call [phone] for further concerns or assistance.

## 2022-07-11 NOTE — ASSESSMENT & PLAN NOTE
Patient admitted with elevated WBC to 14s. Previous episodes of aspiration PNA at OSH, indwelling cabrera given acuity of condition, sacral decubitus ulcer stave IV s/p debridement with enterococcus faecalis / bacteriodes fragilis in cx. Patient afebrile with stable hemodynamics on admission at this time. CXR without acute abnormality. UA with yeast growing, chronic per previous OSH review.    Leukocytosis may be explained by soft tissue infection vs hemoconcentration given extensive dehydration and hypernatremia from lack of enteral fluids or IVF at outside hospice facility prior to transfer to [facility].     Currently STABLE    - Zosyn given previous cultures and persistent leukocytosis  - Etiology may be soft tissue infection; see Sacral Decubitus Ulcer A/P  - Continue monitoring CBC  - Wound care per Sacral Decubitus Ulcer A/P  - Aspiration precautions  - Trend fever curve  - Trend BCx

## 2022-07-11 NOTE — CARE UPDATE
RAPID RESPONSE NURSE ROUND       Rounding completed with charge RN, Evin. No concerns verbalized at this time. Instructed to call [phone] for further concerns or assistance.

## 2022-07-11 NOTE — PLAN OF CARE
Problem: SLP  Goal: SLP Goal  Description: Speech Therapy Short Term Goals  Goal expected to be met by 7/25  1. Pt will participate in an ongoing assessment to determine the least restrictive and safest diet with possible updated goals to follow pending results.  2. Pt will tolerate PMSV during all waking hours with no s/s of respiratory distress.       Speech Therapy Short Term Goals  Goal expected to be met by 7/10  1. Pt will participate in an ongoing assessment to determine the least restrictive and safest diet with possible updated goals to follow pending results.  2. Pt will tolerate PMSV during all waking hours with no s/s of respiratory distress.   Outcome: Ongoing, Progressing     POC extended, goals remain appropriate

## 2022-07-11 NOTE — PLAN OF CARE
JAZMIN followed-up with Hadley at [facility].  Hadley was able to connect with sister Sabi regarding [facility] as an option for pt care.       Hadley asked JAZMIN to put a referral through careport so their nurse could have a look and see what was required for pt.      As per Hadley, [facility] will meet with pt and sister Sabi at [facility] between 1300-1400hrs on 07/11    Yocasta Urbina, JOANIE, MSW, LMSW, RSW   Case Management  [facility]  Email: [email or web address]

## 2022-07-12 LAB
POCT GLUCOSE: 154 MG/DL (ref 70–110)
POCT GLUCOSE: 62 MG/DL (ref 70–110)
POCT GLUCOSE: 72 MG/DL (ref 70–110)
POCT GLUCOSE: 74 MG/DL (ref 70–110)
POCT GLUCOSE: 74 MG/DL (ref 70–110)

## 2022-07-12 PROCEDURE — 99232 SBSQ HOSP IP/OBS MODERATE 35: CPT | Mod: ,,, | Performed by: STUDENT IN AN ORGANIZED HEALTH CARE EDUCATION/TRAINING PROGRAM

## 2022-07-12 PROCEDURE — 31603 EMER TRACHEOSTOMY TTRACH: CPT

## 2022-07-12 PROCEDURE — 63600175 PHARM REV CODE 636 W HCPCS

## 2022-07-12 PROCEDURE — 25000003 PHARM REV CODE 250: Performed by: STUDENT IN AN ORGANIZED HEALTH CARE EDUCATION/TRAINING PROGRAM

## 2022-07-12 PROCEDURE — 99900035 HC TECH TIME PER 15 MIN (STAT)

## 2022-07-12 PROCEDURE — 25000003 PHARM REV CODE 250

## 2022-07-12 PROCEDURE — 94761 N-INVAS EAR/PLS OXIMETRY MLT: CPT

## 2022-07-12 PROCEDURE — 27000221 HC OXYGEN, UP TO 24 HOURS

## 2022-07-12 PROCEDURE — 99900026 HC AIRWAY MAINTENANCE (STAT)

## 2022-07-12 PROCEDURE — 99232 PR SUBSEQUENT HOSPITAL CARE,LEVL II: ICD-10-PCS | Mod: ,,, | Performed by: STUDENT IN AN ORGANIZED HEALTH CARE EDUCATION/TRAINING PROGRAM

## 2022-07-12 PROCEDURE — 25000003 PHARM REV CODE 250: Performed by: HOSPITALIST

## 2022-07-12 PROCEDURE — 63600175 PHARM REV CODE 636 W HCPCS: Performed by: STUDENT IN AN ORGANIZED HEALTH CARE EDUCATION/TRAINING PROGRAM

## 2022-07-12 PROCEDURE — 12000002 HC ACUTE/MED SURGE SEMI-PRIVATE ROOM

## 2022-07-12 PROCEDURE — 97110 THERAPEUTIC EXERCISES: CPT | Mod: CQ

## 2022-07-12 RX ORDER — DEXTROSE MONOHYDRATE 100 MG/ML
INJECTION, SOLUTION INTRAVENOUS CONTINUOUS
Status: ACTIVE | OUTPATIENT
Start: 2022-07-12 | End: 2022-07-13

## 2022-07-12 RX ADMIN — SODIUM BICARBONATE 650 MG TABLET 650 MG: at 09:07

## 2022-07-12 RX ADMIN — ATORVASTATIN CALCIUM 80 MG: 20 TABLET, FILM COATED ORAL at 09:07

## 2022-07-12 RX ADMIN — NYSTATIN OINTMENT: 100000 OINTMENT TOPICAL at 02:07

## 2022-07-12 RX ADMIN — LEVETIRACETAM 750 MG: 100 SOLUTION ORAL at 09:07

## 2022-07-12 RX ADMIN — CHOLESTYRAMINE 8 G: 4 POWDER, FOR SUSPENSION ORAL at 10:07

## 2022-07-12 RX ADMIN — HEPARIN SODIUM 5000 UNITS: 5000 INJECTION INTRAVENOUS; SUBCUTANEOUS at 09:07

## 2022-07-12 RX ADMIN — CHOLESTYRAMINE 8 G: 4 POWDER, FOR SUSPENSION ORAL at 12:07

## 2022-07-12 RX ADMIN — NYSTATIN OINTMENT: 100000 OINTMENT TOPICAL at 10:07

## 2022-07-12 RX ADMIN — INSULIN DETEMIR 12 UNITS: 100 INJECTION, SOLUTION SUBCUTANEOUS at 10:07

## 2022-07-12 RX ADMIN — PIPERACILLIN SODIUM AND TAZOBACTAM SODIUM 4.5 G: 4; .5 INJECTION, POWDER, LYOPHILIZED, FOR SOLUTION INTRAVENOUS at 07:07

## 2022-07-12 RX ADMIN — SODIUM BICARBONATE 650 MG TABLET 650 MG: at 10:07

## 2022-07-12 RX ADMIN — PIPERACILLIN SODIUM AND TAZOBACTAM SODIUM 4.5 G: 4; .5 INJECTION, POWDER, LYOPHILIZED, FOR SOLUTION INTRAVENOUS at 10:07

## 2022-07-12 RX ADMIN — PIPERACILLIN SODIUM AND TAZOBACTAM SODIUM 4.5 G: 4; .5 INJECTION, POWDER, LYOPHILIZED, FOR SOLUTION INTRAVENOUS at 12:07

## 2022-07-12 RX ADMIN — SODIUM CHLORIDE: 0.9 INJECTION, SOLUTION INTRAVENOUS at 07:07

## 2022-07-12 RX ADMIN — HEPARIN SODIUM 5000 UNITS: 5000 INJECTION INTRAVENOUS; SUBCUTANEOUS at 10:07

## 2022-07-12 RX ADMIN — DEXTROSE: 10 SOLUTION INTRAVENOUS at 07:07

## 2022-07-12 RX ADMIN — LEVETIRACETAM 750 MG: 100 SOLUTION ORAL at 10:07

## 2022-07-12 NOTE — PLAN OF CARE
07/12/22 1302   Post-Acute Status   Post-Acute Authorization Placement   Post-Acute Placement Status Referrals Sent   Discharge Plan   Discharge Plan A New Nursing Home placement - assisted care facility   Discharge Plan B New Nursing Home placement - assisted care facility       SW sent more referrals via careport to [facility], [facility], and [facility]      Yocasta Urbina CD, MSW, LMSW, RSW   Case Management  [facility]  Email: [email or web address]

## 2022-07-12 NOTE — ASSESSMENT & PLAN NOTE
Patient admitted with elevated WBC to 14s. Previous episodes of aspiration PNA at OSH, indwelling cabrera given acuity of condition, sacral decubitus ulcer stave IV s/p debridement with enterococcus faecalis / bacteriodes fragilis in cx. Patient afebrile with stable hemodynamics on admission at this time. CXR without acute abnormality. UA with yeast growing, chronic per previous OSH review.    Leukocytosis may be explained by soft tissue infection vs hemoconcentration given extensive dehydration and hypernatremia from lack of enteral fluids or IVF at outside hospice facility prior to transfer to [facility].     Currently STABLE    - Zosyn given previous cultures and persistent leukocytosis  - Etiology may be soft tissue infection; see Sacral Decubitus Ulcer A/P  - Continue monitoring CBC  - Wound care per Sacral Decubitus Ulcer A/P  - Aspiration precautions  - Trend fever curve  - Trend BCx

## 2022-07-12 NOTE — NURSING
RN called med team 2 and spoke with Maria L regarding pt BS being 74 which is just over the parameter to give glucose. TORB to recheck in a little while and re-evaluate. Will pass on to oncoming nurses.

## 2022-07-12 NOTE — ASSESSMENT & PLAN NOTE
Patient is DNR. [facility] on file.     - CM/SW assisting; tracheostomy status complicating placement to residential w/ hospice. Spoke with [facility] and they are agreeable with accepting patient.   - See Care Update note Zi-on MD Aramis on 06/29 for further detail.

## 2022-07-12 NOTE — PT/OT/SLP PROGRESS
Physical Therapy Treatment    Patient Name:  Darlene Avila   MRN:  [id number]    Recommendations:     Discharge Recommendations:  nursing facility, skilled   Discharge Equipment Recommendations: none   Barriers to discharge: Evolving Clinical Presentation     Assessment:     Darlene Avila is a 71 y.o. female admitted with a medical diagnosis of Hypernatremia.  She presents with the following impairments/functional limitations:  weakness, impaired endurance, impaired functional mobilty, impaired balance, decreased lower extremity function, impaired cognition, decreased safety awareness, pain, impaired skin. Pt declined sitting EOB on this date, but agreeable to BLE exercises. Pt tolerated exercises well, and will continue to benefit from skilled PT services to improve all deficits noted above. Resume PT POC as indicated.       Rehab Prognosis: Fair; patient would benefit from acute skilled PT services to address these deficits and reach maximum level of function.    Recent Surgery: Procedure(s) (LRB):  DEBRIDEMENT, WOUND, sacral ulcer (N/A) 18 Days Post-Op    Plan:     During this hospitalization, patient to be seen 3 x/week to address the identified rehab impairments via therapeutic activities, therapeutic exercises, neuromuscular re-education and progress toward the following goals:    · Plan of Care Expires:  07/26/22    Subjective     Chief Complaint: none stated  Patient/Family Comments/goals: none stated  Pain/Comfort:  · Pain Rating 1: 0/10  · Pain Rating Post-Intervention 1: 0/10      Objective:     Communicated with nursing prior to session.  Patient found HOB elevated with  (all lines intact) upon PT entry to room.     General Precautions: Standard, fall   Orthopedic Precautions:N/A   Braces: N/A  Respiratory Status: O2 via trach collar     Functional Mobility:  · Pt declined on this date.       AM-PAC 6 CLICK MOBILITY  Turning over in bed (including adjusting bedclothes, sheets and blankets)?: 2  Sitting  down on and standing up from a chair with arms (e.g., wheelchair, bedside commode, etc.): 2  Moving from lying on back to sitting on the side of the bed?: 2  Moving to and from a bed to a chair (including a wheelchair)?: 1  Need to walk in hospital room?: 1  Climbing 3-5 steps with a railing?: 1  Basic Mobility Total Score: 9       Therapeutic Activities and Exercises:   -BLE exercises 2x10 reps performed in all available planes of motion.   -Pt educated on the importance/benefits of working with therapy.     Patient left HOB elevated with all lines intact, call button in reach, nursing notified and SCD's donned..    GOALS:   Multidisciplinary Problems     Physical Therapy Goals        Problem: Physical Therapy    Goal Priority Disciplines Outcome Goal Variances Interventions   Physical Therapy Goal     PT, PT/OT Ongoing, Progressing     Description: Goals to be met by: 7/10/2022   Modified 22    Patient will increase functional independence with mobility by performin. Rolling to Left with Moderate Assistance.  2. Lower extremity exercise program x 10 reps per handout, with assistance as needed  3. Supine to sit with Maximum Assistance  4. Sit to supine with Maximum Assistance  5. Sitting x 10 minutes edge of bed with Maximum Assistance                     Time Tracking:     PT Received On: 22  PT Start Time: 1149     PT Stop Time: 1204  PT Total Time (min): 15 min     Billable Minutes: Therapeutic Exercise 15    Treatment Type: Treatment  PT/PTA: PTA     PTA Visit Number: 1     2022
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
## 2022-07-12 NOTE — PLAN OF CARE
Problem: Adult Inpatient Plan of Care  Goal: Optimal Comfort and Wellbeing  7/12/2022 1808 by Ezra Rico RN  Outcome: Ongoing, Progressing  7/12/2022 1758 by Ezra Rico RN  Outcome: Ongoing, Progressing     Problem: Diabetes Comorbidity  Goal: Blood Glucose Level Within Targeted Range  7/12/2022 1808 by Ezra Rico RN  Outcome: Ongoing, Progressing  7/12/2022 1758 by Ezra Rico RN  Outcome: Ongoing, Progressing     Problem: Impaired Wound Healing  Goal: Optimal Wound Healing  7/12/2022 1808 by Ezra Rico RN  Outcome: Ongoing, Progressing  7/12/2022 1758 by Ezra Rico RN  Outcome: Ongoing, Progressing     Problem: Infection  Goal: Absence of Infection Signs and Symptoms  7/12/2022 1808 by Ezra Rico RN  Outcome: Ongoing, Progressing  7/12/2022 1758 by Ezra Rico RN  Outcome: Ongoing, Progressing     Problem: Skin Injury Risk Increased  Goal: Skin Health and Integrity  7/12/2022 1808 by Ezra Rico RN  Outcome: Ongoing, Progressing  7/12/2022 1758 by Ezra Rico RN  Outcome: Ongoing, Progressing     Problem: Fall Injury Risk  Goal: Absence of Fall and Fall-Related Injury  7/12/2022 1808 by Ezra Rico RN  Outcome: Ongoing, Progressing  7/12/2022 1758 by Ezra Rico RN  Outcome: Ongoing, Progressing     Problem: Coping Ineffective  Goal: Effective Coping  7/12/2022 1808 by Ezra Rico RN  Outcome: Ongoing, Progressing  7/12/2022 1758 by Ezra Rico RN  Outcome: Ongoing, Progressing           Patient's BG at the beginning of shift was  72, med team. Trach still in place and on medical air. Wound behind head and ear is epithelialized.  Patient was awake and alert.Referral request put in for transfer to skilled nursing facility.

## 2022-07-12 NOTE — SUBJECTIVE & OBJECTIVE
Interval History: NAEON. AF HDS. Patient is more drowsy today but reacts to sternal rub.     Review of Systems   Unable to perform ROS: Patient nonverbal   Constitutional:  Positive for fatigue.   Respiratory:  Negative for chest tightness and shortness of breath.    Cardiovascular:  Negative for chest pain.   Gastrointestinal:  Negative for abdominal pain.   Objective:     Vital Signs (Most Recent):  Temp: 98.7 °F (37.1 °C) (07/12/22 1246)  Pulse: 90 (07/12/22 1246)  Resp: 18 (07/12/22 1246)  BP: 116/82 (07/12/22 1246)  SpO2: 99 % (07/12/22 1142) Vital Signs (24h Range):  Temp:  [97 °F (36.1 °C)-98.7 °F (37.1 °C)] 98.7 °F (37.1 °C)  Pulse:  [] 90  Resp:  [16-18] 18  SpO2:  [96 %-100 %] 99 %  BP: (112-146)/(63-82) 116/82     Weight: 50.2 kg (110 lb 10.7 oz)  Body mass index is 22.35 kg/m².    Intake/Output Summary (Last 24 hours) at 7/12/2022 1354  Last data filed at 7/12/2022 0900  Gross per 24 hour   Intake 1463.37 ml   Output 1050 ml   Net 413.37 ml      Physical Exam  Vitals and nursing note reviewed.   Constitutional:       General: She is not in acute distress.     Appearance: She is not ill-appearing or toxic-appearing.   HENT:      Head: Normocephalic.      Mouth/Throat:      Mouth: Mucous membranes are dry.      Comments: Dried oral secretions adhered to tongue  Neck:      Comments: Trach in place and secured  Cardiovascular:      Rate and Rhythm: Normal rate and regular rhythm.      Pulses: Normal pulses.      Heart sounds: Normal heart sounds. No murmur heard.  Pulmonary:      Effort: Pulmonary effort is normal. No respiratory distress.      Breath sounds: Normal breath sounds. No wheezing or rales.   Abdominal:      General: Abdomen is flat. Bowel sounds are normal. There is no distension.      Palpations: Abdomen is soft.      Tenderness: There is no abdominal tenderness.   Musculoskeletal:         General: No swelling or tenderness. Normal range of motion.      Cervical back: Normal range of  motion.   Skin:     General: Skin is warm.      Coloration: Skin is not jaundiced or pale.      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    Neurological:      Mental Status: She is alert.      Comments: Intermittently alert, minimally responsive   Psychiatric:      Comments: Unable to assess       Significant Labs: All pertinent labs within the past 24 hours have been reviewed.  None    Significant Imaging: I have reviewed all pertinent imaging results/findings within the past 24 hours.

## 2022-07-12 NOTE — PROGRESS NOTES
[facility] - Intensive Care ([place] [id number])  [facility] Medicine  Progress Note    Patient Name: Darlene Avila  MRN: [id number]  Patient Class: IP- Inpatient   Admission Date: 6/17/2022  Length of Stay: 23 days  Attending Physician: Trey Mike MD  Primary Care Provider: Kirill Cabrera Iii, MD        Subjective:     Principal Problem:Hypernatremia        HPI:  Darlene Avila is a 71 year old F with history of diabetes type 2, seizure c/b anoxic brain injury s/p trach, PEG, bed bound status, Stage IV sacral ulcer, ischemic CVA, IBS, history of breast cancer s/p right mastectomy and failure to thrive who was brought in by her HPOA (sister) for nursing home placement. Patient is unable to provide history.     Patient's sister was contacted who stated that the patient was admitted to [facility] in [facility] for recurrent seizures/status epilepticus which resulted in an anoxic brain injury and respiratory failure.  Patient was intubated followed by tracheostomy and PEG placement .  Patient was discharged to nursing home and subsequently discharged to hospice care at the request of patient's sister. However a few days ago, she visited the patient at [facility] recovery facility and noticed they weren't feeding or taking care of the patient due to her 'hospice status'. Per sister today, she would like to place the patient in a skilled nursing facility and would like to rescind her hospice care at this time.  She is no longer interested in hospice placement. She states the patient at baseline is only able to follow some simple commands. She is unsure how much O2 level is her baseline via trach collar.   Of note she has a Stage IV sacral wound that is s/p debridement on 06/09 by Gen surgery in [facility].     Upon arrival to the ED, the patient was hemodynamically stable. Labs revealed Na 157, WBC 14. Hgb 9.4 (baseline 7-9). The patient was given 1L of IV NS and was admitted to hospital medicine for further management.        Overview/Hospital Course:  Patient admitted to hospital medicine with pre-existing anoxic brain injury, hypernatremia, sacral decubitus wound s/p debridement 06/08 in [place], [place]. Patient initially admitted to hospice following discharge from [facility] ICU, but discharged from hospice as patient family believed she was not getting appropriate care. Na 157, corrected with IVF (NS and hypotonic solutions) and enteral FWF down to 148. Trach collar exchanged by ENT due to lack of supplies at facility. Wound care consulted for sacral decubitus, recommended General Surgery consult for possible debridement as wound appears purulent. Persistent leukocytosis, started Zosyn per previous I&D wound cultures growing bacteriodes/enterococcus and new wound findings. cEEG started and Epilepsy consulted for assistance with antiepileptic medications; conflicting reports if patient was on only Keppra/Lacosamide vs Keppra/Lacosamide/Phenytoin. General surgery consulted for debridement of sacral decubitus ulcer, s/p I&D 06/24. Palliative Care consulted for assistance in GOC; decision to pursue medical/surgical care decided. ENT consulted for possible trach decannulation. ID consulted, planning for 6-weeks course of zosyn.Sodium improved; will continue free water flushes.Patient remains with trach on humidifier. Mentation waxes and wanes.     CM/SW to assist with dispo. Due to trach placement, [facility] placement for SNF limited; LTAC is also not an alternative. Long discussion with patient's sister about long term care options for patient. Sister states that she is unable to provide patient the care she needs at home. SW spoke to patient's sister and reports that she is agreeable to placing patient at [facility]. [facility] will put patient on the wait list.  JAZMIN sent e-mail for SSI referral to assist with medicare. Discuss with patient's sister, Sabi, about hospice. [facility] met with patient's sister.  They are able to provide trach and PEG tube care. Patient's sister is agreeable with placing patient with [facility].       Interval History: NAEON. AF HDS. Patient is more drowsy today but reacts to sternal rub.     Review of Systems   Unable to perform ROS: Patient nonverbal   Constitutional:  Positive for fatigue.   Respiratory:  Negative for chest tightness and shortness of breath.    Cardiovascular:  Negative for chest pain.   Gastrointestinal:  Negative for abdominal pain.   Objective:     Vital Signs (Most Recent):  Temp: 98.7 °F (37.1 °C) (07/12/22 1246)  Pulse: 90 (07/12/22 1246)  Resp: 18 (07/12/22 1246)  BP: 116/82 (07/12/22 1246)  SpO2: 99 % (07/12/22 1142) Vital Signs (24h Range):  Temp:  [97 °F (36.1 °C)-98.7 °F (37.1 °C)] 98.7 °F (37.1 °C)  Pulse:  [] 90  Resp:  [16-18] 18  SpO2:  [96 %-100 %] 99 %  BP: (112-146)/(63-82) 116/82     Weight: 50.2 kg (110 lb 10.7 oz)  Body mass index is 22.35 kg/m².    Intake/Output Summary (Last 24 hours) at 7/12/2022 1354  Last data filed at 7/12/2022 0900  Gross per 24 hour   Intake 1463.37 ml   Output 1050 ml   Net 413.37 ml      Physical Exam  Vitals and nursing note reviewed.   Constitutional:       General: She is not in acute distress.     Appearance: She is not ill-appearing or toxic-appearing.   HENT:      Head: Normocephalic.      Mouth/Throat:      Mouth: Mucous membranes are dry.      Comments: Dried oral secretions adhered to tongue  Neck:      Comments: Trach in place and secured  Cardiovascular:      Rate and Rhythm: Normal rate and regular rhythm.      Pulses: Normal pulses.      Heart sounds: Normal heart sounds. No murmur heard.  Pulmonary:      Effort: Pulmonary effort is normal. No respiratory distress.      Breath sounds: Normal breath sounds. No wheezing or rales.   Abdominal:      General: Abdomen is flat. Bowel sounds are normal. There is no distension.      Palpations: Abdomen is soft.      Tenderness: There is no abdominal  tenderness.   Musculoskeletal:         General: No swelling or tenderness. Normal range of motion.      Cervical back: Normal range of motion.   Skin:     General: Skin is warm.      Coloration: Skin is not jaundiced or pale.      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    Neurological:      Mental Status: She is alert.      Comments: Intermittently alert, minimally responsive   Psychiatric:      Comments: Unable to assess       Significant Labs: All pertinent labs within the past 24 hours have been reviewed.  None    Significant Imaging: I have reviewed all pertinent imaging results/findings within the past 24 hours.      Assessment/Plan:      * Hypernatremia  STABLE, WNL    Na stable today.   - Free water flushes      Encephalopathy  Mentation waxes and wanes. Mentation is drowsy today. She wakes up with sternal rub.    Goals of care, counseling/discussion  Refer to palliative care note.      Palliative care encounter  Per Primary Medicine team and Palliative Care encounters with patient family (separate encounters, see dated notes in chart), patient family wishes full medical care and does not desire comfort/hospice measures. DNR remains. See Pall Care note for further detail.    Seizures  Patient previously started on Lacosamide and Keppra at OSH during initial episode of status epilepticus. Phenytoin added as patient continued to have breakthrough seizures.    - Repeat EEG ordered 2/2 interval decrease in alert/responsiveness; diffuse toxic metabolic encephalopathy, no new seizure per Neuro EP  - Continue Keppra monotherapy 750 bid; titrate per neuro recs  - Neurology consulted for further assistance in antiepileptic regimen    G tube feedings  Tube feed dependent via G tube  - Nutrition to assist with TF recs; see recommendations in note; appreciate assistance      Tracheostomy in place  Dysarthria  Dysphagia    Trach connected to humidifier, good O2 sats on RA     - Respiratory  therapy to assist with trach care.   - SLP for swallow evaluation and speech therapy; recommending NPO, re-evaluation, PMSV under direct supervision  - ENT advising to keep tracheostomy in place due to intermittent responsiveness; amenable to decannulation if patient family moves towards hospice/comfort measures    Sacral decubitus ulcer, stage IV  S/p recent debridement at OSH 06/08. OSH Wcx with enterococcus faecalis, bacteriodes fragilus.    - Wound care consulted; appreciate assistance  - Deep wound cx  - General surgery consulted s/p debridement 06/24, no note of bone involvement per OP note, no cultures sent  - Infectious Disease consulted, zosyn for 6-weeks (End date is 8/5)  - Consideration for wound vac given depth of injury  - Turn q2h    Leukocytosis  Patient admitted with elevated WBC to 14s. Previous episodes of aspiration PNA at OSH, indwelling cabrera given acuity of condition, sacral decubitus ulcer stave IV s/p debridement with enterococcus faecalis / bacteriodes fragilis in cx. Patient afebrile with stable hemodynamics on admission at this time. CXR without acute abnormality. UA with yeast growing, chronic per previous OSH review.    Leukocytosis may be explained by soft tissue infection vs hemoconcentration given extensive dehydration and hypernatremia from lack of enteral fluids or IVF at outside hospice facility prior to transfer to [facility].     Currently STABLE    - Zosyn given previous cultures and persistent leukocytosis  - Etiology may be soft tissue infection; see Sacral Decubitus Ulcer A/P  - Continue monitoring CBC  - Wound care per Sacral Decubitus Ulcer A/P  - Aspiration precautions  - Trend fever curve  - Trend BCx    Discharge planning issues  Darlene Avila is a 71 year old F with history of diabetes type 2, seizure, ischemic CVA, IBS, history of breast cancer s/p right mastectomy and failure to thrive who was brought in by her HPOA (sister) for skilled nursing home placement due to concern  for neglect at recent hospice facility. Patient follows very simple commands and doubt she can engage in skilled therapy. [facility] nursing facility likely more of an option.     - Appreciate CM/SW assistance with placement; placement difficult d/t trach; LTAC placement not an option. Patient is now on the wait list for placement at [facility]. Her sister is going to meet with [facility] (7/11) to discuss long term placement. [facility] can provide of trach and PEG care. They are agreeable with accepting patient.   - Appreciate pharmacy's assistance with med rec from previous admission    ACP (advance care planning)  Patient is DNR. LaPOST on file.     - CM/SW assisting; [facility] status [facility]. Spoke with [facility] and they are agreeable with accepting patient.   - See Care Update note Zi-on MD Aramis on 06/29 for further detail.    History of CVA (cerebrovascular accident)  Unclear of patient's current medications. Per med review on care-everywhere, the patient is on statin but not on antiplatelet therapy.     - Appreciate pharmacy's assistance with medication reconciliation with facility- start antiplatelet therapy if no contraindications.   - Continue statin via G-tube      Essential hypertension  Blood pressure stable.      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 A1c repeated 6.9.    - LDSSI   - POCT glucose q6hrs  - Maintain -180  - Titrate basal/bolus regimen to goal as above.      VTE Risk Mitigation (From admission, onward)         Ordered     heparin (porcine) injection 5,000 Units  Every 12 hours         06/18/22 0355     IP VTE HIGH RISK PATIENT  Once         06/18/22 0355     Place sequential compression device  Until discontinued          06/18/22 0355                Discharge Planning   JUDIT: 7/13/2022     Code Status: DNR   Is the patient medically ready for discharge?: Yes    Reason for patient still in hospital (select all that apply): Patient trending condition  Discharge Plan A: New Nursing Home placement - care home care facility   Discharge Delays: (!) Patient and Family Barriers (pt high level of needs and accepting facilities)              Maria L Mathew DO  Department of Hospital Medicine   [facility] - Intensive Care ([place])

## 2022-07-12 NOTE — RESPIRATORY THERAPY
RAPID RESPONSE RESPIRATORY THERAPY PROACTIVE NOTE           Time of visit: 715    Code Status: DNR   : 1950  Bed: 16181/42216 A:   MRN: [id number]  Time spent at the bedside: < 15 min    SITUATION    Evaluated patient for: LDA Check     BACKGROUND    Patient has a past medical history of Arthritis, Cancer of breast, Cataract, Diabetes mellitus, Hypertension, Hypoxia, Memory loss, Orthostatic hypotension, TIA (transient ischemic attack), and Vitamin D deficiency.  Clinically Significant Surgical Hx: tracheostomy    24 Hours Vitals Range:  Temp:  [97 °F (36.1 °C)-98.7 °F (37.1 °C)]   Pulse:  []   Resp:  [16-20]   BP: (112-157)/(63-70)   SpO2:  [96 %-100 %]     Labs:    Recent Labs     07/10/22  0600 22  0336    139   K 4.8 4.4    108   CO2 25 24   CREATININE 0.6 0.5   * 179*   PHOS 2.6* 2.2*   MG 1.8 1.8        No results for input(s): PH, PCO2, PO2, HCO3, POCSATURATED, BE in the last 72 hours.    ASSESSMENT/INTERVENTIONS    Upon arrival in room patient resting in bed.  All trach supplies at bedside.  Extra trachs at bedside include 4.0 and 6.0 shiley both cuffed.    Last VS   Temp: 98 °F (36.7 °C) (404)  Pulse: 86 (404)  Resp: 18 (404)  BP: 112/63 (404)  SpO2: 99 % (404)    Level of Consciousness: Level of Consciousness (AVPU): alert  Respiratory Effort: Respiratory Effort: Normal, Unlabored Expansion/Accessory Muscle Usage: Expansion/Accessory Muscles/Retractions: no use of accessory muscles, no retractions, expansion symmetric  All Lung Field Breath Sounds: All Lung Fields Breath Sounds: Anterior:, Lateral:, coarse, diminished  IGNACIO Breath Sounds: diminished  LLL Breath Sounds: diminished  RUL Breath Sounds: diminished  RML Breath Sounds: diminished  RLL Breath Sounds: diminished  O2 Device/Concentration: Flow (L/min): 5, Oxygen Concentration (%): 28, O2 Device (Oxygen Therapy): tracheostomy collar  Surgical airway: Yes, Type: Shiley Size: 6,  uncuffed  Ambu at bedside: Ambu bag with the patient?: Yes, Adult Ambu     Active Orders   Respiratory Care    Pulse Oximetry Q4H     Frequency: Q4H     Number of Occurrences: Until Specified    Routine tracheostomy care     Frequency: BID     Number of Occurrences: Until Specified    SUCTION PRN     Frequency: PRN     Number of Occurrences: Until Specified       RECOMMENDATIONS    We recommend: RRT Recs: Continue POC per primary team.    ESCALATION        FOLLOW-UP    Please call back the Rapid Response RT, Chetna Puckett, RRT at x [phone] for any questions or concerns.

## 2022-07-13 LAB
POCT GLUCOSE: 156 MG/DL (ref 70–110)
POCT GLUCOSE: 213 MG/DL (ref 70–110)

## 2022-07-13 PROCEDURE — 99900035 HC TECH TIME PER 15 MIN (STAT)

## 2022-07-13 PROCEDURE — 99900026 HC AIRWAY MAINTENANCE (STAT)

## 2022-07-13 PROCEDURE — 63600175 PHARM REV CODE 636 W HCPCS: Performed by: STUDENT IN AN ORGANIZED HEALTH CARE EDUCATION/TRAINING PROGRAM

## 2022-07-13 PROCEDURE — 25000003 PHARM REV CODE 250

## 2022-07-13 PROCEDURE — 99232 PR SUBSEQUENT HOSPITAL CARE,LEVL II: ICD-10-PCS | Mod: ,,, | Performed by: STUDENT IN AN ORGANIZED HEALTH CARE EDUCATION/TRAINING PROGRAM

## 2022-07-13 PROCEDURE — 12000002 HC ACUTE/MED SURGE SEMI-PRIVATE ROOM

## 2022-07-13 PROCEDURE — 63600175 PHARM REV CODE 636 W HCPCS

## 2022-07-13 PROCEDURE — 99232 SBSQ HOSP IP/OBS MODERATE 35: CPT | Mod: ,,, | Performed by: STUDENT IN AN ORGANIZED HEALTH CARE EDUCATION/TRAINING PROGRAM

## 2022-07-13 PROCEDURE — 25000003 PHARM REV CODE 250: Performed by: STUDENT IN AN ORGANIZED HEALTH CARE EDUCATION/TRAINING PROGRAM

## 2022-07-13 PROCEDURE — C1751 CATH, INF, PER/CENT/MIDLINE: HCPCS

## 2022-07-13 PROCEDURE — 27000221 HC OXYGEN, UP TO 24 HOURS

## 2022-07-13 PROCEDURE — 94761 N-INVAS EAR/PLS OXIMETRY MLT: CPT

## 2022-07-13 PROCEDURE — 27200966 HC CLOSED SUCTION SYSTEM

## 2022-07-13 PROCEDURE — 31603 EMER TRACHEOSTOMY TTRACH: CPT

## 2022-07-13 RX ADMIN — PIPERACILLIN SODIUM AND TAZOBACTAM SODIUM 4.5 G: 4; .5 INJECTION, POWDER, LYOPHILIZED, FOR SOLUTION INTRAVENOUS at 11:07

## 2022-07-13 RX ADMIN — ATORVASTATIN CALCIUM 80 MG: 20 TABLET, FILM COATED ORAL at 08:07

## 2022-07-13 RX ADMIN — LEVETIRACETAM 750 MG: 100 SOLUTION ORAL at 11:07

## 2022-07-13 RX ADMIN — HEPARIN SODIUM 5000 UNITS: 5000 INJECTION INTRAVENOUS; SUBCUTANEOUS at 08:07

## 2022-07-13 RX ADMIN — PIPERACILLIN SODIUM AND TAZOBACTAM SODIUM 4.5 G: 4; .5 INJECTION, POWDER, LYOPHILIZED, FOR SOLUTION INTRAVENOUS at 06:07

## 2022-07-13 RX ADMIN — SODIUM BICARBONATE 650 MG TABLET 650 MG: at 08:07

## 2022-07-13 RX ADMIN — PIPERACILLIN SODIUM AND TAZOBACTAM SODIUM 4.5 G: 4; .5 INJECTION, POWDER, LYOPHILIZED, FOR SOLUTION INTRAVENOUS at 03:07

## 2022-07-13 RX ADMIN — NYSTATIN OINTMENT: 100000 OINTMENT TOPICAL at 08:07

## 2022-07-13 RX ADMIN — CHOLESTYRAMINE 8 G: 4 POWDER, FOR SUSPENSION ORAL at 11:07

## 2022-07-13 RX ADMIN — LEVETIRACETAM 750 MG: 100 SOLUTION ORAL at 08:07

## 2022-07-13 RX ADMIN — NYSTATIN OINTMENT: 100000 OINTMENT TOPICAL at 11:07

## 2022-07-13 RX ADMIN — INSULIN DETEMIR 12 UNITS: 100 INJECTION, SOLUTION SUBCUTANEOUS at 11:07

## 2022-07-13 RX ADMIN — HEPARIN SODIUM 5000 UNITS: 5000 INJECTION INTRAVENOUS; SUBCUTANEOUS at 11:07

## 2022-07-13 RX ADMIN — SODIUM BICARBONATE 650 MG TABLET 650 MG: at 11:07

## 2022-07-13 RX ADMIN — CHOLESTYRAMINE 8 G: 4 POWDER, FOR SUSPENSION ORAL at 08:07

## 2022-07-13 NOTE — SUBJECTIVE & OBJECTIVE
Interval History: Patient complaining of being hungry while in the room. Need reassurances that she has tube feeds running. Not in respiratory distress    Review of Systems   Unable to perform ROS: Patient nonverbal   Constitutional:  Positive for fatigue.   Respiratory:  Negative for chest tightness and shortness of breath.    Cardiovascular:  Negative for chest pain.   Gastrointestinal:  Negative for abdominal pain.   Objective:     Vital Signs (Most Recent):  Temp: 98.3 °F (36.8 °C) (07/13/22 1130)  Pulse: 91 (07/13/22 1130)  Resp: 16 (07/13/22 0800)  BP: 117/60 (07/13/22 1130)  SpO2: (!) 94 % (07/13/22 1231)   Vital Signs (24h Range):  Temp:  [98 °F (36.7 °C)-98.8 °F (37.1 °C)] 98.3 °F (36.8 °C)  Pulse:  [79-92] 91  Resp:  [16-19] 16  SpO2:  [94 %-99 %] 94 %  BP: (111-163)/(54-83) 117/60     Weight: 50.2 kg (110 lb 10.7 oz)  Body mass index is 22.35 kg/m².    Intake/Output Summary (Last 24 hours) at 7/13/2022 1607  Last data filed at 7/13/2022 1200  Gross per 24 hour   Intake 2436.68 ml   Output 1300 ml   Net 1136.68 ml      Physical Exam  Vitals and nursing note reviewed.   Constitutional:       General: She is not in acute distress.     Appearance: She is not ill-appearing or toxic-appearing.   HENT:      Head: Normocephalic.      Mouth/Throat:      Mouth: Mucous membranes are dry.      Comments: Dried oral secretions adhered to tongue  Neck:      Comments: Trach in place and secured  Cardiovascular:      Rate and Rhythm: Normal rate and regular rhythm.      Pulses: Normal pulses.      Heart sounds: Normal heart sounds. No murmur heard.  Pulmonary:      Effort: Pulmonary effort is normal. No respiratory distress.      Breath sounds: Normal breath sounds. No wheezing or rales.   Abdominal:      General: Abdomen is flat. Bowel sounds are normal. There is no distension.      Palpations: Abdomen is soft.      Tenderness: There is no abdominal tenderness.   Musculoskeletal:         General: No swelling or  tenderness. Normal range of motion.      Cervical back: Normal range of motion.   Skin:     General: Skin is warm.      Coloration: Skin is not jaundiced or pale.      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    Neurological:      Mental Status: She is alert.      Comments: Intermittently alert, minimally responsive   Psychiatric:      Comments: Unable to assess       Significant Labs: All pertinent labs within the past 24 hours have been reviewed.  BMP: No results for input(s): GLU, NA, K, CL, CO2, BUN, CREATININE, CALCIUM, MG in the last 48 hours.  CBC: No results for input(s): WBC, HGB, HCT, PLT in the last 48 hours.    Significant Imaging: I have reviewed all pertinent imaging results/findings within the past 24 hours.

## 2022-07-13 NOTE — PLAN OF CARE
Problem: Adult Inpatient Plan of Care  Goal: Optimal Comfort and Wellbeing  Outcome: Ongoing, Progressing     Problem: Impaired Wound Healing  Goal: Optimal Wound Healing  Outcome: Ongoing, Progressing     Problem: Infection  Goal: Absence of Infection Signs and Symptoms  Outcome: Ongoing, Progressing     Problem: Skin Injury Risk Increased  Goal: Skin Health and Integrity  Outcome: Ongoing, Progressing     Problem: Fall Injury Risk  Goal: Absence of Fall and Fall-Related Injury  Outcome: Ongoing, Progressing     Problem: Coping Ineffective  Goal: Effective Coping  Outcome: Ongoing, Progressing

## 2022-07-13 NOTE — PROGRESS NOTES
[facility] - Intensive Care ([place] [address])  Adult Nutrition  Progress Note    SUMMARY       Recommendations    1. Increase TF to Glucerna 1.5 @ 45ml/hr to provide 1620 kcal, 89g protein, and 820ml fluid. Additional FW per MD.      2. Add Frank BID for wound healing               - Mix Frank with 120ml of water until all particles dissolved and administer via PEG. Water flush before and after administer.      3. RD following     Goals: Pt to receive nutrition by RD follow up  Nutrition Goal Status: goal met  Communication of RD Recs:  (POC)    Assessment and Plan    Severe malnutrition  Nutrition Problem  Severe Protein-Calorie Malnutrition  Malnutrition in the context of Chronic Illness     Related to (etiology):   Inadequate energy intake     Signs and Symptoms (as evidenced by):   Energy Intake: < 75% of estimated energy requirement for >3 moths  Body Fat Depletion: Severe depletion of orbital, triceps   Muscle Mass Depletion: Severe depletion of temples, clavicle region, interosseous muscle     Interventions/Recommendations (treatment strategy):  Collaboration of nutrition care with other providers     Nutrition Diagnosis Status:   Continue    Malnutrition Assessment  Energy Intake (Malnutrition): less than 75% for greater than or equal to 3 months   Orbital Region (Subcutaneous Fat Loss): severe depletion  Upper Arm Region (Subcutaneous Fat Loss): severe depletion   [place] Region (Muscle Loss): severe depletion  Clavicle Bone Region (Muscle Loss): severe depletion  Clavicle and Acromion Bone Region (Muscle Loss): severe depletion  Dorsal Hand (Muscle Loss): severe depletion     Reason for Assessment    Reason For Assessment: RD follow-up  Diagnosis: other (see comments) (discharge planning issues)  Relevant Medical History: T2DM, seizures c/b anoxic brain injury, s/p trach/PEG, bed bound, stage IV sacral ulcer, IBS  Interdisciplinary Rounds: did not attend  General Information Comments:   Pt with trach/PEG. TF  "order Glucerna 1.5@ goal rate of 35ml/hr. Pt with stage 4 sacral decubitus ulcer. No new wt. Pt diagnosed with malnutrition 8 months ago, since then with 25# wt gain. UBW per 8 months ago was #s. NFPE completed on 7/6 , noted severe fat and muscle wasting. Pt still meet the criteria for severe malnutrition in the context of chronic illness.    Nutrition Discharge Planning: Pending clinical course    Nutrition Risk Screen    Nutrition Risk Screen: tube feeding or parenteral nutrition    Nutrition/Diet History    Spiritual, Cultural Beliefs, Bahai Practices, Values that Affect Care: no  Food Allergies: NKFA  Factors Affecting Nutritional Intake: NPO    Anthropometrics    Temp: 98.3 °F (36.8 °C)  Height Method: Stated  Height: 4' 11" (149.9 cm)  Height (inches): 59 in  Weight Method: Bed Scale  Weight: 50.2 kg (110 lb 10.7 oz)  Weight (lb): 110.67 lb  Ideal Body Weight (IBW), Female: 95 lb  % Ideal Body Weight, Female (lb): 116.49 %  BMI (Calculated): 22.3     Lab/Procedures/Meds    Pertinent Labs Reviewed: reviewed  Pertinent Labs Comments: glucose 179  Pertinent Medications Reviewed: reviewed  Pertinent Medications Comments: insulin detemir, Na bicarbonate, heparin    Estimated/Assessed Needs    Weight Used For Calorie Calculations: 50.2 kg (110 lb 10.7 oz)  Energy Calorie Requirements (kcal): 1506-1757kcal  Energy Need Method: Kcal/kg (30-35)  Protein Requirements: 60-75g (1.2-1.5g/kg)  Weight Used For Protein Calculations: 50.2 kg (110 lb 10.7 oz)  Fluid Requirements (mL): 1ml/kcal or per MD  RDA Method (mL): 1506  CHO Requirement: 157g    Nutrition Prescription Ordered    Current Diet Order: NPO  Current Nutrition Support Formula Ordered: Glucerna 1.5  Current Nutrition Support Rate Ordered: 35 (ml)  Current Nutrition Support Frequency Ordered: ml/hr    Evaluation of Received Nutrient/Fluid Intake    Enteral Calories (kcal): 1260  Enteral Protein (gm): 69  Enteral (Free Water) Fluid (mL): 647  % Kcal " Needs: 84%  % Protein Needs: 100%  I/O: + 6.7L since 6/29  Energy Calories Required: meeting needs  Protein Required: meeting needs  Comments: LBM 7/12  Tolerance: tolerating    Nutrition Risk    Level of Risk/Frequency of Follow-up: low     Monitor and Evaluation    Food and Nutrient Intake: enteral nutrition intake  Food and Nutrient Adminstration: enteral and parenteral nutrition administration  Knowledge/Beliefs/Attitudes: food and nutrition knowledge/skill, beliefs and attitudes  Physical Activity and Function: nutrition-related ADLs and IADLs  Anthropometric Measurements: weight, weight change, body mass index  Biochemical Data, Medical Tests and Procedures: electrolyte and renal panel, lipid profile, gastrointestinal profile, glucose/endocrine profile, inflammatory profile  Nutrition-Focused Physical Findings: overall appearance, extremities, muscles and bones     Nutrition Follow-Up    RD Follow-up?: Yes     Janina MCFARLAND

## 2022-07-13 NOTE — RESPIRATORY THERAPY
RAPID RESPONSE RESPIRATORY THERAPY PROACTIVE NOTE           Time of visit: 717     Code Status: DNR   : 1950  Bed: 16250/92032 A:   MRN: [id number]  Time spent at the bedside: < 15 min    SITUATION    Evaluated patient for: LDA Check     BACKGROUND    Patient has a past medical history of Arthritis, Cancer of breast, Cataract, Diabetes mellitus, Hypertension, Hypoxia, Memory loss, Orthostatic hypotension, TIA (transient ischemic attack), and Vitamin D deficiency.  Clinically Significant Surgical Hx: tracheostomy    24 Hours Vitals Range:  Temp:  [98 °F (36.7 °C)-98.8 °F (37.1 °C)]   Pulse:  [79-94]   Resp:  [16-19]   BP: (116-163)/(61-83)   SpO2:  [96 %-99 %]     Labs:    Recent Labs     22  0336      K 4.4      CO2 24   CREATININE 0.5   *   PHOS 2.2*   MG 1.8        No results for input(s): PH, PCO2, PO2, HCO3, POCSATURATED, BE in the last 72 hours.    ASSESSMENT/INTERVENTIONS    Upon arrival in room patient resting in bed.  All trach supplies at bedside.  Extra trachs at bedside include 4.0 and 6.0 shiley both cuffed.    Last VS   Temp: 98.6 °F (37 °C) (102)  Pulse: 88 (631)  Resp: 16 (631)  BP: 145/63 (631)  SpO2: 96 % (631)    Level of Consciousness: Level of Consciousness (AVPU): alert  Respiratory Effort: Respiratory Effort: Unlabored Expansion/Accessory Muscle Usage: Expansion/Accessory Muscles/Retractions: no use of accessory muscles  All Lung Field Breath Sounds: All Lung Fields Breath Sounds: coarse, diminished, equal bilaterally  IGNACIO Breath Sounds: diminished  LLL Breath Sounds: diminished  RUL Breath Sounds: diminished  RML Breath Sounds: diminished  RLL Breath Sounds: diminished  O2 Device/Concentration: Flow (L/min): 5, Oxygen Concentration (%): 28, O2 Device (Oxygen Therapy): Trach Collar  Surgical airway: Yes, Type: Shiley Size: 6, uncuffed  Ambu at bedside: Ambu bag with the patient?: Yes, Adult Ambu     Active Orders   Respiratory  Care    Pulse Oximetry Q4H     Frequency: Q4H     Number of Occurrences: Until Specified    Routine tracheostomy care     Frequency: BID     Number of Occurrences: Until Specified    SUCTION PRN     Frequency: PRN     Number of Occurrences: Until Specified       RECOMMENDATIONS    We recommend: RRT Recs: Continue POC per primary team.    ESCALATION        FOLLOW-UP    Please call back the Rapid Response RT, Chetna Puckett, RRT at x [phone] for any questions or concerns.

## 2022-07-13 NOTE — PROGRESS NOTES
[facility] - Intensive Care ([place] [address])  [facility] Medicine  Progress Note    Patient Name: Darlene Avila  MRN: [id number]  Patient Class: IP- Inpatient   Admission Date: 6/17/2022  Length of Stay: 24 days  Attending Physician: Trey Mike MD  Primary Care Provider: Kirill Cabrera Iii, MD        Subjective:     Principal Problem:Hypernatremia        HPI:  Darlene Avila is a 71 year old F with history of diabetes type 2, seizure c/b anoxic brain injury s/p trach, PEG, bed bound status, Stage IV sacral ulcer, ischemic CVA, IBS, history of breast cancer s/p right mastectomy and failure to thrive who was brought in by her HPOA (sister) for nursing home placement. Patient is unable to provide history.     Patient's sister was contacted who stated that the patient was admitted to [facility] in [facility] for recurrent seizures/status epilepticus which resulted in an anoxic brain injury and respiratory failure.  Patient was intubated followed by tracheostomy and PEG placement .  Patient was discharged to nursing home and subsequently discharged to hospice care at the request of patient's sister. However a few days ago, she visited the patient at [facility] recovery facility and noticed they weren't feeding or taking care of the patient due to her 'hospice status'. Per sister today, she would like to place the patient in a skilled nursing facility and would like to rescind her hospice care at this time.  She is no longer interested in hospice placement. She states the patient at baseline is only able to follow some simple commands. She is unsure how much O2 level is her baseline via trach collar.   Of note she has a Stage IV sacral wound that is s/p debridement on 06/09 by Gen surgery in [facility].     Upon arrival to the ED, the patient was hemodynamically stable. Labs revealed Na 157, WBC 14. Hgb 9.4 (baseline 7-9). The patient was given 1L of IV NS and was admitted to hospital medicine for further management.        Overview/Hospital Course:  Patient admitted to hospital medicine with pre-existing anoxic brain injury, hypernatremia, sacral decubitus wound s/p debridement 06/08 in [place], [place]. Patient initially admitted to hospice following discharge from [facility] ICU, but discharged from hospice as patient family believed she was not getting appropriate care. Na 157, corrected with IVF (NS and hypotonic solutions) and enteral FWF down to 148. Trach collar exchanged by ENT due to lack of supplies at facility. Wound care consulted for sacral decubitus, recommended General Surgery consult for possible debridement as wound appears purulent. Persistent leukocytosis, started Zosyn per previous I&D wound cultures growing bacteriodes/enterococcus and new wound findings. cEEG started and Epilepsy consulted for assistance with antiepileptic medications; conflicting reports if patient was on only Keppra/Lacosamide vs Keppra/Lacosamide/Phenytoin. General surgery consulted for debridement of sacral decubitus ulcer, s/p I&D 06/24. Palliative Care consulted for assistance in GOC; decision to pursue medical/surgical care decided. ENT consulted for possible trach decannulation. ID consulted, planning for 6-weeks course of zosyn.Sodium improved; will continue free water flushes.Patient remains with trach on humidifier. Mentation waxes and wanes.     CM/SW to assist with dispo. Due to trach placement, [facility] placement for SNF limited; LTAC is also not an alternative. Long discussion with patient's sister about long term care options for patient. Sister states that she is unable to provide patient the care she needs at home. SW spoke to patient's sister and reports that she is agreeable to placing patient at [facility]. [facility] will put patient on the wait list.  JAZMIN sent e-mail for SSI referral to assist with medicare. Discuss with patient's sister, Sabi, about hospice. [facility] met with patient's sister.  They are able to provide trach and PEG tube care. Patient's sister is agreeable with placing patient with [facility].       Interval History: Patient complaining of being hungry while in the room. Need reassurances that she has tube feeds running. Not in respiratory distress    Review of Systems   Unable to perform ROS: Patient nonverbal   Constitutional:  Positive for fatigue.   Respiratory:  Negative for chest tightness and shortness of breath.    Cardiovascular:  Negative for chest pain.   Gastrointestinal:  Negative for abdominal pain.   Objective:     Vital Signs (Most Recent):  Temp: 98.3 °F (36.8 °C) (07/13/22 1130)  Pulse: 91 (07/13/22 1130)  Resp: 16 (07/13/22 0800)  BP: 117/60 (07/13/22 1130)  SpO2: (!) 94 % (07/13/22 1231)   Vital Signs (24h Range):  Temp:  [98 °F (36.7 °C)-98.8 °F (37.1 °C)] 98.3 °F (36.8 °C)  Pulse:  [79-92] 91  Resp:  [16-19] 16  SpO2:  [94 %-99 %] 94 %  BP: (111-163)/(54-83) 117/60     Weight: 50.2 kg (110 lb 10.7 oz)  Body mass index is 22.35 kg/m².    Intake/Output Summary (Last 24 hours) at 7/13/2022 1607  Last data filed at 7/13/2022 1200  Gross per 24 hour   Intake 2436.68 ml   Output 1300 ml   Net 1136.68 ml      Physical Exam  Vitals and nursing note reviewed.   Constitutional:       General: She is not in acute distress.     Appearance: She is not ill-appearing or toxic-appearing.   HENT:      Head: Normocephalic.      Mouth/Throat:      Mouth: Mucous membranes are dry.      Comments: Dried oral secretions adhered to tongue  Neck:      Comments: Trach in place and secured  Cardiovascular:      Rate and Rhythm: Normal rate and regular rhythm.      Pulses: Normal pulses.      Heart sounds: Normal heart sounds. No murmur heard.  Pulmonary:      Effort: Pulmonary effort is normal. No respiratory distress.      Breath sounds: Normal breath sounds. No wheezing or rales.   Abdominal:      General: Abdomen is flat. Bowel sounds are normal. There is no distension.       Palpations: Abdomen is soft.      Tenderness: There is no abdominal tenderness.   Musculoskeletal:         General: No swelling or tenderness. Normal range of motion.      Cervical back: Normal range of motion.   Skin:     General: Skin is warm.      Coloration: Skin is not jaundiced or pale.      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    Neurological:      Mental Status: She is alert.      Comments: Intermittently alert, minimally responsive   Psychiatric:      Comments: Unable to assess       Significant Labs: All pertinent labs within the past 24 hours have been reviewed.  BMP: No results for input(s): GLU, NA, K, CL, CO2, BUN, CREATININE, CALCIUM, MG in the last 48 hours.  CBC: No results for input(s): WBC, HGB, HCT, PLT in the last 48 hours.    Significant Imaging: I have reviewed all pertinent imaging results/findings within the past 24 hours.      Assessment/Plan:      * Hypernatremia  STABLE, WNL    Na stable today.   - Free water flushes      Encephalopathy  Mentation waxes and wanes. Mentation is drowsy today. She wakes up with sternal rub.    Goals of care, counseling/discussion  Refer to palliative care note.      Palliative care encounter  Per Primary Medicine team and Palliative Care encounters with patient family (separate encounters, see dated notes in chart), patient family wishes full medical care and does not desire comfort/hospice measures. DNR remains. See Pall Care note for further detail.    Seizures  Patient previously started on Lacosamide and Keppra at OSH during initial episode of status epilepticus. Phenytoin added as patient continued to have breakthrough seizures.    - Repeat EEG ordered 2/2 interval decrease in alert/responsiveness; diffuse toxic metabolic encephalopathy, no new seizure per Neuro EP  - Continue Keppra monotherapy 750 bid; titrate per neuro recs  - Neurology consulted for further assistance in antiepileptic regimen    G tube feedings  Tube  feed dependent via G tube  - Nutrition to assist with TF recs; see recommendations in note; appreciate assistance      Tracheostomy in place  Dysarthria  Dysphagia    Trach connected to humidifier, good O2 sats on RA     - Respiratory therapy to assist with trach care.   - SLP for swallow evaluation and speech therapy; recommending NPO, re-evaluation, PMSV under direct supervision  - ENT advising to keep tracheostomy in place due to intermittent responsiveness; amenable to decannulation if patient family moves towards hospice/comfort measures    Sacral decubitus ulcer, stage IV  S/p recent debridement at OSH 06/08. OSH Wcx with enterococcus faecalis, bacteriodes fragilus.    - Wound care consulted; appreciate assistance  - Deep wound cx  - General surgery consulted s/p debridement 06/24, no note of bone involvement per OP note, no cultures sent  - Infectious Disease consulted, zosyn for 6-weeks (End date is 8/5)  - Consideration for wound vac given depth of injury  - Turn q2h    Leukocytosis  Patient admitted with elevated WBC to 14s. Previous episodes of aspiration PNA at OSH, indwelling cabrera given acuity of condition, sacral decubitus ulcer stave IV s/p debridement with enterococcus faecalis / bacteriodes fragilis in cx. Patient afebrile with stable hemodynamics on admission at this time. CXR without acute abnormality. UA with yeast growing, chronic per previous OSH review.    Leukocytosis may be explained by soft tissue infection vs hemoconcentration given extensive dehydration and hypernatremia from lack of enteral fluids or IVF at outside hospice facility prior to [place] [facility]     Currently STABLE    - Zosyn given previous cultures and persistent leukocytosis  - Etiology may be soft tissue infection; see Sacral Decubitus Ulcer A/P  - Continue monitoring CBC  - Wound care per Sacral Decubitus Ulcer A/P  - Aspiration precautions  - Trend fever curve  - Trend BCx    Discharge planning issues  Darlene Avila is  a 71 year old F with history of diabetes type 2, seizure, ischemic CVA, IBS, history of breast cancer s/p right mastectomy and failure to thrive who was brought in by her HPOA (sister) for skilled nursing home placement due to concern for neglect at recent hospice facility. Patient follows very simple commands and doubt she can engage in skilled therapy. shelter nursing facility likely more of an option.     - Appreciate CM/SW assistance with placement; placement difficult d/t trach; LTAC placement not an option. Patient is now on the wait list for placement at [facility]. Her sister is going to meet with [facility] (7/11) to discuss long term placement. [facility] can provide of trach and PEG care. They are agreeable with accepting patient.   - Appreciate pharmacy's assistance with med rec from previous admission    ACP (advance care planning)  Patient is DNR. [place] on file.     - CM/SW assisting; tracheostomy status complicating placement to retirement w/ hospice. Spoke with [facility] and they are agreeable with accepting patient.   - See Care Update note Zi-on MD Aramis on 06/29 for further detail.    History of CVA (cerebrovascular accident)  Unclear of patient's current medications. Per med review on care-everywhere, the patient is on statin but not on antiplatelet therapy.     - Appreciate pharmacy's assistance with medication reconciliation with facility- start antiplatelet therapy if no contraindications.   - Continue statin via G-tube      Essential hypertension  Blood pressure stable.      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 A1c repeated 6.9.    - LDSSI   - POCT glucose q6hrs  - Maintain -180  - Titrate basal/bolus regimen to goal as above.      VTE Risk Mitigation (From  admission, onward)         Ordered     heparin (porcine) injection 5,000 Units  Every 12 hours         06/18/22 0355     IP VTE HIGH RISK PATIENT  Once         06/18/22 0355     Place sequential compression device  Until discontinued         06/18/22 0355                Discharge Planning   JUDIT: 7/15/2022     Code Status: DNR   Is the patient medically ready for discharge?: Yes    Reason for patient still in hospital (select all that apply): Pending disposition  Discharge Plan A: New Nursing Home placement - detention care facility   Discharge Delays: (!) Patient and Family Barriers (pt high level of needs and accepting facilities)              Marge Brown MD  Department of Hospital Medicine   [facility] - Intensive Care ([place]-16)

## 2022-07-13 NOTE — PLAN OF CARE
Problem: Adult Inpatient Plan of Care  Goal: Optimal Comfort and Wellbeing  Outcome: Ongoing, Progressing     Problem: Diabetes Comorbidity  Goal: Blood Glucose Level Within Targeted Range  Outcome: Ongoing, Progressing     Problem: Impaired Wound Healing  Goal: Optimal Wound Healing  Outcome: Ongoing, Progressing     Problem: Infection  Goal: Absence of Infection Signs and Symptoms  Outcome: Ongoing, Progressing     Problem: Skin Injury Risk Increased  Goal: Skin Health and Integrity  Outcome: Ongoing, Progressing     Problem: Fall Injury Risk  Goal: Absence of Fall and Fall-Related Injury  Outcome: Ongoing, Progressing     Problem: Coping Ineffective  Goal: Effective Coping  Outcome: Ongoing, Progressing

## 2022-07-13 NOTE — PLAN OF CARE
JAZMIN followed up with [facility] and they are unable to accept pt.  [facility] stated that sister Sabi did not visit the facility and they cannot accommodate with an isolation room, as pt has just recently been vaccinated.  [facility] stated that they had no beds available at this time.    JAZMIN followed up with [facility] in [place], [place] and spoke with Amada [phone].  Amada stated that she would check with her medical team to determine if pt would be clinically accepted to the facility.      JAZMIN spoke with sister Sabi and Sabi stated she is not willing to travel to outside of the [place] area and does not know what she wants to do.  Sister Sabi stated that she does not have the capacity to care for pt 24/7.  JAZMIN stated she would follow-up with [facility] about care plan and the other facilities willing to take pt.    JAZMIN left a message for Yocasta [phone] regarding 3 facilities [facility], [facility] and [facility] regarding pt acceptance to one or all the facilties.      JAZMIN called Hadley with [facility] and spoke with her about pt care if sister Sabi would take pt home with Hospice.   Hadley stated that they would be able to continue with the tube feedings, trach care, and anti-biotics for pt.  Hadley stated that they would set up pt with a hospital bed as well, and provide a nurse daily to assist. They could not provide 24/7 care, but Hadley did provide Sabi with list of sitters.    Yocasta [phone] returned JAZMIN call and stated that she will have the facilities consider the pt and acceptance.  She stated that none of the facilities could do the specific IV anti-biotics for the pt; however she did offer an alternative.  Yocsata stated that her company also has [facility] in [place] and they can determine if pt would be accepted to the LTAC.  Once the iV anti-biotics were completed the pt could move 1 floor down to [facility]  for either SNF placement or correction placement to continue care - or continue care for correction at [facility].  Yocasta stated she would follow-up with SW on 07/14 about the continuation of care for pt from LTAC to correction within their facilities.        SW to continue to follow for d/c planning      Yocasta Urbina CD, MSW, LMSW, RSW   Case Management  [facility]  Email: [email or web address]

## 2022-07-13 NOTE — CONSULTS
[facility] ([place] [address])  Wound Care    Patient Name:  Darlene Avila   MRN:  [id number]  Date: 7/13/2022  Diagnosis: Hypernatremia    History:     Past Medical History:   Diagnosis Date    Arthritis     Cancer of breast     s/p mastectomy (on anastrozole)     Cataract     Diabetes mellitus     c/b Diabetic retinopathy    Hypertension     resolved    Hypoxia 4/15/2021    Memory loss     due to strokes    Orthostatic hypotension 12/27/2020    frequent falls, on midodrine.  4/2021 seen by both cards and palliative care    TIA (transient ischemic attack) 5177-8823    CTH with remote infarcts    Vitamin D deficiency 10/14/2021       Social History     Socioeconomic History    Marital status: Single   Tobacco Use    Smoking status: Current Every Day Smoker     Packs/day: 1.50     Types: Cigarettes    Smokeless tobacco: Never Used   Substance and Sexual Activity    Alcohol use: No    Drug use: No    Sexual activity: Not Currently     Social Determinants of Health     Financial Resource Strain: Low Risk     Difficulty of Paying Living Expenses: Not very hard   Food Insecurity: No Food Insecurity    Worried About Running Out of Food in the Last Year: Never true    Ran Out of Food in the Last Year: Never true   Transportation Needs: No Transportation Needs    Lack of Transportation (Medical): No    Lack of Transportation (Non-Medical): No   Physical Activity: Inactive    Days of Exercise per Week: 0 days    Minutes of Exercise per Session: 0 min   Stress: No Stress Concern Present    Feeling of Stress : Only a little   Social Connections: Unknown    Frequency of Communication with Friends and Family: More than three times a week    Frequency of Social Gatherings with Friends and Family: More than three times a week    Attends [employer] Services: Never    Active Member of Clubs or Organizations: No    Attends Club or Organization Meetings: Never   Housing Stability: Unknown     Unable to Pay for Housing in the Last Year: No    Unstable Housing in the Last Year: No       Precautions:     Allergies as of 06/17/2022 - Reviewed 10/23/2021   Allergen Reaction Noted    Iodinated contrast media Hives 03/17/2013       [facility] Assessment Details/Treatment   Wound care consult for left parietal region of head and left later lower extremity.   There is an eschar covered wound to the left posterior head. Triad was applied and covered with a foam dressing. The left lateral lower extremity is pink, dry and intact. A protective foam dressing applied. The sacrum is a full-thickness tissue loss. The wound was cleansed with NS, kerlix moistened with vashe applied and covered with an abdominal pad. The right posterior thigh is pink, moist and blanchable; triad applied.     Plan:  - Left lower lateral leg - nursing to cleanse with NS, pat dry and apply a foam dressing weekly  - Right posterior thigh - nursing to cleanse with NS, pat dry and apply triad BID and PRN; can cover with foam dressing  - Left posterior head - nursing to cleanse with NS, pat dry and apply triad BID and PRN;  cover with foam dressing  - Sacrum - nursing to cleanse with NS or wound cleanser, pat dry, cover wound bed with gauze moistened with vashe and cover with an abdominal pad every shift and PRN  - Evolution pulsate mattress  - Turning every 2 hours  - Heel protecting boots        07/13/22 1158        Altered Skin Integrity 07/12/22 0645 Left lower;lateral Leg   Date First Assessed/Time First Assessed: 07/12/22 0645   Altered Skin Integrity Present on Admission: yes  Side: Left  Orientation: lower;lateral  Location: Leg   Wound Image    Dressing Appearance Open to air   Appearance Pink;Intact;Dry   Tissue loss description Not applicable   Periwound Area Intact;Pink;Dry   Dressing Applied;Foam        Altered Skin Integrity 07/13/22 Left posterior Head Full thickness tissue loss. Base is covered by slough and/or eschar in the wound bed    Date First Assessed: 07/13/22   Altered Skin Integrity Present on Admission: suspected hospital acquired  Side: Left  Orientation: posterior  Location: Head  Description of Altered Skin Integrity: Full thickness tissue loss. Base is covered by slough ...   Wound Image    Description of Altered Skin Integrity Full thickness tissue loss. Base is covered by slough and/or eschar in the wound bed   Dressing Appearance Open to air   Drainage Amount None   Appearance Eschar   Tissue loss description Not applicable   Care Cleansed with:;Sterile normal saline   Dressing Applied;Foam;Other (comment)  (triad applied)        Altered Skin Integrity 06/18/22 0000 Left lower Sacral spine Full thickness tissue loss. Base is covered by slough and/or eschar in the wound bed   Date First Assessed/Time First Assessed: 06/18/22 0000   Altered Skin Integrity Present on Admission: yes  Side: Left  Orientation: lower  Location: Sacral spine  Is this injury device related?: No  Description of Altered Skin Integrity: Full thicknes...   Wound Image    Description of Altered Skin Integrity Full thickness tissue loss with exposed bone, tendon, or muscle. Often includes undermining and tunneling. May extend into muscle and/or supporting structures.   Dressing Appearance Intact;Moist drainage   Drainage Amount Small   Drainage Characteristics/Odor Serosanguineous   Appearance Pink;Red;Yellow;Granulating;Black;Adipose;Moist   Tissue loss description Not applicable   Periwound Area Intact;Pink;Dry   Wound Edges Open   Wound Length (cm) 11 cm   Wound Width (cm) 12 cm   Wound Depth (cm) 3 cm   Wound Volume (cm^3) 396 cm^3   Wound Surface Area (cm^2) 132 cm^2   Care Cleansed with:;Sterile normal saline   Dressing Removed;Applied;Other (comment)  (kerlix moistened with vashe applied and covered with an abdominal pad)   Periwound Care Skin barrier film applied        Altered Skin Integrity 07/13/22 1158 Right posterior Greater trochanter Moisture  associated dermatitis   Date First Assessed/Time First Assessed: 07/13/22 1158   Altered Skin Integrity Present on Admission: suspected hospital acquired  Side: Right  Orientation: posterior  Location: Greater trochanter  Is this injury device related?: No  Primary Wound Typ...   Wound Image    Dressing Appearance Open to air   Drainage Amount None   Appearance Pink;Moist;Other (see comments)  (blanchable)   Periwound Area Intact;Moist   Care Cleansed with:;Sterile normal saline   Dressing Applied;Other (comment)  (triad applied)       Recommendations made to primary team and Marivel Contreras Skin Integrity NP,  for above plan via secure chat. Orders placed. Wound care to follow-up as needed.   07/13/2022

## 2022-07-14 PROBLEM — T14.8XXA BLOOD BLISTER: Status: ACTIVE | Noted: 2022-07-14

## 2022-07-14 LAB
POCT GLUCOSE: 138 MG/DL (ref 70–110)
POCT GLUCOSE: 166 MG/DL (ref 70–110)
POCT GLUCOSE: 166 MG/DL (ref 70–110)
POCT GLUCOSE: 191 MG/DL (ref 70–110)

## 2022-07-14 PROCEDURE — 93010 EKG 12-LEAD: ICD-10-PCS | Mod: ,,, | Performed by: INTERNAL MEDICINE

## 2022-07-14 PROCEDURE — 92526 ORAL FUNCTION THERAPY: CPT

## 2022-07-14 PROCEDURE — 93010 ELECTROCARDIOGRAM REPORT: CPT | Mod: ,,, | Performed by: INTERNAL MEDICINE

## 2022-07-14 PROCEDURE — 25000003 PHARM REV CODE 250: Performed by: STUDENT IN AN ORGANIZED HEALTH CARE EDUCATION/TRAINING PROGRAM

## 2022-07-14 PROCEDURE — 94761 N-INVAS EAR/PLS OXIMETRY MLT: CPT

## 2022-07-14 PROCEDURE — 99223 1ST HOSP IP/OBS HIGH 75: CPT | Mod: ,,, | Performed by: NURSE PRACTITIONER

## 2022-07-14 PROCEDURE — 99223 PR INITIAL HOSPITAL CARE,LEVL III: ICD-10-PCS | Mod: ,,, | Performed by: NURSE PRACTITIONER

## 2022-07-14 PROCEDURE — 99232 PR SUBSEQUENT HOSPITAL CARE,LEVL II: ICD-10-PCS | Mod: ,,, | Performed by: HOSPITALIST

## 2022-07-14 PROCEDURE — 63600175 PHARM REV CODE 636 W HCPCS

## 2022-07-14 PROCEDURE — 99900035 HC TECH TIME PER 15 MIN (STAT)

## 2022-07-14 PROCEDURE — 63600175 PHARM REV CODE 636 W HCPCS: Performed by: STUDENT IN AN ORGANIZED HEALTH CARE EDUCATION/TRAINING PROGRAM

## 2022-07-14 PROCEDURE — 25000003 PHARM REV CODE 250

## 2022-07-14 PROCEDURE — 12000002 HC ACUTE/MED SURGE SEMI-PRIVATE ROOM

## 2022-07-14 PROCEDURE — 99232 SBSQ HOSP IP/OBS MODERATE 35: CPT | Mod: ,,, | Performed by: HOSPITALIST

## 2022-07-14 PROCEDURE — C1751 CATH, INF, PER/CENT/MIDLINE: HCPCS

## 2022-07-14 PROCEDURE — 93005 ELECTROCARDIOGRAM TRACING: CPT

## 2022-07-14 PROCEDURE — 97110 THERAPEUTIC EXERCISES: CPT

## 2022-07-14 PROCEDURE — C9399 UNCLASSIFIED DRUGS OR BIOLOG: HCPCS | Performed by: STUDENT IN AN ORGANIZED HEALTH CARE EDUCATION/TRAINING PROGRAM

## 2022-07-14 PROCEDURE — 97530 THERAPEUTIC ACTIVITIES: CPT

## 2022-07-14 RX ORDER — ONDANSETRON 2 MG/ML
4 INJECTION INTRAMUSCULAR; INTRAVENOUS EVERY 6 HOURS PRN
Status: DISCONTINUED | OUTPATIENT
Start: 2022-07-14 | End: 2022-07-17 | Stop reason: HOSPADM

## 2022-07-14 RX ADMIN — INSULIN ASPART 2 UNITS: 100 INJECTION, SOLUTION INTRAVENOUS; SUBCUTANEOUS at 01:07

## 2022-07-14 RX ADMIN — CHOLESTYRAMINE 8 G: 4 POWDER, FOR SUSPENSION ORAL at 08:07

## 2022-07-14 RX ADMIN — HEPARIN SODIUM 5000 UNITS: 5000 INJECTION INTRAVENOUS; SUBCUTANEOUS at 08:07

## 2022-07-14 RX ADMIN — PIPERACILLIN SODIUM AND TAZOBACTAM SODIUM 4.5 G: 4; .5 INJECTION, POWDER, LYOPHILIZED, FOR SOLUTION INTRAVENOUS at 10:07

## 2022-07-14 RX ADMIN — PIPERACILLIN SODIUM AND TAZOBACTAM SODIUM 4.5 G: 4; .5 INJECTION, POWDER, LYOPHILIZED, FOR SOLUTION INTRAVENOUS at 06:07

## 2022-07-14 RX ADMIN — CHOLESTYRAMINE 8 G: 4 POWDER, FOR SUSPENSION ORAL at 09:07

## 2022-07-14 RX ADMIN — PIPERACILLIN SODIUM AND TAZOBACTAM SODIUM 4.5 G: 4; .5 INJECTION, POWDER, LYOPHILIZED, FOR SOLUTION INTRAVENOUS at 04:07

## 2022-07-14 RX ADMIN — NYSTATIN OINTMENT: 100000 OINTMENT TOPICAL at 08:07

## 2022-07-14 RX ADMIN — INSULIN DETEMIR 12 UNITS: 100 INJECTION, SOLUTION SUBCUTANEOUS at 09:07

## 2022-07-14 RX ADMIN — SODIUM BICARBONATE 650 MG TABLET 650 MG: at 08:07

## 2022-07-14 RX ADMIN — ATORVASTATIN CALCIUM 80 MG: 20 TABLET, FILM COATED ORAL at 08:07

## 2022-07-14 RX ADMIN — ONDANSETRON 4 MG: 2 INJECTION INTRAMUSCULAR; INTRAVENOUS at 12:07

## 2022-07-14 RX ADMIN — HEPARIN SODIUM 5000 UNITS: 5000 INJECTION INTRAVENOUS; SUBCUTANEOUS at 09:07

## 2022-07-14 RX ADMIN — LEVETIRACETAM 750 MG: 100 SOLUTION ORAL at 09:07

## 2022-07-14 RX ADMIN — NYSTATIN OINTMENT: 100000 OINTMENT TOPICAL at 09:07

## 2022-07-14 RX ADMIN — SODIUM BICARBONATE 650 MG TABLET 650 MG: at 09:07

## 2022-07-14 RX ADMIN — LEVETIRACETAM 750 MG: 100 SOLUTION ORAL at 08:07

## 2022-07-14 NOTE — CONSULTS
[facility] ([place] [address])  Skin Integrity JANET  Consult Note    Patient Name: Darlene Avila  MRN: [id number]  Admission Date: 6/17/2022  Hospital Length of Stay: 25 days  Attending Physician: Yvonne Hills MD  Primary Care Provider: Kirill Cabrera Iii, MD     Consults  Subjective:     History of Present Illness:  Darlene Avila is a 71 year old female with history of diabetes type 2, seizure c/b anoxic brain injury s/p trach, PEG, bed bound status, Stage IV sacral ulcer, ischemic CVA, IBS, history of breast cancer s/p right mastectomy and failure to thrive who was brought in by her HPOA (sister) for nursing home placement. Patient is unable to provide history.      Patient's sister was contacted who stated that the patient was admitted to [facility] in [facility] for recurrent seizures/status epilepticus which resulted in an anoxic brain injury and respiratory failure.  Patient was intubated followed by tracheostomy and PEG placement .  Patient was discharged to nursing home and subsequently discharged to hospice care at the request of patient's sister. However a few days ago, she visited the patient at [facility] recovery facility and noticed they weren't feeding or taking care of the patient due to her 'hospice status'. Per sister today, she would like to place the patient in a skilled nursing facility and would like to rescind her hospice care at this time.  She is no longer interested in hospice placement. She states the patient at baseline is only able to follow some simple commands. She is unsure how much O2 level is her baseline via trach collar.   Of note she has a Stage IV sacral wound that is s/p debridement on 06/09 by Gen surgery in [facility]. Pt admitted to hospital medicine for further management. Skin integrity JANET consulted for evaluation of scalp wound.       Scheduled Meds:   atorvastatin  80 mg Per G Tube Daily    cholestyramine  2 packet Per G Tube BID    heparin (porcine)   5,000 Units Subcutaneous Q12H    insulin detemir U-100  12 Units Subcutaneous QHS    levetiracetam  750 mg Per G Tube BID    nystatin   Topical (Top) BID    piperacillin-tazobactam (ZOSYN) IVPB  4.5 g Intravenous Q8H    sodium bicarbonate  650 mg Per G Tube BID     Continuous Infusions:   dextrose 10 % in water (D10W)       PRN Meds:sodium chloride 0.9%, acetaminophen, dextrose 10 % in water (D10W), glucagon (human recombinant), glucose, glucose, hydrALAZINE, HYDROmorphone, insulin aspart U-100, naloxone, ondansetron, sodium chloride 0.9%    Review of patient's allergies indicates:   Allergen Reactions    Iodinated contrast media Hives        Past Medical History:   Diagnosis Date    Arthritis     Cancer of breast     s/p mastectomy (on anastrozole)     Cataract     Diabetes mellitus     c/b Diabetic retinopathy    Hypertension     resolved    Hypoxia 4/15/2021    Memory loss     due to strokes    Orthostatic hypotension 12/27/2020    frequent falls, on midodrine.  4/2021 seen by both cards and palliative care    TIA (transient ischemic attack) [id number]    CTH with remote infarcts    Vitamin D deficiency 10/14/2021     Past Surgical History:   Procedure Laterality Date    CAPSULOTOMY  6/26/13    yag left eye    CATARACT EXTRACTION  6/3/2013    right eye    CHOLECYSTECTOMY      HYSTERECTOMY      MASTECTOMY Right 9/28/2020    Procedure: MASTECTOMY-Right;  Surgeon: Krysta Clark MD;  Location[facility];  Service: General;  Laterality: Right;    SENTINEL LYMPH NODE BIOPSY Right 9/28/2020    Procedure: BIOPSY, LYMPH NODE, SENTINEL-Right;  Surgeon: Krysta Clark MD;  Location: [facility] OR;  Service: General;  Laterality: Right;    TOTAL ABDOMINAL HYSTERECTOMY W/ BILATERAL SALPINGOOPHORECTOMY      WOUND DEBRIDEMENT N/A 6/24/2022    Procedure: DEBRIDEMENT, WOUND, sacral ulcer;  Surgeon: Cullen Martin MD;  Location: [address];  Service: General;  Laterality: N/A;       Family History       Problem  Relation (Age of Onset)    Breast cancer Sister (65)    Cancer Mother, Sister    Cataracts Mother    Colon cancer Mother (82)    Diabetes Mother, Father, Sister, Maternal Uncle, Paternal Uncle, Maternal Grandmother, Maternal Grandfather    Glaucoma Mother    Hypertension Sister, Maternal Grandmother          Tobacco Use    Smoking status: Current Every Day Smoker     Packs/day: 1.50     Types: Cigarettes    Smokeless tobacco: Never Used   Substance and Sexual Activity    Alcohol use: No    Drug use: No    Sexual activity: Not Currently     Review of Systems   Skin:  Positive for wound.     Objective:     Vital Signs (Most Recent):  Temp: 98.2 °F (36.8 °C) (07/14/22 1203)  Pulse: 91 (07/14/22 1203)  Resp: 19 (07/14/22 1203)  BP: 134/68 (07/14/22 1203)  SpO2: (!) 94 % (07/14/22 1203)   Vital Signs (24h Range):  Temp:  [97.9 °F (36.6 °C)-98.5 °F (36.9 °C)] 98.2 °F (36.8 °C)  Pulse:  [81-96] 91  Resp:  [16-19] 19  SpO2:  [93 %-97 %] 94 %  BP: (121-160)/() 134/68     Weight: 50.2 kg (110 lb 10.7 oz)  Body mass index is 22.35 kg/m².  Physical Exam  Constitutional:       Appearance: Normal appearance.   Skin:     General: Skin is warm and dry.      Findings: Lesion present.       Laboratory:  All pertinent labs reviewed within the last 24 hours.    Diagnostic Results:  None        Assessment/Plan:          JANET Skin Integrity Evaluation    Skin Integrity JANET evaluation of patient as part of the comprehensive skin care team.   She has been admitted for 25 days. Skin injury was noted on 7/13/22. POA no.                    Blood blister  - consult received for evaluation of scalp wound.  - pt was admitted after hospice status was rescinded for nursing home placement by [facility].  - pt has diagnosed Stage 4 pressure injury to sacrum being treated with Vashe dressing changes.  - last debridement performed by general surgery on 6/24/22.  - blistered area noted to left parietal region of head.  - continue with foam dressing  as needed to area.  - nursing to maintain pressure injury prevention measures and continue wound care per orders.  - waffle overlay intact to cassia surface.        Thank you for your consult. I will follow-up with patient. Please contact us if you have any additional questions.       Marivel Contreras NP  Skin Integrity JANET  [facility] ([facility]

## 2022-07-14 NOTE — ASSESSMENT & PLAN NOTE
- consult received for evaluation of scalp wound.  - pt was admitted after hospice status was rescinded for nursing home placement by [facility].  - pt has diagnosed Stage 4 pressure injury to sacrum being treated with Vashe dressing changes.  - last debridement performed by general surgery on 6/24/22.  - blistered area noted to left parietal region of head.  - continue with foam dressing as needed to area.  - nursing to maintain pressure injury prevention measures and continue wound care per orders.  - waffle overlay intact to cassia surface.

## 2022-07-14 NOTE — PROGRESS NOTES
[facility] - Intensive Care ([place] [address])  [facility] Medicine  Progress Note    Patient Name: Darlene Avila  MRN: [id number]  Patient Class: IP- Inpatient   Admission Date: 6/17/2022  Length of Stay: 25 days  Attending Physician: Yvonne Hills MD  Primary Care Provider: Kirill Cabrera Iii, MD        Subjective:     Principal Problem:Hypernatremia        HPI:  Darlene Avila is a 71 year old F with history of diabetes type 2, seizure c/b anoxic brain injury s/p trach, PEG, bed bound status, Stage IV sacral ulcer, ischemic CVA, IBS, history of breast cancer s/p right mastectomy and failure to thrive who was brought in by her HPOA (sister) for nursing home placement. Patient is unable to provide history.     Patient's sister was contacted who stated that the patient was admitted to [facility] in [facility] for recurrent seizures/status epilepticus which resulted in an anoxic brain injury and respiratory failure.  Patient was intubated followed by tracheostomy and PEG placement .  Patient was discharged to nursing home and subsequently discharged to hospice care at the request of patient's sister. However a few days ago, she visited the patient at [facility] recovery facility and noticed they weren't feeding or taking care of the patient due to her 'hospice status'. Per sister today, she would like to place the patient in a skilled nursing facility and would like to rescind her hospice care at this time.  She is no longer interested in hospice placement. She states the patient at baseline is only able to follow some simple commands. She is unsure how much O2 level is her baseline via trach collar.   Of note she has a Stage IV sacral wound that is s/p debridement on 06/09 by Gen surgery in [facility].     Upon arrival to the ED, the patient was hemodynamically stable. Labs revealed Na 157, WBC 14. Hgb 9.4 (baseline 7-9). The patient was given 1L of IV NS and was admitted to hospital medicine for further management.        Overview/Hospital Course:  Patient admitted to hospital medicine with pre-existing anoxic brain injury, hypernatremia, sacral decubitus wound s/p debridement 06/08 in [place], [place]. Patient initially admitted to hospice following discharge from [facility] ICU, but discharged from hospice as patient family believed she was not getting appropriate care. Na 157, corrected with IVF (NS and hypotonic solutions) and enteral FWF down to 148. Trach collar exchanged by ENT due to lack of supplies at facility. Wound care consulted for sacral decubitus, recommended General Surgery consult for possible debridement as wound appears purulent. Persistent leukocytosis, started Zosyn per previous I&D wound cultures growing bacteriodes/enterococcus and new wound findings. cEEG started and Epilepsy consulted for assistance with antiepileptic medications; conflicting reports if patient was on only Keppra/Lacosamide vs Keppra/Lacosamide/Phenytoin. General surgery consulted for debridement of sacral decubitus ulcer, s/p I&D 06/24. Palliative Care consulted for assistance in GOC; decision to pursue medical/surgical care decided. ENT consulted for possible trach decannulation. ID consulted, planning for 6-weeks course of zosyn.Sodium improved; will continue free water flushes.Patient remains with trach on humidifier. Mentation waxes and wanes.     CM/SW to assist with dispo. Due to trach placement, snf placement for SNF limited. Long discussion with patient's sister about long term care options for patient. Sister states that she is unable to provide patient the care she needs at home. SW spoke to patient's sister and reports that she is agreeable to placing patient at [facility]. [facility] will put patient on the wait list.  JAZMIN sent e-mail for SSI referral to assist with medicare. Discuss with patient's sister, Sabi, about hospice. [facility] met with patient's sister. They are able to provide trach  and PEG tube care. Patient's sister is agreeable with placing patient with [facility], however, they cannot give round the clock care. We are looking into finding sitters for patient. [facility] stated that they are unable to accept patient. We are also waiting to see if patient qualifies for [facility] and [facility].     Wound care team saw patient today for her blood blister on her scalp. Foam dressing applied.       Interval History: Patient is drowsy but responds to painful stimuli. NAEON AF HDS. Awaiting decision for long-term care/placement.     Review of Systems   Unable to perform ROS: Patient nonverbal   Constitutional:  Positive for fatigue.   Respiratory:  Negative for chest tightness and shortness of breath.    Cardiovascular:  Negative for chest pain.   Gastrointestinal:  Negative for abdominal pain.   Objective:     Vital Signs (Most Recent):  Temp: 98.2 °F (36.8 °C) (07/14/22 1203)  Pulse: 91 (07/14/22 1203)  Resp: 19 (07/14/22 1203)  BP: 134/68 (07/14/22 1203)  SpO2: (!) 94 % (07/14/22 1203)   Vital Signs (24h Range):  Temp:  [97.9 °F (36.6 °C)-98.5 °F (36.9 °C)] 98.2 °F (36.8 °C)  Pulse:  [81-96] 91  Resp:  [16-19] 19  SpO2:  [93 %-97 %] 94 %  BP: (121-160)/() 134/68     Weight: 50.2 kg (110 lb 10.7 oz)  Body mass index is 22.35 kg/m².    Intake/Output Summary (Last 24 hours) at 7/14/2022 1452  Last data filed at 7/14/2022 1200  Gross per 24 hour   Intake 1985.34 ml   Output 1700 ml   Net 285.34 ml      Physical Exam    Significant Labs: All pertinent labs within the past 24 hours have been reviewed.  None    Significant Imaging: I have reviewed all pertinent imaging results/findings within the past 24 hours.      Assessment/Plan:      * Hypernatremia  STABLE, WNL    Na stable today.   - Free water flushes      Blood blister  Patient developed a blood blister on her scalp at the left parietal region. Wound care team saw her 7/14.     Plan:  - Continue foam dressing per  wound care team's recommendation  - Advise nursing to maintain pressure injury prevention measures    Encephalopathy  Mentation waxes and wanes. Mentation is drowsy today. She wakes up with sternal rub.    Goals of care, counseling/discussion  Refer to palliative care note.      Palliative care encounter  Per Primary Medicine team and Palliative Care encounters with patient family (separate encounters, see dated notes in chart), patient family wishes full medical care and does not desire comfort/hospice measures. DNR remains. See Pall Care note for further detail.    Seizures  Patient previously started on Lacosamide and Keppra at OSH during initial episode of status epilepticus. Phenytoin added as patient continued to have breakthrough seizures.    - Repeat EEG ordered 2/2 interval decrease in alert/responsiveness; diffuse toxic metabolic encephalopathy, no new seizure per Neuro EP  - Continue Keppra monotherapy 750 bid; titrate per neuro recs  - Neurology consulted for further assistance in antiepileptic regimen    G tube feedings  Tube feed dependent via G tube  - Nutrition to assist with TF recs; see recommendations in note; appreciate assistance      Tracheostomy in place  Dysarthria  Dysphagia    Trach connected to humidifier, good O2 sats on RA     - Respiratory therapy to assist with trach care.   - SLP for swallow evaluation and speech therapy; recommending NPO, re-evaluation, PMSV under direct supervision  - ENT advising to keep tracheostomy in place due to intermittent responsiveness; amenable to decannulation if patient family moves towards hospice/comfort measures    Sacral decubitus ulcer, stage IV  S/p recent debridement at OSH 06/08. OSH Wcx with enterococcus faecalis, bacteriodes fragilus.    - Wound care consulted; appreciate assistance  - Deep wound cx  - General surgery consulted s/p debridement 06/24, no note of bone involvement per OP note, no cultures sent  - Infectious Disease consulted,  zosyn for 6-weeks (End date is 8/5)  - Consideration for wound vac given depth of injury  - Turn q2h    Leukocytosis  Patient admitted with elevated WBC to 14s. Previous episodes of aspiration PNA at OSH, indwelling cabrera given acuity of condition, sacral decubitus ulcer stave IV s/p debridement with enterococcus faecalis / bacteriodes fragilis in cx. Patient afebrile with stable hemodynamics on admission at this time. CXR without acute abnormality. UA with yeast growing, chronic per previous OSH review.    Leukocytosis may be explained by soft tissue infection vs hemoconcentration given extensive dehydration and hypernatremia from lack of enteral fluids or IVF at outside hospice facility [place] [facility]     Currently STABLE    - Zosyn given previous cultures and persistent leukocytosis  - Etiology may be soft tissue infection; see Sacral Decubitus Ulcer A/P  - Continue monitoring CBC  - Wound care per Sacral Decubitus Ulcer A/P  - Aspiration precautions  - Trend fever curve  - Trend BCx    Discharge planning issues  Darlene Avila is a 71 year old F with history of diabetes type 2, seizure, ischemic CVA, IBS, history of breast cancer s/p right mastectomy and failure to thrive who was brought in by her HPOA (sister) for skilled nursing home placement due to concern for neglect at recent hospice facility. Patient follows very simple commands and doubt she can engage in skilled therapy. prison nursing facility likely more of an option.     - Appreciate CM/SW assistance with placement; placement difficult d/t trach. Patient is now on the wait list for placement at [facility]. Her sister is going to meet with [facility] (7/11) to discuss long term placement. [facility] can provide of trach and PEG care. They are agreeable with accepting patient. Consulted ID about switching patient's IV abx to oral.   - Appreciate pharmacy's assistance with med rec from previous admission    ACP (advance  care planning)  Patient is DNR. LaPOST on file.     - CM/SW assisting; tracheostomy status complicating placement to longterm w/ hospice. Spoke with [facility] and they are agreeable with accepting patient. Consulted ID about switching patient's IV abx to oral.   - See Care Update note Zi-lona Self MD on 06/29 for further detail.    History of CVA (cerebrovascular accident)  Unclear of patient's current medications. Per med review on care-everywhere, the patient is on statin but not on antiplatelet therapy.     - Appreciate pharmacy's assistance with medication reconciliation with facility- start antiplatelet therapy if no contraindications.   - Continue statin via G-tube      Essential hypertension  Blood pressure stable.      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 A1c repeated 6.9.    - LDSSI   - POCT glucose q6hrs  - Maintain -180  - Titrate basal/bolus regimen to goal as above.      VTE Risk Mitigation (From admission, onward)         Ordered     heparin (porcine) injection 5,000 Units  Every 12 hours         06/18/22 0355     IP VTE HIGH RISK PATIENT  Once         06/18/22 0355     Place sequential compression device  Until discontinued         06/18/22 0355                Discharge Planning   JUDIT: 7/15/2022     Code Status: DNR   Is the patient medically ready for discharge?: Yes    Reason for patient still in hospital (select all that apply): Patient trending condition  Discharge Plan A: New Nursing Home placement - longterm care facility   Discharge Delays: (!) Patient and Family Barriers (pt high level of needs and accepting facilities)              Maria L Mathew DO  Department of Hospital Medicine   S[facility]iposa - Intensive Care ([place])

## 2022-07-14 NOTE — ASSESSMENT & PLAN NOTE
Darlene Avila is a 71 year old F with history of diabetes type 2, seizure, ischemic CVA, IBS, history of breast cancer s/p right mastectomy and failure to thrive who was brought in by her HPOA (sister) for skilled nursing home placement due to concern for neglect at recent hospice facility. Patient follows very simple commands and doubt she can engage in skilled therapy. intermediate nursing facility likely more of an option.     - Appreciate CM/SW assistance with placement; placement difficult d/t trach. Patient is now on the wait list for placement at [facility]. Her sister is going to meet with [facility] (7/11) to discuss long term placement. [facility] provide of [facility] and PEG care. They are agreeable with accepting patient. Consulted ID about switching patient's IV abx to oral.   - Appreciate pharmacy's assistance with med rec from previous admission

## 2022-07-14 NOTE — SUBJECTIVE & OBJECTIVE
Interval History: Patient is drowsy but responds to painful stimuli. NAEON AF HDS. Awaiting decision for long-term care/placement.     Review of Systems   Unable to perform ROS: Patient nonverbal   Constitutional:  Positive for fatigue.   Respiratory:  Negative for chest tightness and shortness of breath.    Cardiovascular:  Negative for chest pain.   Gastrointestinal:  Negative for abdominal pain.   Objective:     Vital Signs (Most Recent):  Temp: 98.2 °F (36.8 °C) (07/14/22 1203)  Pulse: 91 (07/14/22 1203)  Resp: 19 (07/14/22 1203)  BP: 134/68 (07/14/22 1203)  SpO2: (!) 94 % (07/14/22 1203)   Vital Signs (24h Range):  Temp:  [97.9 °F (36.6 °C)-98.5 °F (36.9 °C)] 98.2 °F (36.8 °C)  Pulse:  [81-96] 91  Resp:  [16-19] 19  SpO2:  [93 %-97 %] 94 %  BP: (121-160)/() 134/68     Weight: 50.2 kg (110 lb 10.7 oz)  Body mass index is 22.35 kg/m².    Intake/Output Summary (Last 24 hours) at 7/14/2022 1452  Last data filed at 7/14/2022 1200  Gross per 24 hour   Intake 1985.34 ml   Output 1700 ml   Net 285.34 ml      Physical Exam    Significant Labs: All pertinent labs within the past 24 hours have been reviewed.  None    Significant Imaging: I have reviewed all pertinent imaging results/findings within the past 24 hours.

## 2022-07-14 NOTE — RESPIRATORY THERAPY
RAPID RESPONSE RESPIRATORY THERAPY PROACTIVE NOTE           Time of visit: 759     Code Status: DNR   : 1950  Bed: 32406/38062 A:   MRN: [id number]  Time spent at the bedside: < 15 min    SITUATION    Evaluated patient for: LDA Check     BACKGROUND    Patient has a past medical history of Arthritis, Cancer of breast, Cataract, Diabetes mellitus, Hypertension, Hypoxia, Memory loss, Orthostatic hypotension, TIA (transient ischemic attack), and Vitamin D deficiency.  Clinically Significant Surgical Hx: tracheostomy    24 Hours Vitals Range:  Temp:  [97.9 °F (36.6 °C)-98.7 °F (37.1 °C)]   Pulse:  [81-96]   Resp:  [16-19]   BP: (121-160)/()   SpO2:  [91 %-97 %]     Labs:    No results for input(s): NA, K, CL, CO2, CREATININE, GLU, PHOS, MG in the last 72 hours.    Invalid input(s): CMP,  BUN, TBIL     No results for input(s): PH, PCO2, PO2, HCO3, POCSATURATED, BE in the last 72 hours.    ASSESSMENT/INTERVENTIONS    Upon arrival in room all supplies are at the bedside. Extra trachs: 4&6 shiley cuffed. No other respiratory concerns at this time    Last VS   Temp: 98.7 °F (37.1 °C) (1532)  Pulse: 91 (1532)  Resp: 19 (1532)  BP: 126/60 (1532)  SpO2: 91 % (1532)    Level of Consciousness: Level of Consciousness (AVPU): alert  Respiratory Effort: Respiratory Effort: Normal, Unlabored Expansion/Accessory Muscle Usage: Expansion/Accessory Muscles/Retractions: expansion symmetric, no retractions, no use of accessory muscles  All Lung Field Breath Sounds: All Lung Fields Breath Sounds: diminished  IGNACIO Breath Sounds: diminished  LLL Breath Sounds: diminished  RUL Breath Sounds: diminished  RML Breath Sounds: diminished  RLL Breath Sounds: diminished  O2 Device/Concentration: Flow (L/min): 5, Oxygen Concentration (%): 21, O2 Device (Oxygen Therapy): room air  Surgical airway: Yes, Type: Shiley Size: 6, uncuffed  Ambu at bedside: Ambu bag with the patient?: Yes, Adult Ambu     Active Orders    Respiratory Care    Pulse Oximetry Q4H     Frequency: Q4H     Number of Occurrences: Until Specified    Routine tracheostomy care     Frequency: BID     Number of Occurrences: Until Specified    SUCTION PRN     Frequency: PRN     Number of Occurrences: Until Specified       RECOMMENDATIONS    We recommend: RRT Recs: Continue POC per primary team.    ESCALATION    .    FOLLOW-UP    Please call back the Rapid Response RT, Simona Guaman, RRT at x [phone] for any questions or concerns.

## 2022-07-14 NOTE — SUBJECTIVE & OBJECTIVE
Scheduled Meds:   atorvastatin  80 mg Per G Tube Daily    cholestyramine  2 packet Per G Tube BID    heparin (porcine)  5,000 Units Subcutaneous Q12H    insulin detemir U-100  12 Units Subcutaneous QHS    levetiracetam  750 mg Per G Tube BID    nystatin   Topical (Top) BID    piperacillin-tazobactam (ZOSYN) IVPB  4.5 g Intravenous Q8H    sodium bicarbonate  650 mg Per G Tube BID     Continuous Infusions:   dextrose 10 % in water (D10W)       PRN Meds:sodium chloride 0.9%, acetaminophen, dextrose 10 % in water (D10W), glucagon (human recombinant), glucose, glucose, hydrALAZINE, HYDROmorphone, insulin aspart U-100, naloxone, ondansetron, sodium chloride 0.9%    Review of patient's allergies indicates:   Allergen Reactions    Iodinated contrast media Hives        Past Medical History:   Diagnosis Date    Arthritis     Cancer of breast     s/p mastectomy (on anastrozole)     Cataract     Diabetes mellitus     c/b Diabetic retinopathy    Hypertension     resolved    Hypoxia 4/15/2021    Memory loss     due to strokes    Orthostatic hypotension 12/27/2020    frequent falls, on midodrine.  4/2021 seen by both cards and palliative care    TIA (transient ischemic attack) 6643-9211    CT with remote infarcts    Vitamin D deficiency 10/14/2021     Past Surgical History:   Procedure Laterality Date    CAPSULOTOMY  6/26/13    yag left eye    CATARACT EXTRACTION  6/3/2013    right eye    CHOLECYSTECTOMY      HYSTERECTOMY      MASTECTOMY Right 9/28/2020    Procedure: MASTECTOMY-Right;  Surgeon: Krysta Clark MD;  Location: [facility] OR;  Service: General;  Laterality: Right;    SENTINEL LYMPH NODE BIOPSY Right 9/28/2020    Procedure: BIOPSY, LYMPH NODE, SENTINEL-Right;  Surgeon: Krysta Clark MD;  Location: [facility] OR;  Service: General;  Laterality: Right;    TOTAL ABDOMINAL HYSTERECTOMY W/ BILATERAL SALPINGOOPHORECTOMY      WOUND DEBRIDEMENT N/A 6/24/2022    Procedure: DEBRIDEMENT, WOUND, sacral ulcer;  Surgeon: Cullen Martin MD;   Location: [facility] OR [address] [place] [address];  Service: General;  Laterality: N/A;       Family History       Problem Relation (Age of Onset)    Breast cancer Sister (65)    Cancer Mother, Sister    Cataracts Mother    Colon cancer Mother (82)    Diabetes Mother, Father, Sister, Maternal Uncle, Paternal Uncle, Maternal Grandmother, Maternal Grandfather    Glaucoma Mother    Hypertension Sister, Maternal Grandmother          Tobacco Use    Smoking status: Current Every Day Smoker     Packs/day: 1.50     Types: Cigarettes    Smokeless tobacco: Never Used   Substance and Sexual Activity    Alcohol use: No    Drug use: No    Sexual activity: Not Currently     Review of Systems   Skin:  Positive for wound.     Objective:     Vital Signs (Most Recent):  Temp: 98.2 °F (36.8 °C) (07/14/22 1203)  Pulse: 91 (07/14/22 1203)  Resp: 19 (07/14/22 1203)  BP: 134/68 (07/14/22 1203)  SpO2: (!) 94 % (07/14/22 1203)   Vital Signs (24h Range):  Temp:  [97.9 °F (36.6 °C)-98.5 °F (36.9 °C)] 98.2 °F (36.8 °C)  Pulse:  [81-96] 91  Resp:  [16-19] 19  SpO2:  [93 %-97 %] 94 %  BP: (121-160)/() 134/68     Weight: 50.2 kg (110 lb 10.7 oz)  Body mass index is 22.35 kg/m².  Physical Exam  Constitutional:       Appearance: Normal appearance.   Skin:     General: Skin is warm and dry.      Findings: Lesion present.       Laboratory:  All pertinent labs reviewed within the last 24 hours.    Diagnostic Results:  None

## 2022-07-14 NOTE — PHYSICIAN QUERY
PT Name: Darlene Avila  MR #: [id number]    DOCUMENTATION CLARIFICATION     CDS/: Toño Arias RN, CCDS     Contact information:    [email or web address]        This form is a permanent document in the medical record.     Query Date: July 14, 2022    By submitting this query, we are merely seeking further clarification of documentation.. Please utilize your independent clinical judgment when addressing the question(s) below.    The medical record contains the following:   Indicators  Supporting Clinical Findings Location in Medical Record   x Energy Intake Energy Intake: < 75% of estimated energy requirement for >3 moths   7/13 Nutrition   x Weight Loss Pt diagnosed with malnutrition 8 months ago, since then with 25# wt gain. UBW per 8 months ago was #s   7/13 Nutrition   x Fat Loss Body Fat Depletion: Severe depletion of orbital, triceps    7/13 Nutrition   x Muscle Loss Muscle Mass Depletion: Severe depletion of temples, clavicle region, interosseous muscle   7/13 Nutrition    Edema/Fluid Accumulation      Reduced  Strength (by dynamometer)     x Weight, BMI, Usual Body Weight BMI (Calculated): 22.3;     Weight (lb): 110.67 lb   7/13 Nutrition   x Delayed Wound Healing Sacral decubitus ulcer, stage IV  S/p recent debridement at OSH  Wound care consulted     stage IV sacral ulcer s/p debridement x 2 - 1st with cultres showin Enterococcus/bacteroides and C albicans. 6/18 H&P, Hospital med.         6/26 Infectious Disease    Registered Dietician Diagnosis     x Acute or Chronic Illness Relevant Medical History: T2DM, seizures c/b anoxic brain injury, s/p trach/PEG, bed bound, stage IV sacral ulcer, IBS 7/13 Nutrition    Social or Environmental Circumstances     x Treatment tube feeding or parenteral nutrition  Pt with trach/PEG. TF order Glucerna    7/13 Nutrition   x Other Assessment and Plan   Severe Protein-Calorie Malnutrition  Malnutrition in the context of Chronic Illness;    Pt still  meet the criteria for severe malnutrition in the context of chronic illness.   7/13 Nutrition     Academy of Nutrition and Dietetics (Academy) and the American Society for Parenteral and Enteral Nutrition (A.S.P.E.N.) Clinical Characteristics to support Malnutrition   Malnutrition in the Context of Acute Illness or Injury Malnutrition in the Context of Chronic Illness or Injury Malnutrition in the Context of Social or Environmental Circumstances   Malnutrition Level Moderate Severe Moderate Severe   Moderate   Severe   Energy Intake <75%                   >7 days <50%                 >5 days <75%           >1 month <75%                      >1 month   <75% for >3 months   <50% for >1 month   Weight Loss   1-2% in 1 week >2% in 1 week 5% in 1 month >5% in 1 month 5% in 1 month >5% in 1 month    5% in 1 month >5% in 1 month 7.5% in 3 months >7.5% in 3 months 7.5% in 3 months >7.5% in 3 months    7.5% in 3 months >7.5% in 3 months 10% in 6 months >10% in 6 months 10% in 6 months >10% in 6 months        20% in 1 year                    >20% in 1 year                                                                  20% in 1 year                            >20% in 1 year                                                  Subcutaneous Fat Loss Mild  Moderate  Mild  Severe    Mild   Severe   Muscle Loss Mild depletion Moderate depletion  Mild depletion Severe Depletion   Mild   Severe   Edema/Fluid Accumulation Mild Moderate to severe  Mild  Severe   Mild   Severe   Reduced  Strength         (based on standards supplied by  of dynamometer) N/A Measurably reduced N/A Measurably reduced N/A Measurably reduced     Criteria for mild malnutrition is defined as 1 characteristic outlined above within the established moderate or severe parameters.  A minimum of 2 out of the 6 characteristics noted above are recommended for a diagnosis of moderate or severe malnutrition.  Chronic illness/injury is a disease/condition  lasting 3 months or longer.    The noted clinical guidelines are only system guidelines and do not replace the providers clinical judgment.    Provider, please specify diagnosis or diagnoses associated with above clinical findings.    [x  ] Severe Malnutrition - a minimum of 2 of the 6 severe malnutrition characteristics noted above    [  ] Other Nutritional Diagnosis (please specify): _______   [  ] Malnutrition ruled out   [  ] Clinically Undetermined     Present on admission (POA) status:   [   ] Yes (Y)                          [   ] Clinically Undeterminable (W)    [   ] No (N)                            [   ] Documentation insufficient to determine if condition is POA (U)     Please document in your progress notes daily for the duration of treatment until resolved and  include in your discharge summary.      References:    LILY Cary, & VINNY Nelson (2022, April). Assessment and management of anorexia and cachexia in palliative care. Retrieved May 23, 2022, from https://www.Tip or Skip/contents/assessment-and-management-of-anorexia-and-cachexia-in-palliative-care?yzgrbHhk=5930&source=see_link     LUIS F Vargas, PhD, RD, Meng STERN P., PhD, RN, JUSTIN Fenton MD, PhD, Alicia OWENS A., MS, RD, [facility], MARCOS Lal, MS, RD, The Academy Malnutrition Work Group, The A.S.P.E.N. Board of Directors. (2012). Consensus Statement: Academy of Nutrition and Dietetics and American Society for Parenteral and Enteral Nutrition: Characteristics Recommended for the Identification and Documentation of Adult Malnutrition (Undernutrition). Journal of Parenteral and Enteral Nutrition, 36(3), 275-283. doi:10.1177/5323931433552017     Form No. 55115

## 2022-07-14 NOTE — HPI
Darlene Avila is a 71 year old female with history of diabetes type 2, seizure c/b anoxic brain injury s/p trach, PEG, bed bound status, Stage IV sacral ulcer, ischemic CVA, IBS, history of breast cancer s/p right mastectomy and failure to thrive who was brought in by her HPOA (sister) for nursing home placement. Patient is unable to provide history.      Patient's sister was contacted who stated that the patient was admitted to [facility] in [facility] for recurrent seizures/status epilepticus which resulted in an anoxic brain injury and respiratory failure.  Patient was intubated followed by tracheostomy and PEG placement .  Patient was discharged to nursing home and subsequently discharged to hospice care at the request of patient's sister. However a few days ago, she visited the patient at [facility] and noticed they weren't feeding or taking care of the patient due to her 'hospice status'. Per sister today, she would like to place the patient in a skilled nursing facility and would like to rescind her hospice care at this time.  She is no longer interested in hospice placement. She states the patient at baseline is only able to follow some simple commands. She is unsure how much O2 level is her baseline via trach collar.   Of note she has a Stage IV sacral wound that is s/p debridement on 06/09 by Gen surgery in [facility]. Pt admitted to hospital medicine for further management. Skin integrity JANET consulted for evaluation of scalp wound.

## 2022-07-14 NOTE — ASSESSMENT & PLAN NOTE
Patient developed a blood blister on her scalp at the left parietal region. Wound care team saw her 7/14.     Plan:  - Continue foam dressing per wound care team's recommendation  - Advise nursing to maintain pressure injury prevention measures

## 2022-07-14 NOTE — PLAN OF CARE
JAZMIN followed up with Yocasta Bailey [phone] at [facility] and [facility].  Pt is medically accepted to [facility] and they stated they would submit for authorization for pt is MAXWELL Celestin determines that pt needs more care she is able to provide at home.  Yocasta Bailey stated that pt would be in LTAC and then transition to [facility] or [place] [facility] [facility] depending on level of respitory care pt would need.    JAZMIN followed up with Hadley at [facility].  Hadley stated that she spoke with sister Sabi and they are able to provide 3x week nursing care and 2-3 x week aide to assist.  Hadley stated that [facility] would continue with the tube feeds and if pt was to be d/c home with hospice would need some food supplied for the first 1-2 days.  Hadley stated that [facility] would take care of DME needs for pt.  Hadley further stated that they provided sister Sabi with list of sitter care to assist with pt care needs at home.    JAZMIN and SW supervisor followed-up with sister Sabi and spoke with sister at length regarding d/c plan for pt.  Informed sister Sabi that pt would need to d/c home with hospice or d/c to [facility] in [place].  Sister Sabi stated that she WOULD NOT d/c pt to [place] and she would bring pt home with hospice.      SW followed-up with sister Sabi again to ask about d/c pt home with hospice.  Sister Sabi stated she would bring pt home with hospice.  SW reminded sister Sabi that she would need to hire sitter services to help with the care for the pt as hospice could not provide 24/7 nursing care, and she would need help with pt care such as changing diapers etc...  Sabi expressed that she understood.  SW addressed the personal care needs of the pt several times with sister Sabi and the need for sitter services to be hired to help.  Sister Sabi expressed understanding each time.    JAZMIN followed-up with Hadley at [facility] and asked Hadley to  speak with sister Sabi regarding DME needs for pt upon d/c.      Yocasta Urbina, CD, MSW, LMSW, RSW   Case Management  [facility]  Email: [email or web address]

## 2022-07-14 NOTE — ASSESSMENT & PLAN NOTE
Patient admitted with elevated WBC to 14s. Previous episodes of aspiration PNA at OSH, indwelling cabrera given acuity of condition, sacral decubitus ulcer stave IV s/p debridement with enterococcus faecalis / bacteriodes fragilis in cx. Patient afebrile with stable hemodynamics on admission at this time. CXR without acute abnormality. UA with yeast growing, chronic per previous OSH review.    Leukocytosis may be explained by soft tissue infection vs hemoconcentration given extensive dehydration and hypernatremia from lack of enteral fluids or IVF at outside hospice facility prior to transfer to [facility].     Currently STABLE    - Zosyn given previous cultures and persistent leukocytosis  - Etiology may be soft tissue infection; see Sacral Decubitus Ulcer A/P  - Continue monitoring CBC  - Wound care per Sacral Decubitus Ulcer A/P  - Aspiration precautions  - Trend fever curve  - Trend BCx

## 2022-07-14 NOTE — PT/OT/SLP PROGRESS
Speech Language Pathology Treatment    Patient Name:  Darlene Avila   MRN:  [id number]   [id number]    Admitting Diagnosis: Hypernatremia    Recommendations:                 General Recommendations:  Dysphagia therapy  Diet recommendations:  NPO, Liquid Diet Level: NPO   Aspiration Precautions: Alternate means of nutrition/hydration, Frequent oral care and Strict aspiration precautions   General Precautions: Standard, fall, aspiration, NPO  Communication strategies: PMSV under direct supervision only    Speaking Valve precautions:   · Placement only under supervision and when VSS,   · only when awake and alert, remove with any S/S respiratory distress,   · remove when sleeping.   · To rinse valve:   · 1. Swish valve in pure soap and warm water,   · 2. Rinse valve thoroughly in warm running water,   · 3. Allow valve to air dry thoroughly before placing it in storage container.   · 4. DO NOT use hot water, peroxide, bleach, vinegar, alcohol, brushes, or cotton swabs to clean valve.     Subjective     Patient sleeping upon entry, however, opened eyes with auditory and tactile cues.     Pain/Comfort:   no pain    Respiratory Status: supplemental O2 via trach collar    Objective:     Has the patient been evaluated by SLP for swallowing?   Yes  Keep patient NPO? Yes     Patient seen for an ongoing swallowing assessment. She presents with trach with supplemental O2 via trach collar. PMSV donned on trach hub. Patient vocalized name x1. She did not produce any further vocalizations or follow simple 1 step commands. She did not answer simple y/n questions. Patient accepted an ice chip x1. She presented with 5+ audible swallows and increased wet breathing sounds. Patient did not follow command to elicit a cough or throat clear. No further trials given 2/2 high aspiration risk. PMSV removed and placed back in case.     Assessment:     Darlene Avila is a 71 y.o. female with an SLP diagnosis of Dysphagia.  She presents with  poor MELVINA. ST will continue to follow 1-2x a week to monitor for appropriateness of pleasure feedings.     Goals:   Multidisciplinary Problems     SLP Goals        Problem: SLP    Goal Priority Disciplines Outcome   SLP Goal     SLP Ongoing, Progressing   Description: Speech Therapy Short Term Goals  Goal expected to be met by 7/25  1. Pt will participate in an ongoing assessment to determine the least restrictive and safest diet with possible updated goals to follow pending results.  2. Pt will tolerate PMSV during all waking hours with no s/s of respiratory distress.       Speech Therapy Short Term Goals  Goal expected to be met by 7/10  1. Pt will participate in an ongoing assessment to determine the least restrictive and safest diet with possible updated goals to follow pending results.  2. Pt will tolerate PMSV during all waking hours with no s/s of respiratory distress.                    Plan:     · Patient to be seen:  2 x/week   · Plan of Care expires:     · Plan of Care reviewed with:  patient   · SLP Follow-Up:  Yes       Discharge recommendations:  nursing facility, skilled       Time Tracking:     SLP Treatment Date:   07/14/22  Speech Start Time:  0730  Speech Stop Time:  0741     Speech Total Time (min):  11 min    Billable Minutes: Treatment Swallowing Dysfunction 11    07/14/2022

## 2022-07-14 NOTE — PT/OT/SLP PROGRESS
"Occupational Therapy   Treatment    Name: Darlene Avila  MRN: [id number]  Admitting Diagnosis:  Hypernatremia  20 Days Post-Op    Recommendations:     Discharge Recommendations: nursing facility, skilled  Discharge Equipment Recommendations:   (TBD)  Barriers to discharge:  Decreased caregiver support    Assessment:     Darlene Avila is a 71 y.o. female with a medical diagnosis of Hypernatremia.  She presents with increased intermittent command following, increased alertness, and increased verbalization as compared to previous sessions. Pt began removing blanket upon OT stating it was time to work. Pt motivated for therapy and participated well. Performance deficits affecting function are weakness, impaired endurance, impaired self care skills, impaired functional mobilty, gait instability, impaired balance, impaired cognition, decreased safety awareness, decreased coordination, impaired fine motor.     Rehab Prognosis:  Fair; patient would benefit from acute skilled OT services to address these deficits and reach maximum level of function.       Plan:     Patient to be seen 2 x/week to address the above listed problems via self-care/home management, therapeutic activities, therapeutic exercises  · Plan of Care Expires: 08/14/22  · Plan of Care Reviewed with: patient    Subjective   "Water"    Pain/Comfort:  Pain Rating 1:  (Pt did not report)    Objective:     Communicated with: RN prior to session.  Patient found HOB elevated with PICC line, pulse ox (continuous), telemetry, pressure relief boots, cabrera catheter, Tracheostomy upon OT entry to room. No visitors present    General Precautions: Standard, NPO, aspiration, fall (DNR)   Orthopedic Precautions:N/A   Braces: N/A  Respiratory Status: O2 Via Trach Collar     Occupational Performance:     Bed Mobility:    · Patient completed Rolling/Turning to Left with  total assistance  · Patient completed Rolling/Turning to Right with total assistance  · Patient completed " Scooting/Bridging with total assistance toward EOB and toward HOB  · Draw sheet performed towards HOB  · Patient completed Supine to Sit with total assistance  · Patient completed Sit to Supine with total assistance       Activities of Daily Living:  · Toileting: total assistance to perform toileting hygiene while pt supine in bed. After cleaning, pt began to actively have BM. Notified RN pt was actively have BM and dressings were soiled.      [facility] 6 Click ADL: 6    Treatment & Education:  Pt demonstrated static sitting balance at EOB requiring total assist to maintain upright sitting balance. Pt with sway in all planes when support was decreased in any plane. Pt unable to self correct requiring total assist to return to upright sitting position.    Pt participated in BUE PROM exercises 5x repetitions, 1 set each while supine in bed. Pt performed: shoulder flexion, elbow flexion/extension, supination/pronation, and grasping. Pt with some movement initiation for each exercises, but unable to perform without total assist from OT.    Pt followed commands to make a fist with R and L hand 1/2 attempts in each hand.    Patient left HOB elevated with all lines intact, call button in reach and nurse notifiedEducation:      GOALS:   Multidisciplinary Problems     Occupational Therapy Goals        Problem: Occupational Therapy    Goal Priority Disciplines Outcome Interventions   Occupational Therapy Goal     OT, PT/OT Ongoing, Progressing    Description: Goals to be met by: 7/25/22    Patient will increase functional independence with ADLs by performing:    Rolling to Bilateral with Moderate Assistance  P/AAROM to maintain UE ROM/Strength.   Supine<>sit with Max A  Grooming at EOB with Max A  Maintain balance at EOB with Max A                     Time Tracking:     OT Date of Treatment: 07/14/22  OT Start Time: 0859  OT Stop Time: 0925  OT Total Time (min): 26 min    Billable Minutes:Therapeutic Activity 14  Therapeutic  Exercise 12    OT/GARFIELD: OT          7/14/2022

## 2022-07-14 NOTE — ASSESSMENT & PLAN NOTE
Patient is DNR. LaPOST on file.     - CM/SW assisting; tracheostomy status complicating placement to long term w/ hospice. Spoke with [facility] and they are agreeable with accepting patient. Consulted ID about switching patient's IV abx to oral.   - See Care Update note Zi-on MD Aramis on 06/29 for further detail.

## 2022-07-15 LAB
POCT GLUCOSE: 111 MG/DL (ref 70–110)
POCT GLUCOSE: 116 MG/DL (ref 70–110)
POCT GLUCOSE: 141 MG/DL (ref 70–110)
POCT GLUCOSE: 158 MG/DL (ref 70–110)

## 2022-07-15 PROCEDURE — 99233 SBSQ HOSP IP/OBS HIGH 50: CPT | Mod: ,,, | Performed by: PHYSICIAN ASSISTANT

## 2022-07-15 PROCEDURE — 99900026 HC AIRWAY MAINTENANCE (STAT)

## 2022-07-15 PROCEDURE — 25000003 PHARM REV CODE 250

## 2022-07-15 PROCEDURE — 63600175 PHARM REV CODE 636 W HCPCS: Performed by: STUDENT IN AN ORGANIZED HEALTH CARE EDUCATION/TRAINING PROGRAM

## 2022-07-15 PROCEDURE — 63600175 PHARM REV CODE 636 W HCPCS

## 2022-07-15 PROCEDURE — 12000002 HC ACUTE/MED SURGE SEMI-PRIVATE ROOM

## 2022-07-15 PROCEDURE — C1751 CATH, INF, PER/CENT/MIDLINE: HCPCS

## 2022-07-15 PROCEDURE — 94761 N-INVAS EAR/PLS OXIMETRY MLT: CPT

## 2022-07-15 PROCEDURE — 99232 PR SUBSEQUENT HOSPITAL CARE,LEVL II: ICD-10-PCS | Mod: ,,, | Performed by: HOSPITALIST

## 2022-07-15 PROCEDURE — 99233 PR SUBSEQUENT HOSPITAL CARE,LEVL III: ICD-10-PCS | Mod: ,,, | Performed by: PHYSICIAN ASSISTANT

## 2022-07-15 PROCEDURE — 99232 SBSQ HOSP IP/OBS MODERATE 35: CPT | Mod: ,,, | Performed by: HOSPITALIST

## 2022-07-15 PROCEDURE — 25000003 PHARM REV CODE 250: Performed by: STUDENT IN AN ORGANIZED HEALTH CARE EDUCATION/TRAINING PROGRAM

## 2022-07-15 PROCEDURE — 99900035 HC TECH TIME PER 15 MIN (STAT)

## 2022-07-15 PROCEDURE — 27000221 HC OXYGEN, UP TO 24 HOURS

## 2022-07-15 PROCEDURE — 89220 SPUTUM SPECIMEN COLLECTION: CPT

## 2022-07-15 RX ORDER — AMOXICILLIN AND CLAVULANATE POTASSIUM 200; 28.5 MG/5ML; MG/5ML
875 POWDER, FOR SUSPENSION ORAL EVERY 12 HOURS
Qty: 920 ML | Refills: 0 | Status: SHIPPED | OUTPATIENT
Start: 2022-07-15 | End: 2022-08-05

## 2022-07-15 RX ADMIN — PIPERACILLIN SODIUM AND TAZOBACTAM SODIUM 4.5 G: 4; .5 INJECTION, POWDER, LYOPHILIZED, FOR SOLUTION INTRAVENOUS at 12:07

## 2022-07-15 RX ADMIN — NYSTATIN OINTMENT: 100000 OINTMENT TOPICAL at 12:07

## 2022-07-15 RX ADMIN — PIPERACILLIN SODIUM AND TAZOBACTAM SODIUM 4.5 G: 4; .5 INJECTION, POWDER, LYOPHILIZED, FOR SOLUTION INTRAVENOUS at 03:07

## 2022-07-15 RX ADMIN — HEPARIN SODIUM 5000 UNITS: 5000 INJECTION INTRAVENOUS; SUBCUTANEOUS at 09:07

## 2022-07-15 RX ADMIN — SODIUM BICARBONATE 650 MG TABLET 650 MG: at 11:07

## 2022-07-15 RX ADMIN — SODIUM BICARBONATE 650 MG TABLET 650 MG: at 09:07

## 2022-07-15 RX ADMIN — CHOLESTYRAMINE 8 G: 4 POWDER, FOR SUSPENSION ORAL at 09:07

## 2022-07-15 RX ADMIN — ATORVASTATIN CALCIUM 80 MG: 20 TABLET, FILM COATED ORAL at 11:07

## 2022-07-15 RX ADMIN — INSULIN DETEMIR 12 UNITS: 100 INJECTION, SOLUTION SUBCUTANEOUS at 09:07

## 2022-07-15 RX ADMIN — LEVETIRACETAM 750 MG: 100 SOLUTION ORAL at 09:07

## 2022-07-15 RX ADMIN — HEPARIN SODIUM 5000 UNITS: 5000 INJECTION INTRAVENOUS; SUBCUTANEOUS at 11:07

## 2022-07-15 RX ADMIN — NYSTATIN OINTMENT: 100000 OINTMENT TOPICAL at 09:07

## 2022-07-15 RX ADMIN — LEVETIRACETAM 750 MG: 100 SOLUTION ORAL at 11:07

## 2022-07-15 NOTE — PLAN OF CARE
[facility] - Intensive Care ([facility])  Discharge Final Note    Primary Care Provider: Kirill Cabrera Iii, MD    Expected Discharge Date: 7/15/2022    PT to be transported via stretcher and ambulance at 1500hrs to sister Sabi gallagher [address] [place] [address], [facility].      SW spoke with RN about nutrition.  SW went to nutrition room on floor and found 2 bottles of pt tf nutrition.  SW spoke with nutritionist in room and was told there is a shortage of type of tf pt requires and would follow-up with dietician on possibly changing pt food.      JAZMIN followed-up with Hadley at [facility] and informed her of the nutrition challenge      Final Discharge Note (most recent)       Final Note - 07/15/22 1346          Final Note    Assessment Type Final Discharge Note (P)      Anticipated Discharge Disposition Hospice/Home (P)         Post-Acute Status    Post-Acute Authorization Hospice (P)      Post-Acute Placement Status Set-up Complete/Auth obtained (P)      Hospice Status Set-up Complete/Auth obtained (P)      Discharge Delays None known at this time (P)                    Yocasta Urbina CD, MSW, LMSW, RSW   Case Management  [facility]  Email: [email or web address]    Important Message from Medicare

## 2022-07-15 NOTE — ASSESSMENT & PLAN NOTE
71 year old debilited female with history of anoxic brain injury s/p trach/peg, stage IV sacral ulcer s/p debridement on 6/8 at OSH who presented to [facility] after her power of  revoked her hospice status to seek better care. See HPI for details. She was found to have infected sacral ulceration that tracks down to bone with purulence. Patient was taken to the OR on 6/24 for excisional debridement. No surgical cultures sent.  She has been on IV zosyn for over three weeks. Afebrile. Stable non septic. There are  no plans for sacral wound closure or flap coverage. ID re-consulted for oral antibiotic recommendations.    Recommendations  · Patient has completed appropriate duration of IV Zosyn for skin/soft tissue infection from day of last debridement (> 3 weeks). Could consider 6 week course of antibiotics for presumed osteomyelitis if consistent with goals of care. However, even with a 6 week course of antibiotics, she will still likely have exposed bone with large skin defect that will likely never close without muscle flap closure. As long as she has exposed bone, she will be at risk of reinfection, alejo since wound is located in area with fecal contamination. Would focus on nutrition, pressure offloading, aggressive wound care, and antibiotics for now. The process to healing this is long and can be difficult. I would suggest having an honest goals of care discussion with the family.      · If patient and family would like to continue antibiotics, though prefer an oral antibiotic regimen. Recommend transitioning therapy to  (end date 8/5/22)    · Discussed antibiotic plan with ID staff. ID will sign off.

## 2022-07-15 NOTE — PROCEDURES
Radiology Post-Procedure Note    Pre Op Diagnosis: Decubitus ulcers    Post Op Diagnosis: Same    Procedure: Tunneled line removal    Procedure performed by: Daren Interiano MD    Written Informed Consent Obtained: Yes  Specimen Removed: YES Right chest tunneled line  Estimated Blood Loss: Minimal    Findings:   Right chest tunneled line removed without complications. See dictation.    Patient tolerated procedure well.    Daren Interiano M.D.  Interventional Radiology  Department of Radiology  Pager: [phone]
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
## 2022-07-15 NOTE — PLAN OF CARE
Patient is pending tunnel cath removal in IR prior to discharge home with hospice.    Hadley with [facility] will contact on-call CM/SW pager tomorrow morning to discuss transportation setup for discharge.

## 2022-07-15 NOTE — PT/OT/SLP PROGRESS
Physical Therapy      Patient Name:  Darlene Avila   MRN:  [id number]    Patient not seen today secondary to pt in care of nursing on first attempt. I was unable to return for a second attempt. Will follow-up per PT POC as indicated.

## 2022-07-15 NOTE — ASSESSMENT & PLAN NOTE
Patient is DNR. La[facility] on file.     - CM/SW assisting; tracheostomy status complicating placement to halfway w/ hospice. Spoke with [facility] and they are agreeable with accepting patient. Consulted ID about switching patient's IV abx to oral. Patient switched to augmentin  - See Care Update note Zi-on MD Aramis on 06/29 for further detail.

## 2022-07-15 NOTE — PLAN OF CARE
Pt arrived to IR room 189 for tunneled cath removal, no acute distress noted. Orders, consent and labs reviewed on chart.

## 2022-07-15 NOTE — H&P
Inpatient Radiology Pre-procedure Note    History of Present Illness:  Darlene Avila is a 71 y.o. female who presents for tunneled catheter removal w/o port.  Admission H&P reviewed.  Past Medical History:   Diagnosis Date    Arthritis     Cancer of breast     s/p mastectomy (on anastrozole)     Cataract     Diabetes mellitus     c/b Diabetic retinopathy    Hypertension     resolved    Hypoxia 4/15/2021    Memory loss     due to strokes    Orthostatic hypotension 12/27/2020    frequent falls, on midodrine.  4/2021 seen by both cards and palliative care    TIA (transient ischemic attack) [id number]    [facility] with remote infarcts    Vitamin D deficiency 10/14/2021     Past Surgical History:   Procedure Laterality Date    CAPSULOTOMY  6/26/13    yag left eye    CATARACT EXTRACTION  6/3/2013    right eye    CHOLECYSTECTOMY      HYSTERECTOMY      MASTECTOMY Right 9/28/2020    Procedure: MASTECTOMY-Right;  Surgeon: Krysta Clark MD;  Location: [facility];  Service: General;  Laterality: Right;    SENTINEL LYMPH NODE BIOPSY Right 9/28/2020    Procedure: BIOPSY, LYMPH NODE, SENTINEL-Right;  Surgeon: Krysta Clark MD;  Location: [facility];  Service: General;  Laterality: Right;    TOTAL ABDOMINAL HYSTERECTOMY W/ BILATERAL SALPINGOOPHORECTOMY      WOUND DEBRIDEMENT N/A 6/24/2022    Procedure: DEBRIDEMENT, WOUND, sacral ulcer;  Surgeon: Cullen Martin MD;  Location: [address];  Service: General;  Laterality: N/A;       Review of Systems:   As documented in primary team H&P    Home Meds:   Prior to Admission medications    Medication Sig Start Date End Date Taking? Authorizing Provider   bisacodyL (DULCOLAX) 10 mg Supp Place 10 mg rectally 2 (two) times daily as needed (CONSTIPATION).   Yes Historical Provider   hyoscyamine (LEVSIN/SL) 0.125 mg Subl Place 0.125 mg under the tongue every 2 (two) hours as needed (EXCESS SECRETIONS).   Yes Historical Provider   lorazepam (ATIVAN) 2 mg/mL Conc TAKE 0.25-1ML BY MOUTH  "EVERY 2 HOURS AS NEEDED FOR RESTLESSNESS OR AGITATION   Yes Historical Provider   morphine 100 mg/5 mL (20 mg/mL) concentrated solution TAKE 0.25-1 ML BY MOUTH EVERY 2 HOURS AS NEEDED FOR PAIN OR SHORTNESS OF BREATH   Yes Historical Provider   amoxicillin-clavulanate (AUGMENTIN) 200-28.5 mg/5 mL SusR Take 21.9 mLs (876 mg total) by mouth every 12 (twelve) hours. for 21 days 7/15/22 8/5/22  Marge Brown MD   atorvastatin (LIPITOR) 80 MG tablet 1 tablet (80 mg total) by Per G Tube route once daily. 6/29/22 6/29/23  Dawood Self MD   insulin detemir U-100 (LEVEMIR FLEXTOUCH) 100 unit/mL (3 mL) SubQ InPn pen Inject 8 Units into the skin every evening. 6/28/22 6/28/23  Dawood Self MD   levetiracetam 500 mg/5 mL (5 mL) Soln 7.5 mLs (750 mg total) by Per G Tube route 2 (two) times daily. 6/28/22 6/28/23  Dawood Self MD   pen needle, diabetic (BD ULTRA-FINE WERO PEN NEEDLE) 32 gauge x 5/32" Ndle Use with insulin pens 7/6/22   Yvonne Hills MD   piperacillin sodium/tazobactam (PIPERACILLIN-TAZOBACTAM 4.5G/100ML SODIUM CHLORIDE 0.9%-READY TO MIX) Inject 100 mLs (4.5 g total) into the vein every 8 (eight) hours. 6/28/22 7/15/22  Dawood Self MD     Scheduled Meds:    atorvastatin  80 mg Per G Tube Daily    cholestyramine  2 packet Per G Tube BID    heparin (porcine)  5,000 Units Subcutaneous Q12H    insulin detemir U-100  12 Units Subcutaneous QHS    levetiracetam  750 mg Per G Tube BID    nystatin   Topical (Top) BID    piperacillin-tazobactam (ZOSYN) IVPB  4.5 g Intravenous Q8H    sodium bicarbonate  650 mg Per G Tube BID     Continuous Infusions:    dextrose 10 % in water (D10W)       PRN Meds:sodium chloride 0.9%, acetaminophen, dextrose 10 % in water (D10W), glucagon (human recombinant), glucose, glucose, hydrALAZINE, HYDROmorphone, insulin aspart U-100, naloxone, ondansetron, sodium chloride 0.9%  Anticoagulants/Antiplatelets: Heparin prophylaxis    Allergies:   Review of patient's allergies " indicates:   Allergen Reactions    Iodinated contrast media Hives     Sedation Hx: na    Labs:  No results for input(s): INR in the last 168 hours.    Invalid input(s):  PT,  PTT    Recent Labs   Lab 07/11/22 0336   WBC 9.47   HGB 8.4*   HCT 27.0*   MCV 86   *      Recent Labs   Lab 07/11/22 0336   *      K 4.4      CO2 24   BUN 13   CREATININE 0.5   CALCIUM 9.4   MG 1.8         Vitals:  Temp: 98.2 °F (36.8 °C) (07/15/22 1255)  Pulse: 97 (07/15/22 1255)  Resp: 20 (07/15/22 1255)  BP: (!) 157/72 (07/15/22 1255)  SpO2: (!) 93 % (07/15/22 1255)     Physical Exam:  ASA: 3  Mallampati: 3    General: no acute distress  Mental Status: alert and oriented to person, place and time  HEENT: normocephalic, atraumatic  Chest: unlabored breathing  Heart: regular heart rate  Abdomen: nondistended  Extremity: moves all extremities    Plan: tunneled catheter removal  Sedation Plan: local    Elida Cardona MD PhD  Interventional Radiology  PGY-2

## 2022-07-15 NOTE — PLAN OF CARE
JAZMIN spoke with Hadley at [facility] to ensure DME was delivered and set up.  Hadley with [facility] confirmed that all DME was delivered to sister Sabi's house.    JAZMIN received call from bull Chuyita regarding sitter services.  Chuyita stated that she did not receive the list from her mother Sabi.  JAZMIN asked for her email address to email the list of sitter services to her.      JAZMIN spoke with Yocasta Bailey regarding [facility] and nursing home NH or SNF.  Yocasta stated they had received authorization for LTAC and could work on a plan for pt to return home with hospice or nursing home NH care at another facility.      JAZMIN spoke with Sabi to ensure pt was going home with hospice.  Sabi has all of the equipment for pt to transition home with hospice.      JAZMIN called bedside nurse to ensure 3 days worth of food can be sent with pt on transport.  RN stated that she would contact nutrition to get 3 days worth of food for hospice company.    JAZMIN followed up with Yocasta Bailey with plan for pt to go home with hospice.      Yocasta Urbina CD, MSW, LMSW, RSW   Case Management  [facility]  Email: [email or web address]

## 2022-07-15 NOTE — PLAN OF CARE
Problem: Adult Inpatient Plan of Care  Goal: Optimal Comfort and Wellbeing  Outcome: Ongoing, Progressing     Problem: Diabetes Comorbidity  Goal: Blood Glucose Level Within Targeted Range  Outcome: Ongoing, Progressing     Problem: Infection  Goal: Absence of Infection Signs and Symptoms  Outcome: Ongoing, Progressing     Problem: Impaired Wound Healing  Goal: Optimal Wound Healing  Outcome: Ongoing, Progressing     Problem: Skin Injury Risk Increased  Goal: Skin Health and Integrity  Outcome: Ongoing, Progressing    Pt AO to self. No acute events noted during shift. Vitals remained stable. No c/o pain or discomfort noted, scheduled meds given per MD orders. Q1-2hr safety checks and turns completed. Pt peg tube remains clean, dry, and intact, infusing at 35ml/hr continuously, pt tolerating well. Bed remained low, locked, with all personal items in reach.

## 2022-07-15 NOTE — PROGRESS NOTES
[facility] - [facility] ([place] [id number])  Infectious Disease  Progress Note    Patient Name: Darlene Avila  MRN: [id number]  Admission Date: 6/17/2022  Length of Stay: 26 days  Attending Physician: Yvonne Hills MD  Primary Care Provider: Kirill Cabrera Iii, MD    Isolation Status: No active isolations  Assessment/Plan:      Sacral decubitus ulcer, stage IV  71 year old debilited female with history of anoxic brain injury s/p trach/peg, stage IV sacral ulcer s/p debridement on 6/8 at OSH who presented to [facility] after her power of  revoked her hospice status to seek better care. See HPI for details. She was found to have infected sacral ulceration that tracks down to bone with purulence. Patient was taken to the OR on 6/24 for excisional debridement. No surgical cultures sent.  She has been on IV zosyn for over three weeks. Afebrile. Stable non septic. There are  no plans for sacral wound closure or flap coverage. ID re-consulted for oral antibiotic recommendations.    Recommendations  · Patient has completed appropriate duration of IV Zosyn for skin/soft tissue infection from day of last debridement (> 3 weeks). Could consider 6 week course of antibiotics for presumed osteomyelitis if consistent with goals of care. However, even with a 6 week course of antibiotics, she will still likely have exposed bone with large skin defect that will likely never close without muscle flap closure. As long as she has exposed bone, she will be at risk of reinfection, alejo since wound is located in area with fecal contamination. Would focus on nutrition, pressure offloading, aggressive wound care, and antibiotics for now. The process to healing this is long and can be difficult.     · If patient and family would like to continue antibiotics though prefer an oral antibiotic regimen, recommend transitioning therapy to liquid Augmentin 875 mg PO BID to complete 6 week course (end date 8/5/22)    · Discussed antibiotic plan with  ID staff. ID will sign off.        Thank you for the consult. Please call for any questions.  Analisa Allan PA-C  ID [id number] Spectra: [id number]    Subjective:     Principal Problem:Hypernatremia    HPI: Taken from chart as patient unable to provide:  Darlene Avila is a 71 year old F with history of diabetes type 2, seizure c/b anoxic brain injury s/p trach, PEG, bed bound status, Stage IV sacral ulcer, ischemic CVA, IBS, history of breast cancer s/p right mastectomy and failure to thrive who was brought in by her HPOA (sister) for nursing home placement. Patient is unable to provide history.      Patient's sister was contacted who stated that the patient was admitted to [facility] in [facility] for recurrent seizures/status epilepticus which resulted in an anoxic brain injury and respiratory failure.  Patient was intubated followed by tracheostomy and PEG placement .  Patient was discharged to nursing home and subsequently discharged to hospice care at the request of patient's sister. However a few days ago, she visited the patient at [facility] recovery facility and noticed they weren't feeding or taking care of the patient due to her 'hospice status'. Per sister today, she would like to place the patient in a skilled nursing facility and would like to rescind her hospice care at this time.  She is no longer interested in hospice placement. She states the patient at baseline is only able to follow some simple commands. She is unsure how much O2 level is her baseline via trach collar.   Of note she has a Stage IV sacral wound that is s/p debridement on 06/09 by Gen surgery in [facility]. Cultures from that grew Enterococcus Faecalis/Avium/bacteroides and C albicans    Patient [place] [facility]  Family elected for her to undergo redebridement of sacral ulcer.  Per Gen Sx note prior to debribement, wound probed to bone.  Patient was on Unasyn at OSH, suspected to be stopped briefly while on hospice but restarted  recently.,  ID consulted for abx recs.  Anabella seen and denies fever, chills or sweats with limited questioning.    Interval History:     ID consulted today for updated recommendations. Primary team would like oral recommendations to complete therapy.  Patient is afebrile. No leukocytosis. Alert and answering questions though difficulty communicating noted.     Review of Systems   Unable to perform ROS: Other   Constitutional:  Positive for activity change. Negative for fever.   Cardiovascular:  Negative for chest pain.   Gastrointestinal:  Negative for abdominal pain.   Genitourinary:  Positive for difficulty urinating.   Skin:  Positive for wound.   Psychiatric/Behavioral:  Positive for decreased concentration. Negative for agitation. The patient is not nervous/anxious.    Objective:     Vital Signs (Most Recent):  Temp: 98.2 °F (36.8 °C) (07/15/22 1255)  Pulse: 97 (07/15/22 1255)  Resp: 20 (07/15/22 1255)  BP: (!) 157/72 (07/15/22 1255)  SpO2: (!) 93 % (07/15/22 1255)   Vital Signs (24h Range):  Temp:  [98.2 °F (36.8 °C)-98.7 °F (37.1 °C)] 98.2 °F (36.8 °C)  Pulse:  [85-97] 97  Resp:  [16-20] 20  SpO2:  [91 %-98 %] 93 %  BP: (126-157)/(60-72) 157/72     Weight: 50.2 kg (110 lb 10.7 oz)  Body mass index is 22.35 kg/m².    Estimated Creatinine Clearance: 78.4 mL/min (based on SCr of 0.5 mg/dL).    Physical Exam  Vitals reviewed.   Constitutional:       General: She is not in acute distress.     Appearance: She is well-developed. She is not toxic-appearing or diaphoretic.      Comments: thin   HENT:      Head: Normocephalic and atraumatic.      Nose: Nose normal. No congestion.      Mouth/Throat:      Mouth: Mucous membranes are dry.   Eyes:      General: No scleral icterus.  Cardiovascular:      Rate and Rhythm: Normal rate and regular rhythm.   Pulmonary:      Effort: Pulmonary effort is normal. No respiratory distress.      Breath sounds: No wheezing.      Comments: Trache with transmitted sounds  Abdominal:       General: There is no distension.      Palpations: Abdomen is soft.      Tenderness: There is no abdominal tenderness. There is no guarding.   Genitourinary:     Comments: cabrera  Musculoskeletal:      Right lower leg: No edema.      Left lower leg: No edema.   Skin:     General: Skin is warm and dry.      Comments: Sacral wound not examined today. See photo below   Neurological:      Mental Status: She is alert.      Comments: Alert and responsive to few questions. Difficulty verbalizing complaints.   Psychiatric:         Mood and Affect: Mood normal.         Behavior: Behavior is slowed.                     Significant Labs: CBC:   No results for input(s): WBC, HGB, HCT, PLT in the last 48 hours.    CMP:   No results for input(s): NA, K, CL, CO2, GLU, BUN, CREATININE, CALCIUM, PROT, ALBUMIN, BILITOT, ALKPHOS, AST, ALT, ANIONGAP, EGFRNONAA in the last 48 hours.    Invalid input(s): ESTGFAFRICA    Microbiology Results (last 7 days)       ** No results found for the last 168 hours. **          Recent Lab Results         07/15/22  1220   07/15/22  0749   07/14/22 2024        POCT Glucose 116   111   138               Significant Imaging:

## 2022-07-15 NOTE — ASSESSMENT & PLAN NOTE
S/p recent debridement at OSH 06/08. OSH Wcx with enterococcus faecalis, bacteriodes fragilus.    - Wound care consulted; appreciate assistance  - Deep wound cx  - General surgery consulted s/p debridement 06/24, no note of bone involvement per OP note, no cultures sent  - Infectious Disease consulted, zosyn for 6-weeks (End date is 8/5). Switched to Augmentin   - Consideration for wound vac given depth of injury  - Turn q2h

## 2022-07-15 NOTE — CONSULTS
[facility] ([place] [id number])  Infectious Disease  Consult Note    Patient Name: Darlene Avila  MRN: [id number]  Admission Date: 6/17/2022  Hospital Length of Stay: 26 days  Attending Physician: Yvonne Hills MD  Primary Care Provider: Kirill Cabrera Iii, MD         Inpatient consult to Infectious Diseases  Consult performed by: Analisa Allan PA-C  Consult ordered by: Maria L Mathew, DO          See ID progress note from today for full ID note and recommendations.      Thank you,  Analisa Allan PA-C

## 2022-07-15 NOTE — PLAN OF CARE
Right tunneled cath removal completed, pt tolerated well. No apparent distress noted. Dressing applied CDI. Report called to RADHA Barr. Pt awaiting transport.

## 2022-07-15 NOTE — ASSESSMENT & PLAN NOTE
Patient admitted with elevated WBC to 14s. Previous episodes of aspiration PNA at OSH, indwelling cabrera given acuity of condition, sacral decubitus ulcer stave IV s/p debridement with enterococcus faecalis / bacteriodes fragilis in cx. Patient afebrile with stable hemodynamics on admission at this time. CXR without acute abnormality. UA with yeast growing, chronic per previous OSH review.    Leukocytosis may be explained by soft tissue infection vs hemoconcentration given extensive dehydration and hypernatremia from lack of enteral fluids or IVF at outside hospice facility prior to transfer to [facility].     Currently STABLE    - Zosyn given previous cultures and persistent leukocytosis  - Etiology may be soft tissue infection; see Sacral Decubitus Ulcer A/P  - Continue monitoring CBC  - Wound care per Sacral Decubitus Ulcer A/P  - Aspiration precautions  - Trend fever curve  - Trend BCx

## 2022-07-15 NOTE — PROGRESS NOTES
[facility] - Intensive Care ([place] [address])  [facility] Medicine  Progress Note    Patient Name: Darlene Avila  MRN: [id number]  Patient Class: IP- Inpatient   Admission Date: 6/17/2022  Length of Stay: 26 days  Attending Physician: Yvonne Hills MD  Primary Care Provider: Kirill Cabrera Iii, MD        Subjective:     Principal Problem:Hypernatremia        HPI:  Darlene Avila is a 71 year old F with history of diabetes type 2, seizure c/b anoxic brain injury s/p trach, PEG, bed bound status, Stage IV sacral ulcer, ischemic CVA, IBS, history of breast cancer s/p right mastectomy and failure to thrive who was brought in by her HPOA (sister) for nursing home placement. Patient is unable to provide history.     Patient's sister was contacted who stated that the patient was admitted to [facility] in [facility] for recurrent seizures/status epilepticus which resulted in an anoxic brain injury and respiratory failure.  Patient was intubated followed by tracheostomy and PEG placement .  Patient was discharged to nursing home and subsequently discharged to hospice care at the request of patient's sister. However a few days ago, she visited the patient at [facility] recovery facility and noticed they weren't feeding or taking care of the patient due to her 'hospice status'. Per sister today, she would like to place the patient in a skilled nursing facility and would like to rescind her hospice care at this time.  She is no longer interested in hospice placement. She states the patient at baseline is only able to follow some simple commands. She is unsure how much O2 level is her baseline via trach collar.   Of note she has a Stage IV sacral wound that is s/p debridement on 06/09 by Gen surgery in [facility].     Upon arrival to the ED, the patient was hemodynamically stable. Labs revealed Na 157, WBC 14. Hgb 9.4 (baseline 7-9). The patient was given 1L of IV NS and was admitted to hospital medicine for further management.        Overview/Hospital Course:  Patient admitted to hospital medicine with pre-existing anoxic brain injury, hypernatremia, sacral decubitus wound s/p debridement 06/08 in [place], [place]. Patient initially admitted to hospice following discharge from [facility] ICU, but discharged from hospice as patient family believed she was not getting appropriate care. Na 157, corrected with IVF (NS and hypotonic solutions) and enteral FWF down to 148. Trach collar exchanged by ENT due to lack of supplies at facility. Wound care consulted for sacral decubitus, recommended General Surgery consult for possible debridement as wound appears purulent. Persistent leukocytosis, started Zosyn per previous I&D wound cultures growing bacteriodes/enterococcus and new wound findings. cEEG started and Epilepsy consulted for assistance with antiepileptic medications; conflicting reports if patient was on only Keppra/Lacosamide vs Keppra/Lacosamide/Phenytoin. General surgery consulted for debridement of sacral decubitus ulcer, s/p I&D 06/24. Palliative Care consulted for assistance in GOC; decision to pursue medical/surgical care decided. ENT consulted for possible trach decannulation. ID consulted, planning for 6-weeks course of zosyn.Sodium improved; will continue free water flushes.Patient remains with trach on humidifier. Mentation waxes and wanes. Wound care team saw patient for her blood blister on her scalp. Foam dressing applied.     CM/JAZMIN to assist with dispo. Due to trach placement, residential placement for SNF limited. Long discussion with patient's sister about long term care options for patient. Sister states that she is unable to provide patient the care she needs at home on her own. JAZMIN spoke to patient's sister and reports that she is agreeable to placing patient at [facility]. [facility] will put patient on the wait list. JAZMIN sent e-mail for SSI referral to assist with medicare. Discuss with patient's sister,  Sabi, about hospice. [facility] met with patient's sister. They are able to provide trach and PEG tube care. Patient's sister is agreeable with placing patient with [facility], however, they cannot give round the clock care. We are looking into finding sitters for patient. [facility] stated that they are unable to accept patient. [facility] is willing to accept patient. Sabi was given both options by SW and she would like for patient to be discharged home with [facility]. She is actively looking for sitters. Sabi confirmed that she has all neccessary equipments for patient at home. ABX regimen switched to PO augmentin.           Interval History: NAEON. AF HDS. Patient's sister would like for patient to be discharged home with [facility]. She provided verbal understanding of all the home care patient requires.     Review of Systems   Unable to perform ROS: Patient nonverbal   Constitutional:  Positive for fatigue.   Respiratory:  Negative for chest tightness and shortness of breath.    Cardiovascular:  Negative for chest pain.   Gastrointestinal:  Negative for abdominal pain.   Objective:     Vital Signs (Most Recent):  Temp: 98.2 °F (36.8 °C) (07/15/22 1255)  Pulse: 97 (07/15/22 1255)  Resp: 20 (07/15/22 1255)  BP: (!) 157/72 (07/15/22 1255)  SpO2: (!) 93 % (07/15/22 1255)   Vital Signs (24h Range):  Temp:  [98.2 °F (36.8 °C)-98.7 °F (37.1 °C)] 98.2 °F (36.8 °C)  Pulse:  [85-97] 97  Resp:  [16-20] 20  SpO2:  [93 %-98 %] 93 %  BP: (129-157)/(63-72) 157/72     Weight: 50.2 kg (110 lb 10.7 oz)  Body mass index is 22.35 kg/m².    Intake/Output Summary (Last 24 hours) at 7/15/2022 1751  Last data filed at 7/15/2022 1721  Gross per 24 hour   Intake 705.72 ml   Output 550 ml   Net 155.72 ml      Physical Exam  Vitals and nursing note reviewed.   Constitutional:       General: She is not in acute distress.     Appearance: She is not ill-appearing or toxic-appearing.   HENT:       Head: Normocephalic.      Mouth/Throat:      Mouth: Mucous membranes are dry.      Comments: Dried oral secretions adhered to tongue  Neck:      Comments: Trach in place and secured  Cardiovascular:      Rate and Rhythm: Normal rate and regular rhythm.      Pulses: Normal pulses.      Heart sounds: Normal heart sounds. No murmur heard.  Pulmonary:      Effort: Pulmonary effort is normal. No respiratory distress.      Breath sounds: Normal breath sounds. No wheezing or rales.   Abdominal:      General: Abdomen is flat. Bowel sounds are normal. There is no distension.      Palpations: Abdomen is soft.      Tenderness: There is no abdominal tenderness.   Musculoskeletal:         General: No swelling or tenderness. Normal range of motion.      Cervical back: Normal range of motion.   Skin:     General: Skin is warm.      Coloration: Skin is not jaundiced or pale.      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    Neurological:      Mental Status: She is alert.      Comments: Intermittently alert, minimally responsive   Psychiatric:      Comments: Unable to assess       Significant Labs: All pertinent labs within the past 24 hours have been reviewed.  None    Significant Imaging: I have reviewed all pertinent imaging results/findings within the past 24 hours.      Assessment/Plan:      * Hypernatremia  STABLE, WNL    Na stable today.   - Free water flushes      Blood blister  Patient developed a blood blister on her scalp at the left parietal region. Wound care team saw her 7/14.     Plan:  - Continue foam dressing per wound care team's recommendation  - Advise nursing to maintain pressure injury prevention measures    Encephalopathy  Mentation waxes and wanes. Mentation is drowsy today. She wakes up with sternal rub.    Goals of care, counseling/discussion  Refer to palliative care note.      Palliative care encounter  Per Primary Medicine team and Palliative Care encounters with patient family  (separate encounters, see dated notes in chart), patient family wishes full medical care and does not desire comfort/hospice measures. DNR remains. See Pall Care note for further detail.    Seizures  Patient previously started on Lacosamide and Keppra at OSH during initial episode of status epilepticus. Phenytoin added as patient continued to have breakthrough seizures.    - Repeat EEG ordered 2/2 interval decrease in alert/responsiveness; diffuse toxic metabolic encephalopathy, no new seizure per Neuro EP  - Continue Keppra monotherapy 750 bid; titrate per neuro recs  - Neurology consulted for further assistance in antiepileptic regimen    G tube feedings  Tube feed dependent via G tube  - Nutrition to assist with TF recs; see recommendations in note; appreciate assistance      Tracheostomy in place  Dysarthria  Dysphagia    Trach connected to humidifier, good O2 sats on RA     - Respiratory therapy to assist with trach care.   - SLP for swallow evaluation and speech therapy; recommending NPO, re-evaluation, PMSV under direct supervision  - ENT advising to keep tracheostomy in place due to intermittent responsiveness; amenable to decannulation if patient family moves towards hospice/comfort measures    Sacral decubitus ulcer, stage IV  S/p recent debridement at OSH 06/08. OSH Wcx with enterococcus faecalis, bacteriodes fragilus.    - Wound care consulted; appreciate assistance  - Deep wound cx  - General surgery consulted s/p debridement 06/24, no note of bone involvement per OP note, no cultures sent  - Infectious Disease consulted, zosyn for 6-weeks (End date is 8/5). Switched to Augmentin   - Consideration for wound vac given depth of injury  - Turn q2h    Leukocytosis  Patient admitted with elevated WBC to 14s. Previous episodes of aspiration PNA at OSH, indwelling cabrera given acuity of condition, sacral decubitus ulcer stave IV s/p debridement with enterococcus faecalis / bacteriodes fragilis in cx. Patient  afebrile with stable hemodynamics on admission at this time. CXR without acute abnormality. UA with yeast growing, chronic per previous OSH review.    Leukocytosis may be explained by soft tissue infection vs hemoconcentration given extensive dehydration and hypernatremia from lack of enteral fluids or IVF at outside hospice facility prior to transfer to [facility].     Currently STABLE    - Zosyn given previous cultures and persistent leukocytosis  - Etiology may be soft tissue infection; see Sacral Decubitus Ulcer A/P  - Continue monitoring CBC  - Wound care per Sacral Decubitus Ulcer A/P  - Aspiration precautions  - Trend fever curve  - Trend BCx    Discharge planning issues  Darlene Avila is a 71 year old F with history of diabetes type 2, seizure, ischemic CVA, IBS, history of breast cancer s/p right mastectomy and failure to thrive who was brought in by her HPOA (sister) for skilled nursing home placement due to concern for neglect at recent hospice facility. Patient follows very simple commands and doubt she can engage in skilled therapy. alf nursing facility likely more of an option.     - Appreciate CM/SW assistance with placement; placement difficult d/t trach. [facility] Her sister is going to meet with [facility] (7/11) to discuss long term placement. [facility] can provide of trach and PEG care. They are agreeable with accepting patient. Consulted ID about switching patient's IV abx to oral. Patient switched to Augmentin. Patient's sister would like for patient to be discharge home with [facility].   - Appreciate pharmacy's assistance with med rec from previous admission  - Consult IR for tunnelled PICC removal     ACP (advance care planning)  Patient is DNR. LaPOST on file.     - CM/SW assisting; tracheostomy status complicating placement to alf w/ hospice. Spoke with [facility] and they are agreeable with accepting  patient. Consulted ID about switching patient's IV abx to oral. Patient switched to augmentin  - See Care Update note Zi-lona Self MD on 06/29 for further detail.    History of CVA (cerebrovascular accident)  Unclear of patient's current medications. Per med review on care-everywhere, the patient is on statin but not on antiplatelet therapy.     - Appreciate pharmacy's assistance with medication reconciliation with facility  - start antiplatelet therapy if no contraindications.   - Continue statin via G-tube      Essential hypertension  Blood pressure stable.      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 A1c repeated 6.9.    - LDSSI   - POCT glucose q6hrs  - Maintain -180  - Titrate basal/bolus regimen to goal as above.      VTE Risk Mitigation (From admission, onward)         Ordered     heparin (porcine) injection 5,000 Units  Every 12 hours         06/18/22 0355     IP VTE HIGH RISK PATIENT  Once         06/18/22 0355     Place sequential compression device  Until discontinued         06/18/22 0355                Discharge Planning   JUDIT: 7/15/2022     Code Status: DNR   Is the patient medically ready for discharge?: Yes    Reason for patient still in hospital (select all that apply): Patient trending condition  Discharge Plan A: New Nursing Home placement - long term care facility   Discharge Delays: None known at this time              Maria L Mathew DO  Department of Hospital Medicine   [facility] - Intensive Care ([address])

## 2022-07-15 NOTE — SUBJECTIVE & OBJECTIVE
Interval History: NAEON. AF HDS. Patient's sister would like for patient to be discharged home with [facility]. She provided verbal understanding of all the home care patient requires.     Review of Systems   Unable to perform ROS: Patient nonverbal   Constitutional:  Positive for fatigue.   Respiratory:  Negative for chest tightness and shortness of breath.    Cardiovascular:  Negative for chest pain.   Gastrointestinal:  Negative for abdominal pain.   Objective:     Vital Signs (Most Recent):  Temp: 98.2 °F (36.8 °C) (07/15/22 1255)  Pulse: 97 (07/15/22 1255)  Resp: 20 (07/15/22 1255)  BP: (!) 157/72 (07/15/22 1255)  SpO2: (!) 93 % (07/15/22 1255)   Vital Signs (24h Range):  Temp:  [98.2 °F (36.8 °C)-98.7 °F (37.1 °C)] 98.2 °F (36.8 °C)  Pulse:  [85-97] 97  Resp:  [16-20] 20  SpO2:  [93 %-98 %] 93 %  BP: (129-157)/(63-72) 157/72     Weight: 50.2 kg (110 lb 10.7 oz)  Body mass index is 22.35 kg/m².    Intake/Output Summary (Last 24 hours) at 7/15/2022 1751  Last data filed at 7/15/2022 1721  Gross per 24 hour   Intake 705.72 ml   Output 550 ml   Net 155.72 ml      Physical Exam  Vitals and nursing note reviewed.   Constitutional:       General: She is not in acute distress.     Appearance: She is not ill-appearing or toxic-appearing.   HENT:      Head: Normocephalic.      Mouth/Throat:      Mouth: Mucous membranes are dry.      Comments: Dried oral secretions adhered to tongue  Neck:      Comments: Trach in place and secured  Cardiovascular:      Rate and Rhythm: Normal rate and regular rhythm.      Pulses: Normal pulses.      Heart sounds: Normal heart sounds. No murmur heard.  Pulmonary:      Effort: Pulmonary effort is normal. No respiratory distress.      Breath sounds: Normal breath sounds. No wheezing or rales.   Abdominal:      General: Abdomen is flat. Bowel sounds are normal. There is no distension.      Palpations: Abdomen is soft.      Tenderness: There is no abdominal tenderness.    Musculoskeletal:         General: No swelling or tenderness. Normal range of motion.      Cervical back: Normal range of motion.   Skin:     General: Skin is warm.      Coloration: Skin is not jaundiced or pale.      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    Neurological:      Mental Status: She is alert.      Comments: Intermittently alert, minimally responsive   Psychiatric:      Comments: Unable to assess       Significant Labs: All pertinent labs within the past 24 hours have been reviewed.  None    Significant Imaging: I have reviewed all pertinent imaging results/findings within the past 24 hours.

## 2022-07-15 NOTE — SUBJECTIVE & OBJECTIVE
Interval History:     ID consulted today for updated recommendations. Primary team would like oral recommendations to complete therapy.  Patient is afebrile. No leukocytosis. Alert and answering questions though difficulty communicating noted.     Review of Systems   Unable to perform ROS: Other   Constitutional:  Positive for activity change. Negative for fever.   Cardiovascular:  Negative for chest pain.   Gastrointestinal:  Negative for abdominal pain.   Genitourinary:  Positive for difficulty urinating.   Skin:  Positive for wound.   Psychiatric/Behavioral:  Positive for decreased concentration. Negative for agitation. The patient is not nervous/anxious.    Objective:     Vital Signs (Most Recent):  Temp: 98.2 °F (36.8 °C) (07/15/22 1255)  Pulse: 97 (07/15/22 1255)  Resp: 20 (07/15/22 1255)  BP: (!) 157/72 (07/15/22 1255)  SpO2: (!) 93 % (07/15/22 1255)   Vital Signs (24h Range):  Temp:  [98.2 °F (36.8 °C)-98.7 °F (37.1 °C)] 98.2 °F (36.8 °C)  Pulse:  [85-97] 97  Resp:  [16-20] 20  SpO2:  [91 %-98 %] 93 %  BP: (126-157)/(60-72) 157/72     Weight: 50.2 kg (110 lb 10.7 oz)  Body mass index is 22.35 kg/m².    Estimated Creatinine Clearance: 78.4 mL/min (based on SCr of 0.5 mg/dL).    Physical Exam  Vitals reviewed.   Constitutional:       General: She is not in acute distress.     Appearance: She is well-developed. She is not toxic-appearing or diaphoretic.      Comments: thin   HENT:      Head: Normocephalic and atraumatic.      Nose: Nose normal. No congestion.      Mouth/Throat:      Mouth: Mucous membranes are dry.   Eyes:      General: No scleral icterus.  Cardiovascular:      Rate and Rhythm: Normal rate and regular rhythm.   Pulmonary:      Effort: Pulmonary effort is normal. No respiratory distress.      Breath sounds: No wheezing.      Comments: Trache with transmitted sounds  Abdominal:      General: There is no distension.      Palpations: Abdomen is soft.      Tenderness: There is no abdominal  tenderness. There is no guarding.   Genitourinary:     Comments: cabrera  Musculoskeletal:      Right lower leg: No edema.      Left lower leg: No edema.   Skin:     General: Skin is warm and dry.      Comments: Sacral wound not examined today. See photo below   Neurological:      Mental Status: She is alert.      Comments: Alert and responsive to few questions. Difficulty verbalizing complaints.   Psychiatric:         Mood and Affect: Mood normal.         Behavior: Behavior is slowed.                     Significant Labs: CBC:   No results for input(s): WBC, HGB, HCT, PLT in the last 48 hours.    CMP:   No results for input(s): NA, K, CL, CO2, GLU, BUN, CREATININE, CALCIUM, PROT, ALBUMIN, BILITOT, ALKPHOS, AST, ALT, ANIONGAP, EGFRNONAA in the last 48 hours.    Invalid input(s): ESTGFAFRICA    Microbiology Results (last 7 days)       ** No results found for the last 168 hours. **          Recent Lab Results         07/15/22  1220   07/15/22  0749   07/14/22 2024        POCT Glucose 116   111   138               Significant Imaging:

## 2022-07-15 NOTE — ASSESSMENT & PLAN NOTE
Unclear of patient's current medications. Per med review on care-everywhere, the patient is on statin but not on antiplatelet therapy.     - Appreciate pharmacy's assistance with medication reconciliation with facility  - start antiplatelet therapy if no contraindications.   - Continue statin via G-tube

## 2022-07-15 NOTE — RESPIRATORY THERAPY
RAPID RESPONSE RESPIRATORY THERAPY PROACTIVE NOTE           Time of visit: 811     Code Status: DNR   : 1950  Bed: 56539/87645 A:   MRN: [id number]  Time spent at the bedside: < 15 min    SITUATION    Evaluated patient for: LDA Check     BACKGROUND    Patient has a past medical history of Arthritis, Cancer of breast, Cataract, Diabetes mellitus, Hypertension, Hypoxia, Memory loss, Orthostatic hypotension, TIA (transient ischemic attack), and Vitamin D deficiency.  Clinically Significant Surgical Hx: tracheostomy    24 Hours Vitals Range:  Temp:  [98.2 °F (36.8 °C)-98.7 °F (37.1 °C)]   Pulse:  [85-91]   Resp:  [16-19]   BP: (126-155)/(60-71)   SpO2:  [91 %-98 %]     Labs:    No results for input(s): NA, K, CL, CO2, CREATININE, GLU, PHOS, MG in the last 72 hours.    Invalid input(s): CMP,  BUN, TBIL     No results for input(s): PH, PCO2, PO2, HCO3, POCSATURATED, BE in the last 72 hours.    ASSESSMENT/INTERVENTIONS    Upon arrival in room resting in bed.  All trach supplies at bedside.  Extra trachs at bedside include 4.0 and 6.0 shiley both uncuffed.    Last VS   Temp: 98.7 °F (37.1 °C) (07/15 0747)  Pulse: 85 (07/15 0747)  Resp: 16 (07/15 0747)  BP: 129/63 (07/15 0747)  SpO2: 93 % (07/15 0747)    Level of Consciousness: Level of Consciousness (AVPU): responds to voice  Respiratory Effort: Respiratory Effort: Normal, Unlabored Expansion/Accessory Muscle Usage: Expansion/Accessory Muscles/Retractions: no use of accessory muscles, no retractions, expansion symmetric  All Lung Field Breath Sounds: All Lung Fields Breath Sounds: diminished  IGNACIO Breath Sounds: diminished  LLL Breath Sounds: diminished  RUL Breath Sounds: diminished  RML Breath Sounds: diminished  RLL Breath Sounds: diminished  O2 Device/Concentration: Flow (L/min): 5, Oxygen Concentration (%): 21, O2 Device (Oxygen Therapy): room air  Surgical airway: Yes, Type: Shiley Size: 6, uncuffed  Ambu at bedside: Ambu bag with the patient?: Yes, Adult Ambu      Active Orders   Respiratory Care    Pulse Oximetry Q4H     Frequency: Q4H     Number of Occurrences: Until Specified    Routine tracheostomy care     Frequency: BID     Number of Occurrences: Until Specified    SUCTION PRN     Frequency: PRN     Number of Occurrences: Until Specified       RECOMMENDATIONS    We recommend: RRT Recs: Continue POC per primary team.    FOLLOW-UP    Please call back the Rapid Response RT, Chrissy Mayes, RRT at x [phone] for any questions or concerns.

## 2022-07-16 VITALS
WEIGHT: 110.69 LBS | OXYGEN SATURATION: 98 % | RESPIRATION RATE: 20 BRPM | BODY MASS INDEX: 22.32 KG/M2 | TEMPERATURE: 98 F | HEIGHT: 59 IN | SYSTOLIC BLOOD PRESSURE: 132 MMHG | HEART RATE: 93 BPM | DIASTOLIC BLOOD PRESSURE: 59 MMHG

## 2022-07-16 PROCEDURE — C1751 CATH, INF, PER/CENT/MIDLINE: HCPCS

## 2022-07-16 PROCEDURE — 25000003 PHARM REV CODE 250

## 2022-07-16 PROCEDURE — 99900035 HC TECH TIME PER 15 MIN (STAT)

## 2022-07-16 PROCEDURE — 25000003 PHARM REV CODE 250: Performed by: STUDENT IN AN ORGANIZED HEALTH CARE EDUCATION/TRAINING PROGRAM

## 2022-07-16 PROCEDURE — 63600175 PHARM REV CODE 636 W HCPCS: Performed by: STUDENT IN AN ORGANIZED HEALTH CARE EDUCATION/TRAINING PROGRAM

## 2022-07-16 PROCEDURE — 12000002 HC ACUTE/MED SURGE SEMI-PRIVATE ROOM

## 2022-07-16 PROCEDURE — 94761 N-INVAS EAR/PLS OXIMETRY MLT: CPT

## 2022-07-16 PROCEDURE — 99900026 HC AIRWAY MAINTENANCE (STAT)

## 2022-07-16 PROCEDURE — 99239 PR HOSPITAL DISCHARGE DAY,>30 MIN: ICD-10-PCS | Mod: ,,, | Performed by: HOSPITALIST

## 2022-07-16 PROCEDURE — 99239 HOSP IP/OBS DSCHRG MGMT >30: CPT | Mod: ,,, | Performed by: HOSPITALIST

## 2022-07-16 PROCEDURE — 27000221 HC OXYGEN, UP TO 24 HOURS

## 2022-07-16 RX ORDER — AMOXICILLIN AND CLAVULANATE POTASSIUM 250; 62.5 MG/5ML; MG/5ML
875 POWDER, FOR SUSPENSION ORAL EVERY 12 HOURS
Status: DISCONTINUED | OUTPATIENT
Start: 2022-07-16 | End: 2022-07-17 | Stop reason: HOSPADM

## 2022-07-16 RX ADMIN — NYSTATIN OINTMENT: 100000 OINTMENT TOPICAL at 09:07

## 2022-07-16 RX ADMIN — CHOLESTYRAMINE 8 G: 4 POWDER, FOR SUSPENSION ORAL at 09:07

## 2022-07-16 RX ADMIN — AMOXICILLIN AND CLAVULANATE POTASSIUM 875 MG: 250; 62.5 POWDER, FOR SUSPENSION ORAL at 09:07

## 2022-07-16 RX ADMIN — LEVETIRACETAM 750 MG: 100 SOLUTION ORAL at 09:07

## 2022-07-16 RX ADMIN — HEPARIN SODIUM 5000 UNITS: 5000 INJECTION INTRAVENOUS; SUBCUTANEOUS at 09:07

## 2022-07-16 RX ADMIN — SODIUM BICARBONATE 650 MG TABLET 650 MG: at 09:07

## 2022-07-16 RX ADMIN — ATORVASTATIN CALCIUM 80 MG: 20 TABLET, FILM COATED ORAL at 09:07

## 2022-07-16 NOTE — PLAN OF CARE
[facility] - Intensive Care ([facility]  Discharge Final Note    Primary Care Provider: Kirill Cabrera Iii, MD    Expected Discharge Date: 7/16/2022     CM spoke with Toño at [facility], new mattress will be delivered to patient's home by 7:30 PM this evening. JOSE DAVID called and discussed with Alon with [facility] dispatch. Requested pickup time set for 7:45 PM. Final revised hospice orders faxed to Hadley at [facility]. CM updated hospice, family and treatment team.    Final Discharge Note (most recent)     Final Note - 07/16/22 1815        Final Note    Assessment Type Final Discharge Note     Anticipated Discharge Disposition Hospice/Home     Hospital Resources/Appts/Education Provided Provided patient/caregiver with written discharge plan information;Appointments scheduled by Navigator/Coordinator        Post-Acute Status    Hospice Status Set-up Complete/Auth obtained     Discharge Delays Home Medical Equipment (Insurance, Delivery)               Important Message from Medicare           Maria L German RN  Weekend  - [facility]  [phone]

## 2022-07-16 NOTE — RESPIRATORY THERAPY
RAPID RESPONSE RESPIRATORY THERAPY PROACTIVE NOTE           Time of visit: 922     Code Status: DNR   : 1950  Bed: 85808/82363 A:   MRN: [id number]  Time spent at the bedside: < 15 min    SITUATION    Evaluated patient for: LDA Check     BACKGROUND    Patient has a past medical history of Arthritis, Cancer of breast, Cataract, Diabetes mellitus, Hypertension, Hypoxia, Memory loss, Orthostatic hypotension, TIA (transient ischemic attack), and Vitamin D deficiency.  Clinically Significant Surgical Hx: tracheostomy    24 Hours Vitals Range:  Temp:  [98 °F (36.7 °C)-98.2 °F (36.8 °C)]   Pulse:  []   Resp:  [1-22]   BP: (105-157)/(53-74)   SpO2:  [93 %-99 %]     Labs:    No results for input(s): NA, K, CL, CO2, CREATININE, GLU, PHOS, MG in the last 72 hours.    Invalid input(s): CMP,  BUN, TBIL     No results for input(s): PH, PCO2, PO2, HCO3, POCSATURATED, BE in the last 72 hours.    ASSESSMENT/INTERVENTIONS    Upon arrival in room resting in bed.  All trach supplies at bedside.  Extra trachs at bedside include 4.0 and 6.0 shiley both uncuffed.    Last VS   Temp: 98 °F (36.7 °C) ( 0300)  Pulse: 83 ( 0800)  Resp: 1 ( 0800)  BP: 114/56 ( 0800)  SpO2: 98 % (800)    Level of Consciousness: Level of Consciousness (AVPU): alert  Respiratory Effort: Respiratory Effort: Normal, Unlabored Expansion/Accessory Muscle Usage: Expansion/Accessory Muscles/Retractions: expansion symmetric, no retractions, no use of accessory muscles  All Lung Field Breath Sounds: All Lung Fields Breath Sounds: Anterior:, Lateral:, diminished  IGNACIO Breath Sounds: diminished  LLL Breath Sounds: diminished  RUL Breath Sounds: diminished  RML Breath Sounds: diminished  RLL Breath Sounds: diminished  O2 Device/Concentration: Flow (L/min): 5, Oxygen Concentration (%): 28, O2 Device (Oxygen Therapy): Trach Collar (patient has size 6 shiley uncuffed)  Surgical airway: Yes, Type: Shiley Size: 6, uncuffed  Ambu at bedside:  Ambu bag with the patient?: Yes, Adult Ambu     Active Orders   Respiratory Care    Pulse Oximetry Q Shift     Frequency: Q Shift     Number of Occurrences: Until Specified    Routine tracheostomy care     Frequency: BID     Number of Occurrences: Until Specified    SUCTION PRN     Frequency: PRN     Number of Occurrences: Until Specified       RECOMMENDATIONS    We recommend: RRT Recs: Continue POC per primary team.    FOLLOW-UP    Please call back the Rapid Response RT, Chrissy Mayes, RRT at x [phone] for any questions or concerns.

## 2022-07-16 NOTE — PLAN OF CARE
Update @ 2:00 PM  CM to patient's room to discuss discharge plan with sister. CM called Toño at [facility], asked him to order replacement mattress to use with waffle mattress patient already has. Will hold discharge until confirmation of new HME delivered to home. CM updated team on status.    Update @ 12:40 PM  CM was notified by Hadley with [facility], patient's sister has complained about hospital bed and mattress delivered yesterday. CM attempted to reach sister, Sabi, without results. CM notified team on discharge delay.      11:01 AM  CM followed up with treatment team regarding discharge and requested orders. Patient had tunnel cath removed last night in IR. Patient's sister, Sabi Avila, was updated by the team.    CM arranged stretcher transportation via [facility], requested pickup 12:15 PM.    If the patient still has extra feedings (TF), please send home with her.     CM faxed orders to [facility] ([phone]) and updated Hadley ([phone]).      Maria L German RN  Weekend  - [facility]  [phone].

## 2022-07-16 NOTE — PLAN OF CARE
[facility]  Department of Hospital Medicine  [address] [place] [address]  [phone] after hours  [phone] fax    HOSPICE  ORDERS    07/16/2022    Admit to Hospice:  Home Service Inpatient Service. Home     Diagnoses:   Active Hospital Problems    Diagnosis  POA    *Hypernatremia [E87.0]  Yes    Blood blister [T14.8XXA]  No    Encephalopathy [G93.40]  Yes    Palliative care encounter [Z51.5]  Not Applicable    Goals of care, counseling/discussion [Z71.89]  Not Applicable    Discharge planning issues [Z02.9]  Not Applicable    Leukocytosis [D72.829]  Yes    Sacral decubitus ulcer, stage IV [L89.154]  Yes    Tracheostomy in place [Z93.0]  Not Applicable    G tube feedings [Z93.1]  Not Applicable    Seizures [R56.9]  Yes    ACP (advance care planning) [Z71.89]  Not Applicable    History of CVA (cerebrovascular accident) [Z86.73]  Not Applicable     Chronic    Essential hypertension [I10]  Yes    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    Code Status: DNR    Allergies:   Review of patient's allergies indicates:   Allergen Reactions    Iodinated contrast media Hives       Diet: Tube feeds through PEG tube    Activities: As tolerated    Goals of Care Treatment Preferences:  Code Status: DNR    Health  "care agent: Sabi Diazray  [facility] agent number: [phone]    Living Will: Yes  [place]: Yes  What is most important right now is to focus on extending life as long as possible, even it it means sacrificing quality, curative/life-prolongation (regardless of treatment burdens).  Accordingly, we have decided that the best plan to meet the patient's goals includes continuing with treatment.      Nursing: Per Hospice Routine.   Colostomy Care:  Empty bag every shift and prn                                             Change and clean site every 48 hours    PEG Care:  Clean site daily.  Monitor skin integrity.    Donnelly Care: Empty Donnelly bag Q shift and PRN.  Change Donnelly every month.    Routine Skin for Bedridden Patients: Apply moisture barrier cream to all skin folds and   wet areas in perineal area daily and after baths and all bowel movements.      Oxygen: Continue oxygen if patient requires with tracheostomy    Other Miscellaneous Care:     WOUND CARE:  Wound spray or saline for wound cleaning with all dressing changes.    All wounds to be measured with first dressing changes and every week.      Pressure Ulcer(s) Stage IV  Location: Sacrum        Consult ET nurse        Apply the following to wound:         -  Collagenase (Santyl) daily, cover with telfa, wrap with with kerlix dressing                  -  Wet to Damp dressing: Every 8 hours daily            -  Other: Turn patient and have ulcer care daily              Dry or minimal exudate wound:               Apply wound gel daily (supplied by [facility] [place]), cover with telfa dressing            Abscess s/p I&D:     Location: Sacrum       Irrigate with saline or wound spray     Loosely fill with iodoform gauze or nugauze daily. Size: 1/4"        Cover with gauze dressing      Medications:        Medication List      START taking these medications    amoxicillin-clavulanate 200-28.5 mg/5 mL Susr  Commonly known as: AUGMENTIN  Take 21.9 mLs (876 mg total) by mouth " "every 12 (twelve) hours. for 21 days     atorvastatin 80 MG tablet  Commonly known as: LIPITOR  1 tablet (80 mg total) by Per G Tube route once daily.     insulin detemir U-100 100 unit/mL (3 mL) Inpn pen  Commonly known as: Levemir FLEXTOUCH  Inject 8 Units into the skin every evening.     levetiracetam 500 mg/5 mL (5 mL) Soln  7.5 mLs (750 mg total) by Per G Tube route 2 (two) times daily.     pen needle, diabetic 32 gauge x 5/32" Ndle  Commonly known as: BD ULTRA-FINE WERO PEN NEEDLE  Use with insulin pens        CONTINUE taking these medications    bisacodyL 10 mg Supp  Commonly known as: DULCOLAX  Place 10 mg rectally 2 (two) times daily as needed (CONSTIPATION).     hyoscyamine 0.125 mg Subl  Commonly known as: LEVSIN/SL  Place 0.125 mg under the tongue every 2 (two) hours as needed (EXCESS SECRETIONS).     lorazepam 2 mg/mL Conc  Commonly known as: ATIVAN  TAKE 0.25-1ML BY MOUTH EVERY 2 HOURS AS NEEDED FOR RESTLESSNESS OR AGITATION     morphine 100 mg/5 mL (20 mg/mL) concentrated solution  TAKE 0.25-1 ML BY MOUTH EVERY 2 HOURS AS NEEDED FOR PAIN OR SHORTNESS OF BREATH              DIABETES CARE:    Fingerstick blood sugar a.m. and p.m.     Fingerstick blood sugar AC and HS     Fingerstick blood sugar every 6 hours if patient is unable to eat    Report CBG < 60 or > 350 to physician.         Insulin Sliding Scale         Glucose  Novolog Insulin Subcutaneous        0 - 60   Orange juice or glucose tablet      No insulin   201-250  2 units   251-300  4 units   301-350  6 units   351-400  8 units   >400   10 units then call physician      Future Orders:  Hospice Medical Director may dictate new orders for comfortable care measures & sign death certificate.        _________________________________  Marge Brown MD  07/16/2022    "

## 2022-07-16 NOTE — PLAN OF CARE
Problem: Adult Inpatient Plan of Care  Goal: Optimal Comfort and Wellbeing  Outcome: Ongoing, Progressing     Problem: Impaired Wound Healing  Goal: Optimal Wound Healing  Outcome: Ongoing, Progressing     Problem: Infection  Goal: Absence of Infection Signs and Symptoms  Outcome: Ongoing, Progressing     Problem: Skin Injury Risk Increased  Goal: Skin Health and Integrity  Outcome: Ongoing, Progressing

## 2022-07-17 RX ORDER — LEVETIRACETAM 100 MG/ML
500 SOLUTION ORAL 2 TIMES DAILY
Qty: 300 ML | Refills: 11 | Status: SHIPPED | OUTPATIENT
Start: 2022-07-17 | End: 2023-07-17

## 2022-07-17 RX ORDER — AMOXICILLIN AND CLAVULANATE POTASSIUM 400; 57 MG/5ML; MG/5ML
875 POWDER, FOR SUSPENSION ORAL EVERY 12 HOURS
Qty: 415 ML | Refills: 0 | Status: SHIPPED | OUTPATIENT
Start: 2022-07-17 | End: 2022-08-05

## 2022-07-17 NOTE — ASSESSMENT & PLAN NOTE
On insulin glargine and lispro (unclear doses). A1c repeated 6.9.    - LDSSI   - POCT glucose q6hrs  - Maintain -180  - Titrate basal/bolus regimen to goal as above.  - Discharge patient home with [facility] with insulin and glucometer strips
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
## 2022-07-17 NOTE — DISCHARGE SUMMARY
[facility] - Intensive Care ([place] [address])  [facility] Medicine  Discharge Summary      Patient Name: Darlene Avila  MRN: [id number]  Patient Class: IP- Inpatient  Admission Date: 6/17/2022  Hospital Length of Stay: 27 days  Discharge Date and Time:  07/17/2022 3:46 PM  Attending Physician: No att. providers found   Discharging Provider: Maria L Mathew DO  Primary Care Provider: Kirill Cabrera Iii, MD  [facility] Medicine Team: [facility] [place] [place] HOSP MED 2 Maria L Mathew DO    HPI:   Darlene Avila is a 71 year old F with history of diabetes type 2, seizure c/b anoxic brain injury s/p trach, PEG, bed bound status, Stage IV sacral ulcer, ischemic CVA, IBS, history of breast cancer s/p right mastectomy and failure to thrive who was brought in by her HPOA (sister) for nursing home placement. Patient is unable to provide history.     Patient's sister was contacted who stated that the patient was admitted to [facility] in [facility] for recurrent seizures/status epilepticus which resulted in an anoxic brain injury and respiratory failure.  Patient was intubated followed by tracheostomy and PEG placement .  Patient was discharged to nursing home and subsequently discharged to hospice care at the request of patient's sister. However a few days ago, she visited the patient at [facility] recovery facility and noticed they weren't feeding or taking care of the patient due to her 'hospice status'. Per sister today, she would like to place the patient in a skilled nursing facility and would like to rescind her hospice care at this time.  She is no longer interested in hospice placement. She states the patient at baseline is only able to follow some simple commands. She is unsure how much O2 level is her baseline via trach collar.   Of note she has a Stage IV sacral wound that is s/p debridement on 06/09 by Gen surgery in [facility].     Upon arrival to the ED, the patient was hemodynamically stable. Labs revealed Na 157, WBC 14. Hgb 9.4  (baseline 7-9). The patient was given 1L of IV NS and was admitted to hospital medicine for further management.       Procedure(s) (LRB):  DEBRIDEMENT, WOUND, sacral ulcer (N/A)      Hospital Course:   Patient admitted to hospital medicine with pre-existing anoxic brain injury, hypernatremia, sacral decubitus wound s/p debridement 06/08 in [place], [place]. Patient initially admitted to hospice following discharge from [facility] ICU, but discharged from hospice as patient family believed she was not getting appropriate care. Na 157, corrected with IVF (NS and hypotonic solutions) and enteral FWF down to 148. Trach collar exchanged by ENT due to lack of supplies at facility. Wound care consulted for sacral decubitus, recommended General Surgery consult for possible debridement as wound appears purulent. Persistent leukocytosis, started Zosyn per previous I&D wound cultures growing bacteriodes/enterococcus and new wound findings. cEEG started and Epilepsy consulted for assistance with antiepileptic medications; conflicting reports if patient was on only Keppra/Lacosamide vs Keppra/Lacosamide/Phenytoin. General surgery consulted for debridement of sacral decubitus ulcer, s/p I&D 06/24. Palliative Care consulted for assistance in GOC; decision to pursue medical/surgical care decided. ENT consulted for possible trach decannulation. ID consulted, planning for 6-weeks course of zosyn.Sodium improved; will continue free water flushes.Patient remains with trach on humidifier. Mentation waxes and wanes. Wound care team saw patient for her blood blister on her scalp. Foam dressing applied.     CM/SW to assist with dispo. Due to trach placement, skilled nursing placement for SNF limited. Long discussion with patient's sister about long term care options for patient. Sister states that she is unable to provide patient the care she needs at home on her own. SW spoke to patient's sister and reports that she is agreeable to placing patient at  [facility]. [facility] will put patient on the wait list. JAZMIN sent e-mail for SSI referral to assist with medicare. Discuss with patient's sister, Sabi, about hospice. [facility] met with patient's sister. They are able to provide trach and PEG tube care. Patient's sister is agreeable with placing patient with [facility], however, they cannot give round the clock care. We are looking into finding sitters for patient. [facility] stated that they are unable to accept patient. [facility] is willing to accept patient. Sabi was given both options by JAZMIN and she would like for patient to be discharged home with [facility]. She is actively looking for sitters. Sabi confirmed that she has all neccessary equipments for patient at home. ABX regimen switched to PO augmentin. [facility] and Sabi both agreed on discharge 7/16.            Goals of Care Treatment Preferences:  Code Status: DNR    Health care agent: Sabi [facility] agent number: [phone]    Living Will: Yes  LaPOST: Yes  What is most important right now is to focus on extending life as long as possible, even it it means sacrificing quality, curative/life-prolongation (regardless of treatment burdens).  Accordingly, we have decided that the best plan to meet the patient's goals includes continuing with treatment.      Consults:   Consults (From admission, onward)        Status Ordering Provider     Inpatient consult to Interventional Radiology  Once        Provider:  (Not yet assigned)    Completed LEEANNE ROMEO     Inpatient consult to Infectious Diseases  Once        Provider:  (Not yet assigned)    Completed LIZBET MATHEW     Inpatient consult to Interventional Radiology  Once        Provider:  Concepcion Mathew MD    Completed ROHINI NELSON     [facility]  Once        Provider:  (Not yet assigned)    Completed ROHINI NELSON     Inpatient consult  to Infectious Diseases  Once        Provider:  JEREMIAH Antoine Jr.    Completed NELSON, ZI-ON     Inpatient consult to Neurology  Once        Provider:  Maddi Whelan MD    Completed NELSON, ZI-ON     Inpatient consult to Palliative Care  Once        Provider:  Berta Sun RN    Completed NELSON, ZI-ON     Inpatient consult to PICC team ([facility])  Once        Provider:  (Not yet assigned)    Completed MYCHAL BALBUENA     Inpatient consult to General Surgery  Once        Provider:  Yasir Hernandez MD    Completed ELISA RENTERIA     Inpatient consult to ENT  Once        Provider:  Emmett Guidry MD    Completed LEEANNE ROMEO     Inpatient consult to Registered Dietitian/Nutritionist  Once        Provider:  (Not yet assigned)    Completed OBI, SARAHYECHINEDU     Inpatient consult to Social Work/Case Management  Once        Provider:  (Not yet assigned)    Completed MYCHAL BALBUENA          * Hypernatremia  STABLE, WNL    Na stable today.   - Free water flushes      Blood blister  Patient developed a blood blister on her scalp at the left parietal region. Wound care team saw her 7/14.     Plan:  - Continue foam dressing per wound care team's recommendation  - Advise nursing to maintain pressure injury prevention measures    Encephalopathy  Mentation waxes and wanes. Mentation is drowsy today. She wakes up with sternal rub.    Goals of care, counseling/discussion  Refer to palliative care note.      Palliative care encounter  Per Primary Medicine team and Palliative Care encounters with patient family (separate encounters, see dated notes in chart), patient family wishes full medical care and does not desire comfort/hospice measures. DNR remains. See Pall Care note for further detail.    Seizures  Patient previously started on Lacosamide and Keppra at OSH during initial episode of status epilepticus. Phenytoin added as patient continued to have breakthrough seizures.    - Repeat EEG ordered 2/2 interval  decrease in alert/responsiveness; diffuse toxic metabolic encephalopathy, no new seizure per Neuro EP  - Continue Keppra monotherapy 750 bid; titrate per neuro recs  - Neurology consulted for further assistance in antiepileptic regimen    G tube feedings  Tube feed dependent via G tube  - Nutrition to assist with TF recs; see recommendations in note; appreciate assistance      Tracheostomy in place  Dysarthria  Dysphagia    Trach connected to humidifier, good O2 sats on RA     - Respiratory therapy to assist with trach care.   - SLP for swallow evaluation and speech therapy; recommending NPO, re-evaluation, PMSV under direct supervision  - ENT advising to keep tracheostomy in place due to intermittent responsiveness; amenable to decannulation if patient family moves towards hospice/comfort measures    Sacral decubitus ulcer, stage IV  S/p recent debridement at OSH 06/08. OSH Wcx with enterococcus faecalis, bacteriodes fragilus.    - Wound care consulted; appreciate assistance  - Deep wound cx  - General surgery consulted s/p debridement 06/24, no note of bone involvement per OP note, no cultures sent  - Infectious Disease consulted, zosyn for 6-weeks (End date is 8/5). Switched to Augmentin   - Consideration for wound vac given depth of injury  - Turn q2h    Leukocytosis  Patient admitted with elevated WBC to 14s. Previous episodes of aspiration PNA at OSH, indwelling cabrera given acuity of condition, sacral decubitus ulcer stave IV s/p debridement with enterococcus faecalis / bacteriodes fragilis in cx. Patient afebrile with stable hemodynamics on admission at this time. CXR without acute abnormality. UA with yeast growing, chronic per previous OSH review.    Leukocytosis may be explained by soft tissue infection vs hemoconcentration given extensive dehydration and hypernatremia from lack of enteral fluids or IVF at outside hospice facility prior to transfer to [facility].     Currently STABLE    - Zosyn given previous  cultures and persistent leukocytosis  - Etiology may be soft tissue infection; see Sacral Decubitus Ulcer A/P  - Continue monitoring CBC  - Wound care per Sacral Decubitus Ulcer A/P  - Aspiration precautions  - Trend fever curve  - Trend BCx    Discharge planning issues  Darlene Avila is a 71 year old F with history of diabetes type 2, seizure, ischemic CVA, IBS, history of breast cancer s/p right mastectomy and failure to thrive who was brought in by her HPOA (sister) for skilled nursing home placement due to concern for neglect at recent hospice facility. Patient follows very simple commands and doubt she can engage in skilled therapy. shelter nursing facility likely more of an option.     - Appreciate CM/SW assistance with placement; placement difficult d/t trach. Patient is now on the wait list for placement at [facility]. Her sister is going to meet with [facility] (7/11) to discuss long term placement. [facility] can provide of trach and PEG care. They are agreeable with accepting patient. Consulted ID about switching patient's IV abx to oral. Patient switched to Augmentin. Patient's sister would like for patient to be discharge home with [facility]. Patient prescribed glucometer strip, Augmentin, Keppra, and insulin.  - Appreciate pharmacy's assistance with med rec from previous admission  - Consult IR for tunnelled PICC removal     ACP (advance care planning)  Patient is DNR. LaPOST on file.     - CM/SW assisting; tracheostomy status complicating placement to [facility] w/ hospice. Spoke with [facility] and they are agreeable with accepting patient. Consulted ID about switching patient's IV abx to oral. Patient switched to augmentin  - See Care Update note Zi-on MD Aramis on 06/29 for further detail.    History of CVA (cerebrovascular accident)  Unclear of patient's current medications. Per med review on care-everywhere, the patient is on statin but not on antiplatelet  therapy.     - Appreciate pharmacy's assistance with medication reconciliation with facility  - start antiplatelet therapy if no contraindications.   - Continue statin via G-tube      Essential hypertension  Blood pressure stable.      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 A1c repeated 6.9.    - LDSSI   - POCT glucose q6hrs  - Maintain -180  - Titrate basal/bolus regimen to goal as above.  - Discharge patient home with [facility] with insulin and glucometer strips      Final Active Diagnoses:    Diagnosis Date Noted POA    PRINCIPAL PROBLEM:  Hypernatremia [E87.0] 06/18/2022 Yes    Blood blister [T14.8XXA] 07/14/2022 No    Encephalopathy [G93.40]  Yes    Palliative care encounter [Z51.5] 06/21/2022 Not Applicable    Goals of care, counseling/discussion [Z71.89]  Not Applicable    Discharge planning issues [Z02.9] 06/18/2022 Not Applicable    Leukocytosis [D72.829] 06/18/2022 Yes    Sacral decubitus ulcer, stage IV [L89.154] 06/18/2022 Yes    Tracheostomy in place [Z93.0] 06/18/2022 Not Applicable    G tube feedings [Z93.1] 06/18/2022 Not Applicable    Seizures [R56.9] 06/18/2022 Yes    ACP (advance care planning) [Z71.89] 12/27/2020 Not Applicable    History of CVA (cerebrovascular accident) [Z86.73] 07/24/2020 Not Applicable     Chronic    Essential hypertension [I10] 07/22/2020 Yes    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 none    Height: 4' 11" (1.499 m)    Weight: 50.2 kg (110 lb 10.7 oz)    Please check all that apply: Patient requires positioning of the body in ways not feasible in an ordinary bed due to a medical condition which is expected to last at least one month.    Please check all that apply: Patient requires, for the alleviation of pain, positioning of the body in ways not feasible in an ordinary bed.    Please check all that apply: Patient requires the head of bed to be elevated more than 30 degrees most of the time due to congestive heart failure, chronic pulmonary disease, or aspiration.  Pillows and wedges have been considered and ruled out.    Vendor: [facility]    Expected Date of Delivery: 7/5/2022 not delivered due to discharge cancellation     HOSPITAL BED FOR HOME USE     Order Specific Question Answer Comments   Type: Semi-electric    Length of need (1-99 months): 99    Does patient have medical equipment at home? none    Height: 4' 11" (1.499 m)    Weight: 50.2 kg (110 lb 10.7 oz)    Please check all that apply: Patient requires positioning of the body in ways not feasible in an ordinary bed due to a medical condition which is expected to last at least one month.    Please check all that apply: Patient requires, for the alleviation of pain, positioning of the body in ways not feasible in an ordinary bed.    Please check all that apply: Patient requires the head of bed to be elevated more than 30 degrees most of the time due to congestive heart failure, chronic pulmonary disease, or aspiration.  Pillows and wedges have been considered and ruled out.    Vendor: [facility]    Expected Date of Delivery: 7/5/2022 cancelled due to patient not going home     Ambulatory referral/consult to Neurology "   Standing Status: Future   Referral Priority: Routine Referral Type: Consultation   Referral Reason: Specialty Services Required   Requested Specialty: Neurology   Number of Visits Requested: 1     Ambulatory referral/consult to [facility]   Standing Status: Future   Referral Priority: Routine Referral Type: Consultation   Referral Reason: Specialty Services Required   Number of Visits Requested: 1     COVID-19 PFIZER 2ND DOSAGE APPT REQUEST   Standing Status: Future Standing Exp. Date: 07/10/23     Order Specific Question Answer Comments   Schedule the second dosage on this date: 7/31/2022        Significant Diagnostic Studies: Labs: CMP No results for input(s): NA, K, CL, CO2, GLU, BUN, CREATININE, CALCIUM, PROT, ALBUMIN, BILITOT, ALKPHOS, AST, ALT, ANIONGAP, ESTGFRAFRICA, EGFRNONAA in the last 48 hours. and CBC No results for input(s): WBC, HGB, HCT, PLT in the last 48 hours.    Pending Diagnostic Studies:     Procedure Component Value Units Date/Time    CBC with Automated Differential [[id number]] Collected: 07/03/22 0541    Order Status: Sent Lab Status: In process Updated: 07/03/22 0541    Specimen: Blood     Magnesium [[id number]] Collected: 07/03/22 0541    Order Status: Sent Lab Status: In process Updated: 07/03/22 0541    Specimen: Blood     Phosphorus [[id number]] Collected: 07/03/22 0541    Order Status: Sent Lab Status: In process Updated: 07/03/22 0541    Specimen: Blood     Sodium [[id number]] Collected: 07/03/22 0541    Order Status: Sent Lab Status: In process Updated: 07/03/22 0541    Specimen: Blood     Sodium [[id number]] Collected: 06/19/22 0750    Order Status: Sent Lab Status: In process Updated: 06/19/22 0750    Specimen: Blood     Narrative:      Collection has been rescheduled by RRJ at 06/18/2022 11:16 Reason:   Unable to collect. Nurse Alexei has been notified .   Collection has been rescheduled by DF2 at 06/18/2022 15:49 Reason:   BMP drawn at 15:39.  Que 4 on NA.    Sodium  "[[id number]] Collected: 06/19/22 0750    Order Status: Sent Lab Status: In process Updated: 06/19/22 0750    Specimen: Blood     Narrative:      Collection has been rescheduled by DF2 at 06/18/2022 21:38 Reason: NA   due at 00:30  Collection has been rescheduled by RFW at 06/19/2022 00:23 Reason:   Patient done at 12:23 am         Medications:  Reconciled Home Medications:      Medication List      START taking these medications    amoxicillin-clavulanate 200-28.5 mg/5 mL Susr  Commonly known as: AUGMENTIN  Take 21.9 mLs (876 mg total) by mouth every 12 (twelve) hours. for 21 days     atorvastatin 80 MG tablet  Commonly known as: LIPITOR  1 tablet (80 mg total) by Per G Tube route once daily.     insulin detemir U-100 100 unit/mL (3 mL) Inpn pen  Commonly known as: Levemir FLEXTOUCH  Inject 8 Units into the skin every evening.     levetiracetam 500 mg/5 mL (5 mL) Soln  7.5 mLs (750 mg total) by Per G Tube route 2 (two) times daily.     pen needle, diabetic 32 gauge x 5/32" Ndle  Commonly known as: BD ULTRA-FINE WERO PEN NEEDLE  Use with insulin pens        CONTINUE taking these medications    bisacodyL 10 mg Supp  Commonly known as: DULCOLAX  Place 10 mg rectally 2 (two) times daily as needed (CONSTIPATION).     hyoscyamine 0.125 mg Subl  Commonly known as: LEVSIN/SL  Place 0.125 mg under the tongue every 2 (two) hours as needed (EXCESS SECRETIONS).     lorazepam 2 mg/mL Conc  Commonly known as: ATIVAN  TAKE 0.25-1ML BY MOUTH EVERY 2 HOURS AS NEEDED FOR RESTLESSNESS OR AGITATION     morphine 100 mg/5 mL (20 mg/mL) concentrated solution  TAKE 0.25-1 ML BY MOUTH EVERY 2 HOURS AS NEEDED FOR PAIN OR SHORTNESS OF BREATH            Indwelling Lines/Drains at time of discharge:   Lines/Drains/Airways     Drain  Duration                Gastrostomy/Enterostomy 06/18/22 0400 LUQ 29 days         Urethral Catheter 06/18/22 0600 Non-latex 16 Fr. 29 days         Rectal Tube 07/14/22 1047 3 days          Airway  Duration            "     Surgical Airway Ric Uncuffed -- days                Time spent on the discharge of patient: 40 minutes         Maria L Mathew DO  Department of Hospital Medicine  [facility] - Intensive Care ([place])

## 2022-07-17 NOTE — ASSESSMENT & PLAN NOTE
Patient admitted with elevated WBC to 14s. Previous episodes of aspiration PNA at OSH, indwelling cabrera given acuity of condition, sacral decubitus ulcer stave IV s/p debridement with enterococcus faecalis / bacteriodes fragilis in cx. Patient afebrile with stable hemodynamics on admission at this time. CXR without acute abnormality. UA with yeast growing, chronic per previous OSH review.    Leukocytosis may be explained by soft tissue infection vs hemoconcentration given extensive dehydration and hypernatremia from lack of enteral fluids or IVF at outside hospice facility prior to transfer to [facility].     Currently STABLE    - Zosyn given previous cultures and persistent leukocytosis  - Etiology may be soft tissue infection; see Sacral Decubitus Ulcer A/P  - Continue monitoring CBC  - Wound care per Sacral Decubitus Ulcer A/P  - Aspiration precautions  - Trend fever curve  - Trend BCx

## 2022-07-17 NOTE — NURSING
Upon d/c pt iv previously removed. D/c paperwork has been reviewed, pt family at bedside verbalized understanding.  Pt left off of the unit via EMS with family and all personal belongings.

## 2022-07-17 NOTE — ASSESSMENT & PLAN NOTE
Patient is DNR. LaPOST on file.     - CM/SW assisting; tracheostomy status complicating placement to skilled nursing w/ hospice. Spoke with [facility] and they are agreeable with accepting patient. Consulted ID about switching patient's IV abx to oral. Patient switched to augmentin  - See Care Update note Zi-on MD Aramis on 06/29 for further detail.
